# Patient Record
Sex: MALE | Race: WHITE | NOT HISPANIC OR LATINO | Employment: UNEMPLOYED | URBAN - METROPOLITAN AREA
[De-identification: names, ages, dates, MRNs, and addresses within clinical notes are randomized per-mention and may not be internally consistent; named-entity substitution may affect disease eponyms.]

---

## 2018-10-19 ENCOUNTER — HOSPITAL ENCOUNTER (OUTPATIENT)
Dept: CARDIOLOGY | Facility: HOSPITAL | Age: 53
Discharge: HOME | End: 2018-10-19
Attending: FAMILY MEDICINE | Admitting: FAMILY MEDICINE

## 2020-10-01 ENCOUNTER — INPATIENT HOSPITAL (OUTPATIENT)
Dept: URBAN - METROPOLITAN AREA HOSPITAL 76 | Facility: HOSPITAL | Age: 55
End: 2020-10-01

## 2020-10-01 DIAGNOSIS — K85.90 ACUTE PANCREATITIS WITHOUT NECROSIS OR INFECTION, UNSPECIFIE: ICD-10-CM

## 2020-10-01 DIAGNOSIS — R12 HEARTBURN: ICD-10-CM

## 2020-10-01 PROCEDURE — 99253 IP/OBS CNSLTJ NEW/EST LOW 45: CPT | Performed by: NURSE PRACTITIONER

## 2020-10-02 ENCOUNTER — INPATIENT HOSPITAL (OUTPATIENT)
Dept: URBAN - METROPOLITAN AREA HOSPITAL 76 | Facility: HOSPITAL | Age: 55
End: 2020-10-02

## 2020-10-02 DIAGNOSIS — K85.90 ACUTE PANCREATITIS WITHOUT NECROSIS OR INFECTION, UNSPECIFIE: ICD-10-CM

## 2020-10-02 DIAGNOSIS — R12 HEARTBURN: ICD-10-CM

## 2020-10-02 PROCEDURE — 99232 SBSQ HOSP IP/OBS MODERATE 35: CPT | Performed by: NURSE PRACTITIONER

## 2020-10-23 ENCOUNTER — OFFICE (OUTPATIENT)
Dept: URBAN - METROPOLITAN AREA CLINIC 64 | Facility: CLINIC | Age: 55
End: 2020-10-23

## 2020-10-23 VITALS
HEIGHT: 68 IN | HEART RATE: 81 BPM | SYSTOLIC BLOOD PRESSURE: 132 MMHG | DIASTOLIC BLOOD PRESSURE: 80 MMHG | WEIGHT: 241 LBS

## 2020-10-23 DIAGNOSIS — K85.90 ACUTE PANCREATITIS WITHOUT NECROSIS OR INFECTION, UNSPECIFIE: ICD-10-CM

## 2020-10-23 PROCEDURE — 99214 OFFICE O/P EST MOD 30 MIN: CPT | Performed by: INTERNAL MEDICINE

## 2020-11-11 ENCOUNTER — ON CAMPUS - OUTPATIENT (OUTPATIENT)
Dept: URBAN - METROPOLITAN AREA HOSPITAL 77 | Facility: HOSPITAL | Age: 55
End: 2020-11-11

## 2020-11-11 DIAGNOSIS — K85.90 ACUTE PANCREATITIS WITHOUT NECROSIS OR INFECTION, UNSPECIFIE: ICD-10-CM

## 2020-11-11 DIAGNOSIS — D17.5 BENIGN LIPOMATOUS NEOPLASM OF INTRA-ABDOMINAL ORGANS: ICD-10-CM

## 2020-11-11 PROCEDURE — 43259 EGD US EXAM DUODENUM/JEJUNUM: CPT | Performed by: INTERNAL MEDICINE

## 2022-11-15 ENCOUNTER — HOSPITAL ENCOUNTER (OUTPATIENT)
Facility: HOSPITAL | Age: 57
Discharge: HOME | End: 2022-11-15

## 2022-11-15 VITALS
OXYGEN SATURATION: 95 % | RESPIRATION RATE: 18 BRPM | BODY MASS INDEX: 36.98 KG/M2 | TEMPERATURE: 97.1 F | HEART RATE: 93 BPM | SYSTOLIC BLOOD PRESSURE: 140 MMHG | DIASTOLIC BLOOD PRESSURE: 94 MMHG | HEIGHT: 68 IN | WEIGHT: 244 LBS

## 2022-11-15 DIAGNOSIS — J10.1 INFLUENZA A: Primary | ICD-10-CM

## 2022-11-15 LAB
FLUAV SUBTYP SPEC NAA+PROBE: DETECTED
FLUBV RNA ISLT QL NAA+PROBE: NOT DETECTED
SARS-COV-2 RNA RESP QL NAA+PROBE: NOT DETECTED

## 2022-11-15 PROCEDURE — EDLOS: Performed by: PHYSICIAN ASSISTANT

## 2022-11-15 PROCEDURE — 87636 SARSCOV2 & INF A&B AMP PRB: CPT

## 2022-11-15 PROCEDURE — 99203 OFFICE O/P NEW LOW 30 MIN: CPT | Performed by: PHYSICIAN ASSISTANT

## 2022-11-15 PROCEDURE — 87636 SARSCOV2 & INF A&B AMP PRB: CPT | Performed by: PHYSICIAN ASSISTANT

## 2022-11-15 RX ORDER — GUAIFENESIN 600 MG/1
1200 TABLET, EXTENDED RELEASE ORAL 2 TIMES DAILY
Qty: 20 TABLET | Refills: 0 | Status: SHIPPED | OUTPATIENT
Start: 2022-11-15 | End: 2022-11-20

## 2022-11-15 RX ORDER — BENZONATATE 200 MG/1
200 CAPSULE ORAL 3 TIMES DAILY PRN
Qty: 15 CAPSULE | Refills: 0 | Status: SHIPPED | OUTPATIENT
Start: 2022-11-15 | End: 2022-11-22

## 2022-11-18 ENCOUNTER — HOSPITAL ENCOUNTER (EMERGENCY)
Facility: HOSPITAL | Age: 57
Discharge: HOME | End: 2022-11-18
Attending: EMERGENCY MEDICINE | Admitting: EMERGENCY MEDICINE

## 2022-11-18 ENCOUNTER — APPOINTMENT (OUTPATIENT)
Dept: GENERAL RADIOLOGY | Facility: HOSPITAL | Age: 57
End: 2022-11-18

## 2022-11-18 ENCOUNTER — APPOINTMENT (OUTPATIENT)
Dept: CT IMAGING | Facility: HOSPITAL | Age: 57
End: 2022-11-18

## 2022-11-18 VITALS
HEART RATE: 98 BPM | BODY MASS INDEX: 37.42 KG/M2 | WEIGHT: 246.91 LBS | TEMPERATURE: 100.4 F | SYSTOLIC BLOOD PRESSURE: 126 MMHG | HEIGHT: 68 IN | OXYGEN SATURATION: 94 % | RESPIRATION RATE: 16 BRPM | DIASTOLIC BLOOD PRESSURE: 77 MMHG

## 2022-11-18 DIAGNOSIS — J11.1 INFLUENZA: ICD-10-CM

## 2022-11-18 DIAGNOSIS — R50.9 FEVER, UNSPECIFIED FEVER CAUSE: Primary | ICD-10-CM

## 2022-11-18 LAB
ALBUMIN SERPL-MCNC: 3.9 G/DL (ref 3.5–5.2)
ALBUMIN/GLOB SERPL: 0.9 G/DL
ALP SERPL-CCNC: 83 U/L (ref 39–117)
ALT SERPL W P-5'-P-CCNC: 28 U/L (ref 1–41)
ANION GAP SERPL CALCULATED.3IONS-SCNC: 18 MMOL/L (ref 5–15)
AST SERPL-CCNC: 26 U/L (ref 1–40)
BASOPHILS # BLD AUTO: 0.1 10*3/MM3 (ref 0–0.2)
BASOPHILS NFR BLD AUTO: 0.7 % (ref 0–1.5)
BILIRUB SERPL-MCNC: 0.5 MG/DL (ref 0–1.2)
BUN SERPL-MCNC: 17 MG/DL (ref 6–20)
BUN/CREAT SERPL: 12.1 (ref 7–25)
CALCIUM SPEC-SCNC: 8.8 MG/DL (ref 8.6–10.5)
CHLORIDE SERPL-SCNC: 96 MMOL/L (ref 98–107)
CO2 SERPL-SCNC: 20 MMOL/L (ref 22–29)
CREAT SERPL-MCNC: 1.4 MG/DL (ref 0.76–1.27)
D-LACTATE SERPL-SCNC: 0.6 MMOL/L (ref 0.5–2)
DEPRECATED RDW RBC AUTO: 43.3 FL (ref 37–54)
EGFRCR SERPLBLD CKD-EPI 2021: 58.6 ML/MIN/1.73
EOSINOPHIL # BLD AUTO: 0 10*3/MM3 (ref 0–0.4)
EOSINOPHIL NFR BLD AUTO: 0.2 % (ref 0.3–6.2)
ERYTHROCYTE [DISTWIDTH] IN BLOOD BY AUTOMATED COUNT: 13.6 % (ref 12.3–15.4)
GLOBULIN UR ELPH-MCNC: 4.4 GM/DL
GLUCOSE SERPL-MCNC: 155 MG/DL (ref 65–99)
HCT VFR BLD AUTO: 45.4 % (ref 37.5–51)
HGB BLD-MCNC: 15.4 G/DL (ref 13–17.7)
HOLD SPECIMEN: NORMAL
HOLD SPECIMEN: NORMAL
LYMPHOCYTES # BLD AUTO: 1.9 10*3/MM3 (ref 0.7–3.1)
LYMPHOCYTES NFR BLD AUTO: 15.4 % (ref 19.6–45.3)
MCH RBC QN AUTO: 29.1 PG (ref 26.6–33)
MCHC RBC AUTO-ENTMCNC: 33.9 G/DL (ref 31.5–35.7)
MCV RBC AUTO: 86 FL (ref 79–97)
MONOCYTES # BLD AUTO: 1.5 10*3/MM3 (ref 0.1–0.9)
MONOCYTES NFR BLD AUTO: 12.4 % (ref 5–12)
NEUTROPHILS NFR BLD AUTO: 71.3 % (ref 42.7–76)
NEUTROPHILS NFR BLD AUTO: 8.6 10*3/MM3 (ref 1.7–7)
NRBC BLD AUTO-RTO: 0.1 /100 WBC (ref 0–0.2)
PLATELET # BLD AUTO: 241 10*3/MM3 (ref 140–450)
PMV BLD AUTO: 7.1 FL (ref 6–12)
POTASSIUM SERPL-SCNC: 4.1 MMOL/L (ref 3.5–5.2)
PROT SERPL-MCNC: 8.3 G/DL (ref 6–8.5)
RBC # BLD AUTO: 5.28 10*6/MM3 (ref 4.14–5.8)
SODIUM SERPL-SCNC: 134 MMOL/L (ref 136–145)
WBC NRBC COR # BLD: 12.1 10*3/MM3 (ref 3.4–10.8)
WHOLE BLOOD HOLD COAG: NORMAL
WHOLE BLOOD HOLD SPECIMEN: NORMAL

## 2022-11-18 PROCEDURE — 87040 BLOOD CULTURE FOR BACTERIA: CPT

## 2022-11-18 PROCEDURE — 71046 X-RAY EXAM CHEST 2 VIEWS: CPT

## 2022-11-18 PROCEDURE — 99284 EMERGENCY DEPT VISIT MOD MDM: CPT

## 2022-11-18 PROCEDURE — 85025 COMPLETE CBC W/AUTO DIFF WBC: CPT

## 2022-11-18 PROCEDURE — 83605 ASSAY OF LACTIC ACID: CPT

## 2022-11-18 PROCEDURE — 71250 CT THORAX DX C-: CPT

## 2022-11-18 PROCEDURE — 80053 COMPREHEN METABOLIC PANEL: CPT

## 2022-11-18 PROCEDURE — 36415 COLL VENOUS BLD VENIPUNCTURE: CPT

## 2022-11-18 RX ORDER — ACETAMINOPHEN 500 MG
1000 TABLET ORAL ONCE
Status: COMPLETED | OUTPATIENT
Start: 2022-11-18 | End: 2022-11-18

## 2022-11-18 RX ORDER — SODIUM CHLORIDE 0.9 % (FLUSH) 0.9 %
10 SYRINGE (ML) INJECTION AS NEEDED
Status: DISCONTINUED | OUTPATIENT
Start: 2022-11-18 | End: 2022-11-18 | Stop reason: HOSPADM

## 2022-11-18 RX ADMIN — SODIUM CHLORIDE, POTASSIUM CHLORIDE, SODIUM LACTATE AND CALCIUM CHLORIDE 1000 ML: 600; 310; 30; 20 INJECTION, SOLUTION INTRAVENOUS at 08:24

## 2022-11-18 RX ADMIN — ACETAMINOPHEN 1000 MG: 500 TABLET ORAL at 06:33

## 2022-11-18 NOTE — DISCHARGE INSTRUCTIONS
Rest plenty of fluids Tylenol for fever  Mucinex or Claritin but not both one of the other.  Return for increasing shortness of breath vomiting altered mental status cannot hold anything down chest pain no improvement next for 5 days or any other new or worsening problems or concerns.

## 2022-11-18 NOTE — ED PROVIDER NOTES
Subjective   History of Present Illness  Complaint fever cough short of breath weakness    History of present illness 57-year-old male who was diagnosed with influenza this past Tuesday he started getting sick on Tuesday with fever cough congestion has progressively gotten worse throughout the week he is little bit short of breath temperature of 203.  No vomiting or diarrhea.  No chest pain neck arm or jaw pain.  No ill exposure or foreign travels no antibiotic use or recent hospitalization.  Patient states he cannot sleep at night because of the cough and feels short of breath symptoms ongoing all week moderate-severe really nothing makes it better or worse and are continuous he has been home treating symptomatically.  He did receive a flu shot this year.  No tick bites or rashes no urinary complaints.  No rashes.  No other complaints or associated symptoms at this time.  Cough has been somewhat productive.        Review of Systems   Constitutional: Positive for chills and fever.   HENT: Positive for congestion and sinus pressure.    Eyes: Negative for photophobia and visual disturbance.   Respiratory: Positive for cough and shortness of breath. Negative for chest tightness.    Cardiovascular: Negative for chest pain and palpitations.   Gastrointestinal: Negative for abdominal pain and vomiting.   Genitourinary: Negative for difficulty urinating and dysuria.   Musculoskeletal: Positive for arthralgias and myalgias.   Skin: Negative for rash and wound.   Neurological: Positive for weakness and headaches.   Psychiatric/Behavioral: Negative for agitation and confusion.       Past Medical History:   Diagnosis Date   • Diverticulitis    • GERD (gastroesophageal reflux disease)    • Hypertension        No Known Allergies    Past Surgical History:   Procedure Laterality Date   • HERNIA REPAIR         No family history on file.    Social History     Socioeconomic History   • Marital status:    Tobacco Use   •  Smoking status: Never   • Smokeless tobacco: Never   Substance and Sexual Activity   • Alcohol use: Never   • Drug use: Never   • Sexual activity: Defer     Prior to Admission medications    Medication Sig Start Date End Date Taking? Authorizing Provider   ascorbic acid (VITAMIN C) 100 MG tablet VITAMIN C TABLET 11/28/16   Emergency, Nurse Monty, RN   benzonatate (TESSALON) 200 MG capsule Take 1 capsule by mouth 3 (Three) Times a Day As Needed for Cough for up to 7 days. 11/15/22 11/22/22  Kiko Ordaz II, PA-C   guaiFENesin (Mucinex) 600 MG 12 hr tablet Take 2 tablets by mouth 2 (Two) Times a Day for 5 days. 11/15/22 11/20/22  Kiko Ordaz II, PA-C   Krill Oil 1000 MG capsule KRILL OIL CAPS 11/28/16   Emergency, Nurse Monty, RN   lisinopril (PRINIVIL,ZESTRIL) 10 MG tablet LISINOPRIL 10 MG TABS 9/30/11   Emergency, Nurse Monty, RN   Loratadine-Pseudoephedrine (CLARITIN-D 12 HOUR PO) CLARITIN-D 12 HOUR XR12H-TAB 9/30/11   Emergency, Nurse Epic, RN           Objective   Physical Exam  Constitutional this is a 57-year-old male awake alert no distress temperature was 103.1 sats are 95% room air heart rate initially 116 respirations 20 blood pressure 134/82.  HEENT extraocular muscles are intact pupils equal and reactive there is no photophobia.  Mouth was clear.  Neck supple no adenopathy no meningeal signs.  Lungs clear no retractions.  Back no spinal tenderness or CVA tenderness.  Heart regular without murmur.  Abdomen soft nontender good bowel sounds no peritoneal findings or enlargement of liver or spleen.  No other masses.  Extremities pulses equal throughout upper lower extremities no edema no cords no Homans' sign no evidence of a DVT skin is warm and dry without rashes or cellulitic changes.  Neurologic awake alert follows commands motor strength normal without focal deficits.  Procedures           ED Course      Results for orders placed or performed during the hospital encounter of 11/18/22    Comprehensive Metabolic Panel    Specimen: Blood   Result Value Ref Range    Glucose 155 (H) 65 - 99 mg/dL    BUN 17 6 - 20 mg/dL    Creatinine 1.40 (H) 0.76 - 1.27 mg/dL    Sodium 134 (L) 136 - 145 mmol/L    Potassium 4.1 3.5 - 5.2 mmol/L    Chloride 96 (L) 98 - 107 mmol/L    CO2 20.0 (L) 22.0 - 29.0 mmol/L    Calcium 8.8 8.6 - 10.5 mg/dL    Total Protein 8.3 6.0 - 8.5 g/dL    Albumin 3.90 3.50 - 5.20 g/dL    ALT (SGPT) 28 1 - 41 U/L    AST (SGOT) 26 1 - 40 U/L    Alkaline Phosphatase 83 39 - 117 U/L    Total Bilirubin 0.5 0.0 - 1.2 mg/dL    Globulin 4.4 gm/dL    A/G Ratio 0.9 g/dL    BUN/Creatinine Ratio 12.1 7.0 - 25.0    Anion Gap 18.0 (H) 5.0 - 15.0 mmol/L    eGFR 58.6 (L) >60.0 mL/min/1.73   CBC Auto Differential    Specimen: Blood   Result Value Ref Range    WBC 12.10 (H) 3.40 - 10.80 10*3/mm3    RBC 5.28 4.14 - 5.80 10*6/mm3    Hemoglobin 15.4 13.0 - 17.7 g/dL    Hematocrit 45.4 37.5 - 51.0 %    MCV 86.0 79.0 - 97.0 fL    MCH 29.1 26.6 - 33.0 pg    MCHC 33.9 31.5 - 35.7 g/dL    RDW 13.6 12.3 - 15.4 %    RDW-SD 43.3 37.0 - 54.0 fl    MPV 7.1 6.0 - 12.0 fL    Platelets 241 140 - 450 10*3/mm3    Neutrophil % 71.3 42.7 - 76.0 %    Lymphocyte % 15.4 (L) 19.6 - 45.3 %    Monocyte % 12.4 (H) 5.0 - 12.0 %    Eosinophil % 0.2 (L) 0.3 - 6.2 %    Basophil % 0.7 0.0 - 1.5 %    Neutrophils, Absolute 8.60 (H) 1.70 - 7.00 10*3/mm3    Lymphocytes, Absolute 1.90 0.70 - 3.10 10*3/mm3    Monocytes, Absolute 1.50 (H) 0.10 - 0.90 10*3/mm3    Eosinophils, Absolute 0.00 0.00 - 0.40 10*3/mm3    Basophils, Absolute 0.10 0.00 - 0.20 10*3/mm3    nRBC 0.1 0.0 - 0.2 /100 WBC   POC Lactate    Specimen: Blood   Result Value Ref Range    Lactate 0.6 0.5 - 2.0 mmol/L   Green Top (Gel)   Result Value Ref Range    Extra Tube hide    Lavender Top   Result Value Ref Range    Extra Tube hold for add-on    Gold Top - SST   Result Value Ref Range    Extra Tube hide    Light Blue Top   Result Value Ref Range    Extra Tube Hold for add-ons.       XR Chest 2 View    Result Date: 11/18/2022  Hypoinflated lungs with right basilar airspace disease which may relate to atelectasis and/or pneumonia.  Electronically Signed By-Harlan Parsons MD On:11/18/2022 8:55 AM This report was finalized on [id number] by  Harlan Parsons MD.    CT Chest Without Contrast Diagnostic    Result Date: 11/18/2022   1. Mild bilateral lower lobe linear subsegmental atelectasis. No consolidation. 2. Small amount of mucus or secretions dependently within the right mainstem bronchus. 3. Steatotic liver.    Electronically Signed By-Taya Taylor MD On:11/18/2022 9:19 AM This report was finalized on [id number] by  Taya Taylor MD.    Medications   acetaminophen (TYLENOL) tablet 1,000 mg (1,000 mg Oral Given 11/18/22 0633)   lactated ringers bolus 1,000 mL (0 mL Intravenous Stopped 11/18/22 1014)                                            MDM  Number of Diagnoses or Management Options  Fever, unspecified fever cause: new and requires workup  Influenza: new and requires workup  Diagnosis management comments: Medical decision making.  IV established placed on a monitor with sinus rhythm on my review was given liter lactated Ringer's and a gram of Tylenol p.o. given.  Labs obtained by me chemistries glucose 155 sodium 134 CO2 20 creatinine was 1.4.  White count was 12.1 lactate was 0.6.  Chest x-ray obtained reviewed by me as well as radiology hyperinflated lungs right basilar airspace disease may be atelectasis or pneumonia.  The patient had a chest CT without contrast showed mild bilateral lower lobe lingular segmental atelectasis no consolidation small amount of mucus or secretions dependently within the right mainstem bronchus and fatty liver.  Patient repeat exam temperature was down to 100.4 blood pressure 126/77 heart rate 98 respiration 16.  The patient was resting company on repeat examination temperature down and was feeling better.  I will see him as pneumonia.  No evidence  of sepsis on exam no evidence of any skin changes or cellulitic changes no evidence of acute intra-abdominal process.  No evidence of meningitis or encephalitis.  At this time I talked to the patient about admission to the hospital overnight versus outpatient.  And after discussion with the patient he does not want to stay he wants to go home he will continue to treat this symptomatically we talked about the flu and pneumonia and symptoms that could occur worsening symptoms he voices understanding and he will be discharged home for outpatient management follow-up stable unremarkable ER course improved       Amount and/or Complexity of Data Reviewed  Clinical lab tests: reviewed  Tests in the radiology section of CPT®: reviewed    Risk of Complications, Morbidity, and/or Mortality  Presenting problems: high  Diagnostic procedures: high  Management options: high    Patient Progress  Patient progress: stable      Final diagnoses:   Fever, unspecified fever cause   Influenza       ED Disposition  ED Disposition     ED Disposition   Discharge    Condition   Stable    Comment   --             Silver Bobby MD  [address] [facility] [address]  [phone]    In 3 days           Medication List      No changes were made to your prescriptions during this visit.          Moe Scherer MD  11/18/22 7467

## 2022-11-23 LAB
BACTERIA SPEC AEROBE CULT: NORMAL
BACTERIA SPEC AEROBE CULT: NORMAL

## 2024-02-09 ENCOUNTER — OFFICE VISIT (OUTPATIENT)
Dept: FAMILY MEDICINE CLINIC | Facility: CLINIC | Age: 59
End: 2024-02-09
Payer: COMMERCIAL

## 2024-02-09 ENCOUNTER — LAB (OUTPATIENT)
Dept: FAMILY MEDICINE CLINIC | Facility: CLINIC | Age: 59
End: 2024-02-09
Payer: COMMERCIAL

## 2024-02-09 VITALS
OXYGEN SATURATION: 98 % | RESPIRATION RATE: 20 BRPM | SYSTOLIC BLOOD PRESSURE: 130 MMHG | WEIGHT: 242.6 LBS | DIASTOLIC BLOOD PRESSURE: 78 MMHG | BODY MASS INDEX: 36.77 KG/M2 | HEART RATE: 65 BPM | HEIGHT: 68 IN

## 2024-02-09 DIAGNOSIS — I10 PRIMARY HYPERTENSION: ICD-10-CM

## 2024-02-09 DIAGNOSIS — Z76.89 ENCOUNTER TO ESTABLISH CARE: Primary | ICD-10-CM

## 2024-02-09 DIAGNOSIS — E11.9 TYPE 2 DIABETES MELLITUS WITHOUT COMPLICATION, WITHOUT LONG-TERM CURRENT USE OF INSULIN: ICD-10-CM

## 2024-02-09 DIAGNOSIS — B35.6 TINEA CRURIS: ICD-10-CM

## 2024-02-09 DIAGNOSIS — E78.2 MIXED HYPERLIPIDEMIA: ICD-10-CM

## 2024-02-09 PROBLEM — J45.909 ASTHMA DUE TO ENVIRONMENTAL ALLERGIES: Status: ACTIVE | Noted: 2024-02-09

## 2024-02-09 PROBLEM — G47.33 OBSTRUCTIVE SLEEP APNEA SYNDROME: Status: ACTIVE | Noted: 2024-02-09

## 2024-02-09 PROBLEM — K21.9 GASTROESOPHAGEAL REFLUX DISEASE WITHOUT ESOPHAGITIS: Status: ACTIVE | Noted: 2024-02-09

## 2024-02-09 LAB
ALBUMIN SERPL-MCNC: 4.2 G/DL (ref 3.5–5.2)
ALBUMIN UR-MCNC: 2.1 MG/DL
ALBUMIN/GLOB SERPL: 1.2 G/DL
ALP SERPL-CCNC: 111 U/L (ref 39–117)
ALT SERPL W P-5'-P-CCNC: 41 U/L (ref 1–41)
ANION GAP SERPL CALCULATED.3IONS-SCNC: 10.3 MMOL/L (ref 5–15)
AST SERPL-CCNC: 27 U/L (ref 1–40)
BILIRUB SERPL-MCNC: 0.5 MG/DL (ref 0–1.2)
BUN SERPL-MCNC: 13 MG/DL (ref 6–20)
BUN/CREAT SERPL: 12.7 (ref 7–25)
CALCIUM SPEC-SCNC: 9.2 MG/DL (ref 8.6–10.5)
CHLORIDE SERPL-SCNC: 104 MMOL/L (ref 98–107)
CHOLEST SERPL-MCNC: 167 MG/DL (ref 0–200)
CO2 SERPL-SCNC: 25.7 MMOL/L (ref 22–29)
CREAT SERPL-MCNC: 1.02 MG/DL (ref 0.76–1.27)
CREAT UR-MCNC: 139.2 MG/DL
EGFRCR SERPLBLD CKD-EPI 2021: 85.2 ML/MIN/1.73
GLOBULIN UR ELPH-MCNC: 3.5 GM/DL
GLUCOSE SERPL-MCNC: 126 MG/DL (ref 65–99)
HBA1C MFR BLD: 7.9 % (ref 4.8–5.6)
HDLC SERPL-MCNC: 32 MG/DL (ref 40–60)
LDLC SERPL CALC-MCNC: 114 MG/DL (ref 0–100)
LDLC/HDLC SERPL: 3.49 {RATIO}
MICROALBUMIN/CREAT UR: 15.1 MG/G (ref 0–29)
POTASSIUM SERPL-SCNC: 4.5 MMOL/L (ref 3.5–5.2)
PROT SERPL-MCNC: 7.7 G/DL (ref 6–8.5)
SODIUM SERPL-SCNC: 140 MMOL/L (ref 136–145)
TRIGL SERPL-MCNC: 117 MG/DL (ref 0–150)
VLDLC SERPL-MCNC: 21 MG/DL (ref 5–40)

## 2024-02-09 PROCEDURE — 82043 UR ALBUMIN QUANTITATIVE: CPT | Performed by: NURSE PRACTITIONER

## 2024-02-09 PROCEDURE — 80053 COMPREHEN METABOLIC PANEL: CPT | Performed by: NURSE PRACTITIONER

## 2024-02-09 PROCEDURE — 80061 LIPID PANEL: CPT | Performed by: NURSE PRACTITIONER

## 2024-02-09 PROCEDURE — 82570 ASSAY OF URINE CREATININE: CPT | Performed by: NURSE PRACTITIONER

## 2024-02-09 PROCEDURE — 36415 COLL VENOUS BLD VENIPUNCTURE: CPT

## 2024-02-09 PROCEDURE — 83036 HEMOGLOBIN GLYCOSYLATED A1C: CPT | Performed by: NURSE PRACTITIONER

## 2024-02-09 RX ORDER — ROSUVASTATIN CALCIUM 10 MG/1
10 TABLET, COATED ORAL DAILY
COMMUNITY
Start: 2023-12-07 | End: 2024-02-12 | Stop reason: SDUPTHER

## 2024-02-09 RX ORDER — METOPROLOL SUCCINATE 100 MG/1
100 TABLET, EXTENDED RELEASE ORAL DAILY
COMMUNITY
Start: 2023-11-17 | End: 2024-02-09 | Stop reason: SDUPTHER

## 2024-02-09 RX ORDER — FLUCONAZOLE 150 MG/1
150 TABLET ORAL ONCE
Qty: 2 TABLET | Refills: 0 | Status: SHIPPED | OUTPATIENT
Start: 2024-02-09 | End: 2024-02-09

## 2024-02-09 RX ORDER — MECOBALAMIN 5000 MCG
15 TABLET,DISINTEGRATING ORAL DAILY
COMMUNITY

## 2024-02-09 RX ORDER — METOPROLOL SUCCINATE 100 MG/1
100 TABLET, EXTENDED RELEASE ORAL DAILY
Qty: 90 TABLET | Refills: 3 | Status: SHIPPED | OUTPATIENT
Start: 2024-02-09

## 2024-02-09 RX ORDER — PSEUDOEPHEDRINE HCL 120 MG/1
120 TABLET, FILM COATED, EXTENDED RELEASE ORAL EVERY 12 HOURS
COMMUNITY

## 2024-02-09 NOTE — PROGRESS NOTES
"Chief Complaint  Establish Care    Subjective          Ismael Pulido presents to [facility]  History of Present Illness    Is a new patient, here today to establish care    Has h/o allergies, HTN, DM, dyslipidemia, depression, gerd, plantar fasciitis (resolved), sleep apnea, tinea cruris  Medicines are reported to be vitamin c, lansoprazole, claritin-d, metformin 500 mg bid, metoprolol xl 100 mg qd, sufdafed, crestor 10 mg, cpap    Diet is reported to be fairly healthy with room for improvement    Does not exercise regularly, he is active at work since his job change in August    HTN is managed with metoprolol xl 100 mg qd, he tells me that he is taking this regularly as prescribed  He denies any c/o chest pain, soa, edema, ha,dizziness, weakness    DM is managed with metformin 500 mg bid although he tells me that he has not taken it in 2 months  He endorses that he thinks he has \"pre-diabetes\", takes the metformin, but does not think it works  He believes his A1C will be better since his job change  A1C in May was 8.6, was started on jardiance but it was too expensive, changed to metformin    Cholesterol is managed with crestor 10 mg qd    Colon cancer screening, has had multiple colonoscopies, he is not sure of the last one or anticipated follow up    Is fasting for labs    Review of Systems   Constitutional: Negative.  Negative for appetite change, fatigue and fever.   Respiratory: Negative.  Negative for shortness of breath.    Cardiovascular: Negative.  Negative for chest pain and palpitations.   Gastrointestinal:  Negative for abdominal pain, constipation and diarrhea.        Has h/o hemorrhoid, uses preparation H  Uses lansoprazole for gerd symptoms     Genitourinary: Negative.  Negative for dysuria, frequency and urgency.        Persistent jock itch since working at [employer]   Musculoskeletal: Negative.  Negative for arthralgias and myalgias.   Skin:         " "Endorses recurrent jock itch since starting work at [employer]  Has been using an antifungal powder and an anti itch cream  The rash gets better and worse  He endorses that he sweats a lot at work   Allergic/Immunologic: Positive for environmental allergies.   Neurological:  Negative for dizziness, weakness and headaches.        Endorses some headache when his sinuses are giving him trouble   Psychiatric/Behavioral: Negative.  Negative for dysphoric mood and sleep disturbance. The patient is not nervous/anxious.         Endorses that he sleeps well at night with the cpap       Objective   Vital Signs:  /78   Pulse 65   Resp 20   Ht 172.7 cm (68\")   Wt 110 kg (242 lb 9.6 oz)   SpO2 98%   BMI 36.89 kg/m²     BP Readings from Last 3 Encounters:   02/09/24 130/78   11/18/22 126/77   11/15/22 140/94        Wt Readings from Last 3 Encounters:   02/09/24 110 kg (242 lb 9.6 oz)   11/18/22 112 kg (246 lb 14.6 oz)   11/15/22 111 kg (244 lb)      Physical Exam  Vitals reviewed.   Constitutional:       Appearance: Normal appearance. He is obese.   HENT:      Right Ear: Tympanic membrane, ear canal and external ear normal.      Left Ear: Tympanic membrane, ear canal and external ear normal.      Nose: Nose normal.      Mouth/Throat:      Mouth: Mucous membranes are moist.      Pharynx: Oropharynx is clear.   Neck:      Thyroid: No thyromegaly.      Vascular: No carotid bruit.   Cardiovascular:      Rate and Rhythm: Normal rate and regular rhythm.      Pulses: Normal pulses.      Heart sounds: Normal heart sounds.   Pulmonary:      Effort: Pulmonary effort is normal.      Breath sounds: Normal breath sounds.   Abdominal:      General: Bowel sounds are normal.      Palpations: Abdomen is soft.      Tenderness: There is no abdominal tenderness. There is no right CVA tenderness or left CVA tenderness.   Musculoskeletal:      Cervical back: Neck supple. No tenderness.      Right lower leg: No edema.      Left " lower leg: No edema.   Lymphadenopathy:      Cervical: No cervical adenopathy.   Skin:     General: Skin is warm.   Neurological:      Mental Status: He is alert and oriented to person, place, and time.   Psychiatric:         Mood and Affect: Mood normal.         Behavior: Behavior normal.        Result Review :                 Assessment and Plan    Diagnoses and all orders for this visit:    1. Encounter to establish care (Primary)    2. Type 2 diabetes mellitus without complication, without long-term current use of insulin  -     Hemoglobin A1c; Future  -     Microalbumin / Creatinine Urine Ratio - Urine, Clean Catch  -     Comprehensive Metabolic Panel; Future    3. Primary hypertension  -     Comprehensive Metabolic Panel; Future  -     metoprolol succinate XL (TOPROL-XL) 100 MG 24 hr tablet; Take 1 tablet by mouth Daily.  Dispense: 90 tablet; Refill: 3    4. Mixed hyperlipidemia  -     Comprehensive Metabolic Panel; Future  -     Lipid Panel; Future    5. Tinea cruris  -     fluconazole (Diflucan) 150 MG tablet; Take 1 tablet by mouth 1 (One) Time for 1 dose. Repeat in 7 days if symptoms persist  Dispense: 2 tablet; Refill: 0           Follow Up   Return in about 6 months (around 8/9/2024), or as needed, for Recheck, Annual physical.  Patient was given instructions and counseling regarding his condition or for health maintenance advice. Please see specific information pulled into the AVS if appropriate.

## 2024-02-12 RX ORDER — ROSUVASTATIN CALCIUM 10 MG/1
10 TABLET, COATED ORAL DAILY
Qty: 90 TABLET | Refills: 1 | Status: SHIPPED | OUTPATIENT
Start: 2024-02-12

## 2024-03-18 ENCOUNTER — OFFICE VISIT (OUTPATIENT)
Dept: FAMILY MEDICINE CLINIC | Facility: CLINIC | Age: 59
End: 2024-03-18
Payer: COMMERCIAL

## 2024-03-18 ENCOUNTER — LAB (OUTPATIENT)
Dept: LAB | Facility: HOSPITAL | Age: 59
End: 2024-03-18
Payer: COMMERCIAL

## 2024-03-18 VITALS
WEIGHT: 239 LBS | RESPIRATION RATE: 18 BRPM | DIASTOLIC BLOOD PRESSURE: 78 MMHG | HEIGHT: 68 IN | SYSTOLIC BLOOD PRESSURE: 126 MMHG | BODY MASS INDEX: 36.22 KG/M2 | HEART RATE: 70 BPM | OXYGEN SATURATION: 96 %

## 2024-03-18 DIAGNOSIS — E11.9 TYPE 2 DIABETES MELLITUS WITHOUT COMPLICATION, WITHOUT LONG-TERM CURRENT USE OF INSULIN: ICD-10-CM

## 2024-03-18 DIAGNOSIS — R19.7 DIARRHEA, UNSPECIFIED TYPE: ICD-10-CM

## 2024-03-18 DIAGNOSIS — R19.7 DIARRHEA, UNSPECIFIED TYPE: Primary | ICD-10-CM

## 2024-03-18 PROBLEM — K64.8 OTHER HEMORRHOIDS: Status: ACTIVE | Noted: 2024-03-18

## 2024-03-18 PROBLEM — K57.92 DIVERTICULITIS: Status: ACTIVE | Noted: 2024-03-18

## 2024-03-18 LAB
ALBUMIN SERPL-MCNC: 4.1 G/DL (ref 3.5–5.2)
ALBUMIN/GLOB SERPL: 1.1 G/DL
ALP SERPL-CCNC: 112 U/L (ref 39–117)
ALT SERPL W P-5'-P-CCNC: 43 U/L (ref 1–41)
ANION GAP SERPL CALCULATED.3IONS-SCNC: 13.4 MMOL/L (ref 5–15)
AST SERPL-CCNC: 28 U/L (ref 1–40)
BILIRUB SERPL-MCNC: 0.4 MG/DL (ref 0–1.2)
BUN SERPL-MCNC: 11 MG/DL (ref 6–20)
BUN/CREAT SERPL: 12 (ref 7–25)
CALCIUM SPEC-SCNC: 9.3 MG/DL (ref 8.6–10.5)
CHLORIDE SERPL-SCNC: 106 MMOL/L (ref 98–107)
CO2 SERPL-SCNC: 21.6 MMOL/L (ref 22–29)
CREAT SERPL-MCNC: 0.92 MG/DL (ref 0.76–1.27)
DEPRECATED RDW RBC AUTO: 40.5 FL (ref 37–54)
EGFRCR SERPLBLD CKD-EPI 2021: 96.4 ML/MIN/1.73
ERYTHROCYTE [DISTWIDTH] IN BLOOD BY AUTOMATED COUNT: 13 % (ref 12.3–15.4)
GLOBULIN UR ELPH-MCNC: 3.7 GM/DL
GLUCOSE SERPL-MCNC: 105 MG/DL (ref 65–99)
HCT VFR BLD AUTO: 47.6 % (ref 37.5–51)
HGB BLD-MCNC: 16.2 G/DL (ref 13–17.7)
MCH RBC QN AUTO: 29.8 PG (ref 26.6–33)
MCHC RBC AUTO-ENTMCNC: 34 G/DL (ref 31.5–35.7)
MCV RBC AUTO: 87.5 FL (ref 79–97)
PLATELET # BLD AUTO: 224 10*3/MM3 (ref 140–450)
PMV BLD AUTO: 9.7 FL (ref 6–12)
POTASSIUM SERPL-SCNC: 4.4 MMOL/L (ref 3.5–5.2)
PROT SERPL-MCNC: 7.8 G/DL (ref 6–8.5)
RBC # BLD AUTO: 5.44 10*6/MM3 (ref 4.14–5.8)
SODIUM SERPL-SCNC: 141 MMOL/L (ref 136–145)
WBC NRBC COR # BLD AUTO: 8.01 10*3/MM3 (ref 3.4–10.8)

## 2024-03-18 PROCEDURE — 85027 COMPLETE CBC AUTOMATED: CPT

## 2024-03-18 PROCEDURE — 80053 COMPREHEN METABOLIC PANEL: CPT

## 2024-03-18 PROCEDURE — 36415 COLL VENOUS BLD VENIPUNCTURE: CPT

## 2024-03-18 PROCEDURE — 87338 HPYLORI STOOL AG IA: CPT

## 2024-03-18 NOTE — PROGRESS NOTES
"Chief Complaint  Diarrhea    Subjective          Ismael Pulido presents to [facility]  History of Present Illness     h/o allergies, HTN, DM, dyslipidemia, depression, gerd, plantar fasciitis (resolved), sleep apnea, tinea cruris, diverticulitis, hemorrhoid  Medicines are reported to be vitamin c, lansoprazole, claritin-d, metformin 500 mg bid, metoprolol xl 100 mg qd, sufdafed, crestor 10 mg, cpap    Is here today with c/o diarrhea     He endorses that he has had diarrhea every Monday for 3 weeks, has noticed blood in the toilet after th diarrhea has gotten going, tells me that he has lost 5 pounds     He has recently restarted the metformin, had been off of it for several months - he does not recall having this type of reaction to the metformin in the past    He has used jardiance in the past and it was effective for him, however his cost was too high ($500) to be affordable to him  Will try again with this medicine since it is anew plan year, it is listed as preferred with PA needed when prescribed today    Colonoscopy was last done in 2017  Review of Systems   Constitutional: Negative.  Negative for appetite change, fatigue and fever.   Respiratory: Negative.  Negative for shortness of breath.    Cardiovascular: Negative.  Negative for chest pain.   Gastrointestinal:  Positive for blood in stool and diarrhea. Negative for abdominal pain.        Endorses that each week on Sunday night he develops belching and reflux which leads to diarrhea through the night and into Monday  He has seen blood after the diarrhea on the last 2 Monday's  He does endorse that he has a hemorrhoid      He has been using pepto bismol for a day or two and it makes his symptoms resolve   Psychiatric/Behavioral: Negative.  Negative for sleep disturbance.      Objective   Vital Signs:  /78   Pulse 70   Resp 18   Ht 172.7 cm (67.99\")   Wt 108 kg (239 lb)   SpO2 96%   BMI 36.35 kg/m²     BP " Readings from Last 3 Encounters:   03/18/24 126/78   02/09/24 130/78   11/18/22 126/77        Wt Readings from Last 3 Encounters:   03/18/24 108 kg (239 lb)   02/09/24 110 kg (242 lb 9.6 oz)   11/18/22 112 kg (246 lb 14.6 oz)       Physical Exam  Vitals reviewed.   Constitutional:       Appearance: Normal appearance.   Neck:      Vascular: No carotid bruit.   Cardiovascular:      Rate and Rhythm: Normal rate and regular rhythm.      Heart sounds: Normal heart sounds.   Pulmonary:      Effort: Pulmonary effort is normal.      Breath sounds: Normal breath sounds.   Abdominal:      General: Bowel sounds are normal.      Palpations: Abdomen is soft.      Tenderness: There is no abdominal tenderness. There is no right CVA tenderness or left CVA tenderness.   Musculoskeletal:         General: Normal range of motion.      Cervical back: Neck supple.      Right lower leg: No edema.      Left lower leg: No edema.   Skin:     General: Skin is warm.   Neurological:      Mental Status: He is alert and oriented to person, place, and time.   Psychiatric:         Mood and Affect: Mood normal.         Behavior: Behavior normal.        Result Review :     CMP          2/9/2024    11:13   CMP   Glucose 126    BUN 13    Creatinine 1.02    EGFR 85.2    Sodium 140    Potassium 4.5    Chloride 104    Calcium 9.2    Total Protein 7.7    Albumin 4.2    Globulin 3.5    Total Bilirubin 0.5    Alkaline Phosphatase 111    AST (SGOT) 27    ALT (SGPT) 41    Albumin/Globulin Ratio 1.2    BUN/Creatinine Ratio 12.7    Anion Gap 10.3      Most Recent A1C          2/9/2024    11:13   HGBA1C Most Recent   Hemoglobin A1C 7.90      Microalbumin          2/9/2024    11:13   Microalbumin   Microalbumin, Urine 2.1                Assessment and Plan    Diagnoses and all orders for this visit:    1. Diarrhea, unspecified type (Primary)  -     Comprehensive metabolic panel; Future  -     CBC No Differential; Future  -     H. Pylori Antigen, Stool - Stool, Per  Rectum; Future    2. Type 2 diabetes mellitus without complication, without long-term current use of insulin  -     empagliflozin (JARDIANCE) 10 MG tablet tablet; Take 1 tablet by mouth Daily.  Dispense: 30 tablet; Refill: 2    Check labs, d/c metformin,start jardiance  Referral for EGD/Colonoscopy if persists       Follow Up   Return in about 2 weeks (around 4/1/2024) for Recheck diarrhea.  Patient was given instructions and counseling regarding his condition or for health maintenance advice. Please see specific information pulled into the AVS if appropriate.

## 2024-03-18 NOTE — PATIENT INSTRUCTIONS
Dietary modification may be helpful:  For patients with watery diarrhea, encourage consumption of boiled starches (eg, potatoes, noodles, rice, wheat, and oat) with salt; crackers, bananas, soup, and boiled vegetables are also reasonable options.  Avoid foods with high fat content.  Temporary avoidance of many lactose-containing foods may be helpful; dairy products (except yogurt) may be difficult to digest due to secondary lactose malabsorption, which may last for several weeks to months

## 2024-03-19 LAB — H PYLORI AG STL QL IA: NEGATIVE

## 2024-03-20 ENCOUNTER — TELEPHONE (OUTPATIENT)
Dept: FAMILY MEDICINE CLINIC | Facility: CLINIC | Age: 59
End: 2024-03-20
Payer: COMMERCIAL

## 2024-03-20 NOTE — TELEPHONE ENCOUNTER
Please Relay:  Left Ismael Pulido a message to call our office back for these results.     Blood counts are normal, H. pylori stool test is negative, and the metabolic panel is fairly unremarkable with a couple of slightly abnormal values.

## 2024-04-05 ENCOUNTER — OFFICE VISIT (OUTPATIENT)
Dept: FAMILY MEDICINE CLINIC | Facility: CLINIC | Age: 59
End: 2024-04-05
Payer: COMMERCIAL

## 2024-04-05 VITALS
BODY MASS INDEX: 36.68 KG/M2 | OXYGEN SATURATION: 95 % | WEIGHT: 242 LBS | RESPIRATION RATE: 18 BRPM | HEIGHT: 68 IN | DIASTOLIC BLOOD PRESSURE: 82 MMHG | HEART RATE: 88 BPM | SYSTOLIC BLOOD PRESSURE: 134 MMHG

## 2024-04-05 DIAGNOSIS — R19.7 DIARRHEA, UNSPECIFIED TYPE: Primary | ICD-10-CM

## 2024-04-05 DIAGNOSIS — E11.9 TYPE 2 DIABETES MELLITUS WITHOUT COMPLICATION, WITHOUT LONG-TERM CURRENT USE OF INSULIN: ICD-10-CM

## 2024-04-05 DIAGNOSIS — B35.6 TINEA CRURIS: ICD-10-CM

## 2024-04-05 RX ORDER — FLUCONAZOLE 150 MG/1
150 TABLET ORAL WEEKLY
Qty: 4 TABLET | Refills: 0 | Status: SHIPPED | OUTPATIENT
Start: 2024-04-05

## 2024-04-05 NOTE — PROGRESS NOTES
"Chief Complaint  Results (Follow up )    Subjective          Ismael Pulido presents to [facility]  History of Present Illness    Is here today for follow up with results     h/o allergies, HTN, DM, dyslipidemia, depression, gerd, plantar fasciitis (resolved), sleep apnea, tinea cruris, diverticulitis, hemorrhoid    Current medicines are jardiance 10 mg, vitamin c, claritin-d, metoprolol xl 100 mg qd, sufdafed, crestor 10 mg, neuriva, cpap    Am fasting blood sugars have been running 120-160, evenings have been 100-110    Review of Systems   Constitutional: Negative.  Negative for appetite change, fatigue and fever.   Gastrointestinal:  Negative for abdominal pain and diarrhea.        He endorses that the diarrhea has resolved   Genitourinary: Negative.      Objective   Vital Signs:  /82 (BP Location: Left arm, Patient Position: Sitting)   Pulse 88   Resp 18   Ht 172.7 cm (67.99\")   Wt 110 kg (242 lb)   SpO2 95%   BMI 36.80 kg/m²     BP Readings from Last 3 Encounters:   04/05/24 134/82   03/18/24 126/78   02/09/24 130/78        Wt Readings from Last 3 Encounters:   04/05/24 110 kg (242 lb)   03/18/24 108 kg (239 lb)   02/09/24 110 kg (242 lb 9.6 oz)     Physical Exam  Vitals reviewed.   Constitutional:       Appearance: Normal appearance. He is obese.   Neck:      Vascular: No carotid bruit.   Cardiovascular:      Rate and Rhythm: Normal rate and regular rhythm.      Pulses: Normal pulses.      Heart sounds: Normal heart sounds.   Pulmonary:      Effort: Pulmonary effort is normal.      Breath sounds: Normal breath sounds.   Musculoskeletal:      Right lower leg: No edema.      Left lower leg: No edema.   Skin:     General: Skin is warm.   Neurological:      Mental Status: He is alert and oriented to person, place, and time.   Psychiatric:         Mood and Affect: Mood normal.         Behavior: Behavior normal.        Result Review :     CMP          2/9/2024    " 11:13 3/18/2024    13:47   CMP   Glucose 126  105    BUN 13  11    Creatinine 1.02  0.92    EGFR 85.2  96.4    Sodium 140  141    Potassium 4.5  4.4    Chloride 104  106    Calcium 9.2  9.3    Total Protein 7.7  7.8    Albumin 4.2  4.1    Globulin 3.5  3.7    Total Bilirubin 0.5  0.4    Alkaline Phosphatase 111  112    AST (SGOT) 27  28    ALT (SGPT) 41  43    Albumin/Globulin Ratio 1.2  1.1    BUN/Creatinine Ratio 12.7  12.0    Anion Gap 10.3  13.4      CBC          3/18/2024    13:47   CBC   WBC 8.01    RBC 5.44    Hemoglobin 16.2    Hematocrit 47.6    MCV 87.5    MCH 29.8    MCHC 34.0    RDW 13.0    Platelets 224      Lipid Panel          2/9/2024    11:13   Lipid Panel   Total Cholesterol 167    Triglycerides 117    HDL Cholesterol 32    VLDL Cholesterol 21    LDL Cholesterol  114    LDL/HDL Ratio 3.49      A1C Last 3 Results          2/9/2024    11:13   HGBA1C Last 3 Results   Hemoglobin A1C 7.90      Microalbumin          2/9/2024    11:13   Microalbumin   Microalbumin, Urine 2.1                Assessment and Plan    Diagnoses and all orders for this visit:    1. Diarrhea, unspecified type (Primary)    2. Type 2 diabetes mellitus without complication, without long-term current use of insulin    3. Tinea cruris  -     fluconazole (Diflucan) 150 MG tablet; Take 1 tablet by mouth 1 (One) Time Per Week.  Dispense: 4 tablet; Refill: 0    Continue jardiance, limit sweets, sweet drinks       Follow Up   Return in about 2 months (around 6/5/2024) for Recheck DM.  Patient was given instructions and counseling regarding his condition or for health maintenance advice. Please see specific information pulled into the AVS if appropriate.

## 2024-06-08 DIAGNOSIS — E11.9 TYPE 2 DIABETES MELLITUS WITHOUT COMPLICATION, WITHOUT LONG-TERM CURRENT USE OF INSULIN: ICD-10-CM

## 2024-06-10 RX ORDER — EMPAGLIFLOZIN 10 MG/1
10 TABLET, FILM COATED ORAL DAILY
Qty: 30 TABLET | Refills: 2 | Status: SHIPPED | OUTPATIENT
Start: 2024-06-10

## 2024-06-14 ENCOUNTER — OFFICE VISIT (OUTPATIENT)
Dept: FAMILY MEDICINE CLINIC | Facility: CLINIC | Age: 59
End: 2024-06-14
Payer: COMMERCIAL

## 2024-06-14 VITALS
SYSTOLIC BLOOD PRESSURE: 120 MMHG | BODY MASS INDEX: 36.22 KG/M2 | HEART RATE: 68 BPM | WEIGHT: 239 LBS | DIASTOLIC BLOOD PRESSURE: 80 MMHG | RESPIRATION RATE: 16 BRPM | HEIGHT: 68 IN | OXYGEN SATURATION: 95 %

## 2024-06-14 DIAGNOSIS — B35.3 TINEA PEDIS OF BOTH FEET: ICD-10-CM

## 2024-06-14 DIAGNOSIS — L30.9 DERMATITIS: Primary | ICD-10-CM

## 2024-06-14 DIAGNOSIS — B35.6 TINEA CRURIS: ICD-10-CM

## 2024-06-14 PROCEDURE — 99213 OFFICE O/P EST LOW 20 MIN: CPT | Performed by: STUDENT IN AN ORGANIZED HEALTH CARE EDUCATION/TRAINING PROGRAM

## 2024-06-14 RX ORDER — LORATADINE 10 MG/1
10 TABLET ORAL DAILY
COMMUNITY

## 2024-06-14 RX ORDER — CLOTRIMAZOLE 1 %
1 CREAM (GRAM) TOPICAL 2 TIMES DAILY
Qty: 85 G | Refills: 3 | Status: SHIPPED | OUTPATIENT
Start: 2024-06-14

## 2024-06-14 RX ORDER — TRIAMCINOLONE ACETONIDE 1 MG/G
1 OINTMENT TOPICAL 2 TIMES DAILY
Qty: 80 G | Refills: 3 | Status: SHIPPED | OUTPATIENT
Start: 2024-06-14 | End: 2024-06-28

## 2024-06-14 RX ORDER — MECOBALAMIN 5000 MCG
15 TABLET,DISINTEGRATING ORAL DAILY
COMMUNITY

## 2024-06-14 NOTE — PROGRESS NOTES
[facility] [place] [facility]  Established Patient Visit  06/14/2024     Patient Name: Ismael Pulido   YOB: 1965   Medical Record Number: [id number]   Primary Care Physician: Sharon Silva MD     Subjective   Chief Complaint     Chief Complaint   Patient presents with    Establish Care       Ismael Pulido is a 58 y.o. male who presents to clinic to establish care.  He wants to discuss rash on his abdomen and groin as well as his feet.  He was previously a patient of Fide Marroquin and is here to establish with me as his new PCP, states he needs a doctor who works on Fridays as that is when he is off from work and can make his appointments.      History of Present Illness     Rash on abdomen: Works at [employer] and exposed to sawdust which is what he thinks has caused skin reaction on his lower abdomen.  Area is red and itchy and is persistent for the past several weeks.  He tries not to scratch the area and tries to allow area to heal but has continued exposure and states that area never seems to resolve.    Jock itch, athlete's foot: Has rash from fungus in groin and on testicles that keeps coming back and now also has similar itchy/peeling rash on both feet.  States that he does sweat a lot at work but cannot avoid this.  He knows that the moisture in these areas is causing the rash to remain.  He has been treated with Diflucan without significant improvement in the rash.  Denies any signs of secondary infection.        Review of Systems   A medically appropriate and patient-specific review of systems was performed. Pertinent findings are mentioned in the HPI, with no additional significant findings beyond those already noted.    Patient History   The following portions of the patient's history were reviewed and updated as appropriate:   allergies, current medications, problem list, PMHx, PSHx, PFHx, past social history      Objective   Vitals     Vitals:    06/14/24 1110  "  BP: 120/80   Pulse: 68   Resp: 16   SpO2: 95%   Weight: 108 kg (239 lb)   Height: 172.7 cm (68\")        Physical Exam   Constitutional: Alert, well-appearing, no acute distress  HENT: NCAT, mucous membranes moist  Neck: Supple, no thyromegaly  Respiratory: No respiratory distress, good effort and air entry, clear to auscultation bilaterally   Cardiovascular: RRR, no LE edema  Psychiatric: Appropriate mood and affect, cooperative  Skin: Erythematous maculopapular lesions on lower abdomen with slight eczematous changes and excoriations; erythematous and slightly peeling rash noted on bilateral feet, groin/testicle exam deferred today          Assessment & Plan       Diagnoses and all orders for this visit:    Dermatitis  Comments:  Contact dermatitis on abdomen secondary to sawdust and occupational exposure.  Treat with triamcinolone 0.1% ointment  Orders:  -     triamcinolone (KENALOG) 0.1 % ointment; Apply 1 Application topically to the appropriate area as directed 2 (Two) Times a Day for 14 days. To abdomen    Tinea cruris  Comments:  Recommended to also apply clotrimazole 1% cream to groin area as well as this is the same type of fungal infection on his foot  Orders:  -     clotrimazole (LOTRIMIN) 1 % cream; Apply 1 Application topically to the appropriate area as directed 2 (Two) Times a Day.    Tinea pedis of both feet  Comments:  Continue to apply clotrimazole 1% cream as directed.  Supportive care and hygiene measures discussed  Orders:  -     clotrimazole (LOTRIMIN) 1 % cream; Apply 1 Application topically to the appropriate area as directed 2 (Two) Times a Day.        Follow Up   Return if symptoms worsen or fail to improve.  Follow-up in August as scheduled for management of chronic conditions.    Patient was given instructions and counseling regarding his condition or for health maintenance advice. Please see specific information pulled into the AVS if appropriate.     This medical documentation was " produced in part using speech recognition software (Dragon Dictation System) and may contain errors related to that system including errors in grammar, punctuation, and spelling, as well as words and phrases that may be inappropriate and not intentional --- If there are any questions or concerns please feel free to contact me, the dictating provider, for clarification.        Disclaimer For Patients: Per the Federal 21st Century CURES Act and as of April 2021, medical records (including provider notes and laboratory/imaging results) are to be made available to patient’s and/or their designees as soon as the documents are signed/resulted. While the intention is to ensure transparency and to engage patients in their healthcare, this immediate access may create unintended consequences. This document uses language intended for communication between medical experts and diagnostic results are often interpreted with the entirety of the patient’s clinical picture in mind. It is recommended that patients and/or their designees review all available information with their primary or specialist providers for explanation and guidance to avoid misinterpretation based on layperson understanding, non-medical expert opinions, or internet searches.      Sharon Silva MD

## 2024-08-09 ENCOUNTER — LAB (OUTPATIENT)
Dept: FAMILY MEDICINE CLINIC | Facility: CLINIC | Age: 59
End: 2024-08-09
Payer: COMMERCIAL

## 2024-08-09 ENCOUNTER — OFFICE VISIT (OUTPATIENT)
Dept: FAMILY MEDICINE CLINIC | Facility: CLINIC | Age: 59
End: 2024-08-09
Payer: COMMERCIAL

## 2024-08-09 VITALS
SYSTOLIC BLOOD PRESSURE: 120 MMHG | OXYGEN SATURATION: 92 % | HEART RATE: 66 BPM | WEIGHT: 239 LBS | BODY MASS INDEX: 36.22 KG/M2 | HEIGHT: 68 IN | DIASTOLIC BLOOD PRESSURE: 78 MMHG | RESPIRATION RATE: 16 BRPM

## 2024-08-09 DIAGNOSIS — I10 PRIMARY HYPERTENSION: ICD-10-CM

## 2024-08-09 DIAGNOSIS — E78.2 MIXED HYPERLIPIDEMIA: ICD-10-CM

## 2024-08-09 DIAGNOSIS — E11.9 TYPE 2 DIABETES MELLITUS WITHOUT COMPLICATION, WITHOUT LONG-TERM CURRENT USE OF INSULIN: ICD-10-CM

## 2024-08-09 DIAGNOSIS — B35.6 TINEA CRURIS: ICD-10-CM

## 2024-08-09 DIAGNOSIS — E11.9 TYPE 2 DIABETES MELLITUS WITHOUT COMPLICATION, WITHOUT LONG-TERM CURRENT USE OF INSULIN: Primary | ICD-10-CM

## 2024-08-09 PROCEDURE — 99214 OFFICE O/P EST MOD 30 MIN: CPT | Performed by: STUDENT IN AN ORGANIZED HEALTH CARE EDUCATION/TRAINING PROGRAM

## 2024-08-09 PROCEDURE — 83036 HEMOGLOBIN GLYCOSYLATED A1C: CPT | Performed by: STUDENT IN AN ORGANIZED HEALTH CARE EDUCATION/TRAINING PROGRAM

## 2024-08-09 PROCEDURE — 36415 COLL VENOUS BLD VENIPUNCTURE: CPT

## 2024-08-09 NOTE — PROGRESS NOTES
"[facility] [place] [facility] - [facility]  08/09/2024     Patient Name: Ismael Pulido   YOB: 1965   Medical Record Number: [id number]   Primary Care Physician: Sharon Silva MD     Subjective   Chief Complaint     Chief Complaint   Patient presents with    Diabetes    Hypertension         History of Present Illness     Jock Itch: better; recurrent at times bc of moisture environment     T2DM:   - checks fasting BG daily - avg 150  - less nocturia    - trying new online thing because pancreas is \"gummed up\"  - wait until Feb to recheck and then increase Jardiance     R side neck: bump, better but was worried about carotid      Dermatitis  - Previously prescribed clotrimazole cream for dermatitis on nipples, feet, and groin   - Symptoms have significantly improved with cream; only using intermittently now as needed for mild flares (\"the cream, all that stuff went away... the foot stuff went away, and and then the the ball stuff is regan that's the one though that you're saying regan comes\")  - Attributes groin dermatitis to working in a hot, sweaty environment   - Has used 2-3 tubes of cream so far out of 5 prescribed; has not needed any refills yet     Diabetes Mellitus   - Currently taking Jardiance for diabetes management  - Reports Jardiance is working well; frequently urinating which he attributes to the medication clearing out sugars (\"it works because I I go to the bathroom frequently... I think it's it's cleared the sugars out\")  - Blood sugars have been running in the 150s on average, with one reading above 200 after eating a large meal late at night   - Occasionally wakes up at night with charley horses; finds relief with electrolyte solution   - Trying a new natural supplement aimed at clearing insulin resistance; wants to see if it has any effect before considering medication adjustments     Hypertension  - Blood pressure readings have been consistently at goal for the past " "several months per patient's log  - Heart rate in the 60s-70s, which patient attributes to exercise and metoprolol     Transient Neck Pain  - Experienced intermittent pain on right side of neck over the past few days  - Describes it as feeling like a bug bite or bee sting that would come and go; no visible bite or rash seen  - Occurred both inside and outside; considered splinter or sawdust as potential irritant   - Pain has resolved; no palpable lymphadenopathy or carotid bruit on exam      Review of Systems   A medically appropriate and patient-specific review of systems was performed. Pertinent findings are mentioned in the HPI, with no additional significant findings beyond those already noted.    Patient History   The following portions of the patient's history were reviewed and updated as appropriate:   allergies, current medications, problem list, PMHx, PSHx, PFHx, past social history      Objective   Vitals     Vitals:    08/09/24 1102   BP: 120/78   Pulse: 66   Resp: 16   SpO2: 92%   Weight: 108 kg (239 lb)   Height: 172.7 cm (68\")      BMI Readings from Last 3 Encounters:   08/09/24 36.34 kg/m²   06/14/24 36.34 kg/m²   04/05/24 36.80 kg/m²            Physical Exam   Constitutional: Alert, well-appearing, no acute distress  HENT: NCAT, mucous membranes moist  Neck: Supple,  No palpable lymphadenopathy or masses. Carotid pulses 2+ bilaterally without bruits.  Respiratory: No respiratory distress, good effort and air entry, clear to auscultation bilaterally   Cardiovascular: RRR, no LE edema  Psychiatric: Appropriate mood and affect, cooperative  Skin: No apparent rashes or lesions        Assessment & Plan     Diagnoses and all orders for this visit:    Primary hypertension    Mixed hyperlipidemia    Type 2 diabetes mellitus without complication, without long-term current use of insulin  -     Hemoglobin A1c; Future  -     empagliflozin (Jardiance) 25 MG tablet tablet; Take 1 tablet by mouth " Daily.          Diabetes Mellitus Type 2  Suboptimally controlled. Last A1c 7.9. On Jardiance 10 mg daily. Checking sugars at home, averaging 150s with occasional 200s. Trying natural supplement for insulin resistance.  PLAN:  - Check A1c today  - If A1c improved or stable, continue current management and recheck in 6 months   - If A1c worsened, consider increasing Jardiance to 25 mg daily  - Encourage continued blood sugar monitoring and lifestyle modifications   - Okay to trial natural supplement; reassess efficacy at next visit. Discussed that I could not guarantee supplement's safety or efficacy.     Dermatitis, TInea  Well-controlled on clotrimazole cream as needed for flares. No active rash today.  PLAN:  - Continue clotrimazole cream as needed  - Follow up if symptoms worsen or do not respond to treatment    Hypertension  Well-controlled. Blood pressures consistently at goal.    PLAN:  - Continue current management with metoprolol and lifestyle modifications  - Recheck blood pressure at next visit    Neck pain  - Intermittent right-sided neck pain, feels like a bug bite but no visible lesion   - Exam unremarkable, carotids without bruits  - Suspect irritation from clothing or environmental exposure  - Patient to monitor and notify if persists or worsens        Orders Placed This Encounter   Procedures    Hemoglobin A1c          In addition to the above, I also completed the following during today's visit: educated the patient and/or family on the overall impression and recommended plan of care, encouraged the patient/family to voice questions, and addressed all inquiries. I reviewed the appropriate use of any current medications and emphasized important side effects to be aware of, provided education on any new medications that were started, including their indications, proper administration, dosing, and potential side effects as applicable. We reviewed return precautions and reasons to seek urgent/emergent  care, and if indicated, I reiterated the importance of maintaining a healthy diet and regular physical activity on chronic conditions and overall well-being.     Follow Up   Return in about 6 months (around 2/9/2025) for Annual physical.      This medical documentation was produced in part using speech recognition software (Dragon Dictation System) and may contain errors related to that system including errors in grammar, punctuation, and spelling, as well as words and phrases that may be inappropriate and not intentional --- If there are any questions or concerns please feel free to contact me, the dictating provider, for clarification.        Disclaimer For Patients: Per the Federal 21st Century CURES Act and as of April 2021, medical records (including provider notes and laboratory/imaging results) are to be made available to patient’s and/or their designees as soon as the documents are signed/resulted. While the intention is to ensure transparency and to engage patients in their healthcare, this immediate access may create unintended consequences. This document uses language intended for communication between medical experts and diagnostic results are often interpreted with the entirety of the patient’s clinical picture in mind. It is recommended that patients and/or their designees review all available information with their primary or specialist providers for explanation and guidance to avoid misinterpretation based on layperson understanding, non-medical expert opinions, or internet searches.      Sharon Silva MD

## 2024-08-10 LAB — HBA1C MFR BLD: 8.1 % (ref 4.8–5.6)

## 2024-08-12 ENCOUNTER — TELEPHONE (OUTPATIENT)
Dept: FAMILY MEDICINE CLINIC | Facility: CLINIC | Age: 59
End: 2024-08-12
Payer: COMMERCIAL

## 2024-08-12 NOTE — TELEPHONE ENCOUNTER
Tried calling pt again and had to LVM to call the office back. 8/14/24    Tried calling pt again and had to LVM to call the office back. 8/13/24    Tried calling pt regarding orders below and had to LVM to call the office back. 8/12/24      ----- Message from Sharon Silva sent at 8/12/2024  8:04 AM EDT -----  His A1c went up from 6 months ago, so I really would like to go up on his Jardiance dose from 10 mg to 25 mg daily. We talked about this at his appointment, and I told him if it was the same or lower then I would give it time and let him try this supplement he wanted to try, but if it was higher then I would want to increase the medication. If he's okay with this then I will send in the new dose and he can take 2 tablets of the 10 mg of whatever he has left.

## 2024-08-14 NOTE — PROGRESS NOTES
Pt called back and notified him of message and he v/u. He is ok with increasing dose. Please send in new prescription.

## 2024-12-04 ENCOUNTER — TELEPHONE (OUTPATIENT)
Dept: FAMILY MEDICINE CLINIC | Facility: CLINIC | Age: 59
End: 2024-12-04
Payer: COMMERCIAL

## 2024-12-04 NOTE — TELEPHONE ENCOUNTER
Caller: Ismael Pulido    Relationship: Self    Best call back number: [phone]    What is the best time to reach you: ANYTIME     Who are you requesting to speak with (clinical staff, provider,  specific staff member): CLINICAL     What was the call regarding: PATIENT STATES THAT HIS JARDIANCE IS GOING TO BE OVER $500 AND HE WOULD LIKE TO KNOW IF SOMETHING CAN BE DONE TO LOWER THE PRICE, OR IF THERE IS A CHEAPER ALTERNATIVE THAT COULD BE CALLED IN FOR HIM.     PLEASE ADVISE

## 2024-12-09 NOTE — TELEPHONE ENCOUNTER
Called pt and he is not sure why copay is up. Called Pharmacy and they state that it is because insurance is wanting 90 day supply. She was able to change it and will be $74, which is lower than what he was paying. Tried calling pt back and had to LVM for him to call back.

## 2025-02-07 ENCOUNTER — LAB (OUTPATIENT)
Dept: FAMILY MEDICINE CLINIC | Facility: CLINIC | Age: 60
End: 2025-02-07
Payer: COMMERCIAL

## 2025-02-07 ENCOUNTER — OFFICE VISIT (OUTPATIENT)
Dept: FAMILY MEDICINE CLINIC | Facility: CLINIC | Age: 60
End: 2025-02-07
Payer: COMMERCIAL

## 2025-02-07 VITALS
DIASTOLIC BLOOD PRESSURE: 68 MMHG | BODY MASS INDEX: 36.24 KG/M2 | HEART RATE: 73 BPM | RESPIRATION RATE: 18 BRPM | OXYGEN SATURATION: 98 % | WEIGHT: 239.13 LBS | SYSTOLIC BLOOD PRESSURE: 116 MMHG | HEIGHT: 68 IN

## 2025-02-07 DIAGNOSIS — Z00.00 ENCOUNTER FOR ANNUAL PHYSICAL EXAM: ICD-10-CM

## 2025-02-07 DIAGNOSIS — E78.2 MIXED HYPERLIPIDEMIA: ICD-10-CM

## 2025-02-07 DIAGNOSIS — I10 PRIMARY HYPERTENSION: ICD-10-CM

## 2025-02-07 DIAGNOSIS — E11.9 TYPE 2 DIABETES MELLITUS WITHOUT COMPLICATION, WITHOUT LONG-TERM CURRENT USE OF INSULIN: ICD-10-CM

## 2025-02-07 DIAGNOSIS — K21.9 GASTROESOPHAGEAL REFLUX DISEASE WITHOUT ESOPHAGITIS: ICD-10-CM

## 2025-02-07 DIAGNOSIS — R06.02 SHORTNESS OF BREATH: ICD-10-CM

## 2025-02-07 DIAGNOSIS — Z00.00 ENCOUNTER FOR ANNUAL PHYSICAL EXAM: Primary | ICD-10-CM

## 2025-02-07 LAB
ALBUMIN SERPL-MCNC: 4.2 G/DL (ref 3.5–5.2)
ALBUMIN UR-MCNC: <1.2 MG/DL
ALBUMIN/GLOB SERPL: 1.2 G/DL
ALP SERPL-CCNC: 111 U/L (ref 39–117)
ALT SERPL W P-5'-P-CCNC: 60 U/L (ref 1–41)
ANION GAP SERPL CALCULATED.3IONS-SCNC: 10 MMOL/L (ref 5–15)
AST SERPL-CCNC: 38 U/L (ref 1–40)
BASOPHILS # BLD AUTO: 0.08 10*3/MM3 (ref 0–0.2)
BASOPHILS NFR BLD AUTO: 0.9 % (ref 0–1.5)
BILIRUB SERPL-MCNC: 0.4 MG/DL (ref 0–1.2)
BUN SERPL-MCNC: 12 MG/DL (ref 6–20)
BUN/CREAT SERPL: 11.8 (ref 7–25)
CALCIUM SPEC-SCNC: 9.4 MG/DL (ref 8.6–10.5)
CHLORIDE SERPL-SCNC: 103 MMOL/L (ref 98–107)
CHOLEST SERPL-MCNC: 122 MG/DL (ref 0–200)
CO2 SERPL-SCNC: 25 MMOL/L (ref 22–29)
CREAT SERPL-MCNC: 1.02 MG/DL (ref 0.76–1.27)
CREAT UR-MCNC: 29.3 MG/DL
DEPRECATED RDW RBC AUTO: 39 FL (ref 37–54)
EGFRCR SERPLBLD CKD-EPI 2021: 84.7 ML/MIN/1.73
EOSINOPHIL # BLD AUTO: 0.18 10*3/MM3 (ref 0–0.4)
EOSINOPHIL NFR BLD AUTO: 2 % (ref 0.3–6.2)
ERYTHROCYTE [DISTWIDTH] IN BLOOD BY AUTOMATED COUNT: 12.7 % (ref 12.3–15.4)
GLOBULIN UR ELPH-MCNC: 3.5 GM/DL
GLUCOSE SERPL-MCNC: 144 MG/DL (ref 65–99)
HBA1C MFR BLD: 8.4 % (ref 4.8–5.6)
HCT VFR BLD AUTO: 48.7 % (ref 37.5–51)
HDLC SERPL-MCNC: 32 MG/DL (ref 40–60)
HGB BLD-MCNC: 16.7 G/DL (ref 13–17.7)
IMM GRANULOCYTES # BLD AUTO: 0.06 10*3/MM3 (ref 0–0.05)
IMM GRANULOCYTES NFR BLD AUTO: 0.7 % (ref 0–0.5)
LDLC SERPL CALC-MCNC: 57 MG/DL (ref 0–100)
LDLC/HDLC SERPL: 1.58 {RATIO}
LYMPHOCYTES # BLD AUTO: 2.4 10*3/MM3 (ref 0.7–3.1)
LYMPHOCYTES NFR BLD AUTO: 27 % (ref 19.6–45.3)
MCH RBC QN AUTO: 29.7 PG (ref 26.6–33)
MCHC RBC AUTO-ENTMCNC: 34.3 G/DL (ref 31.5–35.7)
MCV RBC AUTO: 86.5 FL (ref 79–97)
MICROALBUMIN/CREAT UR: NORMAL MG/G{CREAT}
MONOCYTES # BLD AUTO: 0.81 10*3/MM3 (ref 0.1–0.9)
MONOCYTES NFR BLD AUTO: 9.1 % (ref 5–12)
NEUTROPHILS NFR BLD AUTO: 5.35 10*3/MM3 (ref 1.7–7)
NEUTROPHILS NFR BLD AUTO: 60.3 % (ref 42.7–76)
NRBC BLD AUTO-RTO: 0 /100 WBC (ref 0–0.2)
PLATELET # BLD AUTO: 234 10*3/MM3 (ref 140–450)
PMV BLD AUTO: 9.4 FL (ref 6–12)
POTASSIUM SERPL-SCNC: 4.7 MMOL/L (ref 3.5–5.2)
PROT SERPL-MCNC: 7.7 G/DL (ref 6–8.5)
RBC # BLD AUTO: 5.63 10*6/MM3 (ref 4.14–5.8)
SODIUM SERPL-SCNC: 138 MMOL/L (ref 136–145)
TRIGL SERPL-MCNC: 198 MG/DL (ref 0–150)
TSH SERPL DL<=0.05 MIU/L-ACNC: 1.27 UIU/ML (ref 0.27–4.2)
VLDLC SERPL-MCNC: 33 MG/DL (ref 5–40)
WBC NRBC COR # BLD AUTO: 8.88 10*3/MM3 (ref 3.4–10.8)

## 2025-02-07 PROCEDURE — 85025 COMPLETE CBC W/AUTO DIFF WBC: CPT | Performed by: STUDENT IN AN ORGANIZED HEALTH CARE EDUCATION/TRAINING PROGRAM

## 2025-02-07 PROCEDURE — 80061 LIPID PANEL: CPT | Performed by: STUDENT IN AN ORGANIZED HEALTH CARE EDUCATION/TRAINING PROGRAM

## 2025-02-07 PROCEDURE — 82570 ASSAY OF URINE CREATININE: CPT | Performed by: STUDENT IN AN ORGANIZED HEALTH CARE EDUCATION/TRAINING PROGRAM

## 2025-02-07 PROCEDURE — 83036 HEMOGLOBIN GLYCOSYLATED A1C: CPT | Performed by: STUDENT IN AN ORGANIZED HEALTH CARE EDUCATION/TRAINING PROGRAM

## 2025-02-07 PROCEDURE — 84443 ASSAY THYROID STIM HORMONE: CPT | Performed by: STUDENT IN AN ORGANIZED HEALTH CARE EDUCATION/TRAINING PROGRAM

## 2025-02-07 PROCEDURE — 82043 UR ALBUMIN QUANTITATIVE: CPT | Performed by: STUDENT IN AN ORGANIZED HEALTH CARE EDUCATION/TRAINING PROGRAM

## 2025-02-07 PROCEDURE — 36415 COLL VENOUS BLD VENIPUNCTURE: CPT

## 2025-02-07 PROCEDURE — 80053 COMPREHEN METABOLIC PANEL: CPT | Performed by: STUDENT IN AN ORGANIZED HEALTH CARE EDUCATION/TRAINING PROGRAM

## 2025-02-07 RX ORDER — LANSOPRAZOLE 30 MG/1
30 CAPSULE, DELAYED RELEASE ORAL DAILY
Qty: 90 CAPSULE | OUTPATIENT
Start: 2025-02-07

## 2025-02-07 RX ORDER — ROSUVASTATIN CALCIUM 10 MG/1
10 TABLET, COATED ORAL DAILY
Qty: 90 TABLET | Refills: 1 | Status: ON HOLD | OUTPATIENT
Start: 2025-02-07

## 2025-02-07 RX ORDER — LANSOPRAZOLE 30 MG/1
30 CAPSULE, DELAYED RELEASE ORAL DAILY
Qty: 30 CAPSULE | Refills: 5 | Status: ON HOLD | OUTPATIENT
Start: 2025-02-07

## 2025-02-07 RX ORDER — METOPROLOL SUCCINATE 100 MG/1
100 TABLET, EXTENDED RELEASE ORAL DAILY
Qty: 90 TABLET | Refills: 3 | Status: SHIPPED | OUTPATIENT
Start: 2025-02-07 | End: 2025-02-18

## 2025-02-07 NOTE — PROGRESS NOTES
[facility] [place] [facility]  Established Patient Visit  02/07/2025     Patient Name: Ismael Pulido   YOB: 1965   Medical Record Number: [id number]   Primary Care Physician: Sharon Silva MD     Subjective   Chief Complaint     Chief Complaint   Patient presents with    Annual Exam     SOAOE       History of Present Illness     Ears ringing    SOB with stairs or bending over: a few months;   - hx of GERD; lansoprazole     Sinuses clear  Congestion still in throat  Took mucinex    Bout of diarrhea last Friday  No nausea    T2DM: tolerating jardiance 25 mg daily  - blood sugar measurements at home   - still going up to 200     Shortness of Breath  - Reports experiencing dyspnea during physical activities such as ascending stairs or bending over to lift objects from the floor  - Accompanied by facial flushing and a recovery period of approximately 2 to 5 minutes  - Symptoms began approximately one week ago  - Describes a sensation of pressure in his lungs when bending over, attributed to his obesity  - Occasionally experiences apnea while climbing stairs, even when consciously attempting to breathe  - Does not experience dyspnea at rest  - Heart rate normalizes upon reaching the top of the stairs  - Reports dyspnea after walking short distances on flat terrain  - No history of similar symptoms  - Has been taking Mucinex for lung congestion, which has been effective  - Does not experience nausea  - Had an episode of diarrhea last Friday, attributed to cinnamon consumption  - Does not typically experience constipation  - Reports daily bowel movements and occasional hemorrhoids  - Has been consuming black licorice for its anti-inflammatory properties  - Has not consulted a pulmonologist  - Planning a [place] cruise in June 2024 and anticipates extensive walking    Diabetes Mellitus  - Reports that Jardiance has not been effective in controlling his blood glucose levels, which  remain in the 200s  - Has not observed any changes since the dosage increase  - Used to monitor his blood glucose levels daily but has discontinued this practice due to a change in his work schedule  - Plans to resume daily monitoring  - Previously on metformin, which was ineffective  - Open to trying new medications once his current supply is depleted  - Prefers oral medications over injectables  - Occasionally experiences fatigue at lunchtime, attributed to hypoglycemia, although he does not monitor his blood glucose levels at this time  - Reports that his blood glucose levels are either normal or elevated upon returning home    GERD  - Has been taking over-the-counter lansoprazole for an extended period  - Occasionally discontinues the medication to assess its necessity, but symptoms recur upon discontinuation  - Last discontinued the medication at the beginning of the previous year  - Has a history of GERD but does not smoke or consume alcohol  - Advised to avoid lying down immediately after eating  - Experienced an episode of nocturnal regurgitation three weeks ago while lying on his stomach, a position he finds beneficial for breathing with his CPAP machine    Supplemental information: He reports tinnitus and mild throat congestion but clear sinuses. He has a history of sleep apnea and uses a CPAP machine. He has an upcoming appointment with Dr. Mauricio on 04/25/2024 at 1330 hours. He has cleaned his ears and reports no issues. He uses over-the-ear headphones to prevent ear infections.    SOCIAL HISTORY  - Does not smoke  - Does not drink alcohol    FAMILY HISTORY  - Father had a hernia and developed cancer in that area    ALLERGIES  Allergies discussed during this visit.  Medication allergies: NKDA.  Food allergies: CINNAMON, causing burning, ringing, and diarrhea.  Environmental allergies:   Other allergies:     MEDICATIONS  - Current: Jardiance  - Current: Lansoprazole (Prevacid)  - Past:  "Metformin          Review of Systems   A medically appropriate and patient-specific review of systems was performed. Pertinent findings are mentioned in the HPI, with no additional significant findings beyond those already noted.    Patient History   The following portions of the patient's history were reviewed and updated as appropriate:   allergies, current medications, problem list, PMHx, PSHx, PFHx, past social history      Objective   Vitals     Vitals:    02/07/25 1047   BP: 116/68   Pulse: 73   Resp: 18   SpO2: 98%   Weight: 108 kg (239 lb 2 oz)   Height: 172.7 cm (68\")      BMI Readings from Last 3 Encounters:   02/07/25 36.36 kg/m²   12/30/24 34.97 kg/m²   08/09/24 36.34 kg/m²          Physical Exam   Constitutional: Alert, well-appearing, no acute distress  HENT: NCAT, mucous membranes moist  Neck: Supple, no thyromegaly  Respiratory: No respiratory distress, good effort and air entry, clear to auscultation bilaterally   Cardiovascular: RRR, no LE edema  Psychiatric: Appropriate mood and affect, cooperative  Skin: No apparent rashes or lesions          Assessment & Plan      Encounter for annual physical exam  Age appropriate cancer and preventative screenings were reviewed and ordered as indicated. Immunizations were reviewed and education/instructions were provided on any recommended vaccines that are currenlty due. Routine labs ordered for monitoring and screening as indicated. Counseled patient on diet, exercise, weight, vaccines, disease/screening prevention, safety, risk reduction, dental/vision care, and mental health. Addressed all patient/family questions.  Orders:    CBC Auto Differential; Future    Comprehensive Metabolic Panel; Future    Mixed hyperlipidemia  Chronic, stable. Continue current regimen.   Orders:    rosuvastatin (CRESTOR) 10 MG tablet; Take 1 tablet by mouth Daily.    Lipid Panel; Future    Primary hypertension  Chronic, stable. Continue current regimen.          Type 2 diabetes " mellitus without complication, without long-term current use of insulin  Chronic, not at goal  His blood glucose levels have not shown significant improvement despite the increased dose of Jardiance.  PLAN:  He is advised to continue his current regimen of Jardiance. He is also advised to monitor his blood glucose levels first thing in the morning while fasting. A prescription for Rybelsus has been provided, and he is instructed to inform us if the cost of the medication is prohibitive. Laboratory tests, including CBC, CMP, A1c, lipid panel, thyroid, and urine, will be conducted today.  Orders:    Hemoglobin A1c; Future    TSH Rfx On Abnormal To Free T4; Future    Microalbumin / Creatinine Urine Ratio - Urine, Clean Catch; Future    Shortness of breath  His dyspnea could potentially be attributed to a small hernia, which may be exerting pressure on his diaphragm and subsequently causing shortness of breath.  PLAN:  A chest x-ray will be ordered to further investigate the cause of his symptoms.  Treatment plan: He is advised to continue his current regimen of lansoprazole 15 mg, but to double the dose to 30 mg. A prescription for the increased dose of lansoprazole has been provided. If there is no significant improvement in his symptoms within a period of 4 to 6 weeks, further diagnostic measures will be considered.  Orders:    XR Chest PA & Lateral; Future    Gastroesophageal reflux disease without esophagitis  Orders:    lansoprazole (PREVACID) 30 MG capsule; Take 1 capsule by mouth Daily.           In addition to the above, I also completed the following during today's visit: educated the patient and/or family on the overall impression and recommended plan of care, encouraged the patient/family to voice questions, and addressed all inquiries. I reviewed the appropriate use of any current medications and emphasized important side effects to be aware of, provided education on any new medications that were started,  including their indications, proper administration, dosing, and potential side effects as applicable. We reviewed return precautions and reasons to seek urgent/emergent care, and if indicated, I reiterated the importance of maintaining a healthy diet and regular physical activity on chronic conditions and overall well-being.     Follow Up   Return in about 3 months (around 5/7/2025) for 4 month f/u, med refills, chronic conditions.      This medical documentation was produced in part using speech recognition software (Dragon Dictation System) and may contain errors related to that system including errors in grammar, punctuation, and spelling, as well as words and phrases that may be inappropriate and not intentional --- If there are any questions or concerns please feel free to contact me, the dictating provider, for clarification.        Patient or patient representative verbalized consent for the use of Ambient Listening during the visit with  Sharon Silva MD for chart documentation.       Disclaimer For Patients: Per the Federal 21st Century CURES Act and as of April 2021, medical records (including provider notes and laboratory/imaging results) are to be made available to patient’s and/or their designees as soon as the documents are signed/resulted. While the intention is to ensure transparency and to engage patients in their healthcare, this immediate access may create unintended consequences. This document uses language intended for communication between medical experts and diagnostic results are often interpreted with the entirety of the patient’s clinical picture in mind. It is recommended that patients and/or their designees review all available information with their primary or specialist providers for explanation and guidance to avoid misinterpretation based on layperson understanding, non-medical expert opinions, or internet searches.      Sharon Silva MD

## 2025-02-10 DIAGNOSIS — R06.02 SHORTNESS OF BREATH: ICD-10-CM

## 2025-02-16 ENCOUNTER — PATIENT MESSAGE (OUTPATIENT)
Dept: FAMILY MEDICINE CLINIC | Facility: CLINIC | Age: 60
End: 2025-02-16
Payer: COMMERCIAL

## 2025-02-18 ENCOUNTER — HOSPITAL ENCOUNTER (INPATIENT)
Facility: HOSPITAL | Age: 60
LOS: 4 days | Discharge: HOME | DRG: 244 | End: 2025-02-22
Attending: EMERGENCY MEDICINE | Admitting: STUDENT IN AN ORGANIZED HEALTH CARE EDUCATION/TRAINING PROGRAM
Payer: COMMERCIAL

## 2025-02-18 ENCOUNTER — OFFICE VISIT (OUTPATIENT)
Dept: FAMILY MEDICINE CLINIC | Facility: CLINIC | Age: 60
End: 2025-02-18
Payer: COMMERCIAL

## 2025-02-18 ENCOUNTER — APPOINTMENT (OUTPATIENT)
Dept: CT IMAGING | Facility: HOSPITAL | Age: 60
DRG: 244 | End: 2025-02-18
Payer: COMMERCIAL

## 2025-02-18 ENCOUNTER — APPOINTMENT (OUTPATIENT)
Dept: GENERAL RADIOLOGY | Facility: HOSPITAL | Age: 60
DRG: 244 | End: 2025-02-18
Payer: COMMERCIAL

## 2025-02-18 VITALS
WEIGHT: 237.38 LBS | BODY MASS INDEX: 35.98 KG/M2 | DIASTOLIC BLOOD PRESSURE: 70 MMHG | SYSTOLIC BLOOD PRESSURE: 120 MMHG | RESPIRATION RATE: 18 BRPM | HEART RATE: 39 BPM | HEIGHT: 68 IN | OXYGEN SATURATION: 96 %

## 2025-02-18 DIAGNOSIS — S00.01XA ABRASION OF SCALP, INITIAL ENCOUNTER: ICD-10-CM

## 2025-02-18 DIAGNOSIS — R55 SYNCOPE, UNSPECIFIED SYNCOPE TYPE: ICD-10-CM

## 2025-02-18 DIAGNOSIS — R00.1 SYMPTOMATIC BRADYCARDIA: ICD-10-CM

## 2025-02-18 DIAGNOSIS — R00.1 SINUS BRADYCARDIA: ICD-10-CM

## 2025-02-18 DIAGNOSIS — E11.9 TYPE 2 DIABETES MELLITUS WITHOUT COMPLICATION, WITHOUT LONG-TERM CURRENT USE OF INSULIN: ICD-10-CM

## 2025-02-18 DIAGNOSIS — I10 PRIMARY HYPERTENSION: ICD-10-CM

## 2025-02-18 DIAGNOSIS — I44.1 MOBITZ TYPE 2 SECOND DEGREE ATRIOVENTRICULAR BLOCK: Primary | ICD-10-CM

## 2025-02-18 DIAGNOSIS — R00.1 BRADYCARDIA: Primary | ICD-10-CM

## 2025-02-18 DIAGNOSIS — R55 SYNCOPE AND COLLAPSE: ICD-10-CM

## 2025-02-18 LAB
ANION GAP SERPL CALCULATED.3IONS-SCNC: 12.6 MMOL/L (ref 5–15)
BASOPHILS # BLD AUTO: 0.07 10*3/MM3 (ref 0–0.2)
BASOPHILS NFR BLD AUTO: 0.7 % (ref 0–1.5)
BUN SERPL-MCNC: 16 MG/DL (ref 6–20)
BUN/CREAT SERPL: 14.5 (ref 7–25)
CALCIUM SPEC-SCNC: 9.2 MG/DL (ref 8.6–10.5)
CHLORIDE SERPL-SCNC: 105 MMOL/L (ref 98–107)
CO2 SERPL-SCNC: 22.4 MMOL/L (ref 22–29)
CREAT SERPL-MCNC: 1.1 MG/DL (ref 0.76–1.27)
DEPRECATED RDW RBC AUTO: 40.6 FL (ref 37–54)
EGFRCR SERPLBLD CKD-EPI 2021: 77.3 ML/MIN/1.73
EOSINOPHIL # BLD AUTO: 0.16 10*3/MM3 (ref 0–0.4)
EOSINOPHIL NFR BLD AUTO: 1.7 % (ref 0.3–6.2)
ERYTHROCYTE [DISTWIDTH] IN BLOOD BY AUTOMATED COUNT: 12.8 % (ref 12.3–15.4)
GEN 5 1HR TROPONIN T REFLEX: 44 NG/L
GLUCOSE SERPL-MCNC: 161 MG/DL (ref 65–99)
HCT VFR BLD AUTO: 49.7 % (ref 37.5–51)
HGB BLD-MCNC: 16.7 G/DL (ref 13–17.7)
HOLD SPECIMEN: NORMAL
IMM GRANULOCYTES # BLD AUTO: 0.03 10*3/MM3 (ref 0–0.05)
IMM GRANULOCYTES NFR BLD AUTO: 0.3 % (ref 0–0.5)
LYMPHOCYTES # BLD AUTO: 2.97 10*3/MM3 (ref 0.7–3.1)
LYMPHOCYTES NFR BLD AUTO: 31.6 % (ref 19.6–45.3)
MCH RBC QN AUTO: 29.2 PG (ref 26.6–33)
MCHC RBC AUTO-ENTMCNC: 33.6 G/DL (ref 31.5–35.7)
MCV RBC AUTO: 87 FL (ref 79–97)
MONOCYTES # BLD AUTO: 0.82 10*3/MM3 (ref 0.1–0.9)
MONOCYTES NFR BLD AUTO: 8.7 % (ref 5–12)
NEUTROPHILS NFR BLD AUTO: 5.35 10*3/MM3 (ref 1.7–7)
NEUTROPHILS NFR BLD AUTO: 57 % (ref 42.7–76)
NRBC BLD AUTO-RTO: 0 /100 WBC (ref 0–0.2)
PLATELET # BLD AUTO: 257 10*3/MM3 (ref 140–450)
PMV BLD AUTO: 9.2 FL (ref 6–12)
POTASSIUM SERPL-SCNC: 4.4 MMOL/L (ref 3.5–5.2)
RBC # BLD AUTO: 5.71 10*6/MM3 (ref 4.14–5.8)
SODIUM SERPL-SCNC: 140 MMOL/L (ref 136–145)
TROPONIN T % DELTA: -4
TROPONIN T NUMERIC DELTA: -2 NG/L
TROPONIN T SERPL HS-MCNC: 46 NG/L
WBC NRBC COR # BLD AUTO: 9.4 10*3/MM3 (ref 3.4–10.8)
WHOLE BLOOD HOLD COAG: NORMAL

## 2025-02-18 PROCEDURE — 93010 ELECTROCARDIOGRAM REPORT: CPT | Performed by: STUDENT IN AN ORGANIZED HEALTH CARE EDUCATION/TRAINING PROGRAM

## 2025-02-18 PROCEDURE — 93005 ELECTROCARDIOGRAM TRACING: CPT | Performed by: EMERGENCY MEDICINE

## 2025-02-18 PROCEDURE — 99285 EMERGENCY DEPT VISIT HI MDM: CPT

## 2025-02-18 PROCEDURE — 93005 ELECTROCARDIOGRAM TRACING: CPT | Performed by: STUDENT IN AN ORGANIZED HEALTH CARE EDUCATION/TRAINING PROGRAM

## 2025-02-18 PROCEDURE — 71045 X-RAY EXAM CHEST 1 VIEW: CPT

## 2025-02-18 PROCEDURE — 80048 BASIC METABOLIC PNL TOTAL CA: CPT | Performed by: EMERGENCY MEDICINE

## 2025-02-18 PROCEDURE — 84484 ASSAY OF TROPONIN QUANT: CPT | Performed by: EMERGENCY MEDICINE

## 2025-02-18 PROCEDURE — 93005 ELECTROCARDIOGRAM TRACING: CPT

## 2025-02-18 PROCEDURE — 70450 CT HEAD/BRAIN W/O DYE: CPT

## 2025-02-18 PROCEDURE — 36415 COLL VENOUS BLD VENIPUNCTURE: CPT

## 2025-02-18 PROCEDURE — 85025 COMPLETE CBC W/AUTO DIFF WBC: CPT | Performed by: EMERGENCY MEDICINE

## 2025-02-18 RX ORDER — POLYETHYLENE GLYCOL 3350 17 G/17G
17 POWDER, FOR SOLUTION ORAL DAILY PRN
Status: DISCONTINUED | OUTPATIENT
Start: 2025-02-18 | End: 2025-02-22 | Stop reason: HOSPADM

## 2025-02-18 RX ORDER — AMOXICILLIN 250 MG
2 CAPSULE ORAL 2 TIMES DAILY PRN
Status: DISCONTINUED | OUTPATIENT
Start: 2025-02-18 | End: 2025-02-22 | Stop reason: HOSPADM

## 2025-02-18 RX ORDER — BISACODYL 10 MG
10 SUPPOSITORY, RECTAL RECTAL DAILY PRN
Status: DISCONTINUED | OUTPATIENT
Start: 2025-02-18 | End: 2025-02-22 | Stop reason: HOSPADM

## 2025-02-18 RX ORDER — ACETAMINOPHEN 650 MG/1
650 SUPPOSITORY RECTAL EVERY 4 HOURS PRN
Status: DISCONTINUED | OUTPATIENT
Start: 2025-02-18 | End: 2025-02-22 | Stop reason: HOSPADM

## 2025-02-18 RX ORDER — SODIUM CHLORIDE 0.9 % (FLUSH) 0.9 %
10 SYRINGE (ML) INJECTION AS NEEDED
Status: DISCONTINUED | OUTPATIENT
Start: 2025-02-18 | End: 2025-02-22 | Stop reason: HOSPADM

## 2025-02-18 RX ORDER — ACETAMINOPHEN 325 MG/1
650 TABLET ORAL EVERY 4 HOURS PRN
Status: DISCONTINUED | OUTPATIENT
Start: 2025-02-18 | End: 2025-02-22 | Stop reason: HOSPADM

## 2025-02-18 RX ORDER — ACETAMINOPHEN 160 MG/5ML
650 SOLUTION ORAL EVERY 4 HOURS PRN
Status: DISCONTINUED | OUTPATIENT
Start: 2025-02-18 | End: 2025-02-22 | Stop reason: HOSPADM

## 2025-02-18 RX ORDER — LOSARTAN POTASSIUM 25 MG/1
25 TABLET ORAL DAILY
Qty: 30 TABLET | Refills: 5 | Status: ON HOLD | OUTPATIENT
Start: 2025-02-18 | End: 2025-02-19

## 2025-02-18 RX ORDER — BISACODYL 5 MG/1
5 TABLET, DELAYED RELEASE ORAL DAILY PRN
Status: DISCONTINUED | OUTPATIENT
Start: 2025-02-18 | End: 2025-02-22 | Stop reason: HOSPADM

## 2025-02-18 RX ORDER — SODIUM CHLORIDE 9 MG/ML
40 INJECTION, SOLUTION INTRAVENOUS AS NEEDED
Status: DISCONTINUED | OUTPATIENT
Start: 2025-02-18 | End: 2025-02-22 | Stop reason: HOSPADM

## 2025-02-18 RX ORDER — SODIUM CHLORIDE 0.9 % (FLUSH) 0.9 %
10 SYRINGE (ML) INJECTION EVERY 12 HOURS SCHEDULED
Status: DISCONTINUED | OUTPATIENT
Start: 2025-02-18 | End: 2025-02-22 | Stop reason: HOSPADM

## 2025-02-18 RX ORDER — HEPARIN SODIUM 5000 [USP'U]/ML
5000 INJECTION, SOLUTION INTRAVENOUS; SUBCUTANEOUS EVERY 8 HOURS SCHEDULED
Status: DISCONTINUED | OUTPATIENT
Start: 2025-02-19 | End: 2025-02-21

## 2025-02-18 NOTE — Clinical Note
Level of Care: Telemetry [5]   Admitting Physician: LLANES ALVAREZ, CARLOS [[id number]]   Attending Physician: LLANES ALVAREZ, CARLOS [[id number]]

## 2025-02-19 ENCOUNTER — APPOINTMENT (OUTPATIENT)
Dept: CARDIOLOGY | Facility: HOSPITAL | Age: 60
DRG: 244 | End: 2025-02-19
Payer: COMMERCIAL

## 2025-02-19 LAB
ALBUMIN SERPL-MCNC: 3.9 G/DL (ref 3.5–5.2)
ALBUMIN/GLOB SERPL: 1.3 G/DL
ALP SERPL-CCNC: 102 U/L (ref 39–117)
ALT SERPL W P-5'-P-CCNC: 42 U/L (ref 1–41)
ANION GAP SERPL CALCULATED.3IONS-SCNC: 10.4 MMOL/L (ref 5–15)
AORTIC DIMENSIONLESS INDEX: 0.63 (DI)
AST SERPL-CCNC: 34 U/L (ref 1–40)
AV MEAN PRESS GRAD SYS DOP V1V2: 3.3 MMHG
AV VMAX SYS DOP: 124.9 CM/SEC
BASOPHILS # BLD AUTO: 0.06 10*3/MM3 (ref 0–0.2)
BASOPHILS NFR BLD AUTO: 0.7 % (ref 0–1.5)
BH CV ECHO LEFT VENTRICLE GLOBAL LONGITUDINAL STRAIN: -15 %
BH CV ECHO MEAS - AO MAX PG: 6.2 MMHG
BH CV ECHO MEAS - AO V2 VTI: 27.4 CM
BH CV ECHO MEAS - AVA(I,D): 2.3 CM2
BH CV ECHO MEAS - EDV(CUBED): 87.6 ML
BH CV ECHO MEAS - ESV(CUBED): 34.9 ML
BH CV ECHO MEAS - FS: 26.4 %
BH CV ECHO MEAS - IVS/LVPW: 1.02 CM
BH CV ECHO MEAS - IVSD: 1.33 CM
BH CV ECHO MEAS - LA DIMENSION: 4.3 CM
BH CV ECHO MEAS - LAT PEAK E' VEL: 10.8 CM/SEC
BH CV ECHO MEAS - LV MASS(C)D: 223.6 GRAMS
BH CV ECHO MEAS - LV MAX PG: 2.5 MMHG
BH CV ECHO MEAS - LV MEAN PG: 1.25 MMHG
BH CV ECHO MEAS - LV V1 MAX: 79.1 CM/SEC
BH CV ECHO MEAS - LV V1 VTI: 16.2 CM
BH CV ECHO MEAS - LVIDD: 4.4 CM
BH CV ECHO MEAS - LVIDS: 3.3 CM
BH CV ECHO MEAS - LVOT AREA: 3.9 CM2
BH CV ECHO MEAS - LVOT DIAM: 2.22 CM
BH CV ECHO MEAS - LVPWD: 1.31 CM
BH CV ECHO MEAS - MED PEAK E' VEL: 7.6 CM/SEC
BH CV ECHO MEAS - MR MAX PG: 87.2 MMHG
BH CV ECHO MEAS - MR MAX VEL: 467 CM/SEC
BH CV ECHO MEAS - MV A DUR: 0.12 SEC
BH CV ECHO MEAS - MV A MAX VEL: 47.2 CM/SEC
BH CV ECHO MEAS - MV DEC SLOPE: 293.6 CM/SEC2
BH CV ECHO MEAS - MV DEC TIME: 0.31 SEC
BH CV ECHO MEAS - MV E MAX VEL: 88.6 CM/SEC
BH CV ECHO MEAS - MV E/A: 1.88
BH CV ECHO MEAS - MV MAX PG: 2.07 MMHG
BH CV ECHO MEAS - MV MEAN PG: 1.07 MMHG
BH CV ECHO MEAS - MV P1/2T: 76.8 MSEC
BH CV ECHO MEAS - MV V2 VTI: 20.9 CM
BH CV ECHO MEAS - MVA(P1/2T): 2.9 CM2
BH CV ECHO MEAS - MVA(VTI): 3 CM2
BH CV ECHO MEAS - PA ACC TIME: 0.11 SEC
BH CV ECHO MEAS - PA V2 MAX: 87.1 CM/SEC
BH CV ECHO MEAS - PULM A REVS DUR: 0.16 SEC
BH CV ECHO MEAS - PULM A REVS VEL: 27.6 CM/SEC
BH CV ECHO MEAS - PULM DIAS VEL: 57.8 CM/SEC
BH CV ECHO MEAS - PULM S/D: 0.83
BH CV ECHO MEAS - PULM SYS VEL: 48 CM/SEC
BH CV ECHO MEAS - RAP SYSTOLE: 15 MMHG
BH CV ECHO MEAS - RV MAX PG: 1.23 MMHG
BH CV ECHO MEAS - RV V1 MAX: 55.5 CM/SEC
BH CV ECHO MEAS - RV V1 VTI: 12.8 CM
BH CV ECHO MEAS - RVSP: 49.7 MMHG
BH CV ECHO MEAS - SV(LVOT): 62.8 ML
BH CV ECHO MEAS - SVI(LVOT): 28.7 ML/M2
BH CV ECHO MEAS - TAPSE (>1.6): 1.41 CM
BH CV ECHO MEAS - TR MAX PG: 34.7 MMHG
BH CV ECHO MEAS - TR MAX VEL: 294.5 CM/SEC
BH CV ECHO MEASUREMENTS AVERAGE E/E' RATIO: 9.63
BH CV XLRA - TDI S': 9.6 CM/SEC
BILIRUB SERPL-MCNC: 0.2 MG/DL (ref 0–1.2)
BUN SERPL-MCNC: 15 MG/DL (ref 6–20)
BUN/CREAT SERPL: 15.2 (ref 7–25)
CALCIUM SPEC-SCNC: 9.2 MG/DL (ref 8.6–10.5)
CHLORIDE SERPL-SCNC: 107 MMOL/L (ref 98–107)
CO2 SERPL-SCNC: 22.6 MMOL/L (ref 22–29)
CREAT SERPL-MCNC: 0.99 MG/DL (ref 0.76–1.27)
DEPRECATED RDW RBC AUTO: 42.4 FL (ref 37–54)
EGFRCR SERPLBLD CKD-EPI 2021: 87.8 ML/MIN/1.73
EOSINOPHIL # BLD AUTO: 0.16 10*3/MM3 (ref 0–0.4)
EOSINOPHIL NFR BLD AUTO: 1.9 % (ref 0.3–6.2)
ERYTHROCYTE [DISTWIDTH] IN BLOOD BY AUTOMATED COUNT: 13 % (ref 12.3–15.4)
FLUAV RNA RESP QL NAA+PROBE: NOT DETECTED
FLUBV RNA RESP QL NAA+PROBE: NOT DETECTED
GLOBULIN UR ELPH-MCNC: 3.1 GM/DL
GLUCOSE BLDC GLUCOMTR-MCNC: 137 MG/DL (ref 70–105)
GLUCOSE BLDC GLUCOMTR-MCNC: 139 MG/DL (ref 70–105)
GLUCOSE BLDC GLUCOMTR-MCNC: 139 MG/DL (ref 70–105)
GLUCOSE BLDC GLUCOMTR-MCNC: 192 MG/DL (ref 70–105)
GLUCOSE SERPL-MCNC: 145 MG/DL (ref 65–99)
HCT VFR BLD AUTO: 48.5 % (ref 37.5–51)
HGB BLD-MCNC: 15.9 G/DL (ref 13–17.7)
IMM GRANULOCYTES # BLD AUTO: 0.03 10*3/MM3 (ref 0–0.05)
IMM GRANULOCYTES NFR BLD AUTO: 0.3 % (ref 0–0.5)
LEFT ATRIUM VOLUME INDEX: 33.2 ML/M2
LYMPHOCYTES # BLD AUTO: 2.93 10*3/MM3 (ref 0.7–3.1)
LYMPHOCYTES NFR BLD AUTO: 34.1 % (ref 19.6–45.3)
MAGNESIUM SERPL-MCNC: 2.4 MG/DL (ref 1.6–2.6)
MCH RBC QN AUTO: 28.9 PG (ref 26.6–33)
MCHC RBC AUTO-ENTMCNC: 32.8 G/DL (ref 31.5–35.7)
MCV RBC AUTO: 88.2 FL (ref 79–97)
MONOCYTES # BLD AUTO: 0.81 10*3/MM3 (ref 0.1–0.9)
MONOCYTES NFR BLD AUTO: 9.4 % (ref 5–12)
NEUTROPHILS NFR BLD AUTO: 4.61 10*3/MM3 (ref 1.7–7)
NEUTROPHILS NFR BLD AUTO: 53.6 % (ref 42.7–76)
NRBC BLD AUTO-RTO: 0 /100 WBC (ref 0–0.2)
PHOSPHATE SERPL-MCNC: 4 MG/DL (ref 2.5–4.5)
PLATELET # BLD AUTO: 218 10*3/MM3 (ref 140–450)
PMV BLD AUTO: 9.2 FL (ref 6–12)
POTASSIUM SERPL-SCNC: 4.3 MMOL/L (ref 3.5–5.2)
PROT SERPL-MCNC: 7 G/DL (ref 6–8.5)
QT INTERVAL: 558 MS
QTC INTERVAL: 454 MS
RBC # BLD AUTO: 5.5 10*6/MM3 (ref 4.14–5.8)
RSV RNA RESP QL NAA+PROBE: NOT DETECTED
SARS-COV-2 RNA RESP QL NAA+PROBE: NOT DETECTED
SINUS: 3.7 CM
SODIUM SERPL-SCNC: 140 MMOL/L (ref 136–145)
STJ: 2.5 CM
WBC NRBC COR # BLD AUTO: 8.6 10*3/MM3 (ref 3.4–10.8)

## 2025-02-19 PROCEDURE — 85025 COMPLETE CBC W/AUTO DIFF WBC: CPT | Performed by: STUDENT IN AN ORGANIZED HEALTH CARE EDUCATION/TRAINING PROGRAM

## 2025-02-19 PROCEDURE — 25010000002 HEPARIN (PORCINE) PER 1000 UNITS: Performed by: STUDENT IN AN ORGANIZED HEALTH CARE EDUCATION/TRAINING PROGRAM

## 2025-02-19 PROCEDURE — 93005 ELECTROCARDIOGRAM TRACING: CPT | Performed by: STUDENT IN AN ORGANIZED HEALTH CARE EDUCATION/TRAINING PROGRAM

## 2025-02-19 PROCEDURE — 83735 ASSAY OF MAGNESIUM: CPT | Performed by: STUDENT IN AN ORGANIZED HEALTH CARE EDUCATION/TRAINING PROGRAM

## 2025-02-19 PROCEDURE — 84100 ASSAY OF PHOSPHORUS: CPT | Performed by: STUDENT IN AN ORGANIZED HEALTH CARE EDUCATION/TRAINING PROGRAM

## 2025-02-19 PROCEDURE — 25010000002 HEPARIN (PORCINE) PER 1000 UNITS: Performed by: INTERNAL MEDICINE

## 2025-02-19 PROCEDURE — 99222 1ST HOSP IP/OBS MODERATE 55: CPT | Performed by: INTERNAL MEDICINE

## 2025-02-19 PROCEDURE — 87637 SARSCOV2&INF A&B&RSV AMP PRB: CPT | Performed by: STUDENT IN AN ORGANIZED HEALTH CARE EDUCATION/TRAINING PROGRAM

## 2025-02-19 PROCEDURE — 93306 TTE W/DOPPLER COMPLETE: CPT | Performed by: INTERNAL MEDICINE

## 2025-02-19 PROCEDURE — 82948 REAGENT STRIP/BLOOD GLUCOSE: CPT

## 2025-02-19 PROCEDURE — 93356 MYOCRD STRAIN IMG SPCKL TRCK: CPT

## 2025-02-19 PROCEDURE — 93356 MYOCRD STRAIN IMG SPCKL TRCK: CPT | Performed by: INTERNAL MEDICINE

## 2025-02-19 PROCEDURE — 82948 REAGENT STRIP/BLOOD GLUCOSE: CPT | Performed by: STUDENT IN AN ORGANIZED HEALTH CARE EDUCATION/TRAINING PROGRAM

## 2025-02-19 PROCEDURE — 93010 ELECTROCARDIOGRAM REPORT: CPT | Performed by: STUDENT IN AN ORGANIZED HEALTH CARE EDUCATION/TRAINING PROGRAM

## 2025-02-19 PROCEDURE — 80053 COMPREHEN METABOLIC PANEL: CPT | Performed by: STUDENT IN AN ORGANIZED HEALTH CARE EDUCATION/TRAINING PROGRAM

## 2025-02-19 PROCEDURE — 93306 TTE W/DOPPLER COMPLETE: CPT

## 2025-02-19 PROCEDURE — 63710000001 INSULIN LISPRO (HUMAN) PER 5 UNITS: Performed by: STUDENT IN AN ORGANIZED HEALTH CARE EDUCATION/TRAINING PROGRAM

## 2025-02-19 RX ORDER — DEXTROSE MONOHYDRATE 25 G/50ML
25 INJECTION, SOLUTION INTRAVENOUS
Status: DISCONTINUED | OUTPATIENT
Start: 2025-02-19 | End: 2025-02-22 | Stop reason: HOSPADM

## 2025-02-19 RX ORDER — NICOTINE POLACRILEX 4 MG
15 LOZENGE BUCCAL
Status: DISCONTINUED | OUTPATIENT
Start: 2025-02-19 | End: 2025-02-22 | Stop reason: HOSPADM

## 2025-02-19 RX ORDER — CETIRIZINE HYDROCHLORIDE 10 MG/1
10 TABLET ORAL DAILY
Status: DISCONTINUED | OUTPATIENT
Start: 2025-02-19 | End: 2025-02-22 | Stop reason: HOSPADM

## 2025-02-19 RX ORDER — IBUPROFEN 600 MG/1
1 TABLET ORAL
Status: DISCONTINUED | OUTPATIENT
Start: 2025-02-19 | End: 2025-02-22 | Stop reason: HOSPADM

## 2025-02-19 RX ORDER — INSULIN LISPRO 100 [IU]/ML
2-7 INJECTION, SOLUTION INTRAVENOUS; SUBCUTANEOUS
Status: DISCONTINUED | OUTPATIENT
Start: 2025-02-19 | End: 2025-02-22 | Stop reason: HOSPADM

## 2025-02-19 RX ORDER — PANTOPRAZOLE SODIUM 40 MG/1
40 TABLET, DELAYED RELEASE ORAL
Status: DISCONTINUED | OUTPATIENT
Start: 2025-02-19 | End: 2025-02-22 | Stop reason: HOSPADM

## 2025-02-19 RX ORDER — ROSUVASTATIN CALCIUM 10 MG/1
10 TABLET, COATED ORAL DAILY
Status: DISCONTINUED | OUTPATIENT
Start: 2025-02-19 | End: 2025-02-22 | Stop reason: HOSPADM

## 2025-02-19 RX ORDER — LOSARTAN POTASSIUM 25 MG/1
25 TABLET ORAL DAILY
Status: DISCONTINUED | OUTPATIENT
Start: 2025-02-19 | End: 2025-02-22 | Stop reason: HOSPADM

## 2025-02-19 RX ORDER — LOSARTAN POTASSIUM 25 MG/1
25 TABLET ORAL DAILY
Qty: 90 TABLET | Refills: 1 | Status: SHIPPED | OUTPATIENT
Start: 2025-02-19

## 2025-02-19 RX ADMIN — HEPARIN SODIUM 5000 UNITS: 5000 INJECTION INTRAVENOUS; SUBCUTANEOUS at 14:33

## 2025-02-19 RX ADMIN — CETIRIZINE HYDROCHLORIDE 10 MG: 10 TABLET, FILM COATED ORAL at 12:43

## 2025-02-19 RX ADMIN — ROSUVASTATIN CALCIUM 10 MG: 10 TABLET, FILM COATED ORAL at 09:03

## 2025-02-19 RX ADMIN — PANTOPRAZOLE SODIUM 40 MG: 40 TABLET, DELAYED RELEASE ORAL at 09:03

## 2025-02-19 RX ADMIN — Medication 10 ML: at 09:05

## 2025-02-19 RX ADMIN — EMPAGLIFLOZIN 25 MG: 25 TABLET, FILM COATED ORAL at 09:03

## 2025-02-19 RX ADMIN — LOSARTAN POTASSIUM 25 MG: 25 TABLET, FILM COATED ORAL at 09:03

## 2025-02-19 RX ADMIN — HEPARIN SODIUM 5000 UNITS: 5000 INJECTION INTRAVENOUS; SUBCUTANEOUS at 21:07

## 2025-02-19 RX ADMIN — HEPARIN SODIUM 5000 UNITS: 5000 INJECTION INTRAVENOUS; SUBCUTANEOUS at 07:13

## 2025-02-19 RX ADMIN — INSULIN LISPRO 2 UNITS: 100 INJECTION, SOLUTION INTRAVENOUS; SUBCUTANEOUS at 12:44

## 2025-02-19 RX ADMIN — Medication 10 ML: at 21:08

## 2025-02-19 NOTE — ED PROVIDER NOTES
Subjective   History of Present Illness  Chief complaint: Slow heart rate    59-year-old male presents with a slow heart rate.  Patient states he had a syncopal episode at work yesterday.  He became lightheaded and rested his head on the table and symptoms resolved.  He stood back up.  A couple minutes later he had another episode of lightheadedness and put his head back down on the table but then ended up falling and having a brief syncopal episode.  This was witnessed by coworkers.  He sustained an abrasion to the back of his head.  He denies any chest pain or shortness of breath.  He went to occupational health today and had x-rays of his skull and C-spine which were unremarkable.  He also followed up with his primary provider.  They noticed that his heart rate was low at that time and told him to stop taking his metoprolol.  He states he did take it this morning.  He has had no further dizzy spells.  He states he was talking with some friends this evening and told them what happened.  They checked his heart rate and it was still around 38.  They told him to come to the emergency room for further evaluation.    History provided by:  Patient      Review of Systems   Constitutional:  Negative for fever.   HENT:  Negative for congestion.    Respiratory:  Negative for cough and shortness of breath.    Cardiovascular:  Negative for chest pain.   Gastrointestinal:  Negative for abdominal pain and vomiting.   Musculoskeletal:  Negative for back pain.   Neurological:  Positive for syncope. Negative for weakness and numbness.   Psychiatric/Behavioral:  Negative for confusion.        Past Medical History:   Diagnosis Date   • Allergic 01/01/1988    5+ molds, pollens, grass   • Diabetes mellitus     Not sure like 2020   • Diverticulitis    • Diverticulosis     Mot sure 1995   • GERD (gastroesophageal reflux disease)    • Hypertension    • Kidney stone     Not dure 1995   • Obesity     Not sure 1999       Allergies  "  Allergen Reactions   • Lisinopril Nausea And Vomiting       Past Surgical History:   Procedure Laterality Date   • HERNIA REPAIR         Family History   Problem Relation Age of Onset   • Anxiety disorder Mother    • Arthritis Mother    • Depression Mother    • Diabetes Mother    • Hyperlipidemia Mother    • Kidney disease Mother         Kidney stones   • Cancer Father          of cancer in 2009   • Diabetes Father        Social History     Socioeconomic History   • Marital status:    Tobacco Use   • Smoking status: Never   • Smokeless tobacco: Never   Vaping Use   • Vaping status: Never Used   Substance and Sexual Activity   • Alcohol use: Never   • Drug use: Never   • Sexual activity: Yes     Partners: Female       /77   Pulse (!) 38   Temp 98.9 °F (37.2 °C) (Oral)   Resp 16   Ht 172.7 cm (68\")   Wt 108 kg (238 lb 15.7 oz)   SpO2 96%   BMI 36.34 kg/m²       Objective   Physical Exam  Vitals and nursing note reviewed.   Constitutional:       Appearance: Normal appearance.   HENT:      Head: Normocephalic.      Comments: Abrasion to posterior scalp     Mouth/Throat:      Mouth: Mucous membranes are moist.   Cardiovascular:      Rate and Rhythm: Regular rhythm. Bradycardia present.      Heart sounds: Normal heart sounds.   Pulmonary:      Effort: Pulmonary effort is normal. No respiratory distress.      Breath sounds: Normal breath sounds.   Abdominal:      Palpations: Abdomen is soft.      Tenderness: There is no abdominal tenderness.   Skin:     General: Skin is warm and dry.   Neurological:      General: No focal deficit present.      Mental Status: He is alert and oriented to person, place, and time.         Procedures           ED Course  ED Course as of 25 Dr. Garcia per hospitalist request.  He reviewed the EKG, EKG consistent with type II heart block.  Since patient is asymptomatic currently he will see patient in the morning.    Discussed " with Dr. Huggins. [LB]      ED Course User Index  [LB] Rima Downing, TAMIKO      My interpretation of EKG shows sinus bradycardia, rate of 40, prolonged MA interval, nonspecific IVCD, I have no EKG for comparison                                     Results for orders placed or performed during the hospital encounter of 02/18/25   ECG 12 Lead Bradycardia    Collection Time: 02/18/25  8:41 PM   Result Value Ref Range    QT Interval 558 ms    QTC Interval 454 ms   Basic Metabolic Panel    Collection Time: 02/18/25  9:08 PM    Specimen: Blood   Result Value Ref Range    Glucose 161 (H) 65 - 99 mg/dL    BUN 16 6 - 20 mg/dL    Creatinine 1.10 0.76 - 1.27 mg/dL    Sodium 140 136 - 145 mmol/L    Potassium 4.4 3.5 - 5.2 mmol/L    Chloride 105 98 - 107 mmol/L    CO2 22.4 22.0 - 29.0 mmol/L    Calcium 9.2 8.6 - 10.5 mg/dL    BUN/Creatinine Ratio 14.5 7.0 - 25.0    Anion Gap 12.6 5.0 - 15.0 mmol/L    eGFR 77.3 >60.0 mL/min/1.73   High Sensitivity Troponin T    Collection Time: 02/18/25  9:08 PM    Specimen: Blood   Result Value Ref Range    HS Troponin T 46 (H) <22 ng/L   CBC Auto Differential    Collection Time: 02/18/25  9:08 PM    Specimen: Blood   Result Value Ref Range    WBC 9.40 3.40 - 10.80 10*3/mm3    RBC 5.71 4.14 - 5.80 10*6/mm3    Hemoglobin 16.7 13.0 - 17.7 g/dL    Hematocrit 49.7 37.5 - 51.0 %    MCV 87.0 79.0 - 97.0 fL    MCH 29.2 26.6 - 33.0 pg    MCHC 33.6 31.5 - 35.7 g/dL    RDW 12.8 12.3 - 15.4 %    RDW-SD 40.6 37.0 - 54.0 fl    MPV 9.2 6.0 - 12.0 fL    Platelets 257 140 - 450 10*3/mm3    Neutrophil % 57.0 42.7 - 76.0 %    Lymphocyte % 31.6 19.6 - 45.3 %    Monocyte % 8.7 5.0 - 12.0 %    Eosinophil % 1.7 0.3 - 6.2 %    Basophil % 0.7 0.0 - 1.5 %    Immature Grans % 0.3 0.0 - 0.5 %    Neutrophils, Absolute 5.35 1.70 - 7.00 10*3/mm3    Lymphocytes, Absolute 2.97 0.70 - 3.10 10*3/mm3    Monocytes, Absolute 0.82 0.10 - 0.90 10*3/mm3    Eosinophils, Absolute 0.16 0.00 - 0.40 10*3/mm3    Basophils,  Absolute 0.07 0.00 - 0.20 10*3/mm3    Immature Grans, Absolute 0.03 0.00 - 0.05 10*3/mm3    nRBC 0.0 0.0 - 0.2 /100 WBC   Gold Top - SST    Collection Time: 02/18/25  9:08 PM   Result Value Ref Range    Extra Tube Hold for add-ons.    Light Blue Top    Collection Time: 02/18/25  9:08 PM   Result Value Ref Range    Extra Tube Hold for add-ons.      CT Head Without Contrast    Result Date: 2/18/2025  Impression: No acute intracranial findings. Electronically Signed: Bao Trujillo  2/18/2025 10:00 PM EST  Workstation ID: [id number]    XR Chest 1 View    Result Date: 2/18/2025  Impression: No radiographic evidence of acute cardiopulmonary abnormality. Electronically Signed: Bao Trujillo  2/18/2025 9:37 PM EST  Workstation ID: [id number]                 Medical Decision Making  Amount and/or Complexity of Data Reviewed  Labs: ordered.  Radiology: ordered.  ECG/medicine tests: ordered.    Risk  Prescription drug management.      Patient had the above evaluation.  Results were discussed with the patient.  My interpretation of chest x-ray shows no infiltrate or effusion.  CT head shows no acute intracranial abnormality.  EKG shows sinus bradycardia with no acute ischemia.  Troponin is mildly elevated at 46.  Patient is having no chest pain or shortness of breath.  White blood cell count is normal.  BMP is unremarkable.  I discussed with the hospitalist and the patient will be admitted for further evaluation and management.      Final diagnoses:   Syncope, unspecified syncope type   Sinus bradycardia   Abrasion of scalp, initial encounter       ED Disposition  ED Disposition       ED Disposition   Decision to Admit    Condition   --    Comment   Level of Care: Telemetry [5]   Admitting Physician: LLANES ALVAREZ, CARLOS [[id number]]   Attending Physician: LLANES ALVAREZ, CARLOS [[id number]]                 No follow-up provider specified.       Medication List      No changes were made to your prescriptions during this visit.             Hussein Bar MD  02/18/25 1502       Hussein Bar MD  02/19/25 7713

## 2025-02-19 NOTE — CONSULTS
CARDIOLOGY CONSULT:    Ismael Pulido  1965  male  [id number]      Referring Provider: Hospitalist  Reason for Consultation: Syncope and bradycardia    Patient Care Team:  Sharon Silva MD as PCP - General (Internal Medicine & Pediatrics)    Chief complaint syncope    Subjective .     History of present illness:  Ismael Pulido is a 59 y.o. male with history of diabetes hypertension hyperlipidemia presented to hospital after syncopal episode at workplace.  Patient said that he had of having some dizziness recently and low heart rates but he had a syncopal episode.  He was on metoprolol at a higher dose for his blood pressure.  No complaint of any chest pain shortness of breath palpitations or swelling of the feet.  Patient initially had sinus bradycardia but also second-degree block intermittently initially.  Patient is off his beta-blockers now.    Review of Systems   Constitutional: Negative for fever and malaise/fatigue.   HENT:  Negative for ear pain and nosebleeds.    Eyes:  Negative for blurred vision and double vision.   Cardiovascular:  Positive for syncope. Negative for chest pain, dyspnea on exertion and palpitations.   Respiratory:  Negative for cough and shortness of breath.    Skin:  Negative for rash.   Musculoskeletal:  Negative for joint pain.   Gastrointestinal:  Negative for abdominal pain, nausea and vomiting.   Neurological:  Negative for focal weakness and headaches.   Psychiatric/Behavioral:  Negative for depression. The patient is not nervous/anxious.    All other systems reviewed and are negative.      History  Past Medical History:   Diagnosis Date    Allergic 01/01/1988    5+ molds, pollens, grass    Diabetes mellitus     Not sure like 2020    Diverticulitis     Diverticulosis     Mot sure 1995    GERD (gastroesophageal reflux disease)     Hypertension     Kidney stone     Not dure 1995    Obesity     Not sure 1999       Past Surgical History:   Procedure Laterality  Date    HERNIA REPAIR         Family History   Problem Relation Age of Onset    Anxiety disorder Mother     Arthritis Mother     Depression Mother     Diabetes Mother     Hyperlipidemia Mother     Kidney disease Mother         Kidney stones    Cancer Father          of cancer in 2009    Diabetes Father        Social History     Tobacco Use    Smoking status: Never    Smokeless tobacco: Never   Vaping Use    Vaping status: Never Used   Substance Use Topics    Alcohol use: Never    Drug use: Never        Medications Prior to Admission   Medication Sig Dispense Refill Last Dose/Taking    ascorbic acid ([employer] Vitamin C) 1000 MG tablet Take 4 tablets by mouth Daily.   2025 at  5:00 AM    clotrimazole (LOTRIMIN) 1 % cream Apply 1 Application topically to the appropriate area as directed 2 (Two) Times a Day. 85 g 3 2025 at  5:00 AM    empagliflozin (Jardiance) 25 MG tablet tablet Take 1 tablet by mouth Daily. 30 tablet 5 2025 at  5:00 AM    lansoprazole (PREVACID) 30 MG capsule Take 1 capsule by mouth Daily. 30 capsule 5 2025 at  5:00 AM    loratadine (Claritin) 10 MG tablet Take 1 tablet by mouth Daily.   2025 at  5:00 AM    Misc Natural Products (NEURIVA PO) Take  by mouth Daily.   2025 at  5:00 AM    pseudoephedrine (SUDAFED) 120 MG 12 hr tablet Take 1 tablet by mouth Every Morning.   2025 at  5:00 AM    rosuvastatin (CRESTOR) 10 MG tablet Take 1 tablet by mouth Daily. 90 tablet 1 2025 at  5:00 AM    Semaglutide 3 MG tablet Take 1 tablet by mouth Daily. 30 tablet 0        Allergies: Lisinopril    Scheduled Meds:cetirizine, 10 mg, Oral, Daily  empagliflozin, 25 mg, Oral, Daily  heparin (porcine), 5,000 Units, Subcutaneous, Q8H  insulin lispro, 2-7 Units, Subcutaneous, 4x Daily AC & at Bedtime  losartan, 25 mg, Oral, Daily  pantoprazole, 40 mg, Oral, Q AM  rosuvastatin, 10 mg, Oral, Daily  sodium chloride, 10 mL, Intravenous, Q12H      Continuous Infusions:   PRN Meds:.   "acetaminophen **OR** acetaminophen **OR** acetaminophen    senna-docusate sodium **AND** polyethylene glycol **AND** bisacodyl **AND** bisacodyl    Calcium Replacement - Follow Nurse / BPA Driven Protocol    dextrose    dextrose    glucagon (human recombinant)    Magnesium Standard Dose Replacement - Follow Nurse / BPA Driven Protocol    Phosphorus Replacement - Follow Nurse / BPA Driven Protocol    Potassium Replacement - Follow Nurse / BPA Driven Protocol    [COMPLETED] Insert Peripheral IV **AND** sodium chloride    sodium chloride    sodium chloride    Objective     VITAL SIGNS  Vitals:    02/19/25 0420 02/19/25 0432 02/19/25 0829 02/19/25 0903   BP:  126/85 112/73    BP Location:  Left arm Left arm    Patient Position:  Lying Lying    Pulse: (!) 36 (!) 40 (!) 43 (!) 37   Resp:  14 10    Temp:  97.4 °F (36.3 °C) 97.8 °F (36.6 °C)    TempSrc:  Oral Oral    SpO2: 99% 94% 98%    Weight:       Height:           Flowsheet Rows      Flowsheet Row First Filed Value   Admission Height 172.7 cm (68\") Documented at 02/18/2025 2036   Admission Weight 108 kg (238 lb 15.7 oz) Documented at 02/18/2025 2036             TELEMETRY: Sinus bradycardia with PVCs    Physical Exam:  Constitutional:       Appearance: Well-developed.   Eyes:      General: No scleral icterus.     Conjunctiva/sclera: Conjunctivae normal.      Pupils: Pupils are equal, round, and reactive to light.   HENT:      Head: Normocephalic and atraumatic.   Neck:      Vascular: No carotid bruit or JVD.   Pulmonary:      Effort: Pulmonary effort is normal.      Breath sounds: Normal breath sounds. No wheezing. No rales.   Cardiovascular:      Normal rate. Regular rhythm.   Pulses:     Intact distal pulses.   Abdominal:      General: Bowel sounds are normal.      Palpations: Abdomen is soft.   Musculoskeletal: Normal range of motion.      Cervical back: Normal range of motion and neck supple. Skin:     General: Skin is warm and dry.      Findings: No rash. "   Neurological:      Mental Status: Alert.      Comments: No focal deficits          Results Review:   I reviewed the patient's new clinical results.  Lab Results (last 24 hours)       Procedure Component Value Units Date/Time    POC Glucose 4x Daily Before Meals & at Bedtime [[id number]]  (Abnormal) Collected: 02/19/25 0804    Specimen: Blood Updated: 02/19/25 0806     Glucose 137 mg/dL      Comment: Serial Number: [id number]:  [id number]       Magnesium [[id number]]  (Normal) Collected: 02/19/25 0326    Specimen: Blood Updated: 02/19/25 0409     Magnesium 2.4 mg/dL     Comprehensive Metabolic Panel [[id number]]  (Abnormal) Collected: 02/19/25 0326    Specimen: Blood Updated: 02/19/25 0409     Glucose 145 mg/dL      BUN 15 mg/dL      Creatinine 0.99 mg/dL      Sodium 140 mmol/L      Potassium 4.3 mmol/L      Comment: Slight hemolysis detected by analyzer. Result may be falsely elevated.        Chloride 107 mmol/L      CO2 22.6 mmol/L      Calcium 9.2 mg/dL      Total Protein 7.0 g/dL      Albumin 3.9 g/dL      ALT (SGPT) 42 U/L      AST (SGOT) 34 U/L      Comment: Slight hemolysis detected by analyzer. Result may be falsely elevated.        Alkaline Phosphatase 102 U/L      Total Bilirubin 0.2 mg/dL      Globulin 3.1 gm/dL      A/G Ratio 1.3 g/dL      BUN/Creatinine Ratio 15.2     Anion Gap 10.4 mmol/L      eGFR 87.8 mL/min/1.73     Narrative:      GFR Categories in Chronic Kidney Disease (CKD)      GFR Category          GFR (mL/min/1.73)    Interpretation  G1                     90 or greater         Normal or high (1)  G2                      60-89                Mild decrease (1)  G3a                   45-59                Mild to moderate decrease  G3b                   30-44                Moderate to severe decrease  G4                    15-29                Severe decrease  G5                    14 or less           Kidney failure          (1)In the absence of evidence of kidney disease, neither GFR  category G1 or G2 fulfill the criteria for CKD.    eGFR calculation 2021 CKD-EPI creatinine equation, which does not include race as a factor    Phosphorus [[id number]]  (Normal) Collected: 02/19/25 0326    Specimen: Blood Updated: 02/19/25 0401     Phosphorus 4.0 mg/dL     CBC Auto Differential [[id number]]  (Normal) Collected: 02/19/25 0326    Specimen: Blood Updated: 02/19/25 0337     WBC 8.60 10*3/mm3      RBC 5.50 10*6/mm3      Hemoglobin 15.9 g/dL      Hematocrit 48.5 %      MCV 88.2 fL      MCH 28.9 pg      MCHC 32.8 g/dL      RDW 13.0 %      RDW-SD 42.4 fl      MPV 9.2 fL      Platelets 218 10*3/mm3      Neutrophil % 53.6 %      Lymphocyte % 34.1 %      Monocyte % 9.4 %      Eosinophil % 1.9 %      Basophil % 0.7 %      Immature Grans % 0.3 %      Neutrophils, Absolute 4.61 10*3/mm3      Lymphocytes, Absolute 2.93 10*3/mm3      Monocytes, Absolute 0.81 10*3/mm3      Eosinophils, Absolute 0.16 10*3/mm3      Basophils, Absolute 0.06 10*3/mm3      Immature Grans, Absolute 0.03 10*3/mm3      nRBC 0.0 /100 WBC     COVID-19, FLU A/B, RSV PCR 1 HR TAT - Swab, Nasopharynx [[id number]]  (Normal) Collected: 02/19/25 0017    Specimen: Swab from Nasopharynx Updated: 02/19/25 0059     COVID19 Not Detected     Influenza A PCR Not Detected     Influenza B PCR Not Detected     RSV, PCR Not Detected    Narrative:      Fact sheet for providers: https://www.fda.gov/media/996063/download    Fact sheet for patients: https://www.fda.gov/media/414941/download    Test performed by PCR.    High Sensitivity Troponin T 1Hr [[id number]]  (Abnormal) Collected: 02/18/25 2208    Specimen: Blood Updated: 02/18/25 2232     HS Troponin T 44 ng/L      Troponin T Numeric Delta -2 ng/L      Troponin T % Delta -4    Narrative:      High Sensitive Troponin T Reference Range:  <14.0 ng/L- Negative Female for AMI  <22.0 ng/L- Negative Male for AMI  >=14 - Abnormal Female indicating possible myocardial injury.  >=22 - Abnormal Male indicating possible  myocardial injury.   Clinicians would have to utilize clinical acumen, EKG, Troponin, and serial changes to determine if it is an Acute Myocardial Infarction or myocardial injury due to an underlying chronic condition.         Basic Metabolic Panel [[id number]]  (Abnormal) Collected: 02/18/25 2108    Specimen: Blood Updated: 02/18/25 2141     Glucose 161 mg/dL      BUN 16 mg/dL      Creatinine 1.10 mg/dL      Sodium 140 mmol/L      Potassium 4.4 mmol/L      Comment: Specimen hemolyzed.  Result may be falsely elevated.        Chloride 105 mmol/L      CO2 22.4 mmol/L      Calcium 9.2 mg/dL      BUN/Creatinine Ratio 14.5     Anion Gap 12.6 mmol/L      eGFR 77.3 mL/min/1.73     Narrative:      GFR Categories in Chronic Kidney Disease (CKD)      GFR Category          GFR (mL/min/1.73)    Interpretation  G1                     90 or greater         Normal or high (1)  G2                      60-89                Mild decrease (1)  G3a                   45-59                Mild to moderate decrease  G3b                   30-44                Moderate to severe decrease  G4                    15-29                Severe decrease  G5                    14 or less           Kidney failure          (1)In the absence of evidence of kidney disease, neither GFR category G1 or G2 fulfill the criteria for CKD.    eGFR calculation 2021 CKD-EPI creatinine equation, which does not include race as a factor    High Sensitivity Troponin T [[id number]]  (Abnormal) Collected: 02/18/25 2108    Specimen: Blood Updated: 02/18/25 2136     HS Troponin T 46 ng/L     Narrative:      High Sensitive Troponin T Reference Range:  <14.0 ng/L- Negative Female for AMI  <22.0 ng/L- Negative Male for AMI  >=14 - Abnormal Female indicating possible myocardial injury.  >=22 - Abnormal Male indicating possible myocardial injury.   Clinicians would have to utilize clinical acumen, EKG, Troponin, and serial changes to determine if it is an Acute Myocardial  Infarction or myocardial injury due to an underlying chronic condition.         Extra Tubes [[id number]] Collected: 02/18/25 2108    Specimen: Blood, Venous Line Updated: 02/18/25 2117    Narrative:      The following orders were created for panel order Extra Tubes.  Procedure                               Abnormality         Status                     ---------                               -----------         ------                     Gold Top - SST[[id number]]                                   Final result               Light Blue Top[[id number]]                                   Final result                 Please view results for these tests on the individual orders.    Light Blue Top [[id number]] Collected: 02/18/25 2108    Specimen: Blood Updated: 02/18/25 2117     Extra Tube Hold for add-ons.     Comment: Auto resulted       Gold Top - SST [[id number]] Collected: 02/18/25 2108    Specimen: Blood Updated: 02/18/25 2117     Extra Tube Hold for add-ons.     Comment: Auto resulted.       CBC & Differential [[id number]]  (Normal) Collected: 02/18/25 2108    Specimen: Blood Updated: 02/18/25 2116    Narrative:      The following orders were created for panel order CBC & Differential.  Procedure                               Abnormality         Status                     ---------                               -----------         ------                     CBC Auto Differential[[id number]]        Normal              Final result                 Please view results for these tests on the individual orders.    CBC Auto Differential [[id number]]  (Normal) Collected: 02/18/25 2108    Specimen: Blood Updated: 02/18/25 2116     WBC 9.40 10*3/mm3      RBC 5.71 10*6/mm3      Hemoglobin 16.7 g/dL      Hematocrit 49.7 %      MCV 87.0 fL      MCH 29.2 pg      MCHC 33.6 g/dL      RDW 12.8 %      RDW-SD 40.6 fl      MPV 9.2 fL      Platelets 257 10*3/mm3      Neutrophil % 57.0 %      Lymphocyte % 31.6 %      Monocyte % 8.7 %       Eosinophil % 1.7 %      Basophil % 0.7 %      Immature Grans % 0.3 %      Neutrophils, Absolute 5.35 10*3/mm3      Lymphocytes, Absolute 2.97 10*3/mm3      Monocytes, Absolute 0.82 10*3/mm3      Eosinophils, Absolute 0.16 10*3/mm3      Basophils, Absolute 0.07 10*3/mm3      Immature Grans, Absolute 0.03 10*3/mm3      nRBC 0.0 /100 WBC             Imaging Results (Last 24 Hours)       Procedure Component Value Units Date/Time    CT Head Without Contrast [[id number]] Collected: 02/18/25 2158     Updated: 02/18/25 2202    Narrative:      CT HEAD WO CONTRAST    Date of Exam: 2/18/2025 9:52 PM EST    Indication: syncope, fall.    Comparison: None available.    Technique: Axial CT images were obtained of the head without contrast administration.  Coronal reconstructions were performed.  Automated exposure control and iterative reconstruction methods were used.      Findings:  No acute intracranial hemorrhage.Intact appearing gray-white differentiation.No extra-axial fluid collection.No significant mass effect.    No hydrocephalus.    Mild generalized parenchymal volume loss.    Scattered areas of periventricular and subcortical white matter hypoattenuation, nonspecific, perhaps from small vessel ischemic/hypertensive changes in a patient of this age.There are intracranial atherosclerotic calcifications.    Mild scattered mucosal thickening in the paranasal sinuses. Layering fluid in the right maxillary sinus which can be correlated for symptoms of recent sinusitis..Mastoid air cells are essentially clear.Included globes and orbits appear unremarkable by   CT.    No acute or aggressive appearing bony or extracranial soft tissue process.      Impression:      Impression:  No acute intracranial findings.      Electronically Signed: Bao Trujillo    2/18/2025 10:00 PM EST    Workstation ID: [id number]    XR Chest 1 View [[id number]] Collected: 02/18/25 2137     Updated: 02/18/25 2139    Narrative:      XR CHEST 1 VW    Date of  Exam: 2/18/2025 9:10 PM EST    Indication: syncope    Comparison: Chest radiograph 2/7/2025    Findings:      Mediastinum: Cardiac silhouette appears unchanged and normal in size    Lungs: The lungs appear clear without focal consolidation appreciated.    Pleura: No pleural effusion or pneumothorax.    Bones and soft tissues: No acute, displaced fracture seen.        Impression:      Impression:  No radiographic evidence of acute cardiopulmonary abnormality.        Electronically Signed: Bao OHARA Ronnie    2/18/2025 9:37 PM EST    Workstation ID: [id number]            EKG      I personally viewed and interpreted the patient's EKG/Telemetry data:    ECHOCARDIOGRAM:  Results for orders placed during the hospital encounter of 02/18/25    Adult Transthoracic Echo Complete w/ Color, Spectral and Contrast if Necessary Per Protocol    Interpretation Summary    Left ventricular ejection fraction appears to be 56 - 60%.    The right ventricular cavity is moderately dilated.    The left atrial cavity is mildly dilated.    Moderate tricuspid valve regurgitation is present.    Estimated right ventricular systolic pressure from tricuspid regurgitation is moderately elevated (45-55 mmHg).         STRESS MYOVIEW:       CARDIAC CATHETERIZATION:    No results found for this or any previous visit.       OTHER:         MDM      Syncope/bradycardia  Patient presented after syncopal episode and had bradycardia with second-degree AV block but now he is in sinus rhythm with bradycardia  Patient had borderline level troponin  Patient's TSH level and electrolytes are normal.  Patient had an echocardiogram which showed normal LV systolic function but moderate right ventricular dilatation  Patient probably has sleep apnea  Patient was on very high dose beta-blockers which have been stopped and will give him another 24 hours to recover otherwise we will discuss about permanent pacemaker placement.  Patient will also have a stress Myoview to rule  out ischemia    Hypertension  Patient will continue on losartan but stop his metoprolol and will add amlodipine if needed    Diabetes  Patient is on sliding scale insulin but is on oral medicines at home.    Hyperlipidemia  Patient on statins and the lipid levels are well within normal meds.    I discussed the patients findings and my recommendations with patient and nurse    Win Reaves MD  02/19/25  11:54 EST

## 2025-02-19 NOTE — PLAN OF CARE
Goal Outcome Evaluation:         No pain or sob reported, no chest pain. Isaias on monitor, nonsymptomatic with no dizziness or syncope. Alert. No overnight events, echo scheduled in am,

## 2025-02-19 NOTE — PAYOR COMM NOTE
"This is inpatient admit submission for Ismael PATRICK Tess.    IP admit 2/18/25    CORRECT PROVIDER IS NOT IN THIS SYSTEM AS AN OPTION.  ATTENDING: CHARLES MCDUFFIE MD. NPI: [id number]           AUTHORIZATION PENDING:   PLEASE FAX OR CALL DETERMINATION TO CONTACT BELOW:   THANK YOU.      Anita Diego, RN, CCM  Utilization Nurse  [facility]   [address] [place] [address]   [phone]  Fx [phone]     TessIsmael (59 y.o. Male)       Date of Birth   1965    Social Security Number       Address   [address] [place] [address]    Home Phone   [phone]    MRN   [id number]       Muslim   Voodoo    Marital Status                               Admission Date   2/18/25    Admission Type   Emergency    Admitting Provider   Llanes Alvarez, Carlos, MD    Attending Provider   Charles Mcduffie DO    Department, Room/Bed   [facility] PROGRESS CARE, 2109/1       Discharge Date       Discharge Disposition       Discharge Destination                                 Attending Provider: Charles Mcduffie DO    Allergies: Lisinopril    Isolation: None   Infection: None   Code Status: CPR    Ht: 172.7 cm (68\")   Wt: 107 kg (235 lb 0.2 oz)    Admission Cmt: None   Principal Problem: Symptomatic bradycardia [R00.1]                   Active Insurance as of 2/18/2025       Primary Coverage       Payor Plan Insurance Group Employer/Plan Group    [employer] COMMERCIAL AETNA [id number]       Payor Plan Address Payor Plan Phone Number Payor Plan Fax Number Effective Dates    [address] [phone]  3/25/2021 - None Entered    [facility] [address]         Subscriber Name Subscriber Birth Date Member ID       ISMAEL LAU 1965 [id number]                     Emergency Contacts        (Rel.) Home Phone Work Phone Mobile Phone    TESSCLAIRE (Spouse) [phone] -- [phone]                 History & Physical        Llanes " "Abram Huggins MD at 25 [phone]              [facility]  History & Physical    Patient Name: Ismael Pulido  : 1965  MRN: [id number]  Primary Care Physician:  Sharon Silva MD  Date of admission: 2025  Date and Time of Service: 2025 at 2300    Subjective      Chief Complaint: \"loss of consciousness\"    History of Present Illness: Ismael Pulido is a 59 y.o. male with a PMH of HTN on metoprolol  mg once daily last dose taken today at around 5:00 am also on losartan, type 2 DM, HLD, who presented to [facility] on 2025 with a slow heart rate and status post syncope episode yesterday at his work place. 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 He was apparently seen at occupational health and had some skull x ray and C spine which were reportedly normal(films not seen). He was then seen in follow up with his PCP today and was found to be bradycardic for which his metoprolol was discontinued, he again rechecked his HR in the evening and was still in the 30s which prompted him to come to ED where he has been persistently bradycardic. EKG showed Mobitz type 2 heart block, HR persistently in mid 30s. No syncopal episodes reported since yesterday. Denies chest pain, fever or chills, similar episodes in the past. He does report ongoing SOB with minimal exertion.  I received signout for admission for symptomatic sinus bradycardia. I requested Cardiology consultation prior to admission.       Review of Systems   Constitutional:  Negative for chills and fatigue.   HENT:  Negative for drooling.    Eyes:  Negative for itching.   Respiratory:  Positive for shortness of breath. Negative for cough, choking, chest tightness and " stridor.    Cardiovascular:  Negative for chest pain, palpitations and leg swelling.   Gastrointestinal:  Negative for abdominal pain and constipation.   Genitourinary:  Negative for dysuria.   Neurological:  Positive for dizziness and syncope. Negative for seizures.       Personal History     Past Medical History:   Diagnosis Date    Allergic 01/01/1988    5+ molds, pollens, grass    Diabetes mellitus     Not sure like 2020    Diverticulitis     Diverticulosis     Mot sure 1995    GERD (gastroesophageal reflux disease)     Hypertension     Kidney stone     Not dure 1995    Obesity     Not sure 1999       Past Surgical History:   Procedure Laterality Date    HERNIA REPAIR         Family History: family history includes Anxiety disorder in his mother; Arthritis in his mother; Cancer in his father; Depression in his mother; Diabetes in his father and mother; Hyperlipidemia in his mother; Kidney disease in his mother. Otherwise pertinent FHx was reviewed and not pertinent to current issue.    Social History:  reports that he has never smoked. He has never used smokeless tobacco. He reports that he does not drink alcohol and does not use drugs.    Home Medications:  Prior to Admission Medications       Prescriptions Last Dose Informant Patient Reported? Taking?    ascorbic acid ([employer] Vitamin C) 1000 MG tablet 2/18/2025  Yes Yes    Take 4 tablets by mouth Daily.    clotrimazole (LOTRIMIN) 1 % cream 2/18/2025  No Yes    Apply 1 Application topically to the appropriate area as directed 2 (Two) Times a Day.    empagliflozin (Jardiance) 25 MG tablet tablet 2/18/2025  No Yes    Take 1 tablet by mouth Daily.    lansoprazole (PREVACID) 30 MG capsule 2/18/2025  No Yes    Take 1 capsule by mouth Daily.    loratadine (Claritin) 10 MG tablet 2/18/2025  Yes Yes    Take 1 tablet by mouth Daily.    losartan (Cozaar) 25 MG tablet   No No    Take 1 tablet by mouth Daily.    Misc Natural Products (NEURIVA PO) 2/18/2025  Yes Yes    Take   by mouth Daily.    pseudoephedrine (SUDAFED) 120 MG 12 hr tablet 2/18/2025  Yes Yes    Take 1 tablet by mouth Every Morning.    rosuvastatin (CRESTOR) 10 MG tablet 2/18/2025  No Yes    Take 1 tablet by mouth Daily.    Semaglutide 3 MG tablet   No No    Take 1 tablet by mouth Daily.              Allergies:  Allergies   Allergen Reactions    Lisinopril Nausea And Vomiting       Objective      Vitals:   Temp:  [98.9 °F (37.2 °C)] 98.9 °F (37.2 °C)  Heart Rate:  [38-39] 38  Resp:  [16-18] 17  BP: (120-156)/(70-99) 133/80  Body mass index is 36.34 kg/m².  Physical Exam  Constitutional:       Appearance: Normal appearance.   HENT:      Head: Normocephalic.      Nose: Nose normal.      Mouth/Throat:      Mouth: Mucous membranes are dry.   Eyes:      Extraocular Movements: Extraocular movements intact.      Pupils: Pupils are equal, round, and reactive to light.   Cardiovascular:      Rate and Rhythm: Bradycardia present. Rhythm irregular.      Pulses: Normal pulses.      Heart sounds: Normal heart sounds.   Pulmonary:      Effort: Pulmonary effort is normal.      Breath sounds: Normal breath sounds.   Abdominal:      General: Abdomen is flat. Bowel sounds are normal. There is no distension.      Palpations: Abdomen is soft.   Musculoskeletal:         General: Normal range of motion.      Cervical back: Neck supple.   Skin:     General: Skin is dry.      Capillary Refill: Capillary refill takes less than 2 seconds.   Neurological:      Mental Status: He is alert and oriented to person, place, and time.   Psychiatric:         Behavior: Behavior normal.         Diagnostic Data:  Lab Results (last 24 hours)       Procedure Component Value Units Date/Time    High Sensitivity Troponin T 1Hr [[id number]]  (Abnormal) Collected: 02/18/25 2208    Specimen: Blood Updated: 02/18/25 2232     HS Troponin T 44 ng/L      Troponin T Numeric Delta -2 ng/L      Troponin T % Delta -4    Narrative:      High Sensitive Troponin T Reference  Range:  <14.0 ng/L- Negative Female for AMI  <22.0 ng/L- Negative Male for AMI  >=14 - Abnormal Female indicating possible myocardial injury.  >=22 - Abnormal Male indicating possible myocardial injury.   Clinicians would have to utilize clinical acumen, EKG, Troponin, and serial changes to determine if it is an Acute Myocardial Infarction or myocardial injury due to an underlying chronic condition.         Basic Metabolic Panel [[id number]]  (Abnormal) Collected: 02/18/25 2108    Specimen: Blood Updated: 02/18/25 2141     Glucose 161 mg/dL      BUN 16 mg/dL      Creatinine 1.10 mg/dL      Sodium 140 mmol/L      Potassium 4.4 mmol/L      Comment: Specimen hemolyzed.  Result may be falsely elevated.        Chloride 105 mmol/L      CO2 22.4 mmol/L      Calcium 9.2 mg/dL      BUN/Creatinine Ratio 14.5     Anion Gap 12.6 mmol/L      eGFR 77.3 mL/min/1.73     Narrative:      GFR Categories in Chronic Kidney Disease (CKD)      GFR Category          GFR (mL/min/1.73)    Interpretation  G1                     90 or greater         Normal or high (1)  G2                      60-89                Mild decrease (1)  G3a                   45-59                Mild to moderate decrease  G3b                   30-44                Moderate to severe decrease  G4                    15-29                Severe decrease  G5                    14 or less           Kidney failure          (1)In the absence of evidence of kidney disease, neither GFR category G1 or G2 fulfill the criteria for CKD.    eGFR calculation 2021 CKD-EPI creatinine equation, which does not include race as a factor    High Sensitivity Troponin T [[id number]]  (Abnormal) Collected: 02/18/25 2108    Specimen: Blood Updated: 02/18/25 2136     HS Troponin T 46 ng/L     Narrative:      High Sensitive Troponin T Reference Range:  <14.0 ng/L- Negative Female for AMI  <22.0 ng/L- Negative Male for AMI  >=14 - Abnormal Female indicating possible myocardial injury.  >=22 -  Abnormal Male indicating possible myocardial injury.   Clinicians would have to utilize clinical acumen, EKG, Troponin, and serial changes to determine if it is an Acute Myocardial Infarction or myocardial injury due to an underlying chronic condition.         Extra Tubes [[id number]] Collected: 02/18/25 2108    Specimen: Blood, Venous Line Updated: 02/18/25 2117    Narrative:      The following orders were created for panel order Extra Tubes.  Procedure                               Abnormality         Status                     ---------                               -----------         ------                     Gold Top - SST[[id number]]                                   Final result               Light Blue Top[[id number]]                                   Final result                 Please view results for these tests on the individual orders.    Light Blue Top [[id number]] Collected: 02/18/25 2108    Specimen: Blood Updated: 02/18/25 2117     Extra Tube Hold for add-ons.     Comment: Auto resulted       Gold Top - SST [[id number]] Collected: 02/18/25 2108    Specimen: Blood Updated: 02/18/25 2117     Extra Tube Hold for add-ons.     Comment: Auto resulted.       CBC & Differential [[id number]]  (Normal) Collected: 02/18/25 2108    Specimen: Blood Updated: 02/18/25 2116    Narrative:      The following orders were created for panel order CBC & Differential.  Procedure                               Abnormality         Status                     ---------                               -----------         ------                     CBC Auto Differential[[id number]]        Normal              Final result                 Please view results for these tests on the individual orders.    CBC Auto Differential [[id number]]  (Normal) Collected: 02/18/25 2108    Specimen: Blood Updated: 02/18/25 2116     WBC 9.40 10*3/mm3      RBC 5.71 10*6/mm3      Hemoglobin 16.7 g/dL      Hematocrit 49.7 %      MCV 87.0 fL      MCH 29.2 pg       MCHC 33.6 g/dL      RDW 12.8 %      RDW-SD 40.6 fl      MPV 9.2 fL      Platelets 257 10*3/mm3      Neutrophil % 57.0 %      Lymphocyte % 31.6 %      Monocyte % 8.7 %      Eosinophil % 1.7 %      Basophil % 0.7 %      Immature Grans % 0.3 %      Neutrophils, Absolute 5.35 10*3/mm3      Lymphocytes, Absolute 2.97 10*3/mm3      Monocytes, Absolute 0.82 10*3/mm3      Eosinophils, Absolute 0.16 10*3/mm3      Basophils, Absolute 0.07 10*3/mm3      Immature Grans, Absolute 0.03 10*3/mm3      nRBC 0.0 /100 WBC              Imaging Results (Last 24 Hours)       Procedure Component Value Units Date/Time    CT Head Without Contrast [[id number]] Collected: 02/18/25 2158     Updated: 02/18/25 2202    Narrative:      CT HEAD WO CONTRAST    Date of Exam: 2/18/2025 9:52 PM EST    Indication: syncope, fall.    Comparison: None available.    Technique: Axial CT images were obtained of the head without contrast administration.  Coronal reconstructions were performed.  Automated exposure control and iterative reconstruction methods were used.      Findings:  No acute intracranial hemorrhage.Intact appearing gray-white differentiation.No extra-axial fluid collection.No significant mass effect.    No hydrocephalus.    Mild generalized parenchymal volume loss.    Scattered areas of periventricular and subcortical white matter hypoattenuation, nonspecific, perhaps from small vessel ischemic/hypertensive changes in a patient of this age.There are intracranial atherosclerotic calcifications.    Mild scattered mucosal thickening in the paranasal sinuses. Layering fluid in the right maxillary sinus which can be correlated for symptoms of recent sinusitis..Mastoid air cells are essentially clear.Included globes and orbits appear unremarkable by   CT.    No acute or aggressive appearing bony or extracranial soft tissue process.      Impression:      Impression:  No acute intracranial findings.      Electronically Signed: Bao Trujillo     2/18/2025 10:00 PM EST    Workstation ID: [id number]    XR Chest 1 View [[id number]] Collected: 02/18/25 2137     Updated: 02/18/25 2139    Narrative:      XR CHEST 1 VW    Date of Exam: 2/18/2025 9:10 PM EST    Indication: syncope    Comparison: Chest radiograph 2/7/2025    Findings:      Mediastinum: Cardiac silhouette appears unchanged and normal in size    Lungs: The lungs appear clear without focal consolidation appreciated.    Pleura: No pleural effusion or pneumothorax.    Bones and soft tissues: No acute, displaced fracture seen.        Impression:      Impression:  No radiographic evidence of acute cardiopulmonary abnormality.        Electronically Signed: Bao Trujillo    2/18/2025 9:37 PM EST    Workstation ID: [id number]              Assessment & Plan        This is a 59 y.o. male with:    Active and Resolved Problems  Active Hospital Problems    Diagnosis  POA    **Symptomatic bradycardia [R00.1]  Yes      Resolved Hospital Problems   No resolved problems to display.       Symptomatic bradycardia, syncope  Second degree AV block, seems Mobitz type 2, rule out alternating complete heart block  Exertional dyspnea  Mild troponin elevation 46-->44 R/O ACS  Hold metoprolol or any other AV eduardo blocker, last dose metoprolol ER was today at 5:00 am  Transcutaneous pacer on standby  Requested Cardiology consultation prior to admission to evaluate for possible pacemaker  Would avoid atropine  Continuous cardiac monitoring  Obtain ECHO      HTN  Continue losartan  Hold metoprolol as above    Type 2 DM  Hold Jardiance and Semaglutide while inpatient  Start insulin SS    HLD  Continue statin      VTE Prophylaxis:  Pharmacologic VTE prophylaxis orders are present.        The patient desires to be as follows:    CODE STATUS:    Code Status (Patient has no pulse and is not breathing): CPR (Attempt to Resuscitate)  Medical Interventions (Patient has pulse or is breathing): Full Support        Palak Hoffman, who can be  contacted at , is the designated person to make medical decisions on the patient's behalf if He is incapable of doing so. This was clarified with patient and/or next of kin on 2/18/2025 during the course of this H&P.    Admission Status:  I believe this patient meets inpatient status.    Expected Length of Stay: 2 days    PDMP and Medication Dispenses via Sidebar reviewed and consistent with patient reported medications.    I discussed the patient's findings and my recommendations with patient, family, and nursing staff.      Signature:     This document has been electronically signed by Carlos Llanes Alvarez, MD on February 18, 2025 22:50 [facility]     Electronically signed by Llanes Alvarez, Carlos, MD at 02/19/25 0055          Emergency Department Notes        Jacquelin Carrasco RN at 02/18/25 2309          Nursing report ED to floor  Ismael Pulido  59 y.o.  male    HPI:   Chief Complaint   Patient presents with    Slow Heart Rate       Admitting doctor:   Carlos Llanes Alvarez, MD    Admitting diagnosis:   The primary encounter diagnosis was Syncope, unspecified syncope type. Diagnoses of Sinus bradycardia and Abrasion of scalp, initial encounter were also pertinent to this visit.    Code status:   Current Code Status       Date Active Code Status Order ID Comments User Context       2/18/2025 2249 CPR (Attempt to Resuscitate) [id number]  Llanes Alvarez, Carlos, MD ED        Question Answer    Code Status (Patient has no pulse and is not breathing) CPR (Attempt to Resuscitate)    Medical Interventions (Patient has pulse or is breathing) Full Support                    Allergies:   Lisinopril    Isolation:  No active isolations     Fall Risk:  Fall Risk Assessment was completed, and patient is at low risk for falls.   Predictive Model Details         4 (Low) Factor Value    Calculated 2/18/2025 23:08 Age 59    Risk of Fall Model Active Peripheral IV Present     Imaging order  "in this encounter Present     Magnesium not on file     Respiratory Rate 17     Phil Scale not on file     Diastolic BP 80     Clinically Relevant Sex Not Female     Cardiac Assessment X     Albumin not on file     Calcium 9.2 mg/dL     Total Bilirubin not on file     Potassium 4.4 mmol/L     Creatinine 1.1 mg/dL     Days after Admission 0.104     Chloride 105 mmol/L     ALT not on file         Weight:       02/18/25 2036   Weight: 108 kg (238 lb 15.7 oz)       Intake and Output  No intake or output data in the 24 hours ending 02/18/25 2310    Diet:   Dietary Orders (From admission, onward)       Start     Ordered    02/18/25 2249  Diet: Cardiac, Diabetic; Healthy Heart (2-3 Na+); Consistent Carbohydrate; Fluid Consistency: Thin (IDDSI 0)  Diet Effective Now        References:    Diet Order Definitions   Question Answer Comment   Diets: Cardiac    Diets: Diabetic    Cardiac Diet: Healthy Heart (2-3 Na+)    Diabetic Diet: Consistent Carbohydrate    Fluid Consistency: Thin (IDDSI 0)        02/18/25 2249                     Most recent vitals:   Vitals:    02/18/25 2036 02/18/25 2129 02/18/25 2243   BP: 156/99 129/77 133/80   BP Location: Left arm     Patient Position: Sitting     Pulse: (!) 39 (!) 38 (!) 38   Resp: 16 16 17   Temp: 98.9 °F (37.2 °C)     TempSrc: Oral     SpO2: 99% 96% 96%   Weight: 108 kg (238 lb 15.7 oz)     Height: 172.7 cm (68\")         Active LDAs/IV Access:   Lines, Drains & Airways       Active LDAs       Name Placement date Placement time Site Days    Peripheral IV 02/18/25 2213 Anterior;Left Forearm 02/18/25 2213  Forearm  less than 1                    Skin Condition:   Skin Assessments (last day)       None             Labs (abnormal labs have a star):   Labs Reviewed   BASIC METABOLIC PANEL - Abnormal; Notable for the following components:       Result Value    Glucose 161 (*)     All other components within normal limits    Narrative:     GFR Categories in Chronic Kidney Disease " (CKD)      GFR Category          GFR (mL/min/1.73)    Interpretation  G1                     90 or greater         Normal or high (1)  G2                      60-89                Mild decrease (1)  G3a                   45-59                Mild to moderate decrease  G3b                   30-44                Moderate to severe decrease  G4                    15-29                Severe decrease  G5                    14 or less           Kidney failure          (1)In the absence of evidence of kidney disease, neither GFR category G1 or G2 fulfill the criteria for CKD.    eGFR calculation 2021 CKD-EPI creatinine equation, which does not include race as a factor   TROPONIN - Abnormal; Notable for the following components:    HS Troponin T 46 (*)     All other components within normal limits    Narrative:     High Sensitive Troponin T Reference Range:  <14.0 ng/L- Negative Female for AMI  <22.0 ng/L- Negative Male for AMI  >=14 - Abnormal Female indicating possible myocardial injury.  >=22 - Abnormal Male indicating possible myocardial injury.   Clinicians would have to utilize clinical acumen, EKG, Troponin, and serial changes to determine if it is an Acute Myocardial Infarction or myocardial injury due to an underlying chronic condition.        HIGH SENSITIVITIY TROPONIN T 1HR - Abnormal; Notable for the following components:    HS Troponin T 44 (*)     All other components within normal limits    Narrative:     High Sensitive Troponin T Reference Range:  <14.0 ng/L- Negative Female for AMI  <22.0 ng/L- Negative Male for AMI  >=14 - Abnormal Female indicating possible myocardial injury.  >=22 - Abnormal Male indicating possible myocardial injury.   Clinicians would have to utilize clinical acumen, EKG, Troponin, and serial changes to determine if it is an Acute Myocardial Infarction or myocardial injury due to an underlying chronic condition.        CBC WITH AUTO DIFFERENTIAL - Normal   COVID-19/FLUA&B/RSV, NP SWAB  IN TRANSPORT MEDIA 1 HR TAT   CBC WITH AUTO DIFFERENTIAL   COMPREHENSIVE METABOLIC PANEL   MAGNESIUM   PHOSPHORUS   CBC AND DIFFERENTIAL    Narrative:     The following orders were created for panel order CBC & Differential.  Procedure                               Abnormality         Status                     ---------                               -----------         ------                     CBC Auto Differential[[id number]]        Normal              Final result                 Please view results for these tests on the individual orders.   EXTRA TUBES    Narrative:     The following orders were created for panel order Extra Tubes.  Procedure                               Abnormality         Status                     ---------                               -----------         ------                     Gold Top - [facility][[id number]]                                   Final result               Light Blue Top[[id number]]                                   Final result                 Please view results for these tests on the individual orders.   [facility]   LIGHT BLUE TOP       LOC: Person, Place, Time, and Situation    Telemetry:  Progressive Care    Cardiac Monitoring Ordered: yes    EKG:   ECG 12 Lead Bradycardia   Preliminary Result   HEART RATE=40  bpm   RR Zdolldaa=1459  ms   NH Dyxuzvkv=452  ms   P Horizontal Axis=  deg   P Front Axis=55  deg   QRSD Gqnlzmps=661  ms   QT Uzoxxskr=847  ms   IDnY=218  ms   QRS Axis=-11  deg   T Wave Axis=51  deg   - ABNORMAL ECG -   Sinus bradycardia   Prolonged NH interval   IVCD, consider LBBB   Date and Time of Study:2025-02-18 20:41:18      ECG 12 Lead Bradycardia    (Results Pending)       Medications Given in the ED:   Medications   sodium chloride 0.9 % flush 10 mL (has no administration in time range)   sodium chloride 0.9 % flush 10 mL (has no administration in time range)   sodium chloride 0.9 % flush 10 mL (has no administration in time range)   sodium chloride  0.9 % infusion 40 mL (has no administration in time range)   heparin (porcine) 5000 UNIT/ML injection 5,000 Units (has no administration in time range)   Potassium Replacement - Follow Nurse / BPA Driven Protocol (has no administration in time range)   Magnesium Standard Dose Replacement - Follow Nurse / BPA Driven Protocol (has no administration in time range)   Phosphorus Replacement - Follow Nurse / BPA Driven Protocol (has no administration in time range)   Calcium Replacement - Follow Nurse / BPA Driven Protocol (has no administration in time range)   acetaminophen (TYLENOL) tablet 650 mg (has no administration in time range)     Or   acetaminophen (TYLENOL) 160 MG/5ML oral solution 650 mg (has no administration in time range)     Or   acetaminophen (TYLENOL) suppository 650 mg (has no administration in time range)   sennosides-docusate (PERICOLACE) 8.6-50 MG per tablet 2 tablet (has no administration in time range)     And   polyethylene glycol (MIRALAX) packet 17 g (has no administration in time range)     And   bisacodyl (DULCOLAX) EC tablet 5 mg (has no administration in time range)     And   bisacodyl (DULCOLAX) suppository 10 mg (has no administration in time range)       Imaging results:  CT Head Without Contrast    Result Date: 2/18/2025  Impression: No acute intracranial findings. Electronically Signed: Bao Trujillo  2/18/2025 10:00 PM EST  Workstation ID: [id number]    XR Chest 1 View    Result Date: 2/18/2025  Impression: No radiographic evidence of acute cardiopulmonary abnormality. Electronically Signed: Bao Trujillo  2/18/2025 9:37 PM EST  Workstation ID: [id number]     Social issues:   Social History     Socioeconomic History    Marital status:    Tobacco Use    Smoking status: Never    Smokeless tobacco: Never   Vaping Use    Vaping status: Never Used   Substance and Sexual Activity    Alcohol use: Never    Drug use: Never    Sexual activity: Yes     Partners: Female       NIH Stroke  Scale:  Interval: (not recorded)  1a. Level of Consciousness: (not recorded)  1b. LOC Questions: (not recorded)  1c. LOC Commands: (not recorded)  2. Best Gaze: (not recorded)  3. Visual: (not recorded)  4. Facial Palsy: (not recorded)  5a. Motor Arm, Left: (not recorded)  5b. Motor Arm, Right: (not recorded)  6a. Motor Leg, Left: (not recorded)  6b. Motor Leg, Right: (not recorded)  7. Limb Ataxia: (not recorded)  8. Sensory: (not recorded)  9. Best Language: (not recorded)  10. Dysarthria: (not recorded)  11. Extinction and Inattention (formerly Neglect): (not recorded)    Total (NIH Stroke Scale): (not recorded)     Additional notable assessment information: Pt HR will occasionally drop to low 30's, high 20's. Pt asymptomatic with this.     Nursing report ED to floor:  [facility]    Jacquelin Carrasco RN   02/18/25 23:10 EST     Electronically signed by Jacquelin Carrasco RN at 02/18/25 2310       Jacquelin Carrasco RN at 02/18/25 2301          Attempted to call report x1, was told primary nurse is KOMAL and to call back in 10 minutes.    Electronically signed by Jacquelin Carrasco RN at 02/18/25 2301       Hussein Bar MD at 02/18/25 2110          Subjective   History of Present Illness  Chief complaint: Slow heart rate    59-year-old male presents with a slow heart rate.  Patient states he had a syncopal episode at work yesterday.  He became lightheaded and rested his head on the table and symptoms resolved.  He stood back up.  A couple minutes later he had another episode of lightheadedness and put his head back down on the table but then ended up falling and having a brief syncopal episode.  This was witnessed by coworkers.  He sustained an abrasion to the back of his head.  He denies any chest pain or shortness of breath.  He went to occupational health today and had x-rays of his skull and C-spine which were unremarkable.  He also followed up with his primary provider.  They noticed that his heart rate was low at  that time and told him to stop taking his metoprolol.  He states he did take it this morning.  He has had no further dizzy spells.  He states he was talking with some friends this evening and told them what happened.  They checked his heart rate and it was still around 38.  They told him to come to the emergency room for further evaluation.    History provided by:  Patient      Review of Systems   Constitutional:  Negative for fever.   HENT:  Negative for congestion.    Respiratory:  Negative for cough and shortness of breath.    Cardiovascular:  Negative for chest pain.   Gastrointestinal:  Negative for abdominal pain and vomiting.   Musculoskeletal:  Negative for back pain.   Neurological:  Positive for syncope. Negative for weakness and numbness.   Psychiatric/Behavioral:  Negative for confusion.        Past Medical History:   Diagnosis Date    Allergic 1988    5+ molds, pollens, grass    Diabetes mellitus     Not sure like     Diverticulitis     Diverticulosis     Mot sure     GERD (gastroesophageal reflux disease)     Hypertension     Kidney stone     Not dure     Obesity     Not sure        Allergies   Allergen Reactions    Lisinopril Nausea And Vomiting       Past Surgical History:   Procedure Laterality Date    HERNIA REPAIR         Family History   Problem Relation Age of Onset    Anxiety disorder Mother     Arthritis Mother     Depression Mother     Diabetes Mother     Hyperlipidemia Mother     Kidney disease Mother         Kidney stones    Cancer Father          of cancer in 2009    Diabetes Father        Social History     Socioeconomic History    Marital status:    Tobacco Use    Smoking status: Never    Smokeless tobacco: Never   Vaping Use    Vaping status: Never Used   Substance and Sexual Activity    Alcohol use: Never    Drug use: Never    Sexual activity: Yes     Partners: Female       /77   Pulse (!) 38   Temp 98.9 °F (37.2 °C) (Oral)   Resp 16   Ht  "172.7 cm (68\")   Wt 108 kg (238 lb 15.7 oz)   SpO2 96%   BMI 36.34 kg/m²       Objective   Physical Exam  Vitals and nursing note reviewed.   Constitutional:       Appearance: Normal appearance.   HENT:      Head: Normocephalic.      Comments: Abrasion to posterior scalp     Mouth/Throat:      Mouth: Mucous membranes are moist.   Cardiovascular:      Rate and Rhythm: Regular rhythm. Bradycardia present.      Heart sounds: Normal heart sounds.   Pulmonary:      Effort: Pulmonary effort is normal. No respiratory distress.      Breath sounds: Normal breath sounds.   Abdominal:      Palpations: Abdomen is soft.      Tenderness: There is no abdominal tenderness.   Skin:     General: Skin is warm and dry.   Neurological:      General: No focal deficit present.      Mental Status: He is alert and oriented to person, place, and time.         Procedures          ED Course      My interpretation of EKG shows sinus bradycardia, rate of 40, prolonged VT interval, nonspecific IVCD, I have no EKG for comparison                                     Results for orders placed or performed during the hospital encounter of 02/18/25   ECG 12 Lead Bradycardia    Collection Time: 02/18/25  8:41 PM   Result Value Ref Range    QT Interval 558 ms    QTC Interval 454 ms   Basic Metabolic Panel    Collection Time: 02/18/25  9:08 PM    Specimen: Blood   Result Value Ref Range    Glucose 161 (H) 65 - 99 mg/dL    BUN 16 6 - 20 mg/dL    Creatinine 1.10 0.76 - 1.27 mg/dL    Sodium 140 136 - 145 mmol/L    Potassium 4.4 3.5 - 5.2 mmol/L    Chloride 105 98 - 107 mmol/L    CO2 22.4 22.0 - 29.0 mmol/L    Calcium 9.2 8.6 - 10.5 mg/dL    BUN/Creatinine Ratio 14.5 7.0 - 25.0    Anion Gap 12.6 5.0 - 15.0 mmol/L    eGFR 77.3 >60.0 mL/min/1.73   High Sensitivity Troponin T    Collection Time: 02/18/25  9:08 PM    Specimen: Blood   Result Value Ref Range    HS Troponin T 46 (H) <22 ng/L   CBC Auto Differential    Collection Time: 02/18/25  9:08 PM    " Specimen: Blood   Result Value Ref Range    WBC 9.40 3.40 - 10.80 10*3/mm3    RBC 5.71 4.14 - 5.80 10*6/mm3    Hemoglobin 16.7 13.0 - 17.7 g/dL    Hematocrit 49.7 37.5 - 51.0 %    MCV 87.0 79.0 - 97.0 fL    MCH 29.2 26.6 - 33.0 pg    MCHC 33.6 31.5 - 35.7 g/dL    RDW 12.8 12.3 - 15.4 %    RDW-SD 40.6 37.0 - 54.0 fl    MPV 9.2 6.0 - 12.0 fL    Platelets 257 140 - 450 10*3/mm3    Neutrophil % 57.0 42.7 - 76.0 %    Lymphocyte % 31.6 19.6 - 45.3 %    Monocyte % 8.7 5.0 - 12.0 %    Eosinophil % 1.7 0.3 - 6.2 %    Basophil % 0.7 0.0 - 1.5 %    Immature Grans % 0.3 0.0 - 0.5 %    Neutrophils, Absolute 5.35 1.70 - 7.00 10*3/mm3    Lymphocytes, Absolute 2.97 0.70 - 3.10 10*3/mm3    Monocytes, Absolute 0.82 0.10 - 0.90 10*3/mm3    Eosinophils, Absolute 0.16 0.00 - 0.40 10*3/mm3    Basophils, Absolute 0.07 0.00 - 0.20 10*3/mm3    Immature Grans, Absolute 0.03 0.00 - 0.05 10*3/mm3    nRBC 0.0 0.0 - 0.2 /100 WBC   Gold Top - SST    Collection Time: 02/18/25  9:08 PM   Result Value Ref Range    Extra Tube Hold for add-ons.    Light Blue Top    Collection Time: 02/18/25  9:08 PM   Result Value Ref Range    Extra Tube Hold for add-ons.      CT Head Without Contrast    Result Date: 2/18/2025  Impression: No acute intracranial findings. Electronically Signed: Bao Trujillo  2/18/2025 10:00 PM EST  Workstation ID: [id number]    XR Chest 1 View    Result Date: 2/18/2025  Impression: No radiographic evidence of acute cardiopulmonary abnormality. Electronically Signed: Bao Trujillo  2/18/2025 9:37 PM EST  Workstation ID: [id number]                 Medical Decision Making  Amount and/or Complexity of Data Reviewed  Labs: ordered.  Radiology: ordered.  ECG/medicine tests: ordered.    Risk  Prescription drug management.      Patient had the above evaluation.  Results were discussed with the patient.  My interpretation of chest x-ray shows no infiltrate or effusion.  CT head shows no acute intracranial abnormality.  EKG shows sinus  bradycardia with no acute ischemia.  Troponin is mildly elevated at 46.  Patient is having no chest pain or shortness of breath.  White blood cell count is normal.  BMP is unremarkable.  I discussed with the hospitalist and the patient will be admitted for further evaluation and management.      Final diagnoses:   Syncope, unspecified syncope type   Sinus bradycardia   Abrasion of scalp, initial encounter       ED Disposition  ED Disposition       ED Disposition   Decision to Admit    Condition   --    Comment   Level of Care: Telemetry [5]   Admitting Physician: LLANES ALVAREZ, CARLOS [[id number]]   Attending Physician: LLANES ALVAREZ, CARLOS [[id number]]                 No follow-up provider specified.       Medication List      No changes were made to your prescriptions during this visit.            Hussein Bar MD  25      Electronically signed by Hussein Bar MD at 25 220       Operative/Procedure Notes (last 48 hours)  Notes from 25 1201 through 25 1201   No notes of this type exist for this encounter.          Physician Progress Notes (last 48 hours)        Amanda Mcduffie DO at 25 1015          Hospitalist Service   Daily Progress Note      Patient Name: Ismael Pulido  : 1965  MRN: [id number]  Primary Care Physician:  Sharon Silva MD  Date of admission: 2025      Subjective      Chief Complaint: LOC    Patient seen and examined this morning.  Doing well, denies any complaints.  Has been able to get up and go to the bathroom without any issues.  Heart rate remains in 30s and 40s, times going into the 50s and 60s.  Denies any palpitations, dizziness, syncope, chest pain or shortness of breath.  Last took his metoprolol Tuesday morning.    Pertinent positives as noted in HPI/subjective.  All other systems were reviewed and are negative.      Objective      Vitals:   Temp:  [97.4 °F (36.3 °C)-98.9 °F (37.2 °C)] 97.8 °F (36.6 °C)  Heart Rate:   [36-43] 37  Resp:  [10-18] 10  BP: (112-156)/(70-99) 112/73    Physical Exam:    General: Awake, alert, NAD  Eyes: PERRL, EOMI, conjunctivae are clear  Cardiovascular:  bradycardia, regular rhythm, no murmurs  Respiratory: Clear to auscultation bilaterally, no wheezing or rales, unlabored breathing  Abdomen: Soft, nontender, positive bowel sounds, no guarding  Neurologic: A&O, CN grossly intact, moves all extremities spontaneously  Musculoskeletal: Normal range of motion, no other gross deformities  Skin: Warm, dry         Result Review    Result Review:  I have personally reviewed the results from the time of this admission to 2/19/2025 10:15 EST and agree with these findings:  [x]  Laboratory  [x]  Microbiology  [x]  Radiology  [x]  EKG/Telemetry   [x]  Cardiology/Vascular   []  Pathology  [x]  Old records  []  Other:          Assessment & Plan      Brief Patient Summary:  Ismael Pulido is a 59 y.o. male with history of hypertension hyperlipidemia, DM2 who presented to the ED with complaint of dizziness and passing out.  Patient has been on metoprolol 100 mg daily for his hypertension for a while but was noted to have decreased heart rate as outpatient.  He was told to stop metoprolol on Tuesday afternoon  However he had taken the morning dose at 5 AM that day.  While at work patient states he felt sudden dizziness so had to rest on the table then soon he stood back up and then had loss of consciousness for few seconds and fell backward on his head.  Imaging reported negative for any fractures or intracranial findings.  Noted to have bradycardia in the 30s with possible Mobitz type II heart block.  Patient admitted for further workup.  Cardiology consulted.      cetirizine, 10 mg, Oral, Daily  empagliflozin, 25 mg, Oral, Daily  heparin (porcine), 5,000 Units, Subcutaneous, Q8H  insulin lispro, 2-7 Units, Subcutaneous, 4x Daily AC & at Bedtime  losartan, 25 mg, Oral, Daily  pantoprazole, 40 mg, Oral, Q  AM  rosuvastatin, 10 mg, Oral, Daily  sodium chloride, 10 mL, Intravenous, Q12H             I have utilized all available, immediate resources to obtain, update, or review the patient's current medications including all prescriptions, over-the-counter products, herbals, cannabis/cannabidiol products, and vitamin.mineral/dietary (nutritional) supplements.    Active Hospital Problems:  Active Hospital Problems    Diagnosis     **Symptomatic bradycardia        Assessment/Plan:     Symptomatic bradycardia  Possible high degree AV block  Cardiogenic syncope  -Possibly induced by metoprolol but patient has been on same dose for a while now and started having bradycardia, may have underlying high degree AV block  -Relatively asymptomatic at this time but presented with syncope, continue to monitor on telemetry, transcutaneous pacing on standby  -Continue to hold metoprolol, last dose was the morning of 2/18  -Echo pending  -Cardiology on board, may need pacemaker    Hypertension  -Continue home losartan  -Holding metoprolol as above  -Avoid AV eduardo blockers  -Monitor    DM2  -Holding home oral meds  -Continue SSI, Accu-Cheks  -Monitor blood glucose, adjust as needed    Hyperlipidemia  -Continue statin    Chronic environmental allergies/congestion  -Patient chronically on Claritin and Sudafed at home   -okay to continue Claritin for now, hold Sudafed due to above    VTE Prophylaxis:  Pharmacologic VTE prophylaxis orders are present.        CODE STATUS:    Code Status (Patient has no pulse and is not breathing): CPR (Attempt to Resuscitate)  Medical Interventions (Patient has pulse or is breathing): Full Support      Disposition Planning:     Barriers to Discharge: Bradycardia, possible pacemaker  Anticipated Date of Discharge: 2/21  Place of Discharge: Home    Electronically signed by Amanda Mcduffie DO, 02/19/25, 10:15 EST.  [facility] Hospitalist Team      Part of this note may be an electronic  transcription/translation of spoken language to printed text using the Dragon Dictation System.      Electronically signed by Amanda Mcduffie DO at 02/19/25 1022          Consult Notes (last 48 hours)        Win Reaves MD at 02/19/25 1154        Consult Orders    1. Cardiology (on-call MD unless specified) [[id number]] ordered by Amanda Mcduffie DO at 02/18/25 2220                 CARDIOLOGY CONSULT:    Ismael Pulido  1965  male  [id number]      Referring Provider: Hospitalist  Reason for Consultation: Syncope and bradycardia    Patient Care Team:  Sharon Silva MD as PCP - General (Internal Medicine & Pediatrics)    Chief complaint syncope    Subjective .     History of present illness:  Ismael Pulido is a 59 y.o. male with history of diabetes hypertension hyperlipidemia presented to hospital after syncopal episode at workplace.  Patient said that he had of having some dizziness recently and low heart rates but he had a syncopal episode.  He was on metoprolol at a higher dose for his blood pressure.  No complaint of any chest pain shortness of breath palpitations or swelling of the feet.  Patient initially had sinus bradycardia but also second-degree block intermittently initially.  Patient is off his beta-blockers now.    Review of Systems   Constitutional: Negative for fever and malaise/fatigue.   HENT:  Negative for ear pain and nosebleeds.    Eyes:  Negative for blurred vision and double vision.   Cardiovascular:  Positive for syncope. Negative for chest pain, dyspnea on exertion and palpitations.   Respiratory:  Negative for cough and shortness of breath.    Skin:  Negative for rash.   Musculoskeletal:  Negative for joint pain.   Gastrointestinal:  Negative for abdominal pain, nausea and vomiting.   Neurological:  Negative for focal weakness and headaches.   Psychiatric/Behavioral:  Negative for depression. The patient is not nervous/anxious.    All other systems reviewed and are  negative.      History  Past Medical History:   Diagnosis Date    Allergic 1988    5+ molds, pollens, grass    Diabetes mellitus     Not sure like     Diverticulitis     Diverticulosis     Mot sure     GERD (gastroesophageal reflux disease)     Hypertension     Kidney stone     Not dure     Obesity     Not sure        Past Surgical History:   Procedure Laterality Date    HERNIA REPAIR         Family History   Problem Relation Age of Onset    Anxiety disorder Mother     Arthritis Mother     Depression Mother     Diabetes Mother     Hyperlipidemia Mother     Kidney disease Mother         Kidney stones    Cancer Father          of cancer in 2009    Diabetes Father        Social History     Tobacco Use    Smoking status: Never    Smokeless tobacco: Never   Vaping Use    Vaping status: Never Used   Substance Use Topics    Alcohol use: Never    Drug use: Never        Medications Prior to Admission   Medication Sig Dispense Refill Last Dose/Taking    ascorbic acid ([employer] Vitamin C) 1000 MG tablet Take 4 tablets by mouth Daily.   2025 at  5:00 AM    clotrimazole (LOTRIMIN) 1 % cream Apply 1 Application topically to the appropriate area as directed 2 (Two) Times a Day. 85 g 3 2025 at  5:00 AM    empagliflozin (Jardiance) 25 MG tablet tablet Take 1 tablet by mouth Daily. 30 tablet 5 2025 at  5:00 AM    lansoprazole (PREVACID) 30 MG capsule Take 1 capsule by mouth Daily. 30 capsule 5 2025 at  5:00 AM    loratadine (Claritin) 10 MG tablet Take 1 tablet by mouth Daily.   2025 at  5:00 AM    Misc Natural Products (NEURIVA PO) Take  by mouth Daily.   2025 at  5:00 AM    pseudoephedrine (SUDAFED) 120 MG 12 hr tablet Take 1 tablet by mouth Every Morning.   2025 at  5:00 AM    rosuvastatin (CRESTOR) 10 MG tablet Take 1 tablet by mouth Daily. 90 tablet 1 2025 at  5:00 AM    Semaglutide 3 MG tablet Take 1 tablet by mouth Daily. 30 tablet 0        Allergies:  "Lisinopril    Scheduled Meds:cetirizine, 10 mg, Oral, Daily  empagliflozin, 25 mg, Oral, Daily  heparin (porcine), 5,000 Units, Subcutaneous, Q8H  insulin lispro, 2-7 Units, Subcutaneous, 4x Daily AC & at Bedtime  losartan, 25 mg, Oral, Daily  pantoprazole, 40 mg, Oral, Q AM  rosuvastatin, 10 mg, Oral, Daily  sodium chloride, 10 mL, Intravenous, Q12H      Continuous Infusions:   PRN Meds:.  acetaminophen **OR** acetaminophen **OR** acetaminophen    senna-docusate sodium **AND** polyethylene glycol **AND** bisacodyl **AND** bisacodyl    Calcium Replacement - Follow Nurse / BPA Driven Protocol    dextrose    dextrose    glucagon (human recombinant)    Magnesium Standard Dose Replacement - Follow Nurse / BPA Driven Protocol    Phosphorus Replacement - Follow Nurse / BPA Driven Protocol    Potassium Replacement - Follow Nurse / BPA Driven Protocol    [COMPLETED] Insert Peripheral IV **AND** sodium chloride    sodium chloride    sodium chloride    Objective     VITAL SIGNS  Vitals:    02/19/25 0420 02/19/25 0432 02/19/25 0829 02/19/25 0903   BP:  126/85 112/73    BP Location:  Left arm Left arm    Patient Position:  Lying Lying    Pulse: (!) 36 (!) 40 (!) 43 (!) 37   Resp:  14 10    Temp:  97.4 °F (36.3 °C) 97.8 °F (36.6 °C)    TempSrc:  Oral Oral    SpO2: 99% 94% 98%    Weight:       Height:           Flowsheet Rows      Flowsheet Row First Filed Value   Admission Height 172.7 cm (68\") Documented at 02/18/2025 2036   Admission Weight 108 kg (238 lb 15.7 oz) Documented at 02/18/2025 2036             TELEMETRY: Sinus bradycardia with PVCs    Physical Exam:  Constitutional:       Appearance: Well-developed.   Eyes:      General: No scleral icterus.     Conjunctiva/sclera: Conjunctivae normal.      Pupils: Pupils are equal, round, and reactive to light.   HENT:      Head: Normocephalic and atraumatic.   Neck:      Vascular: No carotid bruit or JVD.   Pulmonary:      Effort: Pulmonary effort is normal.      Breath sounds: " Normal breath sounds. No wheezing. No rales.   Cardiovascular:      Normal rate. Regular rhythm.   Pulses:     Intact distal pulses.   Abdominal:      General: Bowel sounds are normal.      Palpations: Abdomen is soft.   Musculoskeletal: Normal range of motion.      Cervical back: Normal range of motion and neck supple. Skin:     General: Skin is warm and dry.      Findings: No rash.   Neurological:      Mental Status: Alert.      Comments: No focal deficits          Results Review:   I reviewed the patient's new clinical results.  Lab Results (last 24 hours)       Procedure Component Value Units Date/Time    POC Glucose 4x Daily Before Meals & at Bedtime [[id number]]  (Abnormal) Collected: 02/19/25 0804    Specimen: Blood Updated: 02/19/25 0806     Glucose 137 mg/dL      Comment: Serial Number: [id number]:  [id number]       Magnesium [[id number]]  (Normal) Collected: 02/19/25 0326    Specimen: Blood Updated: 02/19/25 0409     Magnesium 2.4 mg/dL     Comprehensive Metabolic Panel [[id number]]  (Abnormal) Collected: 02/19/25 0326    Specimen: Blood Updated: 02/19/25 0409     Glucose 145 mg/dL      BUN 15 mg/dL      Creatinine 0.99 mg/dL      Sodium 140 mmol/L      Potassium 4.3 mmol/L      Comment: Slight hemolysis detected by analyzer. Result may be falsely elevated.        Chloride 107 mmol/L      CO2 22.6 mmol/L      Calcium 9.2 mg/dL      Total Protein 7.0 g/dL      Albumin 3.9 g/dL      ALT (SGPT) 42 U/L      AST (SGOT) 34 U/L      Comment: Slight hemolysis detected by analyzer. Result may be falsely elevated.        Alkaline Phosphatase 102 U/L      Total Bilirubin 0.2 mg/dL      Globulin 3.1 gm/dL      A/G Ratio 1.3 g/dL      BUN/Creatinine Ratio 15.2     Anion Gap 10.4 mmol/L      eGFR 87.8 mL/min/1.73     Narrative:      GFR Categories in Chronic Kidney Disease (CKD)      GFR Category          GFR (mL/min/1.73)    Interpretation  G1                     90 or greater         Normal or high (1)  G2                       60-89                Mild decrease (1)  G3a                   45-59                Mild to moderate decrease  G3b                   30-44                Moderate to severe decrease  G4                    15-29                Severe decrease  G5                    14 or less           Kidney failure          (1)In the absence of evidence of kidney disease, neither GFR category G1 or G2 fulfill the criteria for CKD.    eGFR calculation 2021 CKD-EPI creatinine equation, which does not include race as a factor    Phosphorus [[id number]]  (Normal) Collected: 02/19/25 0326    Specimen: Blood Updated: 02/19/25 0401     Phosphorus 4.0 mg/dL     CBC Auto Differential [[id number]]  (Normal) Collected: 02/19/25 0326    Specimen: Blood Updated: 02/19/25 0337     WBC 8.60 10*3/mm3      RBC 5.50 10*6/mm3      Hemoglobin 15.9 g/dL      Hematocrit 48.5 %      MCV 88.2 fL      MCH 28.9 pg      MCHC 32.8 g/dL      RDW 13.0 %      RDW-SD 42.4 fl      MPV 9.2 fL      Platelets 218 10*3/mm3      Neutrophil % 53.6 %      Lymphocyte % 34.1 %      Monocyte % 9.4 %      Eosinophil % 1.9 %      Basophil % 0.7 %      Immature Grans % 0.3 %      Neutrophils, Absolute 4.61 10*3/mm3      Lymphocytes, Absolute 2.93 10*3/mm3      Monocytes, Absolute 0.81 10*3/mm3      Eosinophils, Absolute 0.16 10*3/mm3      Basophils, Absolute 0.06 10*3/mm3      Immature Grans, Absolute 0.03 10*3/mm3      nRBC 0.0 /100 WBC     COVID-19, FLU A/B, RSV PCR 1 HR TAT - Swab, Nasopharynx [[id number]]  (Normal) Collected: 02/19/25 0017    Specimen: Swab from Nasopharynx Updated: 02/19/25 0059     COVID19 Not Detected     Influenza A PCR Not Detected     Influenza B PCR Not Detected     RSV, PCR Not Detected    Narrative:      Fact sheet for providers: https://www.fda.gov/media/532978/download    Fact sheet for patients: https://www.fda.gov/media/199428/download    Test performed by PCR.    High Sensitivity Troponin T 1Hr [[id number]]  (Abnormal) Collected:  02/18/25 2208    Specimen: Blood Updated: 02/18/25 2232     HS Troponin T 44 ng/L      Troponin T Numeric Delta -2 ng/L      Troponin T % Delta -4    Narrative:      High Sensitive Troponin T Reference Range:  <14.0 ng/L- Negative Female for AMI  <22.0 ng/L- Negative Male for AMI  >=14 - Abnormal Female indicating possible myocardial injury.  >=22 - Abnormal Male indicating possible myocardial injury.   Clinicians would have to utilize clinical acumen, EKG, Troponin, and serial changes to determine if it is an Acute Myocardial Infarction or myocardial injury due to an underlying chronic condition.         Basic Metabolic Panel [[id number]]  (Abnormal) Collected: 02/18/25 2108    Specimen: Blood Updated: 02/18/25 2141     Glucose 161 mg/dL      BUN 16 mg/dL      Creatinine 1.10 mg/dL      Sodium 140 mmol/L      Potassium 4.4 mmol/L      Comment: Specimen hemolyzed.  Result may be falsely elevated.        Chloride 105 mmol/L      CO2 22.4 mmol/L      Calcium 9.2 mg/dL      BUN/Creatinine Ratio 14.5     Anion Gap 12.6 mmol/L      eGFR 77.3 mL/min/1.73     Narrative:      GFR Categories in Chronic Kidney Disease (CKD)      GFR Category          GFR (mL/min/1.73)    Interpretation  G1                     90 or greater         Normal or high (1)  G2                      60-89                Mild decrease (1)  G3a                   45-59                Mild to moderate decrease  G3b                   30-44                Moderate to severe decrease  G4                    15-29                Severe decrease  G5                    14 or less           Kidney failure          (1)In the absence of evidence of kidney disease, neither GFR category G1 or G2 fulfill the criteria for CKD.    eGFR calculation 2021 CKD-EPI creatinine equation, which does not include race as a factor    High Sensitivity Troponin T [[id number]]  (Abnormal) Collected: 02/18/25 2108    Specimen: Blood Updated: 02/18/25 2136     HS Troponin T 46 ng/L      Narrative:      High Sensitive Troponin T Reference Range:  <14.0 ng/L- Negative Female for AMI  <22.0 ng/L- Negative Male for AMI  >=14 - Abnormal Female indicating possible myocardial injury.  >=22 - Abnormal Male indicating possible myocardial injury.   Clinicians would have to utilize clinical acumen, EKG, Troponin, and serial changes to determine if it is an Acute Myocardial Infarction or myocardial injury due to an underlying chronic condition.         Extra Tubes [[id number]] Collected: 02/18/25 2108    Specimen: Blood, Venous Line Updated: 02/18/25 2117    Narrative:      The following orders were created for panel order Extra Tubes.  Procedure                               Abnormality         Status                     ---------                               -----------         ------                     Gold Top - SST[[id number]]                                   Final result               Light Blue Top[[id number]]                                   Final result                 Please view results for these tests on the individual orders.    Light Blue Top [[id number]] Collected: 02/18/25 2108    Specimen: Blood Updated: 02/18/25 2117     Extra Tube Hold for add-ons.     Comment: Auto resulted       Gold Top - SST [[id number]] Collected: 02/18/25 2108    Specimen: Blood Updated: 02/18/25 2117     Extra Tube Hold for add-ons.     Comment: Auto resulted.       CBC & Differential [[id number]]  (Normal) Collected: 02/18/25 2108    Specimen: Blood Updated: 02/18/25 2116    Narrative:      The following orders were created for panel order CBC & Differential.  Procedure                               Abnormality         Status                     ---------                               -----------         ------                     CBC Auto Differential[[id number]]        Normal              Final result                 Please view results for these tests on the individual orders.    CBC Auto Differential [[id number]]   (Normal) Collected: 02/18/25 2108    Specimen: Blood Updated: 02/18/25 2116     WBC 9.40 10*3/mm3      RBC 5.71 10*6/mm3      Hemoglobin 16.7 g/dL      Hematocrit 49.7 %      MCV 87.0 fL      MCH 29.2 pg      MCHC 33.6 g/dL      RDW 12.8 %      RDW-SD 40.6 fl      MPV 9.2 fL      Platelets 257 10*3/mm3      Neutrophil % 57.0 %      Lymphocyte % 31.6 %      Monocyte % 8.7 %      Eosinophil % 1.7 %      Basophil % 0.7 %      Immature Grans % 0.3 %      Neutrophils, Absolute 5.35 10*3/mm3      Lymphocytes, Absolute 2.97 10*3/mm3      Monocytes, Absolute 0.82 10*3/mm3      Eosinophils, Absolute 0.16 10*3/mm3      Basophils, Absolute 0.07 10*3/mm3      Immature Grans, Absolute 0.03 10*3/mm3      nRBC 0.0 /100 WBC             Imaging Results (Last 24 Hours)       Procedure Component Value Units Date/Time    CT Head Without Contrast [[id number]] Collected: 02/18/25 2158     Updated: 02/18/25 2202    Narrative:      CT HEAD WO CONTRAST    Date of Exam: 2/18/2025 9:52 PM EST    Indication: syncope, fall.    Comparison: None available.    Technique: Axial CT images were obtained of the head without contrast administration.  Coronal reconstructions were performed.  Automated exposure control and iterative reconstruction methods were used.      Findings:  No acute intracranial hemorrhage.Intact appearing gray-white differentiation.No extra-axial fluid collection.No significant mass effect.    No hydrocephalus.    Mild generalized parenchymal volume loss.    Scattered areas of periventricular and subcortical white matter hypoattenuation, nonspecific, perhaps from small vessel ischemic/hypertensive changes in a patient of this age.There are intracranial atherosclerotic calcifications.    Mild scattered mucosal thickening in the paranasal sinuses. Layering fluid in the right maxillary sinus which can be correlated for symptoms of recent sinusitis..Mastoid air cells are essentially clear.Included globes and orbits appear  unremarkable by   CT.    No acute or aggressive appearing bony or extracranial soft tissue process.      Impression:      Impression:  No acute intracranial findings.      Electronically Signed: Bao Trujillo    2/18/2025 10:00 PM EST    Workstation ID: [id number]    XR Chest 1 View [[id number]] Collected: 02/18/25 2137     Updated: 02/18/25 2139    Narrative:      XR CHEST 1 VW    Date of Exam: 2/18/2025 9:10 PM EST    Indication: syncope    Comparison: Chest radiograph 2/7/2025    Findings:      Mediastinum: Cardiac silhouette appears unchanged and normal in size    Lungs: The lungs appear clear without focal consolidation appreciated.    Pleura: No pleural effusion or pneumothorax.    Bones and soft tissues: No acute, displaced fracture seen.        Impression:      Impression:  No radiographic evidence of acute cardiopulmonary abnormality.        Electronically Signed: Bao Trujillo    2/18/2025 9:37 PM EST    Workstation ID: [id number]            EKG      I personally viewed and interpreted the patient's EKG/Telemetry data:    ECHOCARDIOGRAM:  Results for orders placed during the hospital encounter of 02/18/25    Adult Transthoracic Echo Complete w/ Color, Spectral and Contrast if Necessary Per Protocol    Interpretation Summary    Left ventricular ejection fraction appears to be 56 - 60%.    The right ventricular cavity is moderately dilated.    The left atrial cavity is mildly dilated.    Moderate tricuspid valve regurgitation is present.    Estimated right ventricular systolic pressure from tricuspid regurgitation is moderately elevated (45-55 mmHg).         STRESS MYOVIEW:       CARDIAC CATHETERIZATION:    No results found for this or any previous visit.       OTHER:         MDM      Syncope/bradycardia  Patient presented after syncopal episode and had bradycardia with second-degree AV block but now he is in sinus rhythm with bradycardia  Patient had borderline level troponin  Patient's TSH level and  electrolytes are normal.  Patient had an echocardiogram which showed normal LV systolic function but moderate right ventricular dilatation  Patient probably has sleep apnea  Patient was on very high dose beta-blockers which have been stopped and will give him another 24 hours to recover otherwise we will discuss about permanent pacemaker placement.  Patient will also have a stress Myoview to rule out ischemia    Hypertension  Patient will continue on losartan but stop his metoprolol and will add amlodipine if needed    Diabetes  Patient is on sliding scale insulin but is on oral medicines at home.    Hyperlipidemia  Patient on statins and the lipid levels are well within normal meds.    I discussed the patients findings and my recommendations with patient and nurse    Win Reaves MD  02/19/25  11:54 EST              Electronically signed by Win Reaves MD at 02/19/25 6295

## 2025-02-19 NOTE — PROGRESS NOTES
Hospitalist Service   Daily Progress Note      Patient Name: Ismael Pulido  : 1965  MRN: [id number]  Primary Care Physician:  Sharon Silva MD  Date of admission: 2025      Subjective      Chief Complaint: LOC    Patient seen and examined this morning.  Doing well, denies any complaints.  Has been able to get up and go to the bathroom without any issues.  Heart rate remains in 30s and 40s, times going into the 50s and 60s.  Denies any palpitations, dizziness, syncope, chest pain or shortness of breath.  Last took his metoprolol Tuesday morning.    Pertinent positives as noted in HPI/subjective.  All other systems were reviewed and are negative.      Objective      Vitals:   Temp:  [97.4 °F (36.3 °C)-98.9 °F (37.2 °C)] 97.8 °F (36.6 °C)  Heart Rate:  [36-43] 37  Resp:  [10-18] 10  BP: (112-156)/(70-99) 112/73    Physical Exam:    General: Awake, alert, NAD  Eyes: PERRL, EOMI, conjunctivae are clear  Cardiovascular:  bradycardia, regular rhythm, no murmurs  Respiratory: Clear to auscultation bilaterally, no wheezing or rales, unlabored breathing  Abdomen: Soft, nontender, positive bowel sounds, no guarding  Neurologic: A&O, CN grossly intact, moves all extremities spontaneously  Musculoskeletal: Normal range of motion, no other gross deformities  Skin: Warm, dry         Result Review    Result Review:  I have personally reviewed the results from the time of this admission to 2025 10:15 EST and agree with these findings:  [x]  Laboratory  [x]  Microbiology  [x]  Radiology  [x]  EKG/Telemetry   [x]  Cardiology/Vascular   []  Pathology  [x]  Old records  []  Other:          Assessment & Plan      Brief Patient Summary:  Ismael Pulido is a 59 y.o. male with history of hypertension hyperlipidemia, DM2 who presented to the ED with complaint of dizziness and passing out.  Patient has been on metoprolol 100 mg daily for his hypertension for a while but was noted to have decreased heart  rate as outpatient.  He was told to stop metoprolol on Tuesday afternoon  However he had taken the morning dose at 5 AM that day.  While at work patient states he felt sudden dizziness so had to rest on the table then soon he stood back up and then had loss of consciousness for few seconds and fell backward on his head.  Imaging reported negative for any fractures or intracranial findings.  Noted to have bradycardia in the 30s with possible Mobitz type II heart block.  Patient admitted for further workup.  Cardiology consulted.      cetirizine, 10 mg, Oral, Daily  empagliflozin, 25 mg, Oral, Daily  heparin (porcine), 5,000 Units, Subcutaneous, Q8H  insulin lispro, 2-7 Units, Subcutaneous, 4x Daily AC & at Bedtime  losartan, 25 mg, Oral, Daily  pantoprazole, 40 mg, Oral, Q AM  rosuvastatin, 10 mg, Oral, Daily  sodium chloride, 10 mL, Intravenous, Q12H             I have utilized all available, immediate resources to obtain, update, or review the patient's current medications including all prescriptions, over-the-counter products, herbals, cannabis/cannabidiol products, and vitamin.mineral/dietary (nutritional) supplements.    Active Hospital Problems:  Active Hospital Problems    Diagnosis     **Symptomatic bradycardia        Assessment/Plan:     Symptomatic bradycardia  Possible high degree AV block  Cardiogenic syncope  -Possibly induced by metoprolol but patient has been on same dose for a while now and started having bradycardia, may have underlying high degree AV block  -Relatively asymptomatic at this time but presented with syncope, continue to monitor on telemetry, transcutaneous pacing on standby  -Continue to hold metoprolol, last dose was the morning of 2/18  -Echo pending  -Cardiology on board, may need pacemaker    Hypertension  -Continue home losartan  -Holding metoprolol as above  -Avoid AV eduardo blockers  -Monitor    DM2  -Holding home oral meds  -Continue SSI, Accu-Cheks  -Monitor blood glucose,  adjust as needed    Hyperlipidemia  -Continue statin    Chronic environmental allergies/congestion  -Patient chronically on Claritin and Sudafed at home   -okay to continue Claritin for now, hold Sudafed due to above    VTE Prophylaxis:  Pharmacologic VTE prophylaxis orders are present.        CODE STATUS:    Code Status (Patient has no pulse and is not breathing): CPR (Attempt to Resuscitate)  Medical Interventions (Patient has pulse or is breathing): Full Support      Disposition Planning:     Barriers to Discharge: Bradycardia, possible pacemaker  Anticipated Date of Discharge: 2/21  Place of Discharge: Home    Electronically signed by Amanda Mcduffie DO, 02/19/25, 10:15 EST.  [facility]      Part of this note may be an electronic transcription/translation of spoken language to printed text using the Dragon Dictation System.

## 2025-02-19 NOTE — ED NOTES
Nursing report ED to floor  Ismael Puildo  59 y.o.  male    HPI:   Chief Complaint   Patient presents with    Slow Heart Rate       Admitting doctor:   Carlos Llanes Alvarez, MD    Admitting diagnosis:   The primary encounter diagnosis was Syncope, unspecified syncope type. Diagnoses of Sinus bradycardia and Abrasion of scalp, initial encounter were also pertinent to this visit.    Code status:   Current Code Status       Date Active Code Status Order ID Comments User Context       2/18/2025 2249 CPR (Attempt to Resuscitate) [id number]  Llanes Alvarez, Carlos, MD ED        Question Answer    Code Status (Patient has no pulse and is not breathing) CPR (Attempt to Resuscitate)    Medical Interventions (Patient has pulse or is breathing) Full Support                    Allergies:   Lisinopril    Isolation:  No active isolations     Fall Risk:  Fall Risk Assessment was completed, and patient is at low risk for falls.   Predictive Model Details         4 (Low) Factor Value    Calculated 2/18/2025 23:08 Age 59    Risk of Fall Model Active Peripheral IV Present     Imaging order in this encounter Present     Magnesium not on file     Respiratory Rate 17     Phil Scale not on file     Diastolic BP 80     Clinically Relevant Sex Not Female     Cardiac Assessment X     Albumin not on file     Calcium 9.2 mg/dL     Total Bilirubin not on file     Potassium 4.4 mmol/L     Creatinine 1.1 mg/dL     Days after Admission 0.104     Chloride 105 mmol/L     ALT not on file         Weight:       02/18/25 2036   Weight: 108 kg (238 lb 15.7 oz)       Intake and Output  No intake or output data in the 24 hours ending 02/18/25 2310    Diet:   Dietary Orders (From admission, onward)       Start     Ordered    02/18/25 2249  Diet: Cardiac, Diabetic; Healthy Heart (2-3 Na+); Consistent Carbohydrate; Fluid Consistency: Thin (IDDSI 0)  Diet Effective Now        References:    Diet Order Definitions   Question Answer Comment   Diets:  "Cardiac    Diets: Diabetic    Cardiac Diet: Healthy Heart (2-3 Na+)    Diabetic Diet: Consistent Carbohydrate    Fluid Consistency: Thin (IDDSI 0)        02/18/25 2249                     Most recent vitals:   Vitals:    02/18/25 2036 02/18/25 2129 02/18/25 2243   BP: 156/99 129/77 133/80   BP Location: Left arm     Patient Position: Sitting     Pulse: (!) 39 (!) 38 (!) 38   Resp: 16 16 17   Temp: 98.9 °F (37.2 °C)     TempSrc: Oral     SpO2: 99% 96% 96%   Weight: 108 kg (238 lb 15.7 oz)     Height: 172.7 cm (68\")         Active LDAs/IV Access:   Lines, Drains & Airways       Active LDAs       Name Placement date Placement time Site Days    Peripheral IV 02/18/25 2213 Anterior;Left Forearm 02/18/25 2213  Forearm  less than 1                    Skin Condition:   Skin Assessments (last day)       None             Labs (abnormal labs have a star):   Labs Reviewed   BASIC METABOLIC PANEL - Abnormal; Notable for the following components:       Result Value    Glucose 161 (*)     All other components within normal limits    Narrative:     GFR Categories in Chronic Kidney Disease (CKD)      GFR Category          GFR (mL/min/1.73)    Interpretation  G1                     90 or greater         Normal or high (1)  G2                      60-89                Mild decrease (1)  G3a                   45-59                Mild to moderate decrease  G3b                   30-44                Moderate to severe decrease  G4                    15-29                Severe decrease  G5                    14 or less           Kidney failure          (1)In the absence of evidence of kidney disease, neither GFR category G1 or G2 fulfill the criteria for CKD.    eGFR calculation 2021 CKD-EPI creatinine equation, which does not include race as a factor   TROPONIN - Abnormal; Notable for the following components:    HS Troponin T 46 (*)     All other components within normal limits    Narrative:     High Sensitive Troponin T Reference " Range:  <14.0 ng/L- Negative Female for AMI  <22.0 ng/L- Negative Male for AMI  >=14 - Abnormal Female indicating possible myocardial injury.  >=22 - Abnormal Male indicating possible myocardial injury.   Clinicians would have to utilize clinical acumen, EKG, Troponin, and serial changes to determine if it is an Acute Myocardial Infarction or myocardial injury due to an underlying chronic condition.        HIGH SENSITIVITIY TROPONIN T 1HR - Abnormal; Notable for the following components:    HS Troponin T 44 (*)     All other components within normal limits    Narrative:     High Sensitive Troponin T Reference Range:  <14.0 ng/L- Negative Female for AMI  <22.0 ng/L- Negative Male for AMI  >=14 - Abnormal Female indicating possible myocardial injury.  >=22 - Abnormal Male indicating possible myocardial injury.   Clinicians would have to utilize clinical acumen, EKG, Troponin, and serial changes to determine if it is an Acute Myocardial Infarction or myocardial injury due to an underlying chronic condition.        CBC WITH AUTO DIFFERENTIAL - Normal   COVID-19/FLUA&B/RSV, NP SWAB IN TRANSPORT MEDIA 1 HR TAT   CBC WITH AUTO DIFFERENTIAL   COMPREHENSIVE METABOLIC PANEL   MAGNESIUM   PHOSPHORUS   CBC AND DIFFERENTIAL    Narrative:     The following orders were created for panel order CBC & Differential.  Procedure                               Abnormality         Status                     ---------                               -----------         ------                     CBC Auto Differential[[id number]]        Normal              Final result                 Please view results for these tests on the individual orders.   EXTRA TUBES    Narrative:     The following orders were created for panel order Extra Tubes.  Procedure                               Abnormality         Status                     ---------                               -----------         ------                     Gold Top - [facility][[id number]]                                    Final result               Light Blue Top[[id number]]                                   Final result                 Please view results for these tests on the individual orders.   GOLD TOP - [facility]   LIGHT BLUE TOP       LOC: Person, Place, Time, and Situation    Telemetry:  Progressive Care    Cardiac Monitoring Ordered: yes    EKG:   ECG 12 Lead Bradycardia   Preliminary Result   HEART RATE=40  bpm   RR Wymhmagw=7502  ms   IN Paqrjdcq=978  ms   P Horizontal Axis=  deg   P Front Axis=55  deg   QRSD Tgobgkmz=707  ms   QT Pwoqtezf=498  ms   HMhI=997  ms   QRS Axis=-11  deg   T Wave Axis=51  deg   - ABNORMAL ECG -   Sinus bradycardia   Prolonged IN interval   IVCD, consider LBBB   Date and Time of Study:2025-02-18 20:41:18      ECG 12 Lead Bradycardia    (Results Pending)       Medications Given in the ED:   Medications   sodium chloride 0.9 % flush 10 mL (has no administration in time range)   sodium chloride 0.9 % flush 10 mL (has no administration in time range)   sodium chloride 0.9 % flush 10 mL (has no administration in time range)   sodium chloride 0.9 % infusion 40 mL (has no administration in time range)   heparin (porcine) 5000 UNIT/ML injection 5,000 Units (has no administration in time range)   Potassium Replacement - Follow Nurse / BPA Driven Protocol (has no administration in time range)   Magnesium Standard Dose Replacement - Follow Nurse / BPA Driven Protocol (has no administration in time range)   Phosphorus Replacement - Follow Nurse / BPA Driven Protocol (has no administration in time range)   Calcium Replacement - Follow Nurse / BPA Driven Protocol (has no administration in time range)   acetaminophen (TYLENOL) tablet 650 mg (has no administration in time range)     Or   acetaminophen (TYLENOL) 160 MG/5ML oral solution 650 mg (has no administration in time range)     Or   acetaminophen (TYLENOL) suppository 650 mg (has no administration in time range)   sennosides-docusate  (PERICOLACE) 8.6-50 MG per tablet 2 tablet (has no administration in time range)     And   polyethylene glycol (MIRALAX) packet 17 g (has no administration in time range)     And   bisacodyl (DULCOLAX) EC tablet 5 mg (has no administration in time range)     And   bisacodyl (DULCOLAX) suppository 10 mg (has no administration in time range)       Imaging results:  CT Head Without Contrast    Result Date: 2/18/2025  Impression: No acute intracranial findings. Electronically Signed: Bao Trujillo  2/18/2025 10:00 PM EST  Workstation ID: [id number]    XR Chest 1 View    Result Date: 2/18/2025  Impression: No radiographic evidence of acute cardiopulmonary abnormality. Electronically Signed: Bao Trujillo  2/18/2025 9:37 PM EST  Workstation ID: [id number]     Social issues:   Social History     Socioeconomic History    Marital status:    Tobacco Use    Smoking status: Never    Smokeless tobacco: Never   Vaping Use    Vaping status: Never Used   Substance and Sexual Activity    Alcohol use: Never    Drug use: Never    Sexual activity: Yes     Partners: Female       NIH Stroke Scale:  Interval: (not recorded)  1a. Level of Consciousness: (not recorded)  1b. LOC Questions: (not recorded)  1c. LOC Commands: (not recorded)  2. Best Gaze: (not recorded)  3. Visual: (not recorded)  4. Facial Palsy: (not recorded)  5a. Motor Arm, Left: (not recorded)  5b. Motor Arm, Right: (not recorded)  6a. Motor Leg, Left: (not recorded)  6b. Motor Leg, Right: (not recorded)  7. Limb Ataxia: (not recorded)  8. Sensory: (not recorded)  9. Best Language: (not recorded)  10. Dysarthria: (not recorded)  11. Extinction and Inattention (formerly Neglect): (not recorded)    Total (NIH Stroke Scale): (not recorded)     Additional notable assessment information: Pt HR will occasionally drop to low 30's, high 20's. Pt asymptomatic with this.     Nursing report ED to floor:  [facility] 2109    Jacquelin Carrasco RN   02/18/25 23:10 EST

## 2025-02-19 NOTE — H&P
"[facility] Medicine Services  History & Physical    Patient Name: Ismael Pulido  : 1965  MRN: [id number]  Primary Care Physician:  Sharon Silva MD  Date of admission: 2025  Date and Time of Service: 2025 at 2300    Subjective      Chief Complaint: \"loss of consciousness\"    History of Present Illness: Ismael Pulido is a 59 y.o. male with a PMH of HTN on metoprolol  mg once daily last dose taken today at around 5:00 am also on losartan, type 2 DM, HLD, who presented to [facility] on 2025 with a slow heart rate and status post syncope episode yesterday at his work place. 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 He was apparently seen at occupational health and had some skull x ray and C spine which were reportedly normal(films not seen). He was then seen in follow up with his PCP today and was found to be bradycardic for which his metoprolol was discontinued, he again rechecked his HR in the evening and was still in the 30s which prompted him to come to ED where he has been persistently bradycardic. EKG showed Mobitz type 2 heart block, HR persistently in mid 30s. No syncopal episodes reported since yesterday. Denies chest pain, fever or chills, similar episodes in the past. He does report ongoing SOB with minimal exertion.  I received signout for admission for symptomatic sinus bradycardia. I requested Cardiology consultation prior to admission.       Review of Systems   Constitutional:  Negative for chills and fatigue.   HENT:  Negative for drooling.    Eyes:  Negative for itching.   Respiratory:  Positive for shortness of breath. Negative for cough, choking, chest tightness and stridor.    Cardiovascular:  Negative for chest pain, " palpitations and leg swelling.   Gastrointestinal:  Negative for abdominal pain and constipation.   Genitourinary:  Negative for dysuria.   Neurological:  Positive for dizziness and syncope. Negative for seizures.       Personal History     Past Medical History:   Diagnosis Date    Allergic 01/01/1988    5+ molds, pollens, grass    Diabetes mellitus     Not sure like 2020    Diverticulitis     Diverticulosis     Mot sure 1995    GERD (gastroesophageal reflux disease)     Hypertension     Kidney stone     Not dure 1995    Obesity     Not sure 1999       Past Surgical History:   Procedure Laterality Date    HERNIA REPAIR         Family History: family history includes Anxiety disorder in his mother; Arthritis in his mother; Cancer in his father; Depression in his mother; Diabetes in his father and mother; Hyperlipidemia in his mother; Kidney disease in his mother. Otherwise pertinent FHx was reviewed and not pertinent to current issue.    Social History:  reports that he has never smoked. He has never used smokeless tobacco. He reports that he does not drink alcohol and does not use drugs.    Home Medications:  Prior to Admission Medications       Prescriptions Last Dose Informant Patient Reported? Taking?    ascorbic acid ([employer] Vitamin C) 1000 MG tablet 2/18/2025  Yes Yes    Take 4 tablets by mouth Daily.    clotrimazole (LOTRIMIN) 1 % cream 2/18/2025  No Yes    Apply 1 Application topically to the appropriate area as directed 2 (Two) Times a Day.    empagliflozin (Jardiance) 25 MG tablet tablet 2/18/2025  No Yes    Take 1 tablet by mouth Daily.    lansoprazole (PREVACID) 30 MG capsule 2/18/2025  No Yes    Take 1 capsule by mouth Daily.    loratadine (Claritin) 10 MG tablet 2/18/2025  Yes Yes    Take 1 tablet by mouth Daily.    losartan (Cozaar) 25 MG tablet   No No    Take 1 tablet by mouth Daily.    Misc Natural Products (NEURIVA PO) 2/18/2025  Yes Yes    Take  by mouth Daily.    pseudoephedrine (SUDAFED) 120 MG 12  hr tablet 2/18/2025  Yes Yes    Take 1 tablet by mouth Every Morning.    rosuvastatin (CRESTOR) 10 MG tablet 2/18/2025  No Yes    Take 1 tablet by mouth Daily.    Semaglutide 3 MG tablet   No No    Take 1 tablet by mouth Daily.              Allergies:  Allergies   Allergen Reactions    Lisinopril Nausea And Vomiting       Objective      Vitals:   Temp:  [98.9 °F (37.2 °C)] 98.9 °F (37.2 °C)  Heart Rate:  [38-39] 38  Resp:  [16-18] 17  BP: (120-156)/(70-99) 133/80  Body mass index is 36.34 kg/m².  Physical Exam  Constitutional:       Appearance: Normal appearance.   HENT:      Head: Normocephalic.      Nose: Nose normal.      Mouth/Throat:      Mouth: Mucous membranes are dry.   Eyes:      Extraocular Movements: Extraocular movements intact.      Pupils: Pupils are equal, round, and reactive to light.   Cardiovascular:      Rate and Rhythm: Bradycardia present. Rhythm irregular.      Pulses: Normal pulses.      Heart sounds: Normal heart sounds.   Pulmonary:      Effort: Pulmonary effort is normal.      Breath sounds: Normal breath sounds.   Abdominal:      General: Abdomen is flat. Bowel sounds are normal. There is no distension.      Palpations: Abdomen is soft.   Musculoskeletal:         General: Normal range of motion.      Cervical back: Neck supple.   Skin:     General: Skin is dry.      Capillary Refill: Capillary refill takes less than 2 seconds.   Neurological:      Mental Status: He is alert and oriented to person, place, and time.   Psychiatric:         Behavior: Behavior normal.         Diagnostic Data:  Lab Results (last 24 hours)       Procedure Component Value Units Date/Time    High Sensitivity Troponin T 1Hr [[id number]]  (Abnormal) Collected: 02/18/25 2208    Specimen: Blood Updated: 02/18/25 2232     HS Troponin T 44 ng/L      Troponin T Numeric Delta -2 ng/L      Troponin T % Delta -4    Narrative:      High Sensitive Troponin T Reference Range:  <14.0 ng/L- Negative Female for AMI  <22.0 ng/L-  Negative Male for AMI  >=14 - Abnormal Female indicating possible myocardial injury.  >=22 - Abnormal Male indicating possible myocardial injury.   Clinicians would have to utilize clinical acumen, EKG, Troponin, and serial changes to determine if it is an Acute Myocardial Infarction or myocardial injury due to an underlying chronic condition.         Basic Metabolic Panel [[id number]]  (Abnormal) Collected: 02/18/25 2108    Specimen: Blood Updated: 02/18/25 2141     Glucose 161 mg/dL      BUN 16 mg/dL      Creatinine 1.10 mg/dL      Sodium 140 mmol/L      Potassium 4.4 mmol/L      Comment: Specimen hemolyzed.  Result may be falsely elevated.        Chloride 105 mmol/L      CO2 22.4 mmol/L      Calcium 9.2 mg/dL      BUN/Creatinine Ratio 14.5     Anion Gap 12.6 mmol/L      eGFR 77.3 mL/min/1.73     Narrative:      GFR Categories in Chronic Kidney Disease (CKD)      GFR Category          GFR (mL/min/1.73)    Interpretation  G1                     90 or greater         Normal or high (1)  G2                      60-89                Mild decrease (1)  G3a                   45-59                Mild to moderate decrease  G3b                   30-44                Moderate to severe decrease  G4                    15-29                Severe decrease  G5                    14 or less           Kidney failure          (1)In the absence of evidence of kidney disease, neither GFR category G1 or G2 fulfill the criteria for CKD.    eGFR calculation 2021 CKD-EPI creatinine equation, which does not include race as a factor    High Sensitivity Troponin T [[id number]]  (Abnormal) Collected: 02/18/25 2108    Specimen: Blood Updated: 02/18/25 2136     HS Troponin T 46 ng/L     Narrative:      High Sensitive Troponin T Reference Range:  <14.0 ng/L- Negative Female for AMI  <22.0 ng/L- Negative Male for AMI  >=14 - Abnormal Female indicating possible myocardial injury.  >=22 - Abnormal Male indicating possible myocardial injury.    Clinicians would have to utilize clinical acumen, EKG, Troponin, and serial changes to determine if it is an Acute Myocardial Infarction or myocardial injury due to an underlying chronic condition.         Extra Tubes [[id number]] Collected: 02/18/25 2108    Specimen: Blood, Venous Line Updated: 02/18/25 2117    Narrative:      The following orders were created for panel order Extra Tubes.  Procedure                               Abnormality         Status                     ---------                               -----------         ------                     Gold Top - SST[[id number]]                                   Final result               Light Blue Top[[id number]]                                   Final result                 Please view results for these tests on the individual orders.    Light Blue Top [[id number]] Collected: 02/18/25 2108    Specimen: Blood Updated: 02/18/25 2117     Extra Tube Hold for add-ons.     Comment: Auto resulted       Gold Top - SST [[id number]] Collected: 02/18/25 2108    Specimen: Blood Updated: 02/18/25 2117     Extra Tube Hold for add-ons.     Comment: Auto resulted.       CBC & Differential [[id number]]  (Normal) Collected: 02/18/25 2108    Specimen: Blood Updated: 02/18/25 2116    Narrative:      The following orders were created for panel order CBC & Differential.  Procedure                               Abnormality         Status                     ---------                               -----------         ------                     CBC Auto Differential[[id number]]        Normal              Final result                 Please view results for these tests on the individual orders.    CBC Auto Differential [[id number]]  (Normal) Collected: 02/18/25 2108    Specimen: Blood Updated: 02/18/25 2116     WBC 9.40 10*3/mm3      RBC 5.71 10*6/mm3      Hemoglobin 16.7 g/dL      Hematocrit 49.7 %      MCV 87.0 fL      MCH 29.2 pg      MCHC 33.6 g/dL      RDW 12.8 %      RDW-SD 40.6  fl      MPV 9.2 fL      Platelets 257 10*3/mm3      Neutrophil % 57.0 %      Lymphocyte % 31.6 %      Monocyte % 8.7 %      Eosinophil % 1.7 %      Basophil % 0.7 %      Immature Grans % 0.3 %      Neutrophils, Absolute 5.35 10*3/mm3      Lymphocytes, Absolute 2.97 10*3/mm3      Monocytes, Absolute 0.82 10*3/mm3      Eosinophils, Absolute 0.16 10*3/mm3      Basophils, Absolute 0.07 10*3/mm3      Immature Grans, Absolute 0.03 10*3/mm3      nRBC 0.0 /100 WBC              Imaging Results (Last 24 Hours)       Procedure Component Value Units Date/Time    CT Head Without Contrast [[id number]] Collected: 02/18/25 2158     Updated: 02/18/25 2202    Narrative:      CT HEAD WO CONTRAST    Date of Exam: 2/18/2025 9:52 PM EST    Indication: syncope, fall.    Comparison: None available.    Technique: Axial CT images were obtained of the head without contrast administration.  Coronal reconstructions were performed.  Automated exposure control and iterative reconstruction methods were used.      Findings:  No acute intracranial hemorrhage.Intact appearing gray-white differentiation.No extra-axial fluid collection.No significant mass effect.    No hydrocephalus.    Mild generalized parenchymal volume loss.    Scattered areas of periventricular and subcortical white matter hypoattenuation, nonspecific, perhaps from small vessel ischemic/hypertensive changes in a patient of this age.There are intracranial atherosclerotic calcifications.    Mild scattered mucosal thickening in the paranasal sinuses. Layering fluid in the right maxillary sinus which can be correlated for symptoms of recent sinusitis..Mastoid air cells are essentially clear.Included globes and orbits appear unremarkable by   CT.    No acute or aggressive appearing bony or extracranial soft tissue process.      Impression:      Impression:  No acute intracranial findings.      Electronically Signed: Bao Trujillo    2/18/2025 10:00 PM EST    Workstation ID: [id number]     XR Chest 1 View [[id number]] Collected: 02/18/25 2137     Updated: 02/18/25 2139    Narrative:      XR CHEST 1 VW    Date of Exam: 2/18/2025 9:10 PM EST    Indication: syncope    Comparison: Chest radiograph 2/7/2025    Findings:      Mediastinum: Cardiac silhouette appears unchanged and normal in size    Lungs: The lungs appear clear without focal consolidation appreciated.    Pleura: No pleural effusion or pneumothorax.    Bones and soft tissues: No acute, displaced fracture seen.        Impression:      Impression:  No radiographic evidence of acute cardiopulmonary abnormality.        Electronically Signed: Bao Trujillo    2/18/2025 9:37 PM EST    Workstation ID: [id number]              Assessment & Plan        This is a 59 y.o. male with:    Active and Resolved Problems  Active Hospital Problems    Diagnosis  POA    **Symptomatic bradycardia [R00.1]  Yes      Resolved Hospital Problems   No resolved problems to display.       Symptomatic bradycardia, syncope  Second degree AV block, seems Mobitz type 2, rule out alternating complete heart block  Exertional dyspnea  Mild troponin elevation 46-->44 R/O ACS  Hold metoprolol or any other AV eduardo blocker, last dose metoprolol ER was today at 5:00 am  Transcutaneous pacer on standby  Requested Cardiology consultation prior to admission to evaluate for possible pacemaker  Would avoid atropine  Continuous cardiac monitoring  Obtain ECHO      HTN  Continue losartan  Hold metoprolol as above    Type 2 DM  Hold Jardiance and Semaglutide while inpatient  Start insulin SS    HLD  Continue statin      VTE Prophylaxis:  Pharmacologic VTE prophylaxis orders are present.        The patient desires to be as follows:    CODE STATUS:    Code Status (Patient has no pulse and is not breathing): CPR (Attempt to Resuscitate)  Medical Interventions (Patient has pulse or is breathing): Full Support        Palak Hoffman, who can be contacted at , is the designated person to  make medical decisions on the patient's behalf if He is incapable of doing so. This was clarified with patient and/or next of kin on 2/18/2025 during the course of this H&P.    Admission Status:  I believe this patient meets inpatient status.    Expected Length of Stay: 2 days    PDMP and Medication Dispenses via Sidebar reviewed and consistent with patient reported medications.    I discussed the patient's findings and my recommendations with patient, family, and nursing staff.      Signature:     This document has been electronically signed by Carlos Llanes Alvarez, MD on February 18, 2025 22:50 [facility] Hospitalist Team

## 2025-02-19 NOTE — PLAN OF CARE
Goal Outcome Evaluation:         Pt complains of his neck being sore from fall that brought him in. HR has dropped to 30s and back up to 60s throughout the day. Nonsymptomatic. Moderate tricuspid valve regurgitation present on echo. Continuing to monitor pt to see if HR will normalize without invasive measures

## 2025-02-19 NOTE — CASE MANAGEMENT/SOCIAL WORK
Discharge Planning Assessment   [facility]     Patient Name: Ismael Pulido  MRN: [id number]  Today's Date: 2/19/2025    Admit Date: 2/18/2025    Plan: Anticipate routine home with spouse. Current home cpap through [employer].   Discharge Needs Assessment       Row Name 02/19/25 1644       Living Environment    People in Home spouse    Name(s) of People in Home Spouse- [place]    Current Living Arrangements home    Potentially Unsafe Housing Conditions none    In the past 12 months has the electric, gas, oil, or water company threatened to shut off services in your home? No    Primary Care Provided by self    Provides Primary Care For no one    Family Caregiver if Needed spouse    Quality of Family Relationships helpful;involved;supportive    Able to Return to Prior Arrangements yes       Resource/Environmental Concerns    Resource/Environmental Concerns none    Transportation Concerns none       Transportation Needs    In the past 12 months, has lack of transportation kept you from medical appointments or from getting medications? no    In the past 12 months, has lack of transportation kept you from meetings, work, or from getting things needed for daily living? No       Food Insecurity    Within the past 12 months, you worried that your food would run out before you got the money to buy more. Never true    Within the past 12 months, the food you bought just didn't last and you didn't have money to get more. Never true       Transition Planning    Patient/Family Anticipates Transition to home with family    Patient/Family Anticipated Services at Transition none    Transportation Anticipated car, drives self;family or friend will provide       Discharge Needs Assessment    Readmission Within the Last 30 Days no previous admission in last 30 days    Equipment Currently Used at Home cpap    Concerns to be Addressed denies needs/concerns at this time    Anticipated Changes Related to Illness none    Equipment  "Needed After Discharge none    Provided Post Acute Provider List? N/A                   Discharge Plan       Row Name 02/19/25 4004       Plan    Plan Anticipate routine home with spouse. Current home cpap through [employer].    Patient/Family in Agreement with Plan yes    Plan Comments CM met with patient and spouse at bedside to discuss dc planning. PCP and pharmacy confirmed, reported no trouble affording food/medications, and declined needs at this time for any DME/HH/PT services. Patient confirmed he had a cpap at home through [facility] [employer]. Reported that one of his new diabetic medications was going to be around $100 per month for the first month then he was signing up for a 90-day supply through mail order. Reported that he does prefer mail order for medications because it is \"cheaper.\" Confirmed that they do have someone who comes in to clean their house.                        Demographic Summary       Row Name 02/19/25 1647       General Information    Admission Type inpatient    Arrived From emergency department    Referral Source admission list    Reason for Consult discharge planning    Preferred Language English       Contact Information    Permission Granted to Share Info With                 Functional Status       Row Name 02/19/25 6634       Functional Status    Usual Activity Tolerance good    Current Activity Tolerance good       Functional Status, IADL    Medications independent    Meal Preparation independent    Housekeeping assistive person    Laundry assistive person    Shopping assistive person                Ghada Barry RN     Office Phone: [phone]  Office Cell: [phone]    "

## 2025-02-20 ENCOUNTER — APPOINTMENT (OUTPATIENT)
Dept: NUCLEAR MEDICINE | Facility: HOSPITAL | Age: 60
DRG: 244 | End: 2025-02-20
Payer: COMMERCIAL

## 2025-02-20 PROBLEM — R55 SYNCOPE AND COLLAPSE: Status: ACTIVE | Noted: 2025-02-18

## 2025-02-20 PROBLEM — I44.1 MOBITZ TYPE 2 SECOND DEGREE ATRIOVENTRICULAR BLOCK: Status: ACTIVE | Noted: 2025-02-18

## 2025-02-20 LAB
BH CV NUCLEAR PRIOR STUDY: 3
BH CV REST NUCLEAR ISOTOPE DOSE: 11 MCI
BH CV STRESS BP STAGE 1: NORMAL
BH CV STRESS BP STAGE 2: NORMAL
BH CV STRESS BP STAGE 3: NORMAL
BH CV STRESS COMMENTS STAGE 1: NORMAL
BH CV STRESS COMMENTS STAGE 2: NORMAL
BH CV STRESS DOSE REGADENOSON STAGE 1: 0.4
BH CV STRESS DURATION MIN STAGE 1: 0
BH CV STRESS DURATION MIN STAGE 2: 4
BH CV STRESS DURATION SEC STAGE 1: 10
BH CV STRESS DURATION SEC STAGE 2: 0
BH CV STRESS HR STAGE 1: 75
BH CV STRESS HR STAGE 2: 54
BH CV STRESS HR STAGE 3: 52
BH CV STRESS NUCLEAR ISOTOPE DOSE: 31 MCI
BH CV STRESS PROTOCOL 1: NORMAL
BH CV STRESS RECOVERY BP: NORMAL MMHG
BH CV STRESS RECOVERY HR: 75 BPM
BH CV STRESS STAGE 1: 1
BH CV STRESS STAGE 2: 2
BH CV STRESS STAGE 3: 3
GLUCOSE BLDC GLUCOMTR-MCNC: 119 MG/DL (ref 70–105)
GLUCOSE BLDC GLUCOMTR-MCNC: 123 MG/DL (ref 70–105)
GLUCOSE BLDC GLUCOMTR-MCNC: 127 MG/DL (ref 70–105)
GLUCOSE BLDC GLUCOMTR-MCNC: 165 MG/DL (ref 70–105)
MAXIMAL PREDICTED HEART RATE: 161 BPM
PERCENT MAX PREDICTED HR: 46.58 %
QT INTERVAL: 530 MS
QT INTERVAL: 598 MS
QTC INTERVAL: 418 MS
QTC INTERVAL: 517 MS
SPECT HRT GATED+EF W RNC IV: 64 %
STRESS BASELINE BP: NORMAL MMHG
STRESS BASELINE HR: 74 BPM
STRESS PERCENT HR: 55 %
STRESS POST PEAK BP: NORMAL MMHG
STRESS POST PEAK HR: 75 BPM
STRESS TARGET HR: 137 BPM

## 2025-02-20 PROCEDURE — A9502 TC99M TETROFOSMIN: HCPCS | Performed by: INTERNAL MEDICINE

## 2025-02-20 PROCEDURE — 25010000002 REGADENOSON 0.4 MG/5ML SOLUTION: Performed by: INTERNAL MEDICINE

## 2025-02-20 PROCEDURE — 34310000005 TECHNETIUM TETROFOSMIN KIT: Performed by: INTERNAL MEDICINE

## 2025-02-20 PROCEDURE — 93017 CV STRESS TEST TRACING ONLY: CPT

## 2025-02-20 PROCEDURE — 82948 REAGENT STRIP/BLOOD GLUCOSE: CPT | Performed by: STUDENT IN AN ORGANIZED HEALTH CARE EDUCATION/TRAINING PROGRAM

## 2025-02-20 PROCEDURE — 78452 HT MUSCLE IMAGE SPECT MULT: CPT | Performed by: INTERNAL MEDICINE

## 2025-02-20 PROCEDURE — 25010000002 HEPARIN (PORCINE) PER 1000 UNITS: Performed by: INTERNAL MEDICINE

## 2025-02-20 PROCEDURE — 99232 SBSQ HOSP IP/OBS MODERATE 35: CPT | Performed by: INTERNAL MEDICINE

## 2025-02-20 PROCEDURE — 82948 REAGENT STRIP/BLOOD GLUCOSE: CPT

## 2025-02-20 PROCEDURE — 99222 1ST HOSP IP/OBS MODERATE 55: CPT | Performed by: INTERNAL MEDICINE

## 2025-02-20 PROCEDURE — 93018 CV STRESS TEST I&R ONLY: CPT | Performed by: INTERNAL MEDICINE

## 2025-02-20 PROCEDURE — 78452 HT MUSCLE IMAGE SPECT MULT: CPT

## 2025-02-20 RX ORDER — ECHINACEA PURPUREA EXTRACT 125 MG
1 TABLET ORAL AS NEEDED
Status: DISCONTINUED | OUTPATIENT
Start: 2025-02-20 | End: 2025-02-22 | Stop reason: HOSPADM

## 2025-02-20 RX ORDER — REGADENOSON 0.08 MG/ML
0.4 INJECTION, SOLUTION INTRAVENOUS
Status: COMPLETED | OUTPATIENT
Start: 2025-02-20 | End: 2025-02-20

## 2025-02-20 RX ADMIN — REGADENOSON 0.4 MG: 0.08 INJECTION, SOLUTION INTRAVENOUS at 10:48

## 2025-02-20 RX ADMIN — ROSUVASTATIN CALCIUM 10 MG: 10 TABLET, FILM COATED ORAL at 08:17

## 2025-02-20 RX ADMIN — TETROFOSMIN 1 DOSE: 1.38 INJECTION, POWDER, LYOPHILIZED, FOR SOLUTION INTRAVENOUS at 10:48

## 2025-02-20 RX ADMIN — HEPARIN SODIUM 5000 UNITS: 5000 INJECTION INTRAVENOUS; SUBCUTANEOUS at 21:06

## 2025-02-20 RX ADMIN — CETIRIZINE HYDROCHLORIDE 10 MG: 10 TABLET, FILM COATED ORAL at 08:17

## 2025-02-20 RX ADMIN — TETROFOSMIN 1 DOSE: 1.38 INJECTION, POWDER, LYOPHILIZED, FOR SOLUTION INTRAVENOUS at 09:59

## 2025-02-20 RX ADMIN — Medication 10 ML: at 08:18

## 2025-02-20 RX ADMIN — Medication 10 ML: at 21:07

## 2025-02-20 RX ADMIN — EMPAGLIFLOZIN 25 MG: 25 TABLET, FILM COATED ORAL at 08:17

## 2025-02-20 RX ADMIN — LOSARTAN POTASSIUM 25 MG: 25 TABLET, FILM COATED ORAL at 08:17

## 2025-02-20 RX ADMIN — PANTOPRAZOLE SODIUM 40 MG: 40 TABLET, DELAYED RELEASE ORAL at 06:14

## 2025-02-20 RX ADMIN — HEPARIN SODIUM 5000 UNITS: 5000 INJECTION INTRAVENOUS; SUBCUTANEOUS at 14:21

## 2025-02-20 RX ADMIN — HEPARIN SODIUM 5000 UNITS: 5000 INJECTION INTRAVENOUS; SUBCUTANEOUS at 06:14

## 2025-02-20 NOTE — CONSULTS
Cardiology Consult-EP      REQUESTING PROVIDER  Amanda Mcduffie DO    PATIENT IDENTIFICATION  Name: Ismael Pulido  Age: 59 y.o.  Sex: male  :  1965  MRN: [id number]             REASON  FOR  CONSULTATION  59-year-old male with no known history of ischemic heart disease or cardiac arrhythmia.  Past medical history includes diabetes mellitus 2, primary hypertension, dyslipidemia, GERD, diverticulitis.    CC:  Syncope and collapse    HPI:  Patient presented to the emergency department at [facility] 2025 for evaluation after syncopal episode while at work.  He reports recent dizziness and low heart rates.  He was previously on beta-blocker for blood pressure management which was discontinued.  He was seen in consultation by cardiologist initially.  2D echocardiogram performed showed normal LV systolic function with EF 56-60%, moderate right ventricular dilation and moderate tricuspid valve insufficiency.  Pharmacological nuclear stress testing performed today with no evidence of ischemia, no evidence of prior myocardial injury.  Patient had persistently low heart rates in the 30s-40s.  BB has been discontinued for 36 hours and he continues to have rates in the 40s.  Heart rate actually drops when he stands up.  EP was consulted for possible permanent pacemaker implant.    Telemetry reviewed-patient has frequent heart rates in the 30s the last for several seconds and then quickly jumps to 60s with occasional rates in the upper 70s.  The patient tells me that while he was at work he had sudden onset of severe dizziness.  He laid his head down and it improved.  When he stood up the dizziness returned and this occurred several times.  He then had sudden loss of consciousness, passed out and awoke on the floor.  He stated that he did strike his head and apparently suffered a superficial cut that did not require sutures.  Head CT showed no acute findings.  Upon my evaluation, he is  "sitting up in bed with family member at bedside.  He continues to have frequent highs and lows and his heart rate with no symptoms while lying in bed.  He denies any recent lower extremity edema, diaphoresis, nausea or vomiting.      REVIEW OF SYSTEMS:  Pertinent items are noted in HPI, all other systems reviewed and negative    OBJECTIVE   Diagnostics reviewed    ASSESSMENT  Intermittent profound bradycardia/second-degree Mobitz 2 AV block  Dizziness/lightheadedness  Syncope and collapse  Diabetes mellitus 2  Primary hypertension  Dyslipidemia  GERD      RECOMMENDATIONS  Recommendation by EP is for permanent pacemaker implant.  Procedure, risks and benefits were reviewed with the patient.  He wishes to proceed.  Will make n.p.o. after midnight for permanent pacemaker implant tomorrow.          CHF Guideline Directed Medical Therapy  Beta Blocker:   ARNI/ACE/ARB:   SGLT 2 inhibitors:   Diuretics:   Aldosterone Antagonist:   Vasodilators & Nitrates:       Vital Signs  Visit Vitals  /78 (BP Location: Right arm, Patient Position: Lying)   Pulse (!) 44   Temp 98.1 °F (36.7 °C) (Oral)   Resp 16   Ht 172.7 cm (68\")   Wt 105 kg (230 lb 13.2 oz)   SpO2 94%   BMI 35.10 kg/m²     Oxygen Therapy  SpO2: 94 %  Pulse Oximetry Type: Intermittent  Device (Oxygen Therapy): room air  Flowsheet Rows      Flowsheet Row First Filed Value   Admission Height 172.7 cm (68\") Documented at 02/18/2025 2036   Admission Weight 108 kg (238 lb 15.7 oz) Documented at 02/18/2025 2036          Intake & Output (last 3 days)         02/17 0701  02/18 0700 02/18 0701  02/19 0700 02/19 0701  02/20 0700 02/20 0701  02/21 0700    P.O.   840     Total Intake(mL/kg)   840 (8)     Net   +840             Urine Unmeasured Occurrence   2 x           Lines, Drains & Airways       Active LDAs       Name Placement date Placement time Site Days    Peripheral IV 02/18/25 2213 Anterior;Left Forearm 02/18/25 2213  Forearm  1                    MEDICAL HISTORY  " "  Past Medical History:   Diagnosis Date    Allergic 1988    5+ molds, pollens, grass    Diabetes mellitus     Not sure like     Diverticulitis     Diverticulosis     Mot sure     GERD (gastroesophageal reflux disease)     Hypertension     Kidney stone     Not dure     Obesity     Not sure         SURGICAL HISTORY    Past Surgical History:   Procedure Laterality Date    HERNIA REPAIR          FAMILY HISTORY    Family History   Problem Relation Age of Onset    Anxiety disorder Mother     Arthritis Mother     Depression Mother     Diabetes Mother     Hyperlipidemia Mother     Kidney disease Mother         Kidney stones    Cancer Father          of cancer in 2009    Diabetes Father        SOCIAL HISTORY    Social History     Tobacco Use    Smoking status: Never    Smokeless tobacco: Never   Substance Use Topics    Alcohol use: Never        ALLERGIES    Allergies   Allergen Reactions    Lisinopril Nausea And Vomiting              /78 (BP Location: Right arm, Patient Position: Lying)   Pulse (!) 44   Temp 98.1 °F (36.7 °C) (Oral)   Resp 16   Ht 172.7 cm (68\")   Wt 105 kg (230 lb 13.2 oz)   SpO2 94%   BMI 35.10 kg/m²   Intake/Output last 3 shifts:  I/O last 3 completed shifts:  In: 840 [P.O.:840]  Out: -   Intake/Output this shift:  No intake/output data recorded.    PHYSICAL EXAM:    General: Alert, cooperative, no distress, appears stated age  Head:  Normocephalic, atraumatic, mucous membranes moist  Eyes:  Conjunctivae/corneas clear, EOM's intact     Neck:  Supple,  no adenopathy; no JVD or bruit  Lungs: Clear to auscultation bilaterally, no wheezes, rhonchi or rales are noted  Chest wall: No tenderness  Heart::  Irregular rate and rhythm, S1 and S2 normal, no murmur, rub or gallop  Abdomen: Soft, nontender, nondistended, bowel sounds active  Extremities: No cyanosis, clubbing, or edema   Pulses: 2+ and symmetric all extremities  Skin:  No rashes or lesions  Neuro/psych: A&O " x3. CN II through XII are grossly intact with appropriate affect    Scheduled Meds:      cetirizine, 10 mg, Oral, Daily  empagliflozin, 25 mg, Oral, Daily  heparin (porcine), 5,000 Units, Subcutaneous, Q8H  insulin lispro, 2-7 Units, Subcutaneous, 4x Daily AC & at Bedtime  losartan, 25 mg, Oral, Daily  pantoprazole, 40 mg, Oral, Q AM  rosuvastatin, 10 mg, Oral, Daily  sodium chloride, 10 mL, Intravenous, Q12H        Continuous Infusions:         PRN Meds:      acetaminophen **OR** acetaminophen **OR** acetaminophen    senna-docusate sodium **AND** polyethylene glycol **AND** bisacodyl **AND** bisacodyl    Calcium Replacement - Follow Nurse / BPA Driven Protocol    dextrose    dextrose    glucagon (human recombinant)    Magnesium Standard Dose Replacement - Follow Nurse / BPA Driven Protocol    Phosphorus Replacement - Follow Nurse / BPA Driven Protocol    Potassium Replacement - Follow Nurse / BPA Driven Protocol    [COMPLETED] Insert Peripheral IV **AND** sodium chloride    sodium chloride    sodium chloride    sodium chloride     Data Review:     Results Review:     I reviewed the patient's new clinical results.    CBC    Results from last 7 days   Lab Units 02/19/25  0326 02/18/25  2108   WBC 10*3/mm3 8.60 9.40   HEMOGLOBIN g/dL 15.9 16.7   PLATELETS 10*3/mm3 218 257     Cr Clearance Estimated Creatinine Clearance: 94.3 mL/min (by C-G formula based on SCr of 0.99 mg/dL).  Coag     HbA1C   Lab Results   Component Value Date    HGBA1C 8.40 (H) 02/07/2025    HGBA1C 8.10 (H) 08/09/2024    HGBA1C 7.90 (H) 02/09/2024     Blood Glucose   Glucose   Date/Time Value Ref Range Status   02/20/2025 1210 123 (H) 70 - 105 mg/dL Final     Comment:     Serial Number: [id number]:  [id number]   02/20/2025 0746 119 (H) 70 - 105 mg/dL Final     Comment:     Serial Number: [id number]:  [id number]   02/19/2025 2025 139 (H) 70 - 105 mg/dL Final     Comment:     Serial Number: [id number]:  [id number]   02/19/2025 1608  "139 (H) 70 - 105 mg/dL Final     Comment:     Serial Number: [id number]:  [id number]   02/19/2025 1203 192 (H) 70 - 105 mg/dL Final     Comment:     Serial Number: [id number]:  [id number]   02/19/2025 0804 137 (H) 70 - 105 mg/dL Final     Comment:     Serial Number: [id number]:  [id number]     Infection     CMP   Results from last 7 days   Lab Units 02/19/25  0326 02/18/25  2108   SODIUM mmol/L 140 140   POTASSIUM mmol/L 4.3 4.4   CHLORIDE mmol/L 107 105   CO2 mmol/L 22.6 22.4   BUN mg/dL 15 16   CREATININE mg/dL 0.99 1.10   GLUCOSE mg/dL 145* 161*   ALBUMIN g/dL 3.9  --    BILIRUBIN mg/dL 0.2  --    ALK PHOS U/L 102  --    AST (SGOT) U/L 34  --    ALT (SGPT) U/L 42*  --      ABG      UA      KARINA  No results found for: \"POCMETH\", \"POCAMPHET\", \"POCBARBITUR\", \"POCBENZO\", \"POCCOCAINE\", \"POCOPIATES\", \"POCOXYCODO\", \"POCPHENCYC\", \"POCPROPOXY\", \"POCTHC\", \"POCTRICYC\"  Lysis Labs   Results from last 7 days   Lab Units 02/19/25  0326 02/18/25  2108   HEMOGLOBIN g/dL 15.9 16.7   PLATELETS 10*3/mm3 218 257   CREATININE mg/dL 0.99 1.10     Radiology(recent) CT Head Without Contrast    Result Date: 2/18/2025  Impression: No acute intracranial findings. Electronically Signed: Bao Trujillo  2/18/2025 10:00 PM EST  Workstation ID: [id number]    XR Chest 1 View    Result Date: 2/18/2025  Impression: No radiographic evidence of acute cardiopulmonary abnormality. Electronically Signed: Bao Trujillo  2/18/2025 9:37 PM EST  Workstation ID: [id number]       Results from last 7 days   Lab Units 02/18/25  2208   HSTROP T ng/L 44*       X-rays, labs reviewed personally by physician.    ECG/EMG Results (most recent)       Procedure Component Value Units Date/Time    ECG 12 Lead Bradycardia [[id number]] Collected: 02/18/25 2041     Updated: 02/19/25 0609     QT Interval 558 ms      QTC Interval 454 ms     Narrative:      HEART RATE=40  bpm  RR Kkymwzir=3025  ms  AL Xrwahywd=388  ms  P Horizontal Axis=  deg  P Front Axis=55  " deg  QRSD Yxgppqbc=656  ms  QT Sbqycygk=585  ms  UEfM=368  ms  QRS Axis=-11  deg  T Wave Axis=51  deg  - ABNORMAL ECG -  Sinus bradycardia  Prolonged MI interval  IVCD, consider LBBB  No previous ECG available for comparison  Electronically Signed By: Hussein Bar ([place]) 2025-02-19 06:09:27  Date and Time of Study:2025-02-18 20:41:18    Telemetry Scan [[id number]] Resulted: 02/18/25     Updated: 02/19/25 0709    Telemetry Scan [[id number]] Resulted: 02/18/25     Updated: 02/19/25 0848    Telemetry Scan [[id number]] Resulted: 02/18/25     Updated: 02/19/25 0914    Adult Transthoracic Echo Complete w/ Color, Spectral and Contrast if Necessary Per Protocol [[id number]] Resulted: 02/19/25 1021     Updated: 02/19/25 1021     LV GLOBAL STRAIN  -15.0 %      LVIDd 4.4 cm      LVIDs 3.3 cm      IVSd 1.33 cm      LVPWd 1.31 cm      FS 26.4 %      IVS/LVPW 1.02 cm      ESV(cubed) 34.9 ml      EDV(cubed) 87.6 ml      LV mass(C)d 223.6 grams      LVOT area 3.9 cm2      LVOT diam 2.22 cm      SVi (LVOT) 28.7 ml/m2      MV E max manpreet 88.6 cm/sec      MV A max manpreet 47.2 cm/sec      MV dec time 0.31 sec      MV E/A 1.88     Pulm A Revs Dur 0.16 sec      MV A dur 0.12 sec      LA ESV Index (BP) 33.2 ml/m2      Med Peak E' Manpreet 7.6 cm/sec      Lat Peak E' Manpreet 10.8 cm/sec      TR max manpreet 294.5 cm/sec      Avg E/e' ratio 9.63     SV(LVOT) 62.8 ml      TAPSE (>1.6) 1.41 cm      RV S' 9.6 cm/sec      LA dimension (2D)  4.3 cm      Pulm Sys Manpreet 48.0 cm/sec      Pulm Kothari Manpreet 57.8 cm/sec      Pulm S/D 0.83     Pulm A Revs Manpreet 27.6 cm/sec      LV V1 max 79.1 cm/sec      LV V1 max PG 2.5 mmHg      LV V1 mean PG 1.25 mmHg      LV V1 VTI 16.2 cm      Ao pk manpreet 124.9 cm/sec      Ao max PG 6.2 mmHg      Ao mean PG 3.3 mmHg      Ao V2 VTI 27.4 cm      MARCELLUS(I,D) 2.30 cm2      MV max PG 2.07 mmHg      MV mean PG 1.07 mmHg      MV V2 VTI 20.9 cm      MV P1/2t 76.8 msec      MVA(P1/2t) 2.9 cm2      MVA(VTI) 3.0 cm2      MV dec slope 293.6 cm/sec2      MR max  al 467.0 cm/sec      MR max PG 87.2 mmHg      TR max PG 34.7 mmHg      RVSP(TR) 49.7 mmHg      RAP systole 15.0 mmHg      RV V1 max PG 1.23 mmHg      RV V1 max 55.5 cm/sec      RV V1 VTI 12.8 cm      PA V2 max 87.1 cm/sec      PA acc time 0.11 sec      Sinus 3.7 cm      STJ 2.5 cm      Dimensionless Index 0.63 (DI)     Narrative:        Left ventricular ejection fraction appears to be 56 - 60%.    The right ventricular cavity is moderately dilated.    The left atrial cavity is mildly dilated.    Moderate tricuspid valve regurgitation is present.    Estimated right ventricular systolic pressure from tricuspid   regurgitation is moderately elevated (45-55 mmHg).      Telemetry Scan [[id number]] Resulted: 02/18/25     Updated: 02/19/25 1252    Telemetry Scan [[id number]] Resulted: 02/18/25     Updated: 02/20/25 0826    Telemetry Scan [[id number]] Resulted: 02/18/25     Updated: 02/20/25 0841    Telemetry Scan [[id number]] Resulted: 02/18/25     Updated: 02/20/25 0855    Telemetry Scan [[id number]] Resulted: 02/18/25     Updated: 02/20/25 0959    Telemetry Scan [[id number]] Resulted: 02/18/25     Updated: 02/20/25 1003    ECG 12 Lead Bradycardia [[id number]] Collected: 02/19/25 1008     Updated: 02/20/25 1435     QT Interval 598 ms      QTC Interval 517 ms     Narrative:      HEART RATE=45  bpm  RR Fwilmqlp=1703  ms  MT Xbysarjl=852  ms  P Horizontal Axis=-3  deg  P Front Axis=54  deg  QRSD Xztbfnnl=915  ms  QT Eetlbfis=756  ms  ASpJ=774  ms  QRS Axis=-4  deg  T Wave Axis=-34  deg  - ABNORMAL ECG -  Sinus bradycardia  Multiple premature complexes, vent & supraven  Second deg AVB, Mobitz I (Wenckebach)  Left bundle branch block  When compared with ECG of 18-Feb-2025 23:10:07,  No significant change  Electronically Signed By: Augie Elizalde (RALPH) 2025-02-20 14:35:37  Date and Time of Study:2025-02-19 10:08:15    ECG 12 Lead Bradycardia [[id number]] Collected: 02/18/25 2310     Updated: 02/20/25 1436     QT Interval 530 ms      QTC  Interval 418 ms     Narrative:      HEART RATE=37  bpm  RR Rqqyqmmt=2310  ms  NH Bmzcxtnz=810  ms  P Horizontal Axis=16  deg  P Front Axis=54  deg  QRSD Sdtacrbp=003  ms  QT Altyrppo=696  ms  DQlK=648  ms  QRS Axis=-19  deg  T Wave Axis=34  deg  - ABNORMAL ECG -  Sinus bradycardia  Prolonged NH interval  Left bundle branch block  When compared with ECG of 18-Feb-2025 20:41:18,  No significant change  Electronically Signed By: Augie Elizalde ([facility]) 2025-02-20 14:36:35  Date and Time of Study:2025-02-18 23:10:07    Telemetry Scan [[id number]] Resulted: 02/18/25     Updated: 02/20/25 1514    Telemetry Scan [[id number]] Resulted: 02/18/25     Updated: 02/20/25 1519              Medication Review:   I have reviewed the patient's current medication list  Scheduled Meds:cetirizine, 10 mg, Oral, Daily  empagliflozin, 25 mg, Oral, Daily  heparin (porcine), 5,000 Units, Subcutaneous, Q8H  insulin lispro, 2-7 Units, Subcutaneous, 4x Daily AC & at Bedtime  losartan, 25 mg, Oral, Daily  pantoprazole, 40 mg, Oral, Q AM  rosuvastatin, 10 mg, Oral, Daily  sodium chloride, 10 mL, Intravenous, Q12H      Continuous Infusions:   PRN Meds:.  acetaminophen **OR** acetaminophen **OR** acetaminophen    senna-docusate sodium **AND** polyethylene glycol **AND** bisacodyl **AND** bisacodyl    Calcium Replacement - Follow Nurse / BPA Driven Protocol    dextrose    dextrose    glucagon (human recombinant)    Magnesium Standard Dose Replacement - Follow Nurse / BPA Driven Protocol    Phosphorus Replacement - Follow Nurse / BPA Driven Protocol    Potassium Replacement - Follow Nurse / BPA Driven Protocol    [COMPLETED] Insert Peripheral IV **AND** sodium chloride    sodium chloride    sodium chloride    sodium chloride    Imaging:  Imaging Results (Last 72 Hours)       Procedure Component Value Units Date/Time    CT Head Without Contrast [[id number]] Collected: 02/18/25 2158     Updated: 02/18/25 2202    Narrative:      CT HEAD WO CONTRAST    Date of  Exam: 2/18/2025 9:52 PM EST    Indication: syncope, fall.    Comparison: None available.    Technique: Axial CT images were obtained of the head without contrast administration.  Coronal reconstructions were performed.  Automated exposure control and iterative reconstruction methods were used.      Findings:  No acute intracranial hemorrhage.Intact appearing gray-white differentiation.No extra-axial fluid collection.No significant mass effect.    No hydrocephalus.    Mild generalized parenchymal volume loss.    Scattered areas of periventricular and subcortical white matter hypoattenuation, nonspecific, perhaps from small vessel ischemic/hypertensive changes in a patient of this age.There are intracranial atherosclerotic calcifications.    Mild scattered mucosal thickening in the paranasal sinuses. Layering fluid in the right maxillary sinus which can be correlated for symptoms of recent sinusitis..Mastoid air cells are essentially clear.Included globes and orbits appear unremarkable by   CT.    No acute or aggressive appearing bony or extracranial soft tissue process.      Impression:      Impression:  No acute intracranial findings.      Electronically Signed: Bao Trujillo    2/18/2025 10:00 PM EST    Workstation ID: [id number]    XR Chest 1 View [[id number]] Collected: 02/18/25 2137     Updated: 02/18/25 2139    Narrative:      XR CHEST 1 VW    Date of Exam: 2/18/2025 9:10 PM EST    Indication: syncope    Comparison: Chest radiograph 2/7/2025    Findings:      Mediastinum: Cardiac silhouette appears unchanged and normal in size    Lungs: The lungs appear clear without focal consolidation appreciated.    Pleura: No pleural effusion or pneumothorax.    Bones and soft tissues: No acute, displaced fracture seen.        Impression:      Impression:  No radiographic evidence of acute cardiopulmonary abnormality.        Electronically Signed: Bao Trujillo    2/18/2025 9:37 PM EST    Workstation ID: [id number]       "        TAMIKO Leos  02/20/25  15:24 EST      EMR Dragon/Transcription:   \"Dictated utilizing Dragon dictation\".              Electronically signed by TAMIKO Leos, 02/20/25, 2:21 PM EST.  Copied text in this note has been reviewed by me and is accurate as of 02/20/25.    Patient seen and examined.  Labs x-rays EKGs reviewed.  Patient has left bundle branch block with 2-1 block with syncope and bradycardia and highly suspicious for infra His block      Physical Exam    General:      well developed, well nourished, in no acute distress.    Head:      normocephalic and atraumatic.    Eyes:      PERRL/EOM intact, conjunctivae and sclerae clear without nystagmus.    Neck:      no  thyromegaly, trachea central with normal respiratory effort  Lungs:      clear bilaterally to auscultation.    Heart:       regular rate and rhythm, S1, S2 without murmurs, rubs, or gallops  Skin:      intact without lesions or rashes.    Psych:      alert and cooperative; normal mood and affect; normal attention span and concentration.        Patient scheduled for pacemaker implantation with placement of paraseptal lead versus coronary sinus lead to optimize AV pacing and reduce any heart failure symptoms in future.  Risks and benefits outcomes educated and orders placed for pacemaker implantation patient to be started on IV fluids and instruction given to the nurse.        Electronically signed by Dez Loco MD, 02/21/25, 1:35 PM EST.      "

## 2025-02-20 NOTE — PLAN OF CARE
Goal Outcome Evaluation:  Plan of Care Reviewed With: patient        Progress: no change  Outcome Evaluation: HR 50s overnight with no dizziness or syncopal episodes. Continue to hold metoprolol and monitor

## 2025-02-20 NOTE — CASE MANAGEMENT/SOCIAL WORK
Continued Stay Note  [facility]     Patient Name: Ismael Pulido  MRN: [id number]  Today's Date: 2/20/2025    Admit Date: 2/18/2025    Plan: Anticipate routine home with spouse. Current home cpap through [employer].   Discharge Plan       Row Name 02/20/25 1332       Plan    Plan Comments Barrier to D/C: stress test today, PPM pending, cardiology following.                      Expected Discharge Date and Time       Expected Discharge Date Expected Discharge Time    Feb 22, 2025           Phone communication or documentation only - no physical contact with patient or family.     ANJALI LooneyN, RN    [place] [id number]    Office: [phone]  Fax: [phone]

## 2025-02-20 NOTE — PLAN OF CARE
Goal Outcome Evaluation:   Stress test completed this shift. Plan for pacemaker implant tomorrow. Consent signed and in chart. PT able to voice concerns. Call light within reach.

## 2025-02-20 NOTE — PROGRESS NOTES
CARDIOLOGY PROGRESS NOTE:    Ismael Pulido  59 y.o.  male  1965  [id number]      Referring Provider: Hospitalist    Reason for follow-up: Syncope and bradycardia     Patient Care Team:  Sharon Silva MD as PCP - General (Internal Medicine & Pediatrics)    Subjective .  Patient still has significantly lower heart rate    Objective lying in bed because once he gets up his heart rate drops and is getting dizzy     Review of Systems   Constitutional: Negative for malaise/fatigue.   Cardiovascular:  Negative for chest pain, dyspnea on exertion, leg swelling and palpitations.   Respiratory:  Negative for cough and shortness of breath.    Gastrointestinal:  Negative for abdominal pain, nausea and vomiting.   Neurological:  Negative for dizziness, focal weakness, headaches, light-headedness and numbness.   All other systems reviewed and are negative.      Allergies: Lisinopril    Scheduled Meds:cetirizine, 10 mg, Oral, Daily  empagliflozin, 25 mg, Oral, Daily  heparin (porcine), 5,000 Units, Subcutaneous, Q8H  insulin lispro, 2-7 Units, Subcutaneous, 4x Daily AC & at Bedtime  losartan, 25 mg, Oral, Daily  pantoprazole, 40 mg, Oral, Q AM  rosuvastatin, 10 mg, Oral, Daily  sodium chloride, 10 mL, Intravenous, Q12H      Continuous Infusions:   PRN Meds:.  acetaminophen **OR** acetaminophen **OR** acetaminophen    senna-docusate sodium **AND** polyethylene glycol **AND** bisacodyl **AND** bisacodyl    Calcium Replacement - Follow Nurse / BPA Driven Protocol    dextrose    dextrose    glucagon (human recombinant)    Magnesium Standard Dose Replacement - Follow Nurse / BPA Driven Protocol    Phosphorus Replacement - Follow Nurse / BPA Driven Protocol    Potassium Replacement - Follow Nurse / BPA Driven Protocol    [COMPLETED] Insert Peripheral IV **AND** sodium chloride    sodium chloride    sodium chloride    sodium chloride        VITAL SIGNS  Vitals:    02/20/25 0125 02/20/25 0450 02/20/25 0817 02/20/25  "0844   BP: 115/78 128/94 130/95    BP Location: Right arm Right arm Right arm    Patient Position: Lying Lying Lying    Pulse: 63 (!) 42 (!) 43    Resp: 15 15 14    Temp: 97 °F (36.1 °C) 98.4 °F (36.9 °C)  97.5 °F (36.4 °C)   TempSrc: Axillary Axillary  Oral   SpO2: 95% 95% 97%    Weight:  105 kg (230 lb 13.2 oz)     Height:           Flowsheet Rows      Flowsheet Row First Filed Value   Admission Height 172.7 cm (68\") Documented at 02/18/2025 2036   Admission Weight 108 kg (238 lb 15.7 oz) Documented at 02/18/2025 2036             TELEMETRY: Sinus bradycardia with left bundle branch block    Physical Exam:  Constitutional:       Appearance: Well-developed.   Eyes:      General: No scleral icterus.     Conjunctiva/sclera: Conjunctivae normal.   HENT:      Head: Normocephalic and atraumatic.   Neck:      Vascular: No carotid bruit or JVD.   Pulmonary:      Effort: Pulmonary effort is normal.      Breath sounds: Normal breath sounds. No wheezing. No rales.   Cardiovascular:      Normal rate. Regular rhythm.   Pulses:     Intact distal pulses.   Abdominal:      General: Bowel sounds are normal.      Palpations: Abdomen is soft.   Musculoskeletal:      Cervical back: Normal range of motion and neck supple. Skin:     General: Skin is warm and dry.      Findings: No rash.   Neurological:      Mental Status: Alert.          Results Review:   I reviewed the patient's new clinical results.  Lab Results (last 24 hours)       Procedure Component Value Units Date/Time    POC Glucose 4x Daily Before Meals & at Bedtime [[id number]]  (Abnormal) Collected: 02/20/25 1210    Specimen: Blood Updated: 02/20/25 1211     Glucose 123 mg/dL      Comment: Serial Number: [id number]:  [id number]       POC Glucose 4x Daily Before Meals & at Bedtime [[id number]]  (Abnormal) Collected: 02/20/25 0746    Specimen: Blood Updated: 02/20/25 0750     Glucose 119 mg/dL      Comment: Serial Number: [id number]:  [id number]       POC Glucose " Once [[id number]]  (Abnormal) Collected: 02/19/25 2025    Specimen: Blood Updated: 02/19/25 2026     Glucose 139 mg/dL      Comment: Serial Number: [id number]:  [id number]       POC Glucose Once [[id number]]  (Abnormal) Collected: 02/19/25 1608    Specimen: Blood Updated: 02/19/25 1609     Glucose 139 mg/dL      Comment: Serial Number: [id number]:  [id number]               Imaging Results (Last 24 Hours)       ** No results found for the last 24 hours. **            EKG      I personally viewed and interpreted the patient's EKG/Telemetry data:    ECHOCARDIOGRAM:  Results for orders placed during the hospital encounter of 02/18/25    Adult Transthoracic Echo Complete w/ Color, Spectral and Contrast if Necessary Per Protocol    Interpretation Summary    Left ventricular ejection fraction appears to be 56 - 60%.    The right ventricular cavity is moderately dilated.    The left atrial cavity is mildly dilated.    Moderate tricuspid valve regurgitation is present.    Estimated right ventricular systolic pressure from tricuspid regurgitation is moderately elevated (45-55 mmHg).       STRESS MYOVIEW:       CARDIAC CATHETERIZATION:  No results found for this or any previous visit.       OTHER:         Assessment & Plan     Syncope and symptomatic bradycardia  Patient presented after syncopal episode and had significant bradycardia in the 30s and 40s  Patient was on metoprolol which has been stopped and it has been 36 hours but still his heart rates in the 40s and actually drops when he stands up  Patient had an echocardiogram which showed normal LV function but has moderate right ventricular dilatation and moderate tricuspid regurgitation  Patient is having a Lexiscan Myoview study to rule out any ischemia  Patient will need permanent pacemaker placement  Discussed with patient and family.  Electrophysiologist will see patient.    Hypertension  Patient blood pressure currently stable on losartan and will add  amlodipine if needed.    Diabetes  Patient on sliding scale insulin.    Hyperlipidemia  Patient is on statins and the lipid levels are followed by the primary care doctor.  I discussed the patients findings and my recommendations with patient and nurse    Win Reaves MD  02/20/25  12:50 EST

## 2025-02-20 NOTE — PAYOR COMM NOTE
"This is clinical for Ismael Pulido.  Reference/Auth#: [id number]     EXTENDED AUTHORIZATION PENDING:     Please call or fax determination to contact below.   Thank you.    Anita Diego RN, [facility]  Utilization Review Nurse  [place] [phone]   [phone]  Fx [phone]       Ismael Pulido (59 y.o. Male)       Date of Birth   1965    Social Security Number       Address   [address] [place] [address]    Home Phone   [phone]    MRN   [id number]       Orthodoxy   [employer]    Marital Status                               Admission Date   2/18/25    Admission Type   Emergency    Admitting Provider   Llanes Alvarez, Carlos, MD    Attending Provider   Amanda Mcduffie DO    Department, Room/Bed   [facility] PROGRESS CARE, 2109/1       Discharge Date       Discharge Disposition       Discharge Destination                                 Attending Provider: Amanda Mcduffie DO    Allergies: Lisinopril    Isolation: None   Infection: None   Code Status: CPR    Ht: 172.7 cm (68\")   Wt: 105 kg (230 lb 13.2 oz)    Admission Cmt: None   Principal Problem: Symptomatic bradycardia [R00.1]                   Active Insurance as of 2/18/2025       Primary Coverage       Payor Plan Insurance Group Employer/Plan Group    [employer] COMMERCIAL AETNA [id number]       Payor Plan Address Payor Plan Phone Number Payor Plan Fax Number Effective Dates    [address] [phone]  3/25/2021 - None Entered    [facility] [id number]         Subscriber Name Subscriber Birth Date Member ID       ISMAEL PULIDO 1965 [id number]                     Emergency Contacts        (Rel.) Home Phone Work Phone Mobile Phone    CLAIRE PULIDO (Spouse) [phone] -- [phone]              Operative/Procedure Notes (last 24 hours)  Notes from 02/19/25 1217 through 02/20/25 1217   No notes of this type exist for this " [place], [place], [address]        DEPARTMENT OF HOSPITALIST MEDICINE        PROGRESS  NOTE:    NOTE: Portions of this note have been copied forward, however, changed to reflect the most current clinical status of this patient. Patient:  Elizabeth Alvarado  YOB: 1963  Date of Service: 11/25/2020  MRN: [id number]   Acct: [de-identified]   Primary Care Physician: Santosh Clarke MD  Advance Directive: Full Code  Admit Date: 11/22/2020       Hospital Day: 3      CHIEF COMPLAINT:  Chief Complaint   Patient presents with    Shortness of Breath     covid pos, sob increased this AM       SUBJECTIVE:    Patient carried full conversation with me during my morning rounds. His O2 requirements increased in the afternoon. [address]  COURSE:    11/25/2020  Patient is on day 4 of remdesivir. His O2 comments increased throughout the day. He was increased from 6 L nasal cannula to Ventimask hour at 8 L. He also increased him to full anticoagulation, increased albuterol every hour and Symbicort every 6 hours. 11/24/2020  Today's remdesivir day 3 for him. He is feeling better. O2 sats are in 90s on nasal cannula. 11/23/2020  Patient is feeling better today after receiving oxygen, dexamethasone and remdesivir. He is on day 2 of remdesivir. His O2 sats are in 90s.    11/22/2020  HISTORY OF PRESENT ILLNESS:  Elizabeth Alvarado is an 62 y.o. male. He is a very pleasant gentleman who was diagnosed with COVID-19 pneumonia in our ER 4 days ago when he presented with symptoms for about 6 days. He was stable and not requiring any oxygen hence he was sent home with instruction to self quarantine. He came back to the ER for evaluation with complaints of worsening shortness of breath since this morning. He was satting 86% on room air. He was given oxygen via nasal cannula and his O2 sats improved to 90s.   Chest x-ray was done which showed worsening of the pulmonary findings as compared to the encounter.          Physician Progress Notes (last 24 hours)        Amanda Mcduffie DO at 25 1013          Hospitalist Service   Daily Progress Note      Patient Name: Ismael Pulido  : 1965  MRN: [id number]  Primary Care Physician:  Sharon Silva MD  Date of admission: 2025      Subjective      Chief Complaint: LOC    Patient seen and examined this morning.  Doing well this morning, starting to feel congested however.  Heart rate remains low intermittently going down to the 40s.  Discussed with cardiology, plan for pacemaker likely tomorrow.  Stress test planned for today.  No other complaints.    Pertinent positives as noted in HPI/subjective.  All other systems were reviewed and are negative.      Objective      Vitals:   Temp:  [97 °F (36.1 °C)-98.4 °F (36.9 °C)] 97.5 °F (36.4 °C)  Heart Rate:  [37-63] 43  Resp:  [14-17] 14  BP: (113-130)/(67-95) 130/95    Physical Exam:    General: Awake, alert, NAD  Eyes: PERRL, EOMI, conjunctivae are clear  Cardiovascular:  bradycardia, regular rhythm, no murmurs  Respiratory: Clear to auscultation bilaterally, no wheezing or rales, unlabored breathing  Abdomen: Soft, nontender, positive bowel sounds, no guarding  Neurologic: A&O, CN grossly intact, moves all extremities spontaneously  Musculoskeletal: Normal range of motion, no other gross deformities  Skin: Warm, dry         Result Review    Result Review:  I have personally reviewed the results from the time of this admission to 2025 10:13 EST and agree with these findings:  [x]  Laboratory  []  Microbiology  []  Radiology  [x]  EKG/Telemetry   [x]  Cardiology/Vascular   []  Pathology  []  Old records  []  Other:          Assessment & Plan      Brief Patient Summary:  Ismael Pulido is a 59 y.o. male with history of hypertension hyperlipidemia, DM2 who presented to the ED with complaint of dizziness and passing out.  Patient has been on metoprolol 100 mg daily for his hypertension  x-ray 4 days ago. Given his worsening symptoms and advanced pulmonary changes, he is being admitted for medical management, IV steroids, IV remdesivir, adjuvant medications and close management and hydration in the COVID-19 unit. REVIEW  OF  SYSTEMS:    Constitutional:  + low grade fevers, chills, + nausea, no vomiting,    Lungs:   No hemoptysis, pleurisy, + SOB, + cough   Heart:  No chest pressure with exertion, palpitations,    Abdomen:   No new masses, no bright red blood per rectum   Extremities: + difficulty ambulating due to weakness, no new lesions   Neurologic: No new motor or sensory changes       PAST MEDICAL HISTORY:  Past Medical History:   Diagnosis Date    GERD (gastroesophageal reflux disease)     Hyperlipidemia     Hypertension          PAST SURGICAL HISTORY:  Past Surgical History:   Procedure Laterality Date    COLONOSCOPY      HERNIA REPAIR          FAMILY HISTORY:  History reviewed. No pertinent family history. OBJECTIVE:  /83   Pulse 83   Temp 98.4 °F (36.9 °C)   Resp 20   Ht 6' 7\" (2.007 m)   Wt 250 lb 6 oz (113.6 kg)   SpO2 91%   BMI 28.21 kg/m²   I/O this shift:  In: -   Out: 250 [Urine:250]      PHYSICAL  EXAMINATION (done remotely turning in doorstep to avoid COVID-19 exposure and to conserve PPE) . ..  Captured in coordination with the floor nurse:     BHUMI:  Awake, alert, oriented x 3, patient appears tired and fatigued   Head/Eyes:  Normocephalic, atraumatic, EOMI and PERRLA bilaterally   Respiratory:   Bilateral decreased air entry in both lung fields, mild bilateral rhonchi, coarse breath sounds (As per floor nurse)   Cardiovascular:  Regular rate and rhythm, S1+S2+0 (As per floor nurse)   Abdomen:   Non-distended   Extremities: Moves all, decreased range of motion, no edema   Neurologic: Awake, alert, oriented x 3, cranial nerves II-XII intact   Psychiatric: Normal mood, non-suicidal         CURRENT MEDICATIONS:  Scheduled:   budesonide-formoterol  2 puff Inhalation 4x Daily    albuterol sulfate HFA  2 puff Inhalation Q1H While awake    enoxaparin  1 mg/kg Subcutaneous BID    zinc sulfate  50 mg Oral Daily    melatonin  15 mg Oral Nightly    dexamethasone  6 mg Intravenous Q24H    carvedilol  25 mg Oral BID WC    atorvastatin  10 mg Oral Daily    sodium chloride flush  10 mL Intravenous 2 times per day    famotidine  20 mg Oral BID    Vitamin D  6,000 Units Oral Daily    vitamin C  500 mg Oral TID    pantoprazole  40 mg Oral QAM AC    remdesivir IVPB  100 mg Intravenous Q24H        PRN:  benzonatate, 100 mg, TID PRN  HYDROcodone-homatropine, 1 tablet, Q6H PRN  albuterol sulfate HFA, 2 puff, Q4H PRN  sodium chloride flush, 10 mL, PRN  potassium chloride, 40 mEq, PRN    Or  potassium alternative oral replacement, 40 mEq, PRN    Or  potassium chloride, 10 mEq, PRN  magnesium sulfate, 1 g, PRN  acetaminophen, 650 mg, Q6H PRN    Or  acetaminophen, 650 mg, Q6H PRN  polyethylene glycol, 17 g, Daily PRN  promethazine, 12.5 mg, Q6H PRN    Or  ondansetron, 4 mg, Q6H PRN  sodium chloride, 30 mL, PRN        Infusions:   lactated ringers 75 mL/hr at 11/24/20 2305       Laboratory Data:  Recent Labs     11/23/20  0604 11/25/20  0420   WBC 6.8 7.6   HGB 15.6 14.5    153     Recent Labs     11/23/20  0604 11/24/20  0145 11/25/20  0420   * 133* 135*   K 4.5 4.5 4.2   CL 95* 99 101   CO2 25 24 24   BUN 22* 24* 21*   CREATININE 1.0 1.0 0.9   GLUCOSE 146* 165* 162*     Recent Labs     11/24/20  0145 11/25/20  0420   AST 28 27   ALT 33 41   BILITOT 0.3 0.3   ALKPHOS 51 50     Troponin T: No results for input(s): TROPONINI in the last 72 hours. Pro-BNP: No results for input(s): BNP in the last 72 hours. INR: No results for input(s): INR in the last 72 hours.   UA:  Recent Labs     11/22/20  1902   COLORU DARK YELLOW*   PHUR 6.0   WBCUA 3   RBCUA 3   BACTERIA NEGATIVE*   CLARITYU Clear   SPECGRAV 1.031   LEUKOCYTESUR TRACE*   UROBILINOGEN 1.0   BILIRUBINUR for a while but was noted to have decreased heart rate as outpatient.  He was told to stop metoprolol on Tuesday afternoon  However he had taken the morning dose at 5 AM that day.  While at work patient states he felt sudden dizziness so had to rest on the table then soon he stood back up and then had loss of consciousness for few seconds and fell backward on his head.  Imaging reported negative for any fractures or intracranial findings.  Noted to have bradycardia in the 30s with possible Mobitz type II heart block.  Patient admitted for further workup.  Cardiology consulted.      cetirizine, 10 mg, Oral, Daily  empagliflozin, 25 mg, Oral, Daily  heparin (porcine), 5,000 Units, Subcutaneous, Q8H  insulin lispro, 2-7 Units, Subcutaneous, 4x Daily AC & at Bedtime  losartan, 25 mg, Oral, Daily  pantoprazole, 40 mg, Oral, Q AM  rosuvastatin, 10 mg, Oral, Daily  sodium chloride, 10 mL, Intravenous, Q12H             I have utilized all available, immediate resources to obtain, update, or review the patient's current medications including all prescriptions, over-the-counter products, herbals, cannabis/cannabidiol products, and vitamin.mineral/dietary (nutritional) supplements.    Active Hospital Problems:  Active Hospital Problems    Diagnosis     **Symptomatic bradycardia        Assessment/Plan:     Symptomatic bradycardia  Possible high degree AV block  Cardiogenic syncope  -Possibly induced by metoprolol but patient has been on same dose for a while now and started having bradycardia, may have underlying high degree AV block  -Relatively asymptomatic at this time but presented with syncope, continue to monitor on telemetry, transcutaneous pacing on standby  -Continue to hold metoprolol, last dose was the morning of 2/18  -Echo shows preserved EF without any WMA noted  -Cardiology on board, likely plan for pacemaker but stress test planned for today    Hypertension  -Continue home losartan  -Holding metoprolol as  Negative   BLOODU Negative   GLUCOSEU Negative     A1C: No results for input(s): LABA1C in the last 72 hours. ABG:  No results for input(s): PHART, XKK7QMM, PO2ART, DWO7YTM, BEART, HGBAE, B9MKABLA, CARBOXHGBART in the last 72 hours. Imaging:    Xr Chest Portable    Result Date: 11/22/2020  Bilateral pneumonia, suspicious for Covid infection. Signed by Dr Sangeeta Calderón on 11/22/2020 3:13 PM       ASSESSMENT & PLAN:    Principal Problem:    Pneumonia due to COVID-19 virus  Active Problems:    Dyspnea due to COVID-19    Acute respiratory failure with hypoxia (HCC)    Dyslipidemia    GERD (gastroesophageal reflux disease)  Resolved Problems:    * No resolved hospital problems. *        Continue with current medications  Continue with the IV remdesivir day #4 of 5  Continue with IV dexamethasone  Increase Lovenox to full anticoagulation protocol 1 mg/kg twice daily  Albuterol inhaler increased to 2 puffs q. hourly  Symbicort inhaler increased to every 6 hours  Continue with adjuvant medications vitamin C, zinc and melatonin  Vitamin D level discontinued as the vitamin D level is 51  Continue with IV hydration  DC IV fluids  Follow-up with case management for DC planning tomorrow      Repeat labs in a.m. Electrolyte replacement as per protocol. Patient will be monitored very closely on the floor. Further recommendations as per the hospital course. Discharge Plan: DC planning home with home health care after 5 days of IV remdesivir if the patient is clinically and symptomatically improved    Patient  is on DVT prophylaxis  Current medications reviewed  Lab work reviewed  Radiology/Chest x-ray films reviewed  Treatment recommendations from suspecialities reviewed, appreciated and agreed with  Discussed with the nurse and addressed all questions/concerns  Discussed with Patient and/or Family at the bedside in detail . .. they understand and agree with the management plan.       Suleiman Kirk MD  11/25/2020 6:55 above  -Avoid AV eduardo blockers  -Monitor    DM2  -Holding home oral meds  -Continue SSI, Accu-Cheks  -Monitor blood glucose, adjust as needed    Hyperlipidemia  -Continue statin    Chronic environmental allergies/congestion  -Patient chronically on Claritin and Sudafed at home   -okay to continue Claritin for now, hold Sudafed due to above  -As needed saline spray    VTE Prophylaxis:  Pharmacologic VTE prophylaxis orders are present.        CODE STATUS:    Code Status (Patient has no pulse and is not breathing): CPR (Attempt to Resuscitate)  Medical Interventions (Patient has pulse or is breathing): Full Support      Disposition Planning:     Barriers to Discharge: Bradycardia, possible pacemaker  Anticipated Date of Discharge: 2/21  Place of Discharge: Home    Electronically signed by Amanda Mcduffie DO, 02/20/25, 10:13 EST.  [facility] Hospitalist Team      Part of this note may be an electronic transcription/translation of spoken language to printed text using the Dragon Dictation System.      Electronically signed by Amanda Mcduffie DO at 02/20/25 1015       Consult Notes (last 24 hours)  Notes from 02/19/25 1217 through 02/20/25 1217   No notes of this type exist for this encounter.        PM      DISCLAIMER: This note was created with electronic voice recognition which does have occasional errors. If you have any questions regarding the content within the note please do not hesitate to contact me. .. Thanks.

## 2025-02-20 NOTE — PROGRESS NOTES
Hospitalist Service   Daily Progress Note      Patient Name: Ismael Pulido  : 1965  MRN: [id number]  Primary Care Physician:  Sahron Silva MD  Date of admission: 2025      Subjective      Chief Complaint: LOC    Patient seen and examined this morning.  Doing well this morning, starting to feel congested however.  Heart rate remains low intermittently going down to the 40s.  Discussed with cardiology, plan for pacemaker likely tomorrow.  Stress test planned for today.  No other complaints.    Pertinent positives as noted in HPI/subjective.  All other systems were reviewed and are negative.      Objective      Vitals:   Temp:  [97 °F (36.1 °C)-98.4 °F (36.9 °C)] 97.5 °F (36.4 °C)  Heart Rate:  [37-63] 43  Resp:  [14-17] 14  BP: (113-130)/(67-95) 130/95    Physical Exam:    General: Awake, alert, NAD  Eyes: PERRL, EOMI, conjunctivae are clear  Cardiovascular:  bradycardia, regular rhythm, no murmurs  Respiratory: Clear to auscultation bilaterally, no wheezing or rales, unlabored breathing  Abdomen: Soft, nontender, positive bowel sounds, no guarding  Neurologic: A&O, CN grossly intact, moves all extremities spontaneously  Musculoskeletal: Normal range of motion, no other gross deformities  Skin: Warm, dry         Result Review    Result Review:  I have personally reviewed the results from the time of this admission to 2025 10:13 EST and agree with these findings:  [x]  Laboratory  []  Microbiology  []  Radiology  [x]  EKG/Telemetry   [x]  Cardiology/Vascular   []  Pathology  []  Old records  []  Other:          Assessment & Plan      Brief Patient Summary:  Ismael Pulido is a 59 y.o. male with history of hypertension hyperlipidemia, DM2 who presented to the ED with complaint of dizziness and passing out.  Patient has been on metoprolol 100 mg daily for his hypertension for a while but was noted to have decreased heart rate as outpatient.  He was told to stop metoprolol on Tuesday  afternoon  However he had taken the morning dose at 5 AM that day.  While at work patient states he felt sudden dizziness so had to rest on the table then soon he stood back up and then had loss of consciousness for few seconds and fell backward on his head.  Imaging reported negative for any fractures or intracranial findings.  Noted to have bradycardia in the 30s with possible Mobitz type II heart block.  Patient admitted for further workup.  Cardiology consulted.      cetirizine, 10 mg, Oral, Daily  empagliflozin, 25 mg, Oral, Daily  heparin (porcine), 5,000 Units, Subcutaneous, Q8H  insulin lispro, 2-7 Units, Subcutaneous, 4x Daily AC & at Bedtime  losartan, 25 mg, Oral, Daily  pantoprazole, 40 mg, Oral, Q AM  rosuvastatin, 10 mg, Oral, Daily  sodium chloride, 10 mL, Intravenous, Q12H             I have utilized all available, immediate resources to obtain, update, or review the patient's current medications including all prescriptions, over-the-counter products, herbals, cannabis/cannabidiol products, and vitamin.mineral/dietary (nutritional) supplements.    Active Hospital Problems:  Active Hospital Problems    Diagnosis     **Symptomatic bradycardia        Assessment/Plan:     Symptomatic bradycardia  Possible high degree AV block  Cardiogenic syncope  -Possibly induced by metoprolol but patient has been on same dose for a while now and started having bradycardia, may have underlying high degree AV block  -Relatively asymptomatic at this time but presented with syncope, continue to monitor on telemetry, transcutaneous pacing on standby  -Continue to hold metoprolol, last dose was the morning of 2/18  -Echo shows preserved EF without any WMA noted  -Cardiology on board, likely plan for pacemaker but stress test planned for today    Hypertension  -Continue home losartan  -Holding metoprolol as above  -Avoid AV eduardo blockers  -Monitor    DM2  -Holding home oral meds  -Continue SSI, Accu-Cheks  -Monitor blood  glucose, adjust as needed    Hyperlipidemia  -Continue statin    Chronic environmental allergies/congestion  -Patient chronically on Claritin and Sudafed at home   -okay to continue Claritin for now, hold Sudafed due to above  -As needed saline spray    VTE Prophylaxis:  Pharmacologic VTE prophylaxis orders are present.        CODE STATUS:    Code Status (Patient has no pulse and is not breathing): CPR (Attempt to Resuscitate)  Medical Interventions (Patient has pulse or is breathing): Full Support      Disposition Planning:     Barriers to Discharge: Bradycardia, possible pacemaker  Anticipated Date of Discharge: 2/21  Place of Discharge: Home    Electronically signed by Amanda Mcduffie DO, 02/20/25, 10:13 EST.  [facility] Hospitalist Team      Part of this note may be an electronic transcription/translation of spoken language to printed text using the Dragon Dictation System.

## 2025-02-21 ENCOUNTER — APPOINTMENT (OUTPATIENT)
Dept: GENERAL RADIOLOGY | Facility: HOSPITAL | Age: 60
DRG: 244 | End: 2025-02-21
Payer: COMMERCIAL

## 2025-02-21 ENCOUNTER — ANESTHESIA EVENT (OUTPATIENT)
Dept: CARDIOLOGY | Facility: HOSPITAL | Age: 60
End: 2025-02-21
Payer: COMMERCIAL

## 2025-02-21 ENCOUNTER — ANESTHESIA (OUTPATIENT)
Dept: CARDIOLOGY | Facility: HOSPITAL | Age: 60
End: 2025-02-21
Payer: COMMERCIAL

## 2025-02-21 VITALS
OXYGEN SATURATION: 97 % | RESPIRATION RATE: 6 BRPM | DIASTOLIC BLOOD PRESSURE: 78 MMHG | SYSTOLIC BLOOD PRESSURE: 126 MMHG | HEART RATE: 119 BPM

## 2025-02-21 PROBLEM — I44.2 INTERMITTENT COMPLETE HEART BLOCK: Status: ACTIVE | Noted: 2025-02-21

## 2025-02-21 LAB
GLUCOSE BLDC GLUCOMTR-MCNC: 105 MG/DL (ref 70–105)
GLUCOSE BLDC GLUCOMTR-MCNC: 109 MG/DL (ref 70–105)
GLUCOSE BLDC GLUCOMTR-MCNC: 147 MG/DL (ref 70–105)
GLUCOSE BLDC GLUCOMTR-MCNC: 98 MG/DL (ref 70–105)
GLUCOSE BLDC GLUCOMTR-MCNC: 99 MG/DL (ref 70–105)

## 2025-02-21 PROCEDURE — 25810000003 SODIUM CHLORIDE 0.9 % SOLUTION: Performed by: INTERNAL MEDICINE

## 2025-02-21 PROCEDURE — C1894 INTRO/SHEATH, NON-LASER: HCPCS | Performed by: INTERNAL MEDICINE

## 2025-02-21 PROCEDURE — 02H43JZ INSERTION OF PACEMAKER LEAD INTO CORONARY VEIN, PERCUTANEOUS APPROACH: ICD-10-PCS | Performed by: INTERNAL MEDICINE

## 2025-02-21 PROCEDURE — 25810000003 SODIUM CHLORIDE 0.9 % SOLUTION: Performed by: NURSE ANESTHETIST, CERTIFIED REGISTERED

## 2025-02-21 PROCEDURE — C1898 LEAD, PMKR, OTHER THAN TRANS: HCPCS | Performed by: INTERNAL MEDICINE

## 2025-02-21 PROCEDURE — 25010000002 LIDOCAINE PF 2% 2 % SOLUTION: Performed by: NURSE ANESTHETIST, CERTIFIED REGISTERED

## 2025-02-21 PROCEDURE — C1900 LEAD, CORONARY VENOUS: HCPCS | Performed by: INTERNAL MEDICINE

## 2025-02-21 PROCEDURE — 02H63JZ INSERTION OF PACEMAKER LEAD INTO RIGHT ATRIUM, PERCUTANEOUS APPROACH: ICD-10-PCS | Performed by: INTERNAL MEDICINE

## 2025-02-21 PROCEDURE — 02HK3JZ INSERTION OF PACEMAKER LEAD INTO RIGHT VENTRICLE, PERCUTANEOUS APPROACH: ICD-10-PCS | Performed by: INTERNAL MEDICINE

## 2025-02-21 PROCEDURE — 33208 INSRT HEART PM ATRIAL & VENT: CPT | Performed by: INTERNAL MEDICINE

## 2025-02-21 PROCEDURE — 25010000002 PROPOFOL 200 MG/20ML EMULSION: Performed by: NURSE ANESTHETIST, CERTIFIED REGISTERED

## 2025-02-21 PROCEDURE — 25010000002 CEFAZOLIN PER 500 MG: Performed by: NURSE ANESTHETIST, CERTIFIED REGISTERED

## 2025-02-21 PROCEDURE — C1893 INTRO/SHEATH, FIXED,NON-PEEL: HCPCS | Performed by: INTERNAL MEDICINE

## 2025-02-21 PROCEDURE — 25010000002 CEFAZOLIN PER 500 MG: Performed by: INTERNAL MEDICINE

## 2025-02-21 PROCEDURE — 82948 REAGENT STRIP/BLOOD GLUCOSE: CPT

## 2025-02-21 PROCEDURE — 25010000002 PROPOFOL 1000 MG/100ML EMULSION: Performed by: NURSE ANESTHETIST, CERTIFIED REGISTERED

## 2025-02-21 PROCEDURE — 33225 L VENTRIC PACING LEAD ADD-ON: CPT | Performed by: INTERNAL MEDICINE

## 2025-02-21 PROCEDURE — 25010000002 LIDOCAINE-EPINEPHRINE 2 %-1:100000 SOLUTION: Performed by: INTERNAL MEDICINE

## 2025-02-21 PROCEDURE — C2621 PMKR, OTHER THAN SING/DUAL: HCPCS | Performed by: INTERNAL MEDICINE

## 2025-02-21 PROCEDURE — C1769 GUIDE WIRE: HCPCS | Performed by: INTERNAL MEDICINE

## 2025-02-21 PROCEDURE — C1732 CATH, EP, DIAG/ABL, 3D/VECT: HCPCS | Performed by: INTERNAL MEDICINE

## 2025-02-21 PROCEDURE — 0JH607Z INSERTION OF CARDIAC RESYNCHRONIZATION PACEMAKER PULSE GENERATOR INTO CHEST SUBCUTANEOUS TISSUE AND FASCIA, OPEN APPROACH: ICD-10-PCS | Performed by: INTERNAL MEDICINE

## 2025-02-21 PROCEDURE — 82948 REAGENT STRIP/BLOOD GLUCOSE: CPT | Performed by: STUDENT IN AN ORGANIZED HEALTH CARE EDUCATION/TRAINING PROGRAM

## 2025-02-21 PROCEDURE — 71045 X-RAY EXAM CHEST 1 VIEW: CPT

## 2025-02-21 PROCEDURE — 99232 SBSQ HOSP IP/OBS MODERATE 35: CPT | Performed by: INTERNAL MEDICINE

## 2025-02-21 PROCEDURE — C1892 INTRO/SHEATH,FIXED,PEEL-AWAY: HCPCS | Performed by: INTERNAL MEDICINE

## 2025-02-21 PROCEDURE — 25510000001 IOPAMIDOL PER 1 ML: Performed by: INTERNAL MEDICINE

## 2025-02-21 DEVICE — PACE/SENSE LEAD
Type: IMPLANTABLE DEVICE | Site: HEART | Status: FUNCTIONAL
Brand: INGEVITY™+

## 2025-02-21 DEVICE — CARDIAC RESYNCHRONIZATION THERAPY PACEMAKER
Type: IMPLANTABLE DEVICE | Site: CHEST WALL | Status: FUNCTIONAL
Brand: VISIONIST™ X4 CRT-P

## 2025-02-21 DEVICE — PACE/SENSE LEAD
Type: IMPLANTABLE DEVICE | Site: HEART | Status: FUNCTIONAL
Brand: ACUITY™ X4 STRAIGHT

## 2025-02-21 RX ORDER — ACETAMINOPHEN 650 MG/1
650 SUPPOSITORY RECTAL EVERY 8 HOURS
Status: DISCONTINUED | OUTPATIENT
Start: 2025-02-21 | End: 2025-02-22 | Stop reason: HOSPADM

## 2025-02-21 RX ORDER — CEFAZOLIN SODIUM 1 G/3ML
INJECTION, POWDER, FOR SOLUTION INTRAMUSCULAR; INTRAVENOUS AS NEEDED
Status: DISCONTINUED | OUTPATIENT
Start: 2025-02-21 | End: 2025-02-21 | Stop reason: SURG

## 2025-02-21 RX ORDER — FENTANYL CITRATE 50 UG/ML
50 INJECTION, SOLUTION INTRAMUSCULAR; INTRAVENOUS
Status: DISCONTINUED | OUTPATIENT
Start: 2025-02-21 | End: 2025-02-21

## 2025-02-21 RX ORDER — ACETAMINOPHEN 160 MG/5ML
650 SOLUTION ORAL EVERY 8 HOURS
Status: DISCONTINUED | OUTPATIENT
Start: 2025-02-21 | End: 2025-02-22 | Stop reason: HOSPADM

## 2025-02-21 RX ORDER — SODIUM CHLORIDE 9 MG/ML
INJECTION, SOLUTION INTRAVENOUS CONTINUOUS PRN
Status: DISCONTINUED | OUTPATIENT
Start: 2025-02-21 | End: 2025-02-21 | Stop reason: SURG

## 2025-02-21 RX ORDER — HYDROMORPHONE HYDROCHLORIDE 1 MG/ML
0.5 INJECTION, SOLUTION INTRAMUSCULAR; INTRAVENOUS; SUBCUTANEOUS
Status: DISCONTINUED | OUTPATIENT
Start: 2025-02-21 | End: 2025-02-21

## 2025-02-21 RX ORDER — SODIUM CHLORIDE 9 MG/ML
100 INJECTION, SOLUTION INTRAVENOUS CONTINUOUS
Status: DISCONTINUED | OUTPATIENT
Start: 2025-02-21 | End: 2025-02-21

## 2025-02-21 RX ORDER — ONDANSETRON 2 MG/ML
4 INJECTION INTRAMUSCULAR; INTRAVENOUS EVERY 6 HOURS PRN
Status: DISCONTINUED | OUTPATIENT
Start: 2025-02-21 | End: 2025-02-22 | Stop reason: HOSPADM

## 2025-02-21 RX ORDER — HYDROCODONE BITARTRATE AND ACETAMINOPHEN 5; 325 MG/1; MG/1
1 TABLET ORAL EVERY 6 HOURS PRN
Status: DISCONTINUED | OUTPATIENT
Start: 2025-02-21 | End: 2025-02-22 | Stop reason: HOSPADM

## 2025-02-21 RX ORDER — ONDANSETRON 2 MG/ML
4 INJECTION INTRAMUSCULAR; INTRAVENOUS ONCE AS NEEDED
Status: DISCONTINUED | OUTPATIENT
Start: 2025-02-21 | End: 2025-02-21

## 2025-02-21 RX ORDER — LIDOCAINE HYDROCHLORIDE 20 MG/ML
INJECTION, SOLUTION EPIDURAL; INFILTRATION; INTRACAUDAL; PERINEURAL AS NEEDED
Status: DISCONTINUED | OUTPATIENT
Start: 2025-02-21 | End: 2025-02-21 | Stop reason: SURG

## 2025-02-21 RX ORDER — LIDOCAINE HYDROCHLORIDE AND EPINEPHRINE BITARTRATE 20; .01 MG/ML; MG/ML
INJECTION, SOLUTION SUBCUTANEOUS
Status: DISCONTINUED | OUTPATIENT
Start: 2025-02-21 | End: 2025-02-21 | Stop reason: HOSPADM

## 2025-02-21 RX ORDER — HYDRALAZINE HYDROCHLORIDE 20 MG/ML
5 INJECTION INTRAMUSCULAR; INTRAVENOUS
Status: DISCONTINUED | OUTPATIENT
Start: 2025-02-21 | End: 2025-02-21

## 2025-02-21 RX ORDER — NALOXONE HCL 0.4 MG/ML
0.4 VIAL (ML) INJECTION
Status: DISCONTINUED | OUTPATIENT
Start: 2025-02-21 | End: 2025-02-22 | Stop reason: HOSPADM

## 2025-02-21 RX ORDER — IOPAMIDOL 755 MG/ML
INJECTION, SOLUTION INTRAVASCULAR
Status: DISCONTINUED | OUTPATIENT
Start: 2025-02-21 | End: 2025-02-21 | Stop reason: HOSPADM

## 2025-02-21 RX ORDER — PROPOFOL 10 MG/ML
INJECTION, EMULSION INTRAVENOUS CONTINUOUS PRN
Status: DISCONTINUED | OUTPATIENT
Start: 2025-02-21 | End: 2025-02-21 | Stop reason: SURG

## 2025-02-21 RX ORDER — EPHEDRINE SULFATE 5 MG/ML
5 INJECTION INTRAVENOUS ONCE AS NEEDED
Status: DISCONTINUED | OUTPATIENT
Start: 2025-02-21 | End: 2025-02-21

## 2025-02-21 RX ORDER — MORPHINE SULFATE 2 MG/ML
1 INJECTION, SOLUTION INTRAMUSCULAR; INTRAVENOUS EVERY 4 HOURS PRN
Status: DISCONTINUED | OUTPATIENT
Start: 2025-02-21 | End: 2025-02-22 | Stop reason: HOSPADM

## 2025-02-21 RX ORDER — PROPOFOL 10 MG/ML
INJECTION, EMULSION INTRAVENOUS AS NEEDED
Status: DISCONTINUED | OUTPATIENT
Start: 2025-02-21 | End: 2025-02-21 | Stop reason: SURG

## 2025-02-21 RX ORDER — ACETAMINOPHEN 325 MG/1
650 TABLET ORAL EVERY 8 HOURS
Status: DISCONTINUED | OUTPATIENT
Start: 2025-02-21 | End: 2025-02-22 | Stop reason: HOSPADM

## 2025-02-21 RX ADMIN — SODIUM CHLORIDE 100 ML/HR: 9 INJECTION, SOLUTION INTRAVENOUS at 12:47

## 2025-02-21 RX ADMIN — CEFAZOLIN 2 G: 1 INJECTION, POWDER, FOR SOLUTION INTRAMUSCULAR; INTRAVENOUS at 14:45

## 2025-02-21 RX ADMIN — LOSARTAN POTASSIUM 25 MG: 25 TABLET, FILM COATED ORAL at 08:45

## 2025-02-21 RX ADMIN — PROPOFOL 125 MCG/KG/MIN: 10 INJECTION, EMULSION INTRAVENOUS at 14:37

## 2025-02-21 RX ADMIN — LIDOCAINE HYDROCHLORIDE 60 MG: 20 INJECTION, SOLUTION EPIDURAL; INFILTRATION; INTRACAUDAL; PERINEURAL at 14:36

## 2025-02-21 RX ADMIN — Medication 1 SPRAY: at 12:43

## 2025-02-21 RX ADMIN — CETIRIZINE HYDROCHLORIDE 10 MG: 10 TABLET, FILM COATED ORAL at 08:45

## 2025-02-21 RX ADMIN — ROSUVASTATIN CALCIUM 10 MG: 10 TABLET, FILM COATED ORAL at 08:45

## 2025-02-21 RX ADMIN — ACETAMINOPHEN 650 MG: 325 TABLET, FILM COATED ORAL at 19:17

## 2025-02-21 RX ADMIN — CEFAZOLIN 2000 MG: 2 INJECTION, POWDER, FOR SOLUTION INTRAMUSCULAR; INTRAVENOUS at 22:55

## 2025-02-21 RX ADMIN — Medication 10 ML: at 08:46

## 2025-02-21 RX ADMIN — PROPOFOL INJECTABLE EMULSION 50 MG: 10 INJECTION, EMULSION INTRAVENOUS at 14:36

## 2025-02-21 RX ADMIN — SODIUM CHLORIDE: 9 INJECTION, SOLUTION INTRAVENOUS at 14:07

## 2025-02-21 RX ADMIN — EMPAGLIFLOZIN 25 MG: 25 TABLET, FILM COATED ORAL at 08:45

## 2025-02-21 RX ADMIN — Medication 10 ML: at 22:54

## 2025-02-21 RX ADMIN — Medication 1 SPRAY: at 20:37

## 2025-02-21 RX ADMIN — Medication 10 ML: at 20:36

## 2025-02-21 NOTE — PLAN OF CARE
Goal Outcome Evaluation:  Plan of Care Reviewed With: patient, spouse        Progress: no change  Outcome Evaluation: HR 40s overnight. Pacemaker placement scheduled for today. Continue to monitor

## 2025-02-21 NOTE — ANESTHESIA POSTPROCEDURE EVALUATION
Patient: Ismael Pulido    Procedure Summary       Date: 02/21/25 Room / Location: [place] CATH LAB 3 / [facility] CATH INVASIVE LOCATION    Anesthesia Start: 1430 Anesthesia Stop: 1655    Procedure: Pacemaker DC [place] aware Diagnosis:       Mobitz type 2 second degree atrioventricular block      Symptomatic bradycardia      Syncope and collapse    Providers: Dez Loco MD Provider: Fernando Grande MD    Anesthesia Type: MAC, general ASA Status: 3            Anesthesia Type: MAC, general    Vitals  Vitals Value Taken Time   /90 02/21/25 1731   Temp     Pulse 83 02/21/25 1732   Resp 6 02/21/25 1652   SpO2 95 % 02/21/25 1732   Vitals shown include unfiled device data.        Post Anesthesia Care and Evaluation    Patient location during evaluation: PACU  Patient participation: complete - patient participated  Level of consciousness: awake  Pain scale: See nurse's notes for pain score.  Pain management: adequate    Airway patency: patent  Anesthetic complications: No anesthetic complications  PONV Status: none  Cardiovascular status: acceptable  Respiratory status: acceptable and spontaneous ventilation  Hydration status: acceptable    Comments: Patient seen and examined postoperatively; vital signs stable; SpO2 greater than or equal to 90%; cardiopulmonary status stable; nausea/vomiting adequately controlled; pain adequately controlled; no apparent anesthesia complications; patient discharged from anesthesia care when discharge criteria were met

## 2025-02-21 NOTE — PAYOR COMM NOTE
"This is a clinical update for Ismael Pulido   Reference/Auth # [id number]     EXTENDED AUTHORIZATION PENDING:     Please call or fax determination to contact below.   Thank you.    Paradise Ritter, RN, BSN  Utilization Review Nurse  [facility]  Direct & confidential phone # [phone]  Fax # [phone]      Ismael Pulido (59 y.o. Male)       Date of Birth   1965    Social Security Number       Address   [address] [place] [address]    Home Phone   [phone]    MRN   [id number]       Rastafari   Hinduism    Marital Status                               Admission Date   2/18/25    Admission Type   Emergency    Admitting Provider   Llanes Alvarez, Carlos, MD    Attending Provider   Amanda Mcduffie DO    Department, Room/Bed   [facility] CATH LAB, Pool/Cath       Discharge Date       Discharge Disposition       Discharge Destination                                 Attending Provider: Amanda Mcduffie DO    Allergies: Lisinopril    Isolation: None   Infection: None   Code Status: CPR    Ht: 172.7 cm (68\")   Wt: 103 kg (226 lb 3.1 oz)    Admission Cmt: None   Principal Problem: Symptomatic bradycardia [R00.1]                   Active Insurance as of 2/18/2025       Primary Coverage       Payor Plan Insurance Group Employer/Plan Group    [employer] COMMERCIAL AET[employer] [id number]       Payor Plan Address Payor Plan Phone Number Payor Plan Fax Number Effective Dates    [address] [phone]  3/25/2021 - None Entered    [facility] [id number]         Subscriber Name Subscriber Birth Date Member ID       ISMAEL PULIDO 1965 [id number]                     Emergency Contacts        (Rel.) Home Phone Work Phone Mobile Phone    CLAIRE PULIDO (Spouse) [phone] -- [phone]              Vital Signs (last day)       Date/Time Temp Temp src Pulse Resp BP Patient Position SpO2    02/21/25 1112 97.5 (36.4) Oral 66 11 " 125/87 Lying 96    02/21/25 0754 97.7 (36.5) Oral 67 18 137/94 Lying 94    02/21/25 0436 97.2 (36.2) Oral 72 24 136/86 Lying 94    02/21/25 0053 97.5 (36.4) Oral 69 19 127/75 Lying 96    02/20/25 2100 98.7 (37.1) Axillary 41 18 147/86 Lying 94    02/20/25 1640 97.8 (36.6) Oral 49 18 140/84 Lying 97    02/20/25 1305 98.1 (36.7) Oral 44 16 140/78 Lying 94    02/20/25 0844 97.5 (36.4) Oral -- -- -- -- --    02/20/25 0817 -- -- 43 14 130/95 Lying 97    02/20/25 0450 98.4 (36.9) Axillary 42 15 128/94 Lying 95    02/20/25 0125 97 (36.1) Axillary 63 15 115/78 Lying 95          Current Facility-Administered Medications   Medication Dose Route Frequency Provider Last Rate Last Admin    [Transfer Hold] acetaminophen (TYLENOL) tablet 650 mg  650 mg Oral Q4H PRN Froy, Thelmaram, DO        Or    [Transfer Hold] acetaminophen (TYLENOL) 160 MG/5ML oral solution 650 mg  650 mg Oral Q4H PRN Thelma Mcduffieram, DO        Or    [Transfer Hold] acetaminophen (TYLENOL) suppository 650 mg  650 mg Rectal Q4H PRN hTelma Mcduffieram, DO        [Transfer Hold] sennosides-docusate (PERICOLACE) 8.6-50 MG per tablet 2 tablet  2 tablet Oral BID PRN Thelma Mcduffieram, DO        And    [Transfer Hold] polyethylene glycol (MIRALAX) packet 17 g  17 g Oral Daily PRN Thelma Mcduffieram, DO        And    [Transfer Hold] bisacodyl (DULCOLAX) EC tablet 5 mg  5 mg Oral Daily PRN Amanda Mcduffie, DO        And    [Transfer Hold] bisacodyl (DULCOLAX) suppository 10 mg  10 mg Rectal Daily PRN Thelma Mcduffieram, DO        [Transfer Hold] Calcium Replacement - Follow Nurse / BPA Driven Protocol   Not Applicable PRN Froy, Thelmaram, DO        [Transfer Hold] cetirizine (zyrTEC) tablet 10 mg  10 mg Oral Daily Amanda Mcduffie DO   10 mg at 02/21/25 0845    [Transfer Hold] dextrose (D50W) (25 g/50 mL) IV injection 25 g  25 g Intravenous Q15 Min PRN Llanes Alvarez, Carlos, MD        [Transfer Hold] dextrose (GLUTOSE) oral gel 15 g  15 g Oral Q15 Min PRN Llanes Alvarez, Carlos, MD         [Transfer Hold] empagliflozin (JARDIANCE) tablet 25 mg  25 mg Oral Daily Amanda Mcduffie DO   25 mg at 02/21/25 0845    [Transfer Hold] glucagon (GLUCAGEN) injection 1 mg  1 mg Intramuscular Q15 Min PRN Llanes Alvarez, Carlos, MD        [Transfer Hold] heparin (porcine) 5000 UNIT/ML injection 5,000 Units  5,000 Units Subcutaneous Q8H Amanda Mcduffie DO   5,000 Units at 02/20/25 2106    [Transfer Hold] insulin lispro (HUMALOG/ADMELOG) injection 2-7 Units  2-7 Units Subcutaneous 4x Daily AC & at Bedtime Llanes Alvarez, Carlos, MD   2 Units at 02/19/25 1244    losartan (COZAAR) tablet 25 mg  25 mg Oral Daily Amanda Mcduffie DO   25 mg at 02/21/25 0845    [Transfer Hold] Magnesium Standard Dose Replacement - Follow Nurse / BPA Driven Protocol   Not Applicable PRN Amanda Mcduffie DO        [Transfer Hold] pantoprazole (PROTONIX) EC tablet 40 mg  40 mg Oral Q AM Amanda Mcduffie DO   40 mg at 02/20/25 0614    [Transfer Hold] Phosphorus Replacement - Follow Nurse / BPA Driven Protocol   Not Applicable PRN Amanda Mcduffie DO        Potassium Replacement - Follow Nurse / BPA Driven Protocol   Not Applicable PRN Amanda Mcduffie DO        [Transfer Hold] rosuvastatin (CRESTOR) tablet 10 mg  10 mg Oral Daily Amanda Mcduffie DO   10 mg at 02/21/25 0845    [Transfer Hold] sodium chloride 0.9 % flush 10 mL  10 mL Intravenous PRN Amanda Mcduffie DO        [Transfer Hold] sodium chloride 0.9 % flush 10 mL  10 mL Intravenous Q12H Amanda Mcduffie DO   10 mL at 02/21/25 0846    [Transfer Hold] sodium chloride 0.9 % flush 10 mL  10 mL Intravenous PRN Amanda Mcduffie DO        [Transfer Hold] sodium chloride 0.9 % infusion 40 mL  40 mL Intravenous PRN Amanda Mcduffie DO        sodium chloride 0.9 % infusion  100 mL/hr Intravenous Continuous Dez Loco  mL/hr at 02/21/25 1247 100 mL/hr at 02/21/25 1247    [Transfer Hold] sodium chloride nasal spray 1 spray  1 spray Each Nare PRN Amanda Mcduffie,    1 spray at 02/21/25  1243     Facility-Administered Medications Ordered in Other Encounters   Medication Dose Route Frequency Provider Last Rate Last Admin    sodium chloride 0.9 % infusion   Intravenous Continuous PRN Konstantin Herman CRNA   New Bag at 25 1407     Lab Results (last 24 hours)       Procedure Component Value Units Date/Time    POC Glucose 4x Daily Before Meals & at Bedtime [[id number]]  (Normal) Collected: 25 1108    Specimen: Blood Updated: 25 1110     Glucose 105 mg/dL      Comment: Serial Number: [id number]:  [id number]       POC Glucose 4x Daily Before Meals & at Bedtime [[id number]]  (Abnormal) Collected: 25 0753    Specimen: Blood Updated: 25 0754     Glucose 109 mg/dL      Comment: Serial Number: [id number]:  [id number]       POC Glucose Once [[id number]]  (Abnormal) Collected: 25    Specimen: Blood Updated: 25     Glucose 165 mg/dL      Comment: Serial Number: [id number]:  [id number]       POC Glucose Once [[id number]]  (Abnormal) Collected: 25 1541    Specimen: Blood Updated: 25 1543     Glucose 127 mg/dL      Comment: Serial Number: [id number]:  [id number]             Imaging Results (Last 24 Hours)       ** No results found for the last 24 hours. **          Operative/Procedure Notes (last 24 hours)  Notes from 25 1442 through 25 1442   No notes of this type exist for this encounter.          Physician Progress Notes (last 24 hours)        Amanda Mcduffie DO at 25 1006          Hospitalist Service   Daily Progress Note      Patient Name: Ismael Pulido  : 1965  MRN: [id number]  Primary Care Physician:  Sharon Silva MD  Date of admission: 2025      Subjective      Chief Complaint: LOC    Patient seen and examined this morning.  Doing well, denies any new complaints.  Wants to have Sudafed restarted as soon as possible for his nasal congestion.  Going for pacemaker placement later  today.  Discussed with patient and family at bedside.    Pertinent positives as noted in HPI/subjective.  All other systems were reviewed and are negative.      Objective      Vitals:   Temp:  [97.2 °F (36.2 °C)-98.7 °F (37.1 °C)] 97.7 °F (36.5 °C)  Heart Rate:  [41-72] 67  Resp:  [16-24] 18  BP: (127-147)/(75-94) 137/94    Physical Exam:    General: Awake, alert, NAD  Eyes: PERRL, EOMI, conjunctivae are clear  Cardiovascular:  bradycardia, regular rhythm, no murmurs  Respiratory: Clear to auscultation bilaterally, no wheezing or rales, unlabored breathing  Abdomen: Soft, nontender, positive bowel sounds, no guarding  Neurologic: A&O, CN grossly intact, moves all extremities spontaneously  Musculoskeletal: Normal range of motion, no other gross deformities  Skin: Warm, dry         Result Review    Result Review:  I have personally reviewed the results from the time of this admission to 2/21/2025 10:06 EST and agree with these findings:  [x]  Laboratory  []  Microbiology  []  Radiology  [x]  EKG/Telemetry   [x]  Cardiology/Vascular   []  Pathology  []  Old records  []  Other:          Assessment & Plan      Brief Patient Summary:  Ismael Pulido is a 59 y.o. male with history of hypertension hyperlipidemia, DM2 who presented to the ED with complaint of dizziness and passing out.  Patient has been on metoprolol 100 mg daily for his hypertension for a while but was noted to have decreased heart rate as outpatient.  He was told to stop metoprolol on Tuesday afternoon  However he had taken the morning dose at 5 AM that day.  While at work patient states he felt sudden dizziness so had to rest on the table then soon he stood back up and then had loss of consciousness for few seconds and fell backward on his head.  Imaging reported negative for any fractures or intracranial findings.  Noted to have bradycardia in the 30s with possible Mobitz type II heart block.  Patient admitted for further workup.  Cardiology  consulted.      cetirizine, 10 mg, Oral, Daily  empagliflozin, 25 mg, Oral, Daily  heparin (porcine), 5,000 Units, Subcutaneous, Q8H  insulin lispro, 2-7 Units, Subcutaneous, 4x Daily AC & at Bedtime  losartan, 25 mg, Oral, Daily  pantoprazole, 40 mg, Oral, Q AM  rosuvastatin, 10 mg, Oral, Daily  sodium chloride, 10 mL, Intravenous, Q12H             I have utilized all available, immediate resources to obtain, update, or review the patient's current medications including all prescriptions, over-the-counter products, herbals, cannabis/cannabidiol products, and vitamin.mineral/dietary (nutritional) supplements.    Active Hospital Problems:  Active Hospital Problems    Diagnosis     **Symptomatic bradycardia     Mobitz type 2 second degree atrioventricular block     Syncope and collapse        Assessment/Plan:     Symptomatic bradycardia  Possible high degree AV block  Cardiogenic syncope  -Possibly induced by metoprolol but patient has been on same dose for a while now and started having bradycardia, may have underlying high degree AV block  -Relatively asymptomatic at this time but presented with syncope, continue to monitor on telemetry, transcutaneous pacing on standby  -Continue to hold metoprolol, last dose was the morning of 2/18  -Echo shows preserved EF without any WMA noted  -Cardiology on board, stress test negative, plan for pacemaker placement today    Hypertension  -Continue home losartan  -Holding metoprolol as above  -Avoid AV eduardo blockers  -Monitor    DM2  -Holding home oral meds  -Continue SSI, Accu-Cheks  -Monitor blood glucose, adjust as needed    Hyperlipidemia  -Continue statin    Chronic environmental allergies/congestion  -Patient chronically on Claritin and Sudafed at home   -okay to continue Claritin for now, hold Sudafed due to above  -As needed saline spray    VTE Prophylaxis:  Pharmacologic VTE prophylaxis orders are present.        CODE STATUS:    Code Status (Patient has no pulse and is  not breathing): CPR (Attempt to Resuscitate)  Medical Interventions (Patient has pulse or is breathing): Full Support      Disposition Planning:     Barriers to Discharge: Bradycardia, pacemaker  Anticipated Date of Discharge: 2/22  Place of Discharge: Home    Electronically signed by Amanda Mcduffie DO, 02/21/25, 10:06 [facility].  [facility]      Part of this note may be an electronic transcription/translation of spoken language to printed text using the Dragon Dictation System.      Electronically signed by Amanda Mcduffie DO at 02/21/25 1007       Consult Notes (last 24 hours)  Notes from 02/20/25 1443 through 02/21/25 1443   No notes of this type exist for this encounter.

## 2025-02-21 NOTE — PROGRESS NOTES
CARDIOLOGY PROGRESS NOTE:    Ismael Pulido  59 y.o.  male  1965  [id number]      Referring Provider: Hospitalist    Reason for follow-up: Syncope and bradycardia     Patient Care Team:  Sharon Silva MD as PCP - General (Internal Medicine & Pediatrics)    Subjective .  Patient doing well without any symptoms but still his heart rate drops when he stands up    Objective lying in bed comfortably     Review of Systems   Constitutional: Negative for malaise/fatigue.   Cardiovascular:  Negative for chest pain, dyspnea on exertion, leg swelling and palpitations.   Respiratory:  Negative for cough and shortness of breath.    Gastrointestinal:  Negative for abdominal pain, nausea and vomiting.   Neurological:  Negative for dizziness, focal weakness, headaches, light-headedness and numbness.   All other systems reviewed and are negative.      Allergies: Lisinopril    Scheduled Meds:[Transfer Hold] cetirizine, 10 mg, Oral, Daily  [Transfer Hold] empagliflozin, 25 mg, Oral, Daily  [Transfer Hold] heparin (porcine), 5,000 Units, Subcutaneous, Q8H  [Transfer Hold] insulin lispro, 2-7 Units, Subcutaneous, 4x Daily AC & at Bedtime  losartan, 25 mg, Oral, Daily  [Transfer Hold] pantoprazole, 40 mg, Oral, Q AM  [Transfer Hold] rosuvastatin, 10 mg, Oral, Daily  [Transfer Hold] sodium chloride, 10 mL, Intravenous, Q12H      Continuous Infusions:sodium chloride, 100 mL/hr, Last Rate: 100 mL/hr (02/21/25 1247)      PRN Meds:.  [Transfer Hold] acetaminophen **OR** [Transfer Hold] acetaminophen **OR** [Transfer Hold] acetaminophen    [Transfer Hold] senna-docusate sodium **AND** [Transfer Hold] polyethylene glycol **AND** [Transfer Hold] bisacodyl **AND** [Transfer Hold] bisacodyl    [Transfer Hold] Calcium Replacement - Follow Nurse / BPA Driven Protocol    [Transfer Hold] dextrose    [Transfer Hold] dextrose    [Transfer Hold] glucagon (human recombinant)    lidocaine-EPINEPHrine    [Transfer Hold] Magnesium  "Standard Dose Replacement - Follow Nurse / BPA Driven Protocol    [Transfer Hold] Phosphorus Replacement - Follow Nurse / BPA Driven Protocol    Potassium Replacement - Follow Nurse / BPA Driven Protocol    [COMPLETED] Insert Peripheral IV **AND** [Transfer Hold] sodium chloride    [Transfer Hold] sodium chloride    [Transfer Hold] sodium chloride    [Transfer Hold] sodium chloride        VITAL SIGNS  Vitals:    02/21/25 0436 02/21/25 0500 02/21/25 0754 02/21/25 1112   BP: 136/86  137/94 125/87   BP Location: Right arm  Right arm Right arm   Patient Position: Lying  Lying Lying   Pulse: 72  67 66   Resp: 24 18 11   Temp: 97.2 °F (36.2 °C)  97.7 °F (36.5 °C) 97.5 °F (36.4 °C)   TempSrc: Oral  Oral Oral   SpO2: 94%  94% 96%   Weight:  103 kg (226 lb 3.1 oz)     Height:           Flowsheet Rows      Flowsheet Row First Filed Value   Admission Height 172.7 cm (68\") Documented at 02/18/2025 2036   Admission Weight 108 kg (238 lb 15.7 oz) Documented at 02/18/2025 2036             TELEMETRY: Sinus bradycardia    Physical Exam:  Constitutional:       Appearance: Well-developed.   Eyes:      General: No scleral icterus.     Conjunctiva/sclera: Conjunctivae normal.   HENT:      Head: Normocephalic and atraumatic.   Neck:      Vascular: No carotid bruit or JVD.   Pulmonary:      Effort: Pulmonary effort is normal.      Breath sounds: Normal breath sounds. No wheezing. No rales.   Cardiovascular:      Normal rate. Regular rhythm.   Pulses:     Intact distal pulses.   Abdominal:      General: Bowel sounds are normal.      Palpations: Abdomen is soft.   Musculoskeletal:      Cervical back: Normal range of motion and neck supple. Skin:     General: Skin is warm and dry.      Findings: No rash.   Neurological:      Mental Status: Alert.          Results Review:   I reviewed the patient's new clinical results.  Lab Results (last 24 hours)       Procedure Component Value Units Date/Time    POC Glucose 4x Daily Before Meals & at " Bedtime [[id number]]  (Normal) Collected: 02/21/25 1108    Specimen: Blood Updated: 02/21/25 1110     Glucose 105 mg/dL      Comment: Serial Number: [id number]:  [id number]       POC Glucose 4x Daily Before Meals & at Bedtime [[id number]]  (Abnormal) Collected: 02/21/25 0753    Specimen: Blood Updated: 02/21/25 0754     Glucose 109 mg/dL      Comment: Serial Number: [id number]:  [id number]       POC Glucose Once [[id number]]  (Abnormal) Collected: 02/20/25 2023    Specimen: Blood Updated: 02/20/25 2025     Glucose 165 mg/dL      Comment: Serial Number: [id number]:  [id number]       POC Glucose Once [[id number]]  (Abnormal) Collected: 02/20/25 1541    Specimen: Blood Updated: 02/20/25 1543     Glucose 127 mg/dL      Comment: Serial Number: [id number]:  [id number]               Imaging Results (Last 24 Hours)       ** No results found for the last 24 hours. **            EKG      I personally viewed and interpreted the patient's EKG/Telemetry data:    ECHOCARDIOGRAM:  Results for orders placed during the hospital encounter of 02/18/25    Adult Transthoracic Echo Complete w/ Color, Spectral and Contrast if Necessary Per Protocol    Interpretation Summary    Left ventricular ejection fraction appears to be 56 - 60%.    The right ventricular cavity is moderately dilated.    The left atrial cavity is mildly dilated.    Moderate tricuspid valve regurgitation is present.    Estimated right ventricular systolic pressure from tricuspid regurgitation is moderately elevated (45-55 mmHg).       STRESS MYOVIEW:  Results for orders placed during the hospital encounter of 02/18/25    Stress Test With Myocardial Perfusion One Day    Interpretation Summary    Myocardial perfusion imaging indicates a normal myocardial perfusion study with no evidence of ischemia. Impressions are consistent with a low risk study.    Left ventricular ejection fraction is normal (Calculated EF = 64%).       CARDIAC  CATHETERIZATION:  No results found for this or any previous visit.       OTHER:         Assessment & Plan     Syncope and symptomatic bradycardia  Patient presented with syncope and had symptomatic bradycardia with heart rates dropping into the 30s especially when he stands up  Patient also has first-degree block and left bundle branch block  Patient had an echocardiogram which showed normal V function  Patient had a stress Myoview study which did not show any ischemia  Patient is seen by electrophysiologist and is having a permanent pacemaker placement done.  Discussed with patient and family about procedure risks and benefits.    Hypertension  Patient blood pressure currently stable on losartan.    Diabetes  Patient has sliding scale insulin and will be started on oral medicines later.    Hyperlipidemia  Patient on statins and the lipid levels are well within normal limits    I discussed the patients findings and my recommendations with patient and nurse    Win Reaves MD  02/21/25  15:18 EST

## 2025-02-21 NOTE — CASE MANAGEMENT/SOCIAL WORK
Continued Stay Note  [facility]     Patient Name: Ismael Pulido  MRN: [id number]  Today's Date: 2/21/2025    Admit Date: 2/18/2025    Plan: Anticipate routine home with spouse. Current home cpap through [employer].   Discharge Plan       Row Name 02/21/25 1204       Plan    Plan Anticipate routine home with spouse. Current home cpap through [employer].    Plan Comments Barrier to D/C: PPM today, anticipate d/c once cleared by cardiology.                      Expected Discharge Date and Time       Expected Discharge Date Expected Discharge Time    Feb 22, 2025           Phone communication or documentation only - no physical contact with patient or family.     Cristina Joseph, ANJALIN, [place] [address]    Office: [phone]  Fax: [phone]

## 2025-02-21 NOTE — PROGRESS NOTES
Hospitalist Service   Daily Progress Note      Patient Name: Ismael Pulido  : 1965  MRN: [id number]  Primary Care Physician:  Sharon Silva MD  Date of admission: 2025      Subjective      Chief Complaint: LOC    Patient seen and examined this morning.  Doing well, denies any new complaints.  Wants to have Sudafed restarted as soon as possible for his nasal congestion.  Going for pacemaker placement later today.  Discussed with patient and family at bedside.    Pertinent positives as noted in HPI/subjective.  All other systems were reviewed and are negative.      Objective      Vitals:   Temp:  [97.2 °F (36.2 °C)-98.7 °F (37.1 °C)] 97.7 °F (36.5 °C)  Heart Rate:  [41-72] 67  Resp:  [16-24] 18  BP: (127-147)/(75-94) 137/94    Physical Exam:    General: Awake, alert, NAD  Eyes: PERRL, EOMI, conjunctivae are clear  Cardiovascular:  bradycardia, regular rhythm, no murmurs  Respiratory: Clear to auscultation bilaterally, no wheezing or rales, unlabored breathing  Abdomen: Soft, nontender, positive bowel sounds, no guarding  Neurologic: A&O, CN grossly intact, moves all extremities spontaneously  Musculoskeletal: Normal range of motion, no other gross deformities  Skin: Warm, dry         Result Review    Result Review:  I have personally reviewed the results from the time of this admission to 2025 10:06 EST and agree with these findings:  [x]  Laboratory  []  Microbiology  []  Radiology  [x]  EKG/Telemetry   [x]  Cardiology/Vascular   []  Pathology  []  Old records  []  Other:          Assessment & Plan      Brief Patient Summary:  Ismael Pulido is a 59 y.o. male with history of hypertension hyperlipidemia, DM2 who presented to the ED with complaint of dizziness and passing out.  Patient has been on metoprolol 100 mg daily for his hypertension for a while but was noted to have decreased heart rate as outpatient.  He was told to stop metoprolol on   However he had  taken the morning dose at 5 AM that day.  While at work patient states he felt sudden dizziness so had to rest on the table then soon he stood back up and then had loss of consciousness for few seconds and fell backward on his head.  Imaging reported negative for any fractures or intracranial findings.  Noted to have bradycardia in the 30s with possible Mobitz type II heart block.  Patient admitted for further workup.  Cardiology consulted.      cetirizine, 10 mg, Oral, Daily  empagliflozin, 25 mg, Oral, Daily  heparin (porcine), 5,000 Units, Subcutaneous, Q8H  insulin lispro, 2-7 Units, Subcutaneous, 4x Daily AC & at Bedtime  losartan, 25 mg, Oral, Daily  pantoprazole, 40 mg, Oral, Q AM  rosuvastatin, 10 mg, Oral, Daily  sodium chloride, 10 mL, Intravenous, Q12H             I have utilized all available, immediate resources to obtain, update, or review the patient's current medications including all prescriptions, over-the-counter products, herbals, cannabis/cannabidiol products, and vitamin.mineral/dietary (nutritional) supplements.    Active Hospital Problems:  Active Hospital Problems    Diagnosis     **Symptomatic bradycardia     Mobitz type 2 second degree atrioventricular block     Syncope and collapse        Assessment/Plan:     Symptomatic bradycardia  Possible high degree AV block  Cardiogenic syncope  -Possibly induced by metoprolol but patient has been on same dose for a while now and started having bradycardia, may have underlying high degree AV block  -Relatively asymptomatic at this time but presented with syncope, continue to monitor on telemetry, transcutaneous pacing on standby  -Continue to hold metoprolol, last dose was the morning of 2/18  -Echo shows preserved EF without any WMA noted  -Cardiology on board, stress test negative, plan for pacemaker placement today    Hypertension  -Continue home losartan  -Holding metoprolol as above  -Avoid AV eduardo blockers  -Monitor    DM2  -Holding home oral  meds  -Continue SSI, Accu-Cheks  -Monitor blood glucose, adjust as needed    Hyperlipidemia  -Continue statin    Chronic environmental allergies/congestion  -Patient chronically on Claritin and Sudafed at home   -okay to continue Claritin for now, hold Sudafed due to above  -As needed saline spray    VTE Prophylaxis:  Pharmacologic VTE prophylaxis orders are present.        CODE STATUS:    Code Status (Patient has no pulse and is not breathing): CPR (Attempt to Resuscitate)  Medical Interventions (Patient has pulse or is breathing): Full Support      Disposition Planning:     Barriers to Discharge: Bradycardia, pacemaker  Anticipated Date of Discharge: 2/22  Place of Discharge: Home    Electronically signed by Amanda Mcduffie DO, 02/21/25, 10:06 EST.  [facility], [place]      Part of this note may be an electronic transcription/translation of spoken language to printed text using the Dragon Dictation System.

## 2025-02-21 NOTE — ANESTHESIA PREPROCEDURE EVALUATION
Anesthesia Evaluation     Patient summary reviewed and Nursing notes reviewed   NPO Solid Status: > 8 hours  NPO Liquid Status: > 8 hours           Airway   Mallampati: II  Dental      Pulmonary    (+) asthma,sleep apnea  Cardiovascular     ECG reviewed    (+) hypertension, dysrhythmias Bradycardia, hyperlipidemia      Neuro/Psych  (+) syncope  GI/Hepatic/Renal/Endo    (+) obesity, GERD, renal disease-, diabetes mellitus    Musculoskeletal     Abdominal    Substance History      OB/GYN          Other        ROS/Med Hx Other: ·  Left ventricular ejection fraction appears to be 56 - 60%.  ·  The right ventricular cavity is moderately dilated.  ·  The left atrial cavity is mildly dilated.  ·  Moderate tricuspid valve regurgitation is present.  ·  Estimated right ventricular systolic pressure from tricuspid regurgitation is moderately elevated (45-55 mmHg).                      Anesthesia Plan    ASA 3     MAC and general     intravenous induction     Anesthetic plan, risks, benefits, and alternatives have been provided, discussed and informed consent has been obtained with: patient.    Plan discussed with CRNA.    CODE STATUS:    Code Status (Patient has no pulse and is not breathing): CPR (Attempt to Resuscitate)  Medical Interventions (Patient has pulse or is breathing): Full Support

## 2025-02-22 ENCOUNTER — READMISSION MANAGEMENT (OUTPATIENT)
Dept: CALL CENTER | Facility: HOSPITAL | Age: 60
End: 2025-02-22
Payer: COMMERCIAL

## 2025-02-22 VITALS
WEIGHT: 230.38 LBS | SYSTOLIC BLOOD PRESSURE: 137 MMHG | TEMPERATURE: 97.4 F | BODY MASS INDEX: 34.92 KG/M2 | RESPIRATION RATE: 15 BRPM | OXYGEN SATURATION: 95 % | HEIGHT: 68 IN | HEART RATE: 91 BPM | DIASTOLIC BLOOD PRESSURE: 86 MMHG

## 2025-02-22 LAB
ANION GAP SERPL CALCULATED.3IONS-SCNC: 16.2 MMOL/L (ref 5–15)
BUN SERPL-MCNC: 19 MG/DL (ref 6–20)
BUN/CREAT SERPL: 18.4 (ref 7–25)
CALCIUM SPEC-SCNC: 9.3 MG/DL (ref 8.6–10.5)
CHLORIDE SERPL-SCNC: 102 MMOL/L (ref 98–107)
CO2 SERPL-SCNC: 18.8 MMOL/L (ref 22–29)
CREAT SERPL-MCNC: 1.03 MG/DL (ref 0.76–1.27)
DEPRECATED RDW RBC AUTO: 39.3 FL (ref 37–54)
EGFRCR SERPLBLD CKD-EPI 2021: 83.7 ML/MIN/1.73
ERYTHROCYTE [DISTWIDTH] IN BLOOD BY AUTOMATED COUNT: 12.7 % (ref 12.3–15.4)
GLUCOSE BLDC GLUCOMTR-MCNC: 113 MG/DL (ref 70–105)
GLUCOSE SERPL-MCNC: 93 MG/DL (ref 65–99)
HCT VFR BLD AUTO: 51.9 % (ref 37.5–51)
HGB BLD-MCNC: 17.6 G/DL (ref 13–17.7)
MCH RBC QN AUTO: 29.1 PG (ref 26.6–33)
MCHC RBC AUTO-ENTMCNC: 33.9 G/DL (ref 31.5–35.7)
MCV RBC AUTO: 85.8 FL (ref 79–97)
PLATELET # BLD AUTO: 226 10*3/MM3 (ref 140–450)
PMV BLD AUTO: 9 FL (ref 6–12)
POTASSIUM SERPL-SCNC: 4.4 MMOL/L (ref 3.5–5.2)
QT INTERVAL: 435 MS
QTC INTERVAL: 487 MS
RBC # BLD AUTO: 6.05 10*6/MM3 (ref 4.14–5.8)
SODIUM SERPL-SCNC: 137 MMOL/L (ref 136–145)
WBC NRBC COR # BLD AUTO: 10.58 10*3/MM3 (ref 3.4–10.8)

## 2025-02-22 PROCEDURE — 93005 ELECTROCARDIOGRAM TRACING: CPT | Performed by: INTERNAL MEDICINE

## 2025-02-22 PROCEDURE — 99232 SBSQ HOSP IP/OBS MODERATE 35: CPT | Performed by: INTERNAL MEDICINE

## 2025-02-22 PROCEDURE — 93010 ELECTROCARDIOGRAM REPORT: CPT | Performed by: INTERNAL MEDICINE

## 2025-02-22 PROCEDURE — 82948 REAGENT STRIP/BLOOD GLUCOSE: CPT

## 2025-02-22 PROCEDURE — 85027 COMPLETE CBC AUTOMATED: CPT | Performed by: INTERNAL MEDICINE

## 2025-02-22 PROCEDURE — 80048 BASIC METABOLIC PNL TOTAL CA: CPT | Performed by: INTERNAL MEDICINE

## 2025-02-22 PROCEDURE — 25010000002 CEFAZOLIN PER 500 MG: Performed by: INTERNAL MEDICINE

## 2025-02-22 RX ORDER — CEPHALEXIN 500 MG/1
500 CAPSULE ORAL EVERY 8 HOURS SCHEDULED
Qty: 15 CAPSULE | Refills: 0 | Status: ON HOLD | OUTPATIENT
Start: 2025-02-22 | End: 2025-02-27

## 2025-02-22 RX ORDER — CEPHALEXIN 500 MG/1
500 CAPSULE ORAL EVERY 8 HOURS SCHEDULED
Status: DISCONTINUED | OUTPATIENT
Start: 2025-02-22 | End: 2025-02-22 | Stop reason: HOSPADM

## 2025-02-22 RX ADMIN — CETIRIZINE HYDROCHLORIDE 10 MG: 10 TABLET, FILM COATED ORAL at 08:35

## 2025-02-22 RX ADMIN — LOSARTAN POTASSIUM 25 MG: 25 TABLET, FILM COATED ORAL at 08:35

## 2025-02-22 RX ADMIN — PANTOPRAZOLE SODIUM 40 MG: 40 TABLET, DELAYED RELEASE ORAL at 06:13

## 2025-02-22 RX ADMIN — EMPAGLIFLOZIN 25 MG: 25 TABLET, FILM COATED ORAL at 08:35

## 2025-02-22 RX ADMIN — Medication 10 ML: at 08:36

## 2025-02-22 RX ADMIN — ROSUVASTATIN CALCIUM 10 MG: 10 TABLET, FILM COATED ORAL at 08:35

## 2025-02-22 RX ADMIN — CEPHALEXIN 500 MG: 500 CAPSULE ORAL at 11:03

## 2025-02-22 RX ADMIN — CEFAZOLIN 2000 MG: 2 INJECTION, POWDER, FOR SOLUTION INTRAMUSCULAR; INTRAVENOUS at 06:18

## 2025-02-22 RX ADMIN — ACETAMINOPHEN 650 MG: 325 TABLET, FILM COATED ORAL at 02:40

## 2025-02-22 NOTE — OUTREACH NOTE
Prep Survey      Flowsheet Row Responses   [facility] facility patient discharged from? [facility]   Is LACE score < 7 ? No   Eligibility [facility]   Date of Admission 02/18/25   Date of Discharge 02/22/25   Discharge Disposition Home or Self Care   Discharge diagnosis Symptomatic bradycardia-pacemaker this visit   Does the patient have one of the following disease processes/diagnoses(primary or secondary)? Other   Does the patient have Home health ordered? No   Is there a DME ordered? No   Prep survey completed? Yes            BETHANY RODRIGUEZ - Registered Nurse

## 2025-02-22 NOTE — PLAN OF CARE
Goal Outcome Evaluation:      Pacemaker implantation complete in cath lab today. Site dressing clean, dry, intact, no signs of bleeding. Pt aware of bedrest until 0600 2/22. Call light within reach. Pt able to voice concerns, family at bedside.

## 2025-02-22 NOTE — PROGRESS NOTES
CARDIOLOGY PROGRESS NOTE:    Ismael Pulido  59 y.o.  male  1965  [id number]      Referring Provider: Hospitalist    Reason for follow-up: Bradycardia and pacemaker placement     Patient Care Team:  Sharon Silva MD as PCP - General (Internal Medicine & Pediatrics)    Subjective .  Patient doing well without any symptoms    Objective sitting in chair comfortably.  Pacemaker site is good     Review of Systems   Constitutional: Negative for malaise/fatigue.   Cardiovascular:  Negative for chest pain, dyspnea on exertion, leg swelling and palpitations.   Respiratory:  Negative for cough and shortness of breath.    Gastrointestinal:  Negative for abdominal pain, nausea and vomiting.   Neurological:  Negative for dizziness, focal weakness, headaches, light-headedness and numbness.   All other systems reviewed and are negative.      Allergies: Lisinopril    Scheduled Meds:acetaminophen, 650 mg, Oral, Q8H   Or  acetaminophen, 650 mg, Oral, Q8H   Or  acetaminophen, 650 mg, Rectal, Q8H  cephalexin, 500 mg, Oral, Q8H  cetirizine, 10 mg, Oral, Daily  empagliflozin, 25 mg, Oral, Daily  insulin lispro, 2-7 Units, Subcutaneous, 4x Daily AC & at Bedtime  losartan, 25 mg, Oral, Daily  pantoprazole, 40 mg, Oral, Q AM  rosuvastatin, 10 mg, Oral, Daily  sodium chloride, 10 mL, Intravenous, Q12H      Continuous Infusions:   PRN Meds:.  acetaminophen **OR** acetaminophen **OR** acetaminophen    senna-docusate sodium **AND** polyethylene glycol **AND** bisacodyl **AND** bisacodyl    Calcium Replacement - Follow Nurse / BPA Driven Protocol    dextrose    dextrose    glucagon (human recombinant)    HYDROcodone-acetaminophen    Magnesium Standard Dose Replacement - Follow Nurse / BPA Driven Protocol    Morphine **AND** naloxone    ondansetron    Phosphorus Replacement - Follow Nurse / BPA Driven Protocol    Potassium Replacement - Follow Nurse / BPA Driven Protocol    [COMPLETED] Insert Peripheral IV **AND** sodium  "chloride    sodium chloride    sodium chloride    sodium chloride        VITAL SIGNS  Vitals:    02/22/25 0035 02/22/25 0500 02/22/25 0523 02/22/25 0903   BP: 122/86  132/92 137/86   BP Location: Right arm  Right arm Right arm   Patient Position: Lying  Lying Sitting   Pulse: 89  79 91   Resp: 17  18 15   Temp: 98.9 °F (37.2 °C)  97.3 °F (36.3 °C) 97.4 °F (36.3 °C)   TempSrc: Oral  Oral Oral   SpO2: 95%  93% 95%   Weight:  104 kg (230 lb 6.1 oz)     Height:           Flowsheet Rows      Flowsheet Row First Filed Value   Admission Height 172.7 cm (68\") Documented at 02/18/2025 2036   Admission Weight 108 kg (238 lb 15.7 oz) Documented at 02/18/2025 2036             TELEMETRY: Ventricular pacemaker rhythm    Physical Exam:  Constitutional:       Appearance: Well-developed.   Eyes:      General: No scleral icterus.     Conjunctiva/sclera: Conjunctivae normal.   HENT:      Head: Normocephalic and atraumatic.   Neck:      Vascular: No carotid bruit or JVD.   Pulmonary:      Effort: Pulmonary effort is normal.      Breath sounds: Normal breath sounds. No wheezing. No rales.   Cardiovascular:      Normal rate. Regular rhythm.   Pulses:     Intact distal pulses.   Abdominal:      General: Bowel sounds are normal.      Palpations: Abdomen is soft.   Musculoskeletal:      Cervical back: Normal range of motion and neck supple. Skin:     General: Skin is warm and dry.      Findings: No rash.   Neurological:      Mental Status: Alert.          Results Review:   I reviewed the patient's new clinical results.  Lab Results (last 24 hours)       Procedure Component Value Units Date/Time    Basic Metabolic Panel [[id number]]  (Abnormal) Collected: 02/22/25 0252    Specimen: Blood Updated: 02/22/25 0350     Glucose 93 mg/dL      BUN 19 mg/dL      Creatinine 1.03 mg/dL      Sodium 137 mmol/L      Potassium 4.4 mmol/L      Comment: Specimen hemolyzed.  Result may be falsely elevated.        Chloride 102 mmol/L      CO2 18.8 mmol/L      " Calcium 9.3 mg/dL      BUN/Creatinine Ratio 18.4     Anion Gap 16.2 mmol/L      eGFR 83.7 mL/min/1.73     Narrative:      GFR Categories in Chronic Kidney Disease (CKD)      GFR Category          GFR (mL/min/1.73)    Interpretation  G1                     90 or greater         Normal or high (1)  G2                      60-89                Mild decrease (1)  G3a                   45-59                Mild to moderate decrease  G3b                   30-44                Moderate to severe decrease  G4                    15-29                Severe decrease  G5                    14 or less           Kidney failure          (1)In the absence of evidence of kidney disease, neither GFR category G1 or G2 fulfill the criteria for CKD.    eGFR calculation 2021 CKD-EPI creatinine equation, which does not include race as a factor    CBC (No Diff) [[id number]]  (Abnormal) Collected: 02/22/25 0252    Specimen: Blood Updated: 02/22/25 0257     WBC 10.58 10*3/mm3      RBC 6.05 10*6/mm3      Hemoglobin 17.6 g/dL      Hematocrit 51.9 %      MCV 85.8 fL      MCH 29.1 pg      MCHC 33.9 g/dL      RDW 12.7 %      RDW-SD 39.3 fl      MPV 9.0 fL      Platelets 226 10*3/mm3     POC Glucose Once [[id number]]  (Abnormal) Collected: 02/21/25 2034    Specimen: Blood Updated: 02/21/25 2040     Glucose 147 mg/dL      Comment: Serial Number: [id number]:  [id number]       POC Glucose Once [[id number]]  (Normal) Collected: 02/21/25 1812    Specimen: Blood Updated: 02/21/25 1814     Glucose 98 mg/dL      Comment: Serial Number: [id number]:  [id number]       POC Glucose 4x Daily Before Meals & at Bedtime [[id number]]  (Normal) Collected: 02/21/25 1709    Specimen: Blood Updated: 02/21/25 1711     Glucose 99 mg/dL      Comment: Serial Number: [id number]:  [id number]       POC Glucose 4x Daily Before Meals & at Bedtime [[id number]]  (Normal) Collected: 02/21/25 1108    Specimen: Blood Updated: 02/21/25 1110     Glucose 105 mg/dL       Comment: Serial Number: [id number]:  [id number]               Imaging Results (Last 24 Hours)       Procedure Component Value Units Date/Time    XR Chest 1 View [[id number]] Collected: 02/21/25 1748     Updated: 02/21/25 1751    Narrative:      XR CHEST 1 VW    Date of Exam: 2/21/2025 5:45 PM EST    Indication: Post ICD / Pacer Implant    Comparison: Chest radiograph 2/18/2025    Findings:  Multichamber AICD, without apparent radiographic complication. Lung volumes are low. Heart size normal. Negative for lobar consolidation, edema, effusion or pneumothorax. Degenerative related osseous change.      Impression:      Impression:  Radiographically uncomplicated multichamber AICD. No pneumothorax.      Electronically Signed: Nicholas Becerra MD    2/21/2025 5:49 PM EST    Workstation ID: [id number]            EKG      I personally viewed and interpreted the patient's EKG/Telemetry data:    ECHOCARDIOGRAM:  Results for orders placed during the hospital encounter of 02/18/25    Adult Transthoracic Echo Complete w/ Color, Spectral and Contrast if Necessary Per Protocol    Interpretation Summary    Left ventricular ejection fraction appears to be 56 - 60%.    The right ventricular cavity is moderately dilated.    The left atrial cavity is mildly dilated.    Moderate tricuspid valve regurgitation is present.    Estimated right ventricular systolic pressure from tricuspid regurgitation is moderately elevated (45-55 mmHg).       STRESS MYOVIEW:  Results for orders placed during the hospital encounter of 02/18/25    Stress Test With Myocardial Perfusion One Day    Interpretation Summary    Myocardial perfusion imaging indicates a normal myocardial perfusion study with no evidence of ischemia. Impressions are consistent with a low risk study.    Left ventricular ejection fraction is normal (Calculated EF = 64%).       CARDIAC CATHETERIZATION:  No results found for this or any previous visit.       OTHER:         Assessment &  Plan            Syncope and symptomatic bradycardia  Patient presented after syncopal episode and had symptomatic bradycardia  Patient was off beta-blockers for 72 hours but he still had bradycardia with left bundle branch block and fascicular block  Patient underwent permanent pacemaker placement without any complications and is doing very well.    Hypertension  Patient blood pressure currently stable on losartan and will add beta-blockers as needed in future    Diabetes  Patient is on oral medicines.    Hyperlipidemia  Been on statins and the lipid levels are followed by the primary care doctor.    I discussed the patients findings and my recommendations with patient and nurse    Win Reaves MD  02/22/25  10:39 EST

## 2025-02-22 NOTE — PLAN OF CARE
Problem: Adult Inpatient Plan of Care  Goal: Plan of Care Review  Outcome: Progressing  Flowsheets (Taken 2/22/2025 1022)  Outcome Evaluation: Pt to discharge home.  IV removed intact.  verbalized undersanding of discharge instructions.  Transported per family. Pt left arm in sling on discharge   Goal Outcome Evaluation:              Outcome Evaluation: Pt to discharge home.  IV removed intact.  verbalized undersanding of discharge instructions.  Transported per family. Pt left arm in sling on discharge

## 2025-02-22 NOTE — PROGRESS NOTES
Pacemaker site is clean  Post pacemaker placement instructions discussed with the patient  Treat patient with Keflex for 5 days with 500 3 times daily  Follow-up as an outpatient for staple removal  Patient can be discharged home    Electronically signed by Dez Loco MD, 02/22/25, 9:39 AM EST.

## 2025-02-22 NOTE — DISCHARGE SUMMARY
Hospitalist Service   DISCHARGE SUMMARY    Patient Name: Ismael Pulido  : 1965  MRN: [id number]    Date of Admission: 2025  Date of Discharge:  25    Primary Care Physician: Sharon Silva MD      Presenting Problem:   Sinus bradycardia [R00.1]  Symptomatic bradycardia [R00.1]  Abrasion of scalp, initial encounter [S00.01XA]  Syncope, unspecified syncope type [R55]    Active and Resolved Hospital Problems:  Active Hospital Problems    Diagnosis POA    **Symptomatic bradycardia [R00.1] Yes    Intermittent complete heart block [I44.2] Yes    Mobitz type 2 second degree atrioventricular block [I44.1] Yes    Syncope and collapse [R55] Yes      Resolved Hospital Problems   No resolved problems to display.         Hospital Course     Hospital Course:  Ismael Pulido is a 59 y.o. male with history of hypertension hyperlipidemia, DM2 who presented to the ED with complaint of dizziness and passing out. Patient has been on metoprolol 100 mg daily for his hypertension for a while but was noted to have decreased heart rate as outpatient. He was told to stop metoprolol on Tuesday afternoon However he had taken the morning dose at 5 AM that day. While at work patient states he felt sudden dizziness so had to rest on the table then soon he stood back up and then had loss of consciousness for few seconds and fell backward on his head. Imaging reported negative for any fractures or intracranial findings. Noted to have bradycardia in the 30s with possible Mobitz type II heart block. Patient admitted for further workup. Cardiology consulted.  Further details as noted below.  Patient is clinically improved and stable to discharge home with follow-up with PCP and cardiology as an outpatient.    Assessment/Plan:      Symptomatic bradycardia  Mobitz type II AV block and LBBB  Cardiogenic syncope  -Possibly induced by metoprolol but patient has been on same dose for a while now and started having  bradycardia, may have underlying high degree AV block  -Relatively asymptomatic at this time but presented with syncope, continue to monitor on telemetry, transcutaneous pacing on standby  -Continue to hold metoprolol, last dose was the morning of 2/18  -Echo shows preserved EF without any WMA noted  -Cardiology on board, stress test negative, s/p dual-chamber PPM on 2/21  -Doing well postop, okay to discharge per cardiology with outpatient follow-up     Hypertension  -Continue home losartan  -Holding metoprolol as above  -Avoid AV eduardo blockers  -Monitor     DM2  -Resume home meds on discharge     Hyperlipidemia  -Continue statin     Chronic environmental allergies/congestion  -Patient chronically on Claritin and Sudafed at home     DISCHARGE Follow Up Recommendations for labs and diagnostics:   Follow-up with PCP and cardiology in 1 to 2 weeks    Day of Discharge     Vital Signs:  Temp:  [97.3 °F (36.3 °C)-98.9 °F (37.2 °C)] 97.4 °F (36.3 °C)  Heart Rate:  [] 91  Resp:  [2-18] 15  BP: (122-137)/(70-92) 137/86    Physical Exam:  General: Awake, alert, NAD  Eyes: PERRL, EOMI, conjunctivae are clear  Cardiovascular: Regular rate and rhythm, no murmurs  Respiratory: Clear to auscultation bilaterally, no wheezing or rales, unlabored breathing  Abdomen: Soft, nontender, positive bowel sounds, no guarding  Neurologic: A&O, CN grossly intact, moves all extremities spontaneously  Musculoskeletal: Normal range of motion, no other gross deformities  Skin: Warm, dry        Pertinent  and/or Most Recent Results     LAB RESULTS:      Lab 02/22/25 0252 02/19/25 0326 02/18/25  2108   WBC 10.58 8.60 9.40   HEMOGLOBIN 17.6 15.9 16.7   HEMATOCRIT 51.9* 48.5 49.7   PLATELETS 226 218 257   NEUTROS ABS  --  4.61 5.35   IMMATURE GRANS (ABS)  --  0.03 0.03   LYMPHS ABS  --  2.93 2.97   MONOS ABS  --  0.81 0.82   EOS ABS  --  0.16 0.16   MCV 85.8 88.2 87.0         Lab 02/22/25  0252 02/19/25  0326 02/18/25  2108   SODIUM 137 140  140   POTASSIUM 4.4 4.3 4.4   CHLORIDE 102 107 105   CO2 18.8* 22.6 22.4   ANION GAP 16.2* 10.4 12.6   BUN 19 15 16   CREATININE 1.03 0.99 1.10   EGFR 83.7 87.8 77.3   GLUCOSE 93 145* 161*   CALCIUM 9.3 9.2 9.2   MAGNESIUM  --  2.4  --    PHOSPHORUS  --  4.0  --          Lab 02/19/25  0326   TOTAL PROTEIN 7.0   ALBUMIN 3.9   GLOBULIN 3.1   ALT (SGPT) 42*   AST (SGOT) 34   BILIRUBIN 0.2   ALK PHOS 102         Lab 02/18/25  2208 02/18/25  2108   HSTROP T 44* 46*                 Brief Urine Lab Results  (Last result in the past 365 days)        Color   Clarity   Blood   Leuk Est   Nitrite   Protein   CREAT   Urine HCG        02/07/25 1139             29.3               Microbiology Results (last 10 days)       Procedure Component Value - Date/Time    COVID-19, FLU A/B, RSV PCR 1 HR TAT - Swab, Nasopharynx [[id number]]  (Normal) Collected: 02/19/25 0017    Lab Status: Final result Specimen: Swab from Nasopharynx Updated: 02/19/25 0059     COVID19 Not Detected     Influenza A PCR Not Detected     Influenza B PCR Not Detected     RSV, PCR Not Detected    Narrative:      Fact sheet for providers: https://www.fda.gov/media/082267/download    Fact sheet for patients: https://www.fda.gov/media/909485/download    Test performed by PCR.            XR Chest 1 View    Result Date: 2/21/2025  Impression: Impression: Radiographically uncomplicated multichamber AICD. No pneumothorax. Electronically Signed: Nicholas Becerra MD  2/21/2025 5:49 PM EST  Workstation ID: [id number]    CT Head Without Contrast    Result Date: 2/18/2025  Impression: Impression: No acute intracranial findings. Electronically Signed: Bao Trujillo  2/18/2025 10:00 PM EST  Workstation ID: [id number]    XR Chest 1 View    Result Date: 2/18/2025  Impression: Impression: No radiographic evidence of acute cardiopulmonary abnormality. Electronically Signed: Bao Trujillo  2/18/2025 9:37 PM EST  Workstation ID: [id number]             Results for orders placed during the  hospital encounter of 02/18/25    Adult Transthoracic Echo Complete w/ Color, Spectral and Contrast if Necessary Per Protocol    Interpretation Summary    Left ventricular ejection fraction appears to be 56 - 60%.    The right ventricular cavity is moderately dilated.    The left atrial cavity is mildly dilated.    Moderate tricuspid valve regurgitation is present.    Estimated right ventricular systolic pressure from tricuspid regurgitation is moderately elevated (45-55 mmHg).      Labs Pending at Discharge:      Procedures Performed  Procedure(s):  Pacemaker DC new boston [employer]         Consults:   Consults       Date and Time Order Name Status Description    2/18/2025 10:21 PM Cardiology (on-call MD unless specified) Completed     2/18/2025  9:53 PM Hospitalist (on-call MD unless specified)                Discharge Details        Discharge Medications        New Medications        Instructions Start Date   cephalexin 500 MG capsule  Commonly known as: KEFLEX   500 mg, Oral, Every 8 Hours Scheduled             Continue These Medications        Instructions Start Date   Claritin 10 MG tablet  Generic drug: loratadine   10 mg, Daily      clotrimazole 1 % cream  Commonly known as: LOTRIMIN   1 Application, Topical, 2 Times Daily      [employer] Vitamin C 1000 MG tablet  Generic drug: ascorbic acid   4,000 mg, Daily      empagliflozin 25 MG tablet tablet  Commonly known as: Jardiance   25 mg, Oral, Daily      lansoprazole 30 MG capsule  Commonly known as: PREVACID   30 mg, Oral, Daily      losartan 25 MG tablet  Commonly known as: COZAAR   25 mg, Oral, Daily      NEURIVA PO   Daily      pseudoephedrine 120 MG 12 hr tablet  Commonly known as: SUDAFED   120 mg, Every Morning      rosuvastatin 10 MG tablet  Commonly known as: CRESTOR   10 mg, Oral, Daily      Semaglutide 3 MG tablet   3 mg, Oral, Daily               Allergies   Allergen Reactions    Lisinopril Nausea And Vomiting         Discharge Disposition:  Home or Self  Care    Diet:  Hospital:  Diet Order   Procedures    Diet: Cardiac, Diabetic; Healthy Heart (2-3 Na+); Consistent Carbohydrate; Fluid Consistency: Thin (IDDSI 0)         Discharge Activity:         CODE STATUS:  Code Status and Medical Interventions: CPR (Attempt to Resuscitate); Full Support   Ordered at: 02/18/25 2241     Code Status (Patient has no pulse and is not breathing):    CPR (Attempt to Resuscitate)     Medical Interventions (Patient has pulse or is breathing):    Full Support         Future Appointments   Date Time Provider Department Center   5/9/2025 11:00 AM Sharon Silva MD [facility]           Time spent on Discharge including face to face service:  44 minutes    Signature:    Electronically signed by Amanda Mcduffie DO, 02/22/25, 10:25 AM EST.      Part of this note may be an electronic transcription/translation of spoken language to printed text using the Dragon Dictation System.

## 2025-02-22 NOTE — NURSING NOTE
Patient had a pacemaker placed yesterday.   Currently patient is  Vpaced on the monitor.  Sling is on.   On bedrest as of this writing.

## 2025-02-23 NOTE — CASE MANAGEMENT/SOCIAL WORK
Case Management Discharge Note      Final Note: Home with spouse. Current CPAP through [employer].    Transportation Services  Private: Car    Final Discharge Disposition Code: 01 - home or self-care

## 2025-02-24 ENCOUNTER — TELEPHONE (OUTPATIENT)
Dept: CARDIOLOGY | Facility: CLINIC | Age: 60
End: 2025-02-24
Payer: COMMERCIAL

## 2025-02-24 ENCOUNTER — TRANSITIONAL CARE MANAGEMENT TELEPHONE ENCOUNTER (OUTPATIENT)
Dept: CALL CENTER | Facility: HOSPITAL | Age: 60
End: 2025-02-24
Payer: COMMERCIAL

## 2025-02-24 NOTE — TELEPHONE ENCOUNTER
Caller: ARISTEO    Relationship: SELF    Best call back number: [phone]     What is the best time to reach you: ANY      What was the call regarding: 10/14 DAY [place] CHECK AND FOLLOW UP WITH DR. PLUMMER/ESTEVAN      PT WOULD LIKE TO BE SEEN AROUND NOON DUE TO TRANSPORTATION ISSUES

## 2025-02-24 NOTE — PAYOR COMM NOTE
"This is discharge notification for Ismael Pulido.  Dc'd 25 routine home.      AUTHORIZATION : [id number]   THANK YOU.      Anita Diego RN, CCM  Utilization Nurse  [address] [address]   [phone]   [phone]   Ismael Pulido (59 y.o. Male)       Date of Birth   1965    Social Security Number       Address   [address] [place] [address] [place] [address]    Home Phone   [phone]    MRN   [id number]       [employer]    Marital Status                               Admission Date   25    Admission Type   Emergency    Admitting Provider   Llanes Alvarez, Carlos, MD    Attending Provider       Department, Room/Bed   [facility] PROGRESS CARE,        Discharge Date   2025    Discharge Disposition   Home or Self Care    Discharge Destination                                 Attending Provider: (none)   Allergies: Lisinopril    Isolation: None   Infection: None   Code Status: Prior    Ht: 172.7 cm (68\")   Wt: 104 kg (230 lb 6.1 oz)    Admission Cmt: None   Principal Problem: Symptomatic bradycardia [R00.1]                   Active Insurance as of 2025       Primary Coverage       Payor Plan Insurance Group Employer/Plan Group    [employer] COMMERCIAL A[employer]NA [id number]       Payor Plan Address Payor Plan Phone Number Payor Plan Fax Number Effective Dates    [address] [phone]  3/25/2021 - None Entered    [facility] [address]         Subscriber Name Subscriber Birth Date Member ID       ISMAEL PULIDO 1965 [id number]                     Emergency Contacts        (Rel.) Home Phone Work Phone Mobile Phone    SIDCLAIRE (Spouse) [phone] -- [phone]                 Discharge Summary        Amanda Mcduffie DO at 25 1024                Hospitalist Service   DISCHARGE SUMMARY    Patient Name: Ismael Pulido  : 1965  MRN: [id number]    Date " of Admission: 2/18/2025  Date of Discharge:  02/22/25    Primary Care Physician: Sharon Silva MD      Presenting Problem:   Sinus bradycardia [R00.1]  Symptomatic bradycardia [R00.1]  Abrasion of scalp, initial encounter [S00.01XA]  Syncope, unspecified syncope type [R55]    Active and Resolved Hospital Problems:  Active Hospital Problems    Diagnosis POA    **Symptomatic bradycardia [R00.1] Yes    Intermittent complete heart block [I44.2] Yes    Mobitz type 2 second degree atrioventricular block [I44.1] Yes    Syncope and collapse [R55] Yes      Resolved Hospital Problems   No resolved problems to display.         Hospital Course     Hospital Course:  Ismael Pulido is a 59 y.o. male with history of hypertension hyperlipidemia, DM2 who presented to the ED with complaint of dizziness and passing out. Patient has been on metoprolol 100 mg daily for his hypertension for a while but was noted to have decreased heart rate as outpatient. He was told to stop metoprolol on Tuesday afternoon However he had taken the morning dose at 5 AM that day. While at work patient states he felt sudden dizziness so had to rest on the table then soon he stood back up and then had loss of consciousness for few seconds and fell backward on his head. Imaging reported negative for any fractures or intracranial findings. Noted to have bradycardia in the 30s with possible Mobitz type II heart block. Patient admitted for further workup. Cardiology consulted.  Further details as noted below.  Patient is clinically improved and stable to discharge home with follow-up with PCP and cardiology as an outpatient.    Assessment/Plan:      Symptomatic bradycardia  Mobitz type II AV block and LBBB  Cardiogenic syncope  -Possibly induced by metoprolol but patient has been on same dose for a while now and started having bradycardia, may have underlying high degree AV block  -Relatively asymptomatic at this time but presented with syncope,  continue to monitor on telemetry, transcutaneous pacing on standby  -Continue to hold metoprolol, last dose was the morning of 2/18  -Echo shows preserved EF without any WMA noted  -Cardiology on board, stress test negative, s/p dual-chamber PPM on 2/21  -Doing well postop, okay to discharge per cardiology with outpatient follow-up     Hypertension  -Continue home losartan  -Holding metoprolol as above  -Avoid AV eduardo blockers  -Monitor     DM2  -Resume home meds on discharge     Hyperlipidemia  -Continue statin     Chronic environmental allergies/congestion  -Patient chronically on Claritin and Sudafed at home     DISCHARGE Follow Up Recommendations for labs and diagnostics:   Follow-up with PCP and cardiology in 1 to 2 weeks    Day of Discharge     Vital Signs:  Temp:  [97.3 °F (36.3 °C)-98.9 °F (37.2 °C)] 97.4 °F (36.3 °C)  Heart Rate:  [] 91  Resp:  [2-18] 15  BP: (122-137)/(70-92) 137/86    Physical Exam:  General: Awake, alert, NAD  Eyes: PERRL, EOMI, conjunctivae are clear  Cardiovascular: Regular rate and rhythm, no murmurs  Respiratory: Clear to auscultation bilaterally, no wheezing or rales, unlabored breathing  Abdomen: Soft, nontender, positive bowel sounds, no guarding  Neurologic: A&O, CN grossly intact, moves all extremities spontaneously  Musculoskeletal: Normal range of motion, no other gross deformities  Skin: Warm, dry        Pertinent  and/or Most Recent Results     LAB RESULTS:      Lab 02/22/25  0252 02/19/25  0326 02/18/25  2108   WBC 10.58 8.60 9.40   HEMOGLOBIN 17.6 15.9 16.7   HEMATOCRIT 51.9* 48.5 49.7   PLATELETS 226 218 257   NEUTROS ABS  --  4.61 5.35   IMMATURE GRANS (ABS)  --  0.03 0.03   LYMPHS ABS  --  2.93 2.97   MONOS ABS  --  0.81 0.82   EOS ABS  --  0.16 0.16   MCV 85.8 88.2 87.0         Lab 02/22/25  0252 02/19/25  0326 02/18/25  2108   SODIUM 137 140 140   POTASSIUM 4.4 4.3 4.4   CHLORIDE 102 107 105   CO2 18.8* 22.6 22.4   ANION GAP 16.2* 10.4 12.6   BUN 19 15 16    CREATININE 1.03 0.99 1.10   EGFR 83.7 87.8 77.3   GLUCOSE 93 145* 161*   CALCIUM 9.3 9.2 9.2   MAGNESIUM  --  2.4  --    PHOSPHORUS  --  4.0  --          Lab 02/19/25  0326   TOTAL PROTEIN 7.0   ALBUMIN 3.9   GLOBULIN 3.1   ALT (SGPT) 42*   AST (SGOT) 34   BILIRUBIN 0.2   ALK PHOS 102         Lab 02/18/25  2208 02/18/25  2108   HSTROP T 44* 46*                 Brief Urine Lab Results  (Last result in the past 365 days)        Color   Clarity   Blood   Leuk Est   Nitrite   Protein   CREAT   Urine HCG        02/07/25 1139             29.3               Microbiology Results (last 10 days)       Procedure Component Value - Date/Time    COVID-19, FLU A/B, RSV PCR 1 HR TAT - Swab, Nasopharynx [[id number]]  (Normal) Collected: 02/19/25 0017    Lab Status: Final result Specimen: Swab from Nasopharynx Updated: 02/19/25 0059     COVID19 Not Detected     Influenza A PCR Not Detected     Influenza B PCR Not Detected     RSV, PCR Not Detected    Narrative:      Fact sheet for providers: https://www.fda.gov/media/662820/download    Fact sheet for patients: https://www.fda.gov/media/785883/download    Test performed by PCR.            XR Chest 1 View    Result Date: 2/21/2025  Impression: Impression: Radiographically uncomplicated multichamber AICD. No pneumothorax. Electronically Signed: Nicholas Becerra MD  2/21/2025 5:49 PM EST  Workstation ID: [id number]    CT Head Without Contrast    Result Date: 2/18/2025  Impression: Impression: No acute intracranial findings. Electronically Signed: Bao Trujillo  2/18/2025 10:00 PM EST  Workstation ID: [id number]    XR Chest 1 View    Result Date: 2/18/2025  Impression: Impression: No radiographic evidence of acute cardiopulmonary abnormality. Electronically Signed: Bao Trujillo  2/18/2025 9:37 PM EST  Workstation ID: [id number]             Results for orders placed during the hospital encounter of 02/18/25    Adult Transthoracic Echo Complete w/ Color, Spectral and Contrast if Necessary  Per Protocol    Interpretation Summary    Left ventricular ejection fraction appears to be 56 - 60%.    The right ventricular cavity is moderately dilated.    The left atrial cavity is mildly dilated.    Moderate tricuspid valve regurgitation is present.    Estimated right ventricular systolic pressure from tricuspid regurgitation is moderately elevated (45-55 mmHg).      Labs Pending at Discharge:      Procedures Performed  Procedure(s):  Pacemaker DC new boston [employer]         Consults:   Consults       Date and Time Order Name Status Description    2/18/2025 10:21 PM Cardiology (on-call MD unless specified) Completed     2/18/2025  9:53 PM Hospitalist (on-call MD unless specified)                Discharge Details        Discharge Medications        New Medications        Instructions Start Date   cephalexin 500 MG capsule  Commonly known as: KEFLEX   500 mg, Oral, Every 8 Hours Scheduled             Continue These Medications        Instructions Start Date   Claritin 10 MG tablet  Generic drug: loratadine   10 mg, Daily      clotrimazole 1 % cream  Commonly known as: LOTRIMIN   1 Application, Topical, 2 Times Daily      [employer] Vitamin C 1000 MG tablet  Generic drug: ascorbic acid   4,000 mg, Daily      empagliflozin 25 MG tablet tablet  Commonly known as: Jardiance   25 mg, Oral, Daily      lansoprazole 30 MG capsule  Commonly known as: PREVACID   30 mg, Oral, Daily      losartan 25 MG tablet  Commonly known as: COZAAR   25 mg, Oral, Daily      NEURIVA PO   Daily      pseudoephedrine 120 MG 12 hr tablet  Commonly known as: SUDAFED   120 mg, Every Morning      rosuvastatin 10 MG tablet  Commonly known as: CRESTOR   10 mg, Oral, Daily      Semaglutide 3 MG tablet   3 mg, Oral, Daily               Allergies   Allergen Reactions    Lisinopril Nausea And Vomiting         Discharge Disposition:  Home or Self Care    Diet:  Hospital:  Diet Order   Procedures    Diet: Cardiac, Diabetic; Healthy Heart (2-3 Na+); Consistent  Carbohydrate; Fluid Consistency: Thin (IDDSI 0)         Discharge Activity:         CODE STATUS:  Code Status and Medical Interventions: CPR (Attempt to Resuscitate); Full Support   Ordered at: 02/18/25 1080     Code Status (Patient has no pulse and is not breathing):    CPR (Attempt to Resuscitate)     Medical Interventions (Patient has pulse or is breathing):    Full Support         Future Appointments   Date Time Provider Department Center   5/9/2025 11:00 AM Sharon Silva MD [facility]           Time spent on Discharge including face to face service:  44 minutes    Signature:    Electronically signed by Amanda Mcduffie DO, 02/22/25, 10:25 AM EST.      Part of this note may be an electronic transcription/translation of spoken language to printed text using the Dragon Dictation System.      Electronically signed by Amanda Mcduffie DO at 02/22/25 1026

## 2025-02-24 NOTE — OUTREACH NOTE
Call Center TCM Note      Flowsheet Row Responses   [facility] patient discharged from? [facility]   Does the patient have one of the following disease processes/diagnoses(primary or secondary)? Other   TCM attempt successful? Yes   Call start time 1636   Call end time 1637   Discharge diagnosis Symptomatic bradycardia-pacemaker this visit   Person spoke with today (if not patient) and relationship patient   Meds reviewed with patient/caregiver? Yes   Does the patient have all medications ordered at discharge? Yes   Prescription comments No concerns or questions noted.   Is the patient taking all medications as directed (includes completed medication regime)? Yes   Comments 3/6/2025 11:30 AM  HOSPITAL FOLLOW UP 30 min [facility] Sharon Silva MD   Does the patient have an appointment with their PCP within 7-14 days of discharge? Yes   Has home health visited the patient within 72 hours of discharge? N/A   Psychosocial issues? No   Did the patient receive a copy of their discharge instructions? Yes   Nursing interventions Reviewed instructions with patient   What is the patient's perception of their health status since discharge? Improving   Is the patient/caregiver able to teach back signs and symptoms related to disease process for when to call PCP? Yes   Is the patient/caregiver able to teach back signs and symptoms related to disease process for when to call 911? Yes   Is the patient/caregiver able to teach back the hierarchy of who to call/visit for symptoms/problems? PCP, Specialist, Home health nurse, Urgent Care, ED, 911 Yes   TCM call completed? Yes   Wrap up additional comments Patient reports doing well. Still has some mild pain. No s/s of infection noted. No other concerns or questions noted.   Call end time 1637   Would this patient benefit from a Referral to Amb Social Work? No   Is the patient interested in additional calls from an ambulatory ?  No            Cynthia Avery RN    2/24/2025, 16:37 EST

## 2025-02-24 NOTE — OUTREACH NOTE
Call Center TCM Note      Flowsheet Row Responses   [facility] patient discharged from? [facility]   Does the patient have one of the following disease processes/diagnoses(primary or secondary)? Other   TCM attempt successful? No   Unsuccessful attempts Attempt 1            Cynthia Avery RN    2/24/2025, 14:57 EST

## 2025-02-24 NOTE — TELEPHONE ENCOUNTER
Would you be ok with device check 3/6 @ 12?? I could work him in with [facility] same day.    Or I could offer 3/13 @ 1, but that is past the 10-14 days.

## 2025-02-25 ENCOUNTER — READMISSION MANAGEMENT (OUTPATIENT)
Dept: CALL CENTER | Facility: HOSPITAL | Age: 60
End: 2025-02-25
Payer: COMMERCIAL

## 2025-02-25 ENCOUNTER — APPOINTMENT (OUTPATIENT)
Dept: GENERAL RADIOLOGY | Facility: HOSPITAL | Age: 60
End: 2025-02-25
Payer: COMMERCIAL

## 2025-02-25 ENCOUNTER — APPOINTMENT (OUTPATIENT)
Dept: CT IMAGING | Facility: HOSPITAL | Age: 60
End: 2025-02-25
Payer: COMMERCIAL

## 2025-02-25 ENCOUNTER — NURSE TRIAGE (OUTPATIENT)
Dept: CALL CENTER | Facility: HOSPITAL | Age: 60
End: 2025-02-25
Payer: COMMERCIAL

## 2025-02-25 ENCOUNTER — HOSPITAL ENCOUNTER (INPATIENT)
Facility: HOSPITAL | Age: 60
LOS: 7 days | Discharge: HOME | End: 2025-03-04
Attending: EMERGENCY MEDICINE | Admitting: STUDENT IN AN ORGANIZED HEALTH CARE EDUCATION/TRAINING PROGRAM
Payer: COMMERCIAL

## 2025-02-25 DIAGNOSIS — T82.110A PACEMAKER LEAD MALFUNCTION, INITIAL ENCOUNTER: ICD-10-CM

## 2025-02-25 DIAGNOSIS — R00.1 SYMPTOMATIC BRADYCARDIA: ICD-10-CM

## 2025-02-25 DIAGNOSIS — I44.2 INTERMITTENT COMPLETE HEART BLOCK: ICD-10-CM

## 2025-02-25 DIAGNOSIS — R07.9 CHEST PAIN, UNSPECIFIED TYPE: ICD-10-CM

## 2025-02-25 DIAGNOSIS — R10.13 EPIGASTRIC PAIN: Primary | ICD-10-CM

## 2025-02-25 PROBLEM — R10.9 ABDOMINAL PAIN: Status: ACTIVE | Noted: 2025-02-25

## 2025-02-25 LAB
ANION GAP SERPL CALCULATED.3IONS-SCNC: 14 MMOL/L (ref 5–15)
B PARAPERT DNA SPEC QL NAA+PROBE: NOT DETECTED
B PERT DNA SPEC QL NAA+PROBE: NOT DETECTED
BASOPHILS # BLD AUTO: 0.06 10*3/MM3 (ref 0–0.2)
BASOPHILS NFR BLD AUTO: 0.5 % (ref 0–1.5)
BUN SERPL-MCNC: 9 MG/DL (ref 6–20)
BUN/CREAT SERPL: 9.5 (ref 7–25)
C PNEUM DNA NPH QL NAA+NON-PROBE: NOT DETECTED
CALCIUM SPEC-SCNC: 9.7 MG/DL (ref 8.6–10.5)
CHLORIDE SERPL-SCNC: 103 MMOL/L (ref 98–107)
CO2 SERPL-SCNC: 24 MMOL/L (ref 22–29)
CREAT SERPL-MCNC: 0.95 MG/DL (ref 0.76–1.27)
D DIMER PPP FEU-MCNC: 3.54 MCGFEU/ML (ref 0–0.59)
D-LACTATE SERPL-SCNC: 2.3 MMOL/L (ref 0.5–2)
DEPRECATED RDW RBC AUTO: 42.1 FL (ref 37–54)
EGFRCR SERPLBLD CKD-EPI 2021: 92.2 ML/MIN/1.73
EOSINOPHIL # BLD AUTO: 0.31 10*3/MM3 (ref 0–0.4)
EOSINOPHIL NFR BLD AUTO: 2.5 % (ref 0.3–6.2)
ERYTHROCYTE [DISTWIDTH] IN BLOOD BY AUTOMATED COUNT: 12.9 % (ref 12.3–15.4)
FLUAV SUBTYP SPEC NAA+PROBE: NOT DETECTED
FLUBV RNA ISLT QL NAA+PROBE: NOT DETECTED
GEN 5 1HR TROPONIN T REFLEX: 39 NG/L
GLUCOSE SERPL-MCNC: 139 MG/DL (ref 65–99)
HADV DNA SPEC NAA+PROBE: NOT DETECTED
HCOV 229E RNA SPEC QL NAA+PROBE: NOT DETECTED
HCOV HKU1 RNA SPEC QL NAA+PROBE: NOT DETECTED
HCOV NL63 RNA SPEC QL NAA+PROBE: NOT DETECTED
HCOV OC43 RNA SPEC QL NAA+PROBE: NOT DETECTED
HCT VFR BLD AUTO: 51.9 % (ref 37.5–51)
HGB BLD-MCNC: 17.1 G/DL (ref 13–17.7)
HMPV RNA NPH QL NAA+NON-PROBE: NOT DETECTED
HPIV1 RNA ISLT QL NAA+PROBE: NOT DETECTED
HPIV2 RNA SPEC QL NAA+PROBE: NOT DETECTED
HPIV3 RNA NPH QL NAA+PROBE: NOT DETECTED
HPIV4 P GENE NPH QL NAA+PROBE: NOT DETECTED
IMM GRANULOCYTES # BLD AUTO: 0.07 10*3/MM3 (ref 0–0.05)
IMM GRANULOCYTES NFR BLD AUTO: 0.6 % (ref 0–0.5)
LIPASE SERPL-CCNC: 26 U/L (ref 13–60)
LYMPHOCYTES # BLD AUTO: 2.05 10*3/MM3 (ref 0.7–3.1)
LYMPHOCYTES NFR BLD AUTO: 16.5 % (ref 19.6–45.3)
M PNEUMO IGG SER IA-ACNC: NOT DETECTED
MCH RBC QN AUTO: 29.1 PG (ref 26.6–33)
MCHC RBC AUTO-ENTMCNC: 32.9 G/DL (ref 31.5–35.7)
MCV RBC AUTO: 88.3 FL (ref 79–97)
MONOCYTES # BLD AUTO: 1.13 10*3/MM3 (ref 0.1–0.9)
MONOCYTES NFR BLD AUTO: 9.1 % (ref 5–12)
NEUTROPHILS NFR BLD AUTO: 70.8 % (ref 42.7–76)
NEUTROPHILS NFR BLD AUTO: 8.84 10*3/MM3 (ref 1.7–7)
NRBC BLD AUTO-RTO: 0 /100 WBC (ref 0–0.2)
PLATELET # BLD AUTO: 217 10*3/MM3 (ref 140–450)
PMV BLD AUTO: 9.2 FL (ref 6–12)
POTASSIUM SERPL-SCNC: 4 MMOL/L (ref 3.5–5.2)
PROCALCITONIN SERPL-MCNC: 0.08 NG/ML (ref 0–0.25)
RBC # BLD AUTO: 5.88 10*6/MM3 (ref 4.14–5.8)
RHINOVIRUS RNA SPEC NAA+PROBE: NOT DETECTED
RSV RNA NPH QL NAA+NON-PROBE: NOT DETECTED
SARS-COV-2 RNA RESP QL NAA+PROBE: NOT DETECTED
SODIUM SERPL-SCNC: 141 MMOL/L (ref 136–145)
TROPONIN T % DELTA: 3
TROPONIN T NUMERIC DELTA: 1 NG/L
TROPONIN T SERPL HS-MCNC: 38 NG/L
WBC NRBC COR # BLD AUTO: 12.46 10*3/MM3 (ref 3.4–10.8)
WHOLE BLOOD HOLD COAG: NORMAL

## 2025-02-25 PROCEDURE — 87040 BLOOD CULTURE FOR BACTERIA: CPT | Performed by: EMERGENCY MEDICINE

## 2025-02-25 PROCEDURE — G0378 HOSPITAL OBSERVATION PER HR: HCPCS

## 2025-02-25 PROCEDURE — 80048 BASIC METABOLIC PNL TOTAL CA: CPT | Performed by: EMERGENCY MEDICINE

## 2025-02-25 PROCEDURE — 74018 RADEX ABDOMEN 1 VIEW: CPT

## 2025-02-25 PROCEDURE — 25010000002 HYDROMORPHONE 1 MG/ML SOLUTION: Performed by: EMERGENCY MEDICINE

## 2025-02-25 PROCEDURE — 84145 PROCALCITONIN (PCT): CPT | Performed by: NURSE PRACTITIONER

## 2025-02-25 PROCEDURE — 71045 X-RAY EXAM CHEST 1 VIEW: CPT

## 2025-02-25 PROCEDURE — 36415 COLL VENOUS BLD VENIPUNCTURE: CPT

## 2025-02-25 PROCEDURE — 87040 BLOOD CULTURE FOR BACTERIA: CPT | Performed by: NURSE PRACTITIONER

## 2025-02-25 PROCEDURE — 83605 ASSAY OF LACTIC ACID: CPT | Performed by: NURSE PRACTITIONER

## 2025-02-25 PROCEDURE — 25510000001 IOPAMIDOL PER 1 ML: Performed by: EMERGENCY MEDICINE

## 2025-02-25 PROCEDURE — 71275 CT ANGIOGRAPHY CHEST: CPT

## 2025-02-25 PROCEDURE — 93005 ELECTROCARDIOGRAM TRACING: CPT | Performed by: EMERGENCY MEDICINE

## 2025-02-25 PROCEDURE — 25010000002 KETOROLAC TROMETHAMINE PER 15 MG: Performed by: EMERGENCY MEDICINE

## 2025-02-25 PROCEDURE — 83690 ASSAY OF LIPASE: CPT | Performed by: EMERGENCY MEDICINE

## 2025-02-25 PROCEDURE — 99285 EMERGENCY DEPT VISIT HI MDM: CPT

## 2025-02-25 PROCEDURE — 0202U NFCT DS 22 TRGT SARS-COV-2: CPT | Performed by: EMERGENCY MEDICINE

## 2025-02-25 PROCEDURE — 25010000002 HYDRALAZINE PER 20 MG: Performed by: NURSE PRACTITIONER

## 2025-02-25 PROCEDURE — 25010000002 CEFTRIAXONE PER 250 MG: Performed by: EMERGENCY MEDICINE

## 2025-02-25 PROCEDURE — 74177 CT ABD & PELVIS W/CONTRAST: CPT

## 2025-02-25 PROCEDURE — 85379 FIBRIN DEGRADATION QUANT: CPT | Performed by: EMERGENCY MEDICINE

## 2025-02-25 PROCEDURE — 85025 COMPLETE CBC W/AUTO DIFF WBC: CPT | Performed by: EMERGENCY MEDICINE

## 2025-02-25 PROCEDURE — 25010000002 HYDRALAZINE PER 20 MG: Performed by: INTERNAL MEDICINE

## 2025-02-25 PROCEDURE — 84484 ASSAY OF TROPONIN QUANT: CPT | Performed by: EMERGENCY MEDICINE

## 2025-02-25 RX ORDER — BISACODYL 5 MG/1
5 TABLET, DELAYED RELEASE ORAL DAILY PRN
Status: DISCONTINUED | OUTPATIENT
Start: 2025-02-25 | End: 2025-03-04 | Stop reason: HOSPADM

## 2025-02-25 RX ORDER — PANTOPRAZOLE SODIUM 40 MG/1
40 TABLET, DELAYED RELEASE ORAL
Status: DISCONTINUED | OUTPATIENT
Start: 2025-02-26 | End: 2025-02-26

## 2025-02-25 RX ORDER — KETOROLAC TROMETHAMINE 30 MG/ML
15 INJECTION, SOLUTION INTRAMUSCULAR; INTRAVENOUS ONCE
Status: COMPLETED | OUTPATIENT
Start: 2025-02-25 | End: 2025-02-25

## 2025-02-25 RX ORDER — HYDRALAZINE HYDROCHLORIDE 20 MG/ML
10 INJECTION INTRAMUSCULAR; INTRAVENOUS EVERY 6 HOURS PRN
Status: DISCONTINUED | OUTPATIENT
Start: 2025-02-25 | End: 2025-03-04 | Stop reason: HOSPADM

## 2025-02-25 RX ORDER — CEPHALEXIN 500 MG/1
500 CAPSULE ORAL EVERY 8 HOURS SCHEDULED
Status: DISCONTINUED | OUTPATIENT
Start: 2025-02-25 | End: 2025-02-25

## 2025-02-25 RX ORDER — NITROGLYCERIN 0.4 MG/1
0.4 TABLET SUBLINGUAL
Status: DISCONTINUED | OUTPATIENT
Start: 2025-02-25 | End: 2025-03-04 | Stop reason: HOSPADM

## 2025-02-25 RX ORDER — AMOXICILLIN 250 MG
2 CAPSULE ORAL 2 TIMES DAILY PRN
Status: DISCONTINUED | OUTPATIENT
Start: 2025-02-25 | End: 2025-03-04 | Stop reason: HOSPADM

## 2025-02-25 RX ORDER — SODIUM CHLORIDE 0.9 % (FLUSH) 0.9 %
10 SYRINGE (ML) INJECTION AS NEEDED
Status: DISCONTINUED | OUTPATIENT
Start: 2025-02-25 | End: 2025-03-04 | Stop reason: HOSPADM

## 2025-02-25 RX ORDER — ACETAMINOPHEN 325 MG/1
650 TABLET ORAL EVERY 4 HOURS PRN
Status: DISCONTINUED | OUTPATIENT
Start: 2025-02-25 | End: 2025-03-04 | Stop reason: HOSPADM

## 2025-02-25 RX ORDER — ONDANSETRON 4 MG/1
4 TABLET, ORALLY DISINTEGRATING ORAL EVERY 6 HOURS PRN
Status: DISCONTINUED | OUTPATIENT
Start: 2025-02-25 | End: 2025-03-04 | Stop reason: HOSPADM

## 2025-02-25 RX ORDER — LOSARTAN POTASSIUM 25 MG/1
25 TABLET ORAL DAILY
Status: DISCONTINUED | OUTPATIENT
Start: 2025-02-25 | End: 2025-03-04 | Stop reason: HOSPADM

## 2025-02-25 RX ORDER — SODIUM CHLORIDE 9 MG/ML
40 INJECTION, SOLUTION INTRAVENOUS AS NEEDED
Status: DISCONTINUED | OUTPATIENT
Start: 2025-02-25 | End: 2025-03-04 | Stop reason: HOSPADM

## 2025-02-25 RX ORDER — SODIUM CHLORIDE 0.9 % (FLUSH) 0.9 %
10 SYRINGE (ML) INJECTION EVERY 12 HOURS SCHEDULED
Status: DISCONTINUED | OUTPATIENT
Start: 2025-02-25 | End: 2025-03-04 | Stop reason: HOSPADM

## 2025-02-25 RX ORDER — ONDANSETRON 2 MG/ML
4 INJECTION INTRAMUSCULAR; INTRAVENOUS EVERY 6 HOURS PRN
Status: DISCONTINUED | OUTPATIENT
Start: 2025-02-25 | End: 2025-03-04 | Stop reason: HOSPADM

## 2025-02-25 RX ORDER — ACETAMINOPHEN 160 MG/5ML
650 SOLUTION ORAL EVERY 4 HOURS PRN
Status: DISCONTINUED | OUTPATIENT
Start: 2025-02-25 | End: 2025-03-04 | Stop reason: HOSPADM

## 2025-02-25 RX ORDER — ACETAMINOPHEN 650 MG/1
650 SUPPOSITORY RECTAL EVERY 4 HOURS PRN
Status: DISCONTINUED | OUTPATIENT
Start: 2025-02-25 | End: 2025-03-04 | Stop reason: HOSPADM

## 2025-02-25 RX ORDER — POLYETHYLENE GLYCOL 3350 17 G/17G
17 POWDER, FOR SOLUTION ORAL DAILY PRN
Status: DISCONTINUED | OUTPATIENT
Start: 2025-02-25 | End: 2025-03-04 | Stop reason: HOSPADM

## 2025-02-25 RX ORDER — ROSUVASTATIN CALCIUM 10 MG/1
10 TABLET, COATED ORAL DAILY
Status: DISCONTINUED | OUTPATIENT
Start: 2025-02-25 | End: 2025-03-04 | Stop reason: HOSPADM

## 2025-02-25 RX ORDER — ALUMINA, MAGNESIA, AND SIMETHICONE 2400; 2400; 240 MG/30ML; MG/30ML; MG/30ML
15 SUSPENSION ORAL EVERY 6 HOURS PRN
Status: DISCONTINUED | OUTPATIENT
Start: 2025-02-25 | End: 2025-03-04 | Stop reason: HOSPADM

## 2025-02-25 RX ORDER — CETIRIZINE HYDROCHLORIDE 10 MG/1
10 TABLET ORAL DAILY
Status: DISCONTINUED | OUTPATIENT
Start: 2025-02-25 | End: 2025-03-04 | Stop reason: HOSPADM

## 2025-02-25 RX ORDER — BISACODYL 10 MG
10 SUPPOSITORY, RECTAL RECTAL DAILY PRN
Status: DISCONTINUED | OUTPATIENT
Start: 2025-02-25 | End: 2025-03-04 | Stop reason: HOSPADM

## 2025-02-25 RX ORDER — IOPAMIDOL 755 MG/ML
100 INJECTION, SOLUTION INTRAVASCULAR
Status: COMPLETED | OUTPATIENT
Start: 2025-02-25 | End: 2025-02-25

## 2025-02-25 RX ADMIN — KETOROLAC TROMETHAMINE 15 MG: 30 INJECTION, SOLUTION INTRAMUSCULAR at 11:33

## 2025-02-25 RX ADMIN — HYDRALAZINE HYDROCHLORIDE 10 MG: 20 INJECTION INTRAMUSCULAR; INTRAVENOUS at 20:42

## 2025-02-25 RX ADMIN — HYDROMORPHONE HYDROCHLORIDE 0.5 MG: 1 INJECTION, SOLUTION INTRAMUSCULAR; INTRAVENOUS; SUBCUTANEOUS at 13:24

## 2025-02-25 RX ADMIN — CEFTRIAXONE SODIUM 1 G: 1 INJECTION, POWDER, FOR SOLUTION INTRAMUSCULAR; INTRAVENOUS at 23:35

## 2025-02-25 RX ADMIN — IOPAMIDOL 100 ML: 755 INJECTION, SOLUTION INTRAVENOUS at 14:59

## 2025-02-25 RX ADMIN — HYDROMORPHONE HYDROCHLORIDE 0.5 MG: 1 INJECTION, SOLUTION INTRAMUSCULAR; INTRAVENOUS; SUBCUTANEOUS at 11:33

## 2025-02-25 RX ADMIN — ACETAMINOPHEN 650 MG: 650 SUPPOSITORY RECTAL at 20:42

## 2025-02-25 NOTE — TELEPHONE ENCOUNTER
Epigastric pain s/p pacer on Friday. Anterior bilateral shoulder pain. Neck sore. Cannot get deep breath. Ears are ringing. Head throbbing. Chest pain and pressure. Due to multiple factors with no known etiology for certain, advised to be seen in ED asap via immediate ride or he should call an ambulance. Verbalized understanding.     Reason for Disposition   Sounds like a life-threatening emergency to the triager    Additional Information   Negative: CARDIAC ARREST suspected   Negative: Breathing stopped   Negative: Anaphylactic reaction (life-threatening allergic reaction)   Negative: Asthma attack, severe (e.g., struggling for each breath, unable to speak)   Negative: Bleeding (severe) from skin AND can't be stopped   Negative: Bleeding (severe) from nose, mouth, vomiting, anus, vagina AND can't be stopped   Negative: Breathing difficulty, severe (e.g., struggling for each breath, unable to speak)   Negative: Burn, severe (e.g., burn area larger than 20 palms of hand [>10% BSA] with blisters)   Negative: Choking, severe (e.g., unable to breathe, talk)   Negative: Coma (e.g., unconscious, not responding to verbal or painful stimulus)   Negative: Cyanosis (bluish or gray lips or face)   Negative: Dehydration, severe (e.g., cold/pale/clammy skin, too weak to stand)   Negative: Drowning, near   Negative: Electrical shock, severe   Negative: Heart attack suspected   Negative: Homicidal threat   Negative: Hyperthermia (from heat exposure) > 105 F (40.5 C) rectally or > 104 F (40.0 C) orally   Negative: Hypothermia (from cold exposure) < 95 F (35 C) rectally or < 94 F (34.4 C) orally   Negative: Lightning strike injury   Negative: Meningococcal sepsis suspected (purple spots/dots with fever)   Negative: Mental status altered (confusion) now   Negative: Neck trauma, major (e.g., MVA, diving, trampoline, contact sports, fall > 10 feet or 3 meters, hanging)   Negative: Penetrating wound of chest or abdomen   Negative:  "Purpura/petechiae with fever (purple spots/dots with fever)   Negative: Respiratory distress, severe (e.g., struggling for each breath, unable to speak)   Negative: Seizure lasts > 5 minutes or first seizure ever   Negative: Seizure caused by head injury   Negative: Shock suspected (e.g., cold/pale/clammy skin, too weak to stand, low BP, rapid pulse)   Negative: Stroke suspected (e.g., sudden onset of weakness of the face, arm or leg on one side of the body)   Negative: Suicide attempt   Negative: Trauma, severe   Negative: Weakness, severe (i.e., unable to walk or barely able to walk, requires support) AND new-onset or worsening    Answer Assessment - Initial Assessment Questions  1. SYMPTOMS: \"What is the most serious symptom?\"  Epigastric pain s/p pacer on Friday. Anterior bilateral shoulder pain. Neck sore. Cannot get deep breath. Ears are ringing. Head throbbing. Chest pain and pressure.    Protocols used: 911 Symptoms-ADULT-    "

## 2025-02-25 NOTE — ED PROVIDER NOTES
Subjective   History of Present Illness  Patient is a 59-year-old male complaining of chest and epigastric pain that developed throughout the day today.  Nuys cough fever shortness of breath Isra diarrhea dysuria or other associated complaints      Review of Systems    Past Medical History:   Diagnosis Date    Allergic 1988    5+ molds, pollens, grass    Diabetes mellitus     Not sure like     Diverticulitis     Diverticulosis     Mot sure     GERD (gastroesophageal reflux disease)     Hypertension     Kidney stone     Not dure     Obesity     Not sure        Allergies   Allergen Reactions    Lisinopril Nausea And Vomiting       Past Surgical History:   Procedure Laterality Date    CARDIAC ELECTROPHYSIOLOGY PROCEDURE N/A 2025    Procedure: Pacemaker DC new boston aware;  Surgeon: Dez Loco MD;  Location: [facility] INVASIVE LOCATION;  Service: Cardiovascular;  Laterality: N/A;    HERNIA REPAIR         Family History   Problem Relation Age of Onset    Anxiety disorder Mother     Arthritis Mother     Depression Mother     Diabetes Mother     Hyperlipidemia Mother     Kidney disease Mother         Kidney stones    Cancer Father          of cancer in 2009    Diabetes Father        Social History     Socioeconomic History    Marital status:    Tobacco Use    Smoking status: Never    Smokeless tobacco: Never   Vaping Use    Vaping status: Never Used   Substance and Sexual Activity    Alcohol use: Never    Drug use: Never    Sexual activity: Yes     Partners: Female           Objective   Physical Exam  Neck has no adenopathy JVD or bruits.  Lungs clear.  Heart has regular rate rhythm without murmur rub or gallop.  Chest is nontender.  Abdomen soft with mild epigastric tenderness.  Patient has normal bowel sounds without rebound or guard.  Back has no CVA tenderness.  Procedures           ED Course      Results for orders placed or performed during the hospital encounter  of 02/25/25   ECG 12 Lead Chest Pain    Collection Time: 02/25/25 11:24 AM   Result Value Ref Range    QT Interval 368 ms    QTC Interval 455 ms   Respiratory Panel PCR w/COVID-19(SARS-CoV-2) MAC/DEVIN/RALPH/PAD/COR/TANNER In-House, NP Swab in UTM/VTM, 2 HR TAT - Swab, Nasopharynx    Collection Time: 02/25/25 11:31 AM    Specimen: Nasopharynx; Swab   Result Value Ref Range    ADENOVIRUS, PCR Not Detected Not Detected    Coronavirus 229E Not Detected Not Detected    Coronavirus HKU1 Not Detected Not Detected    Coronavirus NL63 Not Detected Not Detected    Coronavirus OC43 Not Detected Not Detected    COVID19 Not Detected Not Detected - Ref. Range    Human Metapneumovirus Not Detected Not Detected    Human Rhinovirus/Enterovirus Not Detected Not Detected    Influenza A PCR Not Detected Not Detected    Influenza B PCR Not Detected Not Detected    Parainfluenza Virus 1 Not Detected Not Detected    Parainfluenza Virus 2 Not Detected Not Detected    Parainfluenza Virus 3 Not Detected Not Detected    Parainfluenza Virus 4 Not Detected Not Detected    RSV, PCR Not Detected Not Detected    Bordetella pertussis pcr Not Detected Not Detected    Bordetella parapertussis PCR Not Detected Not Detected    Chlamydophila pneumoniae PCR Not Detected Not Detected    Mycoplasma pneumo by PCR Not Detected Not Detected   Basic Metabolic Panel    Collection Time: 02/25/25 11:32 AM    Specimen: Blood   Result Value Ref Range    Glucose 139 (H) 65 - 99 mg/dL    BUN 9 6 - 20 mg/dL    Creatinine 0.95 0.76 - 1.27 mg/dL    Sodium 141 136 - 145 mmol/L    Potassium 4.0 3.5 - 5.2 mmol/L    Chloride 103 98 - 107 mmol/L    CO2 24.0 22.0 - 29.0 mmol/L    Calcium 9.7 8.6 - 10.5 mg/dL    BUN/Creatinine Ratio 9.5 7.0 - 25.0    Anion Gap 14.0 5.0 - 15.0 mmol/L    eGFR 92.2 >60.0 mL/min/1.73   High Sensitivity Troponin T    Collection Time: 02/25/25 11:32 AM    Specimen: Blood   Result Value Ref Range    HS Troponin T 38 (H) <22 ng/L   D-dimer, Quantitative     Collection Time: 02/25/25 11:32 AM    Specimen: Blood   Result Value Ref Range    D-Dimer, Quantitative 3.54 (H) 0.00 - 0.59 MCGFEU/mL   CBC Auto Differential    Collection Time: 02/25/25 11:32 AM    Specimen: Blood   Result Value Ref Range    WBC 12.46 (H) 3.40 - 10.80 10*3/mm3    RBC 5.88 (H) 4.14 - 5.80 10*6/mm3    Hemoglobin 17.1 13.0 - 17.7 g/dL    Hematocrit 51.9 (H) 37.5 - 51.0 %    MCV 88.3 79.0 - 97.0 fL    MCH 29.1 26.6 - 33.0 pg    MCHC 32.9 31.5 - 35.7 g/dL    RDW 12.9 12.3 - 15.4 %    RDW-SD 42.1 37.0 - 54.0 fl    MPV 9.2 6.0 - 12.0 fL    Platelets 217 140 - 450 10*3/mm3    Neutrophil % 70.8 42.7 - 76.0 %    Lymphocyte % 16.5 (L) 19.6 - 45.3 %    Monocyte % 9.1 5.0 - 12.0 %    Eosinophil % 2.5 0.3 - 6.2 %    Basophil % 0.5 0.0 - 1.5 %    Immature Grans % 0.6 (H) 0.0 - 0.5 %    Neutrophils, Absolute 8.84 (H) 1.70 - 7.00 10*3/mm3    Lymphocytes, Absolute 2.05 0.70 - 3.10 10*3/mm3    Monocytes, Absolute 1.13 (H) 0.10 - 0.90 10*3/mm3    Eosinophils, Absolute 0.31 0.00 - 0.40 10*3/mm3    Basophils, Absolute 0.06 0.00 - 0.20 10*3/mm3    Immature Grans, Absolute 0.07 (H) 0.00 - 0.05 10*3/mm3    nRBC 0.0 0.0 - 0.2 /100 WBC   Light Blue Top    Collection Time: 02/25/25 11:32 AM   Result Value Ref Range    Extra Tube Hold for add-ons.    High Sensitivity Troponin T 1Hr    Collection Time: 02/25/25 12:45 PM    Specimen: Blood   Result Value Ref Range    HS Troponin T 39 (H) <22 ng/L    Troponin T Numeric Delta 1 ng/L    Troponin T % Delta 3 Abnormal if >/= 20%   Lipase    Collection Time: 02/25/25 12:45 PM    Specimen: Blood   Result Value Ref Range    Lipase 26 13 - 60 U/L       CT Angiogram Chest Pulmonary Embolism    Result Date: 2/25/2025  Impression: No evidence of pulmonary embolism. Small left pleural effusion. Mild bilateral dependent atelectasis. Moderate gastric distention with air and fluid. This may be transient however, gastroparesis cannot be excluded. Correlate with patient symptoms. This can be  further evaluated with nuclear medicine gastric emptying study as clinically warranted. If there  is concern for gastric outlet obstruction, correlate with endoscopy. Moderately distended urinary bladder. Correlate for urinary retention. Chronic findings as above. Electronically Signed: Charles Santo MD  2/25/2025 3:14 PM EST  Workstation ID: [id number]    CT Abdomen Pelvis With Contrast    Result Date: 2/25/2025  Impression: No evidence of pulmonary embolism. Small left pleural effusion. Mild bilateral dependent atelectasis. Moderate gastric distention with air and fluid. This may be transient however, gastroparesis cannot be excluded. Correlate with patient symptoms. This can be further evaluated with nuclear medicine gastric emptying study as clinically warranted. If there  is concern for gastric outlet obstruction, correlate with endoscopy. Moderately distended urinary bladder. Correlate for urinary retention. Chronic findings as above. Electronically Signed: Charles Santo MD  2/25/2025 3:14 PM EST  Workstation ID: [id number]    XR Chest 1 View    Result Date: 2/25/2025  Impression: Low lung volumes with probable mild bibasal atelectasis. Correlate for infection. Electronically Signed: Nicholas Becerra MD  2/25/2025 12:17 PM EST  Workstation ID: [id number]                                                      Medical Decision Making  Tryptase patient CT scan chest PE protocol shows no evidence of pulmonary embolism with no other acute abnormality.  CT scan and pelvis without contrast shows no perforation obstruction with acute abnormality.  Chest x-ray shows no cardiomegaly fusion or infiltrate.  Strep panel is negative.  BMP shows no renal insufficiency or electrolyte abnormality.  Troponins are unchanged and serial enzymes D-dimer is 3.54.  Patient had leukocytosis with no left shift and no anemia.  Patient NG tube placed to low wall suction due to gastric distention with fluid-filled stomach.  This did provide some  relief of discomfort.  Will be placed in ED observation for further evaluation.    Amount and/or Complexity of Data Reviewed  Labs: ordered. Decision-making details documented in ED Course.  Radiology: ordered and independent interpretation performed.  ECG/medicine tests: ordered and independent interpretation performed.    Risk  Prescription drug management.  Decision regarding hospitalization.        Final diagnoses:   Epigastric pain   Chest pain, unspecified type       ED Disposition  ED Disposition       ED Disposition   Intended Admit    Condition   --    Comment   --               No follow-up provider specified.       Medication List      No changes were made to your prescriptions during this visit.            Jeromy Blake MD  02/25/25 [id number]

## 2025-02-26 ENCOUNTER — INPATIENT HOSPITAL (OUTPATIENT)
Dept: URBAN - METROPOLITAN AREA HOSPITAL 84 | Facility: HOSPITAL | Age: 60
End: 2025-02-26
Payer: COMMERCIAL

## 2025-02-26 ENCOUNTER — ANESTHESIA (OUTPATIENT)
Dept: GASTROENTEROLOGY | Facility: HOSPITAL | Age: 60
End: 2025-02-26
Payer: COMMERCIAL

## 2025-02-26 ENCOUNTER — APPOINTMENT (OUTPATIENT)
Dept: CARDIOLOGY | Facility: HOSPITAL | Age: 60
End: 2025-02-26
Payer: COMMERCIAL

## 2025-02-26 ENCOUNTER — ANESTHESIA EVENT (OUTPATIENT)
Dept: GASTROENTEROLOGY | Facility: HOSPITAL | Age: 60
End: 2025-02-26
Payer: COMMERCIAL

## 2025-02-26 DIAGNOSIS — R10.13 EPIGASTRIC PAIN: ICD-10-CM

## 2025-02-26 DIAGNOSIS — K29.50 UNSPECIFIED CHRONIC GASTRITIS WITHOUT BLEEDING: ICD-10-CM

## 2025-02-26 DIAGNOSIS — K20.90 ESOPHAGITIS, UNSPECIFIED WITHOUT BLEEDING: ICD-10-CM

## 2025-02-26 LAB
ANION GAP SERPL CALCULATED.3IONS-SCNC: 13.9 MMOL/L (ref 5–15)
BASOPHILS # BLD AUTO: 0.07 10*3/MM3 (ref 0–0.2)
BASOPHILS NFR BLD AUTO: 0.4 % (ref 0–1.5)
BUN SERPL-MCNC: 14 MG/DL (ref 6–20)
BUN/CREAT SERPL: 15.4 (ref 7–25)
CALCIUM SPEC-SCNC: 9.7 MG/DL (ref 8.6–10.5)
CHLORIDE SERPL-SCNC: 101 MMOL/L (ref 98–107)
CO2 SERPL-SCNC: 22.1 MMOL/L (ref 22–29)
CREAT SERPL-MCNC: 0.91 MG/DL (ref 0.76–1.27)
D-LACTATE SERPL-SCNC: 2 MMOL/L (ref 0.5–2)
DEPRECATED RDW RBC AUTO: 42.5 FL (ref 37–54)
EGFRCR SERPLBLD CKD-EPI 2021: 97.1 ML/MIN/1.73
EOSINOPHIL # BLD AUTO: 0.01 10*3/MM3 (ref 0–0.4)
EOSINOPHIL NFR BLD AUTO: 0.1 % (ref 0.3–6.2)
ERYTHROCYTE [DISTWIDTH] IN BLOOD BY AUTOMATED COUNT: 13.1 % (ref 12.3–15.4)
GLUCOSE SERPL-MCNC: 133 MG/DL (ref 65–99)
HCT VFR BLD AUTO: 50.2 % (ref 37.5–51)
HGB BLD-MCNC: 16.6 G/DL (ref 13–17.7)
IMM GRANULOCYTES # BLD AUTO: 0.1 10*3/MM3 (ref 0–0.05)
IMM GRANULOCYTES NFR BLD AUTO: 0.5 % (ref 0–0.5)
LYMPHOCYTES # BLD AUTO: 1.5 10*3/MM3 (ref 0.7–3.1)
LYMPHOCYTES NFR BLD AUTO: 8.2 % (ref 19.6–45.3)
MCH RBC QN AUTO: 29.1 PG (ref 26.6–33)
MCHC RBC AUTO-ENTMCNC: 33.1 G/DL (ref 31.5–35.7)
MCV RBC AUTO: 88.1 FL (ref 79–97)
MONOCYTES # BLD AUTO: 2.16 10*3/MM3 (ref 0.1–0.9)
MONOCYTES NFR BLD AUTO: 11.8 % (ref 5–12)
NEUTROPHILS NFR BLD AUTO: 14.48 10*3/MM3 (ref 1.7–7)
NEUTROPHILS NFR BLD AUTO: 79 % (ref 42.7–76)
NRBC BLD AUTO-RTO: 0 /100 WBC (ref 0–0.2)
PLATELET # BLD AUTO: 228 10*3/MM3 (ref 140–450)
PMV BLD AUTO: 9.4 FL (ref 6–12)
POTASSIUM SERPL-SCNC: 4.4 MMOL/L (ref 3.5–5.2)
QT INTERVAL: 368 MS
QTC INTERVAL: 455 MS
RBC # BLD AUTO: 5.7 10*6/MM3 (ref 4.14–5.8)
SODIUM SERPL-SCNC: 137 MMOL/L (ref 136–145)
WBC NRBC COR # BLD AUTO: 18.32 10*3/MM3 (ref 3.4–10.8)

## 2025-02-26 PROCEDURE — 93308 TTE F-UP OR LMTD: CPT

## 2025-02-26 PROCEDURE — 85025 COMPLETE CBC W/AUTO DIFF WBC: CPT | Performed by: NURSE PRACTITIONER

## 2025-02-26 PROCEDURE — 43235 EGD DIAGNOSTIC BRUSH WASH: CPT | Performed by: INTERNAL MEDICINE

## 2025-02-26 PROCEDURE — 25010000002 FENTANYL CITRATE (PF) 100 MCG/2ML SOLUTION: Performed by: NURSE ANESTHETIST, CERTIFIED REGISTERED

## 2025-02-26 PROCEDURE — 93308 TTE F-UP OR LMTD: CPT | Performed by: INTERNAL MEDICINE

## 2025-02-26 PROCEDURE — 25810000003 SODIUM CHLORIDE 0.9 % SOLUTION: Performed by: PHYSICIAN ASSISTANT

## 2025-02-26 PROCEDURE — 80048 BASIC METABOLIC PNL TOTAL CA: CPT | Performed by: NURSE PRACTITIONER

## 2025-02-26 PROCEDURE — 25010000002 METOCLOPRAMIDE PER 10 MG: Performed by: INTERNAL MEDICINE

## 2025-02-26 PROCEDURE — 25010000002 PROPOFOL 200 MG/20ML EMULSION: Performed by: NURSE ANESTHETIST, CERTIFIED REGISTERED

## 2025-02-26 PROCEDURE — 99222 1ST HOSP IP/OBS MODERATE 55: CPT | Performed by: INTERNAL MEDICINE

## 2025-02-26 PROCEDURE — 25010000002 PHENYLEPHRINE 10 MG/ML SOLUTION: Performed by: NURSE ANESTHETIST, CERTIFIED REGISTERED

## 2025-02-26 PROCEDURE — 25010000002 CEFTRIAXONE PER 250 MG: Performed by: PHYSICIAN ASSISTANT

## 2025-02-26 PROCEDURE — 25010000002 LIDOCAINE PF 1% 1 % SOLUTION: Performed by: NURSE ANESTHETIST, CERTIFIED REGISTERED

## 2025-02-26 PROCEDURE — 25010000002 SUCCINYLCHOLINE PER 20 MG: Performed by: NURSE ANESTHETIST, CERTIFIED REGISTERED

## 2025-02-26 PROCEDURE — 25010000002 METOCLOPRAMIDE PER 10 MG: Performed by: NURSE PRACTITIONER

## 2025-02-26 PROCEDURE — 25010000002 CEFTRIAXONE PER 250 MG: Performed by: INTERNAL MEDICINE

## 2025-02-26 PROCEDURE — 0DJ08ZZ INSPECTION OF UPPER INTESTINAL TRACT, VIA NATURAL OR ARTIFICIAL OPENING ENDOSCOPIC: ICD-10-PCS | Performed by: INTERNAL MEDICINE

## 2025-02-26 RX ORDER — PANTOPRAZOLE SODIUM 40 MG/1
40 TABLET, DELAYED RELEASE ORAL
Status: DISCONTINUED | OUTPATIENT
Start: 2025-02-26 | End: 2025-03-04 | Stop reason: HOSPADM

## 2025-02-26 RX ORDER — SUCCINYLCHOLINE CHLORIDE 20 MG/ML
INJECTION INTRAMUSCULAR; INTRAVENOUS AS NEEDED
Status: DISCONTINUED | OUTPATIENT
Start: 2025-02-26 | End: 2025-02-26 | Stop reason: SURG

## 2025-02-26 RX ORDER — LIDOCAINE HYDROCHLORIDE 10 MG/ML
INJECTION, SOLUTION EPIDURAL; INFILTRATION; INTRACAUDAL; PERINEURAL AS NEEDED
Status: DISCONTINUED | OUTPATIENT
Start: 2025-02-26 | End: 2025-02-26 | Stop reason: SURG

## 2025-02-26 RX ORDER — METOCLOPRAMIDE HYDROCHLORIDE 5 MG/ML
5 INJECTION INTRAMUSCULAR; INTRAVENOUS EVERY 6 HOURS
Status: DISCONTINUED | OUTPATIENT
Start: 2025-02-26 | End: 2025-02-26

## 2025-02-26 RX ORDER — ONDANSETRON 2 MG/ML
4 INJECTION INTRAMUSCULAR; INTRAVENOUS EVERY 6 HOURS PRN
Status: DISCONTINUED | OUTPATIENT
Start: 2025-02-26 | End: 2025-03-04 | Stop reason: HOSPADM

## 2025-02-26 RX ORDER — PHENYLEPHRINE HYDROCHLORIDE 10 MG/ML
INJECTION INTRAVENOUS AS NEEDED
Status: DISCONTINUED | OUTPATIENT
Start: 2025-02-26 | End: 2025-02-26 | Stop reason: SURG

## 2025-02-26 RX ORDER — PROPOFOL 10 MG/ML
INJECTION, EMULSION INTRAVENOUS AS NEEDED
Status: DISCONTINUED | OUTPATIENT
Start: 2025-02-26 | End: 2025-02-26 | Stop reason: SURG

## 2025-02-26 RX ORDER — FENTANYL CITRATE 50 UG/ML
INJECTION, SOLUTION INTRAMUSCULAR; INTRAVENOUS AS NEEDED
Status: DISCONTINUED | OUTPATIENT
Start: 2025-02-26 | End: 2025-02-26 | Stop reason: SURG

## 2025-02-26 RX ORDER — ONDANSETRON 4 MG/1
4 TABLET, ORALLY DISINTEGRATING ORAL EVERY 6 HOURS PRN
Status: DISCONTINUED | OUTPATIENT
Start: 2025-02-26 | End: 2025-03-04 | Stop reason: HOSPADM

## 2025-02-26 RX ORDER — METOCLOPRAMIDE HYDROCHLORIDE 5 MG/ML
5 INJECTION INTRAMUSCULAR; INTRAVENOUS
Status: DISCONTINUED | OUTPATIENT
Start: 2025-02-26 | End: 2025-03-03

## 2025-02-26 RX ADMIN — CEFTRIAXONE 2 G: 2 INJECTION, POWDER, FOR SOLUTION INTRAMUSCULAR; INTRAVENOUS at 10:40

## 2025-02-26 RX ADMIN — LIDOCAINE HYDROCHLORIDE 40 MG: 10 INJECTION, SOLUTION EPIDURAL; INFILTRATION; INTRACAUDAL; PERINEURAL at 14:42

## 2025-02-26 RX ADMIN — SUCCINYLCHOLINE CHLORIDE 120 MG: 20 INJECTION, SOLUTION INTRAMUSCULAR; INTRAVENOUS at 14:42

## 2025-02-26 RX ADMIN — PROPOFOL 150 MG: 10 INJECTION, EMULSION INTRAVENOUS at 14:42

## 2025-02-26 RX ADMIN — PHENYLEPHRINE HYDROCHLORIDE 100 MCG: 10 INJECTION INTRAVENOUS at 14:59

## 2025-02-26 RX ADMIN — FENTANYL CITRATE 25 MCG: 50 INJECTION, SOLUTION INTRAMUSCULAR; INTRAVENOUS at 14:42

## 2025-02-26 RX ADMIN — PHENYLEPHRINE HYDROCHLORIDE 100 MCG: 10 INJECTION INTRAVENOUS at 15:04

## 2025-02-26 RX ADMIN — Medication 10 ML: at 20:36

## 2025-02-26 RX ADMIN — METOCLOPRAMIDE 5 MG: 5 INJECTION, SOLUTION INTRAMUSCULAR; INTRAVENOUS at 17:04

## 2025-02-26 RX ADMIN — METOCLOPRAMIDE 5 MG: 5 INJECTION, SOLUTION INTRAMUSCULAR; INTRAVENOUS at 10:40

## 2025-02-26 RX ADMIN — PANTOPRAZOLE SODIUM 40 MG: 40 TABLET, DELAYED RELEASE ORAL at 17:04

## 2025-02-26 RX ADMIN — SODIUM CHLORIDE 50 ML/HR: 9 INJECTION, SOLUTION INTRAVENOUS at 14:38

## 2025-02-26 RX ADMIN — Medication 10 ML: at 08:00

## 2025-02-26 RX ADMIN — SODIUM CHLORIDE 500 ML: 9 INJECTION, SOLUTION INTRAVENOUS at 10:40

## 2025-02-26 NOTE — ANESTHESIA PROCEDURE NOTES
Airway  Urgency: elective    Date/Time: 2/26/2025 2:43 PM  Airway not difficult    General Information and Staff    Patient location during procedure: OR    Indications and Patient Condition  Indications for airway management: airway protection    Preoxygenated: yes  MILS maintained throughout  Mask difficulty assessment: 1 - vent by mask    Final Airway Details  Final airway type: endotracheal airway      Successful airway: ETT  Cuffed: yes   Successful intubation technique: RSI and video laryngoscopy  Facilitating devices/methods: intubating stylet  Endotracheal tube insertion site: oral  Blade: Clark  Blade size: 3  ETT size (mm): 7.0  Cormack-Lehane Classification: grade I - full view of glottis  Placement verified by: chest auscultation and capnometry   Cuff volume (mL): 8  Number of attempts at approach: 1  Assessment: lips, teeth, and gum same as pre-op and atraumatic intubation

## 2025-02-26 NOTE — CONSULTS
"[facility] Medicine Services  Consult Note    Patient Name: Ismael Pulido  : 1965  MRN: [id number]  Primary Care Physician:  Sharon Silva MD  Referring Physician: No ref. provider found  Date of admission: 2025  Date and Time of Care: 25 at 1700    Inpatient Hospitalist Consult  Consult performed by: Brittany Patton APRN  Consult ordered by: Irais Pink PA-C            Reason for Consult/ Chief Complaint: Medical management    Consult Requested By: JOSELYN Hay    Subjective:     History of Present Illness:   Per the documentation by JOSELYN Hay, dated 25,  \"59-year-old male complaining of chest and epigastric pain that developed throughout the day today \"    Review of Systems:   Review of Systems    Personal History:     Past Medical History:   Diagnosis Date    Allergic 1988    5+ molds, pollens, grass    Diabetes mellitus     Not sure like 2020    Diverticulitis     Diverticulosis     Mot sure     GERD (gastroesophageal reflux disease)     Hypertension     Kidney stone     Not dure     Obesity     Not sure        Past Surgical History:   Procedure Laterality Date    CARDIAC ELECTROPHYSIOLOGY PROCEDURE N/A 2025    Procedure: Pacemaker DC new [place]oston aware;  Surgeon: Dez Loco MD;  Location:  INVASIVE LOCATION;  Service: Cardiovascular;  Laterality: N/A;    HERNIA REPAIR         Family History: family history includes Anxiety disorder in his mother; Arthritis in his mother; Cancer in his father; Depression in his mother; Diabetes in his father and mother; Hyperlipidemia in his mother; Kidney disease in his mother. Otherwise pertinent FHx was reviewed and not pertinent to current issue.    Social History:  reports that he has never smoked. He has never used smokeless tobacco. He reports that he does not drink alcohol and does not use drugs.    Home Medications:   Misc Natural Products, Semaglutide, " Yes.  That medication works on heart rate and can be taken with lisinopril.    ascorbic acid, cephalexin, clotrimazole, empagliflozin, lansoprazole, loratadine, losartan, pseudoephedrine, and rosuvastatin    Allergies:  Allergies   Allergen Reactions    Lisinopril Nausea And Vomiting         Objective:     Vital Signs  Temp:  [97.6 °F (36.4 °C)-100.2 °F (37.9 °C)] 99.5 °F (37.5 °C)  Heart Rate:  [] 120  Resp:  [16-32] 22  BP: (105-198)/() 127/94  Flow (L/min) (Oxygen Therapy):  [8-10] 8   Body mass index is 34.53 kg/m².    Physical Exam  Physical Exam  Constitutional:       General: He is not in acute distress.     Appearance: Normal appearance. He is obese. He is not ill-appearing.   HENT:      Head: Normocephalic and atraumatic.      Nose: Nose normal.      Mouth/Throat:      Mouth: Mucous membranes are moist.   Eyes:      Extraocular Movements: Extraocular movements intact.      Conjunctiva/sclera: Conjunctivae normal.      Pupils: Pupils are equal, round, and reactive to light.   Cardiovascular:      Rate and Rhythm: Normal rate and regular rhythm.      Pulses: Normal pulses.      Heart sounds: Normal heart sounds.   Pulmonary:      Effort: Pulmonary effort is normal.      Breath sounds: Normal breath sounds.   Abdominal:      General: Abdomen is flat. Bowel sounds are normal.      Palpations: Abdomen is soft.   Musculoskeletal:         General: Normal range of motion.      Cervical back: Normal range of motion.   Skin:     General: Skin is warm and dry.   Neurological:      General: No focal deficit present.      Mental Status: He is alert and oriented to person, place, and time. Mental status is at baseline.   Psychiatric:         Mood and Affect: Mood normal.         Behavior: Behavior normal.         Thought Content: Thought content normal.         Judgment: Judgment normal.         Scheduled Meds   cefTRIAXone, 2 g, Intravenous, Q24H  cetirizine, 10 mg, Oral, Daily  empagliflozin, 25 mg, Oral, Daily  losartan, 25 mg, Oral, Daily  metoclopramide, 5 mg, Intravenous, BID  AC  pantoprazole, 40 mg, Oral, BID AC  rosuvastatin, 10 mg, Oral, Daily  sodium chloride, 10 mL, Intravenous, Q12H       PRN Meds     acetaminophen **OR** acetaminophen **OR** acetaminophen    aluminum-magnesium hydroxide-simethicone    senna-docusate sodium **AND** polyethylene glycol **AND** bisacodyl **AND** bisacodyl    hydrALAZINE    nitroglycerin    ondansetron ODT **OR** ondansetron    [COMPLETED] Insert Peripheral IV **AND** sodium chloride    sodium chloride    sodium chloride   Infusions         Diagnostic Data    Results from last 7 days   Lab Units 02/26/25  0352   WBC 10*3/mm3 18.32*   HEMOGLOBIN g/dL 16.6   HEMATOCRIT % 50.2   PLATELETS 10*3/mm3 228   GLUCOSE mg/dL 133*   CREATININE mg/dL 0.91   BUN mg/dL 14   SODIUM mmol/L 137   POTASSIUM mmol/L 4.4   ANION GAP mmol/L 13.9       XR Abdomen KUB    Result Date: 2/25/2025  Impression: Gastric tube in appropriate position. Electronically Signed: Charles Santo MD  2/25/2025 9:17 PM EST  Workstation ID: [id number]    XR Abdomen KUB    Result Date: 2/25/2025  Impression: Slightly high position of the gastric tube. Recommend advancing by 3 cm. Electronically Signed: Charles Santo MD  2/25/2025 5:44 PM EST  Workstation ID: [id number]    XR Abdomen KUB    Result Date: 2/25/2025  Impression: The tip of the nasogastric tube terminates in the fundus of the stomach. Electronically Signed: Erasmo Bernard MD  2/25/2025 4:00 PM EST  Workstation ID: [id number]    CT Angiogram Chest Pulmonary Embolism    Result Date: 2/25/2025  Impression: No evidence of pulmonary embolism. Small left pleural effusion. Mild bilateral dependent atelectasis. Moderate gastric distention with air and fluid. This may be transient however, gastroparesis cannot be excluded. Correlate with patient symptoms. This can be further evaluated with nuclear medicine gastric emptying study as clinically warranted. If there  is concern for gastric outlet obstruction, correlate with endoscopy. Moderately  distended urinary bladder. Correlate for urinary retention. Chronic findings as above. Electronically Signed: Charles Santo MD  2/25/2025 3:14 PM EST  Workstation ID: [id number]    CT Abdomen Pelvis With Contrast    Result Date: 2/25/2025  Impression: No evidence of pulmonary embolism. Small left pleural effusion. Mild bilateral dependent atelectasis. Moderate gastric distention with air and fluid. This may be transient however, gastroparesis cannot be excluded. Correlate with patient symptoms. This can be further evaluated with nuclear medicine gastric emptying study as clinically warranted. If there  is concern for gastric outlet obstruction, correlate with endoscopy. Moderately distended urinary bladder. Correlate for urinary retention. Chronic findings as above. Electronically Signed: Charles Santo MD  2/25/2025 3:14 PM EST  Workstation ID: [id number]    XR Chest 1 View    Result Date: 2/25/2025  Impression: Low lung volumes with probable mild bibasal atelectasis. Correlate for infection. Electronically Signed: Nicholas Becerra MD  2/25/2025 12:17 PM EST  Workstation ID: [id number]       I reviewed the patient's new clinical results.    Assessment/Plan:     Active and Resolved Problems  Active Hospital Problems    Diagnosis  POA    **Abdominal pain [R10.9]  Yes      Resolved Hospital Problems   No resolved problems to display.       Chest pain  -Troponin, 38, 39  -d. dimer 3.54  -CXR showed low lung volumes with probable mild bibasal atelectasis.   -Cta chest negative for PE. Small left pleural effusion. Mild bilateral dependent atelectasis  -Ekg rate 85, pvcs, pacemaker, prolonged SD  -Cardiology consulted  -Pacemaker interrogated and lead dislodged     Abdominal pain  -Lipase 26  -Ct abd reviewed and showing Moderate gastric distention with air and fluid. This may be transient however, gastroparesis cannot be excluded. Correlate with patient symptoms. This can be further evaluated with nuclear medicine gastric  emptying study as clinically warranted. If there is concern for gastric outlet obstruction, correlate with endoscopy. Moderately distended urinary bladder. Correlate for urinary retention.  -Gastroenterology consulted  -Ng tube in place to low wall suction, removed today   -EGD today showed esophagitis  -Continue PPI      Hypertension  -Stable  -Continue losartan    Type 2 diabetes  -Continue jardiance   -SSI  -Diabetic diet  -Monitor before meals and at bedtime     VTE Prophylaxis:  Mechanical VTE prophylaxis orders are present.         Code status is   Code Status and Medical Interventions: CPR (Attempt to Resuscitate); Full Support   Ordered at: 02/25/25 6787     Level Of Support Discussed With:    Patient     Code Status (Patient has no pulse and is not breathing):    CPR (Attempt to Resuscitate)     Medical Interventions (Patient has pulse or is breathing):    Full Support       Plan for disposition: 1 to 2 days    Time: 30 minutes        Signature: Electronically signed by TAMIKO Bolden, 02/26/25, 15:34 EST.  [facility] Hospitalist Team

## 2025-02-26 NOTE — CONSULTS
"Diabetes Education  Assessment/Teaching    Patient Name:  Ismael Pulido  YOB: 1965  MRN: [id number]  Admit Date:  2/25/2025      Assessment Date:  2/26/2025  Flowsheet Row Most Recent Value   General Information     Referral From: MD order, A1c  [MD consult to teach blood glucose monitoring and on 2/7/2025 A1c was 8.4%.]   Height 172.7 cm (68\")   Height Method Stated   Weight 103 kg (227 lb 1.2 oz)   Weight Method Bed scale   Pregnancy Assessment    Diabetes History    What type of diabetes do you have? Type 2   Length of Diabetes Diagnosis 6 - 10 years  [Patient stated he was diagnosed at least 5 years ago.]   Current DM knowledge fair   Do you test your blood sugar at home? yes   Frequency of checks usually daily but hasn't been checking for the last 2 months.   Meter type [place]   Who performs the test? patient   Education Preferences    What areas of diabetes would you like to learn about? diabetes complications, testing my blood sugar at home   Nutrition Information    Assessment Topics    Reducing Risk - Assessment Needs education   Monitoring - Assessment Needs education   DM Goals    Reducing Risk - Goal Today   Monitoring - Goal Today            Flowsheet Row Most Recent Value   DM Education Needs    Meter Has own   Meter Type Other (comment)  [[place]]   Frequency of Testing Daily  [Discussed with patient about starting back to checking blood sugar daily and varying the times before meals and at bedtime.]   Medication Oral  [At home patient stated that he takes Jardiance 25 mg daily.]   Reducing Risks A1C testing  [On 2/7/2025 A1c was 8.4%.]   Discharge Plan Home   Motivation Moderate   Teaching Method Discussion, Handouts   Patient Response Verbalized understanding              Other Comments:  A1c info sheet given with discussion on A1c target and healthy blood sugar range. Patient stated his [employer] vanita stopped working. Recommended to patient to reboot his phone and to update the " vanita and if that didn't work to call the company to troubleshoot. Patient stated he works at [employer] and gets plenty of exercise at work. Patient has no further questions or concerns related to diabetes at this time.        Electronically signed by:  Claire Krishnamurthy RN  02/26/25 18:32 EST

## 2025-02-26 NOTE — PLAN OF CARE
Goal Outcome Evaluation:    Plan for pacemaker interrogation today. Awaiting GI consult for further recommendations

## 2025-02-26 NOTE — ANESTHESIA PREPROCEDURE EVALUATION
Anesthesia Evaluation     NPO Solid Status: > 8 hours  NPO Liquid Status: > 8 hours           Airway   Mallampati: II  TM distance: >3 FB  Neck ROM: full  No difficulty expected  Dental - normal exam     Pulmonary - normal exam   (+) asthma,sleep apnea on CPAP  Cardiovascular - normal exam    (+) pacemaker pacemaker, hypertension well controlled, dysrhythmias Bradycardia, hyperlipidemia      Neuro/Psych  (+) syncope  GI/Hepatic/Renal/Endo    (+) obesity, GERD well controlled, renal disease-, diabetes mellitus type 2    Musculoskeletal     Abdominal  - normal exam    Bowel sounds: normal.   Substance History      OB/GYN          Other                    Anesthesia Plan    ASA 3     general   Rapid sequence  intravenous induction     Anesthetic plan, risks, benefits, and alternatives have been provided, discussed and informed consent has been obtained with: patient.  Pre-procedure education provided  Plan discussed with CRNA.    CODE STATUS:    Level Of Support Discussed With: Patient  Code Status (Patient has no pulse and is not breathing): CPR (Attempt to Resuscitate)  Medical Interventions (Patient has pulse or is breathing): Full Support

## 2025-02-26 NOTE — CONSULTS
GI CONSULT  NOTE:    Referring Provider:  JOSELYN Hay    Chief complaint: Epigastric pain    Subjective .     History of present illness: Ismael Pulido is a 59 y.o. male with history of diabetes, hypertension, and recent pacemaker placement who presents with complaints of chest pain and epigastric pain.  The patient reports that he began having upper abdominal pain approximately 2 to 3 weeks ago.  States that he saw his primary care provider as he was feeling constipated in the upper abdomen.  Reports that his dose of lansoprazole was increased.  He states that following PCP visit he was at work and had a syncopal episode secondary to bradycardia.  He did undergo pacemaker placement on 2/22.  States that he had been doing well postoperatively until yesterday.  States that yesterday he began having acute upper abdominal pain once again.  States that normally he moves his bowels daily, but is unsure of when his last bowel movement occurred.  CT abd/pelvis WO mission does show moderate gastric distention suggestive of possible gastroparesis versus gastric outlet obstruction.  The patient denies any overt GI bleeding.  NG tube has been placed to low intermittent wall suction.      Endo History:  11/2020 EUS (Dr. Zhu)-1 cm lipoma in the stomach, normal pancreas    Past Medical History:  Past Medical History:   Diagnosis Date    Allergic 01/01/1988    5+ molds, pollens, grass    Diabetes mellitus     Not sure like 2020    Diverticulitis     Diverticulosis     Mot sure 1995    GERD (gastroesophageal reflux disease)     Hypertension     Kidney stone     Not dure 1995    Obesity     Not sure 1999       Past Surgical History:  Past Surgical History:   Procedure Laterality Date    CARDIAC ELECTROPHYSIOLOGY PROCEDURE N/A 2/21/2025    Procedure: Pacemaker DC new [place] aware;  Surgeon: Dez Loco MD;  Location: [facility] INVASIVE LOCATION;  Service: Cardiovascular;  Laterality: N/A;    HERNIA REPAIR          Social History:  Social History     Tobacco Use    Smoking status: Never    Smokeless tobacco: Never   Vaping Use    Vaping status: Never Used   Substance Use Topics    Alcohol use: Never    Drug use: Never       Family History:  Family History   Problem Relation Age of Onset    Anxiety disorder Mother     Arthritis Mother     Depression Mother     Diabetes Mother     Hyperlipidemia Mother     Kidney disease Mother         Kidney stones    Cancer Father          of cancer in 2009    Diabetes Father        Medications:  Medications Prior to Admission   Medication Sig Dispense Refill Last Dose/Taking    ascorbic acid ([employer] Vitamin C) 1000 MG tablet Take 4 tablets by mouth Daily.       cephalexin (KEFLEX) 500 MG capsule Take 1 capsule by mouth Every 8 (Eight) Hours for 15 doses. Indications: Preventative Medication Therapy Used Around Surgery 15 capsule 0     clotrimazole (LOTRIMIN) 1 % cream Apply 1 Application topically to the appropriate area as directed 2 (Two) Times a Day. 85 g 3     empagliflozin (Jardiance) 25 MG tablet tablet Take 1 tablet by mouth Daily. 30 tablet 5     lansoprazole (PREVACID) 30 MG capsule Take 1 capsule by mouth Daily. 30 capsule 5     loratadine (Claritin) 10 MG tablet Take 1 tablet by mouth Daily.       losartan (COZAAR) 25 MG tablet TAKE 1 TABLET BY MOUTH DAILY 90 tablet 1     Misc Natural Products (NEURIVA PO) Take  by mouth Daily.       pseudoephedrine (SUDAFED) 120 MG 12 hr tablet Take 1 tablet by mouth Every Morning.       rosuvastatin (CRESTOR) 10 MG tablet Take 1 tablet by mouth Daily. 90 tablet 1     Semaglutide 3 MG tablet Take 1 tablet by mouth Daily. 30 tablet 0        Scheduled Meds:cetirizine, 10 mg, Oral, Daily  empagliflozin, 25 mg, Oral, Daily  losartan, 25 mg, Oral, Daily  pantoprazole, 40 mg, Oral, Q AM  rosuvastatin, 10 mg, Oral, Daily  sodium chloride, 10 mL, Intravenous, Q12H      Continuous Infusions:   PRN Meds:.  acetaminophen **OR** acetaminophen  **OR** acetaminophen    aluminum-magnesium hydroxide-simethicone    senna-docusate sodium **AND** polyethylene glycol **AND** bisacodyl **AND** bisacodyl    hydrALAZINE    nitroglycerin    ondansetron ODT **OR** ondansetron    [COMPLETED] Insert Peripheral IV **AND** sodium chloride    sodium chloride    sodium chloride    ALLERGIES:  Lisinopril    ROS:  The following systems were reviewed   Constitution:  No fevers, chills, no unintentional weight loss  Skin: no rash, no jaundice  Eyes:  No blurry vision, no eye pain  HENT:  No change in hearing or smell  Resp:  No cough, dyspnea  CV:  No chest pain or palpitations  :  No dysuria, hematuria  Musculoskeletal:  No leg cramps or arthralgias  Neuro:  No tremor, no numbness  Psych:  No depression or confusion    Objective     Vital Signs:   Vitals:    02/26/25 0755 02/26/25 0800 02/26/25 0810 02/26/25 0825   BP: 120/91  127/89 126/96   BP Location:       Patient Position:       Pulse: 70 96 104 99   Resp:       Temp:       TempSrc:       SpO2:       Weight:       Height:           Physical Exam:       General Appearance:    Awake and alert, in no acute distress   Head:    Normocephalic, without obvious abnormality, atraumatic   Throat:   No oral lesions, no thrush, oral mucosa moist   Lungs:     Respirations regular, even and unlabored   Chest Wall:    No abnormalities observed   Abdomen:     Soft, upper abdominal tenderness, no rebound or guarding, non-distended, NG tube to low intermittent wall suction with dark brown output   Rectal:     Deferred   Extremities:   Moves all extremities, no edema, no cyanosis   Pulses:   Pulses palpable and equal bilaterally   Skin:   No rash, no jaundice, normal palpation   Lymph nodes:   No cervical, supraclavicular or submandibular palpable adenopathy   Neurologic:   Cranial nerves 2 - 12 grossly intact, no asterixis       Results Review:   I reviewed the patient's labs and imaging.  Results from last 7 days   Lab Units  02/26/25  0352 02/25/25  1132 02/22/25  0252   WBC 10*3/mm3 18.32* 12.46* 10.58   HEMOGLOBIN g/dL 16.6 17.1 17.6   PLATELETS 10*3/mm3 228 217 226     Results from last 7 days   Lab Units 02/26/25  0352 02/25/25  1245 02/25/25  1132 02/22/25  0252   SODIUM mmol/L 137  --  141 137   POTASSIUM mmol/L 4.4  --  4.0 4.4   CHLORIDE mmol/L 101  --  103 102   CO2 mmol/L 22.1  --  24.0 18.8*   BUN mg/dL 14  --  9 19   CREATININE mg/dL 0.91  --  0.95 1.03   GLUCOSE mg/dL 133*  --  139* 93   LIPASE U/L  --  26  --   --      Estimated Creatinine Clearance: 101.6 mL/min (by C-G formula based on SCr of 0.91 mg/dL).  Lab Results   Component Value Date    HGBA1C 8.40 (H) 02/07/2025    HGBA1C 8.10 (H) 08/09/2024    HGBA1C 7.90 (H) 02/09/2024     Results from last 7 days   Lab Units 02/25/25  1245 02/25/25  1132   HSTROP T ng/L 39* 38*     Infection   Results from last 7 days   Lab Units 02/25/25  1245   PROCALCITONIN ng/mL 0.08     UA      Microbiology Results (last 10 days)       Procedure Component Value - Date/Time    Respiratory Panel PCR w/COVID-19(SARS-CoV-2) [facility]/RALPH/[facility] In-House, NP Swab in [facility], 2 HR TAT - Swab, Nasopharynx [[id number]]  (Normal) Collected: 02/25/25 1131    Lab Status: Final result Specimen: Swab from Nasopharynx Updated: 02/25/25 1231     ADENOVIRUS, PCR Not Detected     Coronavirus 229E Not Detected     Coronavirus HKU1 Not Detected     Coronavirus NL63 Not Detected     Coronavirus OC43 Not Detected     COVID19 Not Detected     Human Metapneumovirus Not Detected     Human Rhinovirus/Enterovirus Not Detected     Influenza A PCR Not Detected     Influenza B PCR Not Detected     Parainfluenza Virus 1 Not Detected     Parainfluenza Virus 2 Not Detected     Parainfluenza Virus 3 Not Detected     Parainfluenza Virus 4 Not Detected     RSV, PCR Not Detected     Bordetella pertussis pcr Not Detected     Bordetella parapertussis PCR Not Detected     Chlamydophila pneumoniae PCR Not Detected      Mycoplasma pneumo by PCR Not Detected    Narrative:      In the setting of a positive respiratory panel with a viral infection PLUS a negative procalcitonin without other underlying concern for bacterial infection, consider observing off antibiotics or discontinuation of antibiotics and continue supportive care. If the respiratory panel is positive for atypical bacterial infection (Bordetella pertussis, Chlamydophila pneumoniae, or Mycoplasma pneumoniae), consider antibiotic de-escalation to target atypical bacterial infection.    COVID-19, FLU A/B, RSV PCR 1 HR TAT - Swab, Nasopharynx [[id number]]  (Normal) Collected: 02/19/25 0017    Lab Status: Final result Specimen: Swab from Nasopharynx Updated: 02/19/25 0059     COVID19 Not Detected     Influenza A PCR Not Detected     Influenza B PCR Not Detected     RSV, PCR Not Detected    Narrative:      Fact sheet for providers: https://www.fda.gov/media/257177/download    Fact sheet for patients: https://www.fda.gov/media/019271/download    Test performed by PCR.          Imaging Results (Last 72 Hours)       Procedure Component Value Units Date/Time    XR Abdomen KUB [[id number]] Collected: 02/25/25 2115     Updated: 02/25/25 2119    Narrative:      XR ABDOMEN KUB    Date of Exam: 2/25/2025 8:35 PM EST    Indication: NG placement    Comparison: Same day abdominal radiograph    Findings:  Only the upper abdomen is imaged. Gastric tube is in appropriate position. Nonobstructive bowel gas pattern is visualized. No free intraperitoneal air. No acute osseous abnormalities. Visualized lung bases are clear.      Impression:      Impression:  Gastric tube in appropriate position.      Electronically Signed: Charles Santo MD    2/25/2025 9:17 PM EST    Workstation ID: [id number]    XR Abdomen KUB [[id number]] Collected: 02/25/25 1742     Updated: 02/25/25 1746    Narrative:      XR ABDOMEN KUB    Date of Exam: 2/25/2025 5:14 PM EST    Indication: ng    Comparison: Abdominal  radiograph performed on the same day    Findings:  Limited study. Only the left upper quadrant was imaged. Left side of the gastric tube terminates at the level of the GE junction. Recommend advancing by 3 cm. Nonobstructive bowel gas pattern is visualized. No free intraperitoneal air. No acute osseous   abnormalities. Visualized lung bases are clear.      Impression:      Impression:  Slightly high position of the gastric tube. Recommend advancing by 3 cm.      Electronically Signed: Charles Santo MD    2/25/2025 5:44 PM EST    Workstation ID: [id number]    XR Abdomen KUB [[id number]] Collected: 02/25/25 1558     Updated: 02/25/25 1602    Narrative:      XR ABDOMEN KUB    Date of Exam: 2/25/2025 3:30 PM EST    Indication: Nasogastric tube insertion.    Comparison: CT of the abdomen/pelvis performed on 2/25/2025.    Findings:  The tip of the nasogastric tube terminates in the fundus of the stomach. The pelvis and lower abdomen are excluded from the field-of-view. The visualized bowel gas pattern appears unremarkable. There is mild subsegmental atelectasis in the lung bases.   There are degenerative changes in the thoracolumbar spine.      Impression:      Impression:  The tip of the nasogastric tube terminates in the fundus of the stomach.      Electronically Signed: Erasmo Bernard MD    2/25/2025 4:00 PM EST    Workstation ID: [id number]    CT Angiogram Chest Pulmonary Embolism [[id number]] Collected: 02/25/25 1502     Updated: 02/25/25 1516    Narrative:      CT ANGIOGRAM CHEST PULMONARY EMBOLISM, CT ABDOMEN PELVIS W CONTRAST    Date of Exam: 2/25/2025 2:57 PM EST    Indication: Dyspnea. Abdominal pain.    Comparison: Noncontrast chest CT performed on November 18, 2022. Noncontrast CT of the abdomen pelvis performed on October 31, 2018    Technique: Axial CT images were obtained of the chest after the uneventful intravenous administration of iodinated contrast utilizing pulmonary embolism protocol. CT of the abdomen  pelvis was performed at the same time. In addition, a 3-D volume rendered   image was created for interpretation.  Sagittal and coronal reconstructions were performed.  Automated exposure control and iterative reconstruction methods were used.      Findings:    CTA CHEST:    Pulmonary arteries: Adequate opacification of the pulmonary arteries. No evidence of acute pulmonary embolism.    Thoracic aorta: The thoracic aorta is suboptimally opacified with contrast. Thoracic aorta is normal in caliber and contour.    Cardiovascular: The heart is normal in size.  No evidence of pericardial effusion. Patient is post left subclavian triple lead pacemaker placement.    Thyroid: The visualized portion of the thyroid is unremarkable.    Lungs and Pleura: Small left pleural effusion is visualized with adjacent compressive atelectasis. Small left pleural effusion is visualized. Mild bilateral dependent delayed atelectasis is visualized, left greater than right. No suspicious pulmonary   nodules. No pneumothorax. Central airways are patent.    Mediastinum/Maria G: No mediastinal or hilar lymphadenopathy.    Lymph nodes: No axillary or supraclavicular lymphadenopathy.    Bones and Soft Tissue:No acute fracture, aggressive osseous lesions, or soft tissue process.      CT abdomen/pelvis:    Peritoneum:  No free intraperitoneal air or fluid.     Abdominal wall:  There is evidence of prior ventral hernia repair. Small fat-containing left inguinal hernia is visualized.    Liver:  Liver is normal in size and contour. No focal lesions.    Biliary/Gallbladder:  The gallbladder is normal without evidence of radiopaque gallstones. The biliary tree is nondilated.    Pancreas:  Pancreas is within normal limits. There is no evidence of pancreatic mass or peripancreatic inflammatory changes.    Spleen:  Spleen is normal in size and contour.    Gastrointestinal/Mesentery:   There is moderate gastric distention with air-fluid. No evidence of bowel  obstruction or gross inflammatory changes. The appendix appears within normal limits. There is pancolonic diverticulosis without evidence of diverticulitis.    Adrenals:  Adrenal glands are unremarkable.    Kidneys:  The kidneys are in anatomic position. No evidence of nephrolithiasis. No evidence of hydronephrosis or significant perinephric fat stranding.    Bladder:   The urinary bladder is moderately distended.    Reproductive organs:    Unremarkable.    Lymph Nodes:  No significant adenopathy is identified.     Vasculature:  Mild aortoiliac atheromatous changes are visualized. The abdominal aorta is normal in caliber.    Osseous Structures:    No acute fracture or aggressive lesions. Mild multilevel degenerative changes are visualized, most prominent at L4-5.          Impression:      Impression:    No evidence of pulmonary embolism.    Small left pleural effusion.    Mild bilateral dependent atelectasis.    Moderate gastric distention with air and fluid. This may be transient however, gastroparesis cannot be excluded. Correlate with patient symptoms. This can be further evaluated with nuclear medicine gastric emptying study as clinically warranted. If there   is concern for gastric outlet obstruction, correlate with endoscopy.    Moderately distended urinary bladder. Correlate for urinary retention.    Chronic findings as above.        Electronically Signed: Charles Santo MD    2/25/2025 3:14 PM EST    Workstation ID: [id number]    CT Abdomen Pelvis With Contrast [[id number]] Collected: 02/25/25 1502     Updated: 02/25/25 1516    Narrative:      CT ANGIOGRAM CHEST PULMONARY EMBOLISM, CT ABDOMEN PELVIS W CONTRAST    Date of Exam: 2/25/2025 2:57 PM EST    Indication: Dyspnea. Abdominal pain.    Comparison: Noncontrast chest CT performed on November 18, 2022. Noncontrast CT of the abdomen pelvis performed on October 31, 2018    Technique: Axial CT images were obtained of the chest after the uneventful intravenous  administration of iodinated contrast utilizing pulmonary embolism protocol. CT of the abdomen pelvis was performed at the same time. In addition, a 3-D volume rendered   image was created for interpretation.  Sagittal and coronal reconstructions were performed.  Automated exposure control and iterative reconstruction methods were used.      Findings:    CTA CHEST:    Pulmonary arteries: Adequate opacification of the pulmonary arteries. No evidence of acute pulmonary embolism.    Thoracic aorta: The thoracic aorta is suboptimally opacified with contrast. Thoracic aorta is normal in caliber and contour.    Cardiovascular: The heart is normal in size.  No evidence of pericardial effusion. Patient is post left subclavian triple lead pacemaker placement.    Thyroid: The visualized portion of the thyroid is unremarkable.    Lungs and Pleura: Small left pleural effusion is visualized with adjacent compressive atelectasis. Small left pleural effusion is visualized. Mild bilateral dependent delayed atelectasis is visualized, left greater than right. No suspicious pulmonary   nodules. No pneumothorax. Central airways are patent.    Mediastinum/Maria G: No mediastinal or hilar lymphadenopathy.    Lymph nodes: No axillary or supraclavicular lymphadenopathy.    Bones and Soft Tissue:No acute fracture, aggressive osseous lesions, or soft tissue process.      CT abdomen/pelvis:    Peritoneum:  No free intraperitoneal air or fluid.     Abdominal wall:  There is evidence of prior ventral hernia repair. Small fat-containing left inguinal hernia is visualized.    Liver:  Liver is normal in size and contour. No focal lesions.    Biliary/Gallbladder:  The gallbladder is normal without evidence of radiopaque gallstones. The biliary tree is nondilated.    Pancreas:  Pancreas is within normal limits. There is no evidence of pancreatic mass or peripancreatic inflammatory changes.    Spleen:  Spleen is normal in size and  contour.    Gastrointestinal/Mesentery:   There is moderate gastric distention with air-fluid. No evidence of bowel obstruction or gross inflammatory changes. The appendix appears within normal limits. There is pancolonic diverticulosis without evidence of diverticulitis.    Adrenals:  Adrenal glands are unremarkable.    Kidneys:  The kidneys are in anatomic position. No evidence of nephrolithiasis. No evidence of hydronephrosis or significant perinephric fat stranding.    Bladder:   The urinary bladder is moderately distended.    Reproductive organs:    Unremarkable.    Lymph Nodes:  No significant adenopathy is identified.     Vasculature:  Mild aortoiliac atheromatous changes are visualized. The abdominal aorta is normal in caliber.    Osseous Structures:    No acute fracture or aggressive lesions. Mild multilevel degenerative changes are visualized, most prominent at L4-5.          Impression:      Impression:    No evidence of pulmonary embolism.    Small left pleural effusion.    Mild bilateral dependent atelectasis.    Moderate gastric distention with air and fluid. This may be transient however, gastroparesis cannot be excluded. Correlate with patient symptoms. This can be further evaluated with nuclear medicine gastric emptying study as clinically warranted. If there   is concern for gastric outlet obstruction, correlate with endoscopy.    Moderately distended urinary bladder. Correlate for urinary retention.    Chronic findings as above.        Electronically Signed: Charles Santo MD    2/25/2025 3:14 PM EST    Workstation ID: [id number]    XR Chest 1 View [[id number]] Collected: 02/25/25 1217     Updated: 02/25/25 1220    Narrative:      XR CHEST 1 VW    Date of Exam: 2/25/2025 12:00 PM EST    Indication: Chest pain    Comparison: Chest radiograph 2/21/2025    Findings:  Stable cardiomediastinal silhouette. Multichamber AICD. Lung volumes are low with mild bibasilar opacities. No overt edema, large effusion  or pneumothorax. The gym related osseous change.      Impression:      Impression:  Low lung volumes with probable mild bibasal atelectasis. Correlate for infection.      Electronically Signed: Nicholas Becerra MD    2/25/2025 12:17 PM EST    Workstation ID: [id number]            ASSESSMENT:  -Upper abdominal pain  -Abnormal CT showing moderate gastric distention  -Leukocytosis  -Displaced pacemaker lead  -S/p recent pacemaker placement on 2/22/2025 for bradycardia  -Diabetes  -Hypertension    PLAN:  Patient is a 59-year-old male with history of recent pacemaker placement, diabetes, and hypertension who presented on 2/25 with complaints of upper abdominal pain.    CT abdomen/pelvis W on admission shows moderate gastric distention suggestive of possible gastroparesis versus gastric outlet obstruction.  NG tube to low intermittent wall suction with dark brown output.  Will continue.  Maintain NPO.  Will plan EGD today pending cardiac clearance as patient does have a displaced pacemaker lead.  Increase PPI to twice daily dosing.  Add Reglan.  Antiemetics/analgesics as needed.  Supportive care.      I discussed the patients findings and my recommendations with the patient.  I will discuss the case with Dr. Talbert and change the plan accordingly.    We appreciate the referral.    Electronically signed by TAMIKO Mckeon, 02/26/25, 9:32 AM EST.

## 2025-02-26 NOTE — CONSULTS
[facility] CARDIOLOGY CONSULT  [facility]        Subjective:     Encounter Date:02/25/2025      Patient ID: Ismael Pulido is a 59 y.o. male.    Chief Complaint: Consult for lead dislodgement      HPI:  Ismael Pulido is a 59 y.o. male known to Dr. Loco and Dr. Reaves with a recent cardiac history of symptomatic bradycardia with Mobitz 2 AV block s/p [employer] PPM 2/21/25 and discharged home.  2D echo 2/2025 showed an EF 56-60% with moderate TR and mid moderate MR.  Nuclear stress test 2/2025 shows no ischemia.  PMH includes HTN, HLD, and DM.  Patient returns with c/o chest pain and abdominal pain and found with lead dislodgement.  CT abdomen/pelvis showed gastric distention and GI is planning an EGD for further eval gastroparesis vs gastric outlet obstruction.      Patient tells me that he has had epigastric pain for weeks which worsened after discharge.  On Sunday he reports sudden onset of left lateral chest pain and a feeling of being shocked after straining in a lazy boy recliner.  He tells me that following device interrogation today, the shocking sensations resolved.  However the left lateral chest pain continues and is worse with inspiration.  He reports improvement of epigastric pain with NG tube decompression.  He also reports feeling hot and feverish.  He denies any further syncope since discharge.      Past Medical History:   Diagnosis Date    Allergic 01/01/1988    5+ molds, pollens, grass    Diabetes mellitus     Not sure like 2020    Diverticulitis     Diverticulosis     Mot sure 1995    GERD (gastroesophageal reflux disease)     Hypertension     Kidney stone     Not dure 1995    Obesity     Not sure 1999         Past Surgical History:   Procedure Laterality Date    CARDIAC ELECTROPHYSIOLOGY PROCEDURE N/A 2/21/2025    Procedure: Pacemaker DC new [place] aware;  Surgeon: Dez Loco MD;  Location: [facility] CATH INVASIVE LOCATION;  Service:  "Cardiovascular;  Laterality: N/A;    HERNIA REPAIR           Social History     Socioeconomic History    Marital status:    Tobacco Use    Smoking status: Never    Smokeless tobacco: Never   Vaping Use    Vaping status: Never Used   Substance and Sexual Activity    Alcohol use: Never    Drug use: Never    Sexual activity: Yes     Partners: Female       Family History   Problem Relation Age of Onset    Anxiety disorder Mother     Arthritis Mother     Depression Mother     Diabetes Mother     Hyperlipidemia Mother     Kidney disease Mother         Kidney stones    Cancer Father          of cancer in 2009    Diabetes Father          Allergies   Allergen Reactions    Lisinopril Nausea And Vomiting       Current Medications:   Scheduled Meds:cefTRIAXone, 2 g, Intravenous, Q24H  cetirizine, 10 mg, Oral, Daily  empagliflozin, 25 mg, Oral, Daily  losartan, 25 mg, Oral, Daily  metoclopramide, 5 mg, Intravenous, Q6H  pantoprazole, 40 mg, Oral, BID AC  rosuvastatin, 10 mg, Oral, Daily  sodium chloride, 500 mL, Intravenous, Once  sodium chloride, 10 mL, Intravenous, Q12H      Continuous Infusions:     ROS  All other systems reviewed and are negative.       Objective:         /96   Pulse 99   Temp 98.8 °F (37.1 °C) (Oral)   Resp 16   Ht 172.7 cm (68\")   Wt 103 kg (227 lb 1.2 oz)   SpO2 99%   BMI 34.53 kg/m²       General: Well-developed in NAD.  Neuro: AAOx3. No gross deficits.  HEENT: Sclerae clear, no xanthelasmas.  CV: fast S1S2, RRR. No murmurs or gallops.  Resp: Breathing is shallow and tachypneic. Anterior lungs CTA throughout.  GI: Abdomen soft, obese.  Ext: Pedal pulses are palpable. Extremities are nonedematous.  MS: moves all extremities, no weakness.  Skin: warm, dry. Left subclavian incision without erythema or drainage.   Psych: calm and cooperative.            Lab Review:     Results from last 7 days   Lab Units 25  0352 25  1132   SODIUM mmol/L 137 141   POTASSIUM mmol/L " "4.4 4.0   CHLORIDE mmol/L 101 103   CO2 mmol/L 22.1 24.0   BUN mg/dL 14 9   CREATININE mg/dL 0.91 0.95   GLUCOSE mg/dL 133* 139*   CALCIUM mg/dL 9.7 9.7     Results from last 7 days   Lab Units 02/25/25  1245 02/25/25  1132   HSTROP T ng/L 39* 38*     Results from last 7 days   Lab Units 02/26/25  0352 02/25/25  1132   WBC 10*3/mm3 18.32* 12.46*   HEMOGLOBIN g/dL 16.6 17.1   HEMATOCRIT % 50.2 51.9*   PLATELETS 10*3/mm3 228 217                   Invalid input(s): \"LDLCALC\"            Recent Radiology:  Imaging Results (Most Recent)       Procedure Component Value Units Date/Time    XR Abdomen KUB [[id number]] Collected: 02/25/25 2115     Updated: 02/25/25 2119    Narrative:      XR ABDOMEN KUB    Date of Exam: 2/25/2025 8:35 PM EST    Indication: NG placement    Comparison: Same day abdominal radiograph    Findings:  Only the upper abdomen is imaged. Gastric tube is in appropriate position. Nonobstructive bowel gas pattern is visualized. No free intraperitoneal air. No acute osseous abnormalities. Visualized lung bases are clear.      Impression:      Impression:  Gastric tube in appropriate position.      Electronically Signed: Charles Santo MD    2/25/2025 9:17 PM EST    Workstation ID: [id number]    XR Abdomen KUB [[id number]] Collected: 02/25/25 1742     Updated: 02/25/25 1746    Narrative:      XR ABDOMEN KUB    Date of Exam: 2/25/2025 5:14 PM EST    Indication: ng    Comparison: Abdominal radiograph performed on the same day    Findings:  Limited study. Only the left upper quadrant was imaged. Left side of the gastric tube terminates at the level of the GE junction. Recommend advancing by 3 cm. Nonobstructive bowel gas pattern is visualized. No free intraperitoneal air. No acute osseous   abnormalities. Visualized lung bases are clear.      Impression:      Impression:  Slightly high position of the gastric tube. Recommend advancing by 3 cm.      Electronically Signed: Charles Santo MD    2/25/2025 5:44 PM EST    " Workstation ID: [id number]    XR Abdomen KUB [[id number]] Collected: 02/25/25 1558     Updated: 02/25/25 1602    Narrative:      XR ABDOMEN KUB    Date of Exam: 2/25/2025 3:30 PM EST    Indication: Nasogastric tube insertion.    Comparison: CT of the abdomen/pelvis performed on 2/25/2025.    Findings:  The tip of the nasogastric tube terminates in the fundus of the stomach. The pelvis and lower abdomen are excluded from the field-of-view. The visualized bowel gas pattern appears unremarkable. There is mild subsegmental atelectasis in the lung bases.   There are degenerative changes in the thoracolumbar spine.      Impression:      Impression:  The tip of the nasogastric tube terminates in the fundus of the stomach.      Electronically Signed: Erasmo Bernard MD    2/25/2025 4:00 PM EST    Workstation ID: [id number]    CT Angiogram Chest Pulmonary Embolism [[id number]] Collected: 02/25/25 1502     Updated: 02/25/25 1516    Narrative:      CT ANGIOGRAM CHEST PULMONARY EMBOLISM, CT ABDOMEN PELVIS W CONTRAST    Date of Exam: 2/25/2025 2:57 PM EST    Indication: Dyspnea. Abdominal pain.    Comparison: Noncontrast chest CT performed on November 18, 2022. Noncontrast CT of the abdomen pelvis performed on October 31, 2018    Technique: Axial CT images were obtained of the chest after the uneventful intravenous administration of iodinated contrast utilizing pulmonary embolism protocol. CT of the abdomen pelvis was performed at the same time. In addition, a 3-D volume rendered   image was created for interpretation.  Sagittal and coronal reconstructions were performed.  Automated exposure control and iterative reconstruction methods were used.      Findings:    CTA CHEST:    Pulmonary arteries: Adequate opacification of the pulmonary arteries. No evidence of acute pulmonary embolism.    Thoracic aorta: The thoracic aorta is suboptimally opacified with contrast. Thoracic aorta is normal in caliber and contour.    Cardiovascular: The  heart is normal in size.  No evidence of pericardial effusion. Patient is post left subclavian triple lead pacemaker placement.    Thyroid: The visualized portion of the thyroid is unremarkable.    Lungs and Pleura: Small left pleural effusion is visualized with adjacent compressive atelectasis. Small left pleural effusion is visualized. Mild bilateral dependent delayed atelectasis is visualized, left greater than right. No suspicious pulmonary   nodules. No pneumothorax. Central airways are patent.    Mediastinum/Maria G: No mediastinal or hilar lymphadenopathy.    Lymph nodes: No axillary or supraclavicular lymphadenopathy.    Bones and Soft Tissue:No acute fracture, aggressive osseous lesions, or soft tissue process.      CT abdomen/pelvis:    Peritoneum:  No free intraperitoneal air or fluid.     Abdominal wall:  There is evidence of prior ventral hernia repair. Small fat-containing left inguinal hernia is visualized.    Liver:  Liver is normal in size and contour. No focal lesions.    Biliary/Gallbladder:  The gallbladder is normal without evidence of radiopaque gallstones. The biliary tree is nondilated.    Pancreas:  Pancreas is within normal limits. There is no evidence of pancreatic mass or peripancreatic inflammatory changes.    Spleen:  Spleen is normal in size and contour.    Gastrointestinal/Mesentery:   There is moderate gastric distention with air-fluid. No evidence of bowel obstruction or gross inflammatory changes. The appendix appears within normal limits. There is pancolonic diverticulosis without evidence of diverticulitis.    Adrenals:  Adrenal glands are unremarkable.    Kidneys:  The kidneys are in anatomic position. No evidence of nephrolithiasis. No evidence of hydronephrosis or significant perinephric fat stranding.    Bladder:   The urinary bladder is moderately distended.    Reproductive organs:    Unremarkable.    Lymph Nodes:  No significant adenopathy is identified.     Vasculature:  Mild  aortoiliac atheromatous changes are visualized. The abdominal aorta is normal in caliber.    Osseous Structures:    No acute fracture or aggressive lesions. Mild multilevel degenerative changes are visualized, most prominent at L4-5.          Impression:      Impression:    No evidence of pulmonary embolism.    Small left pleural effusion.    Mild bilateral dependent atelectasis.    Moderate gastric distention with air and fluid. This may be transient however, gastroparesis cannot be excluded. Correlate with patient symptoms. This can be further evaluated with nuclear medicine gastric emptying study as clinically warranted. If there   is concern for gastric outlet obstruction, correlate with endoscopy.    Moderately distended urinary bladder. Correlate for urinary retention.    Chronic findings as above.        Electronically Signed: Charles Santo MD    2/25/2025 3:14 PM EST    Workstation ID: [id number]    CT Abdomen Pelvis With Contrast [[id number]] Collected: 02/25/25 1502     Updated: 02/25/25 1516    Narrative:      CT ANGIOGRAM CHEST PULMONARY EMBOLISM, CT ABDOMEN PELVIS W CONTRAST    Date of Exam: 2/25/2025 2:57 PM EST    Indication: Dyspnea. Abdominal pain.    Comparison: Noncontrast chest CT performed on November 18, 2022. Noncontrast CT of the abdomen pelvis performed on October 31, 2018    Technique: Axial CT images were obtained of the chest after the uneventful intravenous administration of iodinated contrast utilizing pulmonary embolism protocol. CT of the abdomen pelvis was performed at the same time. In addition, a 3-D volume rendered   image was created for interpretation.  Sagittal and coronal reconstructions were performed.  Automated exposure control and iterative reconstruction methods were used.      Findings:    CTA CHEST:    Pulmonary arteries: Adequate opacification of the pulmonary arteries. No evidence of acute pulmonary embolism.    Thoracic aorta: The thoracic aorta is suboptimally  opacified with contrast. Thoracic aorta is normal in caliber and contour.    Cardiovascular: The heart is normal in size.  No evidence of pericardial effusion. Patient is post left subclavian triple lead pacemaker placement.    Thyroid: The visualized portion of the thyroid is unremarkable.    Lungs and Pleura: Small left pleural effusion is visualized with adjacent compressive atelectasis. Small left pleural effusion is visualized. Mild bilateral dependent delayed atelectasis is visualized, left greater than right. No suspicious pulmonary   nodules. No pneumothorax. Central airways are patent.    Mediastinum/Maria G: No mediastinal or hilar lymphadenopathy.    Lymph nodes: No axillary or supraclavicular lymphadenopathy.    Bones and Soft Tissue:No acute fracture, aggressive osseous lesions, or soft tissue process.      CT abdomen/pelvis:    Peritoneum:  No free intraperitoneal air or fluid.     Abdominal wall:  There is evidence of prior ventral hernia repair. Small fat-containing left inguinal hernia is visualized.    Liver:  Liver is normal in size and contour. No focal lesions.    Biliary/Gallbladder:  The gallbladder is normal without evidence of radiopaque gallstones. The biliary tree is nondilated.    Pancreas:  Pancreas is within normal limits. There is no evidence of pancreatic mass or peripancreatic inflammatory changes.    Spleen:  Spleen is normal in size and contour.    Gastrointestinal/Mesentery:   There is moderate gastric distention with air-fluid. No evidence of bowel obstruction or gross inflammatory changes. The appendix appears within normal limits. There is pancolonic diverticulosis without evidence of diverticulitis.    Adrenals:  Adrenal glands are unremarkable.    Kidneys:  The kidneys are in anatomic position. No evidence of nephrolithiasis. No evidence of hydronephrosis or significant perinephric fat stranding.    Bladder:   The urinary bladder is moderately distended.    Reproductive organs:     Unremarkable.    Lymph Nodes:  No significant adenopathy is identified.     Vasculature:  Mild aortoiliac atheromatous changes are visualized. The abdominal aorta is normal in caliber.    Osseous Structures:    No acute fracture or aggressive lesions. Mild multilevel degenerative changes are visualized, most prominent at L4-5.          Impression:      Impression:    No evidence of pulmonary embolism.    Small left pleural effusion.    Mild bilateral dependent atelectasis.    Moderate gastric distention with air and fluid. This may be transient however, gastroparesis cannot be excluded. Correlate with patient symptoms. This can be further evaluated with nuclear medicine gastric emptying study as clinically warranted. If there   is concern for gastric outlet obstruction, correlate with endoscopy.    Moderately distended urinary bladder. Correlate for urinary retention.    Chronic findings as above.        Electronically Signed: Charles Santo MD    2/25/2025 3:14 PM EST    Workstation ID: [id number]    XR Chest 1 View [[id number]] Collected: 02/25/25 1217     Updated: 02/25/25 1220    Narrative:      XR CHEST 1 VW    Date of Exam: 2/25/2025 12:00 PM EST    Indication: Chest pain    Comparison: Chest radiograph 2/21/2025    Findings:  Stable cardiomediastinal silhouette. Multichamber AICD. Lung volumes are low with mild bibasilar opacities. No overt edema, large effusion or pneumothorax. The gym related osseous change.      Impression:      Impression:  Low lung volumes with probable mild bibasal atelectasis. Correlate for infection.      Electronically Signed: Nicholas Becerra MD    2/25/2025 12:17 PM EST    Workstation ID: [id number]              ECHOCARDIOGRAM:    Results for orders placed during the hospital encounter of 02/18/25    Adult Transthoracic Echo Complete w/ Color, Spectral and Contrast if Necessary Per Protocol    Interpretation Summary    Left ventricular ejection fraction appears to be 56 - 60%.    The  right ventricular cavity is moderately dilated.    The left atrial cavity is mildly dilated.    Moderate tricuspid valve regurgitation is present.    Estimated right ventricular systolic pressure from tricuspid regurgitation is moderately elevated (45-55 mmHg).            Assessment:         Active Hospital Problems    Diagnosis  POA    **Abdominal pain [R10.9]  Yes     1) Sepsis  - febrile, leukocytosis, tachycardia  - blood cultures pending  - lactate 2.3--> 2.0    2) Suspected RV lead dislodgement per device interrogation  - non-capture on initial EKG  - currently A sensed V paced  - CXR does not show acute findings    3) symptomatic bradycardia with Mobitz 2 AV block s/p [employer] PPM 2/21/25 - 2D echo 2/2025 showed an EF 56-60% with moderate TR and mid moderate MR.  - - Nuclear stress test 2/2025 shows no ischemia.      4) Abdominal Pain  - CT abdomen/pelvis shows gastric distention  - GI consulted; eval for gastroparesis vs gastric outlet obstruction  - planning EGD    5) HTN    6) HLD    7) DM           Plan:   Patient with evidence of sepsis as above.  Device site is without erythema or drainage.  Currently AS  with stable BP.  As d/w Dr. Loco defer any cardiac procedure till EGD performed and clinical picture further delineated but keep NPO.            Electronically signed by TAMIKO Godfrey, 02/26/25, 11:31 AM EST.       Patient seen and examined  Patient had pacemaker implantation last week) GI symptoms and was noted to have RV lead malfunction and my consultation requested  Patient was complaining of upper abdominal pain worse on deep inspiration and a nasogastric tube was placed and endoscopy planned today.  CT scan without evidence of any effusion and stat echo done while I was there bedside without any effusion.  Pain was better when he is sitting up and worse when taking deep breath      Physical Exam    General:      well developed, well nourished, in no acute distress.     Head:      normocephalic and atraumatic.    Eyes:      PERRL/EOM intact, conjunctivae and sclerae clear without nystagmus.    Neck:      no  thyromegaly, trachea central with normal respiratory effort  Lungs:      clear bilaterally to auscultation.    Heart:       regular rate and rhythm, S1, S2 without murmurs, rubs, or gallops  Skin:      intact without lesions or rashes.    Psych:      alert and cooperative; normal mood and affect; normal attention span and concentration.      Pacemaker site is clean and there is no clear-cut rub on exam  Hemodynamically patient is stable    Pacemaker interrogation reviewed with the pacemaker generator  Patient to undergo endoscopy  X-rays reviewed showing appropriate location of leads and RV lead malfunction can be from micro dislodgment  After endoscopy we will decide on RV lead revision which was discussed with the patient    Electronically signed by Dez Loco MD, 02/26/25, 2:37 PM EST.

## 2025-02-26 NOTE — CONSULTS
CARDIOLOGY CONSULT:    Ismael Pulido  1965  male  [id number]      Referring Provider: Hospitalist  Reason for Consultation: Pacemaker function    Patient Care Team:  Sharon Silva MD as PCP - General (Internal Medicine & Pediatrics)    Chief complaint abdominal distention and pain    Subjective .     History of present illness:  Ismael Pulido is a 59 y.o. male with history of sick sinus syndrome status post pacemaker placement hypertension diabetes hyperlipidemia recently presented to the hospital with complaints of epigastric discomfort along with abdominal distention and nausea.  No complaints of any chest pains or shortness of breath.  No PND orthopnea.  No palpitations dizziness syncope or patient underwent recent permanent pacemaker placement.  When patient presented to the ER patient had a CT scan of the abdomen pelvis which showed moderate gastric distention suggesting possible gastroparesis versus gastric outlet obstruction and hence a gastroenterology consultation is obtained.  He also was noted to have the RV lead of the pacemaker dislodgment with high thresholds.    Review of Systems   Constitutional: Negative for fever and malaise/fatigue.   HENT:  Negative for ear pain and nosebleeds.    Eyes:  Negative for blurred vision and double vision.   Cardiovascular:  Negative for chest pain, dyspnea on exertion and palpitations.   Respiratory:  Negative for cough and shortness of breath.    Skin:  Negative for rash.   Musculoskeletal:  Negative for joint pain.   Gastrointestinal:  Positive for abdominal pain. Negative for nausea and vomiting.   Neurological:  Negative for focal weakness and headaches.   Psychiatric/Behavioral:  Negative for depression. The patient is not nervous/anxious.    All other systems reviewed and are negative.      History  Past Medical History:   Diagnosis Date    Allergic 01/01/1988    5+ molds, pollens, grass    Diabetes mellitus     Not sure like 2020     Diverticulitis     Diverticulosis     Mot sure     GERD (gastroesophageal reflux disease)     Hypertension     Kidney stone     Not dure     Obesity     Not sure        Past Surgical History:   Procedure Laterality Date    CARDIAC ELECTROPHYSIOLOGY PROCEDURE N/A 2025    Procedure: Pacemaker DC malgorzata brandt;  Surgeon: Dez Loco MD;  Location: [facility] INVASIVE LOC[facility];  Service: Cardiovascular;  Laterality: N/A;    HERNIA REPAIR         Family History   Problem Relation Age of Onset    Anxiety disorder Mother     Arthritis Mother     Depression Mother     Diabetes Mother     Hyperlipidemia Mother     Kidney disease Mother         Kidney stones    Cancer Father          of cancer in 2009    Diabetes Father        Social History     Tobacco Use    Smoking status: Never    Smokeless tobacco: Never   Vaping Use    Vaping status: Never Used   Substance Use Topics    Alcohol use: Never    Drug use: Never        Medications Prior to Admission   Medication Sig Dispense Refill Last Dose/Taking    ascorbic acid ([employer] Vitamin C) 1000 MG tablet Take 4 tablets by mouth Daily.       cephalexin (KEFLEX) 500 MG capsule Take 1 capsule by mouth Every 8 (Eight) Hours for 15 doses. Indications: Preventative Medication Therapy Used Around Surgery 15 capsule 0     clotrimazole (LOTRIMIN) 1 % cream Apply 1 Application topically to the appropriate area as directed 2 (Two) Times a Day. 85 g 3     empagliflozin (Jardiance) 25 MG tablet tablet Take 1 tablet by mouth Daily. 30 tablet 5     lansoprazole (PREVACID) 30 MG capsule Take 1 capsule by mouth Daily. 30 capsule 5     loratadine (Claritin) 10 MG tablet Take 1 tablet by mouth Daily.       losartan (COZAAR) 25 MG tablet TAKE 1 TABLET BY MOUTH DAILY 90 tablet 1     Misc Natural Products (NEURIVA PO) Take  by mouth Daily.       pseudoephedrine (SUDAFED) 120 MG 12 hr tablet Take 1 tablet by mouth Every Morning.       rosuvastatin (CRESTOR) 10 MG  "tablet Take 1 tablet by mouth Daily. 90 tablet 1     Semaglutide 3 MG tablet Take 1 tablet by mouth Daily. 30 tablet 0        Allergies: Lisinopril    Scheduled Meds:cefTRIAXone, 2 g, Intravenous, Q24H  cetirizine, 10 mg, Oral, Daily  empagliflozin, 25 mg, Oral, Daily  losartan, 25 mg, Oral, Daily  metoclopramide, 5 mg, Intravenous, Q6H  pantoprazole, 40 mg, Oral, BID AC  rosuvastatin, 10 mg, Oral, Daily  sodium chloride, 10 mL, Intravenous, Q12H      Continuous Infusions:   PRN Meds:.  acetaminophen **OR** acetaminophen **OR** acetaminophen    aluminum-magnesium hydroxide-simethicone    senna-docusate sodium **AND** polyethylene glycol **AND** bisacodyl **AND** bisacodyl    hydrALAZINE    nitroglycerin    ondansetron ODT **OR** ondansetron    [COMPLETED] Insert Peripheral IV **AND** sodium chloride    sodium chloride    sodium chloride    Objective     VITAL SIGNS  Vitals:    02/26/25 0810 02/26/25 0825 02/26/25 1102 02/26/25 1200   BP: 127/89 126/96 125/91    BP Location:   Right arm    Patient Position:   Lying    Pulse: 104 99 108 107   Resp:   24    Temp:   97.6 °F (36.4 °C)    TempSrc:   Oral    SpO2:       Weight:       Height:           Flowsheet Rows      Flowsheet Row First Filed Value   Admission Height 172.7 cm (68\") Documented at 02/25/2025 1110   Admission Weight 104 kg (229 lb 4.5 oz) Documented at 02/25/2025 1110             TELEMETRY: Ventricular pacemaker rhythm    Physical Exam:  Constitutional:       Appearance: Well-developed.   Eyes:      General: No scleral icterus.     Conjunctiva/sclera: Conjunctivae normal.      Pupils: Pupils are equal, round, and reactive to light.   HENT:      Head: Normocephalic and atraumatic.   Neck:      Vascular: No carotid bruit or JVD.   Pulmonary:      Effort: Pulmonary effort is normal.      Breath sounds: Normal breath sounds. No wheezing. No rales.   Cardiovascular:      Normal rate. Regular rhythm.   Pulses:     Intact distal pulses.   Abdominal:      " General: Bowel sounds are normal.      Palpations: Abdomen is soft.   Musculoskeletal: Normal range of motion.      Cervical back: Normal range of motion and neck supple. Skin:     General: Skin is warm and dry.      Findings: No rash.   Neurological:      Mental Status: Alert.      Comments: No focal deficits          Results Review:   I reviewed the patient's new clinical results.  Lab Results (last 24 hours)       Procedure Component Value Units Date/Time    Basic Metabolic Panel [[id number]]  (Abnormal) Collected: 02/26/25 0352    Specimen: Blood from Arm, Left Updated: 02/26/25 0530     Glucose 133 mg/dL      BUN 14 mg/dL      Creatinine 0.91 mg/dL      Sodium 137 mmol/L      Potassium 4.4 mmol/L      Chloride 101 mmol/L      CO2 22.1 mmol/L      Calcium 9.7 mg/dL      BUN/Creatinine Ratio 15.4     Anion Gap 13.9 mmol/L      eGFR 97.1 mL/min/1.73     Narrative:      GFR Categories in Chronic Kidney Disease (CKD)      GFR Category          GFR (mL/min/1.73)    Interpretation  G1                     90 or greater         Normal or high (1)  G2                      60-89                Mild decrease (1)  G3a                   45-59                Mild to moderate decrease  G3b                   30-44                Moderate to severe decrease  G4                    15-29                Severe decrease  G5                    14 or less           Kidney failure          (1)In the absence of evidence of kidney disease, neither GFR category G1 or G2 fulfill the criteria for CKD.    eGFR calculation 2021 CKD-EPI creatinine equation, which does not include race as a factor    CBC & Differential [[id number]]  (Abnormal) Collected: 02/26/25 0352    Specimen: Blood from Arm, Left Updated: 02/26/25 0517    Narrative:      The following orders were created for panel order CBC & Differential.  Procedure                               Abnormality         Status                     ---------                                -----------         ------                     CBC Auto Differential[[id number]]        Abnormal            Final result                 Please view results for these tests on the individual orders.    CBC Auto Differential [[id number]]  (Abnormal) Collected: 02/26/25 0352    Specimen: Blood from Arm, Left Updated: 02/26/25 0517     WBC 18.32 10*3/mm3      RBC 5.70 10*6/mm3      Hemoglobin 16.6 g/dL      Hematocrit 50.2 %      MCV 88.1 fL      MCH 29.1 pg      MCHC 33.1 g/dL      RDW 13.1 %      RDW-SD 42.5 fl      MPV 9.4 fL      Platelets 228 10*3/mm3      Neutrophil % 79.0 %      Lymphocyte % 8.2 %      Monocyte % 11.8 %      Eosinophil % 0.1 %      Basophil % 0.4 %      Immature Grans % 0.5 %      Neutrophils, Absolute 14.48 10*3/mm3      Lymphocytes, Absolute 1.50 10*3/mm3      Monocytes, Absolute 2.16 10*3/mm3      Eosinophils, Absolute 0.01 10*3/mm3      Basophils, Absolute 0.07 10*3/mm3      Immature Grans, Absolute 0.10 10*3/mm3      nRBC 0.0 /100 WBC     STAT Lactic Acid, Reflex [[id number]]  (Normal) Collected: 02/25/25 2333    Specimen: Blood from Arm, Right Updated: 02/26/25 0013     Lactate 2.0 mmol/L     Lactic Acid, Plasma [[id number]]  (Abnormal) Collected: 02/25/25 2026    Specimen: Blood from Arm, Right Updated: 02/25/25 2144     Lactate 2.3 mmol/L     Blood Culture - Blood, Arm, Left [[id number]] Collected: 02/25/25 2029    Specimen: Blood from Arm, Left Updated: 02/25/25 2114    Blood Culture - Blood, Arm, Right [[id number]] Collected: 02/25/25 2026    Specimen: Blood from Arm, Right Updated: 02/25/25 2114    Procalcitonin [[id number]]  (Normal) Collected: 02/25/25 1245    Specimen: Blood Updated: 02/25/25 1737     Procalcitonin 0.08 ng/mL     Narrative:      As a Marker for Sepsis (Non-Neonates):    1. <0.5 ng/mL represents a low risk of severe sepsis and/or septic shock.  2. >2 ng/mL represents a high risk of severe sepsis and/or septic shock.    As a Marker for Lower Respiratory Tract Infections  "that require antibiotic therapy:    PCT on Admission    Antibiotic Therapy       6-12 Hrs later    >0.5                Strongly Recommended  >0.25 - <0.5        Recommended   0.1 - 0.25          Discouraged              Remeasure/reassess PCT  <0.1                Strongly Discouraged     Remeasure/reassess PCT    As 28 day mortality risk marker: \"Change in Procalcitonin Result\" (>80% or <=80%) if Day 0 (or Day 1) and Day 4 values are available. Refer to http://[email or web address] [email or web address]-pct-[email or web address]    Change in PCT <=80%  A decrease of PCT levels below or equal to 80% defines a positive change in PCT test result representing a higher risk for 28-day all-cause mortality of patients diagnosed with severe sepsis for septic shock.    Change in PCT >80%  A decrease of PCT levels of more than 80% defines a negative change in PCT result representing a lower risk for 28-day all-cause mortality of patients diagnosed with severe sepsis or septic shock.       D-dimer, Quantitative [[id number]]  (Abnormal) Collected: 02/25/25 1132    Specimen: Blood Updated: 02/25/25 1340     D-Dimer, Quantitative 3.54 MCGFEU/mL     Narrative:      According to the assay 's published package insert, a normal (<0.50 MCGFEU/mL) D-dimer result in conjunction with a non-high clinical probability assessment, excludes deep vein thrombosis (DVT) and pulmonary embolism (PE) with high sensitivity.    D-dimer values increase with age and this can make VTE exclusion of an older population difficult. To address this, the [employer], based on best available evidence and recent guidelines, recommends that clinicians use age-adjusted D-dimer thresholds in patients greater than 50 years of age with: a) a low probability of PE who do not meet all Pulmonary Embolism Rule Out Criteria, or b) in those with intermediate probability of PE.   The formula for an age-adjusted D-dimer cut-off is \"age/100\".  For example, a 60 year old patient would " have an age-adjusted cut-off of 0.60 MCGFEU/mL and an 80 year old 0.80 MCGFEU/mL.    Lipase [[id number]]  (Normal) Collected: 02/25/25 1245    Specimen: Blood Updated: 02/25/25 1339     Lipase 26 U/L             Imaging Results (Last 24 Hours)       Procedure Component Value Units Date/Time    XR Abdomen KUB [[id number]] Collected: 02/25/25 2115     Updated: 02/25/25 2119    Narrative:      XR ABDOMEN KUB    Date of Exam: 2/25/2025 8:35 PM EST    Indication: NG placement    Comparison: Same day abdominal radiograph    Findings:  Only the upper abdomen is imaged. Gastric tube is in appropriate position. Nonobstructive bowel gas pattern is visualized. No free intraperitoneal air. No acute osseous abnormalities. Visualized lung bases are clear.      Impression:      Impression:  Gastric tube in appropriate position.      Electronically Signed: Charles Santo MD    2/25/2025 9:17 PM EST    Workstation ID: [id number]    XR Abdomen KUB [[id number]] Collected: 02/25/25 1742     Updated: 02/25/25 1746    Narrative:      XR ABDOMEN KUB    Date of Exam: 2/25/2025 5:14 PM EST    Indication: ng    Comparison: Abdominal radiograph performed on the same day    Findings:  Limited study. Only the left upper quadrant was imaged. Left side of the gastric tube terminates at the level of the GE junction. Recommend advancing by 3 cm. Nonobstructive bowel gas pattern is visualized. No free intraperitoneal air. No acute osseous   abnormalities. Visualized lung bases are clear.      Impression:      Impression:  Slightly high position of the gastric tube. Recommend advancing by 3 cm.      Electronically Signed: Charles Santo MD    2/25/2025 5:44 PM EST    Workstation ID: [id number]    XR Abdomen KUB [[id number]] Collected: 02/25/25 1558     Updated: 02/25/25 1602    Narrative:      XR ABDOMEN KUB    Date of Exam: 2/25/2025 3:30 PM EST    Indication: Nasogastric tube insertion.    Comparison: CT of the abdomen/pelvis performed on  2/25/2025.    Findings:  The tip of the nasogastric tube terminates in the fundus of the stomach. The pelvis and lower abdomen are excluded from the field-of-view. The visualized bowel gas pattern appears unremarkable. There is mild subsegmental atelectasis in the lung bases.   There are degenerative changes in the thoracolumbar spine.      Impression:      Impression:  The tip of the nasogastric tube terminates in the fundus of the stomach.      Electronically Signed: Erasmo Bernard MD    2/25/2025 4:00 PM EST    Workstation ID: [id number]    CT Angiogram Chest Pulmonary Embolism [[id number]] Collected: 02/25/25 1502     Updated: 02/25/25 1516    Narrative:      CT ANGIOGRAM CHEST PULMONARY EMBOLISM, CT ABDOMEN PELVIS W CONTRAST    Date of Exam: 2/25/2025 2:57 PM EST    Indication: Dyspnea. Abdominal pain.    Comparison: Noncontrast chest CT performed on November 18, 2022. Noncontrast CT of the abdomen pelvis performed on October 31, 2018    Technique: Axial CT images were obtained of the chest after the uneventful intravenous administration of iodinated contrast utilizing pulmonary embolism protocol. CT of the abdomen pelvis was performed at the same time. In addition, a 3-D volume rendered   image was created for interpretation.  Sagittal and coronal reconstructions were performed.  Automated exposure control and iterative reconstruction methods were used.      Findings:    CTA CHEST:    Pulmonary arteries: Adequate opacification of the pulmonary arteries. No evidence of acute pulmonary embolism.    Thoracic aorta: The thoracic aorta is suboptimally opacified with contrast. Thoracic aorta is normal in caliber and contour.    Cardiovascular: The heart is normal in size.  No evidence of pericardial effusion. Patient is post left subclavian triple lead pacemaker placement.    Thyroid: The visualized portion of the thyroid is unremarkable.    Lungs and Pleura: Small left pleural effusion is visualized with adjacent  compressive atelectasis. Small left pleural effusion is visualized. Mild bilateral dependent delayed atelectasis is visualized, left greater than right. No suspicious pulmonary   nodules. No pneumothorax. Central airways are patent.    Mediastinum/Maria G: No mediastinal or hilar lymphadenopathy.    Lymph nodes: No axillary or supraclavicular lymphadenopathy.    Bones and Soft Tissue:No acute fracture, aggressive osseous lesions, or soft tissue process.      CT abdomen/pelvis:    Peritoneum:  No free intraperitoneal air or fluid.     Abdominal wall:  There is evidence of prior ventral hernia repair. Small fat-containing left inguinal hernia is visualized.    Liver:  Liver is normal in size and contour. No focal lesions.    Biliary/Gallbladder:  The gallbladder is normal without evidence of radiopaque gallstones. The biliary tree is nondilated.    Pancreas:  Pancreas is within normal limits. There is no evidence of pancreatic mass or peripancreatic inflammatory changes.    Spleen:  Spleen is normal in size and contour.    Gastrointestinal/Mesentery:   There is moderate gastric distention with air-fluid. No evidence of bowel obstruction or gross inflammatory changes. The appendix appears within normal limits. There is pancolonic diverticulosis without evidence of diverticulitis.    Adrenals:  Adrenal glands are unremarkable.    Kidneys:  The kidneys are in anatomic position. No evidence of nephrolithiasis. No evidence of hydronephrosis or significant perinephric fat stranding.    Bladder:   The urinary bladder is moderately distended.    Reproductive organs:    Unremarkable.    Lymph Nodes:  No significant adenopathy is identified.     Vasculature:  Mild aortoiliac atheromatous changes are visualized. The abdominal aorta is normal in caliber.    Osseous Structures:    No acute fracture or aggressive lesions. Mild multilevel degenerative changes are visualized, most prominent at L4-5.          Impression:       Impression:    No evidence of pulmonary embolism.    Small left pleural effusion.    Mild bilateral dependent atelectasis.    Moderate gastric distention with air and fluid. This may be transient however, gastroparesis cannot be excluded. Correlate with patient symptoms. This can be further evaluated with nuclear medicine gastric emptying study as clinically warranted. If there   is concern for gastric outlet obstruction, correlate with endoscopy.    Moderately distended urinary bladder. Correlate for urinary retention.    Chronic findings as above.        Electronically Signed: Charles Santo MD    2/25/2025 3:14 PM EST    Workstation ID: [id number]    CT Abdomen Pelvis With Contrast [[id number]] Collected: 02/25/25 1502     Updated: 02/25/25 1516    Narrative:      CT ANGIOGRAM CHEST PULMONARY EMBOLISM, CT ABDOMEN PELVIS W CONTRAST    Date of Exam: 2/25/2025 2:57 PM EST    Indication: Dyspnea. Abdominal pain.    Comparison: Noncontrast chest CT performed on November 18, 2022. Noncontrast CT of the abdomen pelvis performed on October 31, 2018    Technique: Axial CT images were obtained of the chest after the uneventful intravenous administration of iodinated contrast utilizing pulmonary embolism protocol. CT of the abdomen pelvis was performed at the same time. In addition, a 3-D volume rendered   image was created for interpretation.  Sagittal and coronal reconstructions were performed.  Automated exposure control and iterative reconstruction methods were used.      Findings:    CTA CHEST:    Pulmonary arteries: Adequate opacification of the pulmonary arteries. No evidence of acute pulmonary embolism.    Thoracic aorta: The thoracic aorta is suboptimally opacified with contrast. Thoracic aorta is normal in caliber and contour.    Cardiovascular: The heart is normal in size.  No evidence of pericardial effusion. Patient is post left subclavian triple lead pacemaker placement.    Thyroid: The visualized portion of the  thyroid is unremarkable.    Lungs and Pleura: Small left pleural effusion is visualized with adjacent compressive atelectasis. Small left pleural effusion is visualized. Mild bilateral dependent delayed atelectasis is visualized, left greater than right. No suspicious pulmonary   nodules. No pneumothorax. Central airways are patent.    Mediastinum/Maria G: No mediastinal or hilar lymphadenopathy.    Lymph nodes: No axillary or supraclavicular lymphadenopathy.    Bones and Soft Tissue:No acute fracture, aggressive osseous lesions, or soft tissue process.      CT abdomen/pelvis:    Peritoneum:  No free intraperitoneal air or fluid.     Abdominal wall:  There is evidence of prior ventral hernia repair. Small fat-containing left inguinal hernia is visualized.    Liver:  Liver is normal in size and contour. No focal lesions.    Biliary/Gallbladder:  The gallbladder is normal without evidence of radiopaque gallstones. The biliary tree is nondilated.    Pancreas:  Pancreas is within normal limits. There is no evidence of pancreatic mass or peripancreatic inflammatory changes.    Spleen:  Spleen is normal in size and contour.    Gastrointestinal/Mesentery:   There is moderate gastric distention with air-fluid. No evidence of bowel obstruction or gross inflammatory changes. The appendix appears within normal limits. There is pancolonic diverticulosis without evidence of diverticulitis.    Adrenals:  Adrenal glands are unremarkable.    Kidneys:  The kidneys are in anatomic position. No evidence of nephrolithiasis. No evidence of hydronephrosis or significant perinephric fat stranding.    Bladder:   The urinary bladder is moderately distended.    Reproductive organs:    Unremarkable.    Lymph Nodes:  No significant adenopathy is identified.     Vasculature:  Mild aortoiliac atheromatous changes are visualized. The abdominal aorta is normal in caliber.    Osseous Structures:    No acute fracture or aggressive lesions. Mild  multilevel degenerative changes are visualized, most prominent at L4-5.          Impression:      Impression:    No evidence of pulmonary embolism.    Small left pleural effusion.    Mild bilateral dependent atelectasis.    Moderate gastric distention with air and fluid. This may be transient however, gastroparesis cannot be excluded. Correlate with patient symptoms. This can be further evaluated with nuclear medicine gastric emptying study as clinically warranted. If there   is concern for gastric outlet obstruction, correlate with endoscopy.    Moderately distended urinary bladder. Correlate for urinary retention.    Chronic findings as above.        Electronically Signed: Charles Santo MD    2/25/2025 3:14 PM EST    Workstation ID: [id number]            EKG      I personally viewed and interpreted the patient's EKG/Telemetry data:    ECHOCARDIOGRAM:  Results for orders placed during the hospital encounter of 02/18/25    Adult Transthoracic Echo Complete w/ Color, Spectral and Contrast if Necessary Per Protocol    Interpretation Summary    Left ventricular ejection fraction appears to be 56 - 60%.    The right ventricular cavity is moderately dilated.    The left atrial cavity is mildly dilated.    Moderate tricuspid valve regurgitation is present.    Estimated right ventricular systolic pressure from tricuspid regurgitation is moderately elevated (45-55 mmHg).         STRESS MYOVIEW:  Results for orders placed during the hospital encounter of 02/18/25    Stress Test With Myocardial Perfusion One Day    Interpretation Summary    Myocardial perfusion imaging indicates a normal myocardial perfusion study with no evidence of ischemia. Impressions are consistent with a low risk study.    Left ventricular ejection fraction is normal (Calculated EF = 64%).       CARDIAC CATHETERIZATION:    No results found for this or any previous visit.       OTHER:         MDM      Abdominal discomfort/epigastric discomfort  Patient  presented with abdominal pain and has gastric distention  Patient also has leukocytosis  Patient has an NG tube with intermittent wall suction and is n.p.o.  Patient will have EGD performed.  Patient is on PPIs at this time and Reglan.    Sick sinus syndrome status post pacemaker placement  Patient had symptomatic bradycardia and sick sinus syndrome and underwent a permanent pacemaker placement  Patient's pacemaker showed high thresholds consistent with RV dislodgment and will have electrophysiology see the patient for the same.    Hypertension  Patient blood pressure currently stable on losartan    Hyperlipidemia  Patient on statins and the lipid levels are followed by primary care doctor    Diabetes  He is on oral medicines.    I discussed the patients findings and my recommendations with patient and nurse    Win Reaves MD  02/26/25  13:33 EST

## 2025-02-26 NOTE — OUTREACH NOTE
Medical Week 2 Survey      Flowsheet Row Responses   [facility] facility patient discharged from? [facility]   Does the patient have one of the following disease processes/diagnoses(primary or secondary)? Other   Week 2 attempt successful? No   Unsuccessful attempts Attempt 1   Revoke Readmitted            FATOU MIGUEL - Registered Nurse

## 2025-02-26 NOTE — ANESTHESIA POSTPROCEDURE EVALUATION
Patient: Ismael Pulido    Procedure Summary       Date: 02/26/25 Room / Location: [facility] ENDOSCOPY 1 / [facility] ENDOSCOPY    Anesthesia Start: 1439 Anesthesia Stop: 1516    Procedure: ESOPHAGOGASTRODUODENOSCOPY Diagnosis:       Epigastric pain      (Epigastric pain [R10.13])    Surgeons: Kiko Talbert MD Provider: Fernando Grande MD    Anesthesia Type: general ASA Status: 3            Anesthesia Type: general    Vitals  Vitals Value Taken Time   /76 02/26/25 1532   Temp 99.5 °F (37.5 °C) 02/26/25 1515   Pulse 116 02/26/25 1536   Resp 22 02/26/25 1525   SpO2 87 % 02/26/25 1536   Vitals shown include unfiled device data.        Post Anesthesia Care and Evaluation    Patient location during evaluation: PACU  Patient participation: complete - patient participated  Level of consciousness: awake  Pain scale: See nurse's notes for pain score.  Pain management: adequate    Airway patency: patent  Anesthetic complications: No anesthetic complications  PONV Status: none  Cardiovascular status: acceptable  Respiratory status: acceptable and spontaneous ventilation  Hydration status: acceptable    Comments: Patient seen and examined postoperatively; vital signs stable; SpO2 greater than or equal to 90%; cardiopulmonary status stable; nausea/vomiting adequately controlled; pain adequately controlled; no apparent anesthesia complications; patient discharged from anesthesia care when discharge criteria were met

## 2025-02-26 NOTE — H&P
[facility] H&P    Patient Name: Ismael Pulido  : 1965  MRN: [id number]  Primary Care Physician: Sharon Silva MD  Date of admission: 2025     Patient Care Team:  Sharon Silva MD as PCP - General (Internal Medicine & Pediatrics)          Subjective   History Present Illness     Chief Complaint:   Chief Complaint   Patient presents with    Chest Pain     Right sided chest pain, upper abd and lung,   pain on base of skull and having sinus pressure, cough some green phlem.       Chest and abdominal pain    Chest Pain   Associated symptoms include abdominal pain, a cough, a fever and nausea. Pertinent negatives include no vomiting.       ED  59-year-old male complaining of chest and epigastric pain that developed throughout the day today     Observation 25  Pt concurs with er hpi. Cardiology and gastroenterology consulted    Review of Systems   Constitutional: Positive for fever.   Cardiovascular:  Positive for chest pain.   Respiratory:  Positive for cough.    Gastrointestinal:  Positive for abdominal pain and nausea. Negative for vomiting.             Personal History     Past Medical History:   Past Medical History:   Diagnosis Date    Allergic 1988    5+ molds, pollens, grass    Diabetes mellitus     Not sure like     Diverticulitis     Diverticulosis     Mot sure     GERD (gastroesophageal reflux disease)     Hypertension     Kidney stone     Not dure     Obesity     Not sure        Surgical History:      Past Surgical History:   Procedure Laterality Date    CARDIAC ELECTROPHYSIOLOGY PROCEDURE N/A 2025    Procedure: Pacemaker DC new [place] aware;  Surgeon: Dez Loco MD;  Location: [facility] INVASIVE LOCATION;  Service: Cardiovascular;  Laterality: N/A;    HERNIA REPAIR             Family History: family history includes Anxiety disorder in his mother; Arthritis in his mother; Cancer in his father; Depression in his  mother; Diabetes in his father and mother; Hyperlipidemia in his mother; Kidney disease in his mother. Otherwise pertinent FHx was reviewed and unremarkable.     Social History:  reports that he has never smoked. He has never used smokeless tobacco. He reports that he does not drink alcohol and does not use drugs.      Medications:  Prior to Admission medications    Medication Sig Start Date End Date Taking? Authorizing Provider   ascorbic acid ([employer] Vitamin C) 1000 MG tablet Take 4 tablets by mouth Daily.    ProviderAlbertina MD   cephalexin (KEFLEX) 500 MG capsule Take 1 capsule by mouth Every 8 (Eight) Hours for 15 doses. Indications: Preventative Medication Therapy Used Around Surgery 2/22/25 2/27/25  Amanda Mcduffie,    clotrimazole (LOTRIMIN) 1 % cream Apply 1 Application topically to the appropriate area as directed 2 (Two) Times a Day. 6/14/24   Sharon Silva MD   empagliflozin (Jardiance) 25 MG tablet tablet Take 1 tablet by mouth Daily. 8/15/24   Sharon Silva MD   lansoprazole (PREVACID) 30 MG capsule Take 1 capsule by mouth Daily. 2/7/25   Sharon Silva MD   loratadine (Claritin) 10 MG tablet Take 1 tablet by mouth Daily.    ProviderAlbertina MD   losartan (COZAAR) 25 MG tablet TAKE 1 TABLET BY MOUTH DAILY 2/19/25   Sharon Silva MD   Misc Natural Products (NEURIVA PO) Take  by mouth Daily.    Albertina Goff MD   pseudoephedrine (SUDAFED) 120 MG 12 hr tablet Take 1 tablet by mouth Every Morning.    Albertina Goff MD   rosuvastatin (CRESTOR) 10 MG tablet Take 1 tablet by mouth Daily. 2/7/25   Sharon Silva MD   Semaglutide 3 MG tablet Take 1 tablet by mouth Daily. 2/18/25   Sharon Silva MD       Allergies:    Allergies   Allergen Reactions    Lisinopril Nausea And Vomiting       Objective   Objective     Vital Signs  Temp:  [97.6 °F (36.4 °C)-100.2 °F (37.9 °C)] 97.6 °F (36.4 °C)  Heart Rate:  [] 108  Resp:   [16-32] 24  BP: (105-198)/() 125/91  SpO2:  [88 %-99 %] 99 %  on   ;   Device (Oxygen Therapy): room air  Body mass index is 34.53 kg/m².    Physical Exam  Constitutional:       Appearance: Normal appearance.   Cardiovascular:      Rate and Rhythm: Regular rhythm. Tachycardia present.   Pulmonary:      Effort: Pulmonary effort is normal.      Breath sounds: Normal breath sounds.   Abdominal:      Comments: Ng tube in place with small amt of drk outpt   Neurological:      General: No focal deficit present.      Mental Status: He is alert and oriented to person, place, and time. Mental status is at baseline.   Psychiatric:         Mood and Affect: Mood normal.         Behavior: Behavior normal.         Results Review:  I have personally reviewed most recent cardiac tracings, lab results, microbiology results, and radiology images and interpretations and agree with findings, most notably: cbc, cmp, lactic, procal, lipase, ddimer, troponin, rpp, chest xray, ct chest and abdomen, kub, ekg.    Results from last 7 days   Lab Units 02/26/25  0352   WBC 10*3/mm3 18.32*   HEMOGLOBIN g/dL 16.6   HEMATOCRIT % 50.2   PLATELETS 10*3/mm3 228     Results from last 7 days   Lab Units 02/26/25  0352 02/25/25  2333 02/25/25  2026 02/25/25  1245 02/25/25  1132   SODIUM mmol/L 137  --   --   --  141   POTASSIUM mmol/L 4.4  --   --   --  4.0   CHLORIDE mmol/L 101  --   --   --  103   CO2 mmol/L 22.1  --   --   --  24.0   BUN mg/dL 14  --   --   --  9   CREATININE mg/dL 0.91  --   --   --  0.95   GLUCOSE mg/dL 133*  --   --   --  139*   CALCIUM mg/dL 9.7  --   --   --  9.7   HSTROP T ng/L  --   --   --  39* 38*   LACTATE mmol/L  --  2.0   < >  --   --    PROCALCITONIN ng/mL  --   --   --  0.08  --     < > = values in this interval not displayed.     Estimated Creatinine Clearance: 101.6 mL/min (by C-G formula based on SCr of 0.91 mg/dL).  Brief Urine Lab Results  (Last result in the past 365 days)        Color   Clarity   Blood    Leuk Est   Nitrite   Protein   CREAT   Urine HCG        02/07/25 1139             29.3                 Microbiology Results (last 10 days)       Procedure Component Value - Date/Time    Respiratory Panel PCR w/COVID-19(SARS-CoV-2) [facility] In-House, NP Swab in UTM/VTM, 2 HR TAT - Swab, Nasopharynx [[id number]]  (Normal) Collected: 02/25/25 1131    Lab Status: Final result Specimen: Swab from Nasopharynx Updated: 02/25/25 1231     ADENOVIRUS, PCR Not Detected     Coronavirus 229E Not Detected     Coronavirus HKU1 Not Detected     Coronavirus NL63 Not Detected     Coronavirus OC43 Not Detected     COVID19 Not Detected     Human Metapneumovirus Not Detected     Human Rhinovirus/Enterovirus Not Detected     Influenza A PCR Not Detected     Influenza B PCR Not Detected     Parainfluenza Virus 1 Not Detected     Parainfluenza Virus 2 Not Detected     Parainfluenza Virus 3 Not Detected     Parainfluenza Virus 4 Not Detected     RSV, PCR Not Detected     Bordetella pertussis pcr Not Detected     Bordetella parapertussis PCR Not Detected     Chlamydophila pneumoniae PCR Not Detected     Mycoplasma pneumo by PCR Not Detected    Narrative:      In the setting of a positive respiratory panel with a viral infection PLUS a negative procalcitonin without other underlying concern for bacterial infection, consider observing off antibiotics or discontinuation of antibiotics and continue supportive care. If the respiratory panel is positive for atypical bacterial infection (Bordetella pertussis, Chlamydophila pneumoniae, or Mycoplasma pneumoniae), consider antibiotic de-escalation to target atypical bacterial infection.    COVID-19, FLU A/B, RSV PCR 1 HR TAT - Swab, Nasopharynx [[id number]]  (Normal) Collected: 02/19/25 0017    Lab Status: Final result Specimen: Swab from Nasopharynx Updated: 02/19/25 0059     COVID19 Not Detected     Influenza A PCR Not Detected     Influenza B PCR Not Detected     RSV, PCR Not Detected     Narrative:      Fact sheet for providers: https://www.fda.gov/media/002418/download    Fact sheet for patients: https://www.fda.gov/media/146629/download    Test performed by PCR.            ECG/EMG Results (most recent)       Procedure Component Value Units Date/Time    Telemetry Scan [[id number]] Resulted: 02/25/25     Updated: 02/25/25 1307    Telemetry Scan [[id number]] Resulted: 02/25/25     Updated: 02/25/25 1628    ECG 12 Lead Chest Pain [[id number]] Collected: 02/25/25 1124     Updated: 02/26/25 0640     QT Interval 368 ms      QTC Interval 455 ms     Narrative:      HEART RATE=85  bpm  RR Qfflubas=611  ms  AK Xmexdktd=830  ms  P Horizontal Axis=222  deg  P Front Axis=216  deg  QRSD Bkwzvcgw=785  ms  QT Pwgnkuor=541  ms  KNpL=539  ms  QRS Axis=-17  deg  T Wave Axis=163  deg  - ABNORMAL ECG -  Pacemaker spikes or artifacts  Sinus or ectopic atrial rhythm  Multiform ventricular premature complexes  Prolonged AK interval  IVCD, consider LBBB  ST elevation secondary to IVCD  When compared with ECG of 22-Feb-2025 05:54:57,  New conduction abnormality  Electronically Signed By: Jeromy Blake ([facility]) 2025-02-26 06:39:51  Date and Time of Study:2025-02-25 11:24:07    Telemetry Scan [[id number]] Resulted: 02/25/25     Updated: 02/26/25 0825    Telemetry Scan [[id number]] Resulted: 02/25/25     Updated: 02/26/25 0906    Telemetry Scan [[id number]] Resulted: 02/25/25     Updated: 02/26/25 0931    Telemetry Scan [[id number]] Resulted: 02/25/25     Updated: 02/26/25 1040                Results for orders placed during the hospital encounter of 02/18/25    Adult Transthoracic Echo Complete w/ Color, Spectral and Contrast if Necessary Per Protocol    Interpretation Summary    Left ventricular ejection fraction appears to be 56 - 60%.    The right ventricular cavity is moderately dilated.    The left atrial cavity is mildly dilated.    Moderate tricuspid valve regurgitation is present.    Estimated right ventricular systolic  pressure from tricuspid regurgitation is moderately elevated (45-55 mmHg).      XR Abdomen KUB    Result Date: 2/25/2025  Impression: Gastric tube in appropriate position. Electronically Signed: Charles Santo MD  2/25/2025 9:17 PM EST  Workstation ID: [id number]    XR Abdomen KUB    Result Date: 2/25/2025  Impression: Slightly high position of the gastric tube. Recommend advancing by 3 cm. Electronically Signed: Charles Santo MD  2/25/2025 5:44 PM EST  Workstation ID: [id number]    XR Abdomen KUB    Result Date: 2/25/2025  Impression: The tip of the nasogastric tube terminates in the fundus of the stomach. Electronically Signed: Erasmo Bernard MD  2/25/2025 4:00 PM EST  Workstation ID: [id number]    CT Angiogram Chest Pulmonary Embolism    Result Date: 2/25/2025  Impression: No evidence of pulmonary embolism. Small left pleural effusion. Mild bilateral dependent atelectasis. Moderate gastric distention with air and fluid. This may be transient however, gastroparesis cannot be excluded. Correlate with patient symptoms. This can be further evaluated with nuclear medicine gastric emptying study as clinically warranted. If there  is concern for gastric outlet obstruction, correlate with endoscopy. Moderately distended urinary bladder. Correlate for urinary retention. Chronic findings as above. Electronically Signed: Charles Santo MD  2/25/2025 3:14 PM EST  Workstation ID: [id number]    CT Abdomen Pelvis With Contrast    Result Date: 2/25/2025  Impression: No evidence of pulmonary embolism. Small left pleural effusion. Mild bilateral dependent atelectasis. Moderate gastric distention with air and fluid. This may be transient however, gastroparesis cannot be excluded. Correlate with patient symptoms. This can be further evaluated with nuclear medicine gastric emptying study as clinically warranted. If there  is concern for gastric outlet obstruction, correlate with endoscopy. Moderately distended urinary bladder. Correlate for  urinary retention. Chronic findings as above. Electronically Signed: Charles Santo MD  2/25/2025 3:14 PM EST  Workstation ID: [id number]    XR Chest 1 View    Result Date: 2/25/2025  Impression: Low lung volumes with probable mild bibasal atelectasis. Correlate for infection. Electronically Signed: Nicholas Becerra MD  2/25/2025 12:17 PM EST  Workstation ID: [id number]    XR Chest 1 View    Result Date: 2/21/2025  Impression: Radiographically uncomplicated multichamber AICD. No pneumothorax. Electronically Signed: Nicholas Becerra MD  2/21/2025 5:49 PM EST  Workstation ID: [id number]    CT Head Without Contrast    Result Date: 2/18/2025  Impression: No acute intracranial findings. Electronically Signed: Bao Trujillo  2/18/2025 10:00 PM EST  Workstation ID: [id number]    XR Chest 1 View    Result Date: 2/18/2025  Impression: No radiographic evidence of acute cardiopulmonary abnormality. Electronically Signed: Bao Trujillo  2/18/2025 9:37 PM EST  Workstation ID: [id number]       Estimated Creatinine Clearance: 101.6 mL/min (by C-G formula based on SCr of 0.91 mg/dL).    Assessment & Plan   Assessment/Plan       Active Hospital Problems    Diagnosis  POA    **Abdominal pain [R10.9]  Yes      Resolved Hospital Problems   No resolved problems to display.     Chest pain  - troponin, 38, 39  - ddimer 3.54  - lactic 2.3, 2.0  - procal .08  - rpp negative  - chest xray reviewed and showing low lung volumes with probable mild bibasal atelectasis.   - cta chest showing no pe. Small left pleural effusion. Mild bilateral dependent atelectasis  - ekg rate 85, pvcs, pacemaker, prolonged ND  - cardiology consulted  - pacemaker interrogated and lead dislodged    Abdominal pain  - bmp unremarkable  - lipase 26  - ct abd reviewed and showing Moderate gastric distention with air and fluid. This may be transient however, gastroparesis cannot be excluded. Correlate with patient symptoms. This can be further evaluated with nuclear medicine  gastric emptying study as clinically warranted. If there is concern for gastric outlet obstruction, correlate with endoscopy. Moderately distended urinary bladder. Correlate for urinary retention.  - gastroenterology consulted  - ng tube in place to low wall suction  - egd planned    Hypertension  -well Controlled   BP Readings from Last 1 Encounters:   02/26/25 125/91     - Continue losartan  - Monitor while admitted     Diabetes mellitus  -well controlled   Lab Results   Component Value Date    GLUCOSE 133 (H) 02/26/2025    GLUCOSE 139 (H) 02/25/2025    GLUCOSE 93 02/22/2025    GLUCOSE 145 (H) 02/19/2025     -jardiance, semaglutide  - ssi  -Diabetic diet  -Monitor before meals and at bedtime       VTE Prophylaxis - Active VTE Prophylaxis  Mechanical:        Start        02/25/25 1649  Maintain Sequential Compression Device  Continuous                          Select Pharmacologic VTE Prophylaxis if Desired & Appropriate      CODE STATUS:    Code Status and Medical Interventions: CPR (Attempt to Resuscitate); Full Support   Ordered at: 02/25/25 1627     Level Of Support Discussed With:    Patient     Code Status (Patient has no pulse and is not breathing):    CPR (Attempt to Resuscitate)     Medical Interventions (Patient has pulse or is breathing):    Full Support       This patient has been examined wearing personal protective equipment.     I discussed the patient's findings and my recommendations with patient and nursing staff.      Signature:Electronically signed by Irais Pink PA-C, 02/26/25, 12:26 PM EST.

## 2025-02-26 NOTE — PLAN OF CARE
Problem: Adult Inpatient Plan of Care  Goal: Plan of Care Review  Outcome: Progressing  Goal: Patient-Specific Goal (Individualized)  Outcome: Progressing  Goal: Absence of Hospital-Acquired Illness or Injury  Outcome: Progressing  Intervention: Identify and Manage Fall Risk  Recent Flowsheet Documentation  Taken 2/26/2025 0415 by Lilian Castellanos RN  Safety Promotion/Fall Prevention:   safety round/check completed   room organization consistent   clutter free environment maintained   assistive device/personal items within reach  Taken 2/26/2025 0205 by Lilian Castellanos RN  Safety Promotion/Fall Prevention:   safety round/check completed   room organization consistent   clutter free environment maintained   assistive device/personal items within reach  Taken 2/26/2025 0000 by Lilian Castellanos RN  Safety Promotion/Fall Prevention:   safety round/check completed   room organization consistent   clutter free environment maintained   assistive device/personal items within reach  Taken 2/25/2025 2200 by Lilian Castellanos RN  Safety Promotion/Fall Prevention:   safety round/check completed   room organization consistent   clutter free environment maintained   assistive device/personal items within reach  Taken 2/25/2025 2042 by Lilian Castellanos RN  Safety Promotion/Fall Prevention:   clutter free environment maintained   assistive device/personal items within reach  Intervention: Prevent Skin Injury  Recent Flowsheet Documentation  Taken 2/25/2025 2042 by Lilian Castellanos RN  Body Position: position changed independently  Skin Protection: transparent dressing maintained  Intervention: Prevent and Manage VTE (Venous Thromboembolism) Risk  Recent Flowsheet Documentation  Taken 2/25/2025 2042 by Lilian Castellanos RN  VTE Prevention/Management: SCDs (sequential compression devices) off  Intervention: Prevent Infection  Recent Flowsheet Documentation  Taken 2/26/2025 0415 by Lilian Castellanos RN  Infection Prevention:   single patient room provided    rest/sleep promoted   hand hygiene promoted   environmental surveillance performed  Taken 2/26/2025 0205 by Lilian Castellanos RN  Infection Prevention:   single patient room provided   rest/sleep promoted   hand hygiene promoted   environmental surveillance performed  Taken 2/26/2025 0000 by Lilian Castellanos RN  Infection Prevention:   single patient room provided   rest/sleep promoted   hand hygiene promoted   environmental surveillance performed  Taken 2/25/2025 2200 by Lilian Castellanos RN  Infection Prevention:   single patient room provided   rest/sleep promoted   hand hygiene promoted   environmental surveillance performed  Taken 2/25/2025 2042 by Lilian Castellanos RN  Infection Prevention:   single patient room provided   rest/sleep promoted   hand hygiene promoted   environmental surveillance performed  Goal: Optimal Comfort and Wellbeing  Outcome: Progressing  Intervention: Provide Person-Centered Care  Recent Flowsheet Documentation  Taken 2/25/2025 2042 by Lilian Castellanos RN  Trust Relationship/Rapport:   care explained   choices provided   emotional support provided   questions answered   empathic listening provided   questions encouraged   reassurance provided   thoughts/feelings acknowledged  Goal: Readiness for Transition of Care  Outcome: Progressing  Intervention: Mutually Develop Transition Plan  Recent Flowsheet Documentation  Taken 2/25/2025 1902 by Lilian Castellanos RN  Equipment Currently Used at Home: none  Taken 2/25/2025 1858 by Lilian Castellanos RN  Transportation Anticipated: family or friend will provide  Patient/Family Anticipated Services at Transition: none  Patient/Family Anticipates Transition to: home with family     Problem: Pain Acute  Goal: Optimal Pain Control and Function  Outcome: Progressing  Intervention: Optimize Psychosocial Wellbeing  Recent Flowsheet Documentation  Taken 2/25/2025 2042 by Lilian Castellanos RN  Diversional Activities:   television   smartphone  Intervention: Prevent or Manage  Pain  Recent Flowsheet Documentation  Taken 2/25/2025 2042 by Lilian Castellanos RN  Medication Review/Management: medications reviewed     Problem: Nausea and Vomiting  Goal: Nausea and Vomiting Relief  Outcome: Progressing     Problem: Sepsis/Septic Shock  Goal: Optimal Coping  Outcome: Progressing  Intervention: Support Patient and Family Response  Recent Flowsheet Documentation  Taken 2/25/2025 2042 by Lilian Castellanos RN  Family/Support System Care:   support provided   self-care encouraged  Goal: Absence of Bleeding  Outcome: Progressing  Goal: Blood Glucose Level Within Target Range  Outcome: Progressing  Goal: Absence of Infection Signs and Symptoms  Outcome: Progressing  Intervention: Initiate Sepsis Management  Recent Flowsheet Documentation  Taken 2/26/2025 0415 by Lilian Castellanos RN  Infection Prevention:   single patient room provided   rest/sleep promoted   hand hygiene promoted   environmental surveillance performed  Taken 2/26/2025 0205 by Lilian Castellanos RN  Infection Prevention:   single patient room provided   rest/sleep promoted   hand hygiene promoted   environmental surveillance performed  Taken 2/26/2025 0000 by Lilian Castellanos RN  Infection Prevention:   single patient room provided   rest/sleep promoted   hand hygiene promoted   environmental surveillance performed  Taken 2/25/2025 2200 by Lilian Castellanos RN  Infection Prevention:   single patient room provided   rest/sleep promoted   hand hygiene promoted   environmental surveillance performed  Taken 2/25/2025 2042 by Lilian Castellanos RN  Infection Prevention:   single patient room provided   rest/sleep promoted   hand hygiene promoted   environmental surveillance performed  Intervention: Promote Recovery  Recent Flowsheet Documentation  Taken 2/25/2025 2042 by Lilian Castellanos RN  Activity Management: bedrest  Goal: Optimal Nutrition Delivery  Outcome: Progressing   Goal Outcome Evaluation:

## 2025-02-26 NOTE — CASE MANAGEMENT/SOCIAL WORK
Discharge Planning Assessment   Cedric     Patient Name: Ismael Pulido  MRN: [id number]  Today's Date: 2/26/2025    Admit Date: 2/25/2025    Plan: Return home with spouse   Discharge Needs Assessment       Row Name 02/26/25 1408       Living Environment    People in Home spouse    Name(s) of People in Home Palak    Current Living Arrangements home    Potentially Unsafe Housing Conditions none    In the past 12 months has the electric, gas, oil, or water company threatened to shut off services in your home? No    Primary Care Provided by self    Provides Primary Care For no one    Family Caregiver if Needed spouse    Family Caregiver Names Palak    Quality of Family Relationships helpful;involved;supportive    Able to Return to Prior Arrangements yes       Resource/Environmental Concerns    Resource/Environmental Concerns none    Transportation Concerns none       Transportation Needs    In the past 12 months, has lack of transportation kept you from medical appointments or from getting medications? no    In the past 12 months, has lack of transportation kept you from meetings, work, or from getting things needed for daily living? No       Food Insecurity    Within the past 12 months, you worried that your food would run out before you got the money to buy more. Never true    Within the past 12 months, the food you bought just didn't last and you didn't have money to get more. Never true       Transition Planning    Patient/Family Anticipates Transition to home with family    Patient/Family Anticipated Services at Transition none    Transportation Anticipated car, drives self;family or friend will provide       Discharge Needs Assessment    Readmission Within the Last 30 Days no previous admission in last 30 days    Equipment Currently Used at Home cpap    Concerns to be Addressed no discharge needs identified    Do you want help finding or keeping work or a job? I do not need or want help    Do you want help  with school or training? For example, starting or completing job training or getting a high school diploma, GED or equivalent No    Anticipated Changes Related to Illness none    Equipment Needed After Discharge none                   Discharge Plan       Row Name 25 7532       Plan    Plan Return home with spouse    Patient/Family in Agreement with Plan yes    Plan Comments Barriers: GI and Cardiology consults. EGD and Heart Echo ordered. Mr. Pulido was out of room and CM interviewed spouse, Palak after she confirmed .  They reside together and normally he works , drives and is independent with  only equipment a CPAP.  Since pacemaker placement last week his  wife has been helping him as needed with self care. PCP and Pharmacy confirmed. She denied any financial concerns.                  Demographic Summary       Row Name 25 [id number]       General Information    Admission Type observation    Arrived From emergency department    Referral Source admission list    Reason for Consult discharge planning    Preferred Language English       Contact Information    Permission Granted to Share Info With                    Functional Status       Row Name 25 1400       Functional Status    Usual Activity Tolerance good    Current Activity Tolerance moderate       Functional Status, IADL    Medications independent;assistive person    Meal Preparation assistive person    Housekeeping assistive person    Laundry assistive person    Shopping assistive person    If for any reason you need help with day-to-day activities such as bathing, preparing meals, shopping, managing finances, etc., do you get the help you need? I get all the help I need    IADL Comments wife has been helping since pacemaker placement       Mental Status    General Appearance WDL WDL       Mental Status Summary    Recent Changes in Mental Status/Cognitive Functioning no changes       Employment/    Employment Status  employed full-time    Shift Worked first shift                          Claire ALBAN,RN Case Manager  [facility]  Phone: Desk- [phone] cell- [phone]

## 2025-02-26 NOTE — OP NOTE
ESOPHAGOGASTRODUODENOSCOPY Procedure Report    Patient Name:  Ismael Pulido  YOB: 1965    Date of Surgery:  2/26/2025     Pre-Op Diagnosis:  Epigastric pain [R10.13]    Post-Op Diagnosis:  1.  Grade B erosive esophagitis  2.  Erosive gastritis  3.  No evidence of gastric outlet obstruction       Procedure/CPT® Codes:  [facility] ESOPHAGOGASTRODUODENOSCOPY TRANSORAL DIAGNOSTIC [12192]    Procedure(s):  ESOPHAGOGASTRODUODENOSCOPY    Staff:  Surgeon(s):  Kiko Talbert MD      Anesthesia: Monitored Anesthesia Care    Description of Procedure:  A description of the procedure as well as risks, benefits and alternative methods were explained to the patient who voiced understanding and signed the corresponding consent form. A physical exam was performed and vital signs were monitored throughout the procedure.    After informed consent was obtained, the patient was placed in the left lateral decubitus position and sedated as above.  The Olympus video gastroscope was inserted into the oropharynx and the esophagus was intubated without difficulty.  Examination of the esophagus, stomach, pylorus, duodenal bulb, and second portion of the duodenum was performed without difficulty. The scope was then retroflexed and the fundus was visualized. The procedure was not difficult and there were no immediate complications.  There was no blood loss.    Findings:  Esophagus: Linear erosions in the distal esophagus consistent with grade B erosive esophagitis  Stomach: Erosions in the gastric antrum and body consistent with erosive gastritis.  Some retained fluid noted.  Pylorus was widely patent and there was no evidence of gastric outlet obstruction.  Duodenum: Normal    Impression:  1.  Erosive esophagitis  2.  Erosive gastritis    Recommendations:  1.  Pantoprazole 40 mg IV twice daily  2.  Will begin low-dose metoclopramide 5 mg IV twice daily to help with gastric emptying  3.  Begin clear liquid diet and advance  as tolerated  4.  We will see as needed      Kiko Talbert MD     Date: 2/26/2025    Time: 15:11 EST      .

## 2025-02-27 ENCOUNTER — APPOINTMENT (OUTPATIENT)
Dept: CARDIOLOGY | Facility: HOSPITAL | Age: 60
End: 2025-02-27
Payer: COMMERCIAL

## 2025-02-27 ENCOUNTER — APPOINTMENT (OUTPATIENT)
Dept: CT IMAGING | Facility: HOSPITAL | Age: 60
End: 2025-02-27
Payer: COMMERCIAL

## 2025-02-27 PROBLEM — T82.110A PACEMAKER LEAD MALFUNCTION: Status: ACTIVE | Noted: 2025-02-25

## 2025-02-27 LAB
ALBUMIN SERPL-MCNC: 3.5 G/DL (ref 3.5–5.2)
ALBUMIN SERPL-MCNC: 3.6 G/DL (ref 3.5–5.2)
ALBUMIN/GLOB SERPL: 0.9 G/DL
ALBUMIN/GLOB SERPL: 1 G/DL
ALP SERPL-CCNC: 76 U/L (ref 39–117)
ALP SERPL-CCNC: 92 U/L (ref 39–117)
ALT SERPL W P-5'-P-CCNC: 16 U/L (ref 1–41)
ALT SERPL W P-5'-P-CCNC: 38 U/L (ref 1–41)
ANION GAP SERPL CALCULATED.3IONS-SCNC: 13.5 MMOL/L (ref 5–15)
ANION GAP SERPL CALCULATED.3IONS-SCNC: 17.1 MMOL/L (ref 5–15)
AST SERPL-CCNC: 22 U/L (ref 1–40)
AST SERPL-CCNC: 65 U/L (ref 1–40)
BACTERIA UR QL AUTO: NORMAL /HPF
BASOPHILS # BLD AUTO: 0.05 10*3/MM3 (ref 0–0.2)
BASOPHILS # BLD AUTO: 0.09 10*3/MM3 (ref 0–0.2)
BASOPHILS NFR BLD AUTO: 0.5 % (ref 0–1.5)
BASOPHILS NFR BLD AUTO: 0.6 % (ref 0–1.5)
BH CV LOWER VASCULAR LEFT COMMON FEMORAL AUGMENT: NORMAL
BH CV LOWER VASCULAR LEFT COMMON FEMORAL COMPETENT: NORMAL
BH CV LOWER VASCULAR LEFT COMMON FEMORAL COMPRESS: NORMAL
BH CV LOWER VASCULAR LEFT COMMON FEMORAL PHASIC: NORMAL
BH CV LOWER VASCULAR LEFT COMMON FEMORAL SPONT: NORMAL
BH CV LOWER VASCULAR LEFT DISTAL FEMORAL COMPRESS: NORMAL
BH CV LOWER VASCULAR LEFT GASTRONEMIUS COMPRESS: NORMAL
BH CV LOWER VASCULAR LEFT GREATER SAPH AK COMPRESS: NORMAL
BH CV LOWER VASCULAR LEFT GREATER SAPH BK COMPRESS: NORMAL
BH CV LOWER VASCULAR LEFT LESSER SAPH COMPRESS: NORMAL
BH CV LOWER VASCULAR LEFT MID FEMORAL AUGMENT: NORMAL
BH CV LOWER VASCULAR LEFT MID FEMORAL COMPETENT: NORMAL
BH CV LOWER VASCULAR LEFT MID FEMORAL COMPRESS: NORMAL
BH CV LOWER VASCULAR LEFT MID FEMORAL PHASIC: NORMAL
BH CV LOWER VASCULAR LEFT MID FEMORAL SPONT: NORMAL
BH CV LOWER VASCULAR LEFT PERONEAL COMPRESS: NORMAL
BH CV LOWER VASCULAR LEFT POPLITEAL AUGMENT: NORMAL
BH CV LOWER VASCULAR LEFT POPLITEAL COMPETENT: NORMAL
BH CV LOWER VASCULAR LEFT POPLITEAL COMPRESS: NORMAL
BH CV LOWER VASCULAR LEFT POPLITEAL PHASIC: NORMAL
BH CV LOWER VASCULAR LEFT POPLITEAL SPONT: NORMAL
BH CV LOWER VASCULAR LEFT POSTERIOR TIBIAL COMPRESS: NORMAL
BH CV LOWER VASCULAR LEFT PROXIMAL FEMORAL COMPRESS: NORMAL
BH CV LOWER VASCULAR LEFT SAPHENOFEMORAL JUNCTION COMPRESS: NORMAL
BH CV LOWER VASCULAR RIGHT COMMON FEMORAL AUGMENT: NORMAL
BH CV LOWER VASCULAR RIGHT COMMON FEMORAL COMPETENT: NORMAL
BH CV LOWER VASCULAR RIGHT COMMON FEMORAL COMPRESS: NORMAL
BH CV LOWER VASCULAR RIGHT COMMON FEMORAL PHASIC: NORMAL
BH CV LOWER VASCULAR RIGHT COMMON FEMORAL SPONT: NORMAL
BH CV LOWER VASCULAR RIGHT DISTAL FEMORAL COMPRESS: NORMAL
BH CV LOWER VASCULAR RIGHT GASTRONEMIUS COMPRESS: NORMAL
BH CV LOWER VASCULAR RIGHT GREATER SAPH AK COMPRESS: NORMAL
BH CV LOWER VASCULAR RIGHT GREATER SAPH BK COMPRESS: NORMAL
BH CV LOWER VASCULAR RIGHT LESSER SAPH COMPRESS: NORMAL
BH CV LOWER VASCULAR RIGHT MID FEMORAL AUGMENT: NORMAL
BH CV LOWER VASCULAR RIGHT MID FEMORAL COMPETENT: NORMAL
BH CV LOWER VASCULAR RIGHT MID FEMORAL COMPRESS: NORMAL
BH CV LOWER VASCULAR RIGHT MID FEMORAL PHASIC: NORMAL
BH CV LOWER VASCULAR RIGHT MID FEMORAL SPONT: NORMAL
BH CV LOWER VASCULAR RIGHT PERONEAL COMPRESS: NORMAL
BH CV LOWER VASCULAR RIGHT POPLITEAL AUGMENT: NORMAL
BH CV LOWER VASCULAR RIGHT POPLITEAL COMPETENT: NORMAL
BH CV LOWER VASCULAR RIGHT POPLITEAL COMPRESS: NORMAL
BH CV LOWER VASCULAR RIGHT POPLITEAL PHASIC: NORMAL
BH CV LOWER VASCULAR RIGHT POPLITEAL SPONT: NORMAL
BH CV LOWER VASCULAR RIGHT POSTERIOR TIBIAL COMPRESS: NORMAL
BH CV LOWER VASCULAR RIGHT PROXIMAL FEMORAL COMPRESS: NORMAL
BH CV LOWER VASCULAR RIGHT SAPHENOFEMORAL JUNCTION COMPRESS: NORMAL
BILIRUB SERPL-MCNC: 0.6 MG/DL (ref 0–1.2)
BILIRUB SERPL-MCNC: 0.9 MG/DL (ref 0–1.2)
BILIRUB UR QL STRIP: NEGATIVE
BUN SERPL-MCNC: 21 MG/DL (ref 6–20)
BUN SERPL-MCNC: 22 MG/DL (ref 6–20)
BUN/CREAT SERPL: 17.7 (ref 7–25)
BUN/CREAT SERPL: 20 (ref 7–25)
CALCIUM SPEC-SCNC: 8.7 MG/DL (ref 8.6–10.5)
CALCIUM SPEC-SCNC: 9.2 MG/DL (ref 8.6–10.5)
CHLORIDE SERPL-SCNC: 100 MMOL/L (ref 98–107)
CHLORIDE SERPL-SCNC: 97 MMOL/L (ref 98–107)
CLARITY UR: CLEAR
CO2 SERPL-SCNC: 16.9 MMOL/L (ref 22–29)
CO2 SERPL-SCNC: 20.5 MMOL/L (ref 22–29)
COLOR UR: YELLOW
CREAT SERPL-MCNC: 1.05 MG/DL (ref 0.76–1.27)
CREAT SERPL-MCNC: 1.24 MG/DL (ref 0.76–1.27)
D-LACTATE SERPL-SCNC: 1.6 MMOL/L (ref 0.5–2)
DEPRECATED RDW RBC AUTO: 42.8 FL (ref 37–54)
DEPRECATED RDW RBC AUTO: 43.8 FL (ref 37–54)
EGFRCR SERPLBLD CKD-EPI 2021: 67 ML/MIN/1.73
EGFRCR SERPLBLD CKD-EPI 2021: 81.8 ML/MIN/1.73
EOSINOPHIL # BLD AUTO: 0.01 10*3/MM3 (ref 0–0.4)
EOSINOPHIL # BLD AUTO: 0.02 10*3/MM3 (ref 0–0.4)
EOSINOPHIL NFR BLD AUTO: 0.1 % (ref 0.3–6.2)
EOSINOPHIL NFR BLD AUTO: 0.2 % (ref 0.3–6.2)
ERYTHROCYTE [DISTWIDTH] IN BLOOD BY AUTOMATED COUNT: 13.1 % (ref 12.3–15.4)
ERYTHROCYTE [DISTWIDTH] IN BLOOD BY AUTOMATED COUNT: 13.2 % (ref 12.3–15.4)
GLOBULIN UR ELPH-MCNC: 3.5 GM/DL
GLOBULIN UR ELPH-MCNC: 3.7 GM/DL
GLUCOSE SERPL-MCNC: 126 MG/DL (ref 65–99)
GLUCOSE SERPL-MCNC: 134 MG/DL (ref 65–99)
GLUCOSE UR STRIP-MCNC: ABNORMAL MG/DL
HCT VFR BLD AUTO: 42.8 % (ref 37.5–51)
HCT VFR BLD AUTO: 47 % (ref 37.5–51)
HGB BLD-MCNC: 13.9 G/DL (ref 13–17.7)
HGB BLD-MCNC: 15.3 G/DL (ref 13–17.7)
HGB UR QL STRIP.AUTO: NEGATIVE
HYALINE CASTS UR QL AUTO: NORMAL /LPF
IMM GRANULOCYTES # BLD AUTO: 0.04 10*3/MM3 (ref 0–0.05)
IMM GRANULOCYTES # BLD AUTO: 0.07 10*3/MM3 (ref 0–0.05)
IMM GRANULOCYTES NFR BLD AUTO: 0.4 % (ref 0–0.5)
IMM GRANULOCYTES NFR BLD AUTO: 0.5 % (ref 0–0.5)
KETONES UR QL STRIP: ABNORMAL
LEUKOCYTE ESTERASE UR QL STRIP.AUTO: NEGATIVE
LYMPHOCYTES # BLD AUTO: 0.94 10*3/MM3 (ref 0.7–3.1)
LYMPHOCYTES # BLD AUTO: 1.46 10*3/MM3 (ref 0.7–3.1)
LYMPHOCYTES NFR BLD AUTO: 10.2 % (ref 19.6–45.3)
LYMPHOCYTES NFR BLD AUTO: 9.8 % (ref 19.6–45.3)
MCH RBC QN AUTO: 29 PG (ref 26.6–33)
MCH RBC QN AUTO: 29.2 PG (ref 26.6–33)
MCHC RBC AUTO-ENTMCNC: 32.5 G/DL (ref 31.5–35.7)
MCHC RBC AUTO-ENTMCNC: 32.6 G/DL (ref 31.5–35.7)
MCV RBC AUTO: 89.2 FL (ref 79–97)
MCV RBC AUTO: 89.9 FL (ref 79–97)
MONOCYTES # BLD AUTO: 1.28 10*3/MM3 (ref 0.1–0.9)
MONOCYTES # BLD AUTO: 1.53 10*3/MM3 (ref 0.1–0.9)
MONOCYTES NFR BLD AUTO: 10.3 % (ref 5–12)
MONOCYTES NFR BLD AUTO: 13.9 % (ref 5–12)
MRSA DNA SPEC QL NAA+PROBE: NORMAL
NEUTROPHILS NFR BLD AUTO: 11.72 10*3/MM3 (ref 1.7–7)
NEUTROPHILS NFR BLD AUTO: 6.87 10*3/MM3 (ref 1.7–7)
NEUTROPHILS NFR BLD AUTO: 74.8 % (ref 42.7–76)
NEUTROPHILS NFR BLD AUTO: 78.7 % (ref 42.7–76)
NITRITE UR QL STRIP: NEGATIVE
NRBC BLD AUTO-RTO: 0 /100 WBC (ref 0–0.2)
NRBC BLD AUTO-RTO: 0 /100 WBC (ref 0–0.2)
PH UR STRIP.AUTO: 6.5 [PH] (ref 5–8)
PLATELET # BLD AUTO: 200 10*3/MM3 (ref 140–450)
PLATELET # BLD AUTO: 217 10*3/MM3 (ref 140–450)
PMV BLD AUTO: 8.9 FL (ref 6–12)
PMV BLD AUTO: 9.3 FL (ref 6–12)
POTASSIUM SERPL-SCNC: 4.2 MMOL/L (ref 3.5–5.2)
POTASSIUM SERPL-SCNC: 4.4 MMOL/L (ref 3.5–5.2)
PROCALCITONIN SERPL-MCNC: 2.39 NG/ML (ref 0–0.25)
PROCALCITONIN SERPL-MCNC: 2.52 NG/ML (ref 0–0.25)
PROT SERPL-MCNC: 7.1 G/DL (ref 6–8.5)
PROT SERPL-MCNC: 7.2 G/DL (ref 6–8.5)
PROT UR QL STRIP: ABNORMAL
RBC # BLD AUTO: 4.76 10*6/MM3 (ref 4.14–5.8)
RBC # BLD AUTO: 5.27 10*6/MM3 (ref 4.14–5.8)
RBC # UR STRIP: NORMAL /HPF
REF LAB TEST METHOD: NORMAL
SODIUM SERPL-SCNC: 131 MMOL/L (ref 136–145)
SODIUM SERPL-SCNC: 134 MMOL/L (ref 136–145)
SP GR UR STRIP: 1.04 (ref 1–1.03)
SQUAMOUS #/AREA URNS HPF: NORMAL /HPF
UROBILINOGEN UR QL STRIP: ABNORMAL
WBC # UR STRIP: NORMAL /HPF
WBC NRBC COR # BLD AUTO: 14.88 10*3/MM3 (ref 3.4–10.8)
WBC NRBC COR # BLD AUTO: 9.2 10*3/MM3 (ref 3.4–10.8)

## 2025-02-27 PROCEDURE — 71250 CT THORAX DX C-: CPT

## 2025-02-27 PROCEDURE — 87641 MR-STAPH DNA AMP PROBE: CPT

## 2025-02-27 PROCEDURE — 25010000002 METOCLOPRAMIDE PER 10 MG: Performed by: INTERNAL MEDICINE

## 2025-02-27 PROCEDURE — 84145 PROCALCITONIN (PCT): CPT | Performed by: INTERNAL MEDICINE

## 2025-02-27 PROCEDURE — 85025 COMPLETE CBC W/AUTO DIFF WBC: CPT | Performed by: STUDENT IN AN ORGANIZED HEALTH CARE EDUCATION/TRAINING PROGRAM

## 2025-02-27 PROCEDURE — 25010000002 KETOROLAC TROMETHAMINE PER 15 MG

## 2025-02-27 PROCEDURE — 83605 ASSAY OF LACTIC ACID: CPT | Performed by: STUDENT IN AN ORGANIZED HEALTH CARE EDUCATION/TRAINING PROGRAM

## 2025-02-27 PROCEDURE — 99232 SBSQ HOSP IP/OBS MODERATE 35: CPT | Performed by: INTERNAL MEDICINE

## 2025-02-27 PROCEDURE — 87040 BLOOD CULTURE FOR BACTERIA: CPT

## 2025-02-27 PROCEDURE — C1751 CATH, INF, PER/CENT/MIDLINE: HCPCS

## 2025-02-27 PROCEDURE — 36410 VNPNXR 3YR/> PHY/QHP DX/THER: CPT

## 2025-02-27 PROCEDURE — 25010000002 PIPERACILLIN SOD-TAZOBACTAM PER 1 G

## 2025-02-27 PROCEDURE — 81001 URINALYSIS AUTO W/SCOPE: CPT

## 2025-02-27 PROCEDURE — 84145 PROCALCITONIN (PCT): CPT

## 2025-02-27 PROCEDURE — 93970 EXTREMITY STUDY: CPT | Performed by: SURGERY

## 2025-02-27 PROCEDURE — 93970 EXTREMITY STUDY: CPT

## 2025-02-27 PROCEDURE — 80053 COMPREHEN METABOLIC PANEL: CPT | Performed by: INTERNAL MEDICINE

## 2025-02-27 PROCEDURE — 25810000003 SEPSIS FLUID NS 0.9 % SOLUTION: Performed by: STUDENT IN AN ORGANIZED HEALTH CARE EDUCATION/TRAINING PROGRAM

## 2025-02-27 PROCEDURE — 80053 COMPREHEN METABOLIC PANEL: CPT | Performed by: STUDENT IN AN ORGANIZED HEALTH CARE EDUCATION/TRAINING PROGRAM

## 2025-02-27 PROCEDURE — 25010000002 CEFTRIAXONE PER 250 MG: Performed by: INTERNAL MEDICINE

## 2025-02-27 PROCEDURE — 85025 COMPLETE CBC W/AUTO DIFF WBC: CPT | Performed by: INTERNAL MEDICINE

## 2025-02-27 RX ORDER — KETOROLAC TROMETHAMINE 30 MG/ML
15 INJECTION, SOLUTION INTRAMUSCULAR; INTRAVENOUS ONCE
Status: COMPLETED | OUTPATIENT
Start: 2025-02-27 | End: 2025-02-27

## 2025-02-27 RX ORDER — SODIUM CHLORIDE 0.9 % (FLUSH) 0.9 %
10 SYRINGE (ML) INJECTION EVERY 12 HOURS SCHEDULED
Status: DISCONTINUED | OUTPATIENT
Start: 2025-02-27 | End: 2025-03-04 | Stop reason: HOSPADM

## 2025-02-27 RX ORDER — LIDOCAINE HYDROCHLORIDE 10 MG/ML
10 INJECTION, SOLUTION EPIDURAL; INFILTRATION; INTRACAUDAL; PERINEURAL ONCE
Status: DISCONTINUED | OUTPATIENT
Start: 2025-02-27 | End: 2025-02-27

## 2025-02-27 RX ORDER — SODIUM CHLORIDE 0.9 % (FLUSH) 0.9 %
3 SYRINGE (ML) INJECTION EVERY 12 HOURS SCHEDULED
Status: CANCELLED | OUTPATIENT
Start: 2025-02-27

## 2025-02-27 RX ORDER — LIDOCAINE HYDROCHLORIDE 10 MG/ML
10 INJECTION, SOLUTION EPIDURAL; INFILTRATION; INTRACAUDAL; PERINEURAL ONCE
Status: DISCONTINUED | OUTPATIENT
Start: 2025-02-27 | End: 2025-03-04 | Stop reason: HOSPADM

## 2025-02-27 RX ORDER — SODIUM CHLORIDE 0.9 % (FLUSH) 0.9 %
3-10 SYRINGE (ML) INJECTION AS NEEDED
Status: CANCELLED | OUTPATIENT
Start: 2025-02-27

## 2025-02-27 RX ORDER — SODIUM CHLORIDE 9 MG/ML
40 INJECTION, SOLUTION INTRAVENOUS AS NEEDED
Status: CANCELLED | OUTPATIENT
Start: 2025-02-27

## 2025-02-27 RX ORDER — SODIUM CHLORIDE 0.9 % (FLUSH) 0.9 %
10 SYRINGE (ML) INJECTION AS NEEDED
Status: DISCONTINUED | OUTPATIENT
Start: 2025-02-27 | End: 2025-03-04 | Stop reason: HOSPADM

## 2025-02-27 RX ORDER — SODIUM CHLORIDE 9 MG/ML
40 INJECTION, SOLUTION INTRAVENOUS AS NEEDED
Status: DISCONTINUED | OUTPATIENT
Start: 2025-02-27 | End: 2025-03-04 | Stop reason: HOSPADM

## 2025-02-27 RX ADMIN — EMPAGLIFLOZIN 25 MG: 25 TABLET, FILM COATED ORAL at 09:10

## 2025-02-27 RX ADMIN — PIPERACILLIN AND TAZOBACTAM 3.38 G: 3; .375 INJECTION, POWDER, FOR SOLUTION INTRAVENOUS at 19:17

## 2025-02-27 RX ADMIN — CEFTRIAXONE 2 G: 2 INJECTION, POWDER, FOR SOLUTION INTRAMUSCULAR; INTRAVENOUS at 12:26

## 2025-02-27 RX ADMIN — PANTOPRAZOLE SODIUM 40 MG: 40 TABLET, DELAYED RELEASE ORAL at 18:16

## 2025-02-27 RX ADMIN — METOCLOPRAMIDE 5 MG: 5 INJECTION, SOLUTION INTRAMUSCULAR; INTRAVENOUS at 19:00

## 2025-02-27 RX ADMIN — KETOROLAC TROMETHAMINE 15 MG: 30 INJECTION, SOLUTION INTRAMUSCULAR at 19:00

## 2025-02-27 RX ADMIN — ACETAMINOPHEN 650 MG: 325 TABLET, FILM COATED ORAL at 11:12

## 2025-02-27 RX ADMIN — ROSUVASTATIN CALCIUM 10 MG: 10 TABLET, FILM COATED ORAL at 09:10

## 2025-02-27 RX ADMIN — LOSARTAN POTASSIUM 25 MG: 25 TABLET, FILM COATED ORAL at 09:10

## 2025-02-27 RX ADMIN — ACETAMINOPHEN 650 MG: 325 TABLET, FILM COATED ORAL at 16:16

## 2025-02-27 RX ADMIN — Medication 10 ML: at 09:11

## 2025-02-27 RX ADMIN — PANTOPRAZOLE SODIUM 40 MG: 40 TABLET, DELAYED RELEASE ORAL at 09:10

## 2025-02-27 RX ADMIN — METOCLOPRAMIDE 5 MG: 5 INJECTION, SOLUTION INTRAMUSCULAR; INTRAVENOUS at 09:10

## 2025-02-27 RX ADMIN — PIPERACILLIN AND TAZOBACTAM 3.38 G: 3; .375 INJECTION, POWDER, FOR SOLUTION INTRAVENOUS at 16:00

## 2025-02-27 RX ADMIN — CETIRIZINE HYDROCHLORIDE 10 MG: 10 TABLET, FILM COATED ORAL at 09:10

## 2025-02-27 RX ADMIN — SODIUM CHLORIDE 2000 ML: 9 INJECTION, SOLUTION INTRAVENOUS at 21:22

## 2025-02-27 NOTE — PLAN OF CARE
Goal Outcome Evaluation:         VSS on room air- 2L applied while sleeping ( wears cpap @ home), answers orientation ?'s correctly but seems to be forgetful at times, tolerating clear liquid diet

## 2025-02-27 NOTE — PAYOR COMM NOTE
"    Clinical Documentation for Inpatient admission ref #[id number]    THANK YOU,  Ivy Young RN, CCM  Utilization Review  [facility]    Phone: [phone]  Fax: [phone]  --------------------------------  NPI: [id number]  Tax ID: [id number]      Ismael Pulido (59 y.o. Male)       Date of Birth   1965    Social Security Number       Address   [address] [place] [address]    Home Phone   [phone]    MRN   [id number]       Samaritan   Buddhist    Marital Status                               Admission Date   25    Admission Type   Emergency    Admitting Provider   Teofilo Belcher MD    Attending Provider   Ron Eduardo MD    Department, Room/Bed   [facility] OBSERVATION,        Discharge Date       Discharge Disposition       Discharge Destination                                 Attending Provider: Ron Eduardo MD    Allergies: Lisinopril    Isolation: None   Infection: None   Code Status: CPR    Ht: 172.7 cm (68\")   Wt: 103 kg (227 lb 1.2 oz)    Admission Cmt: None   Principal Problem: Abdominal pain [R10.9]                   Active Insurance as of 2025       Primary Coverage       Payor Plan Insurance Group Employer/Plan Group    AETNA COMMERCIAL AETNA [id number]       Payor Plan Address Payor Plan Phone Number Payor Plan Fax Number Effective Dates    [address] [phone]  3/25/2021 - None Entered    [facility] [id number]         Subscriber Name Subscriber Birth Date Member ID       ISMAEL PULIDO 1965 [id number]                     Emergency Contacts        (Rel.) Home Phone Work Phone Mobile Phone    SIDCLAIRE BOWEN (Spouse) [phone] -- [phone]                 History & Physical        Irais Pink PA-C at 25 1225          [facility]    Patient Name: Ismael PATRICK Pulido  : 1965  MRN: [id number]  Primary Care Physician: Shira" Sharon Tamayo MD  Date of admission: 2/25/2025     Patient Care Team:  Sharon Silva MD as PCP - General (Internal Medicine & Pediatrics)          Subjective  History Present Illness     Chief Complaint:   Chief Complaint   Patient presents with    Chest Pain     Right sided chest pain, upper abd and lung,   pain on base of skull and having sinus pressure, cough some green phlem.       Chest and abdominal pain    Chest Pain   Associated symptoms include abdominal pain, a cough, a fever and nausea. Pertinent negatives include no vomiting.       ED  59-year-old male complaining of chest and epigastric pain that developed throughout the day today     Observation 2/26/25  Pt concurs with er hpi. Cardiology and gastroenterology consulted    Review of Systems   Constitutional: Positive for fever.   Cardiovascular:  Positive for chest pain.   Respiratory:  Positive for cough.    Gastrointestinal:  Positive for abdominal pain and nausea. Negative for vomiting.             Personal History     Past Medical History:   Past Medical History:   Diagnosis Date    Allergic 01/01/1988    5+ molds, pollens, grass    Diabetes mellitus     Not sure like 2020    Diverticulitis     Diverticulosis     Mot sure 1995    GERD (gastroesophageal reflux disease)     Hypertension     Kidney stone     Not dure 1995    Obesity     Not sure 1999       Surgical History:      Past Surgical History:   Procedure Laterality Date    CARDIAC ELECTROPHYSIOLOGY PROCEDURE N/A 2/21/2025    Procedure: Pacemaker DC new boston aware;  Surgeon: Dez Loco MD;  Location: [facility] INVASIVE LOCATION;  Service: Cardiovascular;  Laterality: N/A;    HERNIA REPAIR             Family History: family history includes Anxiety disorder in his mother; Arthritis in his mother; Cancer in his father; Depression in his mother; Diabetes in his father and mother; Hyperlipidemia in his mother; Kidney disease in his mother. Otherwise pertinent FHx was  reviewed and unremarkable.     Social History:  reports that he has never smoked. He has never used smokeless tobacco. He reports that he does not drink alcohol and does not use drugs.      Medications:  Prior to Admission medications    Medication Sig Start Date End Date Taking? Authorizing Provider   ascorbic acid ([employer] Vitamin C) 1000 MG tablet Take 4 tablets by mouth Daily.    ProviderAlbertina MD   cephalexin (KEFLEX) 500 MG capsule Take 1 capsule by mouth Every 8 (Eight) Hours for 15 doses. Indications: Preventative Medication Therapy Used Around Surgery 2/22/25 2/27/25  Amanda Mcduffie,    clotrimazole (LOTRIMIN) 1 % cream Apply 1 Application topically to the appropriate area as directed 2 (Two) Times a Day. 6/14/24   Sharon Silva MD   empagliflozin (Jardiance) 25 MG tablet tablet Take 1 tablet by mouth Daily. 8/15/24   Sharon Silva MD   lansoprazole (PREVACID) 30 MG capsule Take 1 capsule by mouth Daily. 2/7/25   Sharon Silva MD   loratadine (Claritin) 10 MG tablet Take 1 tablet by mouth Daily.    ProviderAlbertina MD   losartan (COZAAR) 25 MG tablet TAKE 1 TABLET BY MOUTH DAILY 2/19/25   Sharon Silva MD   Misc Natural Products (NEURIVA PO) Take  by mouth Daily.    ProviderAlbertina MD   pseudoephedrine (SUDAFED) 120 MG 12 hr tablet Take 1 tablet by mouth Every Morning.    Albertina Goff MD   rosuvastatin (CRESTOR) 10 MG tablet Take 1 tablet by mouth Daily. 2/7/25   Sharon Silva MD   Semaglutide 3 MG tablet Take 1 tablet by mouth Daily. 2/18/25   Sharon Silva MD       Allergies:    Allergies   Allergen Reactions    Lisinopril Nausea And Vomiting       Objective  Objective     Vital Signs  Temp:  [97.6 °F (36.4 °C)-100.2 °F (37.9 °C)] 97.6 °F (36.4 °C)  Heart Rate:  [] 108  Resp:  [16-32] 24  BP: (105-198)/() 125/91  SpO2:  [88 %-99 %] 99 %  on   ;   Device (Oxygen Therapy): room air  Body mass index is  34.53 kg/m².    Physical Exam  Constitutional:       Appearance: Normal appearance.   Cardiovascular:      Rate and Rhythm: Regular rhythm. Tachycardia present.   Pulmonary:      Effort: Pulmonary effort is normal.      Breath sounds: Normal breath sounds.   Abdominal:      Comments: Ng tube in place with small amt of drk outpt   Neurological:      General: No focal deficit present.      Mental Status: He is alert and oriented to person, place, and time. Mental status is at baseline.   Psychiatric:         Mood and Affect: Mood normal.         Behavior: Behavior normal.         Results Review:  I have personally reviewed most recent cardiac tracings, lab results, microbiology results, and radiology images and interpretations and agree with findings, most notably: cbc, cmp, lactic, procal, lipase, ddimer, troponin, rpp, chest xray, ct chest and abdomen, kub, ekg.    Results from last 7 days   Lab Units 02/26/25  0352   WBC 10*3/mm3 18.32*   HEMOGLOBIN g/dL 16.6   HEMATOCRIT % 50.2   PLATELETS 10*3/mm3 228     Results from last 7 days   Lab Units 02/26/25  0352 02/25/25  2333 02/25/25  2026 02/25/25  1245 02/25/25  1132   SODIUM mmol/L 137  --   --   --  141   POTASSIUM mmol/L 4.4  --   --   --  4.0   CHLORIDE mmol/L 101  --   --   --  103   CO2 mmol/L 22.1  --   --   --  24.0   BUN mg/dL 14  --   --   --  9   CREATININE mg/dL 0.91  --   --   --  0.95   GLUCOSE mg/dL 133*  --   --   --  139*   CALCIUM mg/dL 9.7  --   --   --  9.7   HSTROP T ng/L  --   --   --  39* 38*   LACTATE mmol/L  --  2.0   < >  --   --    PROCALCITONIN ng/mL  --   --   --  0.08  --     < > = values in this interval not displayed.     Estimated Creatinine Clearance: 101.6 mL/min (by C-G formula based on SCr of 0.91 mg/dL).  Brief Urine Lab Results  (Last result in the past 365 days)        Color   Clarity   Blood   Leuk Est   Nitrite   Protein   CREAT   Urine HCG        02/07/25 1139             29.3                 Microbiology Results (last 10  days)       Procedure Component Value - Date/Time    Respiratory Panel PCR w/COVID-19(SARS-CoV-2) MAC/DEVIN/RALPH/PAD/COR/TANNER In-House, NP Swab in UTM/VTM, 2 HR TAT - Swab, Nasopharynx [[id number]]  (Normal) Collected: 02/25/25 1131    Lab Status: Final result Specimen: Swab from Nasopharynx Updated: 02/25/25 1231     ADENOVIRUS, PCR Not Detected     Coronavirus 229E Not Detected     Coronavirus HKU1 Not Detected     Coronavirus NL63 Not Detected     Coronavirus OC43 Not Detected     COVID19 Not Detected     Human Metapneumovirus Not Detected     Human Rhinovirus/Enterovirus Not Detected     Influenza A PCR Not Detected     Influenza B PCR Not Detected     Parainfluenza Virus 1 Not Detected     Parainfluenza Virus 2 Not Detected     Parainfluenza Virus 3 Not Detected     Parainfluenza Virus 4 Not Detected     RSV, PCR Not Detected     Bordetella pertussis pcr Not Detected     Bordetella parapertussis PCR Not Detected     Chlamydophila pneumoniae PCR Not Detected     Mycoplasma pneumo by PCR Not Detected    Narrative:      In the setting of a positive respiratory panel with a viral infection PLUS a negative procalcitonin without other underlying concern for bacterial infection, consider observing off antibiotics or discontinuation of antibiotics and continue supportive care. If the respiratory panel is positive for atypical bacterial infection (Bordetella pertussis, Chlamydophila pneumoniae, or Mycoplasma pneumoniae), consider antibiotic de-escalation to target atypical bacterial infection.    COVID-19, FLU A/B, RSV PCR 1 HR TAT - Swab, Nasopharynx [[id number]]  (Normal) Collected: 02/19/25 0017    Lab Status: Final result Specimen: Swab from Nasopharynx Updated: 02/19/25 0059     COVID19 Not Detected     Influenza A PCR Not Detected     Influenza B PCR Not Detected     RSV, PCR Not Detected    Narrative:      Fact sheet for providers: https://www.fda.gov/media/995386/download    Fact sheet for patients:  https://www.fda.gov/media/640635/download    Test performed by PCR.            ECG/EMG Results (most recent)       Procedure Component Value Units Date/Time    Telemetry Scan [[id number]] Resulted: 02/25/25     Updated: 02/25/25 1307    Telemetry Scan [[id number]] Resulted: 02/25/25     Updated: 02/25/25 1628    ECG 12 Lead Chest Pain [[id number]] Collected: 02/25/25 1124     Updated: 02/26/25 0640     QT Interval 368 ms      QTC Interval 455 ms     Narrative:      HEART RATE=85  bpm  RR Vtrflwva=934  ms  VA Nkkyztpv=684  ms  P Horizontal Axis=222  deg  P Front Axis=216  deg  QRSD Opoofean=204  ms  QT Degaeyby=370  ms  VWtM=592  ms  QRS Axis=-17  deg  T Wave Axis=163  deg  - ABNORMAL ECG -  Pacemaker spikes or artifacts  Sinus or ectopic atrial rhythm  Multiform ventricular premature complexes  Prolonged VA interval  IVCD, consider LBBB  ST elevation secondary to IVCD  When compared with ECG of 22-Feb-2025 05:54:57,  New conduction abnormality  Electronically Signed By: Jeromy Blake ([facility]) 2025-02-26 06:39:51  Date and Time of Study:2025-02-25 11:24:07    Telemetry Scan [[id number]] Resulted: 02/25/25     Updated: 02/26/25 0825    Telemetry Scan [[id number]] Resulted: 02/25/25     Updated: 02/26/25 0906    Telemetry Scan [[id number]] Resulted: 02/25/25     Updated: 02/26/25 0931    Telemetry Scan [[id number]] Resulted: 02/25/25     Updated: 02/26/25 1040                Results for orders placed during the hospital encounter of 02/18/25    Adult Transthoracic Echo Complete w/ Color, Spectral and Contrast if Necessary Per Protocol    Interpretation Summary    Left ventricular ejection fraction appears to be 56 - 60%.    The right ventricular cavity is moderately dilated.    The left atrial cavity is mildly dilated.    Moderate tricuspid valve regurgitation is present.    Estimated right ventricular systolic pressure from tricuspid regurgitation is moderately elevated (45-55 mmHg).      XR Abdomen KUB    Result Date:  2/25/2025  Impression: Gastric tube in appropriate position. Electronically Signed: Charles Santo MD  2/25/2025 9:17 PM EST  Workstation ID: [id number]    XR Abdomen KUB    Result Date: 2/25/2025  Impression: Slightly high position of the gastric tube. Recommend advancing by 3 cm. Electronically Signed: Charles Santo MD  2/25/2025 5:44 PM EST  Workstation ID: [id number]    XR Abdomen KUB    Result Date: 2/25/2025  Impression: The tip of the nasogastric tube terminates in the fundus of the stomach. Electronically Signed: Erasmo Bernard MD  2/25/2025 4:00 PM EST  Workstation ID: [id number]    CT Angiogram Chest Pulmonary Embolism    Result Date: 2/25/2025  Impression: No evidence of pulmonary embolism. Small left pleural effusion. Mild bilateral dependent atelectasis. Moderate gastric distention with air and fluid. This may be transient however, gastroparesis cannot be excluded. Correlate with patient symptoms. This can be further evaluated with nuclear medicine gastric emptying study as clinically warranted. If there  is concern for gastric outlet obstruction, correlate with endoscopy. Moderately distended urinary bladder. Correlate for urinary retention. Chronic findings as above. Electronically Signed: Charles Santo MD  2/25/2025 3:14 PM EST  Workstation ID: [id number]    CT Abdomen Pelvis With Contrast    Result Date: 2/25/2025  Impression: No evidence of pulmonary embolism. Small left pleural effusion. Mild bilateral dependent atelectasis. Moderate gastric distention with air and fluid. This may be transient however, gastroparesis cannot be excluded. Correlate with patient symptoms. This can be further evaluated with nuclear medicine gastric emptying study as clinically warranted. If there  is concern for gastric outlet obstruction, correlate with endoscopy. Moderately distended urinary bladder. Correlate for urinary retention. Chronic findings as above. Electronically Signed: Charles Santo MD  2/25/2025 3:14 PM EST   Workstation ID: [id number]    XR Chest 1 View    Result Date: 2/25/2025  Impression: Low lung volumes with probable mild bibasal atelectasis. Correlate for infection. Electronically Signed: Nicholas eBcerra MD  2/25/2025 12:17 PM EST  Workstation ID: [id number]    XR Chest 1 View    Result Date: 2/21/2025  Impression: Radiographically uncomplicated multichamber AICD. No pneumothorax. Electronically Signed: Nicholas Becerra MD  2/21/2025 5:49 PM EST  Workstation ID: [id number]    CT Head Without Contrast    Result Date: 2/18/2025  Impression: No acute intracranial findings. Electronically Signed: Bao MAYDA Ronnie  2/18/2025 10:00 PM EST  Workstation ID: [id number]    XR Chest 1 View    Result Date: 2/18/2025  Impression: No radiographic evidence of acute cardiopulmonary abnormality. Electronically Signed: Boa MAYDA Ronnie  2/18/2025 9:37 PM EST  Workstation ID: [id number]       Estimated Creatinine Clearance: 101.6 mL/min (by C-G formula based on SCr of 0.91 mg/dL).    Assessment & Plan  Assessment/Plan       Active Hospital Problems    Diagnosis  POA    **Abdominal pain [R10.9]  Yes      Resolved Hospital Problems   No resolved problems to display.     Chest pain  - troponin, 38, 39  - ddimer 3.54  - lactic 2.3, 2.0  - procal .08  - rpp negative  - chest xray reviewed and showing low lung volumes with probable mild bibasal atelectasis.   - cta chest showing no pe. Small left pleural effusion. Mild bilateral dependent atelectasis  - ekg rate 85, pvcs, pacemaker, prolonged MT  - cardiology consulted  - pacemaker interrogated and lead dislodged    Abdominal pain  - bmp unremarkable  - lipase 26  - ct abd reviewed and showing Moderate gastric distention with air and fluid. This may be transient however, gastroparesis cannot be excluded. Correlate with patient symptoms. This can be further evaluated with nuclear medicine gastric emptying study as clinically warranted. If there is concern for gastric outlet obstruction, correlate with  endoscopy. Moderately distended urinary bladder. Correlate for urinary retention.  - gastroenterology consulted  - ng tube in place to low wall suction  - egd planned    Hypertension  -well Controlled   BP Readings from Last 1 Encounters:   02/26/25 125/91     - Continue losartan  - Monitor while admitted     Diabetes mellitus  -well controlled   Lab Results   Component Value Date    GLUCOSE 133 (H) 02/26/2025    GLUCOSE 139 (H) 02/25/2025    GLUCOSE 93 02/22/2025    GLUCOSE 145 (H) 02/19/2025     -jardiance, semaglutide  - ssi  -Diabetic diet  -Monitor before meals and at bedtime       VTE Prophylaxis - Active VTE Prophylaxis  Mechanical:        Start        02/25/25 1649  Maintain Sequential Compression Device  Continuous                          Select Pharmacologic VTE Prophylaxis if Desired & Appropriate      CODE STATUS:    Code Status and Medical Interventions: CPR (Attempt to Resuscitate); Full Support   Ordered at: 02/25/25 1627     Level Of Support Discussed With:    Patient     Code Status (Patient has no pulse and is not breathing):    CPR (Attempt to Resuscitate)     Medical Interventions (Patient has pulse or is breathing):    Full Support       This patient has been examined wearing personal protective equipment.     I discussed the patient's findings and my recommendations with patient and nursing staff.      Signature:Electronically signed by Irais Pink PA-C, 02/26/25, 12:26 PM EST.             Electronically signed by Irais Pink PA-C at 02/26/25 1254          Emergency Department Notes        Apple Shepard RN at 02/25/25 1708          Pt ng came out. Replacing NG     Electronically signed by Apple Shepard RN at 02/25/25 1708       Jeromy Blake MD at 02/25/25 1544          Subjective   History of Present Illness  Patient is a 59-year-old male complaining of chest and epigastric pain that developed throughout the day today.  Nuys cough fever shortness of breath Isra diarrhea  dysuria or other associated complaints      Review of Systems    Past Medical History:   Diagnosis Date    Allergic 1988    5+ molds, pollens, grass    Diabetes mellitus     Not sure like 2020    Diverticulitis     Diverticulosis     Mot sure     GERD (gastroesophageal reflux disease)     Hypertension     Kidney stone     Not dure     Obesity     Not sure        Allergies   Allergen Reactions    Lisinopril Nausea And Vomiting       Past Surgical History:   Procedure Laterality Date    CARDIAC ELECTROPHYSIOLOGY PROCEDURE N/A 2025    Procedure: Pacemaker DC new boston aware;  Surgeon: Dez Loco MD;  Location: [facility] CATH INVASIVE LOCATION;  Service: Cardiovascular;  Laterality: N/A;    HERNIA REPAIR         Family History   Problem Relation Age of Onset    Anxiety disorder Mother     Arthritis Mother     Depression Mother     Diabetes Mother     Hyperlipidemia Mother     Kidney disease Mother         Kidney stones    Cancer Father          of cancer in 2009    Diabetes Father        Social History     Socioeconomic History    Marital status:    Tobacco Use    Smoking status: Never    Smokeless tobacco: Never   Vaping Use    Vaping status: Never Used   Substance and Sexual Activity    Alcohol use: Never    Drug use: Never    Sexual activity: Yes     Partners: Female           Objective   Physical Exam  Neck has no adenopathy JVD or bruits.  Lungs clear.  Heart has regular rate rhythm without murmur rub or gallop.  Chest is nontender.  Abdomen soft with mild epigastric tenderness.  Patient has normal bowel sounds without rebound or guard.  Back has no CVA tenderness.  Procedures          ED Course      Results for orders placed or performed during the hospital encounter of 25   ECG 12 Lead Chest Pain    Collection Time: 25 11:24 AM   Result Value Ref Range    QT Interval 368 ms    QTC Interval 455 ms   Respiratory Panel PCR w/COVID-19(SARS-CoV-2)  MAC/DEVIN/RALPH/PAD/COR/TANNER In-House, NP Swab in UTM/VTM, 2 HR TAT - Swab, Nasopharynx    Collection Time: 02/25/25 11:31 AM    Specimen: Nasopharynx; Swab   Result Value Ref Range    ADENOVIRUS, PCR Not Detected Not Detected    Coronavirus 229E Not Detected Not Detected    Coronavirus HKU1 Not Detected Not Detected    Coronavirus NL63 Not Detected Not Detected    Coronavirus OC43 Not Detected Not Detected    COVID19 Not Detected Not Detected - Ref. Range    Human Metapneumovirus Not Detected Not Detected    Human Rhinovirus/Enterovirus Not Detected Not Detected    Influenza A PCR Not Detected Not Detected    Influenza B PCR Not Detected Not Detected    Parainfluenza Virus 1 Not Detected Not Detected    Parainfluenza Virus 2 Not Detected Not Detected    Parainfluenza Virus 3 Not Detected Not Detected    Parainfluenza Virus 4 Not Detected Not Detected    RSV, PCR Not Detected Not Detected    Bordetella pertussis pcr Not Detected Not Detected    Bordetella parapertussis PCR Not Detected Not Detected    Chlamydophila pneumoniae PCR Not Detected Not Detected    Mycoplasma pneumo by PCR Not Detected Not Detected   Basic Metabolic Panel    Collection Time: 02/25/25 11:32 AM    Specimen: Blood   Result Value Ref Range    Glucose 139 (H) 65 - 99 mg/dL    BUN 9 6 - 20 mg/dL    Creatinine 0.95 0.76 - 1.27 mg/dL    Sodium 141 136 - 145 mmol/L    Potassium 4.0 3.5 - 5.2 mmol/L    Chloride 103 98 - 107 mmol/L    CO2 24.0 22.0 - 29.0 mmol/L    Calcium 9.7 8.6 - 10.5 mg/dL    BUN/Creatinine Ratio 9.5 7.0 - 25.0    Anion Gap 14.0 5.0 - 15.0 mmol/L    eGFR 92.2 >60.0 mL/min/1.73   High Sensitivity Troponin T    Collection Time: 02/25/25 11:32 AM    Specimen: Blood   Result Value Ref Range    HS Troponin T 38 (H) <22 ng/L   D-dimer, Quantitative    Collection Time: 02/25/25 11:32 AM    Specimen: Blood   Result Value Ref Range    D-Dimer, Quantitative 3.54 (H) 0.00 - 0.59 MCGFEU/mL   CBC Auto Differential    Collection Time: 02/25/25  11:32 AM    Specimen: Blood   Result Value Ref Range    WBC 12.46 (H) 3.40 - 10.80 10*3/mm3    RBC 5.88 (H) 4.14 - 5.80 10*6/mm3    Hemoglobin 17.1 13.0 - 17.7 g/dL    Hematocrit 51.9 (H) 37.5 - 51.0 %    MCV 88.3 79.0 - 97.0 fL    MCH 29.1 26.6 - 33.0 pg    MCHC 32.9 31.5 - 35.7 g/dL    RDW 12.9 12.3 - 15.4 %    RDW-SD 42.1 37.0 - 54.0 fl    MPV 9.2 6.0 - 12.0 fL    Platelets 217 140 - 450 10*3/mm3    Neutrophil % 70.8 42.7 - 76.0 %    Lymphocyte % 16.5 (L) 19.6 - 45.3 %    Monocyte % 9.1 5.0 - 12.0 %    Eosinophil % 2.5 0.3 - 6.2 %    Basophil % 0.5 0.0 - 1.5 %    Immature Grans % 0.6 (H) 0.0 - 0.5 %    Neutrophils, Absolute 8.84 (H) 1.70 - 7.00 10*3/mm3    Lymphocytes, Absolute 2.05 0.70 - 3.10 10*3/mm3    Monocytes, Absolute 1.13 (H) 0.10 - 0.90 10*3/mm3    Eosinophils, Absolute 0.31 0.00 - 0.40 10*3/mm3    Basophils, Absolute 0.06 0.00 - 0.20 10*3/mm3    Immature Grans, Absolute 0.07 (H) 0.00 - 0.05 10*3/mm3    nRBC 0.0 0.0 - 0.2 /100 WBC   Light Blue Top    Collection Time: 02/25/25 11:32 AM   Result Value Ref Range    Extra Tube Hold for add-ons.    High Sensitivity Troponin T 1Hr    Collection Time: 02/25/25 12:45 PM    Specimen: Blood   Result Value Ref Range    HS Troponin T 39 (H) <22 ng/L    Troponin T Numeric Delta 1 ng/L    Troponin T % Delta 3 Abnormal if >/= 20%   Lipase    Collection Time: 02/25/25 12:45 PM    Specimen: Blood   Result Value Ref Range    Lipase 26 13 - 60 U/L       CT Angiogram Chest Pulmonary Embolism    Result Date: 2/25/2025  Impression: No evidence of pulmonary embolism. Small left pleural effusion. Mild bilateral dependent atelectasis. Moderate gastric distention with air and fluid. This may be transient however, gastroparesis cannot be excluded. Correlate with patient symptoms. This can be further evaluated with nuclear medicine gastric emptying study as clinically warranted. If there  is concern for gastric outlet obstruction, correlate with endoscopy. Moderately distended  urinary bladder. Correlate for urinary retention. Chronic findings as above. Electronically Signed: Charles Santo MD  2/25/2025 3:14 PM EST  Workstation ID: [id number]    CT Abdomen Pelvis With Contrast    Result Date: 2/25/2025  Impression: No evidence of pulmonary embolism. Small left pleural effusion. Mild bilateral dependent atelectasis. Moderate gastric distention with air and fluid. This may be transient however, gastroparesis cannot be excluded. Correlate with patient symptoms. This can be further evaluated with nuclear medicine gastric emptying study as clinically warranted. If there  is concern for gastric outlet obstruction, correlate with endoscopy. Moderately distended urinary bladder. Correlate for urinary retention. Chronic findings as above. Electronically Signed: Charles Santo MD  2/25/2025 3:14 PM EST  Workstation ID: [id number]    XR Chest 1 View    Result Date: 2/25/2025  Impression: Low lung volumes with probable mild bibasal atelectasis. Correlate for infection. Electronically Signed: Nicholas Becerra MD  2/25/2025 12:17 PM EST  Workstation ID: [id number]                                                      Medical Decision Making  Tryptase patient CT scan chest PE protocol shows no evidence of pulmonary embolism with no other acute abnormality.  CT scan and pelvis without contrast shows no perforation obstruction with acute abnormality.  Chest x-ray shows no cardiomegaly fusion or infiltrate.  Strep panel is negative.  BMP shows no renal insufficiency or electrolyte abnormality.  Troponins are unchanged and serial enzymes D-dimer is 3.54.  Patient had leukocytosis with no left shift and no anemia.  Patient NG tube placed to low wall suction due to gastric distention with fluid-filled stomach.  This did provide some relief of discomfort.  Will be placed in ED observation for further evaluation.    Amount and/or Complexity of Data Reviewed  Labs: ordered. Decision-making details documented in ED  Course.  Radiology: ordered and independent interpretation performed.  ECG/medicine tests: ordered and independent interpretation performed.    Risk  Prescription drug management.  Decision regarding hospitalization.        Final diagnoses:   Epigastric pain   Chest pain, unspecified type       ED Disposition  ED Disposition       ED Disposition   Intended Admit    Condition   --    Comment   --               No follow-up provider specified.       Medication List      No changes were made to your prescriptions during this visit.            Jeromy Blake MD  02/25/25 1548      Electronically signed by Jeromy Blake MD at 02/25/25 1548       Facility-Administered Medications as of 2/27/2025   Medication Dose Route Frequency Provider Last Rate Last Admin    acetaminophen (TYLENOL) tablet 650 mg  650 mg Oral Q4H PRN Kiko Talbert MD   650 mg at 02/27/25 1112    Or    acetaminophen (TYLENOL) 160 MG/5ML oral solution 650 mg  650 mg Oral Q4H PRN Kiko Talbert MD        Or    acetaminophen (TYLENOL) suppository 650 mg  650 mg Rectal Q4H PRN Kiko Talbert MD   650 mg at 02/25/25 2042    aluminum-magnesium hydroxide-simethicone (MAALOX MAX) 400-400-40 MG/5ML suspension 15 mL  15 mL Oral Q6H PRN Kiko Talbert MD        sennosides-docusate (PERICOLACE) 8.6-50 MG per tablet 2 tablet  2 tablet Oral BID PRN Kiko Talbert MD        And    polyethylene glycol (MIRALAX) packet 17 g  17 g Oral Daily PRN Kiko Talbert MD        And    bisacodyl (DULCOLAX) EC tablet 5 mg  5 mg Oral Daily PRN Kiko Talbert MD        And    bisacodyl (DULCOLAX) suppository 10 mg  10 mg Rectal Daily PRN Kiko Talbert MD        [COMPLETED] cefTRIAXone (ROCEPHIN) 1 g in sodium chloride 0.9 % 100 mL MBP  1 g Intravenous Once Constantino Ragland MD   Stopped at 02/26/25 0715    cetirizine (zyrTEC) tablet 10 mg  10 mg Oral Daily Kiko Talbert MD   10 mg at 02/27/25 0910     empagliflozin (JARDIANCE) tablet 25 mg  25 mg Oral Daily Kiko Talbert MD   25 mg at 02/27/25 0910    hydrALAZINE (APRESOLINE) injection 10 mg  10 mg Intravenous Q6H PRN Kiko Talbert MD   10 mg at 02/25/25 2042    [COMPLETED] HYDROmorphone (DILAUDID) injection 0.5 mg  0.5 mg Intravenous Once Jeromy Blake MD   0.5 mg at 02/25/25 1133    [COMPLETED] HYDROmorphone (DILAUDID) injection 0.5 mg  0.5 mg Intravenous Once Jeromy Blaek MD   0.5 mg at 02/25/25 1324    [COMPLETED] iopamidol (ISOVUE-370) 76 % injection 100 mL  100 mL Intravenous Once in imaging Jeromy Blake MD   100 mL at 02/25/25 1459    [COMPLETED] ketorolac (TORADOL) injection 15 mg  15 mg Intravenous Once Jeromy Blake MD   15 mg at 02/25/25 1133    losartan (COZAAR) tablet 25 mg  25 mg Oral Daily Kiko Talbert MD   25 mg at 02/27/25 0910    metoclopramide (REGLAN) injection 5 mg  5 mg Intravenous BID AC Kiko Talbert MD   5 mg at 02/27/25 0910    nitroglycerin (NITROSTAT) SL tablet 0.4 mg  0.4 mg Sublingual Q5 Min PRN Kiko Talbert MD        ondansetron ODT (ZOFRAN-ODT) disintegrating tablet 4 mg  4 mg Oral Q6H PRN Kiko Talbert MD        Or    ondansetron (ZOFRAN) injection 4 mg  4 mg Intravenous Q6H PRN Kiko Talbert MD        ondansetron ODT (ZOFRAN-ODT) disintegrating tablet 4 mg  4 mg Oral Q6H PRN Kiko Talbert MD        Or    ondansetron (ZOFRAN) injection 4 mg  4 mg Intravenous Q6H PRN Kiko Talbert MD        pantoprazole (PROTONIX) EC tablet 40 mg  40 mg Oral BID AC Kiko Talbert MD   40 mg at 02/27/25 0910    piperacillin-tazobactam (ZOSYN) 3.375 g IVPB in 100 mL NS MBP (CD)  3.375 g Intravenous Once Ron Eduardo MD        piperacillin-tazobactam (ZOSYN) 3.375 g IVPB in 100 mL NS MBP (CD)  3.375 g Intravenous Q8H Ron Eduardo MD        rosuvastatin (CRESTOR) tablet 10 mg  10 mg Oral Daily Kiko Talbert MD   10 mg at  25 0910    [COMPLETED] sodium chloride 0.9 % bolus 500 mL  500 mL Intravenous Once Irais Pink PA-C   Stopped at 25 1235    sodium chloride 0.9 % flush 10 mL  10 mL Intravenous PRN Kiko Talbert MD        sodium chloride 0.9 % flush 10 mL  10 mL Intravenous Q12H Kiko Talbert MD   10 mL at 25 0911    sodium chloride 0.9 % flush 10 mL  10 mL Intravenous PRN Kiko Talbert MD        sodium chloride 0.9 % infusion 40 mL  40 mL Intravenous PRN Kiko Talbert MD   50 mL/hr at 25 1438        Physician Progress Notes (last 72 hours)        Ron Eduardo MD at 25 1237              [facility] MEDICINE SERVICE  DAILY PROGRESS NOTE    NAME: Ismael Pulido  : 1965  MRN: [id number]      LOS: 1 day     PROVIDER OF SERVICE: Ron Eduardo MD    Chief Complaint: Abdominal pain    Subjective:     Interval History:  History taken from: patient    Patient seen and examined at bedside this morning.  No acute complaints overnight.  States started having abdominal pain secondary, having clear liquids last night tolerating liquid diet so far        Review of Systems: Negative except described above  Review of Systems    Objective:     Vital Signs  Temp:  [97.8 °F (36.6 °C)-103 °F (39.4 °C)] 103 °F (39.4 °C)  Heart Rate:  [] 64  Resp:  [18-36] 18  BP: (127-148)/(83-98) 148/91  Flow (L/min) (Oxygen Therapy):  [2-10] 2   Body mass index is 34.53 kg/m².    Physical Exam  Physical Exam  Constitutional:       Comments: NAD    Cardiovascular:      Comments:  RRR, S1 & S2   Pulmonary:      Comments:  Lungs CTA   Abdominal:      Comments:  ABD soft, NT            Diagnostic Data    Results from last 7 days   Lab Units 25  0444   WBC 10*3/mm3 14.88*   HEMOGLOBIN g/dL 15.3   HEMATOCRIT % 47.0   PLATELETS 10*3/mm3 217   GLUCOSE mg/dL 126*   CREATININE mg/dL 1.05   BUN mg/dL 21*   SODIUM mmol/L 131*   POTASSIUM mmol/L 4.2   AST (SGOT)  U/L 22   ALT (SGPT) U/L 16   ALK PHOS U/L 92   BILIRUBIN mg/dL 0.9   ANION GAP mmol/L 17.1*       XR Abdomen KUB    Result Date: 2/25/2025  Impression: Gastric tube in appropriate position. Electronically Signed: Charles Santo MD  2/25/2025 9:17 PM EST  Workstation ID: [id number]    XR Abdomen KUB    Result Date: 2/25/2025  Impression: Slightly high position of the gastric tube. Recommend advancing by 3 cm. Electronically Signed: Charles Santo MD  2/25/2025 5:44 PM EST  Workstation ID: [id number]    XR Abdomen KUB    Result Date: 2/25/2025  Impression: The tip of the nasogastric tube terminates in the fundus of the stomach. Electronically Signed: Erasmo Bernard MD  2/25/2025 4:00 PM EST  Workstation ID: [id number]    CT Angiogram Chest Pulmonary Embolism    Result Date: 2/25/2025  Impression: No evidence of pulmonary embolism. Small left pleural effusion. Mild bilateral dependent atelectasis. Moderate gastric distention with air and fluid. This may be transient however, gastroparesis cannot be excluded. Correlate with patient symptoms. This can be further evaluated with nuclear medicine gastric emptying study as clinically warranted. If there  is concern for gastric outlet obstruction, correlate with endoscopy. Moderately distended urinary bladder. Correlate for urinary retention. Chronic findings as above. Electronically Signed: Charles Santo MD  2/25/2025 3:14 PM EST  Workstation ID: [id number]    CT Abdomen Pelvis With Contrast    Result Date: 2/25/2025  Impression: No evidence of pulmonary embolism. Small left pleural effusion. Mild bilateral dependent atelectasis. Moderate gastric distention with air and fluid. This may be transient however, gastroparesis cannot be excluded. Correlate with patient symptoms. This can be further evaluated with nuclear medicine gastric emptying study as clinically warranted. If there  is concern for gastric outlet obstruction, correlate with endoscopy. Moderately distended urinary  bladder. Correlate for urinary retention. Chronic findings as above. Electronically Signed: Charles Santo MD  2/25/2025 3:14 PM EST  Workstation ID: [id number]       I reviewed the patient's new clinical results.    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       Chest pain  -Troponin, 38, 39  -d. dimer 3.54  -CXR showed low lung volumes with probable mild bibasal atelectasis.   -Cta chest negative for PE. Small left pleural effusion. Mild bilateral dependent atelectasis  -Ekg rate 85, pvcs, pacemaker, prolonged AR  -Cardiology consulted  -Pacemaker interrogated and lead dislodged     Abdominal pain  -Lipase 26  -Ct abd reviewed and showing Moderate gastric distention with air and fluid. This may be transient however, gastroparesis cannot be excluded. Correlate with patient symptoms. This can be further evaluated with nuclear medicine gastric emptying study as clinically warranted. If there is concern for gastric outlet obstruction, correlate with endoscopy. Moderately distended urinary bladder. Correlate for urinary retention.  -Gastroenterology consulted  -Ng tube in place to low wall suction, removed today   -EGD on 2/26 showed esophagitis  -Continue PPI     -Fever  Chest x-ray reviewed, will order CT chest  Check UA  Blood cultures ordered on 2/25 showed no growth to date, will repeat another blood culture  RVP negative  Patient already on Rocephin, will escalate to Zosyn  Check bilateral lower extremity venous duplex for any DVT       Hypertension  -Stable  -Continue losartan     Type 2 diabetes  -Continue jardiance   -SSI  -Diabetic diet  -Monitor before meals and at bedtime        VTE Prophylaxis:  Mechanical VTE  prophylaxis orders are present.     Clinical documentation for Inpt admission Ref#[id number]        THANK YOU,  Ivy Young RN, CCM  Utilization Review  [facility]    Phone: [phone]  Fax: [phone]  --------------------------------  NPI: [id number]  Tax ID: [id number]      Disposition Planning:        Anticipated Date of Discharge: 3/1/2025         Time: 35 minutes     Code Status and Medical Interventions: CPR (Attempt to Resuscitate); Full Support   Ordered at: 25 1627     Level Of Support Discussed With:    Patient     Code Status (Patient has no pulse and is not breathing):    CPR (Attempt to Resuscitate)     Medical Interventions (Patient has pulse or is breathing):    Full Support       Signature: Electronically signed by Ron Eduardo MD, 25, 12:37 EST.  [facility] Hospitalist Team      Electronically signed by Ron Eduardo MD at 25 1244       Dez Loco MD at 25 1109          Pacemaker site is clean  Low-grade temperature noted  Check procalcitonin level  Schedule patient for RV pacing lead if no significant febrile illness for next 24 hours  Orders placed and discussed with patient and family      Electronically signed by Dez BURCH. MD Ber[place], 25, 11:09 AM EST.      Electronically signed by Dez Loco MD at 25 1110          Consult Notes (last 72 hours)        Brittany Patton, APRN at 25 1534        Consult Orders    1. Inpatient Hospitalist Consult [[id number]] ordered by Kiko Talbert MD              Attestation signed by Teofilo Belcher MD at 25 1840    I have reviewed this documentation and agree.                      [facility] Medicine Services  Consult Note    Patient Name: Ismael Pulido  : 1965  MRN: [id number]  Primary Care Physician:  Sharon Silva MD  Referring Physician: No ref. provider found  Date of admission: 2025  Date and Time of  "Care: 2/26/25 at 1700    Inpatient Hospitalist Consult  Consult performed by: Brittany Patton APRN  Consult ordered by: Irais Pink PA-C            Reason for Consult/ Chief Complaint: Medical management    Consult Requested By: JOSELYN Hay    Subjective:     History of Present Illness:   Per the documentation by JOSELYN Hay, dated 2/26/25,  \"59-year-old male complaining of chest and epigastric pain that developed throughout the day today \"    Review of Systems:   Review of Systems    Personal History:     Past Medical History:   Diagnosis Date    Allergic 01/01/1988    5+ molds, pollens, grass    Diabetes mellitus     Not sure like 2020    Diverticulitis     Diverticulosis     Mot sure 1995    GERD (gastroesophageal reflux disease)     Hypertension     Kidney stone     Not dure 1995    Obesity     Not sure 1999       Past Surgical History:   Procedure Laterality Date    CARDIAC ELECTROPHYSIOLOGY PROCEDURE N/A 2/21/2025    Procedure: Pacemaker DC new [place] aware;  Surgeon: Dez Loco MD;  Location: [facility] INVASIVE LOCATION;  Service: Cardiovascular;  Laterality: N/A;    HERNIA REPAIR         Family History: family history includes Anxiety disorder in his mother; Arthritis in his mother; Cancer in his father; Depression in his mother; Diabetes in his father and mother; Hyperlipidemia in his mother; Kidney disease in his mother. Otherwise pertinent FHx was reviewed and not pertinent to current issue.    Social History:  reports that he has never smoked. He has never used smokeless tobacco. He reports that he does not drink alcohol and does not use drugs.    Home Medications:   Misc Natural Products, Semaglutide, ascorbic acid, cephalexin, clotrimazole, empagliflozin, lansoprazole, loratadine, losartan, pseudoephedrine, and rosuvastatin    Allergies:  Allergies   Allergen Reactions    Lisinopril Nausea And Vomiting         Objective:     Vital Signs  Temp:  [97.6 °F (36.4 °C)-100.2 " °F (37.9 °C)] 99.5 °F (37.5 °C)  Heart Rate:  [] 120  Resp:  [16-32] 22  BP: (105-198)/() 127/94  Flow (L/min) (Oxygen Therapy):  [8-10] 8   Body mass index is 34.53 kg/m².    Physical Exam  Physical Exam  Constitutional:       General: He is not in acute distress.     Appearance: Normal appearance. He is obese. He is not ill-appearing.   HENT:      Head: Normocephalic and atraumatic.      Nose: Nose normal.      Mouth/Throat:      Mouth: Mucous membranes are moist.   Eyes:      Extraocular Movements: Extraocular movements intact.      Conjunctiva/sclera: Conjunctivae normal.      Pupils: Pupils are equal, round, and reactive to light.   Cardiovascular:      Rate and Rhythm: Normal rate and regular rhythm.      Pulses: Normal pulses.      Heart sounds: Normal heart sounds.   Pulmonary:      Effort: Pulmonary effort is normal.      Breath sounds: Normal breath sounds.   Abdominal:      General: Abdomen is flat. Bowel sounds are normal.      Palpations: Abdomen is soft.   Musculoskeletal:         General: Normal range of motion.      Cervical back: Normal range of motion.   Skin:     General: Skin is warm and dry.   Neurological:      General: No focal deficit present.      Mental Status: He is alert and oriented to person, place, and time. Mental status is at baseline.   Psychiatric:         Mood and Affect: Mood normal.         Behavior: Behavior normal.         Thought Content: Thought content normal.         Judgment: Judgment normal.         Scheduled Meds   cefTRIAXone, 2 g, Intravenous, Q24H  cetirizine, 10 mg, Oral, Daily  empagliflozin, 25 mg, Oral, Daily  losartan, 25 mg, Oral, Daily  metoclopramide, 5 mg, Intravenous, BID AC  pantoprazole, 40 mg, Oral, BID AC  rosuvastatin, 10 mg, Oral, Daily  sodium chloride, 10 mL, Intravenous, Q12H       PRN Meds     acetaminophen **OR** acetaminophen **OR** acetaminophen    aluminum-magnesium hydroxide-simethicone    senna-docusate sodium **AND**  polyethylene glycol **AND** bisacodyl **AND** bisacodyl    hydrALAZINE    nitroglycerin    ondansetron ODT **OR** ondansetron    [COMPLETED] Insert Peripheral IV **AND** sodium chloride    sodium chloride    sodium chloride   Infusions         Diagnostic Data    Results from last 7 days   Lab Units 02/26/25  0352   WBC 10*3/mm3 18.32*   HEMOGLOBIN g/dL 16.6   HEMATOCRIT % 50.2   PLATELETS 10*3/mm3 228   GLUCOSE mg/dL 133*   CREATININE mg/dL 0.91   BUN mg/dL 14   SODIUM mmol/L 137   POTASSIUM mmol/L 4.4   ANION GAP mmol/L 13.9       XR Abdomen KUB    Result Date: 2/25/2025  Impression: Gastric tube in appropriate position. Electronically Signed: Charles Santo MD  2/25/2025 9:17 PM EST  Workstation ID: [id number]    XR Abdomen KUB    Result Date: 2/25/2025  Impression: Slightly high position of the gastric tube. Recommend advancing by 3 cm. Electronically Signed: Charles Santo MD  2/25/2025 5:44 PM EST  Workstation ID: [id number]    XR Abdomen KUB    Result Date: 2/25/2025  Impression: The tip of the nasogastric tube terminates in the fundus of the stomach. Electronically Signed: Erasmo Bernard MD  2/25/2025 4:00 PM EST  Workstation ID: [id number]    CT Angiogram Chest Pulmonary Embolism    Result Date: 2/25/2025  Impression: No evidence of pulmonary embolism. Small left pleural effusion. Mild bilateral dependent atelectasis. Moderate gastric distention with air and fluid. This may be transient however, gastroparesis cannot be excluded. Correlate with patient symptoms. This can be further evaluated with nuclear medicine gastric emptying study as clinically warranted. If there  is concern for gastric outlet obstruction, correlate with endoscopy. Moderately distended urinary bladder. Correlate for urinary retention. Chronic findings as above. Electronically Signed: Charles Santo MD  2/25/2025 3:14 PM EST  Workstation ID: [id number]    CT Abdomen Pelvis With Contrast    Result Date: 2/25/2025  Impression: No evidence of  pulmonary embolism. Small left pleural effusion. Mild bilateral dependent atelectasis. Moderate gastric distention with air and fluid. This may be transient however, gastroparesis cannot be excluded. Correlate with patient symptoms. This can be further evaluated with nuclear medicine gastric emptying study as clinically warranted. If there  is concern for gastric outlet obstruction, correlate with endoscopy. Moderately distended urinary bladder. Correlate for urinary retention. Chronic findings as above. Electronically Signed: Charles Santo MD  2/25/2025 3:14 PM EST  Workstation ID: [id number]    XR Chest 1 View    Result Date: 2/25/2025  Impression: Low lung volumes with probable mild bibasal atelectasis. Correlate for infection. Electronically Signed: Nicholas Becerra MD  2/25/2025 12:17 PM EST  Workstation ID: [id number]       I reviewed the patient's new clinical results.    Assessment/Plan:     Active and Resolved Problems  Active Hospital Problems    Diagnosis  POA    **Abdominal pain [R10.9]  Yes      Resolved Hospital Problems   No resolved problems to display.       Chest pain  -Troponin, 38, 39  -d. dimer 3.54  -CXR showed low lung volumes with probable mild bibasal atelectasis.   -Cta chest negative for PE. Small left pleural effusion. Mild bilateral dependent atelectasis  -Ekg rate 85, pvcs, pacemaker, prolonged CT  -Cardiology consulted  -Pacemaker interrogated and lead dislodged     Abdominal pain  -Lipase 26  -Ct abd reviewed and showing Moderate gastric distention with air and fluid. This may be transient however, gastroparesis cannot be excluded. Correlate with patient symptoms. This can be further evaluated with nuclear medicine gastric emptying study as clinically warranted. If there is concern for gastric outlet obstruction, correlate with endoscopy. Moderately distended urinary bladder. Correlate for urinary retention.  -Gastroenterology consulted  -Ng tube in place to low wall suction, removed  today   -EGD today showed esophagitis  -Continue PPI      Hypertension  -Stable  -Continue losartan    Type 2 diabetes  -Continue jardiance   -SSI  -Diabetic diet  -Monitor before meals and at bedtime     VTE Prophylaxis:  Mechanical VTE prophylaxis orders are present.         Code status is   Code Status and Medical Interventions: CPR (Attempt to Resuscitate); Full Support   Ordered at: 02/25/25 1627     Level Of Support Discussed With:    Patient     Code Status (Patient has no pulse and is not breathing):    CPR (Attempt to Resuscitate)     Medical Interventions (Patient has pulse or is breathing):    Full Support       Plan for disposition: 1 to 2 days    Time: 30 minutes        Signature: Electronically signed by TAMIKO Bolden, 02/26/25, 15:34 EST.  [facility] Hospitalist Team     Electronically signed by Teofilo Belcher MD at 02/26/25 1840       Win Reaves MD at 02/26/25 1333        Consult Orders    1. Inpatient Cardiology Consult [[id number]] ordered by Dez Loco MD at 02/26/25 0939                 CARDIOLOGY CONSULT:    Ismael Pulido  1965  male  [id number]      Referring Provider: Hospitalist  Reason for Consultation: Pacemaker function    Patient Care Team:  Sharon Silva MD as PCP - General (Internal Medicine & Pediatrics)    Chief complaint abdominal distention and pain    Subjective .     History of present illness:  Ismael Pulido is a 59 y.o. male with history of sick sinus syndrome status post pacemaker placement hypertension diabetes hyperlipidemia recently presented to the hospital with complaints of epigastric discomfort along with abdominal distention and nausea.  No complaints of any chest pains or shortness of breath.  No PND orthopnea.  No palpitations dizziness syncope or patient underwent recent permanent pacemaker placement.  When patient presented to the ER patient had a CT scan of the abdomen pelvis which showed moderate gastric  distention suggesting possible gastroparesis versus gastric outlet obstruction and hence a gastroenterology consultation is obtained.  He also was noted to have the RV lead of the pacemaker dislodgment with high thresholds.    Review of Systems   Constitutional: Negative for fever and malaise/fatigue.   HENT:  Negative for ear pain and nosebleeds.    Eyes:  Negative for blurred vision and double vision.   Cardiovascular:  Negative for chest pain, dyspnea on exertion and palpitations.   Respiratory:  Negative for cough and shortness of breath.    Skin:  Negative for rash.   Musculoskeletal:  Negative for joint pain.   Gastrointestinal:  Positive for abdominal pain. Negative for nausea and vomiting.   Neurological:  Negative for focal weakness and headaches.   Psychiatric/Behavioral:  Negative for depression. The patient is not nervous/anxious.    All other systems reviewed and are negative.      History  Past Medical History:   Diagnosis Date    Allergic 1988    5+ molds, pollens, grass    Diabetes mellitus     Not sure like 2020    Diverticulitis     Diverticulosis     Mot sure     GERD (gastroesophageal reflux disease)     Hypertension     Kidney stone     Not dure     Obesity     Not sure        Past Surgical History:   Procedure Laterality Date    CARDIAC ELECTROPHYSIOLOGY PROCEDURE N/A 2025    Procedure: Pacemaker DC new [place] aware;  Surgeon: Dez Loco MD;  Location: [facility] INVASIVE LOCATION;  Service: Cardiovascular;  Laterality: N/A;    HERNIA REPAIR         Family History   Problem Relation Age of Onset    Anxiety disorder Mother     Arthritis Mother     Depression Mother     Diabetes Mother     Hyperlipidemia Mother     Kidney disease Mother         Kidney stones    Cancer Father          of cancer in 2009    Diabetes Father        Social History     Tobacco Use    Smoking status: Never    Smokeless tobacco: Never   Vaping Use    Vaping status: Never Used    Substance Use Topics    Alcohol use: Never    Drug use: Never        Medications Prior to Admission   Medication Sig Dispense Refill Last Dose/Taking    ascorbic acid ([employer] Vitamin C) 1000 MG tablet Take 4 tablets by mouth Daily.       cephalexin (KEFLEX) 500 MG capsule Take 1 capsule by mouth Every 8 (Eight) Hours for 15 doses. Indications: Preventative Medication Therapy Used Around Surgery 15 capsule 0     clotrimazole (LOTRIMIN) 1 % cream Apply 1 Application topically to the appropriate area as directed 2 (Two) Times a Day. 85 g 3     empagliflozin (Jardiance) 25 MG tablet tablet Take 1 tablet by mouth Daily. 30 tablet 5     lansoprazole (PREVACID) 30 MG capsule Take 1 capsule by mouth Daily. 30 capsule 5     loratadine (Claritin) 10 MG tablet Take 1 tablet by mouth Daily.       losartan (COZAAR) 25 MG tablet TAKE 1 TABLET BY MOUTH DAILY 90 tablet 1     Misc Natural Products (NEURIVA PO) Take  by mouth Daily.       pseudoephedrine (SUDAFED) 120 MG 12 hr tablet Take 1 tablet by mouth Every Morning.       rosuvastatin (CRESTOR) 10 MG tablet Take 1 tablet by mouth Daily. 90 tablet 1     Semaglutide 3 MG tablet Take 1 tablet by mouth Daily. 30 tablet 0        Allergies: Lisinopril    Scheduled Meds:cefTRIAXone, 2 g, Intravenous, Q24H  cetirizine, 10 mg, Oral, Daily  empagliflozin, 25 mg, Oral, Daily  losartan, 25 mg, Oral, Daily  metoclopramide, 5 mg, Intravenous, Q6H  pantoprazole, 40 mg, Oral, BID AC  rosuvastatin, 10 mg, Oral, Daily  sodium chloride, 10 mL, Intravenous, Q12H      Continuous Infusions:   PRN Meds:.  acetaminophen **OR** acetaminophen **OR** acetaminophen    aluminum-magnesium hydroxide-simethicone    senna-docusate sodium **AND** polyethylene glycol **AND** bisacodyl **AND** bisacodyl    hydrALAZINE    nitroglycerin    ondansetron ODT **OR** ondansetron    [COMPLETED] Insert Peripheral IV **AND** sodium chloride    sodium chloride    sodium chloride    Objective     VITAL SIGNS  Vitals:     "02/26/25 0810 02/26/25 0825 02/26/25 1102 02/26/25 1200   BP: 127/89 126/96 125/91    BP Location:   Right arm    Patient Position:   Lying    Pulse: 104 99 108 107   Resp:   24    Temp:   97.6 °F (36.4 °C)    TempSrc:   Oral    SpO2:       Weight:       Height:           Flowsheet Rows      Flowsheet Row First Filed Value   Admission Height 172.7 cm (68\") Documented at 02/25/2025 1110   Admission Weight 104 kg (229 lb 4.5 oz) Documented at 02/25/2025 1110             TELEMETRY: Ventricular pacemaker rhythm    Physical Exam:  Constitutional:       Appearance: Well-developed.   Eyes:      General: No scleral icterus.     Conjunctiva/sclera: Conjunctivae normal.      Pupils: Pupils are equal, round, and reactive to light.   HENT:      Head: Normocephalic and atraumatic.   Neck:      Vascular: No carotid bruit or JVD.   Pulmonary:      Effort: Pulmonary effort is normal.      Breath sounds: Normal breath sounds. No wheezing. No rales.   Cardiovascular:      Normal rate. Regular rhythm.   Pulses:     Intact distal pulses.   Abdominal:      General: Bowel sounds are normal.      Palpations: Abdomen is soft.   Musculoskeletal: Normal range of motion.      Cervical back: Normal range of motion and neck supple. Skin:     General: Skin is warm and dry.      Findings: No rash.   Neurological:      Mental Status: Alert.      Comments: No focal deficits          Results Review:   I reviewed the patient's new clinical results.  Lab Results (last 24 hours)       Procedure Component Value Units Date/Time    Basic Metabolic Panel [[id number]]  (Abnormal) Collected: 02/26/25 0352    Specimen: Blood from Arm, Left Updated: 02/26/25 0530     Glucose 133 mg/dL      BUN 14 mg/dL      Creatinine 0.91 mg/dL      Sodium 137 mmol/L      Potassium 4.4 mmol/L      Chloride 101 mmol/L      CO2 22.1 mmol/L      Calcium 9.7 mg/dL      BUN/Creatinine Ratio 15.4     Anion Gap 13.9 mmol/L      eGFR 97.1 mL/min/1.73     Narrative:      GFR Categories " in Chronic Kidney Disease (CKD)      GFR Category          GFR (mL/min/1.73)    Interpretation  G1                     90 or greater         Normal or high (1)  G2                      60-89                Mild decrease (1)  G3a                   45-59                Mild to moderate decrease  G3b                   30-44                Moderate to severe decrease  G4                    15-29                Severe decrease  G5                    14 or less           Kidney failure          (1)In the absence of evidence of kidney disease, neither GFR category G1 or G2 fulfill the criteria for CKD.    eGFR calculation 2021 CKD-EPI creatinine equation, which does not include race as a factor    CBC & Differential [[id number]]  (Abnormal) Collected: 02/26/25 0352    Specimen: Blood from Arm, Left Updated: 02/26/25 0517    Narrative:      The following orders were created for panel order CBC & Differential.  Procedure                               Abnormality         Status                     ---------                               -----------         ------                     CBC Auto Differential[[id number]]        Abnormal            Final result                 Please view results for these tests on the individual orders.    CBC Auto Differential [[id number]]  (Abnormal) Collected: 02/26/25 0352    Specimen: Blood from Arm, Left Updated: 02/26/25 0517     WBC 18.32 10*3/mm3      RBC 5.70 10*6/mm3      Hemoglobin 16.6 g/dL      Hematocrit 50.2 %      MCV 88.1 fL      MCH 29.1 pg      MCHC 33.1 g/dL      RDW 13.1 %      RDW-SD 42.5 fl      MPV 9.4 fL      Platelets 228 10*3/mm3      Neutrophil % 79.0 %      Lymphocyte % 8.2 %      Monocyte % 11.8 %      Eosinophil % 0.1 %      Basophil % 0.4 %      Immature Grans % 0.5 %      Neutrophils, Absolute 14.48 10*3/mm3      Lymphocytes, Absolute 1.50 10*3/mm3      Monocytes, Absolute 2.16 10*3/mm3      Eosinophils, Absolute 0.01 10*3/mm3      Basophils, Absolute 0.07 10*3/mm3   "    Immature Grans, Absolute 0.10 10*3/mm3      nRBC 0.0 /100 WBC     STAT Lactic Acid, Reflex [[id number]]  (Normal) Collected: 02/25/25 2333    Specimen: Blood from Arm, Right Updated: 02/26/25 0013     Lactate 2.0 mmol/L     Lactic Acid, Plasma [[id number]]  (Abnormal) Collected: 02/25/25 2026    Specimen: Blood from Arm, Right Updated: 02/25/25 2144     Lactate 2.3 mmol/L     Blood Culture - Blood, Arm, Left [[id number]] Collected: 02/25/25 2029    Specimen: Blood from Arm, Left Updated: 02/25/25 2114    Blood Culture - Blood, Arm, Right [[id number]] Collected: 02/25/25 2026    Specimen: Blood from Arm, Right Updated: 02/25/25 2114    Procalcitonin [[id number]]  (Normal) Collected: 02/25/25 1245    Specimen: Blood Updated: 02/25/25 1737     Procalcitonin 0.08 ng/mL     Narrative:      As a Marker for Sepsis (Non-Neonates):    1. <0.5 ng/mL represents a low risk of severe sepsis and/or septic shock.  2. >2 ng/mL represents a high risk of severe sepsis and/or septic shock.    As a Marker for Lower Respiratory Tract Infections that require antibiotic therapy:    PCT on Admission    Antibiotic Therapy       6-12 Hrs later    >0.5                Strongly Recommended  >0.25 - <0.5        Recommended   0.1 - 0.25          Discouraged              Remeasure/reassess PCT  <0.1                Strongly Discouraged     Remeasure/reassess PCT    As 28 day mortality risk marker: \"Change in Procalcitonin Result\" (>80% or <=80%) if Day 0 (or Day 1) and Day 4 values are available. Refer to http://[email or web address]-pct-calculator.[email or web address]    Change in PCT <=80%  A decrease of PCT levels below or equal to 80% defines a positive change in PCT test result representing a higher risk for 28-day all-cause mortality of patients diagnosed with severe sepsis for septic shock.    Change in PCT >80%  A decrease of PCT levels of more than 80% defines a negative change in PCT result representing a lower risk for 28-day all-cause mortality of patients " "diagnosed with severe sepsis or septic shock.       D-dimer, Quantitative [[id number]]  (Abnormal) Collected: 02/25/25 1132    Specimen: Blood Updated: 02/25/25 1340     D-Dimer, Quantitative 3.54 MCGFEU/mL     Narrative:      According to the assay 's published package insert, a normal (<0.50 MCGFEU/mL) D-dimer result in conjunction with a non-high clinical probability assessment, excludes deep vein thrombosis (DVT) and pulmonary embolism (PE) with high sensitivity.    D-dimer values increase with age and this can make VTE exclusion of an older population difficult. To address this, the [employer], based on best available evidence and recent guidelines, recommends that clinicians use age-adjusted D-dimer thresholds in patients greater than 50 years of age with: a) a low probability of PE who do not meet all Pulmonary Embolism Rule Out Criteria, or b) in those with intermediate probability of PE.   The formula for an age-adjusted D-dimer cut-off is \"age/100\".  For example, a 60 year old patient would have an age-adjusted cut-off of 0.60 MCGFEU/mL and an 80 year old 0.80 MCGFEU/mL.    Lipase [[id number]]  (Normal) Collected: 02/25/25 1245    Specimen: Blood Updated: 02/25/25 1339     Lipase 26 U/L             Imaging Results (Last 24 Hours)       Procedure Component Value Units Date/Time    XR Abdomen KUB [[id number]] Collected: 02/25/25 2115     Updated: 02/25/25 2119    Narrative:      XR ABDOMEN KUB    Date of Exam: 2/25/2025 8:35 PM EST    Indication: NG placement    Comparison: Same day abdominal radiograph    Findings:  Only the upper abdomen is imaged. Gastric tube is in appropriate position. Nonobstructive bowel gas pattern is visualized. No free intraperitoneal air. No acute osseous abnormalities. Visualized lung bases are clear.      Impression:      Impression:  Gastric tube in appropriate position.      Electronically Signed: Charles Santo MD    2/25/2025 9:17 PM EST    " Workstation ID: [id number]    XR Abdomen KUB [[id number]] Collected: 02/25/25 1742     Updated: 02/25/25 1746    Narrative:      XR ABDOMEN KUB    Date of Exam: 2/25/2025 5:14 PM EST    Indication: ng    Comparison: Abdominal radiograph performed on the same day    Findings:  Limited study. Only the left upper quadrant was imaged. Left side of the gastric tube terminates at the level of the GE junction. Recommend advancing by 3 cm. Nonobstructive bowel gas pattern is visualized. No free intraperitoneal air. No acute osseous   abnormalities. Visualized lung bases are clear.      Impression:      Impression:  Slightly high position of the gastric tube. Recommend advancing by 3 cm.      Electronically Signed: Charles Santo MD    2/25/2025 5:44 PM EST    Workstation ID: [id number]    XR Abdomen KUB [[id number]] Collected: 02/25/25 1558     Updated: 02/25/25 1602    Narrative:      XR ABDOMEN KUB    Date of Exam: 2/25/2025 3:30 PM EST    Indication: Nasogastric tube insertion.    Comparison: CT of the abdomen/pelvis performed on 2/25/2025.    Findings:  The tip of the nasogastric tube terminates in the fundus of the stomach. The pelvis and lower abdomen are excluded from the field-of-view. The visualized bowel gas pattern appears unremarkable. There is mild subsegmental atelectasis in the lung bases.   There are degenerative changes in the thoracolumbar spine.      Impression:      Impression:  The tip of the nasogastric tube terminates in the fundus of the stomach.      Electronically Signed: Erasmo Bernard MD    2/25/2025 4:00 PM EST    Workstation ID: [id number]    CT Angiogram Chest Pulmonary Embolism [[id number]] Collected: 02/25/25 1502     Updated: 02/25/25 1516    Narrative:      CT ANGIOGRAM CHEST PULMONARY EMBOLISM, CT ABDOMEN PELVIS W CONTRAST    Date of Exam: 2/25/2025 2:57 PM EST    Indication: Dyspnea. Abdominal pain.    Comparison: Noncontrast chest CT performed on November 18, 2022. Noncontrast CT of the abdomen  pelvis performed on October 31, 2018    Technique: Axial CT images were obtained of the chest after the uneventful intravenous administration of iodinated contrast utilizing pulmonary embolism protocol. CT of the abdomen pelvis was performed at the same time. In addition, a 3-D volume rendered   image was created for interpretation.  Sagittal and coronal reconstructions were performed.  Automated exposure control and iterative reconstruction methods were used.      Findings:    CTA CHEST:    Pulmonary arteries: Adequate opacification of the pulmonary arteries. No evidence of acute pulmonary embolism.    Thoracic aorta: The thoracic aorta is suboptimally opacified with contrast. Thoracic aorta is normal in caliber and contour.    Cardiovascular: The heart is normal in size.  No evidence of pericardial effusion. Patient is post left subclavian triple lead pacemaker placement.    Thyroid: The visualized portion of the thyroid is unremarkable.    Lungs and Pleura: Small left pleural effusion is visualized with adjacent compressive atelectasis. Small left pleural effusion is visualized. Mild bilateral dependent delayed atelectasis is visualized, left greater than right. No suspicious pulmonary   nodules. No pneumothorax. Central airways are patent.    Mediastinum/Maria G: No mediastinal or hilar lymphadenopathy.    Lymph nodes: No axillary or supraclavicular lymphadenopathy.    Bones and Soft Tissue:No acute fracture, aggressive osseous lesions, or soft tissue process.      CT abdomen/pelvis:    Peritoneum:  No free intraperitoneal air or fluid.     Abdominal wall:  There is evidence of prior ventral hernia repair. Small fat-containing left inguinal hernia is visualized.    Liver:  Liver is normal in size and contour. No focal lesions.    Biliary/Gallbladder:  The gallbladder is normal without evidence of radiopaque gallstones. The biliary tree is nondilated.    Pancreas:  Pancreas is within normal limits. There is no  evidence of pancreatic mass or peripancreatic inflammatory changes.    Spleen:  Spleen is normal in size and contour.    Gastrointestinal/Mesentery:   There is moderate gastric distention with air-fluid. No evidence of bowel obstruction or gross inflammatory changes. The appendix appears within normal limits. There is pancolonic diverticulosis without evidence of diverticulitis.    Adrenals:  Adrenal glands are unremarkable.    Kidneys:  The kidneys are in anatomic position. No evidence of nephrolithiasis. No evidence of hydronephrosis or significant perinephric fat stranding.    Bladder:   The urinary bladder is moderately distended.    Reproductive organs:    Unremarkable.    Lymph Nodes:  No significant adenopathy is identified.     Vasculature:  Mild aortoiliac atheromatous changes are visualized. The abdominal aorta is normal in caliber.    Osseous Structures:    No acute fracture or aggressive lesions. Mild multilevel degenerative changes are visualized, most prominent at L4-5.          Impression:      Impression:    No evidence of pulmonary embolism.    Small left pleural effusion.    Mild bilateral dependent atelectasis.    Moderate gastric distention with air and fluid. This may be transient however, gastroparesis cannot be excluded. Correlate with patient symptoms. This can be further evaluated with nuclear medicine gastric emptying study as clinically warranted. If there   is concern for gastric outlet obstruction, correlate with endoscopy.    Moderately distended urinary bladder. Correlate for urinary retention.    Chronic findings as above.        Electronically Signed: Charles Santo MD    2/25/2025 3:14 PM EST    Workstation ID: [id number]    CT Abdomen Pelvis With Contrast [[id number]] Collected: 02/25/25 1502     Updated: 02/25/25 1516    Narrative:      CT ANGIOGRAM CHEST PULMONARY EMBOLISM, CT ABDOMEN PELVIS W CONTRAST    Date of Exam: 2/25/2025 2:57 PM EST    Indication: Dyspnea. Abdominal  pain.    Comparison: Noncontrast chest CT performed on November 18, 2022. Noncontrast CT of the abdomen pelvis performed on October 31, 2018    Technique: Axial CT images were obtained of the chest after the uneventful intravenous administration of iodinated contrast utilizing pulmonary embolism protocol. CT of the abdomen pelvis was performed at the same time. In addition, a 3-D volume rendered   image was created for interpretation.  Sagittal and coronal reconstructions were performed.  Automated exposure control and iterative reconstruction methods were used.      Findings:    CTA CHEST:    Pulmonary arteries: Adequate opacification of the pulmonary arteries. No evidence of acute pulmonary embolism.    Thoracic aorta: The thoracic aorta is suboptimally opacified with contrast. Thoracic aorta is normal in caliber and contour.    Cardiovascular: The heart is normal in size.  No evidence of pericardial effusion. Patient is post left subclavian triple lead pacemaker placement.    Thyroid: The visualized portion of the thyroid is unremarkable.    Lungs and Pleura: Small left pleural effusion is visualized with adjacent compressive atelectasis. Small left pleural effusion is visualized. Mild bilateral dependent delayed atelectasis is visualized, left greater than right. No suspicious pulmonary   nodules. No pneumothorax. Central airways are patent.    Mediastinum/Maria G: No mediastinal or hilar lymphadenopathy.    Lymph nodes: No axillary or supraclavicular lymphadenopathy.    Bones and Soft Tissue:No acute fracture, aggressive osseous lesions, or soft tissue process.      CT abdomen/pelvis:    Peritoneum:  No free intraperitoneal air or fluid.     Abdominal wall:  There is evidence of prior ventral hernia repair. Small fat-containing left inguinal hernia is visualized.    Liver:  Liver is normal in size and contour. No focal lesions.    Biliary/Gallbladder:  The gallbladder is normal without evidence of radiopaque  gallstones. The biliary tree is nondilated.    Pancreas:  Pancreas is within normal limits. There is no evidence of pancreatic mass or peripancreatic inflammatory changes.    Spleen:  Spleen is normal in size and contour.    Gastrointestinal/Mesentery:   There is moderate gastric distention with air-fluid. No evidence of bowel obstruction or gross inflammatory changes. The appendix appears within normal limits. There is pancolonic diverticulosis without evidence of diverticulitis.    Adrenals:  Adrenal glands are unremarkable.    Kidneys:  The kidneys are in anatomic position. No evidence of nephrolithiasis. No evidence of hydronephrosis or significant perinephric fat stranding.    Bladder:   The urinary bladder is moderately distended.    Reproductive organs:    Unremarkable.    Lymph Nodes:  No significant adenopathy is identified.     Vasculature:  Mild aortoiliac atheromatous changes are visualized. The abdominal aorta is normal in caliber.    Osseous Structures:    No acute fracture or aggressive lesions. Mild multilevel degenerative changes are visualized, most prominent at L4-5.          Impression:      Impression:    No evidence of pulmonary embolism.    Small left pleural effusion.    Mild bilateral dependent atelectasis.    Moderate gastric distention with air and fluid. This may be transient however, gastroparesis cannot be excluded. Correlate with patient symptoms. This can be further evaluated with nuclear medicine gastric emptying study as clinically warranted. If there   is concern for gastric outlet obstruction, correlate with endoscopy.    Moderately distended urinary bladder. Correlate for urinary retention.    Chronic findings as above.        Electronically Signed: Charles Santo MD    2/25/2025 3:14 PM EST    Workstation ID: [id number]            EKG      I personally viewed and interpreted the patient's EKG/Telemetry data:    ECHOCARDIOGRAM:  Results for orders placed during the hospital  encounter of 02/18/25    Adult Transthoracic Echo Complete w/ Color, Spectral and Contrast if Necessary Per Protocol    Interpretation Summary    Left ventricular ejection fraction appears to be 56 - 60%.    The right ventricular cavity is moderately dilated.    The left atrial cavity is mildly dilated.    Moderate tricuspid valve regurgitation is present.    Estimated right ventricular systolic pressure from tricuspid regurgitation is moderately elevated (45-55 mmHg).         STRESS MYOVIEW:  Results for orders placed during the hospital encounter of 02/18/25    Stress Test With Myocardial Perfusion One Day    Interpretation Summary    Myocardial perfusion imaging indicates a normal myocardial perfusion study with no evidence of ischemia. Impressions are consistent with a low risk study.    Left ventricular ejection fraction is normal (Calculated EF = 64%).       CARDIAC CATHETERIZATION:    No results found for this or any previous visit.       OTHER:         MDM      Abdominal discomfort/epigastric discomfort  Patient presented with abdominal pain and has gastric distention  Patient also has leukocytosis  Patient has an NG tube with intermittent wall suction and is n.p.o.  Patient will have EGD performed.  Patient is on PPIs at this time and Reglan.    Sick sinus syndrome status post pacemaker placement  Patient had symptomatic bradycardia and sick sinus syndrome and underwent a permanent pacemaker placement  Patient's pacemaker showed high thresholds consistent with RV dislodgment and will have electrophysiology see the patient for the same.    Hypertension  Patient blood pressure currently stable on losartan    Hyperlipidemia  Patient on statins and the lipid levels are followed by primary care doctor    Diabetes  He is on oral medicines.    I discussed the patients findings and my recommendations with patient and nurse    Win Reaves MD  02/26/25  13:33 EST              Electronically signed by Win Reaves,  MD at 02/26/25 1336       Robbie Li at 02/26/25 1102        Consult Orders    1. Inpatient Spiritual Care Consult [[id number]] ordered by Jeromy Blake MD at 02/25/25 4283              Summary:Pt & family support                 [facility].    Electronically signed by Robbie Li at 02/26/25 1103       Dez Loco MD at 02/26/25 1047          [facility] CARDIOLOGY CONSULT  [facility]        Subjective:     Encounter Date:02/25/2025    Subjective  Patient ID: Ismael Pulido is a 59 y.o. male.    Chief Complaint: Consult for lead dislodgement      HPI:  Ismael Pulido is a 59 y.o. male known to Dr. Loco and Dr. Reaves with a recent cardiac history of symptomatic bradycardia with Mobitz 2 AV block s/p [employer] PPM 2/21/25 and discharged home.  2D echo 2/2025 showed an EF 56-60% with moderate TR and mid moderate MR.  Nuclear stress test 2/2025 shows no ischemia.  PMH includes HTN, HLD, and DM.  Patient returns with c/o chest pain and abdominal pain and found with lead dislodgement.  CT abdomen/pelvis showed gastric distention and GI is planning an EGD for further eval gastroparesis vs gastric outlet obstruction.      Patient tells me that he has had epigastric pain for weeks which worsened after discharge.  On Sunday he reports sudden onset of left lateral chest pain and a feeling of being shocked after straining in a lazy boy recliner.  He tells me that following device interrogation today, the shocking sensations resolved.  However the left lateral chest pain continues and is worse with inspiration.  He reports improvement of epigastric pain with NG tube decompression.  He also reports feeling hot and feverish.  He denies any further syncope since discharge.      Past Medical History:   Diagnosis Date    Allergic 01/01/1988    5+ molds, pollens, grass    Diabetes mellitus     Not sure  "like     Diverticulitis     Diverticulosis     Mot sure     GERD (gastroesophageal reflux disease)     Hypertension     Kidney stone     Not dure     Obesity     Not sure          Past Surgical History:   Procedure Laterality Date    CARDIAC ELECTROPHYSIOLOGY PROCEDURE N/A 2025    Procedure: Pacemaker DC malgorzata brandt;  Surgeon: Dez Loco MD;  Location: [facility] INVASIVE LOCATION;  Service: Cardiovascular;  Laterality: N/A;    HERNIA REPAIR           Social History     Socioeconomic History    Marital status:    Tobacco Use    Smoking status: Never    Smokeless tobacco: Never   Vaping Use    Vaping status: Never Used   Substance and Sexual Activity    Alcohol use: Never    Drug use: Never    Sexual activity: Yes     Partners: Female       Family History   Problem Relation Age of Onset    Anxiety disorder Mother     Arthritis Mother     Depression Mother     Diabetes Mother     Hyperlipidemia Mother     Kidney disease Mother         Kidney stones    Cancer Father          of cancer in 2009    Diabetes Father          Allergies   Allergen Reactions    Lisinopril Nausea And Vomiting       Current Medications:   Scheduled Meds:cefTRIAXone, 2 g, Intravenous, Q24H  cetirizine, 10 mg, Oral, Daily  empagliflozin, 25 mg, Oral, Daily  losartan, 25 mg, Oral, Daily  metoclopramide, 5 mg, Intravenous, Q6H  pantoprazole, 40 mg, Oral, BID AC  rosuvastatin, 10 mg, Oral, Daily  sodium chloride, 500 mL, Intravenous, Once  sodium chloride, 10 mL, Intravenous, Q12H      Continuous Infusions:     ROS  All other systems reviewed and are negative.      Objective:   Objective      /96   Pulse 99   Temp 98.8 °F (37.1 °C) (Oral)   Resp 16   Ht 172.7 cm (68\")   Wt 103 kg (227 lb 1.2 oz)   SpO2 99%   BMI 34.53 kg/m²       General: Well-developed in NAD.  Neuro: AAOx3. No gross deficits.  HEENT: Sclerae clear, no xanthelasmas.  CV: fast S1S2, RRR. No murmurs or gallops.  Resp: " "Breathing is shallow and tachypneic. Anterior lungs CTA throughout.  GI: Abdomen soft, obese.  Ext: Pedal pulses are palpable. Extremities are nonedematous.  MS: moves all extremities, no weakness.  Skin: warm, dry. Left subclavian incision without erythema or drainage.   Psych: calm and cooperative.            Lab Review:     Results from last 7 days   Lab Units 02/26/25  0352 02/25/25  1132   SODIUM mmol/L 137 141   POTASSIUM mmol/L 4.4 4.0   CHLORIDE mmol/L 101 103   CO2 mmol/L 22.1 24.0   BUN mg/dL 14 9   CREATININE mg/dL 0.91 0.95   GLUCOSE mg/dL 133* 139*   CALCIUM mg/dL 9.7 9.7     Results from last 7 days   Lab Units 02/25/25  1245 02/25/25  1132   HSTROP T ng/L 39* 38*     Results from last 7 days   Lab Units 02/26/25  0352 02/25/25  1132   WBC 10*3/mm3 18.32* 12.46*   HEMOGLOBIN g/dL 16.6 17.1   HEMATOCRIT % 50.2 51.9*   PLATELETS 10*3/mm3 228 217                   Invalid input(s): \"LDLCALC\"            Recent Radiology:  Imaging Results (Most Recent)       Procedure Component Value Units Date/Time    XR Abdomen KUB [[id number]] Collected: 02/25/25 2115     Updated: 02/25/25 2119    Narrative:      XR ABDOMEN KUB    Date of Exam: 2/25/2025 8:35 PM EST    Indication: NG placement    Comparison: Same day abdominal radiograph    Findings:  Only the upper abdomen is imaged. Gastric tube is in appropriate position. Nonobstructive bowel gas pattern is visualized. No free intraperitoneal air. No acute osseous abnormalities. Visualized lung bases are clear.      Impression:      Impression:  Gastric tube in appropriate position.      Electronically Signed: Charles Santo MD    2/25/2025 9:17 PM EST    Workstation ID: [id number]    XR Abdomen KUB [[id number]] Collected: 02/25/25 1742     Updated: 02/25/25 1746    Narrative:      XR ABDOMEN KUB    Date of Exam: 2/25/2025 5:14 PM EST    Indication: ng    Comparison: Abdominal radiograph performed on the same day    Findings:  Limited study. Only the left upper quadrant " was imaged. Left side of the gastric tube terminates at the level of the GE junction. Recommend advancing by 3 cm. Nonobstructive bowel gas pattern is visualized. No free intraperitoneal air. No acute osseous   abnormalities. Visualized lung bases are clear.      Impression:      Impression:  Slightly high position of the gastric tube. Recommend advancing by 3 cm.      Electronically Signed: Charles Santo MD    2/25/2025 5:44 PM EST    Workstation ID: [id number]    XR Abdomen KUB [[id number]] Collected: 02/25/25 1558     Updated: 02/25/25 1602    Narrative:      XR ABDOMEN KUB    Date of Exam: 2/25/2025 3:30 PM EST    Indication: Nasogastric tube insertion.    Comparison: CT of the abdomen/pelvis performed on 2/25/2025.    Findings:  The tip of the nasogastric tube terminates in the fundus of the stomach. The pelvis and lower abdomen are excluded from the field-of-view. The visualized bowel gas pattern appears unremarkable. There is mild subsegmental atelectasis in the lung bases.   There are degenerative changes in the thoracolumbar spine.      Impression:      Impression:  The tip of the nasogastric tube terminates in the fundus of the stomach.      Electronically Signed: Erasmo Bernard MD    2/25/2025 4:00 PM EST    Workstation ID: [id number]    CT Angiogram Chest Pulmonary Embolism [[id number]] Collected: 02/25/25 1502     Updated: 02/25/25 1516    Narrative:      CT ANGIOGRAM CHEST PULMONARY EMBOLISM, CT ABDOMEN PELVIS W CONTRAST    Date of Exam: 2/25/2025 2:57 PM EST    Indication: Dyspnea. Abdominal pain.    Comparison: Noncontrast chest CT performed on November 18, 2022. Noncontrast CT of the abdomen pelvis performed on October 31, 2018    Technique: Axial CT images were obtained of the chest after the uneventful intravenous administration of iodinated contrast utilizing pulmonary embolism protocol. CT of the abdomen pelvis was performed at the same time. In addition, a 3-D volume rendered   image was created for  interpretation.  Sagittal and coronal reconstructions were performed.  Automated exposure control and iterative reconstruction methods were used.      Findings:    CTA CHEST:    Pulmonary arteries: Adequate opacification of the pulmonary arteries. No evidence of acute pulmonary embolism.    Thoracic aorta: The thoracic aorta is suboptimally opacified with contrast. Thoracic aorta is normal in caliber and contour.    Cardiovascular: The heart is normal in size.  No evidence of pericardial effusion. Patient is post left subclavian triple lead pacemaker placement.    Thyroid: The visualized portion of the thyroid is unremarkable.    Lungs and Pleura: Small left pleural effusion is visualized with adjacent compressive atelectasis. Small left pleural effusion is visualized. Mild bilateral dependent delayed atelectasis is visualized, left greater than right. No suspicious pulmonary   nodules. No pneumothorax. Central airways are patent.    Mediastinum/Maria G: No mediastinal or hilar lymphadenopathy.    Lymph nodes: No axillary or supraclavicular lymphadenopathy.    Bones and Soft Tissue:No acute fracture, aggressive osseous lesions, or soft tissue process.      CT abdomen/pelvis:    Peritoneum:  No free intraperitoneal air or fluid.     Abdominal wall:  There is evidence of prior ventral hernia repair. Small fat-containing left inguinal hernia is visualized.    Liver:  Liver is normal in size and contour. No focal lesions.    Biliary/Gallbladder:  The gallbladder is normal without evidence of radiopaque gallstones. The biliary tree is nondilated.    Pancreas:  Pancreas is within normal limits. There is no evidence of pancreatic mass or peripancreatic inflammatory changes.    Spleen:  Spleen is normal in size and contour.    Gastrointestinal/Mesentery:   There is moderate gastric distention with air-fluid. No evidence of bowel obstruction or gross inflammatory changes. The appendix appears within normal limits. There is  pancolonic diverticulosis without evidence of diverticulitis.    Adrenals:  Adrenal glands are unremarkable.    Kidneys:  The kidneys are in anatomic position. No evidence of nephrolithiasis. No evidence of hydronephrosis or significant perinephric fat stranding.    Bladder:   The urinary bladder is moderately distended.    Reproductive organs:    Unremarkable.    Lymph Nodes:  No significant adenopathy is identified.     Vasculature:  Mild aortoiliac atheromatous changes are visualized. The abdominal aorta is normal in caliber.    Osseous Structures:    No acute fracture or aggressive lesions. Mild multilevel degenerative changes are visualized, most prominent at L4-5.          Impression:      Impression:    No evidence of pulmonary embolism.    Small left pleural effusion.    Mild bilateral dependent atelectasis.    Moderate gastric distention with air and fluid. This may be transient however, gastroparesis cannot be excluded. Correlate with patient symptoms. This can be further evaluated with nuclear medicine gastric emptying study as clinically warranted. If there   is concern for gastric outlet obstruction, correlate with endoscopy.    Moderately distended urinary bladder. Correlate for urinary retention.    Chronic findings as above.        Electronically Signed: Charles Santo MD    2/25/2025 3:14 PM EST    Workstation ID: [id number]    CT Abdomen Pelvis With Contrast [[id number]] Collected: 02/25/25 1502     Updated: 02/25/25 1516    Narrative:      CT ANGIOGRAM CHEST PULMONARY EMBOLISM, CT ABDOMEN PELVIS W CONTRAST    Date of Exam: 2/25/2025 2:57 PM EST    Indication: Dyspnea. Abdominal pain.    Comparison: Noncontrast chest CT performed on November 18, 2022. Noncontrast CT of the abdomen pelvis performed on October 31, 2018    Technique: Axial CT images were obtained of the chest after the uneventful intravenous administration of iodinated contrast utilizing pulmonary embolism protocol. CT of the abdomen  pelvis was performed at the same time. In addition, a 3-D volume rendered   image was created for interpretation.  Sagittal and coronal reconstructions were performed.  Automated exposure control and iterative reconstruction methods were used.      Findings:    CTA CHEST:    Pulmonary arteries: Adequate opacification of the pulmonary arteries. No evidence of acute pulmonary embolism.    Thoracic aorta: The thoracic aorta is suboptimally opacified with contrast. Thoracic aorta is normal in caliber and contour.    Cardiovascular: The heart is normal in size.  No evidence of pericardial effusion. Patient is post left subclavian triple lead pacemaker placement.    Thyroid: The visualized portion of the thyroid is unremarkable.    Lungs and Pleura: Small left pleural effusion is visualized with adjacent compressive atelectasis. Small left pleural effusion is visualized. Mild bilateral dependent delayed atelectasis is visualized, left greater than right. No suspicious pulmonary   nodules. No pneumothorax. Central airways are patent.    Mediastinum/Maria G: No mediastinal or hilar lymphadenopathy.    Lymph nodes: No axillary or supraclavicular lymphadenopathy.    Bones and Soft Tissue:No acute fracture, aggressive osseous lesions, or soft tissue process.      CT abdomen/pelvis:    Peritoneum:  No free intraperitoneal air or fluid.     Abdominal wall:  There is evidence of prior ventral hernia repair. Small fat-containing left inguinal hernia is visualized.    Liver:  Liver is normal in size and contour. No focal lesions.    Biliary/Gallbladder:  The gallbladder is normal without evidence of radiopaque gallstones. The biliary tree is nondilated.    Pancreas:  Pancreas is within normal limits. There is no evidence of pancreatic mass or peripancreatic inflammatory changes.    Spleen:  Spleen is normal in size and contour.    Gastrointestinal/Mesentery:   There is moderate gastric distention with air-fluid. No evidence of bowel  obstruction or gross inflammatory changes. The appendix appears within normal limits. There is pancolonic diverticulosis without evidence of diverticulitis.    Adrenals:  Adrenal glands are unremarkable.    Kidneys:  The kidneys are in anatomic position. No evidence of nephrolithiasis. No evidence of hydronephrosis or significant perinephric fat stranding.    Bladder:   The urinary bladder is moderately distended.    Reproductive organs:    Unremarkable.    Lymph Nodes:  No significant adenopathy is identified.     Vasculature:  Mild aortoiliac atheromatous changes are visualized. The abdominal aorta is normal in caliber.    Osseous Structures:    No acute fracture or aggressive lesions. Mild multilevel degenerative changes are visualized, most prominent at L4-5.          Impression:      Impression:    No evidence of pulmonary embolism.    Small left pleural effusion.    Mild bilateral dependent atelectasis.    Moderate gastric distention with air and fluid. This may be transient however, gastroparesis cannot be excluded. Correlate with patient symptoms. This can be further evaluated with nuclear medicine gastric emptying study as clinically warranted. If there   is concern for gastric outlet obstruction, correlate with endoscopy.    Moderately distended urinary bladder. Correlate for urinary retention.    Chronic findings as above.        Electronically Signed: Charles Santo MD    2/25/2025 3:14 PM EST    Workstation ID: [id number]    XR Chest 1 View [[id number]] Collected: 02/25/25 1217     Updated: 02/25/25 1220    Narrative:      XR CHEST 1 VW    Date of Exam: 2/25/2025 12:00 PM EST    Indication: Chest pain    Comparison: Chest radiograph 2/21/2025    Findings:  Stable cardiomediastinal silhouette. Multichamber AICD. Lung volumes are low with mild bibasilar opacities. No overt edema, large effusion or pneumothorax. The gym related osseous change.      Impression:      Impression:  Low lung volumes with probable  mild bibasal atelectasis. Correlate for infection.      Electronically Signed: Nicholas Becerra MD    2/25/2025 12:17 PM EST    Workstation ID: [id number]              ECHOCARDIOGRAM:    Results for orders placed during the hospital encounter of 02/18/25    Adult Transthoracic Echo Complete w/ Color, Spectral and Contrast if Necessary Per Protocol    Interpretation Summary    Left ventricular ejection fraction appears to be 56 - 60%.    The right ventricular cavity is moderately dilated.    The left atrial cavity is mildly dilated.    Moderate tricuspid valve regurgitation is present.    Estimated right ventricular systolic pressure from tricuspid regurgitation is moderately elevated (45-55 mmHg).           Assessment:   Assessment      Active Hospital Problems    Diagnosis  POA    **Abdominal pain [R10.9]  Yes     1) Sepsis  - febrile, leukocytosis, tachycardia  - blood cultures pending  - lactate 2.3--> 2.0    2) Suspected RV lead dislodgement per device interrogation  - non-capture on initial EKG  - currently A sensed V paced  - CXR does not show acute findings    3) symptomatic bradycardia with Mobitz 2 AV block s/p [employer] PPM 2/21/25 - 2D echo 2/2025 showed an EF 56-60% with moderate TR and mid moderate MR.  - - Nuclear stress test 2/2025 shows no ischemia.      4) Abdominal Pain  - CT abdomen/pelvis shows gastric distention  - GI consulted; eval for gastroparesis vs gastric outlet obstruction  - planning EGD    5) HTN    6) HLD    7) DM          Plan:   Patient with evidence of sepsis as above.  Device site is without erythema or drainage.  Currently AS  with stable BP.  As d/w Dr. Loco defer any cardiac procedure till EGD performed and clinical picture further delineated but keep NPO.            Electronically signed by TAMIKO Godfrey, 02/26/25, 11:31 AM EST.       Patient seen and examined  Patient had pacemaker implantation last week) GI symptoms and was noted to have RV lead  malfunction and my consultation requested  Patient was complaining of upper abdominal pain worse on deep inspiration and a nasogastric tube was placed and endoscopy planned today.  CT scan without evidence of any effusion and stat echo done while I was there bedside without any effusion.  Pain was better when he is sitting up and worse when taking deep breath      Physical Exam    General:      well developed, well nourished, in no acute distress.    Head:      normocephalic and atraumatic.    Eyes:      PERRL/EOM intact, conjunctivae and sclerae clear without nystagmus.    Neck:      no  thyromegaly, trachea central with normal respiratory effort  Lungs:      clear bilaterally to auscultation.    Heart:       regular rate and rhythm, S1, S2 without murmurs, rubs, or gallops  Skin:      intact without lesions or rashes.    Psych:      alert and cooperative; normal mood and affect; normal attention span and concentration.      Pacemaker site is clean and there is no clear-cut rub on exam  Hemodynamically patient is stable    Pacemaker interrogation reviewed with the pacemaker generator  Patient to undergo endoscopy  X-rays reviewed showing appropriate location of leads and RV lead malfunction can be from micro dislodgment  After endoscopy we will decide on RV lead revision which was discussed with the patient    Electronically signed by Dez Loco MD, 02/26/25, 2:37 PM EST.      Electronically signed by Dez Loco MD at 02/26/25 1437       Grace Mary APRN at 02/26/25 0932        Consult Orders    1. Inpatient Gastroenterology Consult [[id number]] ordered by Irais Pink PA-C at 02/26/25 0805              Attestation signed by Kiko Talbert MD at 02/26/25 0259    I have reviewed this documentation and agree. Plan EGD today.  Kiko Talbert MD                      GI CONSULT  NOTE:    Referring Provider:  JOSELYN Hay    Chief complaint: Epigastric pain    Subjective .      History of present illness: Ismael Pulido is a 59 y.o. male with history of diabetes, hypertension, and recent pacemaker placement who presents with complaints of chest pain and epigastric pain.  The patient reports that he began having upper abdominal pain approximately 2 to 3 weeks ago.  States that he saw his primary care provider as he was feeling constipated in the upper abdomen.  Reports that his dose of lansoprazole was increased.  He states that following PCP visit he was at work and had a syncopal episode secondary to bradycardia.  He did undergo pacemaker placement on 2/22.  States that he had been doing well postoperatively until yesterday.  States that yesterday he began having acute upper abdominal pain once again.  States that normally he moves his bowels daily, but is unsure of when his last bowel movement occurred.  CT abd/pelvis WO mission does show moderate gastric distention suggestive of possible gastroparesis versus gastric outlet obstruction.  The patient denies any overt GI bleeding.  NG tube has been placed to low intermittent wall suction.      Endo History:  11/2020 EUS (Dr. Zhu)-1 cm lipoma in the stomach, normal pancreas    Past Medical History:  Past Medical History:   Diagnosis Date    Allergic 01/01/1988    5+ molds, pollens, grass    Diabetes mellitus     Not sure like 2020    Diverticulitis     Diverticulosis     Mot sure 1995    GERD (gastroesophageal reflux disease)     Hypertension     Kidney stone     Not dure 1995    Obesity     Not sure 1999       Past Surgical History:  Past Surgical History:   Procedure Laterality Date    CARDIAC ELECTROPHYSIOLOGY PROCEDURE N/A 2/21/2025    Procedure: Pacemaker DC new kortney aware;  Surgeon: Dez Loco MD;  Location: [facility] INVASIVE LOCATION;  Service: Cardiovascular;  Laterality: N/A;    HERNIA REPAIR         Social History:  Social History     Tobacco Use    Smoking status: Never    Smokeless tobacco: Never   Vaping  Use    Vaping status: Never Used   Substance Use Topics    Alcohol use: Never    Drug use: Never       Family History:  Family History   Problem Relation Age of Onset    Anxiety disorder Mother     Arthritis Mother     Depression Mother     Diabetes Mother     Hyperlipidemia Mother     Kidney disease Mother         Kidney stones    Cancer Father          of cancer in 2009    Diabetes Father        Medications:  Medications Prior to Admission   Medication Sig Dispense Refill Last Dose/Taking    ascorbic acid ([employer] Vitamin C) 1000 MG tablet Take 4 tablets by mouth Daily.       cephalexin (KEFLEX) 500 MG capsule Take 1 capsule by mouth Every 8 (Eight) Hours for 15 doses. Indications: Preventative Medication Therapy Used Around Surgery 15 capsule 0     clotrimazole (LOTRIMIN) 1 % cream Apply 1 Application topically to the appropriate area as directed 2 (Two) Times a Day. 85 g 3     empagliflozin (Jardiance) 25 MG tablet tablet Take 1 tablet by mouth Daily. 30 tablet 5     lansoprazole (PREVACID) 30 MG capsule Take 1 capsule by mouth Daily. 30 capsule 5     loratadine (Claritin) 10 MG tablet Take 1 tablet by mouth Daily.       losartan (COZAAR) 25 MG tablet TAKE 1 TABLET BY MOUTH DAILY 90 tablet 1     Misc Natural Products (NEURIVA PO) Take  by mouth Daily.       pseudoephedrine (SUDAFED) 120 MG 12 hr tablet Take 1 tablet by mouth Every Morning.       rosuvastatin (CRESTOR) 10 MG tablet Take 1 tablet by mouth Daily. 90 tablet 1     Semaglutide 3 MG tablet Take 1 tablet by mouth Daily. 30 tablet 0        Scheduled Meds:cetirizine, 10 mg, Oral, Daily  empagliflozin, 25 mg, Oral, Daily  losartan, 25 mg, Oral, Daily  pantoprazole, 40 mg, Oral, Q AM  rosuvastatin, 10 mg, Oral, Daily  sodium chloride, 10 mL, Intravenous, Q12H      Continuous Infusions:   PRN Meds:.  acetaminophen **OR** acetaminophen **OR** acetaminophen    aluminum-magnesium hydroxide-simethicone    senna-docusate sodium **AND** polyethylene glycol  **AND** bisacodyl **AND** bisacodyl    hydrALAZINE    nitroglycerin    ondansetron ODT **OR** ondansetron    [COMPLETED] Insert Peripheral IV **AND** sodium chloride    sodium chloride    sodium chloride    ALLERGIES:  Lisinopril    ROS:  The following systems were reviewed   Constitution:  No fevers, chills, no unintentional weight loss  Skin: no rash, no jaundice  Eyes:  No blurry vision, no eye pain  HENT:  No change in hearing or smell  Resp:  No cough, dyspnea  CV:  No chest pain or palpitations  :  No dysuria, hematuria  Musculoskeletal:  No leg cramps or arthralgias  Neuro:  No tremor, no numbness  Psych:  No depression or confusion    Objective     Vital Signs:   Vitals:    02/26/25 0755 02/26/25 0800 02/26/25 0810 02/26/25 0825   BP: 120/91  127/89 126/96   BP Location:       Patient Position:       Pulse: 70 96 104 99   Resp:       Temp:       TempSrc:       SpO2:       Weight:       Height:           Physical Exam:       General Appearance:    Awake and alert, in no acute distress   Head:    Normocephalic, without obvious abnormality, atraumatic   Throat:   No oral lesions, no thrush, oral mucosa moist   Lungs:     Respirations regular, even and unlabored   Chest Wall:    No abnormalities observed   Abdomen:     Soft, upper abdominal tenderness, no rebound or guarding, non-distended, NG tube to low intermittent wall suction with dark brown output   Rectal:     Deferred   Extremities:   Moves all extremities, no edema, no cyanosis   Pulses:   Pulses palpable and equal bilaterally   Skin:   No rash, no jaundice, normal palpation   Lymph nodes:   No cervical, supraclavicular or submandibular palpable adenopathy   Neurologic:   Cranial nerves 2 - 12 grossly intact, no asterixis       Results Review:   I reviewed the patient's labs and imaging.  Results from last 7 days   Lab Units 02/26/25  0352 02/25/25  1132 02/22/25  0252   WBC 10*3/mm3 18.32* 12.46* 10.58   HEMOGLOBIN g/dL 16.6 17.1 17.6   PLATELETS  10*3/mm3 228 217 226     Results from last 7 days   Lab Units 02/26/25  0352 02/25/25  1245 02/25/25  1132 02/22/25  0252   SODIUM mmol/L 137  --  141 137   POTASSIUM mmol/L 4.4  --  4.0 4.4   CHLORIDE mmol/L 101  --  103 102   CO2 mmol/L 22.1  --  24.0 18.8*   BUN mg/dL 14  --  9 19   CREATININE mg/dL 0.91  --  0.95 1.03   GLUCOSE mg/dL 133*  --  139* 93   LIPASE U/L  --  26  --   --      Estimated Creatinine Clearance: 101.6 mL/min (by C-G formula based on SCr of 0.91 mg/dL).  Lab Results   Component Value Date    HGBA1C 8.40 (H) 02/07/2025    HGBA1C 8.10 (H) 08/09/2024    HGBA1C 7.90 (H) 02/09/2024     Results from last 7 days   Lab Units 02/25/25  1245 02/25/25  1132   HSTROP T ng/L 39* 38*     Infection   Results from last 7 days   Lab Units 02/25/25  1245   PROCALCITONIN ng/mL 0.08     UA      Microbiology Results (last 10 days)       Procedure Component Value - Date/Time    Respiratory Panel PCR w/COVID-19(SARS-CoV-2) [facility] In-House, NP Swab in UTM/VTM, 2 HR TAT - Swab, Nasopharynx [[id number]]  (Normal) Collected: 02/25/25 1131    Lab Status: Final result Specimen: Swab from Nasopharynx Updated: 02/25/25 1231     ADENOVIRUS, PCR Not Detected     Coronavirus 229E Not Detected     Coronavirus HKU1 Not Detected     Coronavirus NL63 Not Detected     Coronavirus OC43 Not Detected     COVID19 Not Detected     Human Metapneumovirus Not Detected     Human Rhinovirus/Enterovirus Not Detected     Influenza A PCR Not Detected     Influenza B PCR Not Detected     Parainfluenza Virus 1 Not Detected     Parainfluenza Virus 2 Not Detected     Parainfluenza Virus 3 Not Detected     Parainfluenza Virus 4 Not Detected     RSV, PCR Not Detected     Bordetella pertussis pcr Not Detected     Bordetella parapertussis PCR Not Detected     Chlamydophila pneumoniae PCR Not Detected     Mycoplasma pneumo by PCR Not Detected    Narrative:      In the setting of a positive respiratory panel with a viral infection PLUS  a negative procalcitonin without other underlying concern for bacterial infection, consider observing off antibiotics or discontinuation of antibiotics and continue supportive care. If the respiratory panel is positive for atypical bacterial infection (Bordetella pertussis, Chlamydophila pneumoniae, or Mycoplasma pneumoniae), consider antibiotic de-escalation to target atypical bacterial infection.    COVID-19, FLU A/B, RSV PCR 1 HR TAT - Swab, Nasopharynx [[id number]]  (Normal) Collected: 02/19/25 0017    Lab Status: Final result Specimen: Swab from Nasopharynx Updated: 02/19/25 0059     COVID19 Not Detected     Influenza A PCR Not Detected     Influenza B PCR Not Detected     RSV, PCR Not Detected    Narrative:      Fact sheet for providers: https://www.fda.gov/media/930781/download    Fact sheet for patients: https://www.fda.gov/media/251017/download    Test performed by PCR.          Imaging Results (Last 72 Hours)       Procedure Component Value Units Date/Time    XR Abdomen KUB [[id number]] Collected: 02/25/25 2115     Updated: 02/25/25 2119    Narrative:      XR ABDOMEN KUB    Date of Exam: 2/25/2025 8:35 PM EST    Indication: NG placement    Comparison: Same day abdominal radiograph    Findings:  Only the upper abdomen is imaged. Gastric tube is in appropriate position. Nonobstructive bowel gas pattern is visualized. No free intraperitoneal air. No acute osseous abnormalities. Visualized lung bases are clear.      Impression:      Impression:  Gastric tube in appropriate position.      Electronically Signed: Charles Santo MD    2/25/2025 9:17 PM EST    Workstation ID: [id number]    XR Abdomen KUB [[id number]] Collected: 02/25/25 1742     Updated: 02/25/25 1746    Narrative:      XR ABDOMEN KUB    Date of Exam: 2/25/2025 5:14 PM EST    Indication: ng    Comparison: Abdominal radiograph performed on the same day    Findings:  Limited study. Only the left upper quadrant was imaged. Left side of the gastric tube  terminates at the level of the GE junction. Recommend advancing by 3 cm. Nonobstructive bowel gas pattern is visualized. No free intraperitoneal air. No acute osseous   abnormalities. Visualized lung bases are clear.      Impression:      Impression:  Slightly high position of the gastric tube. Recommend advancing by 3 cm.      Electronically Signed: Charles Santo MD    2/25/2025 5:44 PM EST    Workstation ID: [id number]    XR Abdomen KUB [[id number]] Collected: 02/25/25 1558     Updated: 02/25/25 1602    Narrative:      XR ABDOMEN KUB    Date of Exam: 2/25/2025 3:30 PM EST    Indication: Nasogastric tube insertion.    Comparison: CT of the abdomen/pelvis performed on 2/25/2025.    Findings:  The tip of the nasogastric tube terminates in the fundus of the stomach. The pelvis and lower abdomen are excluded from the field-of-view. The visualized bowel gas pattern appears unremarkable. There is mild subsegmental atelectasis in the lung bases.   There are degenerative changes in the thoracolumbar spine.      Impression:      Impression:  The tip of the nasogastric tube terminates in the fundus of the stomach.      Electronically Signed: Erasmo Bernard MD    2/25/2025 4:00 PM EST    Workstation ID: [id number]    CT Angiogram Chest Pulmonary Embolism [[id number]] Collected: 02/25/25 1502     Updated: 02/25/25 1516    Narrative:      CT ANGIOGRAM CHEST PULMONARY EMBOLISM, CT ABDOMEN PELVIS W CONTRAST    Date of Exam: 2/25/2025 2:57 PM EST    Indication: Dyspnea. Abdominal pain.    Comparison: Noncontrast chest CT performed on November 18, 2022. Noncontrast CT of the abdomen pelvis performed on October 31, 2018    Technique: Axial CT images were obtained of the chest after the uneventful intravenous administration of iodinated contrast utilizing pulmonary embolism protocol. CT of the abdomen pelvis was performed at the same time. In addition, a 3-D volume rendered   image was created for interpretation.  Sagittal and coronal  reconstructions were performed.  Automated exposure control and iterative reconstruction methods were used.      Findings:    CTA CHEST:    Pulmonary arteries: Adequate opacification of the pulmonary arteries. No evidence of acute pulmonary embolism.    Thoracic aorta: The thoracic aorta is suboptimally opacified with contrast. Thoracic aorta is normal in caliber and contour.    Cardiovascular: The heart is normal in size.  No evidence of pericardial effusion. Patient is post left subclavian triple lead pacemaker placement.    Thyroid: The visualized portion of the thyroid is unremarkable.    Lungs and Pleura: Small left pleural effusion is visualized with adjacent compressive atelectasis. Small left pleural effusion is visualized. Mild bilateral dependent delayed atelectasis is visualized, left greater than right. No suspicious pulmonary   nodules. No pneumothorax. Central airways are patent.    Mediastinum/Maria G: No mediastinal or hilar lymphadenopathy.    Lymph nodes: No axillary or supraclavicular lymphadenopathy.    Bones and Soft Tissue:No acute fracture, aggressive osseous lesions, or soft tissue process.      CT abdomen/pelvis:    Peritoneum:  No free intraperitoneal air or fluid.     Abdominal wall:  There is evidence of prior ventral hernia repair. Small fat-containing left inguinal hernia is visualized.    Liver:  Liver is normal in size and contour. No focal lesions.    Biliary/Gallbladder:  The gallbladder is normal without evidence of radiopaque gallstones. The biliary tree is nondilated.    Pancreas:  Pancreas is within normal limits. There is no evidence of pancreatic mass or peripancreatic inflammatory changes.    Spleen:  Spleen is normal in size and contour.    Gastrointestinal/Mesentery:   There is moderate gastric distention with air-fluid. No evidence of bowel obstruction or gross inflammatory changes. The appendix appears within normal limits. There is pancolonic diverticulosis without evidence  of diverticulitis.    Adrenals:  Adrenal glands are unremarkable.    Kidneys:  The kidneys are in anatomic position. No evidence of nephrolithiasis. No evidence of hydronephrosis or significant perinephric fat stranding.    Bladder:   The urinary bladder is moderately distended.    Reproductive organs:    Unremarkable.    Lymph Nodes:  No significant adenopathy is identified.     Vasculature:  Mild aortoiliac atheromatous changes are visualized. The abdominal aorta is normal in caliber.    Osseous Structures:    No acute fracture or aggressive lesions. Mild multilevel degenerative changes are visualized, most prominent at L4-5.          Impression:      Impression:    No evidence of pulmonary embolism.    Small left pleural effusion.    Mild bilateral dependent atelectasis.    Moderate gastric distention with air and fluid. This may be transient however, gastroparesis cannot be excluded. Correlate with patient symptoms. This can be further evaluated with nuclear medicine gastric emptying study as clinically warranted. If there   is concern for gastric outlet obstruction, correlate with endoscopy.    Moderately distended urinary bladder. Correlate for urinary retention.    Chronic findings as above.        Electronically Signed: Charles Santo MD    2/25/2025 3:14 PM EST    Workstation ID: [id number]    CT Abdomen Pelvis With Contrast [[id number]] Collected: 02/25/25 1502     Updated: 02/25/25 1516    Narrative:      CT ANGIOGRAM CHEST PULMONARY EMBOLISM, CT ABDOMEN PELVIS W CONTRAST    Date of Exam: 2/25/2025 2:57 PM EST    Indication: Dyspnea. Abdominal pain.    Comparison: Noncontrast chest CT performed on November 18, 2022. Noncontrast CT of the abdomen pelvis performed on October 31, 2018    Technique: Axial CT images were obtained of the chest after the uneventful intravenous administration of iodinated contrast utilizing pulmonary embolism protocol. CT of the abdomen pelvis was performed at the same time. In  addition, a 3-D volume rendered   image was created for interpretation.  Sagittal and coronal reconstructions were performed.  Automated exposure control and iterative reconstruction methods were used.      Findings:    CTA CHEST:    Pulmonary arteries: Adequate opacification of the pulmonary arteries. No evidence of acute pulmonary embolism.    Thoracic aorta: The thoracic aorta is suboptimally opacified with contrast. Thoracic aorta is normal in caliber and contour.    Cardiovascular: The heart is normal in size.  No evidence of pericardial effusion. Patient is post left subclavian triple lead pacemaker placement.    Thyroid: The visualized portion of the thyroid is unremarkable.    Lungs and Pleura: Small left pleural effusion is visualized with adjacent compressive atelectasis. Small left pleural effusion is visualized. Mild bilateral dependent delayed atelectasis is visualized, left greater than right. No suspicious pulmonary   nodules. No pneumothorax. Central airways are patent.    Mediastinum/Maria G: No mediastinal or hilar lymphadenopathy.    Lymph nodes: No axillary or supraclavicular lymphadenopathy.    Bones and Soft Tissue:No acute fracture, aggressive osseous lesions, or soft tissue process.      CT abdomen/pelvis:    Peritoneum:  No free intraperitoneal air or fluid.     Abdominal wall:  There is evidence of prior ventral hernia repair. Small fat-containing left inguinal hernia is visualized.    Liver:  Liver is normal in size and contour. No focal lesions.    Biliary/Gallbladder:  The gallbladder is normal without evidence of radiopaque gallstones. The biliary tree is nondilated.    Pancreas:  Pancreas is within normal limits. There is no evidence of pancreatic mass or peripancreatic inflammatory changes.    Spleen:  Spleen is normal in size and contour.    Gastrointestinal/Mesentery:   There is moderate gastric distention with air-fluid. No evidence of bowel obstruction or gross inflammatory changes.  The appendix appears within normal limits. There is pancolonic diverticulosis without evidence of diverticulitis.    Adrenals:  Adrenal glands are unremarkable.    Kidneys:  The kidneys are in anatomic position. No evidence of nephrolithiasis. No evidence of hydronephrosis or significant perinephric fat stranding.    Bladder:   The urinary bladder is moderately distended.    Reproductive organs:    Unremarkable.    Lymph Nodes:  No significant adenopathy is identified.     Vasculature:  Mild aortoiliac atheromatous changes are visualized. The abdominal aorta is normal in caliber.    Osseous Structures:    No acute fracture or aggressive lesions. Mild multilevel degenerative changes are visualized, most prominent at L4-5.          Impression:      Impression:    No evidence of pulmonary embolism.    Small left pleural effusion.    Mild bilateral dependent atelectasis.    Moderate gastric distention with air and fluid. This may be transient however, gastroparesis cannot be excluded. Correlate with patient symptoms. This can be further evaluated with nuclear medicine gastric emptying study as clinically warranted. If there   is concern for gastric outlet obstruction, correlate with endoscopy.    Moderately distended urinary bladder. Correlate for urinary retention.    Chronic findings as above.        Electronically Signed: Charles Santo MD    2/25/2025 3:14 PM EST    Workstation ID: [id number]    XR Chest 1 View [[id number]] Collected: 02/25/25 1217     Updated: 02/25/25 1220    Narrative:      XR CHEST 1 VW    Date of Exam: 2/25/2025 12:00 PM EST    Indication: Chest pain    Comparison: Chest radiograph 2/21/2025    Findings:  Stable cardiomediastinal silhouette. Multichamber AICD. Lung volumes are low with mild bibasilar opacities. No overt edema, large effusion or pneumothorax. The gym related osseous change.      Impression:      Impression:  Low lung volumes with probable mild bibasal atelectasis. Correlate for  infection.      Electronically Signed: Nicholas Becerra MD    2/25/2025 12:17 PM EST    Workstation ID: [id number]            ASSESSMENT:  -Upper abdominal pain  -Abnormal CT showing moderate gastric distention  -Leukocytosis  -Displaced pacemaker lead  -S/p recent pacemaker placement on 2/22/2025 for bradycardia  -Diabetes  -Hypertension    PLAN:  Patient is a 59-year-old male with history of recent pacemaker placement, diabetes, and hypertension who presented on 2/25 with complaints of upper abdominal pain.    CT abdomen/pelvis W on admission shows moderate gastric distention suggestive of possible gastroparesis versus gastric outlet obstruction.  NG tube to low intermittent wall suction with dark brown output.  Will continue.  Maintain NPO.  Will plan EGD today pending cardiac clearance as patient does have a displaced pacemaker lead.  Increase PPI to twice daily dosing.  Add Reglan.  Antiemetics/analgesics as needed.  Supportive care.      I discussed the patients findings and my recommendations with the patient.  I will discuss the case with Dr. Talbert and change the plan accordingly.    We appreciate the referral.    Electronically signed by TAMIKO Mckeon, 02/26/25, 9:32 AM EST.                Electronically signed by Kiko Talbert MD at 02/26/25 2886

## 2025-02-27 NOTE — CASE MANAGEMENT/SOCIAL WORK
Case Management Readmission Assessment Note    Case Management Readmission Assessment (all recorded)       Readmission Interview       [facility] Name 02/27/25 1528             Readmission Indications    Is the patient and/or family able to complete the readmission assessment questions? Yes      Is this hospitalization related to the prior hospital diagnosis? No        [facility] Name 02/27/25 1528             Recommendation for rehospitalization    Did you speak with your physician prior to coming to the hospital No        [facility] Name 02/27/25 1528             Follow-up Appointments    Do you have a PCP? Yes      Did you have an appointment with PCP after your hospitalization? No  3/06 Dr. Silva      Did you have an appointment with a Specialist? No  Cardiology 3/06      Are you current with the Pulmonary Clinic? No      Are you current with the CHF Clinic? No        [facility] Name 02/27/25 1528             Medications    Did you have newly prescribed medications at discharge? Yes  Keflex      Did you understand the reasons for your medications at discharge and how to take them? Yes      Did you understand the side effects of your medications? Yes      Are you taking all of you prescribed medications? Yes      What pharmacy was used to fill prescription(s)? [facility]      Were medications picked up? Yes        [facility] Name 02/27/25 1528             Discharge Instructions    Did you understand your discharge instructions? Yes      Did your family/caregiver hear your instructions? Yes      Were you told to eat a special diet? No      Did you adhere to the diet? No      Were you given a number of someone to call if you had questions or concerns? Yes        [facility] Name 02/27/25 1528             Index discharge location/services    Where did you go upon discharge? Home      Do you have supportive family or friends in the home? Yes        [facility] Name 02/27/25 1528             Discharge Readiness    On a scale of 1-5 (5 being well prepared), how ready  were you for discharge 4      Recommendation based on interview Education on diagnosis/self management        [facility] Name 02/27/25 1528             Palliative Care/Hospice    Are you current with Palliative Care? No      Are you current with Hospice Care? No        [facility] Name 02/27/25 1528 02/25/25 1858          Advance Directives (For Healthcare)    Pre-existing AND/MOST/POLST Order No No     Advance Directive Status Patient does not have advance directive Patient does not have advance directive     Have you reviewed your Advance Directive and is it valid for this stay? No No     Literature Provided on Advance Directives No No     Patient Requests Assistance on Advance Directives Patient Declined Patient Declined       [facility] Name 02/27/25 1528             Readmission Assessment Final Comments    Final Comments 2/18-2/22 Bradycardia and pacemaker placement. 2/25 to ER for abdominal/ epigastric pain.Cta chest negative for PE. Small left pleural effusion. Mild bilateral dependent atelectasis. IV antibiotics. GI and Cardiology following. EGD 2/26 withgrade B erosive esophagitis; Protonix started

## 2025-02-27 NOTE — PROGRESS NOTES
CARDIOLOGY PROGRESS NOTE:    Ismael Pulido  59 y.o.  male  1965  [id number]      Referring Provider: Hospitalist    Reason for follow-up: Abdominal distention and nausea     Patient Care Team:  Sharon Silva MD as PCP - General (Internal Medicine & Pediatrics)    Subjective .  Patient feeling much better without any symptoms today    Objective lying in bed comfortably     Review of Systems   Constitutional: Negative for malaise/fatigue.   Cardiovascular:  Negative for chest pain, dyspnea on exertion, leg swelling and palpitations.   Respiratory:  Negative for cough and shortness of breath.    Gastrointestinal:  Negative for abdominal pain, nausea and vomiting.   Neurological:  Negative for dizziness, focal weakness, headaches, light-headedness and numbness.   All other systems reviewed and are negative.      Allergies: Lisinopril    Scheduled Meds:cetirizine, 10 mg, Oral, Daily  empagliflozin, 25 mg, Oral, Daily  losartan, 25 mg, Oral, Daily  metoclopramide, 5 mg, Intravenous, BID AC  pantoprazole, 40 mg, Oral, BID AC  piperacillin-tazobactam, 3.375 g, Intravenous, Once  piperacillin-tazobactam, 3.375 g, Intravenous, Q8H  rosuvastatin, 10 mg, Oral, Daily  sodium chloride, 10 mL, Intravenous, Q12H      Continuous Infusions:   PRN Meds:.  acetaminophen **OR** acetaminophen **OR** acetaminophen    aluminum-magnesium hydroxide-simethicone    senna-docusate sodium **AND** polyethylene glycol **AND** bisacodyl **AND** bisacodyl    hydrALAZINE    nitroglycerin    ondansetron ODT **OR** ondansetron    ondansetron ODT **OR** ondansetron    [COMPLETED] Insert Peripheral IV **AND** sodium chloride    sodium chloride    sodium chloride        VITAL SIGNS  Vitals:    02/27/25 0345 02/27/25 0838 02/27/25 1108 02/27/25 1115   BP: 128/92 131/83 148/91    BP Location: Right arm Left arm Right arm    Patient Position: Lying Lying Sitting    Pulse: 70 70 (!) 122 64   Resp: 22 25 18    Temp: 98.1 °F (36.7 °C) 100.5  "°F (38.1 °C) (!) 103 °F (39.4 °C) (!) 103 °F (39.4 °C)   TempSrc: Oral Oral Oral Oral   SpO2: 91% 90% 92%    Weight: 103 kg (227 lb 1.2 oz)      Height:           Flowsheet Rows      Flowsheet Row First Filed Value   Admission Height 172.7 cm (68\") Documented at 02/25/2025 1110   Admission Weight 104 kg (229 lb 4.5 oz) Documented at 02/25/2025 1110             TELEMETRY: Ventricular pacemaker rhythm    Physical Exam:  Constitutional:       Appearance: Well-developed.   Eyes:      General: No scleral icterus.     Conjunctiva/sclera: Conjunctivae normal.   HENT:      Head: Normocephalic and atraumatic.   Neck:      Vascular: No carotid bruit or JVD.   Pulmonary:      Effort: Pulmonary effort is normal.      Breath sounds: Normal breath sounds. No wheezing. No rales.   Cardiovascular:      Normal rate. Regular rhythm.   Pulses:     Intact distal pulses.   Abdominal:      General: Bowel sounds are normal.      Palpations: Abdomen is soft.   Musculoskeletal:      Cervical back: Normal range of motion and neck supple. Skin:     General: Skin is warm and dry.      Findings: No rash.   Neurological:      Mental Status: Alert.          Results Review:   I reviewed the patient's new clinical results.  Lab Results (last 24 hours)       Procedure Component Value Units Date/Time    Procalcitonin [[id number]] Collected: 02/27/25 1236    Specimen: Blood Updated: 02/27/25 1315    Comprehensive Metabolic Panel [[id number]]  (Abnormal) Collected: 02/27/25 0444    Specimen: Blood Updated: 02/27/25 0556     Glucose 126 mg/dL      BUN 21 mg/dL      Creatinine 1.05 mg/dL      Sodium 131 mmol/L      Potassium 4.2 mmol/L      Chloride 97 mmol/L      CO2 16.9 mmol/L      Calcium 9.2 mg/dL      Total Protein 7.1 g/dL      Albumin 3.6 g/dL      ALT (SGPT) 16 U/L      AST (SGOT) 22 U/L      Alkaline Phosphatase 92 U/L      Total Bilirubin 0.9 mg/dL      Globulin 3.5 gm/dL      A/G Ratio 1.0 g/dL      BUN/Creatinine Ratio 20.0     Anion Gap 17.1 " mmol/L      eGFR 81.8 mL/min/1.73     Narrative:      GFR Categories in Chronic Kidney Disease (CKD)      GFR Category          GFR (mL/min/1.73)    Interpretation  G1                     90 or greater         Normal or high (1)  G2                      60-89                Mild decrease (1)  G3a                   45-59                Mild to moderate decrease  G3b                   30-44                Moderate to severe decrease  G4                    15-29                Severe decrease  G5                    14 or less           Kidney failure          (1)In the absence of evidence of kidney disease, neither GFR category G1 or G2 fulfill the criteria for CKD.    eGFR calculation 2021 CKD-EPI creatinine equation, which does not include race as a factor    CBC & Differential [[id number]]  (Abnormal) Collected: 02/27/25 0444    Specimen: Blood Updated: 02/27/25 0527    Narrative:      The following orders were created for panel order CBC & Differential.  Procedure                               Abnormality         Status                     ---------                               -----------         ------                     CBC Auto Differential[[id number]]        Abnormal            Final result                 Please view results for these tests on the individual orders.    CBC Auto Differential [[id number]]  (Abnormal) Collected: 02/27/25 0444    Specimen: Blood Updated: 02/27/25 0527     WBC 14.88 10*3/mm3      RBC 5.27 10*6/mm3      Hemoglobin 15.3 g/dL      Hematocrit 47.0 %      MCV 89.2 fL      MCH 29.0 pg      MCHC 32.6 g/dL      RDW 13.1 %      RDW-SD 42.8 fl      MPV 9.3 fL      Platelets 217 10*3/mm3      Neutrophil % 78.7 %      Lymphocyte % 9.8 %      Monocyte % 10.3 %      Eosinophil % 0.1 %      Basophil % 0.6 %      Immature Grans % 0.5 %      Neutrophils, Absolute 11.72 10*3/mm3      Lymphocytes, Absolute 1.46 10*3/mm3      Monocytes, Absolute 1.53 10*3/mm3      Eosinophils, Absolute 0.01 10*3/mm3       Basophils, Absolute 0.09 10*3/mm3      Immature Grans, Absolute 0.07 10*3/mm3      nRBC 0.0 /100 WBC     Blood Culture - Blood, Arm, Right [[id number]]  (Normal) Collected: 02/25/25 2026    Specimen: Blood from Arm, Right Updated: 02/26/25 2116     Blood Culture No growth at 24 hours    Blood Culture - Blood, Arm, Left [[id number]]  (Normal) Collected: 02/25/25 2029    Specimen: Blood from Arm, Left Updated: 02/26/25 2116     Blood Culture No growth at 24 hours            Imaging Results (Last 24 Hours)       ** No results found for the last 24 hours. **            EKG      I personally viewed and interpreted the patient's EKG/Telemetry data:    ECHOCARDIOGRAM:  Results for orders placed during the hospital encounter of 02/25/25    Adult Transthoracic Echo Limited W/ Cont if Necessary Per Protocol    Interpretation Summary    Left ventricular ejection fraction appears to be 51 - 55%.    The right ventricular cavity is moderately dilated.    Technically very limited study.  Valvular structure and function was not evaluated.       STRESS MYOVIEW:  Results for orders placed during the hospital encounter of 02/18/25    Stress Test With Myocardial Perfusion One Day    Interpretation Summary    Myocardial perfusion imaging indicates a normal myocardial perfusion study with no evidence of ischemia. Impressions are consistent with a low risk study.    Left ventricular ejection fraction is normal (Calculated EF = 64%).       CARDIAC CATHETERIZATION:  No results found for this or any previous visit.       OTHER:         Assessment & Plan     Abdominal discomfort/epigastric discomfort  Patient presented with abdominal epigastric discomfort has gastric distention along with leukocytosis  Patient had NG tube and an EGD was performed which showed only erosive esophagitis and was placed on PPIs and is already feeling better and is NG tube has been removed and is on oral diet now    Sick sinus syndrome  Bradycardia status post  pacemaker placement but because of his abdominal discomfort and abnormal movements of his thoracoabdominal area his right ventricular lead is probably dislodged with high thresholds and hence it will be repositioned tomorrow by electrophysiologist.    Hypertension  Patient blood pressure currently stable on losartan and will be started on beta-blockers as needed    Hyperlipidemia  Patient is on statins and the lipid levels are followed by the primary care doctor    Diabetes  Patient is on oral medicines and followed by the primary care doctor.  I discussed the patients findings and my recommendations with patient and nurse    Win Reaves MD  02/27/25  13:54 EST

## 2025-02-27 NOTE — PROGRESS NOTES
Pacemaker site is clean  Low-grade temperature noted  Check procalcitonin level  Schedule patient for RV pacing lead if no significant febrile illness for next 24 hours  Orders placed and discussed with patient and family      Electronically signed by Dez Loco MD, 02/27/25, 11:09 AM EST.

## 2025-02-27 NOTE — PROGRESS NOTES
[facility] MEDICINE SERVICE  DAILY PROGRESS NOTE    NAME: Ismael Pulido  : 1965  MRN: [id number]      LOS: 1 day     PROVIDER OF SERVICE: Ron Eduardo MD    Chief Complaint: Abdominal pain    Subjective:     Interval History:  History taken from: patient    Patient seen and examined at bedside this morning.  No acute complaints overnight.  States started having abdominal pain secondary, having clear liquids last night tolerating liquid diet so far        Review of Systems: Negative except described above  Review of Systems    Objective:     Vital Signs  Temp:  [97.8 °F (36.6 °C)-103 °F (39.4 °C)] 103 °F (39.4 °C)  Heart Rate:  [] 64  Resp:  [18-36] 18  BP: (127-148)/(83-98) 148/91  Flow (L/min) (Oxygen Therapy):  [2-10] 2   Body mass index is 34.53 kg/m².    Physical Exam  Physical Exam  Constitutional:       Comments: NAD    Cardiovascular:      Comments:  RRR, S1 & S2   Pulmonary:      Comments:  Lungs CTA   Abdominal:      Comments:  ABD soft, NT            Diagnostic Data    Results from last 7 days   Lab Units 25  0444   WBC 10*3/mm3 14.88*   HEMOGLOBIN g/dL 15.3   HEMATOCRIT % 47.0   PLATELETS 10*3/mm3 217   GLUCOSE mg/dL 126*   CREATININE mg/dL 1.05   BUN mg/dL 21*   SODIUM mmol/L 131*   POTASSIUM mmol/L 4.2   AST (SGOT) U/L 22   ALT (SGPT) U/L 16   ALK PHOS U/L 92   BILIRUBIN mg/dL 0.9   ANION GAP mmol/L 17.1*       XR Abdomen KUB    Result Date: 2025  Impression: Gastric tube in appropriate position. Electronically Signed: Charles Santo MD  2025 9:17 PM EST  Workstation ID: [id number]    XR Abdomen KUB    Result Date: 2025  Impression: Slightly high position of the gastric tube. Recommend advancing by 3 cm. Electronically Signed: Charles Santo MD  2025 5:44 PM EST  Workstation ID: [id number]    XR Abdomen KUB    Result Date: 2025  Impression: The tip of the nasogastric tube terminates in the fundus of the stomach. Electronically Signed:  Erasmo Bernard MD  2/25/2025 4:00 PM EST  Workstation ID: [id number]    CT Angiogram Chest Pulmonary Embolism    Result Date: 2/25/2025  Impression: No evidence of pulmonary embolism. Small left pleural effusion. Mild bilateral dependent atelectasis. Moderate gastric distention with air and fluid. This may be transient however, gastroparesis cannot be excluded. Correlate with patient symptoms. This can be further evaluated with nuclear medicine gastric emptying study as clinically warranted. If there  is concern for gastric outlet obstruction, correlate with endoscopy. Moderately distended urinary bladder. Correlate for urinary retention. Chronic findings as above. Electronically Signed: Charles Santo MD  2/25/2025 3:14 PM EST  Workstation ID: [id number]    CT Abdomen Pelvis With Contrast    Result Date: 2/25/2025  Impression: No evidence of pulmonary embolism. Small left pleural effusion. Mild bilateral dependent atelectasis. Moderate gastric distention with air and fluid. This may be transient however, gastroparesis cannot be excluded. Correlate with patient symptoms. This can be further evaluated with nuclear medicine gastric emptying study as clinically warranted. If there  is concern for gastric outlet obstruction, correlate with endoscopy. Moderately distended urinary bladder. Correlate for urinary retention. Chronic findings as above. Electronically Signed: Charles Santo MD  2/25/2025 3:14 PM EST  Workstation ID: [id number]       I reviewed the patient's new clinical results.    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       Chest pain  -Troponin, 38, 39  -d. dimer 3.54  -CXR showed low lung volumes with probable mild  bibasal atelectasis.   -Cta chest negative for PE. Small left pleural effusion. Mild bilateral dependent atelectasis  -Ekg rate 85, pvcs, pacemaker, prolonged MT  -Cardiology consulted  -Pacemaker interrogated and lead dislodged     Abdominal pain  -Lipase 26  -Ct abd reviewed and showing Moderate gastric distention with air and fluid. This may be transient however, gastroparesis cannot be excluded. Correlate with patient symptoms. This can be further evaluated with nuclear medicine gastric emptying study as clinically warranted. If there is concern for gastric outlet obstruction, correlate with endoscopy. Moderately distended urinary bladder. Correlate for urinary retention.  -Gastroenterology consulted  -Ng tube in place to low wall suction, removed today   -EGD on 2/26 showed esophagitis  -Continue PPI     -Fever  Chest x-ray reviewed, will order CT chest  Check UA  Blood cultures ordered on 2/25 showed no growth to date, will repeat another blood culture  RVP negative  Patient already on Rocephin, will escalate to Zosyn  Check bilateral lower extremity venous duplex for any DVT       Hypertension  -Stable  -Continue losartan     Type 2 diabetes  -Continue jardiance   -SSI  -Diabetic diet  -Monitor before meals and at bedtime        VTE Prophylaxis:  Mechanical VTE prophylaxis orders are present.             Disposition Planning:        Anticipated Date of Discharge: 3/1/2025         Time: 35 minutes     Code Status and Medical Interventions: CPR (Attempt to Resuscitate); Full Support   Ordered at: 02/25/25 2787     Level Of Support Discussed With:    Patient     Code Status (Patient has no pulse and is not breathing):    CPR (Attempt to Resuscitate)     Medical Interventions (Patient has pulse or is breathing):    Full Support       Signature: Electronically signed by Ron Eduardo MD, 02/27/25, 12:37 EST.  [facility] Hospitalist Team

## 2025-02-28 LAB
ANION GAP SERPL CALCULATED.3IONS-SCNC: 12.2 MMOL/L (ref 5–15)
B PARAPERT DNA SPEC QL NAA+PROBE: NOT DETECTED
B PERT DNA SPEC QL NAA+PROBE: NOT DETECTED
BASOPHILS # BLD AUTO: 0.04 10*3/MM3 (ref 0–0.2)
BASOPHILS NFR BLD AUTO: 0.5 % (ref 0–1.5)
BUN SERPL-MCNC: 26 MG/DL (ref 6–20)
BUN/CREAT SERPL: 20.6 (ref 7–25)
C PNEUM DNA NPH QL NAA+NON-PROBE: NOT DETECTED
CALCIUM SPEC-SCNC: 8.2 MG/DL (ref 8.6–10.5)
CHLORIDE SERPL-SCNC: 103 MMOL/L (ref 98–107)
CO2 SERPL-SCNC: 20.8 MMOL/L (ref 22–29)
CREAT SERPL-MCNC: 1.26 MG/DL (ref 0.76–1.27)
DEPRECATED RDW RBC AUTO: 44.3 FL (ref 37–54)
EGFRCR SERPLBLD CKD-EPI 2021: 65.7 ML/MIN/1.73
EOSINOPHIL # BLD AUTO: 0.04 10*3/MM3 (ref 0–0.4)
EOSINOPHIL NFR BLD AUTO: 0.5 % (ref 0.3–6.2)
ERYTHROCYTE [DISTWIDTH] IN BLOOD BY AUTOMATED COUNT: 13.2 % (ref 12.3–15.4)
FLUAV H3 RNA NPH QL NAA+PROBE: DETECTED
FLUBV RNA ISLT QL NAA+PROBE: NOT DETECTED
GLUCOSE SERPL-MCNC: 97 MG/DL (ref 65–99)
HADV DNA SPEC NAA+PROBE: NOT DETECTED
HCOV 229E RNA SPEC QL NAA+PROBE: NOT DETECTED
HCOV HKU1 RNA SPEC QL NAA+PROBE: NOT DETECTED
HCOV NL63 RNA SPEC QL NAA+PROBE: NOT DETECTED
HCOV OC43 RNA SPEC QL NAA+PROBE: NOT DETECTED
HCT VFR BLD AUTO: 40.8 % (ref 37.5–51)
HGB BLD-MCNC: 12.9 G/DL (ref 13–17.7)
HMPV RNA NPH QL NAA+NON-PROBE: NOT DETECTED
HPIV1 RNA ISLT QL NAA+PROBE: NOT DETECTED
HPIV2 RNA SPEC QL NAA+PROBE: NOT DETECTED
HPIV3 RNA NPH QL NAA+PROBE: NOT DETECTED
HPIV4 P GENE NPH QL NAA+PROBE: NOT DETECTED
IMM GRANULOCYTES # BLD AUTO: 0.05 10*3/MM3 (ref 0–0.05)
IMM GRANULOCYTES NFR BLD AUTO: 0.7 % (ref 0–0.5)
LYMPHOCYTES # BLD AUTO: 0.92 10*3/MM3 (ref 0.7–3.1)
LYMPHOCYTES NFR BLD AUTO: 12.2 % (ref 19.6–45.3)
M PNEUMO IGG SER IA-ACNC: NOT DETECTED
MCH RBC QN AUTO: 28.9 PG (ref 26.6–33)
MCHC RBC AUTO-ENTMCNC: 31.6 G/DL (ref 31.5–35.7)
MCV RBC AUTO: 91.3 FL (ref 79–97)
MONOCYTES # BLD AUTO: 1.16 10*3/MM3 (ref 0.1–0.9)
MONOCYTES NFR BLD AUTO: 15.4 % (ref 5–12)
NEUTROPHILS NFR BLD AUTO: 5.33 10*3/MM3 (ref 1.7–7)
NEUTROPHILS NFR BLD AUTO: 70.7 % (ref 42.7–76)
NRBC BLD AUTO-RTO: 0 /100 WBC (ref 0–0.2)
PLATELET # BLD AUTO: 183 10*3/MM3 (ref 140–450)
PMV BLD AUTO: 9.3 FL (ref 6–12)
POTASSIUM SERPL-SCNC: 4.3 MMOL/L (ref 3.5–5.2)
RBC # BLD AUTO: 4.47 10*6/MM3 (ref 4.14–5.8)
RHINOVIRUS RNA SPEC NAA+PROBE: NOT DETECTED
RSV RNA NPH QL NAA+NON-PROBE: NOT DETECTED
SARS-COV-2 RNA RESP QL NAA+PROBE: NOT DETECTED
SODIUM SERPL-SCNC: 136 MMOL/L (ref 136–145)
WBC NRBC COR # BLD AUTO: 7.54 10*3/MM3 (ref 3.4–10.8)

## 2025-02-28 PROCEDURE — 85025 COMPLETE CBC W/AUTO DIFF WBC: CPT | Performed by: INTERNAL MEDICINE

## 2025-02-28 PROCEDURE — 25010000002 METOCLOPRAMIDE PER 10 MG: Performed by: INTERNAL MEDICINE

## 2025-02-28 PROCEDURE — 80048 BASIC METABOLIC PNL TOTAL CA: CPT | Performed by: INTERNAL MEDICINE

## 2025-02-28 PROCEDURE — 25010000002 PIPERACILLIN SOD-TAZOBACTAM PER 1 G

## 2025-02-28 PROCEDURE — 0202U NFCT DS 22 TRGT SARS-COV-2: CPT

## 2025-02-28 PROCEDURE — 99233 SBSQ HOSP IP/OBS HIGH 50: CPT | Performed by: INTERNAL MEDICINE

## 2025-02-28 RX ADMIN — METOCLOPRAMIDE 5 MG: 5 INJECTION, SOLUTION INTRAMUSCULAR; INTRAVENOUS at 16:49

## 2025-02-28 RX ADMIN — ACETAMINOPHEN 650 MG: 650 SOLUTION ORAL at 19:31

## 2025-02-28 RX ADMIN — CETIRIZINE HYDROCHLORIDE 10 MG: 10 TABLET, FILM COATED ORAL at 09:49

## 2025-02-28 RX ADMIN — METOCLOPRAMIDE 5 MG: 5 INJECTION, SOLUTION INTRAMUSCULAR; INTRAVENOUS at 09:48

## 2025-02-28 RX ADMIN — PIPERACILLIN AND TAZOBACTAM 3.38 G: 3; .375 INJECTION, POWDER, FOR SOLUTION INTRAVENOUS at 02:48

## 2025-02-28 RX ADMIN — LOSARTAN POTASSIUM 25 MG: 25 TABLET, FILM COATED ORAL at 09:49

## 2025-02-28 RX ADMIN — Medication 10 ML: at 09:58

## 2025-02-28 RX ADMIN — PIPERACILLIN AND TAZOBACTAM 3.38 G: 3; .375 INJECTION, POWDER, FOR SOLUTION INTRAVENOUS at 11:59

## 2025-02-28 RX ADMIN — PANTOPRAZOLE SODIUM 40 MG: 40 TABLET, DELAYED RELEASE ORAL at 16:49

## 2025-02-28 RX ADMIN — EMPAGLIFLOZIN 25 MG: 25 TABLET, FILM COATED ORAL at 09:49

## 2025-02-28 RX ADMIN — PANTOPRAZOLE SODIUM 40 MG: 40 TABLET, DELAYED RELEASE ORAL at 09:49

## 2025-02-28 RX ADMIN — ACETAMINOPHEN 650 MG: 325 TABLET, FILM COATED ORAL at 10:03

## 2025-02-28 RX ADMIN — PIPERACILLIN AND TAZOBACTAM 3.38 G: 3; .375 INJECTION, POWDER, FOR SOLUTION INTRAVENOUS at 19:23

## 2025-02-28 RX ADMIN — ROSUVASTATIN CALCIUM 10 MG: 10 TABLET, FILM COATED ORAL at 09:49

## 2025-02-28 NOTE — CONSULTS
Midline Line Insertion Procedure Note    Procedure: Insertion of #18G/10CM PowerMidline    Indications:  Poor Access, Pt./Dr. Preference    Procedure Details:   Sterile technique was used including antiseptics, cap, gloves, gown, hand hygiene, mask, and sheet.    #18G/10CM PowerMidline inserted to the R Cephalic vein per hospital protocol by Tomas Fatima RN.     Non-pulsatile blood return: yes    Ultrasound Guidance: Yes    Lot #: [id number]  Expiration date: 2025-12-31    Complications:  none    Findings:  Catheter inserted to 10 cm, with 0 cm exposed.   Mid upper arm circumference is 37 cm.  Catheter was flushed with 10 cc NS and sterile dressing applied.   Patient tolerated procedure well.    Recommendations:  Verbal and/or written Care/Maintenance instructions provided to patient.   Primary nurse notified that midline is okay to use at this time.     Mateo Fatima RN  02/27/25  19:28 EST

## 2025-02-28 NOTE — PROGRESS NOTES
CARDIOLOGY PROGRESS NOTE:    Ismael Pulido  59 y.o.  male  1965  [id number]      Referring Provider: Hospitalist    Reason for follow-up: Abdominal distention and nausea.  Pacemaker malfunction     Patient Care Team:  Sharon Silva MD as PCP - General (Internal Medicine & Pediatrics)    Subjective .  Patient had temperature last night and also has some shortness of breath.  Objective lying in bed comfortably     Review of Systems   Constitutional: Negative for malaise/fatigue.   Cardiovascular:  Negative for chest pain, dyspnea on exertion, leg swelling and palpitations.   Respiratory:  Positive for shortness of breath. Negative for cough.    Gastrointestinal:  Negative for abdominal pain, nausea and vomiting.   Neurological:  Negative for dizziness, focal weakness, headaches, light-headedness and numbness.   All other systems reviewed and are negative.      Allergies: Lisinopril    Scheduled Meds:cetirizine, 10 mg, Oral, Daily  empagliflozin, 25 mg, Oral, Daily  lidocaine PF 1%, 10 mL, Infiltration, Once  losartan, 25 mg, Oral, Daily  metoclopramide, 5 mg, Intravenous, BID AC  pantoprazole, 40 mg, Oral, BID AC  piperacillin-tazobactam, 3.375 g, Intravenous, Q8H  rosuvastatin, 10 mg, Oral, Daily  sodium chloride, 2,000 mL, Intravenous, Once  sodium chloride, 10 mL, Intravenous, Q12H  sodium chloride, 10 mL, Intravenous, Q12H      Continuous Infusions:   PRN Meds:.  acetaminophen **OR** acetaminophen **OR** acetaminophen    aluminum-magnesium hydroxide-simethicone    senna-docusate sodium **AND** polyethylene glycol **AND** bisacodyl **AND** bisacodyl    hydrALAZINE    nitroglycerin    ondansetron ODT **OR** ondansetron    ondansetron ODT **OR** ondansetron    [COMPLETED] Insert Peripheral IV **AND** sodium chloride    sodium chloride    sodium chloride    sodium chloride    sodium chloride        VITAL SIGNS  Vitals:    02/28/25 0415 02/28/25 0746 02/28/25 1004 02/28/25 1110   BP: 128/77 126/80   "131/87   BP Location: Right arm Left arm  Left arm   Patient Position: Lying Lying  Lying   Pulse: 99 96  107   Resp: 19 24  18   Temp: 98.2 °F (36.8 °C) 98 °F (36.7 °C) (!) 100.7 °F (38.2 °C) 99 °F (37.2 °C)   TempSrc: Axillary Oral Oral Oral   SpO2: 90% 96%     Weight: 105 kg (230 lb 9.6 oz)      Height:           Flowsheet Rows      Flowsheet Row First Filed Value   Admission Height 172.7 cm (68\") Documented at 02/25/2025 1110   Admission Weight 104 kg (229 lb 4.5 oz) Documented at 02/25/2025 1110             TELEMETRY: Ventricular pacemaker rhythm    Physical Exam:  Constitutional:       Appearance: Well-developed.   Eyes:      General: No scleral icterus.     Conjunctiva/sclera: Conjunctivae normal.      Pupils: Pupils are equal, round, and reactive to light.   HENT:      Head: Normocephalic and atraumatic.   Neck:      Vascular: No carotid bruit or JVD.   Pulmonary:      Effort: Pulmonary effort is normal.      Breath sounds: Normal breath sounds. No wheezing. No rales.   Cardiovascular:      Normal rate. Regular rhythm.   Pulses:     Intact distal pulses.   Abdominal:      General: Bowel sounds are normal.      Palpations: Abdomen is soft.   Musculoskeletal: Normal range of motion.      Cervical back: Normal range of motion and neck supple. Skin:     General: Skin is warm and dry.      Findings: No rash.   Neurological:      Mental Status: Alert.      Comments: No focal deficits          Results Review:   I reviewed the patient's new clinical results.  Lab Results (last 24 hours)       Procedure Component Value Units Date/Time    Respiratory Panel PCR w/COVID-19(SARS-CoV-2) [facility] In-House, NP Swab in UTM/VTM, 2 HR TAT - Swab, Nasopharynx [[id number]]  (Abnormal) Collected: 02/28/25 1055    Specimen: Swab from Nasopharynx Updated: 02/28/25 1159     ADENOVIRUS, PCR Not Detected     Coronavirus 229E Not Detected     Coronavirus HKU1 Not Detected     Coronavirus NL63 Not Detected     Coronavirus " OC43 Not Detected     COVID19 Not Detected     Human Metapneumovirus Not Detected     Human Rhinovirus/Enterovirus Not Detected     Influenza A H3 Detected     Influenza B PCR Not Detected     Parainfluenza Virus 1 Not Detected     Parainfluenza Virus 2 Not Detected     Parainfluenza Virus 3 Not Detected     Parainfluenza Virus 4 Not Detected     RSV, PCR Not Detected     Bordetella pertussis pcr Not Detected     Bordetella parapertussis PCR Not Detected     Chlamydophila pneumoniae PCR Not Detected     Mycoplasma pneumo by PCR Not Detected    Narrative:      In the setting of a positive respiratory panel with a viral infection PLUS a negative procalcitonin without other underlying concern for bacterial infection, consider observing off antibiotics or discontinuation of antibiotics and continue supportive care. If the respiratory panel is positive for atypical bacterial infection (Bordetella pertussis, Chlamydophila pneumoniae, or Mycoplasma pneumoniae), consider antibiotic de-escalation to target atypical bacterial infection.    Basic Metabolic Panel [[id number]]  (Abnormal) Collected: 02/28/25 0254    Specimen: Blood Updated: 02/28/25 0339     Glucose 97 mg/dL      BUN 26 mg/dL      Creatinine 1.26 mg/dL      Sodium 136 mmol/L      Potassium 4.3 mmol/L      Chloride 103 mmol/L      CO2 20.8 mmol/L      Calcium 8.2 mg/dL      BUN/Creatinine Ratio 20.6     Anion Gap 12.2 mmol/L      eGFR 65.7 mL/min/1.73     Narrative:      GFR Categories in Chronic Kidney Disease (CKD)      GFR Category          GFR (mL/min/1.73)    Interpretation  G1                     90 or greater         Normal or high (1)  G2                      60-89                Mild decrease (1)  G3a                   45-59                Mild to moderate decrease  G3b                   30-44                Moderate to severe decrease  G4                    15-29                Severe decrease  G5                    14 or less           Kidney  failure          (1)In the absence of evidence of kidney disease, neither GFR category G1 or G2 fulfill the criteria for CKD.    eGFR calculation 2021 CKD-EPI creatinine equation, which does not include race as a factor    CBC & Differential [[id number]]  (Abnormal) Collected: 02/28/25 0254    Specimen: Blood Updated: 02/28/25 0325    Narrative:      The following orders were created for panel order CBC & Differential.  Procedure                               Abnormality         Status                     ---------                               -----------         ------                     CBC Auto Differential[[id number]]        Abnormal            Final result                 Please view results for these tests on the individual orders.    CBC Auto Differential [[id number]]  (Abnormal) Collected: 02/28/25 0254    Specimen: Blood Updated: 02/28/25 0325     WBC 7.54 10*3/mm3      RBC 4.47 10*6/mm3      Hemoglobin 12.9 g/dL      Hematocrit 40.8 %      MCV 91.3 fL      MCH 28.9 pg      MCHC 31.6 g/dL      RDW 13.2 %      RDW-SD 44.3 fl      MPV 9.3 fL      Platelets 183 10*3/mm3      Neutrophil % 70.7 %      Lymphocyte % 12.2 %      Monocyte % 15.4 %      Eosinophil % 0.5 %      Basophil % 0.5 %      Immature Grans % 0.7 %      Neutrophils, Absolute 5.33 10*3/mm3      Lymphocytes, Absolute 0.92 10*3/mm3      Monocytes, Absolute 1.16 10*3/mm3      Eosinophils, Absolute 0.04 10*3/mm3      Basophils, Absolute 0.04 10*3/mm3      Immature Grans, Absolute 0.05 10*3/mm3      nRBC 0.0 /100 WBC     Lactic Acid, Plasma [[id number]]  (Normal) Collected: 02/27/25 2123    Specimen: Blood Updated: 02/27/25 2151     Lactate 1.6 mmol/L     Comprehensive Metabolic Panel [[id number]]  (Abnormal) Collected: 02/27/25 1857    Specimen: Blood Updated: 02/27/25 2136     Glucose 134 mg/dL      BUN 22 mg/dL      Creatinine 1.24 mg/dL      Sodium 134 mmol/L      Potassium 4.4 mmol/L      Chloride 100 mmol/L      CO2 20.5 mmol/L      Calcium 8.7  mg/dL      Total Protein 7.2 g/dL      Albumin 3.5 g/dL      ALT (SGPT) 38 U/L      AST (SGOT) 65 U/L      Alkaline Phosphatase 76 U/L      Total Bilirubin 0.6 mg/dL      Globulin 3.7 gm/dL      A/G Ratio 0.9 g/dL      BUN/Creatinine Ratio 17.7     Anion Gap 13.5 mmol/L      eGFR 67.0 mL/min/1.73     Narrative:      GFR Categories in Chronic Kidney Disease (CKD)      GFR Category          GFR (mL/min/1.73)    Interpretation  G1                     90 or greater         Normal or high (1)  G2                      60-89                Mild decrease (1)  G3a                   45-59                Mild to moderate decrease  G3b                   30-44                Moderate to severe decrease  G4                    15-29                Severe decrease  G5                    14 or less           Kidney failure          (1)In the absence of evidence of kidney disease, neither GFR category G1 or G2 fulfill the criteria for CKD.    eGFR calculation 2021 CKD-EPI creatinine equation, which does not include race as a factor    CBC & Differential [[id number]]  (Abnormal) Collected: 02/27/25 2123    Specimen: Blood Updated: 02/27/25 2133    Narrative:      The following orders were created for panel order CBC & Differential.  Procedure                               Abnormality         Status                     ---------                               -----------         ------                     CBC Auto Differential[[id number]]        Abnormal            Final result                 Please view results for these tests on the individual orders.    CBC Auto Differential [[id number]]  (Abnormal) Collected: 02/27/25 2123    Specimen: Blood Updated: 02/27/25 2133     WBC 9.20 10*3/mm3      RBC 4.76 10*6/mm3      Hemoglobin 13.9 g/dL      Hematocrit 42.8 %      MCV 89.9 fL      MCH 29.2 pg      MCHC 32.5 g/dL      RDW 13.2 %      RDW-SD 43.8 fl      MPV 8.9 fL      Platelets 200 10*3/mm3      Neutrophil % 74.8 %      Lymphocyte % 10.2  "%      Monocyte % 13.9 %      Eosinophil % 0.2 %      Basophil % 0.5 %      Immature Grans % 0.4 %      Neutrophils, Absolute 6.87 10*3/mm3      Lymphocytes, Absolute 0.94 10*3/mm3      Monocytes, Absolute 1.28 10*3/mm3      Eosinophils, Absolute 0.02 10*3/mm3      Basophils, Absolute 0.05 10*3/mm3      Immature Grans, Absolute 0.04 10*3/mm3      nRBC 0.0 /100 WBC     Blood Culture - Blood, Arm, Right [[id number]]  (Normal) Collected: 02/25/25 2026    Specimen: Blood from Arm, Right Updated: 02/27/25 2115     Blood Culture No growth at 2 days    Blood Culture - Blood, Arm, Left [[id number]]  (Normal) Collected: 02/25/25 2029    Specimen: Blood from Arm, Left Updated: 02/27/25 2115     Blood Culture No growth at 2 days    MRSA Screen, PCR (Inpatient) - Swab, Nares [[id number]]  (Normal) Collected: 02/27/25 1935    Specimen: Swab from Nares Updated: 02/27/25 2114     MRSA PCR No MRSA Detected    Narrative:      The negative predictive value of this diagnostic test is high and should only be used to consider de-escalating anti-MRSA therapy. A positive result may indicate colonization with MRSA and must be correlated clinically.    Procalcitonin [[id number]]  (Abnormal) Collected: 02/27/25 1857    Specimen: Blood Updated: 02/27/25 1957     Procalcitonin 2.39 ng/mL     Narrative:      As a Marker for Sepsis (Non-Neonates):    1. <0.5 ng/mL represents a low risk of severe sepsis and/or septic shock.  2. >2 ng/mL represents a high risk of severe sepsis and/or septic shock.    As a Marker for Lower Respiratory Tract Infections that require antibiotic therapy:    PCT on Admission    Antibiotic Therapy       6-12 Hrs later    >0.5                Strongly Recommended  >0.25 - <0.5        Recommended   0.1 - 0.25          Discouraged              Remeasure/reassess PCT  <0.1                Strongly Discouraged     Remeasure/reassess PCT    As 28 day mortality risk marker: \"Change in Procalcitonin Result\" (>80% or <=80%) if Day 0 " (or Day 1) and Day 4 values are available. Refer to http://[email or web address]-pct-calculator.com    Change in PCT <=80%  A decrease of PCT levels below or equal to 80% defines a positive change in PCT test result representing a higher risk for 28-day all-cause mortality of patients diagnosed with severe sepsis for septic shock.    Change in PCT >80%  A decrease of PCT levels of more than 80% defines a negative change in PCT result representing a lower risk for 28-day all-cause mortality of patients diagnosed with severe sepsis or septic shock.       Urinalysis, Microscopic Only - Urine, Clean Catch [[id number]] Collected: 02/27/25 1721    Specimen: Urine, Clean Catch Updated: 02/27/25 1735     RBC, UA 0-2 /HPF      WBC, UA 0-2 /HPF      Comment: Urine culture not indicated.        Bacteria, UA None Seen /HPF      Squamous Epithelial Cells, UA 0-2 /HPF      Hyaline Casts, UA None Seen /LPF      Methodology Automated Microscopy    Urinalysis With Culture If Indicated - Urine, Clean Catch [[id number]]  (Abnormal) Collected: 02/27/25 1721    Specimen: Urine, Clean Catch Updated: 02/27/25 1735     Color, UA Yellow     Appearance, UA Clear     pH, UA 6.5     Specific Gravity, UA 1.038     Glucose, UA >=1000 mg/dL (3+)     Ketones, UA 15 mg/dL (1+)     Bilirubin, UA Negative     Blood, UA Negative     Protein, UA 30 mg/dL (1+)     Leuk Esterase, UA Negative     Nitrite, UA Negative     Urobilinogen, UA 1.0 E.U./dL    Narrative:      In absence of clinical symptoms, the presence of pyuria, bacteria, and/or nitrites on the urinalysis result does not correlate with infection.    Blood Culture - Blood, Hand, Right [[id number]] Collected: 02/27/25 1707    Specimen: Blood from Hand, Right Updated: 02/27/25 1714    Procalcitonin [[id number]]  (Abnormal) Collected: 02/27/25 1236    Specimen: Blood Updated: 02/27/25 1405     Procalcitonin 2.52 ng/mL     Narrative:      As a Marker for Sepsis (Non-Neonates):    1. <0.5 ng/mL represents a low  "risk of severe sepsis and/or septic shock.  2. >2 ng/mL represents a high risk of severe sepsis and/or septic shock.    As a Marker for Lower Respiratory Tract Infections that require antibiotic therapy:    PCT on Admission    Antibiotic Therapy       6-12 Hrs later    >0.5                Strongly Recommended  >0.25 - <0.5        Recommended   0.1 - 0.25          Discouraged              Remeasure/reassess PCT  <0.1                Strongly Discouraged     Remeasure/reassess PCT    As 28 day mortality risk marker: \"Change in Procalcitonin Result\" (>80% or <=80%) if Day 0 (or Day 1) and Day 4 values are available. Refer to http://[email or web address]-pct-calculator.com    Change in PCT <=80%  A decrease of PCT levels below or equal to 80% defines a positive change in PCT test result representing a higher risk for 28-day all-cause mortality of patients diagnosed with severe sepsis for septic shock.    Change in PCT >80%  A decrease of PCT levels of more than 80% defines a negative change in PCT result representing a lower risk for 28-day all-cause mortality of patients diagnosed with severe sepsis or septic shock.               Imaging Results (Last 24 Hours)       Procedure Component Value Units Date/Time    CT Chest Without Contrast Diagnostic [[id number]] Collected: 02/27/25 1525     Updated: 02/27/25 1531    Narrative:      CT CHEST WO CONTRAST DIAGNOSTIC    Date of Exam: 2/27/2025 2:10 PM EST    Indication: Chest pain, rule out pneumonia.    Comparison: Chest CT 2/25/2025    Technique: Axial CT images were obtained of the chest without contrast administration.  Sagittal and coronal reconstructions were performed.  Automated exposure control and iterative reconstruction methods were used.      Findings:  Thyroid unremarkable. Aortic atherosclerotic disease without aneurysm. Multichamber AICD. Coronary atherosclerotic disease. Mild cardiomegaly. Increasing small low-density pericardial effusion. Esophagus unremarkable. Mild " mediastinal adenopathy   including some increasing nodes from prior exam, example 1.3 cm short axis right paratracheal lymph node previously measured 7 mm short axis.    Trachea patent. Increasing now small to moderate low-density left effusion and new trace right pleural effusion. Increasing adjacent consolidation in the left lower lobe. Linear bandlike areas of atelectasis versus scar. No edema or pneumothorax. No   suspicious nodule.    Lipomatous infiltration and atrophy of the pancreas. Vicarious excretion of contrast in the gallbladder. Colonic diverticulosis. Minimal symmetric gynecomastia. Minimal reticular subcutaneous edema similar to prior. Bone island in the left glenoid. No   acute displaced fracture or aggressive lesion. Degenerative changes in the thoracic spine.      Impression:      Impression:  1. Findings suggesting volume overload/third spacing including increasing small to moderate left effusion, new trace right pleural effusion and new small pericardial effusion.  2. Increasing adjacent left lower lobe consolidation which could reflect atelectasis or pneumonia in the right clinical setting.  3. Mild likely reactive mediastinal adenopathy, slightly increasing from prior.  4. Aortic and coronary atherosclerotic disease.  5. Stable mild cardiomegaly.      Electronically Signed: Nicholas Becerra MD    2/27/2025 3:29 PM EST    Workstation ID: [id number]            EKG      I personally viewed and interpreted the patient's EKG/Telemetry data:    ECHOCARDIOGRAM:  Results for orders placed during the hospital encounter of 02/25/25    Adult Transthoracic Echo Limited W/ Cont if Necessary Per Protocol    Interpretation Summary    Left ventricular ejection fraction appears to be 51 - 55%.    The right ventricular cavity is moderately dilated.    Technically very limited study.  Valvular structure and function was not evaluated.       STRESS MYOVIEW:  Results for orders placed during the hospital encounter of  02/18/25    Stress Test With Myocardial Perfusion One Day    Interpretation Summary    Myocardial perfusion imaging indicates a normal myocardial perfusion study with no evidence of ischemia. Impressions are consistent with a low risk study.    Left ventricular ejection fraction is normal (Calculated EF = 64%).       CARDIAC CATHETERIZATION:  No results found for this or any previous visit.       OTHER:         Assessment & Plan     Fever/shortness of breath  Patient developed fever last night and had some shortness of breath and hence he had respiratory panel done which showed influenza A and he will be on Tamiflu now.  Patient also has high white count.    Sick sinus syndrome/RV lead dislodgment  Patient had sick sinus syndrome and status post pacemaker placement which was a biventricular pacemaker.  Patient's right ventricular lead is showing high thresholds and hence will be repositioned but because of the fluid will hold off for a few days.    Abdominal pain and distention  Patient initially presented with abdominal discomfort and at high white count but now after the EGD is feeling better    History of hypertension  Patient is currently on beta-blockers.    Hyperlipidemia  Patrick on statins and the lipid levels will be followed with primary care doctor    Diabetes  Patient on oral medicines and his sugar levels are followed by the primary care doctor.  I discussed the patients findings and my recommendations with patient and nurse    Win Reaves MD  02/28/25  12:25 EST

## 2025-02-28 NOTE — CASE MANAGEMENT/SOCIAL WORK
Continued Stay Note  [facility]     Patient Name: Ismael Pulido  MRN: [id number]  Today's Date: 2/28/2025    Admit Date: 2/25/2025    Plan: Return home with spouse. No home oxygen;  on 2 liters   Discharge Plan       Row Name 02/28/25 1436       Plan    Plan Return home with spouse. No home oxygen;  on 2 liters    Plan Comments Barriers: GI and Cardiology following. Repeat BC pending. CT chest ordered which showed increasing left lower lobe consolidation. IV antibiotics, IV Reglan.. Oxygen 2 liters which he does not have at home. At discharge Mr. Pulido plans to return home with spouse                          Claire LINDQUIST,RN Case Manager  [facility]  Phone: Desk- [phone] cell- [phone]

## 2025-02-28 NOTE — PROGRESS NOTES
Patient looks significantly better  No further tachypnea fever  Positive for flu  Defer RV lead revision for now  Scheduled RV lead revision may be Monday evening or early next week discussed with patient and family      Electronically signed by Dez Loco MD, 02/28/25, 5:11 PM EST.

## 2025-02-28 NOTE — SIGNIFICANT NOTE
"Per RN, pt has low grade fever and plans pacemaker lead insertion later this date, up tp pt whether he wants to work with PT this date.  Pt declined PT eval secondary to not feeling well.  \"Please try back tomorrow\"    "

## 2025-02-28 NOTE — PLAN OF CARE
Problem: Adult Inpatient Plan of Care  Goal: Plan of Care Review  Outcome: Progressing  Flowsheets (Taken 2/28/2025 0420)  Progress: no change  Outcome Evaluation: Patient is alert and oriented, patient can use call light to make needs known, patients wife is at bedside, patient given fluid bolus for simple sepsis signs, patient tolerated well.  Plan of Care Reviewed With:   patient   spouse  Goal: Patient-Specific Goal (Individualized)  Outcome: Progressing  Goal: Absence of Hospital-Acquired Illness or Injury  Outcome: Progressing  Intervention: Identify and Manage Fall Risk  Recent Flowsheet Documentation  Taken 2/28/2025 0400 by Faviola Daniels RN  Safety Promotion/Fall Prevention: safety round/check completed  Taken 2/28/2025 0200 by Faviola Daniels RN  Safety Promotion/Fall Prevention: safety round/check completed  Taken 2/28/2025 0000 by Faviola Daniels RN  Safety Promotion/Fall Prevention: safety round/check completed  Taken 2/27/2025 2200 by Faviola Daniels RN  Safety Promotion/Fall Prevention: safety round/check completed  Intervention: Prevent Skin Injury  Recent Flowsheet Documentation  Taken 2/28/2025 0400 by Faviola Daniels RN  Body Position: position changed independently  Skin Protection: transparent dressing maintained  Taken 2/28/2025 0200 by Faviola Daniels RN  Body Position: position changed independently  Taken 2/28/2025 0000 by Faviola Daniels RN  Body Position: position changed independently  Skin Protection: transparent dressing maintained  Intervention: Prevent and Manage VTE (Venous Thromboembolism) Risk  Recent Flowsheet Documentation  Taken 2/28/2025 0400 by Faviola Daniels RN  VTE Prevention/Management:   SCDs (sequential compression devices) off   patient refused intervention  Taken 2/28/2025 0000 by Faviola Daniels RN  VTE Prevention/Management:   SCDs (sequential compression devices) off   patient refused intervention  Intervention: Prevent Infection  Recent Flowsheet  Documentation  Taken 2/28/2025 0400 by Faviola Daniels RN  Infection Prevention: hand hygiene promoted  Taken 2/28/2025 0200 by Faviola Daniels RN  Infection Prevention: hand hygiene promoted  Taken 2/28/2025 0000 by Faviola Daniels RN  Infection Prevention: hand hygiene promoted  Goal: Optimal Comfort and Wellbeing  Outcome: Progressing  Intervention: Monitor Pain and Promote Comfort  Recent Flowsheet Documentation  Taken 2/28/2025 0400 by Faviola Daniels RN  Pain Management Interventions: pain management plan reviewed with patient/caregiver  Taken 2/28/2025 0000 by Faviola Daniels RN  Pain Management Interventions: pain management plan reviewed with patient/caregiver  Intervention: Provide Person-Centered Care  Recent Flowsheet Documentation  Taken 2/28/2025 0400 by Faviola Daniels RN  Trust Relationship/Rapport:   care explained   choices provided   emotional support provided   empathic listening provided   questions answered   questions encouraged   reassurance provided   thoughts/feelings acknowledged  Taken 2/28/2025 0000 by Faviola Daniels RN  Trust Relationship/Rapport:   care explained   choices provided   empathic listening provided   emotional support provided   questions answered   questions encouraged   reassurance provided   thoughts/feelings acknowledged  Goal: Readiness for Transition of Care  Outcome: Progressing     Problem: Pain Acute  Goal: Optimal Pain Control and Function  Outcome: Progressing  Intervention: Optimize Psychosocial Wellbeing  Recent Flowsheet Documentation  Taken 2/28/2025 0400 by Faviola Daniels RN  Supportive Measures: active listening utilized  Diversional Activities: smartphone  Taken 2/28/2025 0000 by Faviola Daniels RN  Supportive Measures: active listening utilized  Diversional Activities: smartphone  Intervention: Develop Pain Management Plan  Recent Flowsheet Documentation  Taken 2/28/2025 0400 by Faviola Daniels RN  Pain Management Interventions: pain management plan  reviewed with patient/caregiver  Taken 2/28/2025 0000 by Faviola Daniels RN  Pain Management Interventions: pain management plan reviewed with patient/caregiver  Intervention: Prevent or Manage Pain  Recent Flowsheet Documentation  Taken 2/28/2025 0400 by Faviola Daniels RN  Sensory Stimulation Regulation: care clustered  Sleep/Rest Enhancement: awakenings minimized  Medication Review/Management: medications reviewed  Taken 2/28/2025 0200 by Faviola Daniels RN  Medication Review/Management: medications reviewed  Taken 2/28/2025 0000 by Faviola Daniels RN  Sensory Stimulation Regulation: care clustered  Sleep/Rest Enhancement: awakenings minimized  Medication Review/Management: medications reviewed     Problem: Nausea and Vomiting  Goal: Nausea and Vomiting Relief  Outcome: Progressing  Intervention: Prevent and Manage Nausea and Vomiting  Recent Flowsheet Documentation  Taken 2/28/2025 0400 by Faviola Daniels RN  Environmental Support: calm environment promoted  Taken 2/28/2025 0000 by Faviola Daniels RN  Environmental Support: calm environment promoted     Problem: Sepsis/Septic Shock  Goal: Optimal Coping  Outcome: Progressing  Intervention: Support Patient and Family Response  Recent Flowsheet Documentation  Taken 2/28/2025 0400 by Faviola Daniels RN  Supportive Measures: active listening utilized  Family/Support System Care: support provided  Taken 2/28/2025 0000 by Faviola Daniels RN  Supportive Measures: active listening utilized  Family/Support System Care: support provided  Goal: Absence of Bleeding  Outcome: Progressing  Goal: Blood Glucose Level Within Target Range  Outcome: Progressing  Goal: Absence of Infection Signs and Symptoms  Outcome: Progressing  Intervention: Initiate Sepsis Management  Recent Flowsheet Documentation  Taken 2/28/2025 0400 by Faviola Daniels RN  Infection Prevention: hand hygiene promoted  Taken 2/28/2025 0200 by Faviola Daniels RN  Infection Prevention: hand hygiene  promoted  Taken 2/28/2025 0000 by Faviola Daniels RN  Infection Prevention: hand hygiene promoted  Intervention: Promote Recovery  Recent Flowsheet Documentation  Taken 2/28/2025 0400 by Faviola Daniels RN  Activity Management: activity encouraged  Sleep/Rest Enhancement: awakenings minimized  Taken 2/28/2025 0200 by Faviola Daniels RN  Activity Management: activity encouraged  Taken 2/28/2025 0000 by Faviola Daniels RN  Activity Management: activity encouraged  Sleep/Rest Enhancement: awakenings minimized  Goal: Optimal Nutrition Delivery  Outcome: Progressing     Problem: Fall Injury Risk  Goal: Absence of Fall and Fall-Related Injury  Outcome: Progressing  Intervention: Identify and Manage Contributors  Recent Flowsheet Documentation  Taken 2/28/2025 0400 by Faviola Daniels RN  Medication Review/Management: medications reviewed  Self-Care Promotion: independence encouraged  Taken 2/28/2025 0200 by Faviola Daniels RN  Medication Review/Management: medications reviewed  Self-Care Promotion: independence encouraged  Taken 2/28/2025 0000 by Faviola Daniels RN  Medication Review/Management: medications reviewed  Self-Care Promotion: independence encouraged  Intervention: Promote Injury-Free Environment  Recent Flowsheet Documentation  Taken 2/28/2025 0400 by Faviola Daniels RN  Safety Promotion/Fall Prevention: safety round/check completed  Taken 2/28/2025 0200 by Faviola Daniels RN  Safety Promotion/Fall Prevention: safety round/check completed  Taken 2/28/2025 0000 by Faviola Daniels RN  Safety Promotion/Fall Prevention: safety round/check completed  Taken 2/27/2025 2200 by Faviola Daniels RN  Safety Promotion/Fall Prevention: safety round/check completed   Goal Outcome Evaluation:  Plan of Care Reviewed With: patient, spouse        Progress: no change  Outcome Evaluation: Patient is alert and oriented, patient can use call light to make needs known, patients wife is at bedside, patient given fluid bolus for simple  sepsis signs, patient tolerated well.

## 2025-02-28 NOTE — PROGRESS NOTES
[facility] MEDICINE SERVICE  DAILY PROGRESS NOTE    NAME: Ismael Pulido  : 1965  MRN: [id number]      LOS: 2 days     PROVIDER OF SERVICE: Ron Eduardo MD    Chief Complaint: Abdominal pain    Subjective:     Interval History:  History taken from: patient    Patient seen and examined at bedside this morning.  No acute complaints overnight.  Spiked a fever again this morning        Review of Systems: Negative except described above  Review of Systems    Objective:     Vital Signs  Temp:  [98 °F (36.7 °C)-103 °F (39.4 °C)] 98 °F (36.7 °C)  Heart Rate:  [] 96  Resp:  [18-24] 24  BP: ()/(61-91) 126/80   Body mass index is 35.06 kg/m².    Physical Exam  Physical Exam  Constitutional:       Comments: NAD    Cardiovascular:      Comments:  RRR, S1 & S2   Pulmonary:      Comments:  Lungs CTA   Abdominal:      Comments:  ABD soft, NT            Diagnostic Data    Results from last 7 days   Lab Units 25  0254 25  2123 25  1857   WBC 10*3/mm3 7.54   < >  --    HEMOGLOBIN g/dL 12.9*   < >  --    HEMATOCRIT % 40.8   < >  --    PLATELETS 10*3/mm3 183   < >  --    GLUCOSE mg/dL 97  --  134*   CREATININE mg/dL 1.26  --  1.24   BUN mg/dL 26*  --  22*   SODIUM mmol/L 136  --  134*   POTASSIUM mmol/L 4.3  --  4.4   AST (SGOT) U/L  --   --  65*   ALT (SGPT) U/L  --   --  38   ALK PHOS U/L  --   --  76   BILIRUBIN mg/dL  --   --  0.6   ANION GAP mmol/L 12.2  --  13.5    < > = values in this interval not displayed.       CT Chest Without Contrast Diagnostic    Result Date: 2025  Impression: 1. Findings suggesting volume overload/third spacing including increasing small to moderate left effusion, new trace right pleural effusion and new small pericardial effusion. 2. Increasing adjacent left lower lobe consolidation which could reflect atelectasis or pneumonia in the right clinical setting. 3. Mild likely reactive mediastinal adenopathy, slightly increasing from prior. 4.  Aortic and coronary atherosclerotic disease. 5. Stable mild cardiomegaly. Electronically Signed: Nicholas Becerra MD  2/27/2025 3:29 PM EST  Workstation ID: [id number]       I reviewed the patient's new clinical results.    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       Chest pain  -Troponin, 38, 39  -d. dimer 3.54  -CXR showed low lung volumes with probable mild bibasal atelectasis.   -Cta chest negative for PE. Small left pleural effusion. Mild bilateral dependent atelectasis  -Ekg rate 85, pvcs, pacemaker, prolonged SD  -Cardiology consulted  -Pacemaker interrogated and lead dislodged.  Patient scheduled to go for lead replacement as per cardiology     Abdominal pain  -Lipase 26  -Ct abd reviewed and showing Moderate gastric distention with air and fluid. This may be transient however, gastroparesis cannot be excluded. Correlate with patient symptoms. This can be further evaluated with nuclear medicine gastric emptying study as clinically warranted. If there is concern for gastric outlet obstruction, correlate with endoscopy. Moderately distended urinary bladder. Correlate for urinary retention.  -Will do bladder scan every 6 hours and monitor for retention  -Gastroenterology consulted  -Ng tube in place to low wall suction, removed today   -EGD on 2/26 showed esophagitis  -Continue PPI      -Fever likely in the setting of developing pneumonia  Chest x-ray reviewed, CT chest ordered which showed increasing left lower lobe consolidation  UA unremarkable  Blood cultures ordered on 2/25 showed no growth to date, will repeat another blood culture  RVP negative, will repeat  MRSA PCR negative  Pro-Fidel increased from 0.08 on  2/25-2.39 on 2/27  Continue on Zosyn for now  Check bilateral lower extremity venous duplex for any DVT, negative        Hypertension  -Stable  -Continue losartan     Type 2 diabetes  -Continue jardiance   -SSI  -Diabetic diet  -Monitor before meals and at bedtime     VTE Prophylaxis:  Mechanical VTE prophylaxis orders are present.             Disposition Planning:        Anticipated Date of Discharge: tbd         Time: 35 minutes     Code Status and Medical Interventions: CPR (Attempt to Resuscitate); Full Support   Ordered at: 02/25/25 8451     Level Of Support Discussed With:    Patient     Code Status (Patient has no pulse and is not breathing):    CPR (Attempt to Resuscitate)     Medical Interventions (Patient has pulse or is breathing):    Full Support       Signature: Electronically signed by Ron Eduardo MD, 02/28/25, 09:11 EST.  [facility] Hospitalist Team

## 2025-02-28 NOTE — PAYOR COMM NOTE
"  INPATIENT AUTH  EXTENSION REQUEST / CLINICAL SUPPORT -  [id number]       Requesting auth extension for Inpatient Hospital auth [id number].  Clinical support follows for above referenced request.  Please advise if additional information is required to process this authorization request.    Thank you,      Caro Hoover  Utilization Review Coordinator  [facility]  [address] [place]  [address]  Ph: [phone]  Fx: [phone]      Ismael Pulido (59 y.o. Male)       Date of Birth   1965    Social Security Number       Address   [address] [place] [address]    Home Phone   [phone]    MRN   [id number]       Episcopal   Gnosticism    Marital Status                               Admission Date   2/25/25    Admission Type   Emergency    Admitting Provider   Teofilo Belcher MD    Attending Provider   Ron Eduardo MD    Department, Room/Bed   [facility] OBSERVATION, 228/1       Discharge Date       Discharge Disposition       Discharge Destination                                 Attending Provider: Ron Eduardo MD    Allergies: Lisinopril    Isolation: None   Infection: Influenza (02/28/25)   Code Status: CPR    Ht: 172.7 cm (68\")   Wt: 105 kg (230 lb 9.6 oz)    Admission Cmt: None   Principal Problem: Abdominal pain [R10.9]                   Active Insurance as of 2/25/2025       Primary Coverage       Payor Plan Insurance Group Employer/Plan Group    [employer]ERCIAL AETNA [id number]       Payor Plan Address Payor Plan Phone Number Payor Plan Fax Number Effective Dates    [address] [phone]  3/25/2021 - None Entered    [facility] [id number]         Subscriber Name Subscriber Birth Date Member ID       ISMAEL PULIDO PATRICK 1965 [id number]                     Emergency Contacts        (Rel.) Home Phone Work Phone Mobile Phone    SIDCLAIRE (Spouse) [phone] -- [phone]      "         Operative/Procedure Notes (last 24 hours)  Notes from 02/27/25 1424 through 02/28/25 1424   No notes of this type exist for this encounter.          Physician Progress Notes (last 24 hours)        Win Reaves MD at 02/28/25 1225          CARDIOLOGY PROGRESS NOTE:    Ismael Pulido  59 y.o.  male  1965  [id number]      Referring Provider: Hospitalist    Reason for follow-up: Abdominal distention and nausea.  Pacemaker malfunction     Patient Care Team:  Sharon Silva MD as PCP - General (Internal Medicine & Pediatrics)    Subjective .  Patient had temperature last night and also has some shortness of breath.  Objective lying in bed comfortably     Review of Systems   Constitutional: Negative for malaise/fatigue.   Cardiovascular:  Negative for chest pain, dyspnea on exertion, leg swelling and palpitations.   Respiratory:  Positive for shortness of breath. Negative for cough.    Gastrointestinal:  Negative for abdominal pain, nausea and vomiting.   Neurological:  Negative for dizziness, focal weakness, headaches, light-headedness and numbness.   All other systems reviewed and are negative.      Allergies: Lisinopril    Scheduled Meds:cetirizine, 10 mg, Oral, Daily  empagliflozin, 25 mg, Oral, Daily  lidocaine PF 1%, 10 mL, Infiltration, Once  losartan, 25 mg, Oral, Daily  metoclopramide, 5 mg, Intravenous, BID AC  pantoprazole, 40 mg, Oral, BID AC  piperacillin-tazobactam, 3.375 g, Intravenous, Q8H  rosuvastatin, 10 mg, Oral, Daily  sodium chloride, 2,000 mL, Intravenous, Once  sodium chloride, 10 mL, Intravenous, Q12H  sodium chloride, 10 mL, Intravenous, Q12H      Continuous Infusions:   PRN Meds:.  acetaminophen **OR** acetaminophen **OR** acetaminophen    aluminum-magnesium hydroxide-simethicone    senna-docusate sodium **AND** polyethylene glycol **AND** bisacodyl **AND** bisacodyl    hydrALAZINE    nitroglycerin    ondansetron ODT **OR** ondansetron    ondansetron ODT **OR**  "ondansetron    [COMPLETED] Insert Peripheral IV **AND** sodium chloride    sodium chloride    sodium chloride    sodium chloride    sodium chloride        VITAL SIGNS  Vitals:    02/28/25 0415 02/28/25 0746 02/28/25 1004 02/28/25 1110   BP: 128/77 126/80  131/87   BP Location: Right arm Left arm  Left arm   Patient Position: Lying Lying  Lying   Pulse: 99 96  107   Resp: 19 24  18   Temp: 98.2 °F (36.8 °C) 98 °F (36.7 °C) (!) 100.7 °F (38.2 °C) 99 °F (37.2 °C)   TempSrc: Axillary Oral Oral Oral   SpO2: 90% 96%     Weight: 105 kg (230 lb 9.6 oz)      Height:           Flowsheet Rows      Flowsheet Row First Filed Value   Admission Height 172.7 cm (68\") Documented at 02/25/2025 1110   Admission Weight 104 kg (229 lb 4.5 oz) Documented at 02/25/2025 1110             TELEMETRY: Ventricular pacemaker rhythm    Physical Exam:  Constitutional:       Appearance: Well-developed.   Eyes:      General: No scleral icterus.     Conjunctiva/sclera: Conjunctivae normal.      Pupils: Pupils are equal, round, and reactive to light.   HENT:      Head: Normocephalic and atraumatic.   Neck:      Vascular: No carotid bruit or JVD.   Pulmonary:      Effort: Pulmonary effort is normal.      Breath sounds: Normal breath sounds. No wheezing. No rales.   Cardiovascular:      Normal rate. Regular rhythm.   Pulses:     Intact distal pulses.   Abdominal:      General: Bowel sounds are normal.      Palpations: Abdomen is soft.   Musculoskeletal: Normal range of motion.      Cervical back: Normal range of motion and neck supple. Skin:     General: Skin is warm and dry.      Findings: No rash.   Neurological:      Mental Status: Alert.      Comments: No focal deficits          Results Review:   I reviewed the patient's new clinical results.  Lab Results (last 24 hours)       Procedure Component Value Units Date/Time    Respiratory Panel PCR w/COVID-19(SARS-CoV-2) MAC/DEVIN/RALPH/PAD/COR/TANNER In-House, NP Swab in UTM/VTM, 2 HR TAT - Swab, Nasopharynx " [[id number]]  (Abnormal) Collected: 02/28/25 1055    Specimen: Swab from Nasopharynx Updated: 02/28/25 1159     ADENOVIRUS, PCR Not Detected     Coronavirus 229E Not Detected     Coronavirus HKU1 Not Detected     Coronavirus NL63 Not Detected     Coronavirus OC43 Not Detected     COVID19 Not Detected     Human Metapneumovirus Not Detected     Human Rhinovirus/Enterovirus Not Detected     Influenza A H3 Detected     Influenza B PCR Not Detected     Parainfluenza Virus 1 Not Detected     Parainfluenza Virus 2 Not Detected     Parainfluenza Virus 3 Not Detected     Parainfluenza Virus 4 Not Detected     RSV, PCR Not Detected     Bordetella pertussis pcr Not Detected     Bordetella parapertussis PCR Not Detected     Chlamydophila pneumoniae PCR Not Detected     Mycoplasma pneumo by PCR Not Detected    Narrative:      In the setting of a positive respiratory panel with a viral infection PLUS a negative procalcitonin without other underlying concern for bacterial infection, consider observing off antibiotics or discontinuation of antibiotics and continue supportive care. If the respiratory panel is positive for atypical bacterial infection (Bordetella pertussis, Chlamydophila pneumoniae, or Mycoplasma pneumoniae), consider antibiotic de-escalation to target atypical bacterial infection.    Basic Metabolic Panel [[id number]]  (Abnormal) Collected: 02/28/25 0254    Specimen: Blood Updated: 02/28/25 0339     Glucose 97 mg/dL      BUN 26 mg/dL      Creatinine 1.26 mg/dL      Sodium 136 mmol/L      Potassium 4.3 mmol/L      Chloride 103 mmol/L      CO2 20.8 mmol/L      Calcium 8.2 mg/dL      BUN/Creatinine Ratio 20.6     Anion Gap 12.2 mmol/L      eGFR 65.7 mL/min/1.73     Narrative:      GFR Categories in Chronic Kidney Disease (CKD)      GFR Category          GFR (mL/min/1.73)    Interpretation  G1                     90 or greater         Normal or high (1)  G2                      60-89                Mild decrease (1)  G3a                    45-59                Mild to moderate decrease  G3b                   30-44                Moderate to severe decrease  G4                    15-29                Severe decrease  G5                    14 or less           Kidney failure          (1)In the absence of evidence of kidney disease, neither GFR category G1 or G2 fulfill the criteria for CKD.    eGFR calculation 2021 CKD-EPI creatinine equation, which does not include race as a factor    CBC & Differential [[id number]]  (Abnormal) Collected: 02/28/25 0254    Specimen: Blood Updated: 02/28/25 0325    Narrative:      The following orders were created for panel order CBC & Differential.  Procedure                               Abnormality         Status                     ---------                               -----------         ------                     CBC Auto Differential[[id number]]        Abnormal            Final result                 Please view results for these tests on the individual orders.    CBC Auto Differential [[id number]]  (Abnormal) Collected: 02/28/25 0254    Specimen: Blood Updated: 02/28/25 0325     WBC 7.54 10*3/mm3      RBC 4.47 10*6/mm3      Hemoglobin 12.9 g/dL      Hematocrit 40.8 %      MCV 91.3 fL      MCH 28.9 pg      MCHC 31.6 g/dL      RDW 13.2 %      RDW-SD 44.3 fl      MPV 9.3 fL      Platelets 183 10*3/mm3      Neutrophil % 70.7 %      Lymphocyte % 12.2 %      Monocyte % 15.4 %      Eosinophil % 0.5 %      Basophil % 0.5 %      Immature Grans % 0.7 %      Neutrophils, Absolute 5.33 10*3/mm3      Lymphocytes, Absolute 0.92 10*3/mm3      Monocytes, Absolute 1.16 10*3/mm3      Eosinophils, Absolute 0.04 10*3/mm3      Basophils, Absolute 0.04 10*3/mm3      Immature Grans, Absolute 0.05 10*3/mm3      nRBC 0.0 /100 WBC     Lactic Acid, Plasma [[id number]]  (Normal) Collected: 02/27/25 2123    Specimen: Blood Updated: 02/27/25 2151     Lactate 1.6 mmol/L     Comprehensive Metabolic Panel [[id number]]  (Abnormal)  Collected: 02/27/25 1857    Specimen: Blood Updated: 02/27/25 2136     Glucose 134 mg/dL      BUN 22 mg/dL      Creatinine 1.24 mg/dL      Sodium 134 mmol/L      Potassium 4.4 mmol/L      Chloride 100 mmol/L      CO2 20.5 mmol/L      Calcium 8.7 mg/dL      Total Protein 7.2 g/dL      Albumin 3.5 g/dL      ALT (SGPT) 38 U/L      AST (SGOT) 65 U/L      Alkaline Phosphatase 76 U/L      Total Bilirubin 0.6 mg/dL      Globulin 3.7 gm/dL      A/G Ratio 0.9 g/dL      BUN/Creatinine Ratio 17.7     Anion Gap 13.5 mmol/L      eGFR 67.0 mL/min/1.73     Narrative:      GFR Categories in Chronic Kidney Disease (CKD)      GFR Category          GFR (mL/min/1.73)    Interpretation  G1                     90 or greater         Normal or high (1)  G2                      60-89                Mild decrease (1)  G3a                   45-59                Mild to moderate decrease  G3b                   30-44                Moderate to severe decrease  G4                    15-29                Severe decrease  G5                    14 or less           Kidney failure          (1)In the absence of evidence of kidney disease, neither GFR category G1 or G2 fulfill the criteria for CKD.    eGFR calculation 2021 CKD-EPI creatinine equation, which does not include race as a factor    CBC & Differential [[id number]]  (Abnormal) Collected: 02/27/25 2123    Specimen: Blood Updated: 02/27/25 2133    Narrative:      The following orders were created for panel order CBC & Differential.  Procedure                               Abnormality         Status                     ---------                               -----------         ------                     CBC Auto Differential[[id number]]        Abnormal            Final result                 Please view results for these tests on the individual orders.    CBC Auto Differential [[id number]]  (Abnormal) Collected: 02/27/25 2123    Specimen: Blood Updated: 02/27/25 2133     WBC 9.20 10*3/mm3       RBC 4.76 10*6/mm3      Hemoglobin 13.9 g/dL      Hematocrit 42.8 %      MCV 89.9 fL      MCH 29.2 pg      MCHC 32.5 g/dL      RDW 13.2 %      RDW-SD 43.8 fl      MPV 8.9 fL      Platelets 200 10*3/mm3      Neutrophil % 74.8 %      Lymphocyte % 10.2 %      Monocyte % 13.9 %      Eosinophil % 0.2 %      Basophil % 0.5 %      Immature Grans % 0.4 %      Neutrophils, Absolute 6.87 10*3/mm3      Lymphocytes, Absolute 0.94 10*3/mm3      Monocytes, Absolute 1.28 10*3/mm3      Eosinophils, Absolute 0.02 10*3/mm3      Basophils, Absolute 0.05 10*3/mm3      Immature Grans, Absolute 0.04 10*3/mm3      nRBC 0.0 /100 WBC     Blood Culture - Blood, Arm, Right [[id number]]  (Normal) Collected: 02/25/25 2026    Specimen: Blood from Arm, Right Updated: 02/27/25 2115     Blood Culture No growth at 2 days    Blood Culture - Blood, Arm, Left [[id number]]  (Normal) Collected: 02/25/25 2029    Specimen: Blood from Arm, Left Updated: 02/27/25 2115     Blood Culture No growth at 2 days    MRSA Screen, PCR (Inpatient) - Swab, Nares [[id number]]  (Normal) Collected: 02/27/25 1935    Specimen: Swab from Nares Updated: 02/27/25 2114     MRSA PCR No MRSA Detected    Narrative:      The negative predictive value of this diagnostic test is high and should only be used to consider de-escalating anti-MRSA therapy. A positive result may indicate colonization with MRSA and must be correlated clinically.    Procalcitonin [[id number]]  (Abnormal) Collected: 02/27/25 1857    Specimen: Blood Updated: 02/27/25 1957     Procalcitonin 2.39 ng/mL     Narrative:      As a Marker for Sepsis (Non-Neonates):    1. <0.5 ng/mL represents a low risk of severe sepsis and/or septic shock.  2. >2 ng/mL represents a high risk of severe sepsis and/or septic shock.    As a Marker for Lower Respiratory Tract Infections that require antibiotic therapy:    PCT on Admission    Antibiotic Therapy       6-12 Hrs later    >0.5                Strongly Recommended  >0.25 - <0.5        " Recommended   0.1 - 0.25          Discouraged              Remeasure/reassess PCT  <0.1                Strongly Discouraged     Remeasure/reassess PCT    As 28 day mortality risk marker: \"Change in Procalcitonin Result\" (>80% or <=80%) if Day 0 (or Day 1) and Day 4 values are available. Refer to http://www.[facility]-pct-calculator.com    Change in PCT <=80%  A decrease of PCT levels below or equal to 80% defines a positive change in PCT test result representing a higher risk for 28-day all-cause mortality of patients diagnosed with severe sepsis for septic shock.    Change in PCT >80%  A decrease of PCT levels of more than 80% defines a negative change in PCT result representing a lower risk for 28-day all-cause mortality of patients diagnosed with severe sepsis or septic shock.       Urinalysis, Microscopic Only - Urine, Clean Catch [[id number]] Collected: 02/27/25 1721    Specimen: Urine, Clean Catch Updated: 02/27/25 1735     RBC, UA 0-2 /HPF      WBC, UA 0-2 /HPF      Comment: Urine culture not indicated.        Bacteria, UA None Seen /HPF      Squamous Epithelial Cells, UA 0-2 /HPF      Hyaline Casts, UA None Seen /LPF      Methodology Automated Microscopy    Urinalysis With Culture If Indicated - Urine, Clean Catch [[id number]]  (Abnormal) Collected: 02/27/25 1721    Specimen: Urine, Clean Catch Updated: 02/27/25 1735     Color, UA Yellow     Appearance, UA Clear     pH, UA 6.5     Specific Gravity, UA 1.038     Glucose, UA >=1000 mg/dL (3+)     Ketones, UA 15 mg/dL (1+)     Bilirubin, UA Negative     Blood, UA Negative     Protein, UA 30 mg/dL (1+)     Leuk Esterase, UA Negative     Nitrite, UA Negative     Urobilinogen, UA 1.0 E.U./dL    Narrative:      In absence of clinical symptoms, the presence of pyuria, bacteria, and/or nitrites on the urinalysis result does not correlate with infection.    Blood Culture - Blood, Hand, Right [[id number]] Collected: 02/27/25 1707    Specimen: Blood from Hand, Right Updated: " "02/27/25 1714    Procalcitonin [[id number]]  (Abnormal) Collected: 02/27/25 1236    Specimen: Blood Updated: 02/27/25 1405     Procalcitonin 2.52 ng/mL     Narrative:      As a Marker for Sepsis (Non-Neonates):    1. <0.5 ng/mL represents a low risk of severe sepsis and/or septic shock.  2. >2 ng/mL represents a high risk of severe sepsis and/or septic shock.    As a Marker for Lower Respiratory Tract Infections that require antibiotic therapy:    PCT on Admission    Antibiotic Therapy       6-12 Hrs later    >0.5                Strongly Recommended  >0.25 - <0.5        Recommended   0.1 - 0.25          Discouraged              Remeasure/reassess PCT  <0.1                Strongly Discouraged     Remeasure/reassess PCT    As 28 day mortality risk marker: \"Change in Procalcitonin Result\" (>80% or <=80%) if Day 0 (or Day 1) and Day 4 values are available. Refer to http://www.[employer] [facility]-pct-calculator.com    Change in PCT <=80%  A decrease of PCT levels below or equal to 80% defines a positive change in PCT test result representing a higher risk for 28-day all-cause mortality of patients diagnosed with severe sepsis for septic shock.    Change in PCT >80%  A decrease of PCT levels of more than 80% defines a negative change in PCT result representing a lower risk for 28-day all-cause mortality of patients diagnosed with severe sepsis or septic shock.               Imaging Results (Last 24 Hours)       Procedure Component Value Units Date/Time    CT Chest Without Contrast Diagnostic [[id number]] Collected: 02/27/25 1525     Updated: 02/27/25 1531    Narrative:      CT CHEST WO CONTRAST DIAGNOSTIC    Date of Exam: 2/27/2025 2:10 PM EST    Indication: Chest pain, rule out pneumonia.    Comparison: Chest CT 2/25/2025    Technique: Axial CT images were obtained of the chest without contrast administration.  Sagittal and coronal reconstructions were performed.  Automated exposure control and iterative reconstruction methods were " used.      Findings:  Thyroid unremarkable. Aortic atherosclerotic disease without aneurysm. Multichamber AICD. Coronary atherosclerotic disease. Mild cardiomegaly. Increasing small low-density pericardial effusion. Esophagus unremarkable. Mild mediastinal adenopathy   including some increasing nodes from prior exam, example 1.3 cm short axis right paratracheal lymph node previously measured 7 mm short axis.    Trachea patent. Increasing now small to moderate low-density left effusion and new trace right pleural effusion. Increasing adjacent consolidation in the left lower lobe. Linear bandlike areas of atelectasis versus scar. No edema or pneumothorax. No   suspicious nodule.    Lipomatous infiltration and atrophy of the pancreas. Vicarious excretion of contrast in the gallbladder. Colonic diverticulosis. Minimal symmetric gynecomastia. Minimal reticular subcutaneous edema similar to prior. Bone island in the left glenoid. No   acute displaced fracture or aggressive lesion. Degenerative changes in the thoracic spine.      Impression:      Impression:  1. Findings suggesting volume overload/third spacing including increasing small to moderate left effusion, new trace right pleural effusion and new small pericardial effusion.  2. Increasing adjacent left lower lobe consolidation which could reflect atelectasis or pneumonia in the right clinical setting.  3. Mild likely reactive mediastinal adenopathy, slightly increasing from prior.  4. Aortic and coronary atherosclerotic disease.  5. Stable mild cardiomegaly.      Electronically Signed: Nicholas Becerra MD    2/27/2025 3:29 PM EST    Workstation ID: [id number]            EKG      I personally viewed and interpreted the patient's EKG/Telemetry data:    ECHOCARDIOGRAM:  Results for orders placed during the hospital encounter of 02/25/25    Adult Transthoracic Echo Limited W/ Cont if Necessary Per Protocol    Interpretation Summary    Left ventricular ejection fraction  appears to be 51 - 55%.    The right ventricular cavity is moderately dilated.    Technically very limited study.  Valvular structure and function was not evaluated.       STRESS MYOVIEW:  Results for orders placed during the hospital encounter of 02/18/25    Stress Test With Myocardial Perfusion One Day    Interpretation Summary    Myocardial perfusion imaging indicates a normal myocardial perfusion study with no evidence of ischemia. Impressions are consistent with a low risk study.    Left ventricular ejection fraction is normal (Calculated EF = 64%).       CARDIAC CATHETERIZATION:  No results found for this or any previous visit.       OTHER:         Assessment & Plan     Fever/shortness of breath  Patient developed fever last night and had some shortness of breath and hence he had respiratory panel done which showed influenza A and he will be on Tamiflu now.  Patient also has high white count.    Sick sinus syndrome/RV lead dislodgment  Patient had sick sinus syndrome and status post pacemaker placement which was a biventricular pacemaker.  Patient's right ventricular lead is showing high thresholds and hence will be repositioned but because of the fluid will hold off for a few days.    Abdominal pain and distention  Patient initially presented with abdominal discomfort and at high white count but now after the EGD is feeling better    History of hypertension  Patient is currently on beta-blockers.    Hyperlipidemia  Patrick on statins and the lipid levels will be followed with primary care doctor    Diabetes  Patient on oral medicines and his sugar levels are followed by the primary care doctor.  I discussed the patients findings and my recommendations with patient and nurse    Win Reaves MD  02/28/25  12:25 EST                Electronically signed by Win Reaves MD at 02/28/25 9899       Ron Eduardo MD at 02/28/25 0956              [facility] MEDICINE SERVICE  DAILY PROGRESS  NOTE    NAME: Ismael Pulido  : 1965  MRN: [id number]      LOS: 2 days     PROVIDER OF SERVICE: Ron Eduardo MD    Chief Complaint: Abdominal pain    Subjective:     Interval History:  History taken from: patient    Patient seen and examined at bedside this morning.  No acute complaints overnight.  Spiked a fever again this morning        Review of Systems: Negative except described above  Review of Systems    Objective:     Vital Signs  Temp:  [98 °F (36.7 °C)-103 °F (39.4 °C)] 98 °F (36.7 °C)  Heart Rate:  [] 96  Resp:  [18-24] 24  BP: ()/(61-91) 126/80   Body mass index is 35.06 kg/m².    Physical Exam  Physical Exam  Constitutional:       Comments: NAD    Cardiovascular:      Comments:  RRR, S1 & S2   Pulmonary:      Comments:  Lungs CTA   Abdominal:      Comments:  ABD soft, NT            Diagnostic Data    Results from last 7 days   Lab Units 25  0254 25  2123 25  1857   WBC 10*3/mm3 7.54   < >  --    HEMOGLOBIN g/dL 12.9*   < >  --    HEMATOCRIT % 40.8   < >  --    PLATELETS 10*3/mm3 183   < >  --    GLUCOSE mg/dL 97  --  134*   CREATININE mg/dL 1.26  --  1.24   BUN mg/dL 26*  --  22*   SODIUM mmol/L 136  --  134*   POTASSIUM mmol/L 4.3  --  4.4   AST (SGOT) U/L  --   --  65*   ALT (SGPT) U/L  --   --  38   ALK PHOS U/L  --   --  76   BILIRUBIN mg/dL  --   --  0.6   ANION GAP mmol/L 12.2  --  13.5    < > = values in this interval not displayed.       CT Chest Without Contrast Diagnostic    Result Date: 2025  Impression: 1. Findings suggesting volume overload/third spacing including increasing small to moderate left effusion, new trace right pleural effusion and new small pericardial effusion. 2. Increasing adjacent left lower lobe consolidation which could reflect atelectasis or pneumonia in the right clinical setting. 3. Mild likely reactive mediastinal adenopathy, slightly increasing from prior. 4. Aortic and coronary atherosclerotic disease. 5.  Stable mild cardiomegaly. Electronically Signed: Nicholas Becerra MD  2/27/2025 3:29 PM EST  Workstation ID: [id number]       I reviewed the patient's new clinical results.    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       Chest pain  -Troponin, 38, 39  -d. dimer 3.54  -CXR showed low lung volumes with probable mild bibasal atelectasis.   -Cta chest negative for PE. Small left pleural effusion. Mild bilateral dependent atelectasis  -Ekg rate 85, pvcs, pacemaker, prolonged NV  -Cardiology consulted  -Pacemaker interrogated and lead dislodged.  Patient scheduled to go for lead replacement as per cardiology     Abdominal pain  -Lipase 26  -Ct abd reviewed and showing Moderate gastric distention with air and fluid. This may be transient however, gastroparesis cannot be excluded. Correlate with patient symptoms. This can be further evaluated with nuclear medicine gastric emptying study as clinically warranted. If there is concern for gastric outlet obstruction, correlate with endoscopy. Moderately distended urinary bladder. Correlate for urinary retention.  -Will do bladder scan every 6 hours and monitor for retention  -Gastroenterology consulted  -Ng tube in place to low wall suction, removed today   -EGD on 2/26 showed esophagitis  -Continue PPI      -Fever likely in the setting of developing pneumonia  Chest x-ray reviewed, CT chest ordered which showed increasing left lower lobe consolidation  UA unremarkable  Blood cultures ordered on 2/25 showed no growth to date, will repeat another blood culture  RVP negative, will repeat  MRSA PCR negative  Pro-Fidel increased from 0.08 on 2/25-2.39 on 2/27  Continue on Zosyn for  now  Check bilateral lower extremity venous duplex for any DVT, negative        Hypertension  -Stable  -Continue losartan     Type 2 diabetes  -Continue jardiance   -SSI  -Diabetic diet  -Monitor before meals and at bedtime     VTE Prophylaxis:  Mechanical VTE prophylaxis orders are present.             Disposition Planning:        Anticipated Date of Discharge: tbd         Time: 35 minutes     Code Status and Medical Interventions: CPR (Attempt to Resuscitate); Full Support   Ordered at: 02/25/25 1627     Level Of Support Discussed With:    Patient     Code Status (Patient has no pulse and is not breathing):    CPR (Attempt to Resuscitate)     Medical Interventions (Patient has pulse or is breathing):    Full Support       Signature: Electronically signed by Ron Eduardo MD, 02/28/25, 09:11 EST.  [facility] Hospitalist Team      Electronically signed by Ron Eduardo MD at 02/28/25 1130       Consult Notes (last 24 hours)  Notes from 02/27/25 1425 through 02/28/25 1425   No notes of this type exist for this encounter.

## 2025-03-01 PROCEDURE — 25010000002 PIPERACILLIN SOD-TAZOBACTAM PER 1 G

## 2025-03-01 PROCEDURE — 25010000002 METOCLOPRAMIDE PER 10 MG: Performed by: INTERNAL MEDICINE

## 2025-03-01 PROCEDURE — 99232 SBSQ HOSP IP/OBS MODERATE 35: CPT | Performed by: INTERNAL MEDICINE

## 2025-03-01 RX ORDER — OSELTAMIVIR PHOSPHATE 75 MG/1
75 CAPSULE ORAL EVERY 12 HOURS SCHEDULED
Status: DISCONTINUED | OUTPATIENT
Start: 2025-03-01 | End: 2025-03-04 | Stop reason: HOSPADM

## 2025-03-01 RX ADMIN — CETIRIZINE HYDROCHLORIDE 10 MG: 10 TABLET, FILM COATED ORAL at 10:07

## 2025-03-01 RX ADMIN — PANTOPRAZOLE SODIUM 40 MG: 40 TABLET, DELAYED RELEASE ORAL at 17:30

## 2025-03-01 RX ADMIN — PIPERACILLIN AND TAZOBACTAM 3.38 G: 3; .375 INJECTION, POWDER, FOR SOLUTION INTRAVENOUS at 03:17

## 2025-03-01 RX ADMIN — LOSARTAN POTASSIUM 25 MG: 25 TABLET, FILM COATED ORAL at 10:07

## 2025-03-01 RX ADMIN — OSELTAMIVIR PHOSPHATE 75 MG: 75 CAPSULE ORAL at 19:57

## 2025-03-01 RX ADMIN — Medication 10 ML: at 10:08

## 2025-03-01 RX ADMIN — ROSUVASTATIN CALCIUM 10 MG: 10 TABLET, FILM COATED ORAL at 10:07

## 2025-03-01 RX ADMIN — Medication 10 ML: at 10:07

## 2025-03-01 RX ADMIN — OSELTAMIVIR PHOSPHATE 75 MG: 75 CAPSULE ORAL at 10:07

## 2025-03-01 RX ADMIN — PIPERACILLIN AND TAZOBACTAM 3.38 G: 3; .375 INJECTION, POWDER, FOR SOLUTION INTRAVENOUS at 19:57

## 2025-03-01 RX ADMIN — METOCLOPRAMIDE 5 MG: 5 INJECTION, SOLUTION INTRAMUSCULAR; INTRAVENOUS at 17:30

## 2025-03-01 RX ADMIN — ACETAMINOPHEN 650 MG: 325 TABLET, FILM COATED ORAL at 17:30

## 2025-03-01 RX ADMIN — PIPERACILLIN AND TAZOBACTAM 3.38 G: 3; .375 INJECTION, POWDER, FOR SOLUTION INTRAVENOUS at 11:59

## 2025-03-01 RX ADMIN — METOCLOPRAMIDE 5 MG: 5 INJECTION, SOLUTION INTRAMUSCULAR; INTRAVENOUS at 10:07

## 2025-03-01 RX ADMIN — EMPAGLIFLOZIN 25 MG: 25 TABLET, FILM COATED ORAL at 10:06

## 2025-03-01 RX ADMIN — PANTOPRAZOLE SODIUM 40 MG: 40 TABLET, DELAYED RELEASE ORAL at 10:07

## 2025-03-01 NOTE — PLAN OF CARE
Problem: Adult Inpatient Plan of Care  Goal: Plan of Care Review  Outcome: Progressing  Flowsheets (Taken 2/28/2025 4354)  Progress: no change  Outcome Evaluation: Patient having low grade fever this shift. Tylenol is effective. Continues IV abt. Call light is in reach and fall precautions are in place.  Plan of Care Reviewed With: patient  Goal: Patient-Specific Goal (Individualized)  Outcome: Progressing  Goal: Absence of Hospital-Acquired Illness or Injury  Outcome: Progressing  Intervention: Identify and Manage Fall Risk  Recent Flowsheet Documentation  Taken 2/28/2025 2200 by Cristina Marie RN  Safety Promotion/Fall Prevention: safety round/check completed  Taken 2/28/2025 2100 by Cristina Marie RN  Safety Promotion/Fall Prevention: safety round/check completed  Taken 2/28/2025 2001 by Cristina Marie RN  Safety Promotion/Fall Prevention: safety round/check completed  Taken 2/28/2025 1929 by Cristina Marie RN  Safety Promotion/Fall Prevention: safety round/check completed  Intervention: Prevent Skin Injury  Recent Flowsheet Documentation  Taken 2/28/2025 2001 by Cristina Marie RN  Body Position: position changed independently  Skin Protection: transparent dressing maintained  Intervention: Prevent and Manage VTE (Venous Thromboembolism) Risk  Recent Flowsheet Documentation  Taken 2/28/2025 2001 by Cristina Marie RN  VTE Prevention/Management: patient refused intervention  Intervention: Prevent Infection  Recent Flowsheet Documentation  Taken 2/28/2025 2200 by Cristina Marie RN  Infection Prevention: rest/sleep promoted  Taken 2/28/2025 2100 by Cristina Marie RN  Infection Prevention: rest/sleep promoted  Taken 2/28/2025 2001 by Cristina Marie RN  Infection Prevention: environmental surveillance performed  Taken 2/28/2025 1929 by Cristina Marie RN  Infection Prevention: rest/sleep promoted  Goal: Optimal Comfort and Wellbeing  Outcome: Progressing  Intervention: Provide Person-Centered Care  Recent  Flowsheet Documentation  Taken 2/28/2025 2001 by Cristina Marie, RN  Trust Relationship/Rapport: care explained  Goal: Readiness for Transition of Care  Outcome: Progressing   Goal Outcome Evaluation:  Plan of Care Reviewed With: patient        Progress: no change  Outcome Evaluation: Patient having low grade fever this shift. Tylenol is effective. Continues IV abt. Call light is in reach and fall precautions are in place.

## 2025-03-01 NOTE — PROGRESS NOTES
[facility] MEDICINE SERVICE  DAILY PROGRESS NOTE    NAME: Ismael Pulido  : 1965  MRN: [id number]      LOS: 3 days     PROVIDER OF SERVICE: Ron Eduardo MD    Chief Complaint: Abdominal pain    Subjective:     Interval History:  History taken from: patient    Patient seen and examined at bedside this morning.  No acute complaints overnight.  He is feeling much better today        Review of Systems: Negative except as described above      Review of Systems    Objective:     Vital Signs  Temp:  [97.8 °F (36.6 °C)-101.3 °F (38.5 °C)] 97.8 °F (36.6 °C)  Heart Rate:  [] 87  Resp:  [16-19] 18  BP: (118-131)/(76-90) 118/83   Body mass index is 35.06 kg/m².    Physical Exam  Physical Exam  Constitutional:       Comments: NAD    Cardiovascular:      Comments:  RRR, S1 & S2   Pulmonary:      Comments:  Lungs CTA   Abdominal:      Comments:  ABD soft, NT            Diagnostic Data    Results from last 7 days   Lab Units 25  0254 253 25  1857   WBC 10*3/mm3 7.54   < >  --    HEMOGLOBIN g/dL 12.9*   < >  --    HEMATOCRIT % 40.8   < >  --    PLATELETS 10*3/mm3 183   < >  --    GLUCOSE mg/dL 97  --  134*   CREATININE mg/dL 1.26  --  1.24   BUN mg/dL 26*  --  22*   SODIUM mmol/L 136  --  134*   POTASSIUM mmol/L 4.3  --  4.4   AST (SGOT) U/L  --   --  65*   ALT (SGPT) U/L  --   --  38   ALK PHOS U/L  --   --  76   BILIRUBIN mg/dL  --   --  0.6   ANION GAP mmol/L 12.2  --  13.5    < > = values in this interval not displayed.       CT Chest Without Contrast Diagnostic    Result Date: 2025  Impression: 1. Findings suggesting volume overload/third spacing including increasing small to moderate left effusion, new trace right pleural effusion and new small pericardial effusion. 2. Increasing adjacent left lower lobe consolidation which could reflect atelectasis or pneumonia in the right clinical setting. 3. Mild likely reactive mediastinal adenopathy, slightly increasing  from prior. 4. Aortic and coronary atherosclerotic disease. 5. Stable mild cardiomegaly. Electronically Signed: Nicholas Becerra MD  2/27/2025 3:29 PM EST  Workstation ID: [id number]       I reviewed the patient's new clinical results.    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       Chest pain  -Troponin, 38, 39  -d. dimer 3.54  -CXR showed low lung volumes with probable mild bibasal atelectasis.   -Cta chest negative for PE. Small left pleural effusion. Mild bilateral dependent atelectasis  -Ekg rate 85, pvcs, pacemaker, prolonged ND  -Cardiology consulted  -Pacemaker interrogated and lead dislodged.  Patient was initially scheduled to go for lead replacement with EP however because he tested positive for flu and was continuously spiking fevers procedure rescheduled to early next week     Abdominal pain  -Lipase 26  -Ct abd reviewed and showing Moderate gastric distention with air and fluid. This may be transient however, gastroparesis cannot be excluded. Correlate with patient symptoms. This can be further evaluated with nuclear medicine gastric emptying study as clinically warranted. If there is concern for gastric outlet obstruction, correlate with endoscopy. Moderately distended urinary bladder. Correlate for urinary retention.  -Will do bladder scan every 6 hours and monitor for retention  -Gastroenterology consulted  -EGD on 2/26 showed esophagitis  -Continue PPI      -Fever likely in the setting of developing pneumonia  Chest x-ray reviewed, CT chest ordered which showed increasing left lower lobe consolidation  UA unremarkable  Blood cultures ordered on 2/25 showed no growth to date, will repeat another blood  culture on 12/27 also showed no growth to date  RVP negative, will repeat  MRSA PCR negative  Pro-Fidel increased from 0.08 on 2/25-2.39 on 2/27  Tested positive for flu, will start on Tamiflu  Continue on Zosyn for now  Check bilateral lower extremity venous duplex for any DVT, negative        Hypertension  -Stable  -Continue losartan     Type 2 diabetes  -Continue jardiance   -SSI  -Diabetic diet  -Monitor before meals and at bedtime        VTE Prophylaxis:  Mechanical VTE prophylaxis orders are present.             Disposition Planning:        Anticipated Date of Discharge: 3/5/2025         Time: 35 minutes     Code Status and Medical Interventions: CPR (Attempt to Resuscitate); Full Support   Ordered at: 02/25/25 1627     Level Of Support Discussed With:    Patient     Code Status (Patient has no pulse and is not breathing):    CPR (Attempt to Resuscitate)     Medical Interventions (Patient has pulse or is breathing):    Full Support       Signature: Electronically signed by Ron Eduardo MD, 03/01/25, 09:00 EST.  [facility] Hospitalist Team

## 2025-03-01 NOTE — PROGRESS NOTES
CARDIOLOGY PROGRESS NOTE:    Ismael Pulido  59 y.o.  male  1965  [id number]      Referring Provider: Hospitalist    Reason for follow-up: Pacemaker malfunction influenza A     Patient Care Team:  Sharon Silva MD as PCP - General (Internal Medicine & Pediatrics)    Subjective .  Patient is doing well without any chest pain or shortness of breath or cough    Objective lying in bed comfortably sitting in chair     Review of Systems   Constitutional: Negative for malaise/fatigue.   Cardiovascular:  Negative for chest pain, dyspnea on exertion, leg swelling and palpitations.   Respiratory:  Negative for cough and shortness of breath.    Gastrointestinal:  Negative for abdominal pain, nausea and vomiting.   Neurological:  Negative for dizziness, focal weakness, headaches, light-headedness and numbness.   All other systems reviewed and are negative.      Allergies: Lisinopril    Scheduled Meds:cetirizine, 10 mg, Oral, Daily  empagliflozin, 25 mg, Oral, Daily  lidocaine PF 1%, 10 mL, Infiltration, Once  losartan, 25 mg, Oral, Daily  metoclopramide, 5 mg, Intravenous, BID AC  oseltamivir, 75 mg, Oral, Q12H  pantoprazole, 40 mg, Oral, BID AC  piperacillin-tazobactam, 3.375 g, Intravenous, Q8H  rosuvastatin, 10 mg, Oral, Daily  sodium chloride, 2,000 mL, Intravenous, Once  sodium chloride, 10 mL, Intravenous, Q12H  sodium chloride, 10 mL, Intravenous, Q12H      Continuous Infusions:Pharmacy to Dose Oseltamivir (TAMIFLU),       PRN Meds:.  acetaminophen **OR** acetaminophen **OR** acetaminophen    aluminum-magnesium hydroxide-simethicone    senna-docusate sodium **AND** polyethylene glycol **AND** bisacodyl **AND** bisacodyl    hydrALAZINE    nitroglycerin    ondansetron ODT **OR** ondansetron    ondansetron ODT **OR** ondansetron    Pharmacy to Dose Oseltamivir (TAMIFLU)    [COMPLETED] Insert Peripheral IV **AND** sodium chloride    sodium chloride    sodium chloride    sodium chloride    sodium  "chloride        VITAL SIGNS  Vitals:    02/28/25 2350 03/01/25 0330 03/01/25 0757 03/01/25 1110   BP: 125/90 120/88 118/83 132/84   BP Location: Left arm Right arm Right arm Right arm   Patient Position: Lying Lying Lying Sitting   Pulse: 70 80 87 89   Resp: 18 16 18 15   Temp: 99.3 °F (37.4 °C) 98.8 °F (37.1 °C) 97.8 °F (36.6 °C) 97.7 °F (36.5 °C)   TempSrc: Oral Oral Axillary Oral   SpO2: 95% 98% 99% 98%   Weight:       Height:           Flowsheet Rows      Flowsheet Row First Filed Value   Admission Height 172.7 cm (68\") Documented at 02/25/2025 1110   Admission Weight 104 kg (229 lb 4.5 oz) Documented at 02/25/2025 1110             TELEMETRY: Pacemaker rhythm    Physical Exam:  Constitutional:       Appearance: Well-developed.   Eyes:      General: No scleral icterus.     Conjunctiva/sclera: Conjunctivae normal.   HENT:      Head: Normocephalic and atraumatic.   Neck:      Vascular: No carotid bruit or JVD.   Pulmonary:      Effort: Pulmonary effort is normal.      Breath sounds: Normal breath sounds. No wheezing. No rales.   Cardiovascular:      Normal rate. Regular rhythm.   Pulses:     Intact distal pulses.   Abdominal:      General: Bowel sounds are normal.      Palpations: Abdomen is soft.   Musculoskeletal:      Cervical back: Normal range of motion and neck supple. Skin:     General: Skin is warm and dry.      Findings: No rash.   Neurological:      Mental Status: Alert.          Results Review:   I reviewed the patient's new clinical results.  Lab Results (last 24 hours)       Procedure Component Value Units Date/Time    Blood Culture - Blood, Arm, Right [[id number]]  (Normal) Collected: 02/25/25 2026    Specimen: Blood from Arm, Right Updated: 02/28/25 2115     Blood Culture No growth at 3 days    Blood Culture - Blood, Arm, Left [[id number]]  (Normal) Collected: 02/25/25 2029    Specimen: Blood from Arm, Left Updated: 02/28/25 2115     Blood Culture No growth at 3 days    Blood Culture - Blood, Hand, " Right [[id number]]  (Normal) Collected: 02/27/25 1707    Specimen: Blood from Hand, Right Updated: 02/28/25 1715     Blood Culture No growth at 24 hours            Imaging Results (Last 24 Hours)       ** No results found for the last 24 hours. **            EKG      I personally viewed and interpreted the patient's EKG/Telemetry data:    ECHOCARDIOGRAM:  Results for orders placed during the hospital encounter of 02/25/25    Adult Transthoracic Echo Limited W/ Cont if Necessary Per Protocol    Interpretation Summary    Left ventricular ejection fraction appears to be 51 - 55%.    The right ventricular cavity is moderately dilated.    Technically very limited study.  Valvular structure and function was not evaluated.       STRESS MYOVIEW:  Results for orders placed during the hospital encounter of 02/18/25    Stress Test With Myocardial Perfusion One Day    Interpretation Summary    Myocardial perfusion imaging indicates a normal myocardial perfusion study with no evidence of ischemia. Impressions are consistent with a low risk study.    Left ventricular ejection fraction is normal (Calculated EF = 64%).       CARDIAC CATHETERIZATION:  No results found for this or any previous visit.       OTHER:         Assessment & Plan     Sick sinus syndrome status post pacemaker placement  Patient had permanent pacemaker placement secondary to sick sinus syndrome but when he came with abdominal symptoms his right ventricular lead had high thresholds and will need repositioning but his LV lead is working very well  Patient is seen bilateral physiologist.    Influenza A  Patient has fever with shortness of breath and is tested positive for influenza A and is on Tamiflu.    Hypertension  Patient blood pressure currently stable on beta-blockers    Hyperlipidemia  Patient is on statins and the lipid levels are well within normal limits    Diabetes  Patient is on oral medicines and followed by the primary care doctor.      I discussed  the patients findings and my recommendations with patient and nurse      Win Reaves MD  03/01/25  15:00 EST

## 2025-03-02 ENCOUNTER — APPOINTMENT (OUTPATIENT)
Dept: CARDIOLOGY | Facility: HOSPITAL | Age: 60
End: 2025-03-02
Payer: COMMERCIAL

## 2025-03-02 LAB
BACTERIA SPEC AEROBE CULT: NORMAL
BACTERIA SPEC AEROBE CULT: NORMAL
BH CV UPPER VENOUS LEFT INTERNAL JUGULAR AUGMENT: NORMAL
BH CV UPPER VENOUS LEFT INTERNAL JUGULAR COMPRESS: NORMAL
BH CV UPPER VENOUS LEFT INTERNAL JUGULAR PHASIC: NORMAL
BH CV UPPER VENOUS LEFT INTERNAL JUGULAR SPONT: NORMAL
BH CV UPPER VENOUS RIGHT AXILLARY AUGMENT: NORMAL
BH CV UPPER VENOUS RIGHT AXILLARY COMPRESS: NORMAL
BH CV UPPER VENOUS RIGHT AXILLARY PHASIC: NORMAL
BH CV UPPER VENOUS RIGHT AXILLARY SPONT: NORMAL
BH CV UPPER VENOUS RIGHT BASILIC FOREARM COLOR: 1
BH CV UPPER VENOUS RIGHT BASILIC FOREARM COMPRESS: NORMAL
BH CV UPPER VENOUS RIGHT BASILIC FOREARM THROMBUS: NORMAL
BH CV UPPER VENOUS RIGHT BASILIC UPPER COLOR: 1
BH CV UPPER VENOUS RIGHT BASILIC UPPER COMPRESS: NORMAL
BH CV UPPER VENOUS RIGHT BASILIC UPPER THROMBUS: NORMAL
BH CV UPPER VENOUS RIGHT BRACHIAL COMPRESS: NORMAL
BH CV UPPER VENOUS RIGHT CEPHALIC FOREARM COLOR: 1
BH CV UPPER VENOUS RIGHT CEPHALIC FOREARM COMPRESS: NORMAL
BH CV UPPER VENOUS RIGHT CEPHALIC FOREARM THROMBUS: NORMAL
BH CV UPPER VENOUS RIGHT CEPHALIC UPPER COLOR: 1
BH CV UPPER VENOUS RIGHT CEPHALIC UPPER COMPRESS: NORMAL
BH CV UPPER VENOUS RIGHT CEPHALIC UPPER THROMBUS: NORMAL
BH CV UPPER VENOUS RIGHT INTERNAL JUGULAR AUGMENT: NORMAL
BH CV UPPER VENOUS RIGHT INTERNAL JUGULAR COMPRESS: NORMAL
BH CV UPPER VENOUS RIGHT INTERNAL JUGULAR PHASIC: NORMAL
BH CV UPPER VENOUS RIGHT INTERNAL JUGULAR SPONT: NORMAL
BH CV UPPER VENOUS RIGHT RADIAL COMPRESS: NORMAL
BH CV UPPER VENOUS RIGHT SUBCLAVIAN AUGMENT: NORMAL
BH CV UPPER VENOUS RIGHT SUBCLAVIAN COMPRESS: NORMAL
BH CV UPPER VENOUS RIGHT SUBCLAVIAN PHASIC: NORMAL
BH CV UPPER VENOUS RIGHT SUBCLAVIAN SPONT: NORMAL
BH CV UPPER VENOUS RIGHT ULNAR COMPRESS: NORMAL

## 2025-03-02 PROCEDURE — 99232 SBSQ HOSP IP/OBS MODERATE 35: CPT | Performed by: INTERNAL MEDICINE

## 2025-03-02 PROCEDURE — 93971 EXTREMITY STUDY: CPT

## 2025-03-02 PROCEDURE — 25010000002 METOCLOPRAMIDE PER 10 MG: Performed by: INTERNAL MEDICINE

## 2025-03-02 PROCEDURE — 97162 PT EVAL MOD COMPLEX 30 MIN: CPT

## 2025-03-02 PROCEDURE — 25010000002 PIPERACILLIN SOD-TAZOBACTAM PER 1 G

## 2025-03-02 PROCEDURE — 93971 EXTREMITY STUDY: CPT | Performed by: SURGERY

## 2025-03-02 RX ADMIN — PIPERACILLIN AND TAZOBACTAM 3.38 G: 3; .375 INJECTION, POWDER, FOR SOLUTION INTRAVENOUS at 03:56

## 2025-03-02 RX ADMIN — CETIRIZINE HYDROCHLORIDE 10 MG: 10 TABLET, FILM COATED ORAL at 08:24

## 2025-03-02 RX ADMIN — PIPERACILLIN AND TAZOBACTAM 3.38 G: 3; .375 INJECTION, POWDER, FOR SOLUTION INTRAVENOUS at 12:59

## 2025-03-02 RX ADMIN — OSELTAMIVIR PHOSPHATE 75 MG: 75 CAPSULE ORAL at 08:23

## 2025-03-02 RX ADMIN — ROSUVASTATIN CALCIUM 10 MG: 10 TABLET, FILM COATED ORAL at 08:24

## 2025-03-02 RX ADMIN — OSELTAMIVIR PHOSPHATE 75 MG: 75 CAPSULE ORAL at 19:54

## 2025-03-02 RX ADMIN — Medication 10 ML: at 20:09

## 2025-03-02 RX ADMIN — METOCLOPRAMIDE 5 MG: 5 INJECTION, SOLUTION INTRAMUSCULAR; INTRAVENOUS at 17:41

## 2025-03-02 RX ADMIN — PANTOPRAZOLE SODIUM 40 MG: 40 TABLET, DELAYED RELEASE ORAL at 17:41

## 2025-03-02 RX ADMIN — METOCLOPRAMIDE 5 MG: 5 INJECTION, SOLUTION INTRAMUSCULAR; INTRAVENOUS at 08:24

## 2025-03-02 RX ADMIN — LOSARTAN POTASSIUM 25 MG: 25 TABLET, FILM COATED ORAL at 08:23

## 2025-03-02 RX ADMIN — Medication 10 ML: at 08:24

## 2025-03-02 RX ADMIN — PANTOPRAZOLE SODIUM 40 MG: 40 TABLET, DELAYED RELEASE ORAL at 08:24

## 2025-03-02 RX ADMIN — EMPAGLIFLOZIN 25 MG: 25 TABLET, FILM COATED ORAL at 08:24

## 2025-03-02 RX ADMIN — PIPERACILLIN AND TAZOBACTAM 3.38 G: 3; .375 INJECTION, POWDER, FOR SOLUTION INTRAVENOUS at 19:54

## 2025-03-02 NOTE — PLAN OF CARE
Goal Outcome Evaluation:  Plan of Care Reviewed With: patient, spouse           Outcome Evaluation: Pt is a 58 YO F admitted with abdominal pain, Flu (+), recent pacemaker placement after syncope and collapse. Awaiting a lead replacement, planned to do outpaitent. Pt reports living with spouse, where he is independent with all ADLs, ambulation without AD, drives and works. Pt states he has had no falls other than the syncopal episode prior to pacemaker placement. Pt this date demonstrates fair mobitliy, ambulating in room with CGA without significant balance deficit. Pt appears safe to return home with family assist, and PT will follow 3x/week while admitted.    Anticipated Discharge Disposition (PT): home with assist

## 2025-03-02 NOTE — THERAPY EVALUATION
Patient Name: Ismael Pulido  : 1965    MRN: [id number]                              Today's Date: 3/2/2025       Admit Date: 2025    Visit Dx:     ICD-10-CM ICD-9-CM   1. Epigastric pain  R10.13 789.06   2. Chest pain, unspecified type  R07.9 786.50   3. Intermittent complete heart block  I44.2 426.0   4. Symptomatic bradycardia  R00.1 427.89   5. Pacemaker lead malfunction, initial encounter  T82.110A 996.01     Patient Active Problem List   Diagnosis    Primary hypertension    Asthma due to environmental allergies    Gastroesophageal reflux disease without esophagitis    Obstructive sleep apnea syndrome    Type 2 diabetes mellitus without complication, without long-term current use of insulin    Mixed hyperlipidemia    Diverticulitis    Other hemorrhoids    Symptomatic bradycardia    Mobitz type 2 second degree atrioventricular block    Syncope and collapse    Intermittent complete heart block    Abdominal pain    Pacemaker lead malfunction     Past Medical History:   Diagnosis Date    Allergic 1988    5+ molds, pollens, grass    Diabetes mellitus     Not sure like 2020    Diverticulitis     Diverticulosis     Mot sure     GERD (gastroesophageal reflux disease)     Hypertension     Kidney stone     Not dure     Obesity     Not sure      Past Surgical History:   Procedure Laterality Date    CARDIAC ELECTROPHYSIOLOGY PROCEDURE N/A 2025    Procedure: Pacemaker DC new [place] aware;  Surgeon: Dez Loco MD;  Location: [facility] CATH INVASIVE LOCATION;  Service: Cardiovascular;  Laterality: N/A;    ENDOSCOPY N/A 2025    Procedure: ESOPHAGOGASTRODUODENOSCOPY;  Surgeon: Kiko Talbert MD;  Location: [facility] ENDOSCOPY;  Service: Gastroenterology;  Laterality: N/A;  POST-ESOPHAGITIS, GASTRITIS    HERNIA REPAIR        General Information       Row Name 25 [id number]          Physical Therapy Time and Intention    Document Type evaluation  -EL     Mode of Treatment  individual therapy;physical therapy  -EL       Row Name 03/02/25 1742          General Information    Prior Level of Function independent:;all household mobility;ADL's;driving;work  -EL       Row Name 03/02/25 1742          Living Environment    People in Home spouse  -EL       Row Name 03/02/25 1742          Cognition    Orientation Status (Cognition) oriented x 4  -EL       Row Name 03/02/25 1742          Safety Issues/Impairments Affecting Functional Mobility    Impairments Affecting Function (Mobility) balance;cognition;endurance/activity tolerance;strength  -EL               User Key  (r) = Recorded By, (t) = Taken By, (c) = Cosigned By      Initials Name Provider Type    Clark Plasencia, PT Physical Therapist                   Mobility       Row Name 03/02/25 1748          Bed Mobility    Bed Mobility bed mobility (all) activities  -EL     All Activities, [place] (Bed Mobility) modified independence  -EL     Assistive Device (Bed Mobility) bed rails  -EL       Row Name 03/02/25 1748          Bed-Chair Transfer    Bed-Chair [place] (Transfers) contact guard  -EL       Row Name 03/02/25 1748          Sit-Stand Transfer    Sit-Stand [place] (Transfers) contact guard  -EL       Row Name 03/02/25 1748          Gait/Stairs (Locomotion)    [place] Level (Gait) contact guard  -EL     Distance in Feet (Gait) 35  -EL     Deviations/Abnormal Patterns (Gait) gait speed decreased  -EL     Comment, (Gait/Stairs) Mildly unstable, but no significant LOB.  -EL               User Key  (r) = Recorded By, (t) = Taken By, (c) = Cosigned By      Initials Name Provider Type    Clark Plasencia, LEE Physical Therapist                   Obj/Interventions       Row Name 03/02/25 1801          Range of Motion Comprehensive    General Range of Motion bilateral lower extremity ROM WFL  -EL       Row Name 03/02/25 1801          Strength Comprehensive (MMT)    General Manual Muscle Testing (MMT) Assessment lower extremity  strength deficits identified  -EL     Comment, General Manual Muscle Testing (MMT) Assessment BLE 4-/5 gross  -EL       Row Name 03/02/25 1801          Sensory Assessment (Somatosensory)    Sensory Assessment (Somatosensory) sensation intact  -EL               User Key  (r) = Recorded By, (t) = Taken By, (c) = Cosigned By      Initials Name Provider Type    Clark Plasencia, PT Physical Therapist                   Goals/Plan       Row Name 03/02/25 1806          Bed Mobility Goal 1 (PT)    Activity/Assistive Device (Bed Mobility Goal 1, PT) bed mobility activities, all  -EL     [place] Level/Cues Needed (Bed Mobility Goal 1, PT) independent  -EL     Time Frame (Bed Mobility Goal 1, PT) long term goal (LTG);2 weeks  -       Row Name 03/02/25 1806          Transfer Goal 1 (PT)    Activity/Assistive Device (Transfer Goal 1, PT) transfers, all  -EL     [place] Level/Cues Needed (Transfer Goal 1, PT) independent  -EL     Time Frame (Transfer Goal 1, PT) long term goal (LTG);2 weeks  -[place] [place] Name 03/02/25 1806          Gait Training Goal 1 (PT)    Activity/Assistive Device (Gait Training Goal 1, PT) gait (walking locomotion)  -EL     [place] Level (Gait Training Goal 1, PT) independent  -EL     Distance (Gait Training Goal 1, PT) 200  -EL     Time Frame (Gait Training Goal 1, PT) long term goal (LTG);2 weeks  -       Row Name 03/02/25 1806          Therapy Assessment/Plan (PT)    Planned Therapy Interventions (PT) neuromuscular re-education;balance training;bed mobility training;transfer training;gait training;patient/family education;strengthening  -EL               User Key  (r) = Recorded By, (t) = Taken By, (c) = Cosigned By      Initials Name Provider Type    Clark Plasencia, PT Physical Therapist                   Clinical Impression       Row Name 03/02/25 1801          Pain    Additional Documentation Pain Scale: FACES Pre/Post-Treatment (Group)  -       Row Name 03/02/25 1801          Pain  Scale: FACES Pre/Post-Treatment    Pain: FACES Scale, Pretreatment 2-->hurts little bit  -EL     Posttreatment Pain Rating 2-->hurts little bit  -EL       Row Name 03/02/25 1801          Plan of Care Review    Plan of Care Reviewed With patient;spouse  -EL     Outcome Evaluation Pt is a 60 YO F admitted with abdominal pain, Flu (+), recent pacemaker placement after syncope and collapse. Awaiting a lead replacement, planned to do outpaitent. Pt reports living with spouse, where he is independent with all ADLs, ambulation without AD, drives and works. Pt states he has had no falls other than the syncopal episode prior to pacemaker placement. Pt this date demonstrates fair mobitliy, ambulating in room with CGA without significant balance deficit. Pt appears safe to return home with family assist, and PT will follow 3x/week while admitted.  -       Row Name 03/02/25 1801          Therapy Assessment/Plan (PT)    Rehab Potential (PT) good  -EL     Criteria for Skilled Interventions Met (PT) yes  -EL     Therapy Frequency (PT) 3 times/wk  -       Row Name 03/02/25 1801          Vital Signs    O2 Delivery Pre Treatment room air  -EL     O2 Delivery Intra Treatment room air  -EL     O2 Delivery Post Treatment room air  -EL     Pre Patient Position Supine  -EL     Intra Patient Position Standing  -EL     Post Patient Position Supine  -EL       Row Name 03/02/25 1801          Positioning and Restraints    Pre-Treatment Position in bed  -EL     Post Treatment Position bed  -EL     In Bed notified nsg;supine;call light within reach;encouraged to call for assist  -EL               User Key  (r) = Recorded By, (t) = Taken By, (c) = Cosigned By      Initials Name Provider Type    Clark Plasencia, PT Physical Therapist                   Outcome Measures       Row Name 03/02/25 1806 03/02/25 0800       How much help from another person do you currently need...    Turning from your back to your side while in flat bed without using  bedrails? 4  -EL 4  -JH    Moving from lying on back to sitting on the side of a flat bed without bedrails? 4  -EL 4  -JH    Moving to and from a bed to a chair (including a wheelchair)? 4  -EL 4  -JH    Standing up from a chair using your arms (e.g., wheelchair, bedside chair)? 3  -EL 4  -JH    Climbing 3-5 steps with a railing? 3  -EL 4  -JH    To walk in hospital room? 3  -EL 4  -JH    AM-PAC 6 Clicks Score (PT) 21  -EL 24  -JH    Highest Level of Mobility Goal 6 --> Walk 10 steps or more  -EL 8 --> Walked 250 feet or more  -      Row Name 03/02/25 1806          Functional Assessment    Outcome Measure Options AM-PAC 6 Clicks Basic Mobility (PT)  -               User Key  (r) = Recorded By, (t) = Taken By, (c) = Cosigned By      Initials Name Provider Type    EL Clark Aparicio, PT Physical Therapist    Anay Conley, RN Registered Nurse                                 Physical Therapy Education       Title: PT OT SLP Therapies (Done)       Topic: Physical Therapy (Done)       Point: Mobility training (Done)       Learning Progress Summary            Patient Acceptance, E,TB, VU by  at 3/2/2025 1807                      Point: Precautions (Done)       Learning Progress Summary            Patient Acceptance, E,TB, VU by  at 3/2/2025 1807                                      User Key       Initials Effective Dates Name Provider Type [facility] 06/23/20 -  Clark Aparicio, PT Physical Therapist PT                  PT Recommendation and Plan  Planned Therapy Interventions (PT): neuromuscular re-education, balance training, bed mobility training, transfer training, gait training, patient/family education, strengthening  Outcome Evaluation: Pt is a 58 YO F admitted with abdominal pain, Flu (+), recent pacemaker placement after syncope and collapse. Awaiting a lead replacement, planned to do outpaitent. Pt reports living with spouse, where he is independent with all ADLs, ambulation without AD, drives and works.  Pt states he has had no falls other than the syncopal episode prior to pacemaker placement. Pt this date demonstrates fair mobitliy, ambulating in room with CGA without significant balance deficit. Pt appears safe to return home with family assist, and PT will follow 3x/week while admitted.     Time Calculation:   PT Evaluation Complexity  History, PT Evaluation Complexity: 1-2 personal factors and/or comorbidities  Examination of Body Systems (PT Eval Complexity): total of 3 or more elements  Clinical Presentation (PT Evaluation Complexity): evolving  Clinical Decision Making (PT Evaluation Complexity): moderate complexity  Overall Complexity (PT Evaluation Complexity): moderate complexity     PT Charges       Row Name 03/02/25 1808             Time Calculation    Start Time 1056  -EL      Stop Time 1119  -EL      Time Calculation (min) 23 min  -EL      PT Received On 03/02/25  -EL      PT - Next Appointment 03/04/25  -EL      PT Goal Re-Cert Due Date 03/16/25  -EL                User Key  (r) = Recorded By, (t) = Taken By, (c) = Cosigned By      Initials Name Provider Type    Clark Plasencia, PT Physical Therapist                  Therapy Charges for Today       Code Description Service Date Service Provider Modifiers Qty    [id number]  PT EVAL MOD COMPLEXITY 3 3/2/2025 Clark Aparicio, PT GP 1            PT G-Codes  Outcome Measure Options: AM-PAC 6 Clicks Basic Mobility (PT)  AM-PAC 6 Clicks Score (PT): 21  PT Discharge Summary  Anticipated Discharge Disposition (PT): home with assist    Clark Aparicio PT  3/2/2025

## 2025-03-02 NOTE — PLAN OF CARE
Problem: Adult Inpatient Plan of Care  Goal: Plan of Care Review  Outcome: Progressing  Flowsheets (Taken 3/2/2025 0128)  Progress: no change  Outcome Evaluation: Continues IV ABT. Call light is in reach and fall precautions are in place.  Plan of Care Reviewed With: patient  Goal: Patient-Specific Goal (Individualized)  Outcome: Progressing  Goal: Absence of Hospital-Acquired Illness or Injury  Outcome: Progressing  Intervention: Identify and Manage Fall Risk  Recent Flowsheet Documentation  Taken 3/2/2025 0000 by Cristina Marie RN  Safety Promotion/Fall Prevention: safety round/check completed  Taken 3/1/2025 2200 by Cristina Marie RN  Safety Promotion/Fall Prevention: safety round/check completed  Taken 3/1/2025 2100 by Cristina Marie RN  Safety Promotion/Fall Prevention: safety round/check completed  Taken 3/1/2025 2001 by Cristina Marie RN  Safety Promotion/Fall Prevention: safety round/check completed  Taken 3/1/2025 1900 by Cristina Marie RN  Safety Promotion/Fall Prevention: safety round/check completed  Intervention: Prevent Skin Injury  Recent Flowsheet Documentation  Taken 3/2/2025 0000 by Cristina Marie RN  Skin Protection: transparent dressing maintained  Taken 3/1/2025 2001 by Cristina Marie RN  Body Position: position changed independently  Skin Protection: transparent dressing maintained  Intervention: Prevent and Manage VTE (Venous Thromboembolism) Risk  Recent Flowsheet Documentation  Taken 3/1/2025 2001 by Cristina Marie RN  VTE Prevention/Management: patient refused intervention  Intervention: Prevent Infection  Recent Flowsheet Documentation  Taken 3/2/2025 0000 by Cristina Marie RN  Infection Prevention: rest/sleep promoted  Taken 3/1/2025 2200 by Cristina Marie RN  Infection Prevention: rest/sleep promoted  Taken 3/1/2025 2100 by Cristina Marie RN  Infection Prevention: rest/sleep promoted  Taken 3/1/2025 2001 by Cristina Marie RN  Infection Prevention: rest/sleep promoted  Taken  3/1/2025 1900 by Cristina Marie, RN  Infection Prevention: rest/sleep promoted  Goal: Optimal Comfort and Wellbeing  Outcome: Progressing  Intervention: Provide Person-Centered Care  Recent Flowsheet Documentation  Taken 3/1/2025 2001 by Cristina Marie, RN  Trust Relationship/Rapport:   care explained   questions answered   questions encouraged  Goal: Readiness for Transition of Care  Outcome: Progressing   Goal Outcome Evaluation:  Plan of Care Reviewed With: patient        Progress: no change  Outcome Evaluation: Continues IV ABT. Call light is in reach and fall precautions are in place.

## 2025-03-02 NOTE — PLAN OF CARE
Problem: Adult Inpatient Plan of Care  Goal: Plan of Care Review  Outcome: Progressing  Flowsheets  Taken 3/2/2025 1624 by Anay Varghese RN  Progress: improving  Taken 3/2/2025 0128 by Cristina Marie RN  Outcome Evaluation: Continues IV ABT. Call light is in reach and fall precautions are in place.  Goal: Patient-Specific Goal (Individualized)  Outcome: Progressing  Goal: Absence of Hospital-Acquired Illness or Injury  Outcome: Progressing  Intervention: Identify and Manage Fall Risk  Recent Flowsheet Documentation  Taken 3/2/2025 1615 by Anay Varghese RN  Safety Promotion/Fall Prevention:   safety round/check completed   room organization consistent  Taken 3/2/2025 1200 by Anay Varghese RN  Safety Promotion/Fall Prevention:   safety round/check completed   room organization consistent  Taken 3/2/2025 0800 by Anay Varghese RN  Safety Promotion/Fall Prevention:   safety round/check completed   room organization consistent  Intervention: Prevent Skin Injury  Recent Flowsheet Documentation  Taken 3/2/2025 1615 by Anay Varghese RN  Body Position: position changed independently  Taken 3/2/2025 1200 by Anay Varghese RN  Body Position: position changed independently  Taken 3/2/2025 0800 by Anay Varghese RN  Body Position: position changed independently  Intervention: Prevent Infection  Recent Flowsheet Documentation  Taken 3/2/2025 0800 by Anay Varghese RN  Infection Prevention:   rest/sleep promoted   hand hygiene promoted  Goal: Optimal Comfort and Wellbeing  Outcome: Progressing  Goal: Readiness for Transition of Care  Outcome: Progressing     Problem: Pain Acute  Goal: Optimal Pain Control and Function  Outcome: Progressing     Problem: Nausea and Vomiting  Goal: Nausea and Vomiting Relief  Outcome: Progressing  Intervention: Prevent and Manage Nausea and Vomiting  Recent Flowsheet Documentation  Taken 3/2/2025 1615 by Anay Varghese RN  Oral Care: mouth wash rinse  Taken 3/2/2025 0800 by Halima  Anay, RN  Oral Care: oral rinse provided     Problem: Sepsis/Septic Shock  Goal: Optimal Coping  Outcome: Progressing  Goal: Absence of Bleeding  Outcome: Progressing  Goal: Blood Glucose Level Within Target Range  Outcome: Progressing  Goal: Absence of Infection Signs and Symptoms  Outcome: Progressing  Intervention: Initiate Sepsis Management  Recent Flowsheet Documentation  Taken 3/2/2025 0800 by Anay Varghese, RN  Infection Prevention:   rest/sleep promoted   hand hygiene promoted  Isolation Precautions:   precautions maintained   droplet  Intervention: Promote Recovery  Recent Flowsheet Documentation  Taken 3/2/2025 1200 by Anay Varghese, RN  Activity Management: up ad kalee  Taken 3/2/2025 0800 by Anay Varghese, RN  Activity Management:   up ad kalee   up in chair  Goal: Optimal Nutrition Delivery  Outcome: Progressing     Problem: Fall Injury Risk  Goal: Absence of Fall and Fall-Related Injury  Outcome: Progressing  Intervention: Promote Injury-Free Environment  Recent Flowsheet Documentation  Taken 3/2/2025 1615 by Anay Varghese, RN  Safety Promotion/Fall Prevention:   safety round/check completed   room organization consistent  Taken 3/2/2025 1200 by Anay Varghese RN  Safety Promotion/Fall Prevention:   safety round/check completed   room organization consistent  Taken 3/2/2025 0800 by Anay Varghese, RN  Safety Promotion/Fall Prevention:   safety round/check completed   room organization consistent   Goal Outcome Evaluation:           Progress: improving

## 2025-03-02 NOTE — PROGRESS NOTES
[facility] MEDICINE SERVICE  DAILY PROGRESS NOTE    NAME: Ismael Pulido  : 1965  MRN: [id number]      LOS: 4 days     PROVIDER OF SERVICE: Ron Eduardo MD    Chief Complaint: Abdominal pain    Subjective:     Interval History:  History taken from: patient    Patient seen and examined at bedside this morning.  No acute complaints overnight        Review of Systems: Negative except described above  Review of Systems    Objective:     Vital Signs  Temp:  [97.5 °F (36.4 °C)-98.6 °F (37 °C)] 97.5 °F (36.4 °C)  Heart Rate:  [77-95] 77  Resp:  [14-15] 14  BP: (120-132)/(74-95) 120/84   Body mass index is 35.06 kg/m².    Physical Exam  Physical Exam  Constitutional:       Comments: NAD    Cardiovascular:      Comments:  RRR, S1 & S2   Pulmonary:      Comments:  Lungs CTA   Abdominal:      Comments:  ABD soft, NT            Diagnostic Data    Results from last 7 days   Lab Units 25  0254 253 25  1857   WBC 10*3/mm3 7.54   < >  --    HEMOGLOBIN g/dL 12.9*   < >  --    HEMATOCRIT % 40.8   < >  --    PLATELETS 10*3/mm3 183   < >  --    GLUCOSE mg/dL 97  --  134*   CREATININE mg/dL 1.26  --  1.24   BUN mg/dL 26*  --  22*   SODIUM mmol/L 136  --  134*   POTASSIUM mmol/L 4.3  --  4.4   AST (SGOT) U/L  --   --  65*   ALT (SGPT) U/L  --   --  38   ALK PHOS U/L  --   --  76   BILIRUBIN mg/dL  --   --  0.6   ANION GAP mmol/L 12.2  --  13.5    < > = values in this interval not displayed.       No radiology results for the last day      I reviewed the patient's new clinical results.    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       Chest pain  -Troponin, 38, 39  -d. dimer 3.54  -CXR  showed low lung volumes with probable mild bibasal atelectasis.   -Cta chest negative for PE. Small left pleural effusion. Mild bilateral dependent atelectasis  -Ekg rate 85, pvcs, pacemaker, prolonged CT  -Cardiology consulted  -Pacemaker interrogated and lead dislodged.  Patient was initially scheduled to go for lead replacement with EP however because he tested positive for flu and was continuously spiking fevers procedure rescheduled to early next week.  Will discuss with cardiology to see if it can be done in the next day or 2     Abdominal pain  -Lipase 26  -Ct abd reviewed and showing Moderate gastric distention with air and fluid. This may be transient however, gastroparesis cannot be excluded. Correlate with patient symptoms. This can be further evaluated with nuclear medicine gastric emptying study as clinically warranted. If there is concern for gastric outlet obstruction, correlate with endoscopy. Moderately distended urinary bladder. Correlate for urinary retention.  -Will do bladder scan every 6 hours and monitor for retention  -Gastroenterology consulted  -EGD on 2/26 showed esophagitis  -Continue PPI      -Fever likely in the setting of developing pneumonia  Chest x-ray reviewed, CT chest ordered which showed increasing left lower lobe consolidation  UA unremarkable  Blood cultures ordered on 2/25 showed no growth to date, will repeat another blood culture on 12/27 also showed no growth to date  RVP negative, will repeat  MRSA PCR negative  Pro-Fidel increased from 0.08 on 2/25-2.39 on 2/27  Tested positive for flu, will start on Tamiflu  Continue on Zosyn for now  Check bilateral lower extremity venous duplex for any DVT, negative        Hypertension  -Stable  -Continue losartan     Type 2 diabetes  -Continue jardiance   -SSI  -Diabetic diet  -Monitor before meals and at bedtime        VTE Prophylaxis:  Mechanical VTE prophylaxis orders are present.             Disposition Planning:        Anticipated  Date of Discharge: 3/4/2025         Time: 35 minutes     Code Status and Medical Interventions: CPR (Attempt to Resuscitate); Full Support   Ordered at: 02/25/25 1627     Level Of Support Discussed With:    Patient     Code Status (Patient has no pulse and is not breathing):    CPR (Attempt to Resuscitate)     Medical Interventions (Patient has pulse or is breathing):    Full Support       Signature: Electronically signed by Ron Eduardo MD, 03/02/25, 10:41 EST.  [facility]

## 2025-03-03 ENCOUNTER — ANESTHESIA (OUTPATIENT)
Dept: CARDIOLOGY | Facility: HOSPITAL | Age: 60
End: 2025-03-03
Payer: COMMERCIAL

## 2025-03-03 ENCOUNTER — ANESTHESIA EVENT (OUTPATIENT)
Dept: CARDIOLOGY | Facility: HOSPITAL | Age: 60
End: 2025-03-03
Payer: COMMERCIAL

## 2025-03-03 ENCOUNTER — TELEPHONE (OUTPATIENT)
Dept: FAMILY MEDICINE CLINIC | Facility: CLINIC | Age: 60
End: 2025-03-03

## 2025-03-03 PROBLEM — T82.110A PACEMAKER LEAD MALFUNCTION: Status: RESOLVED | Noted: 2025-02-25 | Resolved: 2025-03-03

## 2025-03-03 PROCEDURE — 25010000002 PIPERACILLIN SOD-TAZOBACTAM PER 1 G

## 2025-03-03 PROCEDURE — 99232 SBSQ HOSP IP/OBS MODERATE 35: CPT | Performed by: INTERNAL MEDICINE

## 2025-03-03 RX ORDER — SODIUM CHLORIDE 9 MG/ML
9 INJECTION, SOLUTION INTRAVENOUS CONTINUOUS PRN
Status: CANCELLED | OUTPATIENT
Start: 2025-03-03 | End: 2025-03-04

## 2025-03-03 RX ORDER — SODIUM CHLORIDE 0.9 % (FLUSH) 0.9 %
10 SYRINGE (ML) INJECTION AS NEEDED
Status: CANCELLED | OUTPATIENT
Start: 2025-03-03

## 2025-03-03 RX ORDER — SODIUM CHLORIDE 0.9 % (FLUSH) 0.9 %
10 SYRINGE (ML) INJECTION EVERY 12 HOURS SCHEDULED
Status: CANCELLED | OUTPATIENT
Start: 2025-03-03

## 2025-03-03 RX ADMIN — ROSUVASTATIN CALCIUM 10 MG: 10 TABLET, FILM COATED ORAL at 09:01

## 2025-03-03 RX ADMIN — PANTOPRAZOLE SODIUM 40 MG: 40 TABLET, DELAYED RELEASE ORAL at 09:01

## 2025-03-03 RX ADMIN — PIPERACILLIN AND TAZOBACTAM 3.38 G: 3; .375 INJECTION, POWDER, FOR SOLUTION INTRAVENOUS at 20:39

## 2025-03-03 RX ADMIN — OSELTAMIVIR PHOSPHATE 75 MG: 75 CAPSULE ORAL at 20:39

## 2025-03-03 RX ADMIN — EMPAGLIFLOZIN 25 MG: 25 TABLET, FILM COATED ORAL at 09:01

## 2025-03-03 RX ADMIN — OSELTAMIVIR PHOSPHATE 75 MG: 75 CAPSULE ORAL at 09:01

## 2025-03-03 RX ADMIN — PIPERACILLIN AND TAZOBACTAM 3.38 G: 3; .375 INJECTION, POWDER, FOR SOLUTION INTRAVENOUS at 03:24

## 2025-03-03 RX ADMIN — PANTOPRAZOLE SODIUM 40 MG: 40 TABLET, DELAYED RELEASE ORAL at 18:05

## 2025-03-03 RX ADMIN — Medication 10 ML: at 09:02

## 2025-03-03 RX ADMIN — CETIRIZINE HYDROCHLORIDE 10 MG: 10 TABLET, FILM COATED ORAL at 09:01

## 2025-03-03 RX ADMIN — LOSARTAN POTASSIUM 25 MG: 25 TABLET, FILM COATED ORAL at 09:01

## 2025-03-03 RX ADMIN — PIPERACILLIN AND TAZOBACTAM 3.38 G: 3; .375 INJECTION, POWDER, FOR SOLUTION INTRAVENOUS at 11:37

## 2025-03-03 NOTE — ANESTHESIA PREPROCEDURE EVALUATION
Anesthesia Evaluation     Patient summary reviewed and Nursing notes reviewed   no history of anesthetic complications:   NPO Solid Status: > 8 hours  NPO Liquid Status: > 2 hours           Airway   Dental      Pulmonary    (+) asthma,sleep apnea  Cardiovascular     ECG reviewed    (+) pacemaker pacemaker, hypertension, valvular problems/murmurs MR and TI, dysrhythmias PVC, Bradycardia, hyperlipidemia      Neuro/Psych  (+) syncope  GI/Hepatic/Renal/Endo    (+) obesity, GERD, renal disease- stones, diabetes mellitus    Musculoskeletal     Abdominal    Substance History      OB/GYN          Other        ROS/Med Hx Other: Additional History:  Mobitz type 2 AVB, complete heart block, allergies, abd pain    Semaglutide use    Echo:  Echocardiogram Findings    Left Ventricle Left ventricular ejection fraction appears to be 51 - 55%.     Normal left ventricular cavity size and wall thickness noted.  Right Ventricle Normal right ventricular wall thickness and systolic function noted. The right ventricular cavity is moderately dilated.  Pericardium There is no evidence of pericardial effusion. .      Echocardiogram Findings    Left Ventricle Left ventricular ejection fraction appears to be 56 - 60%.   Global longitudinal LV strain (GLS) = -15.0%. Normal left ventricular cavity size and wall thickness noted.  Right Ventricle Normal right ventricular wall thickness and systolic function noted. The right ventricular cavity is moderately dilated.  Left Atrium The left atrial cavity is mildly dilated.  Right Atrium Normal right atrial cavity size noted.  Aortic Valve The aortic valve is grossly normal in structure.  Mitral Valve Mild to moderate mitral valve regurgitation is present.  Tricuspid Valve The tricuspid valve is grossly normal in structure. Moderate tricuspid valve regurgitation is present. Estimated right ventricular systolic pressure from tricuspid regurgitation is moderately elevated (45-55 mmHg).  Pulmonic  Valve The pulmonic valve is not well visualized.  Greater Vessels No dilation of the aortic root is present.  Pericardium There is no evidence of pericardial effusion. .        Stress Test:  ·  Myocardial perfusion imaging indicates a normal myocardial perfusion study with no evidence of ischemia. Impressions are consistent with a low risk study.  ·  Left ventricular ejection fraction is normal (Calculated EF = 64%).        PSH:  HERNIA REPAIR CARDIAC ELECTROPHYSIOLOGY PROCEDURE  ENDOSCOPY                 Anesthesia Plan    ASA 3     general   total IV anesthesia  (Patient identified; pre-operative vital signs, all relevant labs/studies, complete medical/surgical/anesthetic history, full medication list, full allergy list, and NPO status obtained/reviewed; physical assessment performed; anesthetic options, side effects, potential complications, risks, and benefits discussed; questions answered; written anesthesia consent obtained; patient cleared for procedure; anesthesia machine and equipment checked and functioning)  intravenous induction     Anesthetic plan, risks, benefits, and alternatives have been provided, discussed and informed consent has been obtained with: patient.    Plan discussed with CRNA and CAA.    CODE STATUS:    Level Of Support Discussed With: Patient  Code Status (Patient has no pulse and is not breathing): CPR (Attempt to Resuscitate)  Medical Interventions (Patient has pulse or is breathing): Full Support

## 2025-03-03 NOTE — CASE MANAGEMENT/SOCIAL WORK
Social Work Assessment  [facility]     Patient Name: Ismael Pulido  MRN: [id number]  Today's Date: 3/3/2025    Admit Date: 2/25/2025     Demographic Summary       Row Name 03/03/25 1010       General Information    Reason for Consult health care directive;other (see comments)    General Information Comments LSW consulted re: ACP. LSW met with Pt at bedside, Pt has FMLA paperwork at bedside. Pt is seeing his PCP on Thursday and will have her complete it. Pt reported that he has a will and living will and does not have a need for ACP during this hospital stay. Pt reported that he is  and has three daughters. No further needs identified.      JOANNA Vargas, MSW    Phone: [phone]  Fax: [phone]  Email: [email or web address]

## 2025-03-03 NOTE — CASE MANAGEMENT/SOCIAL WORK
Continued Stay Note  [facility]     Patient Name: Ismael Pulido  MRN: [id number]  Today's Date: 3/3/2025    Admit Date: 2/25/2025    Plan: Return home with spouse. No home oxygen;  on 2 liters   Discharge Plan       Row Name 03/03/25 1403       Plan    Plan Comments Barriers: IV antibiotics. Followed by GI and Cardiology. Oxygen 2 liters with no home oxygen.  AT discharge Mr. Pulido plans to return home with his wife                             Claire LINDQUIST,RN Case Manager  [facility]  Phone: Desk- [phone] cell- [phone]

## 2025-03-03 NOTE — TELEPHONE ENCOUNTER
Caller: Aristeo Pulido    Relationship: Self    Best call back number: [phone]     What is the best time to reach you: ANY     Who are you requesting to speak with (clinical staff, provider,  specific staff member): PROVIDER     Do you know the name of the person who called: ARISTEO     What was the call regarding: PATIENT IS REQUESTING A CALL REGARDING HIS APPOINTMENT SCHEDULE ON THURSDAY. PATIENT STATED  THAT HE HAS THE FLU AND HAS BEEN ON TAMIFLU FOR 4 DAYS . PATIENT WOULD LIKE TO KNOW IF HE SHOULD COME IN. PATIENT ALSO STATED THAT'S HE HAS FMLA PAPERS THAT NEED TO BE FILLED OUT AT THIS APPOINTMENT.     Is it okay if the provider responds through [facility]: YES

## 2025-03-03 NOTE — PROGRESS NOTES
[facility] MEDICINE SERVICE  DAILY PROGRESS NOTE    NAME: Ismael Pulido  : 1965  MRN: [id number]      LOS: 5 days     PROVIDER OF SERVICE: Ron Eduardo MD    Chief Complaint: Abdominal pain    Subjective:     Interval History:  History taken from: patient    Patient seen and examined at bedside this morning.  No acute complaints overnight.  Scheduled to go for pacemaker lead placement today        Review of Systems: Negative except described above  Review of Systems    Objective:     Vital Signs  Temp:  [97.6 °F (36.4 °C)-98.6 °F (37 °C)] 97.8 °F (36.6 °C)  Heart Rate:  [73-94] 73  Resp:  [14-16] 16  BP: (100-124)/(74-87) 124/87   Body mass index is 35.06 kg/m².    Physical Exam  Physical Exam  Constitutional:       Comments: NAD    Cardiovascular:      Comments:  RRR, S1 & S2   Pulmonary:      Comments:  Lungs CTA   Abdominal:      Comments:  ABD soft, NT            Diagnostic Data    Results from last 7 days   Lab Units 25  0254 253 25  1857   WBC 10*3/mm3 7.54   < >  --    HEMOGLOBIN g/dL 12.9*   < >  --    HEMATOCRIT % 40.8   < >  --    PLATELETS 10*3/mm3 183   < >  --    GLUCOSE mg/dL 97  --  134*   CREATININE mg/dL 1.26  --  1.24   BUN mg/dL 26*  --  22*   SODIUM mmol/L 136  --  134*   POTASSIUM mmol/L 4.3  --  4.4   AST (SGOT) U/L  --   --  65*   ALT (SGPT) U/L  --   --  38   ALK PHOS U/L  --   --  76   BILIRUBIN mg/dL  --   --  0.6   ANION GAP mmol/L 12.2  --  13.5    < > = values in this interval not displayed.       No radiology results for the last day      I reviewed the patient's new clinical results.    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        Chest pain  -Troponin, 38, 39  -d. dimer 3.54  -CXR showed low lung volumes with probable mild bibasal atelectasis.   -Cta chest negative for PE. Small left pleural effusion. Mild bilateral dependent atelectasis  -Ekg rate 85, pvcs, pacemaker, prolonged ND  -Cardiology consulted  -Pacemaker interrogated and lead dislodged.  Patient was initially scheduled to go for lead replacement with EP however because he tested positive for flu and was continuously spiking fevers procedure rescheduled to early next week.  Scheduled to go for lead replacement today     Abdominal pain  -Lipase 26  -Ct abd reviewed and showing Moderate gastric distention with air and fluid. This may be transient however, gastroparesis cannot be excluded. Correlate with patient symptoms. This can be further evaluated with nuclear medicine gastric emptying study as clinically warranted. If there is concern for gastric outlet obstruction, correlate with endoscopy. Moderately distended urinary bladder. Correlate for urinary retention.  -Will do bladder scan every 6 hours and monitor for retention  -Gastroenterology consulted  -EGD on 2/26 showed esophagitis  -Continue PPI      -Fever likely in the setting of developing pneumonia  Chest x-ray reviewed, CT chest ordered which showed increasing left lower lobe consolidation  UA unremarkable  Blood cultures ordered on 2/25 showed no growth to date, will repeat another blood culture on 12/27 also showed no growth to date  RVP negative, will repeat  MRSA PCR negative  Pro-Fidel increased from 0.08 on 2/25-2.39 on 2/27  Tested positive for flu, will continue on Tamiflu  Continue on Zosyn for now, received 5 days so far  Check bilateral lower extremity venous duplex for any DVT, negative     Right upper extremity swelling, significantly improved  Acute superficial thrombophlebitis seen in cephalic and basilic veins  Arm elevation and cold compression, supportive care     Hypertension  -Stable  -Continue  losartan     Type 2 diabetes  -Continue jardiance   -SSI  -Diabetic diet  -Monitor before meals and at bedtime     VTE Prophylaxis:  Mechanical VTE prophylaxis orders are present.             Disposition Planning:        Anticipated Date of Discharge: 3/4/25         Time: 35 minutes     Code Status and Medical Interventions: CPR (Attempt to Resuscitate); Full Support   Ordered at: 02/25/25 8197     Level Of Support Discussed With:    Patient     Code Status (Patient has no pulse and is not breathing):    CPR (Attempt to Resuscitate)     Medical Interventions (Patient has pulse or is breathing):    Full Support       Signature: Electronically signed by Ron Eduardo MD, 03/03/25, 10:11 EST.  [facility]ist [facility]

## 2025-03-03 NOTE — PLAN OF CARE
Problem: Adult Inpatient Plan of Care  Goal: Plan of Care Review  Outcome: Progressing  Flowsheets (Taken 3/3/2025 0251)  Progress: improving  Plan of Care Reviewed With: patient  Goal: Patient-Specific Goal (Individualized)  Outcome: Progressing  Goal: Absence of Hospital-Acquired Illness or Injury  Outcome: Progressing  Intervention: Identify and Manage Fall Risk  Recent Flowsheet Documentation  Taken 3/3/2025 0200 by Cristina Marie RN  Safety Promotion/Fall Prevention: safety round/check completed  Taken 3/3/2025 0000 by Cristina Marie RN  Safety Promotion/Fall Prevention: safety round/check completed  Taken 3/2/2025 2200 by Cristina Marie RN  Safety Promotion/Fall Prevention: safety round/check completed  Taken 3/2/2025 2008 by Cristina Marie RN  Safety Promotion/Fall Prevention: safety round/check completed  Intervention: Prevent Skin Injury  Recent Flowsheet Documentation  Taken 3/2/2025 2008 by Cristina Marie RN  Body Position: position changed independently  Skin Protection: transparent dressing maintained  Intervention: Prevent and Manage VTE (Venous Thromboembolism) Risk  Recent Flowsheet Documentation  Taken 3/2/2025 2008 by Cristina Marie RN  VTE Prevention/Management: patient refused intervention  Intervention: Prevent Infection  Recent Flowsheet Documentation  Taken 3/3/2025 0200 by Cristina Marie RN  Infection Prevention: rest/sleep promoted  Taken 3/3/2025 0000 by Cristina Marie RN  Infection Prevention: rest/sleep promoted  Taken 3/2/2025 2200 by Cristina Marie RN  Infection Prevention: rest/sleep promoted  Taken 3/2/2025 2008 by Cristina Marie RN  Infection Prevention: rest/sleep promoted  Goal: Optimal Comfort and Wellbeing  Outcome: Progressing  Intervention: Provide Person-Centered Care  Recent Flowsheet Documentation  Taken 3/2/2025 2008 by Cristina Marie RN  Trust Relationship/Rapport:   care explained   questions encouraged   questions answered  Goal: Readiness for Transition of  Care  Outcome: Progressing   Goal Outcome Evaluation:  Plan of Care Reviewed With: patient        Progress: improving  Outcome Evaluation: Continues IV ABT. Call light is in reach and fall precautions are in place.

## 2025-03-03 NOTE — PAYOR COMM NOTE
"    Updated clinicals for continue inpt stay    THANK YOU,  Ivy Young RN, CCM  Utilization Review  [facility]    Phone: [phone]  Fax: [phone]  --------------------------------  NPI: [id number]  Tax ID: [id number]          Ismael Pulido (59 y.o. Male)       Date of Birth   1965    Social Security Number       Address   [address] [place] [address]    Home Phone   [phone]    MRN   [id number]       Orthodoxy   Protestant    Marital Status                               Admission Date   2/25/25    Admission Type   Emergency    Admitting Provider   Teofilo Belcher MD    Attending Provider   Ron Eduardo MD    Department, Room/Bed   [facility] OBSERVATION, 228/1       Discharge Date       Discharge Disposition       Discharge Destination                                 Attending Provider: Ron Eduardo MD    Allergies: Lisinopril    Isolation: Droplet   Infection: Influenza (02/28/25)   Code Status: CPR    Ht: 172.7 cm (68\")   Wt: 105 kg (230 lb 9.6 oz)    Admission Cmt: None   Principal Problem: Abdominal pain [R10.9]                   Active Insurance as of 2/25/2025       Primary Coverage       Payor Plan Insurance Group Employer/Plan Group    AET[employer] COMMERCIAL AETNA [id number]       Payor Plan Address Payor Plan Phone Number Payor Plan Fax Number Effective Dates    [address] [phone]  3/25/2021 - None Entered    [facility] [address]         Subscriber Name Subscriber Birth Date Member ID       ISMAEL PULIDO 1965 [id number]                     Emergency Contacts        (Rel.) Home Phone Work Phone Mobile Phone    SIDCLAIRE (Spouse) [phone] -- [phone]              Facility-Administered Medications as of 3/3/2025   Medication Dose Route Frequency Provider Last Rate Last Admin    acetaminophen (TYLENOL) tablet 650 mg  650 mg Oral Q4H PRN Kiko Talbert MD   650 " mg at 03/01/25 1730    Or    acetaminophen (TYLENOL) 160 MG/5ML oral solution 650 mg  650 mg Oral Q4H PRN Kiko Talbert MD   650 mg at 02/28/25 1931    Or    acetaminophen (TYLENOL) suppository 650 mg  650 mg Rectal Q4H PRN Kiko Talbert MD   650 mg at 02/25/25 2042    aluminum-magnesium hydroxide-simethicone (MAALOX MAX) 400-400-40 MG/5ML suspension 15 mL  15 mL Oral Q6H PRN Kiko Talbert MD        sennosides-docusate (PERICOLACE) 8.6-50 MG per tablet 2 tablet  2 tablet Oral BID PRN Kiko Talbert MD        And    polyethylene glycol (MIRALAX) packet 17 g  17 g Oral Daily PRN Kiko Talbert MD        And    bisacodyl (DULCOLAX) EC tablet 5 mg  5 mg Oral Daily PRN Kiko Talbert MD        And    bisacodyl (DULCOLAX) suppository 10 mg  10 mg Rectal Daily PRN Kiko Talbert MD        [COMPLETED] cefTRIAXone (ROCEPHIN) 1 g in sodium chloride 0.9 % 100 mL MBP  1 g Intravenous Once Constantino Ragland MD   Stopped at 02/26/25 0715    cetirizine (zyrTEC) tablet 10 mg  10 mg Oral Daily Kiko Talbert MD   10 mg at 03/03/25 0901    empagliflozin (JARDIANCE) tablet 25 mg  25 mg Oral Daily Kiko Talbert MD   25 mg at 03/03/25 0901    hydrALAZINE (APRESOLINE) injection 10 mg  10 mg Intravenous Q6H PRN Kiko Talbert MD   10 mg at 02/25/25 2042    [COMPLETED] HYDROmorphone (DILAUDID) injection 0.5 mg  0.5 mg Intravenous Once Jeromy Blake MD   0.5 mg at 02/25/25 1133    [COMPLETED] HYDROmorphone (DILAUDID) injection 0.5 mg  0.5 mg Intravenous Once Jeromy Blake MD   0.5 mg at 02/25/25 1324    [COMPLETED] iopamidol (ISOVUE-370) 76 % injection 100 mL  100 mL Intravenous Once in imaging Jeromy Blake MD   100 mL at 02/25/25 1459    [COMPLETED] ketorolac (TORADOL) injection 15 mg  15 mg Intravenous Once Jeromy Blake MD   15 mg at 02/25/25 1133    [COMPLETED] ketorolac (TORADOL) injection 15 mg  15 mg Intravenous Once Ron Eduardo MD   15  mg at 02/27/25 1900    lidocaine PF 1% (XYLOCAINE) injection 10 mL  10 mL Infiltration Once Ron Eduardo MD        losartan (COZAAR) tablet 25 mg  25 mg Oral Daily Kiko Talbert MD   25 mg at 03/03/25 0901    nitroglycerin (NITROSTAT) SL tablet 0.4 mg  0.4 mg Sublingual Q5 Min PRN Kiko Talbert MD        ondansetron ODT (ZOFRAN-ODT) disintegrating tablet 4 mg  4 mg Oral Q6H PRN Kiko Talbert MD        Or    ondansetron (ZOFRAN) injection 4 mg  4 mg Intravenous Q6H PRN Kiko Talbert MD        ondansetron ODT (ZOFRAN-ODT) disintegrating tablet 4 mg  4 mg Oral Q6H PRN Kiko Talbert MD        Or    ondansetron (ZOFRAN) injection 4 mg  4 mg Intravenous Q6H PRN Kiko Talbert MD        oseltamivir (TAMIFLU) capsule 75 mg  75 mg Oral Q12H Ron Eduardo MD   75 mg at 03/03/25 0901    pantoprazole (PROTONIX) EC tablet 40 mg  40 mg Oral BID AC Kiko Talbert MD   40 mg at 03/03/25 0901    Pharmacy to Dose Oseltamivir (TAMIFLU)   Not Applicable Continuous PRN Ron Eduardo MD        [COMPLETED] piperacillin-tazobactam (ZOSYN) 3.375 g IVPB in 100 mL NS MBP (CD)  3.375 g Intravenous Once Ron Eduardo MD   3.375 g at 02/27/25 1600    piperacillin-tazobactam (ZOSYN) 3.375 g IVPB in 100 mL NS MBP (CD)  3.375 g Intravenous Q8H Ron Eduardo MD 0 mL/hr at 02/27/25 2318 3.375 g at 03/03/25 1137    rosuvastatin (CRESTOR) tablet 10 mg  10 mg Oral Daily Kiko Talbert MD   10 mg at 03/03/25 0901    [COMPLETED] sepsis fluid NS 0.9 % bolus 2,000 mL  2,000 mL Intravenous Once Llanes Alvarez, Carlos, MD   2,000 mL at 02/27/25 2122    sodium chloride 0.9 % bolus 2,000 mL  2,000 mL Intravenous Once Llanes Alvarez, Carlos, MD        [COMPLETED] sodium chloride 0.9 % bolus 500 mL  500 mL Intravenous Once Irais Pink PA-C   Stopped at 02/26/25 1235    sodium chloride 0.9 % flush 10 mL  10 mL Intravenous PRN Gali,  Kiko Castillo MD        sodium chloride 0.9 % flush 10 mL  10 mL Intravenous Q12H Kiko Talbert MD   10 mL at 03/02/25 2009    sodium chloride 0.9 % flush 10 mL  10 mL Intravenous PRN Kiko Talbert MD        sodium chloride 0.9 % flush 10 mL  10 mL Intravenous Q12H Ron Eduardo MD   10 mL at 03/03/25 0902    sodium chloride 0.9 % flush 10 mL  10 mL Intravenous PRN Ron Eduardo MD        sodium chloride 0.9 % infusion 40 mL  40 mL Intravenous PRN Kiko Talbert MD   50 mL/hr at 02/26/25 1438    sodium chloride 0.9 % infusion 40 mL  40 mL Intravenous PRN Ron Eduardo MD        vancomycin (VANCOCIN) 1,000 mg in sodium chloride 0.9 % 250 mL IVPB-VTB  1,000 mg Intravenous Once Dez Loco MD            Physician Progress Notes (last 4 days)        Win Reaves MD at 03/03/25 1206          CARDIOLOGY PROGRESS NOTE:    Ismael Pulido  59 y.o.  male  1965  [id number]      Referring Provider: Hospitalist    Reason for follow-up: Pacemaker malfunction     Patient Care Team:  Sharon Silva MD as PCP - General (Internal Medicine & Pediatrics)    Subjective .  Patient doing well without any symptoms    Objective lying in bed comfortably     Review of Systems   Constitutional: Negative for malaise/fatigue.   Cardiovascular:  Negative for chest pain, dyspnea on exertion, leg swelling and palpitations.   Respiratory:  Negative for cough and shortness of breath.    Gastrointestinal:  Negative for abdominal pain, nausea and vomiting.   Neurological:  Negative for dizziness, focal weakness, headaches, light-headedness and numbness.   All other systems reviewed and are negative.      Allergies: Lisinopril    Scheduled Meds:cetirizine, 10 mg, Oral, Daily  empagliflozin, 25 mg, Oral, Daily  lidocaine PF 1%, 10 mL, Infiltration, Once  losartan, 25 mg, Oral, Daily  oseltamivir, 75 mg, Oral, Q12H  pantoprazole, 40 mg, Oral, BID  "AC  piperacillin-tazobactam, 3.375 g, Intravenous, Q8H  rosuvastatin, 10 mg, Oral, Daily  sodium chloride, 2,000 mL, Intravenous, Once  sodium chloride, 10 mL, Intravenous, Q12H  sodium chloride, 10 mL, Intravenous, Q12H  vancomycin (VANCOCIN) 1,000 mg in sodium chloride 0.9 % 250 mL IVPB-VTB, 1,000 mg, Intravenous, Once      Continuous Infusions:Pharmacy to Dose Oseltamivir (TAMIFLU),       PRN Meds:.  acetaminophen **OR** acetaminophen **OR** acetaminophen    aluminum-magnesium hydroxide-simethicone    senna-docusate sodium **AND** polyethylene glycol **AND** bisacodyl **AND** bisacodyl    hydrALAZINE    nitroglycerin    ondansetron ODT **OR** ondansetron    ondansetron ODT **OR** ondansetron    Pharmacy to Dose Oseltamivir (TAMIFLU)    [COMPLETED] Insert Peripheral IV **AND** sodium chloride    sodium chloride    sodium chloride    sodium chloride    sodium chloride        VITAL SIGNS  Vitals:    03/02/25 2345 03/03/25 0308 03/03/25 0737 03/03/25 1143   BP: 111/74 109/80 124/87 129/85   BP Location: Left arm Left arm Left arm Left arm   Patient Position: Lying Lying Lying Lying   Pulse: 84 78 73 83   Resp: 14 14 16 16   Temp: 98.1 °F (36.7 °C) 97.6 °F (36.4 °C) 97.8 °F (36.6 °C) 98.3 °F (36.8 °C)   TempSrc: Axillary Axillary Axillary Oral   SpO2: 95% 94% 95% 94%   Weight:       Height:           Flowsheet Rows      Flowsheet Row First Filed Value   Admission Height 172.7 cm (68\") Documented at 02/25/2025 1110   Admission Weight 104 kg (229 lb 4.5 oz) Documented at 02/25/2025 1110             TELEMETRY: Ventricular pacemaker rhythm    Physical Exam:  Constitutional:       Appearance: Well-developed.   Eyes:      General: No scleral icterus.     Conjunctiva/sclera: Conjunctivae normal.   HENT:      Head: Normocephalic and atraumatic.   Neck:      Vascular: No carotid bruit or JVD.   Pulmonary:      Effort: Pulmonary effort is normal.      Breath sounds: Normal breath sounds. No wheezing. No rales.   Cardiovascular: "      Normal rate. Regular rhythm.   Pulses:     Intact distal pulses.   Abdominal:      General: Bowel sounds are normal.      Palpations: Abdomen is soft.   Musculoskeletal:      Cervical back: Normal range of motion and neck supple. Skin:     General: Skin is warm and dry.      Findings: No rash.   Neurological:      Mental Status: Alert.          Results Review:   I reviewed the patient's new clinical results.  Lab Results (last 24 hours)       Procedure Component Value Units Date/Time    Blood Culture - Blood, Arm, Left [[id number]]  (Normal) Collected: 02/25/25 2029    Specimen: Blood from Arm, Left Updated: 03/02/25 2117     Blood Culture No growth at 5 days    Blood Culture - Blood, Arm, Right [[id number]]  (Normal) Collected: 02/25/25 2026    Specimen: Blood from Arm, Right Updated: 03/02/25 2117     Blood Culture No growth at 5 days    Blood Culture - Blood, Hand, Right [[id number]]  (Normal) Collected: 02/27/25 1707    Specimen: Blood from Hand, Right Updated: 03/02/25 1715     Blood Culture No growth at 3 days            Imaging Results (Last 24 Hours)       ** No results found for the last 24 hours. **            EKG      I personally viewed and interpreted the patient's EKG/Telemetry data:    ECHOCARDIOGRAM:  Results for orders placed during the hospital encounter of 02/25/25    Adult Transthoracic Echo Limited W/ Cont if Necessary Per Protocol    Interpretation Summary    Left ventricular ejection fraction appears to be 51 - 55%.    The right ventricular cavity is moderately dilated.    Technically very limited study.  Valvular structure and function was not evaluated.       STRESS MYOVIEW:  Results for orders placed during the hospital encounter of 02/18/25    Stress Test With Myocardial Perfusion One Day    Interpretation Summary    Myocardial perfusion imaging indicates a normal myocardial perfusion study with no evidence of ischemia. Impressions are consistent with a low risk study.    Left  ventricular ejection fraction is normal (Calculated EF = 64%).       CARDIAC CATHETERIZATION:  No results found for this or any previous visit.       OTHER:         Assessment & Plan     Shortness of breath/abdominal pain  Patient presented with significant abdominal pain nausea and had an NG tube initially but later removed and had workup done which did not show any small bowel obstruction  Patient had influenza A with fever and is being treated for the same.    Pacemaker malfunction  Patient had a permanent pacemaker placement recently but his RV lead has high thresholds and hence he can be readjusted.    Sick sinus syndrome  Patient had a permanent pacemaker placement because of sick sinus syndrome and his pacemaker will be adjusted today.    Hypertension  Patient blood pressure currently stable on beta-blockers next    Hyperlipidemia  Patient is on statins and the lipid levels are well within normal limits    Diabetes  Patient is on oral medicines.    I discussed the patients findings and my recommendations with patient and nurse    Win Reaves MD  25  12:06 EST                Electronically signed by Win Reaves MD at 25 1208       Ron Eduardo MD at 25 1011              [facility] MEDICINE SERVICE  DAILY PROGRESS NOTE    NAME: Ismael Pulido  : 1965  MRN: [id number]      LOS: 5 days     PROVIDER OF SERVICE: Ron Eduardo MD    Chief Complaint: Abdominal pain    Subjective:     Interval History:  History taken from: patient    Patient seen and examined at bedside this morning.  No acute complaints overnight.  Scheduled to go for pacemaker lead placement today        Review of Systems: Negative except described above  Review of Systems    Objective:     Vital Signs  Temp:  [97.6 °F (36.4 °C)-98.6 °F (37 °C)] 97.8 °F (36.6 °C)  Heart Rate:  [73-94] 73  Resp:  [14-16] 16  BP: (100-124)/(74-87) 124/87   Body mass index is 35.06 kg/m².    Physical  Exam  Physical Exam  Constitutional:       Comments: NAD    Cardiovascular:      Comments:  RRR, S1 & S2   Pulmonary:      Comments:  Lungs CTA   Abdominal:      Comments:  ABD soft, NT            Diagnostic Data    Results from last 7 days   Lab Units 02/28/25  0254 02/27/25 2123 02/27/25  1857   WBC 10*3/mm3 7.54   < >  --    HEMOGLOBIN g/dL 12.9*   < >  --    HEMATOCRIT % 40.8   < >  --    PLATELETS 10*3/mm3 183   < >  --    GLUCOSE mg/dL 97  --  134*   CREATININE mg/dL 1.26  --  1.24   BUN mg/dL 26*  --  22*   SODIUM mmol/L 136  --  134*   POTASSIUM mmol/L 4.3  --  4.4   AST (SGOT) U/L  --   --  65*   ALT (SGPT) U/L  --   --  38   ALK PHOS U/L  --   --  76   BILIRUBIN mg/dL  --   --  0.6   ANION GAP mmol/L 12.2  --  13.5    < > = values in this interval not displayed.       No radiology results for the last day      I reviewed the patient's new clinical results.    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       Chest pain  -Troponin, 38, 39  -d. dimer 3.54  -CXR showed low lung volumes with probable mild bibasal atelectasis.   -Cta chest negative for PE. Small left pleural effusion. Mild bilateral dependent atelectasis  -Ekg rate 85, pvcs, pacemaker, prolonged SD  -Cardiology consulted  -Pacemaker interrogated and lead dislodged.  Patient was initially scheduled to go for lead replacement with EP however because he tested positive for flu and was continuously spiking fevers procedure rescheduled to early next week.  Scheduled to go for lead replacement today     Abdominal pain  -Lipase 26  -Ct abd reviewed and showing Moderate gastric distention with air and fluid. This may be transient however, gastroparesis cannot  be excluded. Correlate with patient symptoms. This can be further evaluated with nuclear medicine gastric emptying study as clinically warranted. If there is concern for gastric outlet obstruction, correlate with endoscopy. Moderately distended urinary bladder. Correlate for urinary retention.  -Will do bladder scan every 6 hours and monitor for retention  -Gastroenterology consulted  -EGD on 2/26 showed esophagitis  -Continue PPI      -Fever likely in the setting of developing pneumonia  Chest x-ray reviewed, CT chest ordered which showed increasing left lower lobe consolidation  UA unremarkable  Blood cultures ordered on 2/25 showed no growth to date, will repeat another blood culture on 12/27 also showed no growth to date  RVP negative, will repeat  MRSA PCR negative  Pro-Fidel increased from 0.08 on 2/25-2.39 on 2/27  Tested positive for flu, will continue on Tamiflu  Continue on Zosyn for now, received 5 days so far  Check bilateral lower extremity venous duplex for any DVT, negative     Right upper extremity swelling, significantly improved  Acute superficial thrombophlebitis seen in cephalic and basilic veins  Arm elevation and cold compression, supportive care     Hypertension  -Stable  -Continue losartan     Type 2 diabetes  -Continue jardiance   -SSI  -Diabetic diet  -Monitor before meals and at bedtime     VTE Prophylaxis:  Mechanical VTE prophylaxis orders are present.             Disposition Planning:        Anticipated Date of Discharge: 3/4/25         Time: 35 minutes     Code Status and Medical Interventions: CPR (Attempt to Resuscitate); Full Support   Ordered at: 02/25/25 2497     Level Of Support Discussed With:    Patient     Code Status (Patient has no pulse and is not breathing):    CPR (Attempt to Resuscitate)     Medical Interventions (Patient has pulse or is breathing):    Full Support       Signature: Electronically signed by Ron Eduardo MD, 03/03/25, 10:11 EST.  [facility]  Hospitalist Team      Electronically signed by Ron Eduardo MD at 03/03/25 1013       Win Reaves MD at 03/02/25 1926          CARDIOLOGY PROGRESS NOTE:    Ismael Pulido  59 y.o.  male  1965  [id number]      Referring Provider: Hospitalist    Reason for follow-up: Pacemaker malfunction      Patient Care Team:  Sharon Silva MD as PCP - General (Internal Medicine & Pediatrics)    Subjective .  No cp or soa while needed    Objective  sitting in chair comfortably      Review of Systems   Constitutional: Negative for malaise/fatigue.   Cardiovascular:  Negative for chest pain, dyspnea on exertion, leg swelling and palpitations.   Respiratory:  Negative for cough and shortness of breath.    Gastrointestinal:  Negative for abdominal pain, nausea and vomiting.   Neurological:  Negative for dizziness, focal weakness, headaches, light-headedness and numbness.   All other systems reviewed and are negative.      Allergies: Lisinopril    Scheduled Meds:cetirizine, 10 mg, Oral, Daily  empagliflozin, 25 mg, Oral, Daily  lidocaine PF 1%, 10 mL, Infiltration, Once  losartan, 25 mg, Oral, Daily  metoclopramide, 5 mg, Intravenous, BID AC  oseltamivir, 75 mg, Oral, Q12H  pantoprazole, 40 mg, Oral, BID AC  piperacillin-tazobactam, 3.375 g, Intravenous, Q8H  rosuvastatin, 10 mg, Oral, Daily  sodium chloride, 2,000 mL, Intravenous, Once  sodium chloride, 10 mL, Intravenous, Q12H  sodium chloride, 10 mL, Intravenous, Q12H      Continuous Infusions:Pharmacy to Dose Oseltamivir (TAMIFLU),       PRN Meds:.  acetaminophen **OR** acetaminophen **OR** acetaminophen    aluminum-magnesium hydroxide-simethicone    senna-docusate sodium **AND** polyethylene glycol **AND** bisacodyl **AND** bisacodyl    hydrALAZINE    nitroglycerin    ondansetron ODT **OR** ondansetron    ondansetron ODT **OR** ondansetron    Pharmacy to Dose Oseltamivir (TAMIFLU)    [COMPLETED] Insert Peripheral IV **AND** sodium chloride     "sodium chloride    sodium chloride    sodium chloride    sodium chloride        VITAL SIGNS  Vitals:    03/02/25 0730 03/02/25 1130 03/02/25 1308 03/02/25 1610   BP: 120/84 121/86 100/76 117/76   BP Location: Left arm Left arm  Left arm   Patient Position: Lying Sitting  Sitting   Pulse: 77 86 94 90   Resp: 14 15  15   Temp: 97.5 °F (36.4 °C) 97.6 °F (36.4 °C)  98.2 °F (36.8 °C)   TempSrc: Oral Oral  Oral   SpO2: 95% 94% 93% 95%   Weight:       Height:           Flowsheet Rows      Flowsheet Row First Filed Value   Admission Height 172.7 cm (68\") Documented at 02/25/2025 1110   Admission Weight 104 kg (229 lb 4.5 oz) Documented at 02/25/2025 1110             TELEMETRY: Pacemaker rhythm    Physical Exam:  Constitutional:       Appearance: Well-developed.   Eyes:      General: No scleral icterus.     Conjunctiva/sclera: Conjunctivae normal.   HENT:      Head: Normocephalic and atraumatic.   Neck:      Vascular: No carotid bruit or JVD.   Pulmonary:      Effort: Pulmonary effort is normal.      Breath sounds: Normal breath sounds. No wheezing. No rales.   Cardiovascular:      Normal rate. Regular rhythm.   Pulses:     Intact distal pulses.   Abdominal:      General: Bowel sounds are normal.      Palpations: Abdomen is soft.   Musculoskeletal:      Cervical back: Normal range of motion and neck supple. Skin:     General: Skin is warm and dry.      Findings: No rash.   Neurological:      Mental Status: Alert.          Results Review:   I reviewed the patient's new clinical results.  Lab Results (last 24 hours)       Procedure Component Value Units Date/Time    Blood Culture - Blood, Hand, Right [[id number]]  (Normal) Collected: 02/27/25 1707    Specimen: Blood from Hand, Right Updated: 03/02/25 1715     Blood Culture No growth at 3 days    Blood Culture - Blood, Arm, Right [[id number]]  (Normal) Collected: 02/25/25 2026    Specimen: Blood from Arm, Right Updated: 03/01/25 2115     Blood Culture No growth at 4 days    Blood " Culture - Blood, Arm, Left [[id number]]  (Normal) Collected: 02/25/25 2029    Specimen: Blood from Arm, Left Updated: 03/01/25 2115     Blood Culture No growth at 4 days            Imaging Results (Last 24 Hours)       ** No results found for the last 24 hours. **            EKG      I personally viewed and interpreted the patient's EKG/Telemetry data:    ECHOCARDIOGRAM:  Results for orders placed during the hospital encounter of 02/25/25    Adult Transthoracic Echo Limited W/ Cont if Necessary Per Protocol    Interpretation Summary    Left ventricular ejection fraction appears to be 51 - 55%.    The right ventricular cavity is moderately dilated.    Technically very limited study.  Valvular structure and function was not evaluated.       STRESS MYOVIEW:  Results for orders placed during the hospital encounter of 02/18/25    Stress Test With Myocardial Perfusion One Day    Interpretation Summary    Myocardial perfusion imaging indicates a normal myocardial perfusion study with no evidence of ischemia. Impressions are consistent with a low risk study.    Left ventricular ejection fraction is normal (Calculated EF = 64%).       CARDIAC CATHETERIZATION:  No results found for this or any previous visit.       OTHER:         Assessment & Plan     Syncope/sick sinus syndrome  Patient had complete heart block and status post permanent pacemaker placement  Patient went home after the pacemaker without any complications but when he came back with respiratory and abdominal symptoms he had the RV lead threshold increased and hence he will have the RV lead repositioned when he is stable.    Influenza A  Patient had fever with shortness of breath after he was admitted and is tested positive for influenza A and is on Tamiflu and will be going home soon.    Hypertension  Patient blood pressure currently stable on beta-blockers    Hyperlipidemia  Patient is on statins and lipid levels are followed by the primary care  doctor    Diabetes  Present oral medicines.    I discussed the patients findings and my recommendations with patient and nurse      Win Reaves MD  25  19:27 EST                Electronically signed by Win Reaves MD at 25 1929       Ron Eduardo MD at 25 1041              [facility] MEDICINE SERVICE  DAILY PROGRESS NOTE    NAME: Ismael Pulido  : 1965  MRN: [id number]      LOS: 4 days     PROVIDER OF SERVICE: Ron Eduardo MD    Chief Complaint: Abdominal pain    Subjective:     Interval History:  History taken from: patient    Patient seen and examined at bedside this morning.  No acute complaints overnight        Review of Systems: Negative except described above  Review of Systems    Objective:     Vital Signs  Temp:  [97.5 °F (36.4 °C)-98.6 °F (37 °C)] 97.5 °F (36.4 °C)  Heart Rate:  [77-95] 77  Resp:  [14-15] 14  BP: (120-132)/(74-95) 120/84   Body mass index is 35.06 kg/m².    Physical Exam  Physical Exam  Constitutional:       Comments: NAD    Cardiovascular:      Comments:  RRR, S1 & S2   Pulmonary:      Comments:  Lungs CTA   Abdominal:      Comments:  ABD soft, NT            Diagnostic Data    Results from last 7 days   Lab Units 25  0254 253 25  1857   WBC 10*3/mm3 7.54   < >  --    HEMOGLOBIN g/dL 12.9*   < >  --    HEMATOCRIT % 40.8   < >  --    PLATELETS 10*3/mm3 183   < >  --    GLUCOSE mg/dL 97  --  134*   CREATININE mg/dL 1.26  --  1.24   BUN mg/dL 26*  --  22*   SODIUM mmol/L 136  --  134*   POTASSIUM mmol/L 4.3  --  4.4   AST (SGOT) U/L  --   --  65*   ALT (SGPT) U/L  --   --  38   ALK PHOS U/L  --   --  76   BILIRUBIN mg/dL  --   --  0.6   ANION GAP mmol/L 12.2  --  13.5    < > = values in this interval not displayed.       No radiology results for the last day      I reviewed the patient's new clinical results.    Assessment/Plan:     Active and Resolved Problems  Active Hospital Problems    Diagnosis  POA     **Abdominal pain [R10.9]  Yes    Pacemaker lead malfunction [T82.110A]  Unknown    Intermittent complete heart block [I44.2]  Unknown    Symptomatic bradycardia [R00.1]  Unknown      Resolved Hospital Problems   No resolved problems to display.       Chest pain  -Troponin, 38, 39  -d. dimer 3.54  -CXR showed low lung volumes with probable mild bibasal atelectasis.   -Cta chest negative for PE. Small left pleural effusion. Mild bilateral dependent atelectasis  -Ekg rate 85, pvcs, pacemaker, prolonged HI  -Cardiology consulted  -Pacemaker interrogated and lead dislodged.  Patient was initially scheduled to go for lead replacement with EP however because he tested positive for flu and was continuously spiking fevers procedure rescheduled to early next week.  Will discuss with cardiology to see if it can be done in the next day or 2     Abdominal pain  -Lipase 26  -Ct abd reviewed and showing Moderate gastric distention with air and fluid. This may be transient however, gastroparesis cannot be excluded. Correlate with patient symptoms. This can be further evaluated with nuclear medicine gastric emptying study as clinically warranted. If there is concern for gastric outlet obstruction, correlate with endoscopy. Moderately distended urinary bladder. Correlate for urinary retention.  -Will do bladder scan every 6 hours and monitor for retention  -Gastroenterology consulted  -EGD on 2/26 showed esophagitis  -Continue PPI      -Fever likely in the setting of developing pneumonia  Chest x-ray reviewed, CT chest ordered which showed increasing left lower lobe consolidation  UA unremarkable  Blood cultures ordered on 2/25 showed no growth to date, will repeat another blood culture on 12/27 also showed no growth to date  RVP negative, will repeat  MRSA PCR negative  Pro-Fidel increased from 0.08 on 2/25-2.39 on 2/27  Tested positive for flu, will start on Tamiflu  Continue on Zosyn for now  Check bilateral lower extremity venous  duplex for any DVT, negative        Hypertension  -Stable  -Continue losartan     Type 2 diabetes  -Continue jardiance   -SSI  -Diabetic diet  -Monitor before meals and at bedtime        VTE Prophylaxis:  Mechanical VTE prophylaxis orders are present.             Disposition Planning:        Anticipated Date of Discharge: 3/4/2025         Time: 35 minutes     Code Status and Medical Interventions: CPR (Attempt to Resuscitate); Full Support   Ordered at: 02/25/25 1627     Level Of Support Discussed With:    Patient     Code Status (Patient has no pulse and is not breathing):    CPR (Attempt to Resuscitate)     Medical Interventions (Patient has pulse or is breathing):    Full Support       Signature: Electronically signed by Ron Eduardo MD, 03/02/25, 10:41 EST.  [facility] Hospitalist Team     Electronically signed by Ron Eduardo MD at 03/02/25 1043       Win Reaves MD at 03/01/25 1500          CARDIOLOGY PROGRESS NOTE:    Ismael Pulido  59 y.o.  male  1965  [id number]      Referring Provider: Hospitalist    Reason for follow-up: Pacemaker malfunction influenza A     Patient Care Team:  Sharon Silva MD as PCP - General (Internal Medicine & Pediatrics)    Subjective .  Patient is doing well without any chest pain or shortness of breath or cough    Objective lying in bed comfortably sitting in chair     Review of Systems   Constitutional: Negative for malaise/fatigue.   Cardiovascular:  Negative for chest pain, dyspnea on exertion, leg swelling and palpitations.   Respiratory:  Negative for cough and shortness of breath.    Gastrointestinal:  Negative for abdominal pain, nausea and vomiting.   Neurological:  Negative for dizziness, focal weakness, headaches, light-headedness and numbness.   All other systems reviewed and are negative.      Allergies: Lisinopril    Scheduled Meds:cetirizine, 10 mg, Oral, Daily  empagliflozin, 25 mg, Oral, Daily  lidocaine PF 1%, 10  "mL, Infiltration, Once  losartan, 25 mg, Oral, Daily  metoclopramide, 5 mg, Intravenous, BID AC  oseltamivir, 75 mg, Oral, Q12H  pantoprazole, 40 mg, Oral, BID AC  piperacillin-tazobactam, 3.375 g, Intravenous, Q8H  rosuvastatin, 10 mg, Oral, Daily  sodium chloride, 2,000 mL, Intravenous, Once  sodium chloride, 10 mL, Intravenous, Q12H  sodium chloride, 10 mL, Intravenous, Q12H      Continuous Infusions:Pharmacy to Dose Oseltamivir (TAMIFLU),       PRN Meds:.  acetaminophen **OR** acetaminophen **OR** acetaminophen    aluminum-magnesium hydroxide-simethicone    senna-docusate sodium **AND** polyethylene glycol **AND** bisacodyl **AND** bisacodyl    hydrALAZINE    nitroglycerin    ondansetron ODT **OR** ondansetron    ondansetron ODT **OR** ondansetron    Pharmacy to Dose Oseltamivir (TAMIFLU)    [COMPLETED] Insert Peripheral IV **AND** sodium chloride    sodium chloride    sodium chloride    sodium chloride    sodium chloride        VITAL SIGNS  Vitals:    02/28/25 2350 03/01/25 0330 03/01/25 0757 03/01/25 1110   BP: 125/90 120/88 118/83 132/84   BP Location: Left arm Right arm Right arm Right arm   Patient Position: Lying Lying Lying Sitting   Pulse: 70 80 87 89   Resp: 18 16 18 15   Temp: 99.3 °F (37.4 °C) 98.8 °F (37.1 °C) 97.8 °F (36.6 °C) 97.7 °F (36.5 °C)   TempSrc: Oral Oral Axillary Oral   SpO2: 95% 98% 99% 98%   Weight:       Height:           Flowsheet Rows      Flowsheet Row First Filed Value   Admission Height 172.7 cm (68\") Documented at 02/25/2025 1110   Admission Weight 104 kg (229 lb 4.5 oz) Documented at 02/25/2025 1110             TELEMETRY: Pacemaker rhythm    Physical Exam:  Constitutional:       Appearance: Well-developed.   Eyes:      General: No scleral icterus.     Conjunctiva/sclera: Conjunctivae normal.   HENT:      Head: Normocephalic and atraumatic.   Neck:      Vascular: No carotid bruit or JVD.   Pulmonary:      Effort: Pulmonary effort is normal.      Breath sounds: Normal breath " sounds. No wheezing. No rales.   Cardiovascular:      Normal rate. Regular rhythm.   Pulses:     Intact distal pulses.   Abdominal:      General: Bowel sounds are normal.      Palpations: Abdomen is soft.   Musculoskeletal:      Cervical back: Normal range of motion and neck supple. Skin:     General: Skin is warm and dry.      Findings: No rash.   Neurological:      Mental Status: Alert.          Results Review:   I reviewed the patient's new clinical results.  Lab Results (last 24 hours)       Procedure Component Value Units Date/Time    Blood Culture - Blood, Arm, Right [[id number]]  (Normal) Collected: 02/25/25 2026    Specimen: Blood from Arm, Right Updated: 02/28/25 2115     Blood Culture No growth at 3 days    Blood Culture - Blood, Arm, Left [[id number]]  (Normal) Collected: 02/25/25 2029    Specimen: Blood from Arm, Left Updated: 02/28/25 2115     Blood Culture No growth at 3 days    Blood Culture - Blood, Hand, Right [[id number]]  (Normal) Collected: 02/27/25 1707    Specimen: Blood from Hand, Right Updated: 02/28/25 1715     Blood Culture No growth at 24 hours            Imaging Results (Last 24 Hours)       ** No results found for the last 24 hours. **            EKG      I personally viewed and interpreted the patient's EKG/Telemetry data:    ECHOCARDIOGRAM:  Results for orders placed during the hospital encounter of 02/25/25    Adult Transthoracic Echo Limited W/ Cont if Necessary Per Protocol    Interpretation Summary    Left ventricular ejection fraction appears to be 51 - 55%.    The right ventricular cavity is moderately dilated.    Technically very limited study.  Valvular structure and function was not evaluated.       STRESS MYOVIEW:  Results for orders placed during the hospital encounter of 02/18/25    Stress Test With Myocardial Perfusion One Day    Interpretation Summary    Myocardial perfusion imaging indicates a normal myocardial perfusion study with no evidence of ischemia. Impressions are  consistent with a low risk study.    Left ventricular ejection fraction is normal (Calculated EF = 64%).       CARDIAC CATHETERIZATION:  No results found for this or any previous visit.       OTHER:         Assessment & Plan     Sick sinus syndrome status post pacemaker placement  Patient had permanent pacemaker placement secondary to sick sinus syndrome but when he came with abdominal symptoms his right ventricular lead had high thresholds and will need repositioning but his LV lead is working very well  Patient is seen bilateral physiologist.    Influenza A  Patient has fever with shortness of breath and is tested positive for influenza A and is on Tamiflu.    Hypertension  Patient blood pressure currently stable on beta-blockers    Hyperlipidemia  Patient is on statins and the lipid levels are well within normal limits    Diabetes  Patient is on oral medicines and followed by the primary care doctor.      I discussed the patients findings and my recommendations with patient and nurse      Win Reaves MD  25  15:00 EST                Electronically signed by Win Reaves MD at 25 1502       Ron Eduardo MD at 25 0900              [facility] MEDICINE SERVICE  DAILY PROGRESS NOTE    NAME: Ismael Pulido  : 1965  MRN: [id number]      LOS: 3 days     PROVIDER OF SERVICE: Ron Eduardo MD    Chief Complaint: Abdominal pain    Subjective:     Interval History:  History taken from: patient    Patient seen and examined at bedside this morning.  No acute complaints overnight.  He is feeling much better today        Review of Systems: Negative except as described above      Review of Systems    Objective:     Vital Signs  Temp:  [97.8 °F (36.6 °C)-101.3 °F (38.5 °C)] 97.8 °F (36.6 °C)  Heart Rate:  [] 87  Resp:  [16-19] 18  BP: (118-131)/(76-90) 118/83   Body mass index is 35.06 kg/m².    Physical Exam  Physical Exam  Constitutional:       Comments: NAD     Cardiovascular:      Comments:  RRR, S1 & S2   Pulmonary:      Comments:  Lungs CTA   Abdominal:      Comments:  ABD soft, NT            Diagnostic Data    Results from last 7 days   Lab Units 02/28/25  0254 02/27/25  2123 02/27/25  1857   WBC 10*3/mm3 7.54   < >  --    HEMOGLOBIN g/dL 12.9*   < >  --    HEMATOCRIT % 40.8   < >  --    PLATELETS 10*3/mm3 183   < >  --    GLUCOSE mg/dL 97  --  134*   CREATININE mg/dL 1.26  --  1.24   BUN mg/dL 26*  --  22*   SODIUM mmol/L 136  --  134*   POTASSIUM mmol/L 4.3  --  4.4   AST (SGOT) U/L  --   --  65*   ALT (SGPT) U/L  --   --  38   ALK PHOS U/L  --   --  76   BILIRUBIN mg/dL  --   --  0.6   ANION GAP mmol/L 12.2  --  13.5    < > = values in this interval not displayed.       CT Chest Without Contrast Diagnostic    Result Date: 2/27/2025  Impression: 1. Findings suggesting volume overload/third spacing including increasing small to moderate left effusion, new trace right pleural effusion and new small pericardial effusion. 2. Increasing adjacent left lower lobe consolidation which could reflect atelectasis or pneumonia in the right clinical setting. 3. Mild likely reactive mediastinal adenopathy, slightly increasing from prior. 4. Aortic and coronary atherosclerotic disease. 5. Stable mild cardiomegaly. Electronically Signed: Nicholas Becerra MD  2/27/2025 3:29 PM EST  Workstation ID: [id number]       I reviewed the patient's new clinical results.    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       Chest pain  -Troponin, 38, 39  -d. dimer 3.54  -CXR showed low lung volumes with probable mild bibasal atelectasis.   -Cta chest negative for PE. Small left pleural effusion. Mild  bilateral dependent atelectasis  -Ekg rate 85, pvcs, pacemaker, prolonged MI  -Cardiology consulted  -Pacemaker interrogated and lead dislodged.  Patient was initially scheduled to go for lead replacement with EP however because he tested positive for flu and was continuously spiking fevers procedure rescheduled to early next week     Abdominal pain  -Lipase 26  -Ct abd reviewed and showing Moderate gastric distention with air and fluid. This may be transient however, gastroparesis cannot be excluded. Correlate with patient symptoms. This can be further evaluated with nuclear medicine gastric emptying study as clinically warranted. If there is concern for gastric outlet obstruction, correlate with endoscopy. Moderately distended urinary bladder. Correlate for urinary retention.  -Will do bladder scan every 6 hours and monitor for retention  -Gastroenterology consulted  -EGD on 2/26 showed esophagitis  -Continue PPI      -Fever likely in the setting of developing pneumonia  Chest x-ray reviewed, CT chest ordered which showed increasing left lower lobe consolidation  UA unremarkable  Blood cultures ordered on 2/25 showed no growth to date, will repeat another blood culture on 12/27 also showed no growth to date  RVP negative, will repeat  MRSA PCR negative  Pro-Fidel increased from 0.08 on 2/25-2.39 on 2/27  Tested positive for flu, will start on Tamiflu  Continue on Zosyn for now  Check bilateral lower extremity venous duplex for any DVT, negative        Hypertension  -Stable  -Continue losartan     Type 2 diabetes  -Continue jardiance   -SSI  -Diabetic diet  -Monitor before meals and at bedtime        VTE Prophylaxis:  Mechanical VTE prophylaxis orders are present.             Disposition Planning:        Anticipated Date of Discharge: 3/5/2025         Time: 35 minutes     Code Status and Medical Interventions: CPR (Attempt to Resuscitate); Full Support   Ordered at: 02/25/25 1179     Level Of Support Discussed With:     Patient     Code Status (Patient has no pulse and is not breathing):    CPR (Attempt to Resuscitate)     Medical Interventions (Patient has pulse or is breathing):    Full Support       Signature: Electronically signed by Ron Eduardo MD, 03/01/25, 09:00 EST.  [facility]      Electronically signed by Ron Eduardo MD at 03/01/25 1113       Dez Loco MD at 02/28/25 1710          Patient looks significantly better  No further tachypnea fever  Positive for flu  Defer RV lead revision for now  Scheduled RV lead revision may be Monday evening or early next week discussed with patient and family      Electronically signed by Dez BURCH. MD Ber[place], 02/28/25, 5:11 PM EST.      Electronically signed by Dez Loco MD at 02/28/25 1711       Win Reaves MD at 02/28/25 1225          CARDIOLOGY PROGRESS NOTE:    Ismael Pulido  59 y.o.  male  1965  [id number]      Referring Provider: Hospitalist    Reason for follow-up: Abdominal distention and nausea.  Pacemaker malfunction     Patient Care Team:  Sharon Silva MD as PCP - General (Internal Medicine & Pediatrics)    Subjective .  Patient had temperature last night and also has some shortness of breath.  Objective lying in bed comfortably     Review of Systems   Constitutional: Negative for malaise/fatigue.   Cardiovascular:  Negative for chest pain, dyspnea on exertion, leg swelling and palpitations.   Respiratory:  Positive for shortness of breath. Negative for cough.    Gastrointestinal:  Negative for abdominal pain, nausea and vomiting.   Neurological:  Negative for dizziness, focal weakness, headaches, light-headedness and numbness.   All other systems reviewed and are negative.      Allergies: Lisinopril    Scheduled Meds:cetirizine, 10 mg, Oral, Daily  empagliflozin, 25 mg, Oral, Daily  lidocaine PF 1%, 10 mL, Infiltration, Once  losartan, 25 mg, Oral, Daily  metoclopramide, 5 mg,  "Intravenous, BID AC  pantoprazole, 40 mg, Oral, BID AC  piperacillin-tazobactam, 3.375 g, Intravenous, Q8H  rosuvastatin, 10 mg, Oral, Daily  sodium chloride, 2,000 mL, Intravenous, Once  sodium chloride, 10 mL, Intravenous, Q12H  sodium chloride, 10 mL, Intravenous, Q12H      Continuous Infusions:   PRN Meds:.  acetaminophen **OR** acetaminophen **OR** acetaminophen    aluminum-magnesium hydroxide-simethicone    senna-docusate sodium **AND** polyethylene glycol **AND** bisacodyl **AND** bisacodyl    hydrALAZINE    nitroglycerin    ondansetron ODT **OR** ondansetron    ondansetron ODT **OR** ondansetron    [COMPLETED] Insert Peripheral IV **AND** sodium chloride    sodium chloride    sodium chloride    sodium chloride    sodium chloride        VITAL SIGNS  Vitals:    02/28/25 0415 02/28/25 0746 02/28/25 1004 02/28/25 1110   BP: 128/77 126/80  131/87   BP Location: Right arm Left arm  Left arm   Patient Position: Lying Lying  Lying   Pulse: 99 96  107   Resp: 19 24  18   Temp: 98.2 °F (36.8 °C) 98 °F (36.7 °C) (!) 100.7 °F (38.2 °C) 99 °F (37.2 °C)   TempSrc: Axillary Oral Oral Oral   SpO2: 90% 96%     Weight: 105 kg (230 lb 9.6 oz)      Height:           Flowsheet Rows      Flowsheet Row First Filed Value   Admission Height 172.7 cm (68\") Documented at 02/25/2025 1110   Admission Weight 104 kg (229 lb 4.5 oz) Documented at 02/25/2025 1110             TELEMETRY: Ventricular pacemaker rhythm    Physical Exam:  Constitutional:       Appearance: Well-developed.   Eyes:      General: No scleral icterus.     Conjunctiva/sclera: Conjunctivae normal.      Pupils: Pupils are equal, round, and reactive to light.   HENT:      Head: Normocephalic and atraumatic.   Neck:      Vascular: No carotid bruit or JVD.   Pulmonary:      Effort: Pulmonary effort is normal.      Breath sounds: Normal breath sounds. No wheezing. No rales.   Cardiovascular:      Normal rate. Regular rhythm.   Pulses:     Intact distal pulses.   Abdominal:    "   General: Bowel sounds are normal.      Palpations: Abdomen is soft.   Musculoskeletal: Normal range of motion.      Cervical back: Normal range of motion and neck supple. Skin:     General: Skin is warm and dry.      Findings: No rash.   Neurological:      Mental Status: Alert.      Comments: No focal deficits          Results Review:   I reviewed the patient's new clinical results.  Lab Results (last 24 hours)       Procedure Component Value Units Date/Time    Respiratory Panel PCR w/COVID-19(SARS-CoV-2) [facility] In-House, NP Swab in UTM/VTM, 2 HR TAT - Swab, Nasopharynx [[id number]]  (Abnormal) Collected: 02/28/25 1055    Specimen: Swab from Nasopharynx Updated: 02/28/25 1159     ADENOVIRUS, PCR Not Detected     Coronavirus 229E Not Detected     Coronavirus HKU1 Not Detected     Coronavirus NL63 Not Detected     Coronavirus OC43 Not Detected     COVID19 Not Detected     Human Metapneumovirus Not Detected     Human Rhinovirus/Enterovirus Not Detected     Influenza A H3 Detected     Influenza B PCR Not Detected     Parainfluenza Virus 1 Not Detected     Parainfluenza Virus 2 Not Detected     Parainfluenza Virus 3 Not Detected     Parainfluenza Virus 4 Not Detected     RSV, PCR Not Detected     Bordetella pertussis pcr Not Detected     Bordetella parapertussis PCR Not Detected     Chlamydophila pneumoniae PCR Not Detected     Mycoplasma pneumo by PCR Not Detected    Narrative:      In the setting of a positive respiratory panel with a viral infection PLUS a negative procalcitonin without other underlying concern for bacterial infection, consider observing off antibiotics or discontinuation of antibiotics and continue supportive care. If the respiratory panel is positive for atypical bacterial infection (Bordetella pertussis, Chlamydophila pneumoniae, or Mycoplasma pneumoniae), consider antibiotic de-escalation to target atypical bacterial infection.    Basic Metabolic Panel [[id number]]  (Abnormal)  Collected: 02/28/25 0254    Specimen: Blood Updated: 02/28/25 0339     Glucose 97 mg/dL      BUN 26 mg/dL      Creatinine 1.26 mg/dL      Sodium 136 mmol/L      Potassium 4.3 mmol/L      Chloride 103 mmol/L      CO2 20.8 mmol/L      Calcium 8.2 mg/dL      BUN/Creatinine Ratio 20.6     Anion Gap 12.2 mmol/L      eGFR 65.7 mL/min/1.73     Narrative:      GFR Categories in Chronic Kidney Disease (CKD)      GFR Category          GFR (mL/min/1.73)    Interpretation  G1                     90 or greater         Normal or high (1)  G2                      60-89                Mild decrease (1)  G3a                   45-59                Mild to moderate decrease  G3b                   30-44                Moderate to severe decrease  G4                    15-29                Severe decrease  G5                    14 or less           Kidney failure          (1)In the absence of evidence of kidney disease, neither GFR category G1 or G2 fulfill the criteria for CKD.    eGFR calculation 2021 CKD-EPI creatinine equation, which does not include race as a factor    CBC & Differential [[id number]]  (Abnormal) Collected: 02/28/25 0254    Specimen: Blood Updated: 02/28/25 0325    Narrative:      The following orders were created for panel order CBC & Differential.  Procedure                               Abnormality         Status                     ---------                               -----------         ------                     CBC Auto Differential[[id number]]        Abnormal            Final result                 Please view results for these tests on the individual orders.    CBC Auto Differential [[id number]]  (Abnormal) Collected: 02/28/25 0254    Specimen: Blood Updated: 02/28/25 0325     WBC 7.54 10*3/mm3      RBC 4.47 10*6/mm3      Hemoglobin 12.9 g/dL      Hematocrit 40.8 %      MCV 91.3 fL      MCH 28.9 pg      MCHC 31.6 g/dL      RDW 13.2 %      RDW-SD 44.3 fl      MPV 9.3 fL      Platelets 183 10*3/mm3       Neutrophil % 70.7 %      Lymphocyte % 12.2 %      Monocyte % 15.4 %      Eosinophil % 0.5 %      Basophil % 0.5 %      Immature Grans % 0.7 %      Neutrophils, Absolute 5.33 10*3/mm3      Lymphocytes, Absolute 0.92 10*3/mm3      Monocytes, Absolute 1.16 10*3/mm3      Eosinophils, Absolute 0.04 10*3/mm3      Basophils, Absolute 0.04 10*3/mm3      Immature Grans, Absolute 0.05 10*3/mm3      nRBC 0.0 /100 WBC     Lactic Acid, Plasma [[id number]]  (Normal) Collected: 02/27/25 2123    Specimen: Blood Updated: 02/27/25 2151     Lactate 1.6 mmol/L     Comprehensive Metabolic Panel [[id number]]  (Abnormal) Collected: 02/27/25 1857    Specimen: Blood Updated: 02/27/25 2136     Glucose 134 mg/dL      BUN 22 mg/dL      Creatinine 1.24 mg/dL      Sodium 134 mmol/L      Potassium 4.4 mmol/L      Chloride 100 mmol/L      CO2 20.5 mmol/L      Calcium 8.7 mg/dL      Total Protein 7.2 g/dL      Albumin 3.5 g/dL      ALT (SGPT) 38 U/L      AST (SGOT) 65 U/L      Alkaline Phosphatase 76 U/L      Total Bilirubin 0.6 mg/dL      Globulin 3.7 gm/dL      A/G Ratio 0.9 g/dL      BUN/Creatinine Ratio 17.7     Anion Gap 13.5 mmol/L      eGFR 67.0 mL/min/1.73     Narrative:      GFR Categories in Chronic Kidney Disease (CKD)      GFR Category          GFR (mL/min/1.73)    Interpretation  G1                     90 or greater         Normal or high (1)  G2                      60-89                Mild decrease (1)  G3a                   45-59                Mild to moderate decrease  G3b                   30-44                Moderate to severe decrease  G4                    15-29                Severe decrease  G5                    14 or less           Kidney failure          (1)In the absence of evidence of kidney disease, neither GFR category G1 or G2 fulfill the criteria for CKD.    eGFR calculation 2021 CKD-EPI creatinine equation, which does not include race as a factor    CBC & Differential [[id number]]  (Abnormal) Collected: 02/27/25  2123    Specimen: Blood Updated: 02/27/25 2133    Narrative:      The following orders were created for panel order CBC & Differential.  Procedure                               Abnormality         Status                     ---------                               -----------         ------                     CBC Auto Differential[[id number]]        Abnormal            Final result                 Please view results for these tests on the individual orders.    CBC Auto Differential [[id number]]  (Abnormal) Collected: 02/27/25 2123    Specimen: Blood Updated: 02/27/25 2133     WBC 9.20 10*3/mm3      RBC 4.76 10*6/mm3      Hemoglobin 13.9 g/dL      Hematocrit 42.8 %      MCV 89.9 fL      MCH 29.2 pg      MCHC 32.5 g/dL      RDW 13.2 %      RDW-SD 43.8 fl      MPV 8.9 fL      Platelets 200 10*3/mm3      Neutrophil % 74.8 %      Lymphocyte % 10.2 %      Monocyte % 13.9 %      Eosinophil % 0.2 %      Basophil % 0.5 %      Immature Grans % 0.4 %      Neutrophils, Absolute 6.87 10*3/mm3      Lymphocytes, Absolute 0.94 10*3/mm3      Monocytes, Absolute 1.28 10*3/mm3      Eosinophils, Absolute 0.02 10*3/mm3      Basophils, Absolute 0.05 10*3/mm3      Immature Grans, Absolute 0.04 10*3/mm3      nRBC 0.0 /100 WBC     Blood Culture - Blood, Arm, Right [[id number]]  (Normal) Collected: 02/25/25 2026    Specimen: Blood from Arm, Right Updated: 02/27/25 2115     Blood Culture No growth at 2 days    Blood Culture - Blood, Arm, Left [[id number]]  (Normal) Collected: 02/25/25 2029    Specimen: Blood from Arm, Left Updated: 02/27/25 2115     Blood Culture No growth at 2 days    MRSA Screen, PCR (Inpatient) - Swab, Nares [[id number]]  (Normal) Collected: 02/27/25 1935    Specimen: Swab from Nares Updated: 02/27/25 2114     MRSA PCR No MRSA Detected    Narrative:      The negative predictive value of this diagnostic test is high and should only be used to consider de-escalating anti-MRSA therapy. A positive result may indicate colonization  "with MRSA and must be correlated clinically.    Procalcitonin [[id number]]  (Abnormal) Collected: 02/27/25 1857    Specimen: Blood Updated: 02/27/25 1957     Procalcitonin 2.39 ng/mL     Narrative:      As a Marker for Sepsis (Non-Neonates):    1. <0.5 ng/mL represents a low risk of severe sepsis and/or septic shock.  2. >2 ng/mL represents a high risk of severe sepsis and/or septic shock.    As a Marker for Lower Respiratory Tract Infections that require antibiotic therapy:    PCT on Admission    Antibiotic Therapy       6-12 Hrs later    >0.5                Strongly Recommended  >0.25 - <0.5        Recommended   0.1 - 0.25          Discouraged              Remeasure/reassess PCT  <0.1                Strongly Discouraged     Remeasure/reassess PCT    As 28 day mortality risk marker: \"Change in Procalcitonin Result\" (>80% or <=80%) if Day 0 (or Day 1) and Day 4 values are available. Refer to http://www.[facility]pct[facility]cal[facility].com    Change in PCT <=80%  A decrease of PCT levels below or equal to 80% defines a positive change in PCT test result representing a higher risk for 28-day all-cause mortality of patients diagnosed with severe sepsis for septic shock.    Change in PCT >80%  A decrease of PCT levels of more than 80% defines a negative change in PCT result representing a lower risk for 28-day all-cause mortality of patients diagnosed with severe sepsis or septic shock.       Urinalysis, Microscopic Only - Urine, Clean Catch [[id number]] Collected: 02/27/25 1721    Specimen: Urine, Clean Catch Updated: 02/27/25 1735     RBC, UA 0-2 /HPF      WBC, UA 0-2 /HPF      Comment: Urine culture not indicated.        Bacteria, UA None Seen /HPF      Squamous Epithelial Cells, UA 0-2 /HPF      Hyaline Casts, UA None Seen /LPF      Methodology Automated Microscopy    Urinalysis With Culture If Indicated - Urine, Clean Catch [[id number]]  (Abnormal) Collected: 02/27/25 1721    Specimen: Urine, Clean Catch Updated: 02/27/25 1735 " "    Color, UA Yellow     Appearance, UA Clear     pH, UA 6.5     Specific Gravity, UA 1.038     Glucose, UA >=1000 mg/dL (3+)     Ketones, UA 15 mg/dL (1+)     Bilirubin, UA Negative     Blood, UA Negative     Protein, UA 30 mg/dL (1+)     Leuk Esterase, UA Negative     Nitrite, UA Negative     Urobilinogen, UA 1.0 E.U./dL    Narrative:      In absence of clinical symptoms, the presence of pyuria, bacteria, and/or nitrites on the urinalysis result does not correlate with infection.    Blood Culture - Blood, Hand, Right [[id number]] Collected: 02/27/25 1707    Specimen: Blood from Hand, Right Updated: 02/27/25 1714    Procalcitonin [[id number]]  (Abnormal) Collected: 02/27/25 1236    Specimen: Blood Updated: 02/27/25 1405     Procalcitonin 2.52 ng/mL     Narrative:      As a Marker for Sepsis (Non-Neonates):    1. <0.5 ng/mL represents a low risk of severe sepsis and/or septic shock.  2. >2 ng/mL represents a high risk of severe sepsis and/or septic shock.    As a Marker for Lower Respiratory Tract Infections that require antibiotic therapy:    PCT on Admission    Antibiotic Therapy       6-12 Hrs later    >0.5                Strongly Recommended  >0.25 - <0.5        Recommended   0.1 - 0.25          Discouraged              Remeasure/reassess PCT  <0.1                Strongly Discouraged     Remeasure/reassess PCT    As 28 day mortality risk marker: \"Change in Procalcitonin Result\" (>80% or <=80%) if Day 0 (or Day 1) and Day 4 values are available. Refer to http://www.[facility]-pct-calculator.com    Change in PCT <=80%  A decrease of PCT levels below or equal to 80% defines a positive change in PCT test result representing a higher risk for 28-day all-cause mortality of patients diagnosed with severe sepsis for septic shock.    Change in PCT >80%  A decrease of PCT levels of more than 80% defines a negative change in PCT result representing a lower risk for 28-day all-cause mortality of patients diagnosed with severe " sepsis or septic shock.               Imaging Results (Last 24 Hours)       Procedure Component Value Units Date/Time    CT Chest Without Contrast Diagnostic [[id number]] Collected: 02/27/25 1525     Updated: 02/27/25 1531    Narrative:      CT CHEST WO CONTRAST DIAGNOSTIC    Date of Exam: 2/27/2025 2:10 PM EST    Indication: Chest pain, rule out pneumonia.    Comparison: Chest CT 2/25/2025    Technique: Axial CT images were obtained of the chest without contrast administration.  Sagittal and coronal reconstructions were performed.  Automated exposure control and iterative reconstruction methods were used.      Findings:  Thyroid unremarkable. Aortic atherosclerotic disease without aneurysm. Multichamber AICD. Coronary atherosclerotic disease. Mild cardiomegaly. Increasing small low-density pericardial effusion. Esophagus unremarkable. Mild mediastinal adenopathy   including some increasing nodes from prior exam, example 1.3 cm short axis right paratracheal lymph node previously measured 7 mm short axis.    Trachea patent. Increasing now small to moderate low-density left effusion and new trace right pleural effusion. Increasing adjacent consolidation in the left lower lobe. Linear bandlike areas of atelectasis versus scar. No edema or pneumothorax. No   suspicious nodule.    Lipomatous infiltration and atrophy of the pancreas. Vicarious excretion of contrast in the gallbladder. Colonic diverticulosis. Minimal symmetric gynecomastia. Minimal reticular subcutaneous edema similar to prior. Bone island in the left glenoid. No   acute displaced fracture or aggressive lesion. Degenerative changes in the thoracic spine.      Impression:      Impression:  1. Findings suggesting volume overload/third spacing including increasing small to moderate left effusion, new trace right pleural effusion and new small pericardial effusion.  2. Increasing adjacent left lower lobe consolidation which could reflect atelectasis or  pneumonia in the right clinical setting.  3. Mild likely reactive mediastinal adenopathy, slightly increasing from prior.  4. Aortic and coronary atherosclerotic disease.  5. Stable mild cardiomegaly.      Electronically Signed: Nicholas Becerra MD    2/27/2025 3:29 PM EST    Workstation ID: [id number]            EKG      I personally viewed and interpreted the patient's EKG/Telemetry data:    ECHOCARDIOGRAM:  Results for orders placed during the hospital encounter of 02/25/25    Adult Transthoracic Echo Limited W/ Cont if Necessary Per Protocol    Interpretation Summary    Left ventricular ejection fraction appears to be 51 - 55%.    The right ventricular cavity is moderately dilated.    Technically very limited study.  Valvular structure and function was not evaluated.       STRESS MYOVIEW:  Results for orders placed during the hospital encounter of 02/18/25    Stress Test With Myocardial Perfusion One Day    Interpretation Summary    Myocardial perfusion imaging indicates a normal myocardial perfusion study with no evidence of ischemia. Impressions are consistent with a low risk study.    Left ventricular ejection fraction is normal (Calculated EF = 64%).       CARDIAC CATHETERIZATION:  No results found for this or any previous visit.       OTHER:         Assessment & Plan     Fever/shortness of breath  Patient developed fever last night and had some shortness of breath and hence he had respiratory panel done which showed influenza A and he will be on Tamiflu now.  Patient also has high white count.    Sick sinus syndrome/RV lead dislodgment  Patient had sick sinus syndrome and status post pacemaker placement which was a biventricular pacemaker.  Patient's right ventricular lead is showing high thresholds and hence will be repositioned but because of the fluid will hold off for a few days.    Abdominal pain and distention  Patient initially presented with abdominal discomfort and at high white count but now after  the EGD is feeling better    History of hypertension  Patient is currently on beta-blockers.    Hyperlipidemia  Patrick on statins and the lipid levels will be followed with primary care doctor    Diabetes  Patient on oral medicines and his sugar levels are followed by the primary care doctor.  I discussed the patients findings and my recommendations with patient and nurse    Win Reaves MD  25  12:25 EST                Electronically signed by Win Reaves MD at 25 1227       Ron Eduardo MD at 25 0911              [facility] MEDICINE SERVICE  DAILY PROGRESS NOTE    NAME: Ismael Pulido  : 1965  MRN: [id number]      LOS: 2 days     PROVIDER OF SERVICE: Ron Eduardo MD    Chief Complaint: Abdominal pain    Subjective:     Interval History:  History taken from: patient    Patient seen and examined at bedside this morning.  No acute complaints overnight.  Spiked a fever again this morning        Review of Systems: Negative except described above  Review of Systems    Objective:     Vital Signs  Temp:  [98 °F (36.7 °C)-103 °F (39.4 °C)] 98 °F (36.7 °C)  Heart Rate:  [] 96  Resp:  [18-24] 24  BP: ()/(61-91) 126/80   Body mass index is 35.06 kg/m².    Physical Exam  Physical Exam  Constitutional:       Comments: NAD    Cardiovascular:      Comments:  RRR, S1 & S2   Pulmonary:      Comments:  Lungs CTA   Abdominal:      Comments:  ABD soft, NT            Diagnostic Data    Results from last 7 days   Lab Units 25  0254 25  2123 25  1857   WBC 10*3/mm3 7.54   < >  --    HEMOGLOBIN g/dL 12.9*   < >  --    HEMATOCRIT % 40.8   < >  --    PLATELETS 10*3/mm3 183   < >  --    GLUCOSE mg/dL 97  --  134*   CREATININE mg/dL 1.26  --  1.24   BUN mg/dL 26*  --  22*   SODIUM mmol/L 136  --  134*   POTASSIUM mmol/L 4.3  --  4.4   AST (SGOT) U/L  --   --  65*   ALT (SGPT) U/L  --   --  38   ALK PHOS U/L  --   --  76   BILIRUBIN mg/dL  --   --  0.6    ANION GAP mmol/L 12.2  --  13.5    < > = values in this interval not displayed.       CT Chest Without Contrast Diagnostic    Result Date: 2/27/2025  Impression: 1. Findings suggesting volume overload/third spacing including increasing small to moderate left effusion, new trace right pleural effusion and new small pericardial effusion. 2. Increasing adjacent left lower lobe consolidation which could reflect atelectasis or pneumonia in the right clinical setting. 3. Mild likely reactive mediastinal adenopathy, slightly increasing from prior. 4. Aortic and coronary atherosclerotic disease. 5. Stable mild cardiomegaly. Electronically Signed: Nicholas Becrera MD  2/27/2025 3:29 PM EST  Workstation ID: [id number]       I reviewed the patient's new clinical results.    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       Chest pain  -Troponin, 38, 39  -d. dimer 3.54  -CXR showed low lung volumes with probable mild bibasal atelectasis.   -Cta chest negative for PE. Small left pleural effusion. Mild bilateral dependent atelectasis  -Ekg rate 85, pvcs, pacemaker, prolonged KS  -Cardiology consulted  -Pacemaker interrogated and lead dislodged.  Patient scheduled to go for lead replacement as per cardiology     Abdominal pain  -Lipase 26  -Ct abd reviewed and showing Moderate gastric distention with air and fluid. This may be transient however, gastroparesis cannot be excluded. Correlate with patient symptoms. This can be further evaluated with nuclear medicine gastric emptying study as clinically warranted. If there is concern for gastric outlet obstruction, correlate with endoscopy. Moderately distended urinary bladder. Correlate for  urinary retention.  -Will do bladder scan every 6 hours and monitor for retention  -Gastroenterology consulted  -Ng tube in place to low wall suction, removed today   -EGD on 2/26 showed esophagitis  -Continue PPI      -Fever likely in the setting of developing pneumonia  Chest x-ray reviewed, CT chest ordered which showed increasing left lower lobe consolidation  UA unremarkable  Blood cultures ordered on 2/25 showed no growth to date, will repeat another blood culture  RVP negative, will repeat  MRSA PCR negative  Pro-Fidel increased from 0.08 on 2/25-2.39 on 2/27  Continue on Zosyn for now  Check bilateral lower extremity venous duplex for any DVT, negative        Hypertension  -Stable  -Continue losartan     Type 2 diabetes  -Continue jardiance   -SSI  -Diabetic diet  -Monitor before meals and at bedtime     VTE Prophylaxis:  Mechanical VTE prophylaxis orders are present.             Disposition Planning:        Anticipated Date of Discharge: tbd         Time: 35 minutes     Code Status and Medical Interventions: CPR (Attempt to Resuscitate); Full Support   Ordered at: 02/25/25 1627     Level Of Support Discussed With:    Patient     Code Status (Patient has no pulse and is not breathing):    CPR (Attempt to Resuscitate)     Medical Interventions (Patient has pulse or is breathing):    Full Support       Signature: Electronically signed by Ron Eduardo MD, 02/28/25, 09:11 EST.  [facility] Hospitalist Team      Electronically signed by Ron Eduardo MD at 02/28/25 1130       Consult Notes (last 4 days)  Notes from 02/27/25 1357 through 03/03/25 1357   No notes of this type exist for this encounter.

## 2025-03-03 NOTE — TELEPHONE ENCOUNTER
Called pt back and instructed him that as long as he felt ok to come in, it was fine to keep appt and can bring FMLA forms with him and we will fill them out. Pt v/u.

## 2025-03-03 NOTE — PROGRESS NOTES
CARDIOLOGY PROGRESS NOTE:    Ismael Pulido  59 y.o.  male  1965  [id number]      Referring Provider: Hospitalist    Reason for follow-up: Pacemaker malfunction     Patient Care Team:  Sharon Silva MD as PCP - General (Internal Medicine & Pediatrics)    Subjective .  Patient doing well without any symptoms    Objective lying in bed comfortably     Review of Systems   Constitutional: Negative for malaise/fatigue.   Cardiovascular:  Negative for chest pain, dyspnea on exertion, leg swelling and palpitations.   Respiratory:  Negative for cough and shortness of breath.    Gastrointestinal:  Negative for abdominal pain, nausea and vomiting.   Neurological:  Negative for dizziness, focal weakness, headaches, light-headedness and numbness.   All other systems reviewed and are negative.      Allergies: Lisinopril    Scheduled Meds:cetirizine, 10 mg, Oral, Daily  empagliflozin, 25 mg, Oral, Daily  lidocaine PF 1%, 10 mL, Infiltration, Once  losartan, 25 mg, Oral, Daily  oseltamivir, 75 mg, Oral, Q12H  pantoprazole, 40 mg, Oral, BID AC  piperacillin-tazobactam, 3.375 g, Intravenous, Q8H  rosuvastatin, 10 mg, Oral, Daily  sodium chloride, 2,000 mL, Intravenous, Once  sodium chloride, 10 mL, Intravenous, Q12H  sodium chloride, 10 mL, Intravenous, Q12H  vancomycin (VANCOCIN) 1,000 mg in sodium chloride 0.9 % 250 mL IVPB-VTB, 1,000 mg, Intravenous, Once      Continuous Infusions:Pharmacy to Dose Oseltamivir (TAMIFLU),       PRN Meds:.  acetaminophen **OR** acetaminophen **OR** acetaminophen    aluminum-magnesium hydroxide-simethicone    senna-docusate sodium **AND** polyethylene glycol **AND** bisacodyl **AND** bisacodyl    hydrALAZINE    nitroglycerin    ondansetron ODT **OR** ondansetron    ondansetron ODT **OR** ondansetron    Pharmacy to Dose Oseltamivir (TAMIFLU)    [COMPLETED] Insert Peripheral IV **AND** sodium chloride    sodium chloride    sodium chloride    sodium chloride    sodium  "chloride        VITAL SIGNS  Vitals:    03/02/25 2345 03/03/25 0308 03/03/25 0737 03/03/25 1143   BP: 111/74 109/80 124/87 129/85   BP Location: Left arm Left arm Left arm Left arm   Patient Position: Lying Lying Lying Lying   Pulse: 84 78 73 83   Resp: 14 14 16 16   Temp: 98.1 °F (36.7 °C) 97.6 °F (36.4 °C) 97.8 °F (36.6 °C) 98.3 °F (36.8 °C)   TempSrc: Axillary Axillary Axillary Oral   SpO2: 95% 94% 95% 94%   Weight:       Height:           Flowsheet Rows      Flowsheet Row First Filed Value   Admission Height 172.7 cm (68\") Documented at 02/25/2025 1110   Admission Weight 104 kg (229 lb 4.5 oz) Documented at 02/25/2025 1110             TELEMETRY: Ventricular pacemaker rhythm    Physical Exam:  Constitutional:       Appearance: Well-developed.   Eyes:      General: No scleral icterus.     Conjunctiva/sclera: Conjunctivae normal.   HENT:      Head: Normocephalic and atraumatic.   Neck:      Vascular: No carotid bruit or JVD.   Pulmonary:      Effort: Pulmonary effort is normal.      Breath sounds: Normal breath sounds. No wheezing. No rales.   Cardiovascular:      Normal rate. Regular rhythm.   Pulses:     Intact distal pulses.   Abdominal:      General: Bowel sounds are normal.      Palpations: Abdomen is soft.   Musculoskeletal:      Cervical back: Normal range of motion and neck supple. Skin:     General: Skin is warm and dry.      Findings: No rash.   Neurological:      Mental Status: Alert.          Results Review:   I reviewed the patient's new clinical results.  Lab Results (last 24 hours)       Procedure Component Value Units Date/Time    Blood Culture - Blood, Arm, Left [[id number]]  (Normal) Collected: 02/25/25 2029    Specimen: Blood from Arm, Left Updated: 03/02/25 2117     Blood Culture No growth at 5 days    Blood Culture - Blood, Arm, Right [[id number]]  (Normal) Collected: 02/25/25 2026    Specimen: Blood from Arm, Right Updated: 03/02/25 2117     Blood Culture No growth at 5 days    Blood Culture - " Blood, Hand, Right [[id number]]  (Normal) Collected: 02/27/25 1700    Specimen: Blood from Hand, Right Updated: 03/02/25 1713     Blood Culture No growth at 3 days            Imaging Results (Last 24 Hours)       ** No results found for the last 24 hours. **            EKG      I personally viewed and interpreted the patient's EKG/Telemetry data:    ECHOCARDIOGRAM:  Results for orders placed during the hospital encounter of 02/25/25    Adult Transthoracic Echo Limited W/ Cont if Necessary Per Protocol    Interpretation Summary    Left ventricular ejection fraction appears to be 51 - 55%.    The right ventricular cavity is moderately dilated.    Technically very limited study.  Valvular structure and function was not evaluated.       STRESS MYOVIEW:  Results for orders placed during the hospital encounter of 02/18/25    Stress Test With Myocardial Perfusion One Day    Interpretation Summary    Myocardial perfusion imaging indicates a normal myocardial perfusion study with no evidence of ischemia. Impressions are consistent with a low risk study.    Left ventricular ejection fraction is normal (Calculated EF = 64%).       CARDIAC CATHETERIZATION:  No results found for this or any previous visit.       OTHER:         Assessment & Plan     Shortness of breath/abdominal pain  Patient presented with significant abdominal pain nausea and had an NG tube initially but later removed and had workup done which did not show any small bowel obstruction  Patient had influenza A with fever and is being treated for the same.    Pacemaker malfunction  Patient had a permanent pacemaker placement recently but his RV lead has high thresholds and hence he can be readjusted.    Sick sinus syndrome  Patient had a permanent pacemaker placement because of sick sinus syndrome and his pacemaker will be adjusted today.    Hypertension  Patient blood pressure currently stable on beta-blockers next    Hyperlipidemia  Patient is on statins and the  lipid levels are well within normal limits    Diabetes  Patient is on oral medicines.    I discussed the patients findings and my recommendations with patient and nurse    Win Reaves MD  03/03/25  12:06 EST

## 2025-03-03 NOTE — PROGRESS NOTES
CARDIOLOGY PROGRESS NOTE:    Imsael Pulido  59 y.o.  male  1965  [id number]      Referring Provider: Hospitalist    Reason for follow-up: Pacemaker malfunction      Patient Care Team:  Sharon Silva MD as PCP - General (Internal Medicine & Pediatrics)    Subjective .  No cp or soa while needed    Objective  sitting in chair comfortably      Review of Systems   Constitutional: Negative for malaise/fatigue.   Cardiovascular:  Negative for chest pain, dyspnea on exertion, leg swelling and palpitations.   Respiratory:  Negative for cough and shortness of breath.    Gastrointestinal:  Negative for abdominal pain, nausea and vomiting.   Neurological:  Negative for dizziness, focal weakness, headaches, light-headedness and numbness.   All other systems reviewed and are negative.      Allergies: Lisinopril    Scheduled Meds:cetirizine, 10 mg, Oral, Daily  empagliflozin, 25 mg, Oral, Daily  lidocaine PF 1%, 10 mL, Infiltration, Once  losartan, 25 mg, Oral, Daily  metoclopramide, 5 mg, Intravenous, BID AC  oseltamivir, 75 mg, Oral, Q12H  pantoprazole, 40 mg, Oral, BID AC  piperacillin-tazobactam, 3.375 g, Intravenous, Q8H  rosuvastatin, 10 mg, Oral, Daily  sodium chloride, 2,000 mL, Intravenous, Once  sodium chloride, 10 mL, Intravenous, Q12H  sodium chloride, 10 mL, Intravenous, Q12H      Continuous Infusions:Pharmacy to Dose Oseltamivir (TAMIFLU),       PRN Meds:.  acetaminophen **OR** acetaminophen **OR** acetaminophen    aluminum-magnesium hydroxide-simethicone    senna-docusate sodium **AND** polyethylene glycol **AND** bisacodyl **AND** bisacodyl    hydrALAZINE    nitroglycerin    ondansetron ODT **OR** ondansetron    ondansetron ODT **OR** ondansetron    Pharmacy to Dose Oseltamivir (TAMIFLU)    [COMPLETED] Insert Peripheral IV **AND** sodium chloride    sodium chloride    sodium chloride    sodium chloride    sodium chloride        VITAL SIGNS  Vitals:    03/02/25 0730 03/02/25 1130 03/02/25 1308  "03/02/25 1610   BP: 120/84 121/86 100/76 117/76   BP Location: Left arm Left arm  Left arm   Patient Position: Lying Sitting  Sitting   Pulse: 77 86 94 90   Resp: 14 15  15   Temp: 97.5 °F (36.4 °C) 97.6 °F (36.4 °C)  98.2 °F (36.8 °C)   TempSrc: Oral Oral  Oral   SpO2: 95% 94% 93% 95%   Weight:       Height:           Flowsheet Rows      Flowsheet Row First Filed Value   Admission Height 172.7 cm (68\") Documented at 02/25/2025 1110   Admission Weight 104 kg (229 lb 4.5 oz) Documented at 02/25/2025 1110             TELEMETRY: Pacemaker rhythm    Physical Exam:  Constitutional:       Appearance: Well-developed.   Eyes:      General: No scleral icterus.     Conjunctiva/sclera: Conjunctivae normal.   HENT:      Head: Normocephalic and atraumatic.   Neck:      Vascular: No carotid bruit or JVD.   Pulmonary:      Effort: Pulmonary effort is normal.      Breath sounds: Normal breath sounds. No wheezing. No rales.   Cardiovascular:      Normal rate. Regular rhythm.   Pulses:     Intact distal pulses.   Abdominal:      General: Bowel sounds are normal.      Palpations: Abdomen is soft.   Musculoskeletal:      Cervical back: Normal range of motion and neck supple. Skin:     General: Skin is warm and dry.      Findings: No rash.   Neurological:      Mental Status: Alert.          Results Review:   I reviewed the patient's new clinical results.  Lab Results (last 24 hours)       Procedure Component Value Units Date/Time    Blood Culture - Blood, Hand, Right [[id number]]  (Normal) Collected: 02/27/25 1707    Specimen: Blood from Hand, Right Updated: 03/02/25 1715     Blood Culture No growth at 3 days    Blood Culture - Blood, Arm, Right [[id number]]  (Normal) Collected: 02/25/25 2026    Specimen: Blood from Arm, Right Updated: 03/01/25 2115     Blood Culture No growth at 4 days    Blood Culture - Blood, Arm, Left [[id number]]  (Normal) Collected: 02/25/25 2029    Specimen: Blood from Arm, Left Updated: 03/01/25 2115     Blood " Culture No growth at 4 days            Imaging Results (Last 24 Hours)       ** No results found for the last 24 hours. **            EKG      I personally viewed and interpreted the patient's EKG/Telemetry data:    ECHOCARDIOGRAM:  Results for orders placed during the hospital encounter of 02/25/25    Adult Transthoracic Echo Limited W/ Cont if Necessary Per Protocol    Interpretation Summary    Left ventricular ejection fraction appears to be 51 - 55%.    The right ventricular cavity is moderately dilated.    Technically very limited study.  Valvular structure and function was not evaluated.       STRESS MYOVIEW:  Results for orders placed during the hospital encounter of 02/18/25    Stress Test With Myocardial Perfusion One Day    Interpretation Summary    Myocardial perfusion imaging indicates a normal myocardial perfusion study with no evidence of ischemia. Impressions are consistent with a low risk study.    Left ventricular ejection fraction is normal (Calculated EF = 64%).       CARDIAC CATHETERIZATION:  No results found for this or any previous visit.       OTHER:         Assessment & Plan     Syncope/sick sinus syndrome  Patient had complete heart block and status post permanent pacemaker placement  Patient went home after the pacemaker without any complications but when he came back with respiratory and abdominal symptoms he had the RV lead threshold increased and hence he will have the RV lead repositioned when he is stable.    Influenza A  Patient had fever with shortness of breath after he was admitted and is tested positive for influenza A and is on Tamiflu and will be going home soon.    Hypertension  Patient blood pressure currently stable on beta-blockers    Hyperlipidemia  Patient is on statins and lipid levels are followed by the primary care doctor    Diabetes  Present oral medicines.    I discussed the patients findings and my recommendations with patient and nurse      Win Reaves  MD  03/02/25  19:27 EST

## 2025-03-04 ENCOUNTER — READMISSION MANAGEMENT (OUTPATIENT)
Dept: CALL CENTER | Facility: HOSPITAL | Age: 60
End: 2025-03-04
Payer: COMMERCIAL

## 2025-03-04 VITALS
DIASTOLIC BLOOD PRESSURE: 75 MMHG | RESPIRATION RATE: 18 BRPM | BODY MASS INDEX: 34.95 KG/M2 | TEMPERATURE: 97.8 F | SYSTOLIC BLOOD PRESSURE: 108 MMHG | WEIGHT: 230.6 LBS | HEIGHT: 68 IN | OXYGEN SATURATION: 96 % | HEART RATE: 70 BPM

## 2025-03-04 LAB
ANION GAP SERPL CALCULATED.3IONS-SCNC: 14.5 MMOL/L (ref 5–15)
BACTERIA SPEC AEROBE CULT: NORMAL
BUN SERPL-MCNC: 15 MG/DL (ref 6–20)
BUN/CREAT SERPL: 15 (ref 7–25)
CALCIUM SPEC-SCNC: 9.2 MG/DL (ref 8.6–10.5)
CHLORIDE SERPL-SCNC: 100 MMOL/L (ref 98–107)
CO2 SERPL-SCNC: 21.5 MMOL/L (ref 22–29)
CREAT SERPL-MCNC: 1 MG/DL (ref 0.76–1.27)
DEPRECATED RDW RBC AUTO: 41.3 FL (ref 37–54)
EGFRCR SERPLBLD CKD-EPI 2021: 86.7 ML/MIN/1.73
ERYTHROCYTE [DISTWIDTH] IN BLOOD BY AUTOMATED COUNT: 12.5 % (ref 12.3–15.4)
GLUCOSE SERPL-MCNC: 107 MG/DL (ref 65–99)
HCT VFR BLD AUTO: 47.4 % (ref 37.5–51)
HGB BLD-MCNC: 15.1 G/DL (ref 13–17.7)
MCH RBC QN AUTO: 28.9 PG (ref 26.6–33)
MCHC RBC AUTO-ENTMCNC: 31.9 G/DL (ref 31.5–35.7)
MCV RBC AUTO: 90.6 FL (ref 79–97)
PLATELET # BLD AUTO: 288 10*3/MM3 (ref 140–450)
PMV BLD AUTO: 9 FL (ref 6–12)
POTASSIUM SERPL-SCNC: 3.9 MMOL/L (ref 3.5–5.2)
RBC # BLD AUTO: 5.23 10*6/MM3 (ref 4.14–5.8)
SODIUM SERPL-SCNC: 136 MMOL/L (ref 136–145)
WBC NRBC COR # BLD AUTO: 10.15 10*3/MM3 (ref 3.4–10.8)

## 2025-03-04 PROCEDURE — 25010000002 PIPERACILLIN SOD-TAZOBACTAM PER 1 G

## 2025-03-04 PROCEDURE — 80048 BASIC METABOLIC PNL TOTAL CA: CPT

## 2025-03-04 PROCEDURE — 97116 GAIT TRAINING THERAPY: CPT

## 2025-03-04 PROCEDURE — 99232 SBSQ HOSP IP/OBS MODERATE 35: CPT

## 2025-03-04 PROCEDURE — 85027 COMPLETE CBC AUTOMATED: CPT

## 2025-03-04 PROCEDURE — 97112 NEUROMUSCULAR REEDUCATION: CPT

## 2025-03-04 RX ORDER — OSELTAMIVIR PHOSPHATE 75 MG/1
75 CAPSULE ORAL EVERY 12 HOURS SCHEDULED
Qty: 3 CAPSULE | Refills: 0 | OUTPATIENT
Start: 2025-03-04 | End: 2025-03-06

## 2025-03-04 RX ORDER — OSELTAMIVIR PHOSPHATE 75 MG/1
75 CAPSULE ORAL EVERY 12 HOURS SCHEDULED
Qty: 3 CAPSULE | Refills: 0 | Status: SHIPPED | OUTPATIENT
Start: 2025-03-04 | End: 2025-03-06

## 2025-03-04 RX ADMIN — EMPAGLIFLOZIN 25 MG: 25 TABLET, FILM COATED ORAL at 09:30

## 2025-03-04 RX ADMIN — ROSUVASTATIN CALCIUM 10 MG: 10 TABLET, FILM COATED ORAL at 09:31

## 2025-03-04 RX ADMIN — LOSARTAN POTASSIUM 25 MG: 25 TABLET, FILM COATED ORAL at 09:31

## 2025-03-04 RX ADMIN — PANTOPRAZOLE SODIUM 40 MG: 40 TABLET, DELAYED RELEASE ORAL at 09:31

## 2025-03-04 RX ADMIN — OSELTAMIVIR PHOSPHATE 75 MG: 75 CAPSULE ORAL at 09:30

## 2025-03-04 RX ADMIN — PIPERACILLIN AND TAZOBACTAM 3.38 G: 3; .375 INJECTION, POWDER, FOR SOLUTION INTRAVENOUS at 03:42

## 2025-03-04 RX ADMIN — Medication 10 ML: at 09:40

## 2025-03-04 RX ADMIN — CETIRIZINE HYDROCHLORIDE 10 MG: 10 TABLET, FILM COATED ORAL at 09:31

## 2025-03-04 RX ADMIN — PIPERACILLIN AND TAZOBACTAM 3.38 G: 3; .375 INJECTION, POWDER, FOR SOLUTION INTRAVENOUS at 12:33

## 2025-03-04 NOTE — DISCHARGE SUMMARY
"             [facility]  Discharge Summary    Date of Service: 3/4/2025  Patient Name: Ismael Pulido  : 1965  MRN: [id number]    Date of Admission: 2025  Discharge Diagnosis: Abdominal pain  Date of Discharge: 3/4/2025  Primary Care Physician: Sharon Silva MD      Presenting Problem:   Epigastric pain [R10.13]  Abdominal pain [R10.9]  Chest pain, unspecified type [R07.9]    Active and Resolved Hospital Problems:  Active Hospital Problems    Diagnosis POA    **Abdominal pain [R10.9] Yes    Intermittent complete heart block [I44.2] Yes    Symptomatic bradycardia [R00.1] Yes      Resolved Hospital Problems    Diagnosis POA    Pacemaker lead malfunction [T82.110A] Unknown         Hospital Course     HPI:    \"Per the documentation by JOSELYN Hay, dated 25,  \"59-year-old male complaining of chest and epigastric pain that developed throughout the day today \"\"    Hospital Course:  Chest pain  -Troponin, 38, 39  -d. dimer 3.54  -CXR showed low lung volumes with probable mild bibasal atelectasis.   -Cta chest negative for PE. Small left pleural effusion. Mild bilateral dependent atelectasis  -Ekg rate 85, pvcs, pacemaker, prolonged [place]  -Cardiology consulted, cleared for discharge  -Pacemaker interrogated and lead dislodged.  Patient underwent lead repositioning on 3/3 where he was found to have normal position, device interrogation completed revealing normal function     Abdominal pain  -Lipase 26  -Ct abd reviewed and showing Moderate gastric distention with air and fluid. This may be transient however, gastroparesis cannot be excluded. Correlate with patient symptoms. This can be further evaluated with nuclear medicine gastric emptying study as clinically warranted. If there is concern for gastric outlet obstruction, correlate with endoscopy. Moderately distended urinary bladder. Correlate for urinary retention.  -Passed voiding trial  -Gastroenterology " consulted  -EGD on 2/26 showed esophagitis  -Continue PPI      -Fever likely in the setting of developing pneumonia  Chest x-ray reviewed, CT chest ordered which showed increasing left lower lobe consolidation  UA unremarkable  Blood cultures ordered on 2/25 showed no growth to date, will repeat another blood culture on 12/27 also showed no growth to date  RVP negative, will repeat  MRSA PCR negative  Pro-Fidel increased from 0.08 on 2/25-2.39 on 2/27  Tested positive for flu, will continue on Tamiflu to complete course  Received Zosyn while he was in the hospital  Check bilateral lower extremity venous duplex for any DVT, negative     Right upper extremity swelling, significantly improved  Acute superficial thrombophlebitis seen in cephalic and basilic veins  Arm elevation and cold compression, supportive care     Hypertension  -Stable  -Continue losartan           DISCHARGE Follow Up Recommendations for labs and diagnostics: Follow-up with PCP in 1 week and cardiology        Day of Discharge     Vital Signs:  Temp:  [97.8 °F (36.6 °C)-98.6 °F (37 °C)] 97.8 °F (36.6 °C)  Heart Rate:  [60-95] 70  Resp:  [16-20] 18  BP: (107-138)/(66-86) 108/75    Physical Exam:  Physical Exam  Constitutional:       Comments: NAD    Cardiovascular:      Comments:  RRR, S1 & S2   Pulmonary:      Comments:  Lungs CTA   Abdominal:      Comments:  ABD soft, NT             Pertinent  and/or Most Recent Results     LAB RESULTS:      Lab 02/28/25  0254 02/27/25  2123 02/27/25  1857 02/27/25  1236 02/27/25  0444 02/26/25  0352 02/25/25  2333 02/25/25  2026 02/25/25  1245   WBC 7.54 9.20  --   --  14.88* 18.32*  --   --   --    HEMOGLOBIN 12.9* 13.9  --   --  15.3 16.6  --   --   --    HEMATOCRIT 40.8 42.8  --   --  47.0 50.2  --   --   --    PLATELETS 183 200  --   --  217 228  --   --   --    NEUTROS ABS 5.33 6.87  --   --  11.72* 14.48*  --   --   --    IMMATURE GRANS (ABS) 0.05 0.04  --   --  0.07* 0.10*  --   --   --    LYMPHS ABS 0.92  0.94  --   --  1.46 1.50  --   --   --    MONOS ABS 1.16* 1.28*  --   --  1.53* 2.16*  --   --   --    EOS ABS 0.04 0.02  --   --  0.01 0.01  --   --   --    MCV 91.3 89.9  --   --  89.2 88.1  --   --   --    PROCALCITONIN  --   --  2.39* 2.52*  --   --   --   --  0.08   LACTATE  --  1.6  --   --   --   --  2.0 2.3*  --          Lab 02/28/25  0254 02/27/25 1857 02/27/25 0444 02/26/25  0352   SODIUM 136 134* 131* 137   POTASSIUM 4.3 4.4 4.2 4.4   CHLORIDE 103 100 97* 101   CO2 20.8* 20.5* 16.9* 22.1   ANION GAP 12.2 13.5 17.1* 13.9   BUN 26* 22* 21* 14   CREATININE 1.26 1.24 1.05 0.91   EGFR 65.7 67.0 81.8 97.1   GLUCOSE 97 134* 126* 133*   CALCIUM 8.2* 8.7 9.2 9.7         Lab 02/27/25 1857 02/27/25 0444 02/25/25  1245   TOTAL PROTEIN 7.2 7.1  --    ALBUMIN 3.5 3.6  --    GLOBULIN 3.7 3.5  --    ALT (SGPT) 38 16  --    AST (SGOT) 65* 22  --    BILIRUBIN 0.6 0.9  --    ALK PHOS 76 92  --    LIPASE  --   --  26         Lab 02/25/25  1245   HSTROP T 39*                 Brief Urine Lab Results  (Last result in the past 365 days)        Color   Clarity   Blood   Leuk Est   Nitrite   Protein   CREAT   Urine HCG        02/27/25 1721 Yellow   Clear   Negative   Negative   Negative   30 mg/dL (1+)                 Microbiology Results (last 10 days)       Procedure Component Value - Date/Time    Respiratory Panel PCR w/COVID-19(SARS-CoV-2) [facility] In-House, NP Swab in UTM/VTM, 2 HR TAT - Swab, Nasopharynx [[id number]]  (Abnormal) Collected: 02/28/25 1055    Lab Status: Final result Specimen: Swab from Nasopharynx Updated: 02/28/25 1159     ADENOVIRUS, PCR Not Detected     Coronavirus 229E Not Detected     Coronavirus HKU1 Not Detected     Coronavirus NL63 Not Detected     Coronavirus OC43 Not Detected     COVID19 Not Detected     Human Metapneumovirus Not Detected     Human Rhinovirus/Enterovirus Not Detected     Influenza A H3 Detected     Influenza B PCR Not Detected     Parainfluenza Virus 1 Not Detected      Parainfluenza Virus 2 Not Detected     Parainfluenza Virus 3 Not Detected     Parainfluenza Virus 4 Not Detected     RSV, PCR Not Detected     Bordetella pertussis pcr Not Detected     Bordetella parapertussis PCR Not Detected     Chlamydophila pneumoniae PCR Not Detected     Mycoplasma pneumo by PCR Not Detected    Narrative:      In the setting of a positive respiratory panel with a viral infection PLUS a negative procalcitonin without other underlying concern for bacterial infection, consider observing off antibiotics or discontinuation of antibiotics and continue supportive care. If the respiratory panel is positive for atypical bacterial infection (Bordetella pertussis, Chlamydophila pneumoniae, or Mycoplasma pneumoniae), consider antibiotic de-escalation to target atypical bacterial infection.    MRSA Screen, PCR (Inpatient) - Swab, Nares [[id number]]  (Normal) Collected: 02/27/25 1935    Lab Status: Final result Specimen: Swab from Nares Updated: 02/27/25 2114     MRSA PCR No MRSA Detected    Narrative:      The negative predictive value of this diagnostic test is high and should only be used to consider de-escalating anti-MRSA therapy. A positive result may indicate colonization with MRSA and must be correlated clinically.    Blood Culture - Blood, Hand, Right [[id number]]  (Normal) Collected: 02/27/25 1707    Lab Status: Preliminary result Specimen: Blood from Hand, Right Updated: 03/03/25 1715     Blood Culture No growth at 4 days    Blood Culture - Blood, Arm, Left [[id number]]  (Normal) Collected: 02/25/25 2029    Lab Status: Final result Specimen: Blood from Arm, Left Updated: 03/02/25 2117     Blood Culture No growth at 5 days    Blood Culture - Blood, Arm, Right [[id number]]  (Normal) Collected: 02/25/25 2026    Lab Status: Final result Specimen: Blood from Arm, Right Updated: 03/02/25 2117     Blood Culture No growth at 5 days    Respiratory Panel PCR w/COVID-19(SARS-CoV-2) [facility]  In-House, NP Swab in [facility], 2 HR TAT - Swab, Nasopharynx [[id number]]  (Normal) Collected: 02/25/25 1131    Lab Status: Final result Specimen: Swab from Nasopharynx Updated: 02/25/25 1231     ADENOVIRUS, PCR Not Detected     Coronavirus 229E Not Detected     Coronavirus HKU1 Not Detected     Coronavirus NL63 Not Detected     Coronavirus OC43 Not Detected     COVID19 Not Detected     Human Metapneumovirus Not Detected     Human Rhinovirus/Enterovirus Not Detected     Influenza A PCR Not Detected     Influenza B PCR Not Detected     Parainfluenza Virus 1 Not Detected     Parainfluenza Virus 2 Not Detected     Parainfluenza Virus 3 Not Detected     Parainfluenza Virus 4 Not Detected     RSV, PCR Not Detected     Bordetella pertussis pcr Not Detected     Bordetella parapertussis PCR Not Detected     Chlamydophila pneumoniae PCR Not Detected     Mycoplasma pneumo by PCR Not Detected    Narrative:      In the setting of a positive respiratory panel with a viral infection PLUS a negative procalcitonin without other underlying concern for bacterial infection, consider observing off antibiotics or discontinuation of antibiotics and continue supportive care. If the respiratory panel is positive for atypical bacterial infection (Bordetella pertussis, Chlamydophila pneumoniae, or Mycoplasma pneumoniae), consider antibiotic de-escalation to target atypical bacterial infection.            CT Chest Without Contrast Diagnostic    Result Date: 2/27/2025  Impression: Impression: 1. Findings suggesting volume overload/third spacing including increasing small to moderate left effusion, new trace right pleural effusion and new small pericardial effusion. 2. Increasing adjacent left lower lobe consolidation which could reflect atelectasis or pneumonia in the right clinical setting. 3. Mild likely reactive mediastinal adenopathy, slightly increasing from prior. 4. Aortic and coronary atherosclerotic disease. 5. Stable mild cardiomegaly.  Electronically Signed: Nicholas Becerra MD  2/27/2025 3:29 PM EST  Workstation ID: [id number]    XR Abdomen KUB    Result Date: 2/25/2025  Impression: Impression: Gastric tube in appropriate position. Electronically Signed: Charles Santo MD  2/25/2025 9:17 PM EST  Workstation ID: [id number]    XR Abdomen KUB    Result Date: 2/25/2025  Impression: Impression: Slightly high position of the gastric tube. Recommend advancing by 3 cm. Electronically Signed: Charles Santo MD  2/25/2025 5:44 PM EST  Workstation ID: [id number]    XR Abdomen KUB    Result Date: 2/25/2025  Impression: Impression: The tip of the nasogastric tube terminates in the fundus of the stomach. Electronically Signed: Erasmo Bernard MD  2/25/2025 4:00 PM EST  Workstation ID: [id number]    CT Angiogram Chest Pulmonary Embolism    Result Date: 2/25/2025  Impression: Impression: No evidence of pulmonary embolism. Small left pleural effusion. Mild bilateral dependent atelectasis. Moderate gastric distention with air and fluid. This may be transient however, gastroparesis cannot be excluded. Correlate with patient symptoms. This can be further evaluated with nuclear medicine gastric emptying study as clinically warranted. If there  is concern for gastric outlet obstruction, correlate with endoscopy. Moderately distended urinary bladder. Correlate for urinary retention. Chronic findings as above. Electronically Signed: Charles Santo MD  2/25/2025 3:14 PM EST  Workstation ID: [id number]    CT Abdomen Pelvis With Contrast    Result Date: 2/25/2025  Impression: Impression: No evidence of pulmonary embolism. Small left pleural effusion. Mild bilateral dependent atelectasis. Moderate gastric distention with air and fluid. This may be transient however, gastroparesis cannot be excluded. Correlate with patient symptoms. This can be further evaluated with nuclear medicine gastric emptying study as clinically warranted. If there  is concern for gastric outlet obstruction,  correlate with endoscopy. Moderately distended urinary bladder. Correlate for urinary retention. Chronic findings as above. Electronically Signed: Charles Santo MD  2/25/2025 3:14 PM EST  Workstation ID: [id number]    XR Chest 1 View    Result Date: 2/25/2025  Impression: Impression: Low lung volumes with probable mild bibasal atelectasis. Correlate for infection. Electronically Signed: Nicholas Becerra MD  2/25/2025 12:17 PM EST  Workstation ID: [id number]    XR Chest 1 View    Result Date: 2/21/2025  Impression: Impression: Radiographically uncomplicated multichamber AICD. No pneumothorax. Electronically Signed: Nicholas Becerra MD  2/21/2025 5:49 PM EST  Workstation ID: [id number]    CT Head Without Contrast    Result Date: 2/18/2025  Impression: Impression: No acute intracranial findings. Electronically Signed: Bao Trujillo  2/18/2025 10:00 PM EST  Workstation ID: [id number]    XR Chest 1 View    Result Date: 2/18/2025  Impression: Impression: No radiographic evidence of acute cardiopulmonary abnormality. Electronically Signed: Bao Trujillo  2/18/2025 9:37 PM EST  Workstation ID: [id number]     Results for orders placed during the hospital encounter of 02/25/25    Duplex Venous Upper Extremity - Right CAR    Interpretation Summary    There is no acute deep vein thrombosis in the right upper extremity.    Acute superficial vein thrombosis involving the right cephalic and basilic veins, associated with PICC line.    All other right sided vessels appear normal.      Results for orders placed during the hospital encounter of 02/25/25    Duplex Venous Upper Extremity - Right CAR    Interpretation Summary    There is no acute deep vein thrombosis in the right upper extremity.    Acute superficial vein thrombosis involving the right cephalic and basilic veins, associated with PICC line.    All other right sided vessels appear normal.      Results for orders placed during the hospital encounter of 02/25/25    Adult  Transthoracic Echo Limited W/ Cont if Necessary Per Protocol    Interpretation Summary    Left ventricular ejection fraction appears to be 51 - 55%.    The right ventricular cavity is moderately dilated.    Technically very limited study.  Valvular structure and function was not evaluated.      Labs Pending at Discharge:  Pending Results       Procedure [Order ID] Specimen - Date/Time    Basic Metabolic Panel [[id number]] Collected: 03/04/25 1112    Specimen: Blood from Arm, Right     CBC (No Diff) [[id number]] Collected: 03/04/25 1112    Specimen: Blood from Arm, Right     EP/CRM Study [[id number]]             Procedures Performed  Procedure(s):  Pacemaker DC new         Consults:   Consults       Date and Time Order Name Status Description    2/26/2025  1:56 PM Inpatient Hospitalist Consult Completed     2/26/2025  9:39 AM Inpatient Cardiology Consult Completed     2/26/2025  8:05 AM Inpatient Gastroenterology Consult Completed     2/18/2025 10:21 PM Cardiology (on-call MD unless specified) Completed               Discharge Details        Discharge Medications        Continue These Medications        Instructions Start Date   Claritin 10 MG tablet  Generic drug: loratadine   10 mg, Daily      clotrimazole 1 % cream  Commonly known as: LOTRIMIN   1 Application, Topical, 2 Times Daily      [facility] Vitamin C 1000 MG tablet  Generic drug: ascorbic acid   4,000 mg, Daily      empagliflozin 25 MG tablet tablet  Commonly known as: Jardiance   25 mg, Oral, Daily      lansoprazole 30 MG capsule  Commonly known as: PREVACID   30 mg, Oral, Daily      losartan 25 MG tablet  Commonly known as: COZAAR   25 mg, Oral, Daily      NEURIVA PO   Daily      pseudoephedrine 120 MG 12 hr tablet  Commonly known as: SUDAFED   120 mg, Every Morning      rosuvastatin 10 MG tablet  Commonly known as: CRESTOR   10 mg, Oral, Daily      Semaglutide 3 MG tablet   3 mg, Oral, Daily             ASK your doctor about these medications        Instructions  Start Date   cephalexin 500 MG capsule  Commonly known as: KEFLEX  Ask about: Should I take this medication?   500 mg, Oral, Every 8 Hours Scheduled               Allergies   Allergen Reactions    Lisinopril Nausea And Vomiting         Discharge Disposition: home      Diet:  Hospital:  Diet Order   Procedures    Diet: Cardiac; Healthy Heart (2-3 Na+); Fluid Consistency: Thin (IDDSI 0)         Discharge Activity:         CODE STATUS:  Code Status and Medical Interventions: CPR (Attempt to Resuscitate); Full Support   Ordered at: 02/25/25 1627     Level Of Support Discussed With:    Patient     Code Status (Patient has no pulse and is not breathing):    CPR (Attempt to Resuscitate)     Medical Interventions (Patient has pulse or is breathing):    Full Support         Future Appointments   Date Time Provider Department Center   3/6/2025 11:30 AM Sharon Silva MD [facility]   3/7/2025  9:30 AM Anitha Herrera APRN [facility]   3/13/2025  1:00 PM [facility] [place] DEVICE CHECK [facility]   3/13/2025  1:45 PM Rima Salazar APRN [facility]   5/9/2025 11:00 AM Sharon Silva MD [facility]           Time spent on Discharge including face to face service:  35 minutes    Signature: Electronically signed by Ron Eduardo MD, 03/04/25, 11:34 EST.  [facility] Hospitalist Team

## 2025-03-04 NOTE — PROGRESS NOTES
CARDIOLOGY PROGRESS NOTE:    Ismael Pulido  59 y.o.  male  1965  [id number]      Referring Provider: Hospitalist    Reason for follow-up: Pacemaker malfunction     Patient Care Team:  Sharon Silva MD as PCP - General (Internal Medicine & Pediatrics)    Subjective  Patient seen and examined.  Labs and chart reviewed.  Patient is doing well from cardiology standpoint as he denies chest pain, shortness of breath, edema, palpitations.    Objective  Patient out of bed to chair resting comfortably on room air     Review of Systems   Constitutional: Negative for malaise/fatigue.   Cardiovascular:  Negative for chest pain, dyspnea on exertion, leg swelling and palpitations.   Respiratory:  Negative for cough and shortness of breath.    Gastrointestinal:  Negative for abdominal pain, nausea and vomiting.   Neurological:  Negative for dizziness, focal weakness, headaches, light-headedness and numbness.   All other systems reviewed and are negative.      Allergies: Lisinopril    Scheduled Meds:cetirizine, 10 mg, Oral, Daily  empagliflozin, 25 mg, Oral, Daily  lidocaine PF 1%, 10 mL, Infiltration, Once  losartan, 25 mg, Oral, Daily  oseltamivir, 75 mg, Oral, Q12H  pantoprazole, 40 mg, Oral, BID AC  piperacillin-tazobactam, 3.375 g, Intravenous, Q8H  rosuvastatin, 10 mg, Oral, Daily  sodium chloride, 2,000 mL, Intravenous, Once  sodium chloride, 10 mL, Intravenous, Q12H  sodium chloride, 10 mL, Intravenous, Q12H      Continuous Infusions:Pharmacy to Dose Oseltamivir (TAMIFLU),       PRN Meds:.  acetaminophen **OR** acetaminophen **OR** acetaminophen    aluminum-magnesium hydroxide-simethicone    senna-docusate sodium **AND** polyethylene glycol **AND** bisacodyl **AND** bisacodyl    hydrALAZINE    nitroglycerin    ondansetron ODT **OR** ondansetron    ondansetron ODT **OR** ondansetron    Pharmacy to Dose Oseltamivir (TAMIFLU)    [COMPLETED] Insert Peripheral IV **AND** sodium chloride    sodium chloride     "sodium chloride    sodium chloride    sodium chloride        VITAL SIGNS  Vitals:    03/04/25 0743 03/04/25 0744 03/04/25 0745 03/04/25 0746   BP:    108/75   BP Location:    Left arm   Patient Position:    Sitting   Pulse: 81 60 73 70   Resp:    18   Temp:    97.8 °F (36.6 °C)   TempSrc:    Oral   SpO2: 94% 97% 95% 96%   Weight:       Height:           Flowsheet Rows      Flowsheet Row First Filed Value   Admission Height 172.7 cm (68\") Documented at 02/25/2025 1110   Admission Weight 104 kg (229 lb 4.5 oz) Documented at 02/25/2025 1110             TELEMETRY: paced rhythm     Physical Exam:  Constitutional:       Appearance: Well-developed.   Eyes:      General: No scleral icterus.     Conjunctiva/sclera: Conjunctivae normal.   HENT:      Head: Normocephalic and atraumatic.   Neck:      Vascular: No carotid bruit or JVD.   Pulmonary:      Effort: Pulmonary effort is normal.      Breath sounds: Normal breath sounds. No wheezing. No rales.   Cardiovascular:      Normal rate. Regular rhythm.   Pulses:     Intact distal pulses.   Abdominal:      General: Bowel sounds are normal.      Palpations: Abdomen is soft.   Musculoskeletal:      Cervical back: Normal range of motion and neck supple. Skin:     General: Skin is warm and dry.      Findings: No rash.   Neurological:      Mental Status: Alert.          Results Review:   I reviewed the patient's new clinical results.  Lab Results (last 24 hours)       Procedure Component Value Units Date/Time    Blood Culture - Blood, Hand, Right [[id number]]  (Normal) Collected: 02/27/25 1707    Specimen: Blood from Hand, Right Updated: 03/03/25 1715     Blood Culture No growth at 4 days            Imaging Results (Last 24 Hours)       ** No results found for the last 24 hours. **            EKG      I personally viewed and interpreted the patient's EKG/Telemetry data:    ECHOCARDIOGRAM:  Results for orders placed during the hospital encounter of 02/25/25    Adult Transthoracic Echo " Limited W/ Cont if Necessary Per Protocol    Interpretation Summary    Left ventricular ejection fraction appears to be 51 - 55%.    The right ventricular cavity is moderately dilated.    Technically very limited study.  Valvular structure and function was not evaluated.       STRESS MYOVIEW:  Results for orders placed during the hospital encounter of 02/18/25    Stress Test With Myocardial Perfusion One Day    Interpretation Summary    Myocardial perfusion imaging indicates a normal myocardial perfusion study with no evidence of ischemia. Impressions are consistent with a low risk study.    Left ventricular ejection fraction is normal (Calculated EF = 64%).       CARDIAC CATHETERIZATION:  No results found for this or any previous visit.       OTHER:         Assessment & Plan     Shortness of breath  + Influenza A/pneumonia  Dimer elevated  CTA negative for PE  Blood cultures negative  Tamiflu  Status post IV antibiotics    Sick sinus syndrome  Pacemaker in situ  Pacemaker malfunction  Initial device interrogation showing RV lead dislodgment  EP consulted  EP study yesterday revealing normal lead position  Device interrogation revealed normal function  Staple removal scheduled for Friday 3/7    Abdominal pain  GI consulted  EGD revealed esophagitis (2/26)   PPI    Hypertension  Blood pressure well-controlled  Losartan 25 mg    Hyperlipidemia  Statin therapy  LDL 57    Diabetes mellitus  A1c 8.4%  Jardiance    I discussed the patients findings and my recommendations with patient and nurse    Rima Salazar, TAMIKO  03/04/25  10:23 EST

## 2025-03-04 NOTE — PLAN OF CARE
Assessment: Ismael Pulido presents with functional mobility impairments which indicate the need for skilled intervention. Tolerating session today without incident. Pt able to amb without any LOB. Should do well at home with assist.Will continue to follow and progress as tolerated.

## 2025-03-04 NOTE — PROGRESS NOTES
Patient seen today for wound check s/p aborted RV lead revision.  Left subclavian incision is well approximated with staples and without erythema or drainage.  Staples will be discontinued today.  Device interrogation will be performed.        RTC in one month.

## 2025-03-04 NOTE — OUTREACH NOTE
Prep Survey      Flowsheet Row Responses   [facility] patient discharged from? [place]   Is LACE score < 7 ? No   Eligibility [facility]   Date of Admission 02/25/25   Date of Discharge 03/04/25   Discharge diagnosis Pacemaker DC new   Does the patient have one of the following disease processes/diagnoses(primary or secondary)? General Surgery   Prep survey completed? Yes            Cynthia OHARA - Registered Nurse

## 2025-03-04 NOTE — ANESTHESIA POSTPROCEDURE EVALUATION
Patient: Ismael Pulido    Procedure Summary       Date: 03/03/25 Room / Location: [place] CATH LAB 3 / [facility] CATH INVASIVE LOCATION    Anesthesia Start: 1636 Anesthesia Stop: 1657    Procedure: Pacemaker DC new Diagnosis:     Providers: Dez Loco MD Provider: Joesph Okeefe MD    Anesthesia Type: general ASA Status: 3            Anesthesia Type: general    Vitals  Vitals Value Taken Time   /86 03/03/25 1942   Temp 97.9 °F (36.6 °C) 03/03/25 1942   Pulse 83 03/03/25 2153   Resp 16 03/03/25 1942   SpO2 95 % 03/03/25 2153   Vitals shown include unfiled device data.        Post Anesthesia Care and Evaluation    Patient location during evaluation: PACU  Patient participation: complete - patient participated  Level of consciousness: awake  Pain scale: See nurse's notes for pain score.  Pain management: adequate    Airway patency: patent  Anesthetic complications: No anesthetic complications  PONV Status: none  Cardiovascular status: acceptable  Respiratory status: acceptable and spontaneous ventilation  Hydration status: acceptable    Comments: Patient seen and examined postoperatively; vital signs stable; SpO2 greater than or equal to 90%; cardiopulmonary status stable; nausea/vomiting adequately controlled; pain adequately controlled; no apparent anesthesia complications; patient discharged from anesthesia care when discharge criteria were met

## 2025-03-04 NOTE — PLAN OF CARE
Problem: Adult Inpatient Plan of Care  Goal: Plan of Care Review  Outcome: Progressing  Goal: Patient-Specific Goal (Individualized)  Outcome: Progressing  Goal: Absence of Hospital-Acquired Illness or Injury  Outcome: Progressing  Intervention: Identify and Manage Fall Risk  Recent Flowsheet Documentation  Taken 3/4/2025 0010 by Lilian Castellanos RN  Safety Promotion/Fall Prevention:   safety round/check completed   room organization consistent   clutter free environment maintained   assistive device/personal items within reach  Taken 3/3/2025 2210 by Lilian Castellanos RN  Safety Promotion/Fall Prevention:   safety round/check completed   room organization consistent   clutter free environment maintained   assistive device/personal items within reach  Taken 3/3/2025 2015 by Lilian Castellanos RN  Safety Promotion/Fall Prevention:   safety round/check completed   room organization consistent   clutter free environment maintained   assistive device/personal items within reach  Intervention: Prevent Skin Injury  Recent Flowsheet Documentation  Taken 3/3/2025 2015 by Lilian Castellanos RN  Body Position: position changed independently  Skin Protection: transparent dressing maintained  Intervention: Prevent and Manage VTE (Venous Thromboembolism) Risk  Recent Flowsheet Documentation  Taken 3/3/2025 2015 by Lilian Castellanos RN  VTE Prevention/Management: patient refused intervention  Intervention: Prevent Infection  Recent Flowsheet Documentation  Taken 3/4/2025 0010 by Lilian Castellanos RN  Infection Prevention:   single patient room provided   rest/sleep promoted   hand hygiene promoted   environmental surveillance performed  Taken 3/3/2025 2210 by Lilian Castellanos RN  Infection Prevention:   single patient room provided   rest/sleep promoted   hand hygiene promoted   environmental surveillance performed  Taken 3/3/2025 2015 by Lilian Castellanos RN  Infection Prevention:   single patient room provided   rest/sleep promoted   hand hygiene promoted    environmental surveillance performed  Goal: Optimal Comfort and Wellbeing  Outcome: Progressing  Intervention: Provide Person-Centered Care  Recent Flowsheet Documentation  Taken 3/3/2025 2015 by Lilian Castellanos RN  Trust Relationship/Rapport:   care explained   choices provided   emotional support provided   empathic listening provided   questions answered   questions encouraged   reassurance provided   thoughts/feelings acknowledged  Goal: Readiness for Transition of Care  Outcome: Progressing     Problem: Pain Acute  Goal: Optimal Pain Control and Function  Outcome: Progressing  Intervention: Optimize Psychosocial Wellbeing  Recent Flowsheet Documentation  Taken 3/3/2025 2015 by Lilian Castellanos RN  Diversional Activities:   smartphone   television  Intervention: Prevent or Manage Pain  Recent Flowsheet Documentation  Taken 3/3/2025 2015 by Lilian Castellanos RN  Medication Review/Management: medications reviewed     Problem: Nausea and Vomiting  Goal: Nausea and Vomiting Relief  Outcome: Progressing     Problem: Sepsis/Septic Shock  Goal: Optimal Coping  Outcome: Progressing  Intervention: Support Patient and Family Response  Recent Flowsheet Documentation  Taken 3/3/2025 2015 by Lilian Castellanos RN  Family/Support System Care:   self-care encouraged   support provided  Goal: Absence of Bleeding  Outcome: Progressing  Goal: Blood Glucose Level Within Target Range  Outcome: Progressing  Goal: Absence of Infection Signs and Symptoms  Outcome: Progressing  Intervention: Initiate Sepsis Management  Recent Flowsheet Documentation  Taken 3/4/2025 0010 by Lilian Castellanos RN  Infection Prevention:   single patient room provided   rest/sleep promoted   hand hygiene promoted   environmental surveillance performed  Isolation Precautions:   precautions maintained   droplet  Taken 3/3/2025 2210 by Lilian Castellanos RN  Infection Prevention:   single patient room provided   rest/sleep promoted   hand hygiene promoted   environmental  surveillance performed  Isolation Precautions:   precautions maintained   droplet  Taken 3/3/2025 2015 by Lilian Castellanos RN  Infection Prevention:   single patient room provided   rest/sleep promoted   hand hygiene promoted   environmental surveillance performed  Isolation Precautions:   droplet   precautions maintained  Intervention: Promote Recovery  Recent Flowsheet Documentation  Taken 3/3/2025 2015 by Lilian Castellanos RN  Activity Management: activity encouraged  Goal: Optimal Nutrition Delivery  Outcome: Progressing     Problem: Fall Injury Risk  Goal: Absence of Fall and Fall-Related Injury  Outcome: Progressing  Intervention: Identify and Manage Contributors  Recent Flowsheet Documentation  Taken 3/3/2025 2015 by Lilian Castellanos RN  Medication Review/Management: medications reviewed  Self-Care Promotion: independence encouraged  Intervention: Promote Injury-Free Environment  Recent Flowsheet Documentation  Taken 3/4/2025 0010 by Lilian Castellanos RN  Safety Promotion/Fall Prevention:   safety round/check completed   room organization consistent   clutter free environment maintained   assistive device/personal items within reach  Taken 3/3/2025 2210 by Lilian Castellanos, RN  Safety Promotion/Fall Prevention:   safety round/check completed   room organization consistent   clutter free environment maintained   assistive device/personal items within reach  Taken 3/3/2025 2015 by Lilian Castellanos, RN  Safety Promotion/Fall Prevention:   safety round/check completed   room organization consistent   clutter free environment maintained   assistive device/personal items within reach   Goal Outcome Evaluation:

## 2025-03-04 NOTE — THERAPY TREATMENT NOTE
Subjective: Pt agreeable to therapeutic plan of care.     Objective:     Precautions - falls, droplet isol, PPM leads replaced    Bed mobility - Modified-Independent to R  Transfers - CGA  Ambulation - 80 feet CGA    Vitals: WNL    Pain: 0 VAS   Education: Provided education on the importance of mobility in the acute care setting, Verbal/Tactile Cues, Transfer Training, Gait Training, and Post-Op Precautions, occ vc's to not push or pull with LUE and not reach over head    Assessment: Ismael Pulido presents with functional mobility impairments which indicate the need for skilled intervention. Tolerating session today without incident. Pt able to amb without any LOB. Should do well at home with assist.Will continue to follow and progress as tolerated.     Plan/Recommendations:   If medically appropriate, No ongoing therapy recommended post-acute care. No therapy needs. Pt requires no DME at discharge.     Pt desires Home with family assist at discharge. Pt cooperative; agreeable to therapeutic recommendations and plan of care.         Basic Mobility 6-click:  Rollin = Total, A lot = 2, A little = 3; 4 = None  Supine>Sit:   1 = Total, A lot = 2, A little = 3; 4 = None   Sit>Stand with arms:  1 = Total, A lot = 2, A little = 3; 4 = None  Bed>Chair:   1 = Total, A lot = 2, A little = 3; 4 = None  Ambulate in room:  1 = Total, A lot = 2, A little = 3; 4 = None  3-5 Steps with railin = Total, A lot = 2, A little = 3; 4 = None  Score: 21    Post-Tx Position: Supine with HOB Elevated, Alarms activated, and Call light and personal items within reach  PPE: gloves and surgical mask    Therapy Charges for Today       Code Description Service Date Service Provider Modifiers Qty    82598637051 HC GAIT TRAINING EA 15 MIN 3/4/2025 Veronica Lay, PTA GP 1    80677905252 HC PT NEUROMUSC RE EDUCATION EA 15 MIN 3/4/2025 Veronica Lay, PTA GP 1           PT Charges       Row Name 25 1149              Time Calculation    Start Time 0724  -      Stop Time 0741  -      Time Calculation (min) 17 min  -      PT Received On 03/04/25  -      PT - Next Appointment 03/06/25  -         Time Calculation- PT    Total Timed Code Minutes- PT 17 minute(s)  -                User Key  (r) = Recorded By, (t) = Taken By, (c) = Cosigned By      Initials Name Provider Type    Veronica Powers, PTA Physical Therapist Assistant

## 2025-03-05 ENCOUNTER — TRANSITIONAL CARE MANAGEMENT TELEPHONE ENCOUNTER (OUTPATIENT)
Dept: CALL CENTER | Facility: HOSPITAL | Age: 60
End: 2025-03-05
Payer: COMMERCIAL

## 2025-03-05 ENCOUNTER — TELEPHONE (OUTPATIENT)
Dept: CARDIOLOGY | Facility: CLINIC | Age: 60
End: 2025-03-05
Payer: COMMERCIAL

## 2025-03-05 NOTE — PAYOR COMM NOTE
"This is discharge notification for Ismael Pulido  Reference/Auth # [id number]   Pt discharged on 3/4/25    Pending additional days approval    Paradise Ritter, RN, BSN  Utilization Review Nurse  [facility]  Direct & confidential phone # [phone]  Fax # [phone]      Tess Ismael PATRICK (59 y.o. Male)       Date of Birth   1965    Social Security Number       [address] [place] [address]    Home Phone   [phone]    MRN   [id number]       Bahai   Samaritan    Marital Status                               Admission Date   2/25/25    Admission Type   Emergency    Admitting Provider   Teofilo Belcher MD    Attending Provider       Department, Room/Bed   [facility] OBSERVATION, 228/1       Discharge Date   3/4/2025    Discharge Disposition   Home or Self Care    Discharge Destination                                 Attending Provider: (none)   Allergies: Lisinopril    Isolation: None   Infection: Influenza (02/28/25)   Code Status: Prior    Ht: 172.7 cm (68\")   Wt: 105 kg (230 lb 9.6 oz)    Admission Cmt: None   Principal Problem: Abdominal pain [R10.9]                   Active Insurance as of 2/25/2025       Primary Coverage       Payor Plan Insurance Group Employer/Plan Group    [employer] COMMERCIAL AET[employer] [id number]       Payor Plan Address Payor Plan Phone Number Payor Plan Fax Number Effective Dates    [address] [phone]  3/25/2021 - None Entered    [facility] [id number]         Subscriber Name Subscriber Birth Date Member ID       ISMAEL PULIDO PATRICK 1965 [id number]                     Emergency Contacts        (Rel.) Home Phone Work Phone Mobile Phone    CLAIRE PULIDO (Spouse) [phone] -- [phone]              Vital Signs (last day) before discharge       Date/Time Temp Temp src Pulse Resp BP Patient Position SpO2    03/04/25 0746 97.8 (36.6) Oral 70 18 108/75 Sitting 96    03/04/25 0745 -- " -- 73 -- -- -- 95 03/04/25 0744 -- -- 60 -- -- -- 97 03/04/25 0743 -- -- 81 -- -- -- 94 03/04/25 0742 -- -- 63 -- -- -- 95 03/04/25 0741 -- -- 62 -- -- -- 97 03/04/25 0740 -- -- 62 -- -- -- 97 03/04/25 0739 -- -- 82 -- -- -- 0    03/04/25 0732 -- -- 79 -- -- -- 95 03/04/25 0731 -- -- 77 -- -- -- 96 03/04/25 0730 -- -- 78 -- -- -- 95 03/04/25 0729 -- -- 66 -- -- -- 94 03/04/25 0728 -- -- 67 -- -- -- 95 03/04/25 0727 -- -- 66 -- -- -- 95 03/04/25 0726 -- -- 66 -- -- -- 96 03/04/25 0725 -- -- 82 -- -- -- 95 03/04/25 0724 -- -- 76 -- -- -- 94 03/04/25 0723 -- -- 77 -- -- -- 94 03/04/25 0722 -- -- 62 -- -- -- 95 03/04/25 0721 -- -- 65 -- -- -- 94 03/04/25 0720 -- -- 63 -- -- -- 95 03/04/25 0719 -- -- 62 -- -- -- 95 03/04/25 0718 -- -- 60 -- -- -- 95 03/04/25 0717 -- -- 70 -- -- -- 95 03/04/25 0716 -- -- 64 -- -- -- 94 03/04/25 0715 -- -- 61 -- -- -- 96    03/04/25 0714 -- -- 75 -- -- -- 95 03/04/25 0713 -- -- 65 -- -- -- 95 03/04/25 0712 -- -- 66 -- -- -- 95    03/04/25 0711 -- -- 63 -- -- -- 96    03/04/25 0710 -- -- 82 -- -- -- 95    03/04/25 0709 -- -- 88 -- -- -- 97    03/04/25 0708 -- -- 90 -- -- -- 97    03/04/25 0707 -- -- 95 -- -- -- 93    03/04/25 0706 -- -- 92 -- -- -- 97    03/04/25 0705 -- -- 89 -- -- -- 94    03/04/25 0704 -- -- 71 -- -- -- 95    03/04/25 0703 -- -- 77 -- -- -- 96    03/04/25 0702 -- -- 79 -- -- -- 96    03/04/25 0701 -- -- 61 -- -- -- 95    03/04/25 0700 -- -- 73 -- -- -- 94    03/04/25 0659 -- -- 65 -- -- -- 95    03/04/25 0658 -- -- 60 -- -- -- 96    03/04/25 0657 -- -- 66 -- -- -- 93    03/04/25 0656 -- -- 68 -- -- -- 95    03/04/25 0655 -- -- 65 -- -- -- 94    03/04/25 0654 -- -- 67 -- -- -- 94    03/04/25 0653 -- -- 64 -- -- -- 94    03/04/25 0652 -- -- 67 -- -- -- 94    03/04/25 0651 -- -- 62 -- -- -- 95    03/04/25 0650 -- -- 63 -- -- -- 96    03/04/25 0649 -- -- 62 -- -- -- 94    03/04/25 0648 -- -- 76 -- --  -- 95 03/04/25 0647 -- -- 60 -- -- -- 94 03/04/25 0646 -- -- 66 -- -- -- 95 03/04/25 0645 -- -- 63 -- -- -- 94 03/04/25 0644 -- -- 65 -- -- -- 94 03/04/25 0643 -- -- 70 -- -- -- 94 03/04/25 0642 -- -- 65 -- -- -- 95 03/04/25 0641 -- -- 65 -- -- -- 95 03/04/25 0640 -- -- 66 -- -- -- 95 03/04/25 0639 -- -- 63 -- -- -- 94 03/04/25 0638 -- -- 60 -- -- -- 95 03/04/25 0637 -- -- 67 -- -- -- 93 03/04/25 0636 -- -- 64 -- -- -- 94 03/04/25 0635 -- -- 63 -- -- -- 93 03/04/25 0634 -- -- 71 -- -- -- 94 03/04/25 0633 -- -- 62 -- -- -- 95 03/04/25 0632 -- -- 60 -- -- -- 96 03/04/25 0631 -- -- 60 -- -- -- 96 03/04/25 0630 -- -- 72 -- -- -- 96 03/04/25 0629 -- -- 60 -- -- -- 97 03/04/25 0628 -- -- 83 -- -- -- 92    03/04/25 0627 -- -- 60 -- -- -- 97    03/04/25 0626 -- -- 61 -- -- -- 95 03/04/25 0625 -- -- 66 -- -- -- 95 03/04/25 0624 -- -- 68 -- -- -- 95 03/04/25 0623 -- -- 63 -- -- -- 96 03/04/25 0622 -- -- 62 -- -- -- 95 03/04/25 0621 -- -- 65 -- -- -- 95    03/04/25 0620 -- -- 60 -- -- -- 95    03/04/25 0619 -- -- 65 -- -- -- 94    03/04/25 0618 -- -- 62 -- -- -- 95    03/04/25 0617 -- -- 60 -- -- -- 95    03/04/25 0616 -- -- 64 -- -- -- 95    03/04/25 0615 -- -- 66 -- -- -- 96    03/04/25 0614 -- -- 60 -- -- -- 95    03/04/25 0613 -- -- 68 -- -- -- 94    03/04/25 0612 -- -- 63 -- -- -- 95    03/04/25 0611 -- -- 62 -- -- -- 96    03/04/25 0610 -- -- 67 -- -- -- 95    03/04/25 0609 -- -- 60 -- -- -- 94    03/04/25 0608 -- -- 74 -- -- -- 93    03/04/25 0607 -- -- 78 -- -- -- 93    03/04/25 0606 -- -- 61 -- -- -- 93    03/04/25 0605 -- -- 61 -- -- -- 94    03/04/25 0604 -- -- 65 -- -- -- 93    03/04/25 0603 -- -- 60 -- -- -- 97    03/04/25 0602 -- -- 69 -- -- -- 93    03/04/25 0601 -- -- 74 -- -- -- 94    03/04/25 0600 -- -- 67 -- -- -- 93    03/04/25 0559 -- -- 73 -- -- -- 95    03/04/25 0558 -- -- 66 -- -- -- 94    03/04/25 0557 -- -- 63 -- -- -- 94     03/04/25 0556 -- -- 68 -- -- -- 94 03/04/25 0555 -- -- 62 -- -- -- 95 03/04/25 0554 -- -- 78 -- -- -- 94 03/04/25 0553 -- -- 61 -- -- -- 96    03/04/25 0552 -- -- 67 -- -- -- 95 03/04/25 0551 -- -- 64 -- -- -- 95 03/04/25 0550 -- -- 67 -- -- -- 95 03/04/25 0549 -- -- 73 -- -- -- 94 03/04/25 0548 -- -- 71 -- -- -- 95 03/04/25 0547 -- -- 66 -- -- -- 93 03/04/25 0546 -- -- 65 -- -- -- 95 03/04/25 0545 -- -- 77 -- -- -- 94 03/04/25 0544 -- -- 74 -- -- -- 94 03/04/25 0543 -- -- 67 -- -- -- 95 03/04/25 0542 -- -- 75 -- -- -- 95 03/04/25 0541 -- -- 74 -- -- -- 95 03/04/25 0540 -- -- 63 -- -- -- 96 03/04/25 0539 -- -- 78 -- -- -- 95 03/04/25 0538 -- -- 73 -- -- -- 95 03/04/25 0537 -- -- 66 -- -- -- 94 03/04/25 0536 -- -- 68 -- -- -- 94    03/04/25 0535 -- -- 70 -- -- -- 95 03/04/25 0534 -- -- 66 -- -- -- 95 03/04/25 0533 -- -- 64 -- -- -- 95 03/04/25 0532 -- -- 64 -- -- -- 95 03/04/25 0531 -- -- 77 -- -- -- 95 03/04/25 0530 -- -- 71 -- -- -- 95    03/04/25 0529 -- -- 69 -- -- -- 94    03/04/25 0528 -- -- 72 -- -- -- 95    03/04/25 0527 -- -- 64 -- -- -- 95    03/04/25 0526 -- -- 60 -- -- -- 95    03/04/25 0525 -- -- 67 -- -- -- 96    03/04/25 0524 -- -- 77 -- -- -- 96    03/04/25 0523 -- -- 78 -- -- -- 94    03/04/25 0522 -- -- 66 -- -- -- 96    03/04/25 0521 -- -- 69 -- -- -- 96    03/04/25 0520 -- -- 67 -- -- -- 97    03/04/25 0519 -- -- 77 -- -- -- 95    03/04/25 0518 -- -- 62 -- -- -- 96    03/04/25 0517 -- -- 74 -- -- -- 95    03/04/25 0516 -- -- 68 -- -- -- 94    03/04/25 0515 -- -- 69 -- -- -- 95    03/04/25 0514 -- -- 77 -- -- -- 95    03/04/25 0513 -- -- 67 -- -- -- 95    03/04/25 0512 -- -- 73 -- -- -- 93    03/04/25 0511 -- -- 72 -- -- -- 96    03/04/25 0510 -- -- 60 -- -- -- 94    03/04/25 0509 -- -- 71 -- -- -- 94    03/04/25 0508 -- -- 66 -- -- -- 95    03/04/25 0507 -- -- 76 -- -- -- 95    03/04/25 0506 -- -- 77 -- -- -- 94    03/04/25  0505 -- -- 68 -- -- -- 95    03/04/25 0504 -- -- 71 -- -- -- 95    03/04/25 0503 -- -- 67 -- -- -- 95    03/04/25 0502 -- -- 71 -- -- -- 95    03/04/25 0501 -- -- 74 -- -- -- 95    03/04/25 0500 -- -- 68 -- -- -- 97    03/04/25 0459 -- -- 72 -- -- -- 93    03/04/25 0458 -- -- 65 -- -- -- 95    03/04/25 0457 -- -- 62 -- -- -- 94    03/04/25 0456 -- -- 67 -- -- -- 94    03/04/25 0455 -- -- 63 -- -- -- 94    03/04/25 0454 -- -- 69 -- -- -- 93    03/04/25 0453 -- -- 67 -- -- -- 94    03/04/25 0452 -- -- 67 -- -- -- 96    03/04/25 0451 -- -- 67 -- -- -- 94    03/04/25 0450 -- -- 66 -- -- -- 93    03/04/25 0449 -- -- 71 -- -- -- 94    03/04/25 0448 -- -- 69 -- -- -- 96    03/04/25 0447 -- -- 73 -- -- -- 93    03/04/25 0446 -- -- 77 -- -- -- 94    03/04/25 0445 -- -- 65 -- -- -- 94    03/04/25 0444 -- -- 72 -- -- -- 93    03/04/25 0443 -- -- 74 -- -- -- 94    03/04/25 0442 -- -- 70 -- -- -- 95    03/04/25 0441 -- -- 90 -- -- -- 94    03/04/25 0440 -- -- 72 -- -- -- 96    03/04/25 0439 -- -- 65 -- -- -- 94    03/04/25 0438 -- -- 69 -- -- -- 93    03/04/25 0437 -- -- 73 -- -- -- 93    03/04/25 0436 -- -- 75 -- -- -- 95    03/04/25 0435 -- -- 62 -- -- -- 94    03/04/25 0434 -- -- 71 -- -- -- 94    03/04/25 0433 -- -- 75 -- -- -- 93    03/04/25 0432 -- -- 75 -- -- -- 94    03/04/25 0431 -- -- 74 -- -- -- 93    03/04/25 0430 -- -- 77 -- -- -- 96    03/04/25 0429 -- -- 73 -- -- -- 93    03/04/25 0428 -- -- 74 -- -- -- 94    03/04/25 0427 -- -- 75 -- -- -- 94    03/04/25 0426 -- -- 76 -- -- -- 94    03/04/25 0425 -- -- 75 -- -- -- 93    03/04/25 0424 -- -- 72 -- -- -- 94    03/04/25 0423 -- -- 75 -- -- -- 94    03/04/25 0422 -- -- 72 -- -- -- 93    03/04/25 0421 -- -- 77 -- -- -- 95    03/04/25 0420 -- -- 70 -- -- -- 94    03/04/25 0419 -- -- 79 -- -- -- 94    03/04/25 0418 -- -- 73 -- -- -- 94    03/04/25 0417 -- -- 71 -- -- -- 93    03/04/25 0416 -- -- 75 -- -- -- 94    03/04/25 0415 -- -- 69 -- -- -- 94    03/04/25 0414 -- --  68 -- -- -- 94    03/04/25 0413 -- -- 73 -- -- -- 93    03/04/25 0412 -- -- 71 -- -- -- 94    03/04/25 0411 -- -- 66 -- -- -- 95    03/04/25 0410 -- -- 65 -- -- -- 93    03/04/25 0409 -- -- 65 -- -- -- 94    03/04/25 0408 -- -- 69 -- -- -- 94    03/04/25 0407 -- -- 77 -- -- -- 94    03/04/25 0406 -- -- 74 -- -- -- 94    03/04/25 0405 -- -- 78 -- -- -- 93    03/04/25 0404 -- -- 72 -- -- -- 94    03/04/25 0403 -- -- 71 -- -- -- 94    03/04/25 0402 -- -- 74 -- -- -- 93    03/04/25 0401 -- -- 64 -- -- -- 94    03/04/25 0400 -- -- 68 -- -- -- 95    03/04/25 0350 98.6 (37) Oral -- 18 109/70 Lying --    03/03/25 2314 98.2 (36.8) Oral -- 20 107/66 Lying --    03/03/25 1942 97.9 (36.6) Oral 87 16 138/86 Lying 95    03/03/25 1535 98.2 (36.8) Oral 81 16 115/86 Lying 96    03/03/25 1143 98.3 (36.8) Oral 83 16 129/85 Lying 94    03/03/25 0737 97.8 (36.6) Axillary 73 16 124/87 Lying 95    03/03/25 0308 97.6 (36.4) Axillary 78 14 109/80 Lying 94          Physician Progress Notes (last 24 hours)  Notes from 03/04/25 1427 through 03/05/25 1427   No notes of this type exist for this encounter.       Consult Notes (last 24 hours)  Notes from 03/04/25 1427 through 03/05/25 1427   No notes of this type exist for this encounter.           Rima Salazar APRN   Nurse Practitioner  Cardiology     Progress Notes     Signed     Date of Service: 03/04/25 0902  Creation Time: 03/04/25 0902     Signed       Expand All Collapse All    CARDIOLOGY PROGRESS NOTE:     Ismael Pulido  59 y.o.  male  1965  [id number]        Referring Provider: Hospitalist     Reason for follow-up: Pacemaker malfunction     Patient Care Team:  Sharon Silva MD as PCP - General (Internal Medicine & Pediatrics)        Subjective  Patient seen and examined.  Labs and chart reviewed.  Patient is doing well from cardiology standpoint as he denies chest pain, shortness of breath, edema, palpitations.        Objective  Patient out of bed to chair resting  comfortably on room air     Review of Systems   Constitutional: Negative for malaise/fatigue.   Cardiovascular:  Negative for chest pain, dyspnea on exertion, leg swelling and palpitations.   Respiratory:  Negative for cough and shortness of breath.    Gastrointestinal:  Negative for abdominal pain, nausea and vomiting.   Neurological:  Negative for dizziness, focal weakness, headaches, light-headedness and numbness.   All other systems reviewed and are negative.        Allergies: Lisinopril     Scheduled Meds:  Scheduled Medication   cetirizine, 10 mg, Oral, Daily  empagliflozin, 25 mg, Oral, Daily  lidocaine PF 1%, 10 mL, Infiltration, Once  losartan, 25 mg, Oral, Daily  oseltamivir, 75 mg, Oral, Q12H  pantoprazole, 40 mg, Oral, BID AC  piperacillin-tazobactam, 3.375 g, Intravenous, Q8H  rosuvastatin, 10 mg, Oral, Daily  sodium chloride, 2,000 mL, Intravenous, Once  sodium chloride, 10 mL, Intravenous, Q12H  sodium chloride, 10 mL, Intravenous, Q12H         Continuous Infusions:  Infusion Medications   Pharmacy to Dose Oseltamivir (TAMIFLU),          PRN Meds:.  PRN Medication     acetaminophen **OR** acetaminophen **OR** acetaminophen    aluminum-magnesium hydroxide-simethicone    senna-docusate sodium **AND** polyethylene glycol **AND** bisacodyl **AND** bisacodyl    hydrALAZINE    nitroglycerin    ondansetron ODT **OR** ondansetron    ondansetron ODT **OR** ondansetron    Pharmacy to Dose Oseltamivir (TAMIFLU)    [COMPLETED] Insert Peripheral IV **AND** sodium chloride    sodium chloride    sodium chloride    sodium chloride    sodium chloride              VITAL SIGNS  Vitals          Vitals:     03/04/25 0743 03/04/25 0744 03/04/25 0745 03/04/25 0746   BP:       108/75   BP Location:       Left arm   Patient Position:       Sitting   Pulse: 81 60 73 70   Resp:       18   Temp:       97.8 °F (36.6 °C)   TempSrc:       Oral   SpO2: 94% 97% 95% 96%   Weight:           Height:                    Flowsheet Rows      "  Flowsheet Row First Filed Value   Admission Height 172.7 cm (68\") Documented at 02/25/2025 1110   Admission Weight 104 kg (229 lb 4.5 oz) Documented at 02/25/2025 1110                 TELEMETRY: paced rhythm      Physical Exam:  Constitutional:       Appearance: Well-developed.   Eyes:      General: No scleral icterus.     Conjunctiva/sclera: Conjunctivae normal.   HENT:      Head: Normocephalic and atraumatic.   Neck:      Vascular: No carotid bruit or JVD.   Pulmonary:      Effort: Pulmonary effort is normal.      Breath sounds: Normal breath sounds. No wheezing. No rales.   Cardiovascular:      Normal rate. Regular rhythm.   Pulses:     Intact distal pulses.   Abdominal:      General: Bowel sounds are normal.      Palpations: Abdomen is soft.   Musculoskeletal:      Cervical back: Normal range of motion and neck supple. Skin:     General: Skin is warm and dry.      Findings: No rash.   Neurological:      Mental Status: Alert.            Results Review:              I reviewed the patient's new clinical results.  Lab Results (last 24 hours)         Procedure Component Value Units Date/Time     Blood Culture - Blood, Hand, Right [[id number]]  (Normal) Collected: 02/27/25 1707     Specimen: Blood from Hand, Right Updated: 03/03/25 1715       Blood Culture No growth at 4 days                Imaging Results (Last 24 Hours)         ** No results found for the last 24 hours. **                EKG       I personally viewed and interpreted the patient's EKG/Telemetry data:     ECHOCARDIOGRAM:  Results for orders placed during the hospital encounter of 02/25/25     Adult Transthoracic Echo Limited W/ Cont if Necessary Per Protocol     Interpretation Summary    Left ventricular ejection fraction appears to be 51 - 55%.    The right ventricular cavity is moderately dilated.    Technically very limited study.  Valvular structure and function was not evaluated.        STRESS MYOVIEW:  Results for orders placed during the " hospital encounter of 25     Stress Test With Myocardial Perfusion One Day     Interpretation Summary    Myocardial perfusion imaging indicates a normal myocardial perfusion study with no evidence of ischemia. Impressions are consistent with a low risk study.    Left ventricular ejection fraction is normal (Calculated EF = 64%).        CARDIAC CATHETERIZATION:  No results found for this or any previous visit.        OTHER:               Assessment & Plan  Shortness of breath  + Influenza A/pneumonia  Dimer elevated  CTA negative for PE  Blood cultures negative  Tamiflu  Status post IV antibiotics     Sick sinus syndrome  Pacemaker in situ  Pacemaker malfunction  Initial device interrogation showing RV lead dislodgment  EP consulted  EP study yesterday revealing normal lead position  Device interrogation revealed normal function  Staple removal scheduled for Friday 3/7     Abdominal pain  GI consulted  EGD revealed esophagitis ()   PPI     Hypertension  Blood pressure well-controlled  Losartan 25 mg     Hyperlipidemia  Statin therapy  LDL 57     Diabetes mellitus  A1c 8.4%  Jardiance     I discussed the patients findings and my recommendations with patient and nurse     Rima Salazar, TAMIKO  25  10:23 EST                                         Discharge Summary        Ron Eduardo MD at [address] [place] [address] Medicine Services  Discharge Summary    Date of Service: 3/4/2025  Patient Name: Ismael Pulido  : 1965  MRN: [id number]    Date of Admission: 2025  Discharge Diagnosis: Abdominal pain  Date of Discharge: 3/4/2025  Primary Care Physician: Sharon Silva MD      Presenting Problem:   Epigastric pain [R10.13]  Abdominal pain [R10.9]  Chest pain, unspecified type [R07.9]    Active and Resolved Hospital Problems:  Active Hospital Problems    Diagnosis POA    **Abdominal pain [R10.9] Yes    Intermittent complete heart block [I44.2] Yes  "   Symptomatic bradycardia [R00.1] Yes      Resolved Hospital Problems    Diagnosis POA    Pacemaker lead malfunction [T82.110A] Unknown         Hospital Course     HPI:    \"Per the documentation by JOSELYN Hay, dated 2/26/25,  \"59-year-old male complaining of chest and epigastric pain that developed throughout the day today \"\"    Hospital Course:  Chest pain  -Troponin, 38, 39  -d. dimer 3.54  -CXR showed low lung volumes with probable mild bibasal atelectasis.   -Cta chest negative for PE. Small left pleural effusion. Mild bilateral dependent atelectasis  -Ekg rate 85, pvcs, pacemaker, prolonged NV  -Cardiology consulted, cleared for discharge  -Pacemaker interrogated and lead dislodged.  Patient underwent lead repositioning on 3/3 where he was found to have normal position, device interrogation completed revealing normal function     Abdominal pain  -Lipase 26  -Ct abd reviewed and showing Moderate gastric distention with air and fluid. This may be transient however, gastroparesis cannot be excluded. Correlate with patient symptoms. This can be further evaluated with nuclear medicine gastric emptying study as clinically warranted. If there is concern for gastric outlet obstruction, correlate with endoscopy. Moderately distended urinary bladder. Correlate for urinary retention.  -Passed voiding trial  -Gastroenterology consulted  -EGD on 2/26 showed esophagitis  -Continue PPI      -Fever likely in the setting of developing pneumonia  Chest x-ray reviewed, CT chest ordered which showed increasing left lower lobe consolidation  UA unremarkable  Blood cultures ordered on 2/25 showed no growth to date, will repeat another blood culture on 12/27 also showed no growth to date  RVP negative, will repeat  MRSA PCR negative  Pro-Fidel increased from 0.08 on 2/25-2.39 on 2/27  Tested positive for flu, will continue on Tamiflu to complete course  Received Zosyn while he was in the hospital  Check bilateral lower " extremity venous duplex for any DVT, negative     Right upper extremity swelling, significantly improved  Acute superficial thrombophlebitis seen in cephalic and basilic veins  Arm elevation and cold compression, supportive care     Hypertension  -Stable  -Continue losartan           DISCHARGE Follow Up Recommendations for labs and diagnostics: Follow-up with PCP in 1 week and cardiology        Day of Discharge     Vital Signs:  Temp:  [97.8 °F (36.6 °C)-98.6 °F (37 °C)] 97.8 °F (36.6 °C)  Heart Rate:  [60-95] 70  Resp:  [16-20] 18  BP: (107-138)/(66-86) 108/75    Physical Exam:  Physical Exam  Constitutional:       Comments: NAD    Cardiovascular:      Comments:  RRR, S1 & S2   Pulmonary:      Comments:  Lungs CTA   Abdominal:      Comments:  ABD soft, NT             Pertinent  and/or Most Recent Results     LAB RESULTS:      Lab 02/28/25  0254 02/27/25  2123 02/27/25  1857 02/27/25  1236 02/27/25  0444 02/26/25  0352 02/25/25  2333 02/25/25 2026 02/25/25  1245   WBC 7.54 9.20  --   --  14.88* 18.32*  --   --   --    HEMOGLOBIN 12.9* 13.9  --   --  15.3 16.6  --   --   --    HEMATOCRIT 40.8 42.8  --   --  47.0 50.2  --   --   --    PLATELETS 183 200  --   --  217 228  --   --   --    NEUTROS ABS 5.33 6.87  --   --  11.72* 14.48*  --   --   --    IMMATURE GRANS (ABS) 0.05 0.04  --   --  0.07* 0.10*  --   --   --    LYMPHS ABS 0.92 0.94  --   --  1.46 1.50  --   --   --    MONOS ABS 1.16* 1.28*  --   --  1.53* 2.16*  --   --   --    EOS ABS 0.04 0.02  --   --  0.01 0.01  --   --   --    MCV 91.3 89.9  --   --  89.2 88.1  --   --   --    PROCALCITONIN  --   --  2.39* 2.52*  --   --   --   --  0.08   LACTATE  --  1.6  --   --   --   --  2.0 2.3*  --          Lab 02/28/25  0254 02/27/25  1857 02/27/25  0444 02/26/25  0352   SODIUM 136 134* 131* 137   POTASSIUM 4.3 4.4 4.2 4.4   CHLORIDE 103 100 97* 101   CO2 20.8* 20.5* 16.9* 22.1   ANION GAP 12.2 13.5 17.1* 13.9   BUN 26* 22* 21* 14   CREATININE 1.26 1.24 1.05 0.91    EGFR 65.7 67.0 81.8 97.1   GLUCOSE 97 134* 126* 133*   CALCIUM 8.2* 8.7 9.2 9.7         Lab 02/27/25  1857 02/27/25  0444 02/25/25  1245   TOTAL PROTEIN 7.2 7.1  --    ALBUMIN 3.5 3.6  --    GLOBULIN 3.7 3.5  --    ALT (SGPT) 38 16  --    AST (SGOT) 65* 22  --    BILIRUBIN 0.6 0.9  --    ALK PHOS 76 92  --    LIPASE  --   --  26         Lab 02/25/25  1245   HSTROP T 39*                 Brief Urine Lab Results  (Last result in the past 365 days)        Color   Clarity   Blood   Leuk Est   Nitrite   Protein   CREAT   Urine HCG        02/27/25 1721 Yellow   Clear   Negative   Negative   Negative   30 mg/dL (1+)                 Microbiology Results (last 10 days)       Procedure Component Value - Date/Time    Respiratory Panel PCR w/COVID-19(SARS-CoV-2) [facility] In-House, NP Swab in UTM/VTM, 2 HR TAT - Swab, Nasopharynx [[id number]]  (Abnormal) Collected: 02/28/25 1055    Lab Status: Final result Specimen: Swab from Nasopharynx Updated: 02/28/25 1159     ADENOVIRUS, PCR Not Detected     Coronavirus 229E Not Detected     Coronavirus HKU1 Not Detected     Coronavirus NL63 Not Detected     Coronavirus OC43 Not Detected     COVID19 Not Detected     Human Metapneumovirus Not Detected     Human Rhinovirus/Enterovirus Not Detected     Influenza A H3 Detected     Influenza B PCR Not Detected     Parainfluenza Virus 1 Not Detected     Parainfluenza Virus 2 Not Detected     Parainfluenza Virus 3 Not Detected     Parainfluenza Virus 4 Not Detected     RSV, PCR Not Detected     Bordetella pertussis pcr Not Detected     Bordetella parapertussis PCR Not Detected     Chlamydophila pneumoniae PCR Not Detected     Mycoplasma pneumo by PCR Not Detected    Narrative:      In the setting of a positive respiratory panel with a viral infection PLUS a negative procalcitonin without other underlying concern for bacterial infection, consider observing off antibiotics or discontinuation of antibiotics and continue supportive  care. If the respiratory panel is positive for atypical bacterial infection (Bordetella pertussis, Chlamydophila pneumoniae, or Mycoplasma pneumoniae), consider antibiotic de-escalation to target atypical bacterial infection.    MRSA Screen, PCR (Inpatient) - Swab, Nares [[id number]]  (Normal) Collected: 02/27/25 1935    Lab Status: Final result Specimen: Swab from Nares Updated: 02/27/25 2114     MRSA PCR No MRSA Detected    Narrative:      The negative predictive value of this diagnostic test is high and should only be used to consider de-escalating anti-MRSA therapy. A positive result may indicate colonization with MRSA and must be correlated clinically.    Blood Culture - Blood, Hand, Right [[id number]]  (Normal) Collected: 02/27/25 1707    Lab Status: Preliminary result Specimen: Blood from Hand, Right Updated: 03/03/25 1715     Blood Culture No growth at 4 days    Blood Culture - Blood, Arm, Left [[id number]]  (Normal) Collected: 02/25/25 2029    Lab Status: Final result Specimen: Blood from Arm, Left Updated: 03/02/25 2117     Blood Culture No growth at 5 days    Blood Culture - Blood, Arm, Right [[id number]]  (Normal) Collected: 02/25/25 2026    Lab Status: Final result Specimen: Blood from Arm, Right Updated: 03/02/25 2117     Blood Culture No growth at 5 days    Respiratory Panel PCR w/COVID-19(SARS-CoV-2) [facility] In-House, NP Swab in UTM/VTM, 2 HR TAT - Swab, Nasopharynx [[id number]]  (Normal) Collected: 02/25/25 1131    Lab Status: Final result Specimen: Swab from Nasopharynx Updated: 02/25/25 1231     ADENOVIRUS, PCR Not Detected     Coronavirus 229E Not Detected     Coronavirus HKU1 Not Detected     Coronavirus NL63 Not Detected     Coronavirus OC43 Not Detected     COVID19 Not Detected     Human Metapneumovirus Not Detected     Human Rhinovirus/Enterovirus Not Detected     Influenza A PCR Not Detected     Influenza B PCR Not Detected     Parainfluenza Virus 1 Not Detected     Parainfluenza  Virus 2 Not Detected     Parainfluenza Virus 3 Not Detected     Parainfluenza Virus 4 Not Detected     RSV, PCR Not Detected     Bordetella pertussis pcr Not Detected     Bordetella parapertussis PCR Not Detected     Chlamydophila pneumoniae PCR Not Detected     Mycoplasma pneumo by PCR Not Detected    Narrative:      In the setting of a positive respiratory panel with a viral infection PLUS a negative procalcitonin without other underlying concern for bacterial infection, consider observing off antibiotics or discontinuation of antibiotics and continue supportive care. If the respiratory panel is positive for atypical bacterial infection (Bordetella pertussis, Chlamydophila pneumoniae, or Mycoplasma pneumoniae), consider antibiotic de-escalation to target atypical bacterial infection.            CT Chest Without Contrast Diagnostic    Result Date: 2/27/2025  Impression: Impression: 1. Findings suggesting volume overload/third spacing including increasing small to moderate left effusion, new trace right pleural effusion and new small pericardial effusion. 2. Increasing adjacent left lower lobe consolidation which could reflect atelectasis or pneumonia in the right clinical setting. 3. Mild likely reactive mediastinal adenopathy, slightly increasing from prior. 4. Aortic and coronary atherosclerotic disease. 5. Stable mild cardiomegaly. Electronically Signed: Nicholas Becerra MD  2/27/2025 3:29 PM EST  Workstation ID: [id number]    XR Abdomen KUB    Result Date: 2/25/2025  Impression: Impression: Gastric tube in appropriate position. Electronically Signed: Charles Santo MD  2/25/2025 9:17 PM EST  Workstation ID: [id number]    XR Abdomen KUB    Result Date: 2/25/2025  Impression: Impression: Slightly high position of the gastric tube. Recommend advancing by 3 cm. Electronically Signed: Charles Santo MD  2/25/2025 5:44 PM EST  Workstation ID: [id number]    XR Abdomen KUB    Result Date: 2/25/2025  Impression: Impression:  The tip of the nasogastric tube terminates in the fundus of the stomach. Electronically Signed: Erasmo Beranrd MD  2/25/2025 4:00 PM EST  Workstation ID: [id number]    CT Angiogram Chest Pulmonary Embolism    Result Date: 2/25/2025  Impression: Impression: No evidence of pulmonary embolism. Small left pleural effusion. Mild bilateral dependent atelectasis. Moderate gastric distention with air and fluid. This may be transient however, gastroparesis cannot be excluded. Correlate with patient symptoms. This can be further evaluated with nuclear medicine gastric emptying study as clinically warranted. If there  is concern for gastric outlet obstruction, correlate with endoscopy. Moderately distended urinary bladder. Correlate for urinary retention. Chronic findings as above. Electronically Signed: Charles Santo MD  2/25/2025 3:14 PM EST  Workstation ID: [id number]    CT Abdomen Pelvis With Contrast    Result Date: 2/25/2025  Impression: Impression: No evidence of pulmonary embolism. Small left pleural effusion. Mild bilateral dependent atelectasis. Moderate gastric distention with air and fluid. This may be transient however, gastroparesis cannot be excluded. Correlate with patient symptoms. This can be further evaluated with nuclear medicine gastric emptying study as clinically warranted. If there  is concern for gastric outlet obstruction, correlate with endoscopy. Moderately distended urinary bladder. Correlate for urinary retention. Chronic findings as above. Electronically Signed: Charles Santo MD  2/25/2025 3:14 PM EST  Workstation ID: [id number]    XR Chest 1 View    Result Date: 2/25/2025  Impression: Impression: Low lung volumes with probable mild bibasal atelectasis. Correlate for infection. Electronically Signed: Nicholas Becerra MD  2/25/2025 12:17 PM EST  Workstation ID: [id number]    XR Chest 1 View    Result Date: 2/21/2025  Impression: Impression: Radiographically uncomplicated multichamber AICD. No pneumothorax.  Electronically Signed: Nicholas Becerra MD  2/21/2025 5:49 PM EST  Workstation ID: [id number]    CT Head Without Contrast    Result Date: 2/18/2025  Impression: Impression: No acute intracranial findings. Electronically Signed: Bao Trujillo  2/18/2025 10:00 PM EST  Workstation ID: [id number]    XR Chest 1 View    Result Date: 2/18/2025  Impression: Impression: No radiographic evidence of acute cardiopulmonary abnormality. Electronically Signed: Bao Trujillo  2/18/2025 9:37 PM EST  Workstation ID: [id number]     Results for orders placed during the hospital encounter of 02/25/25    Duplex Venous Upper Extremity - Right CAR    Interpretation Summary    There is no acute deep vein thrombosis in the right upper extremity.    Acute superficial vein thrombosis involving the right cephalic and basilic veins, associated with PICC line.    All other right sided vessels appear normal.      Results for orders placed during the hospital encounter of 02/25/25    Duplex Venous Upper Extremity - Right CAR    Interpretation Summary    There is no acute deep vein thrombosis in the right upper extremity.    Acute superficial vein thrombosis involving the right cephalic and basilic veins, associated with PICC line.    All other right sided vessels appear normal.      Results for orders placed during the hospital encounter of 02/25/25    Adult Transthoracic Echo Limited W/ Cont if Necessary Per Protocol    Interpretation Summary    Left ventricular ejection fraction appears to be 51 - 55%.    The right ventricular cavity is moderately dilated.    Technically very limited study.  Valvular structure and function was not evaluated.      Labs Pending at Discharge:  Pending Results       Procedure [Order ID] Specimen - Date/Time    Basic Metabolic Panel [[id number]] Collected: 03/04/25 1112    Specimen: Blood from Arm, Right     CBC (No Diff) [[id number]] Collected: 03/04/25 1112    Specimen: Blood from Arm, Right     EP/CRM Study [[id number]]              Procedures Performed  Procedure(s):  Pacemaker DC new         Consults:   Consults       Date and Time Order Name Status Description    2/26/2025  1:56 PM Inpatient Hospitalist Consult Completed     2/26/2025  9:39 AM Inpatient Cardiology Consult Completed     2/26/2025  8:05 AM Inpatient Gastroenterology Consult Completed     2/18/2025 10:21 PM Cardiology (on-call MD unless specified) Completed               Discharge Details        Discharge Medications        Continue These Medications        Instructions Start Date   Claritin 10 MG tablet  Generic drug: loratadine   10 mg, Daily      clotrimazole 1 % cream  Commonly known as: LOTRIMIN   1 Application, Topical, 2 Times Daily      [facility] Vitamin C 1000 MG tablet  Generic drug: ascorbic acid   4,000 mg, Daily      empagliflozin 25 MG tablet tablet  Commonly known as: Jardiance   25 mg, Oral, Daily      lansoprazole 30 MG capsule  Commonly known as: PREVACID   30 mg, Oral, Daily      losartan 25 MG tablet  Commonly known as: COZAAR   25 mg, Oral, Daily      NEURIVA PO   Daily      pseudoephedrine 120 MG 12 hr tablet  Commonly known as: SUDAFED   120 mg, Every Morning      rosuvastatin 10 MG tablet  Commonly known as: CRESTOR   10 mg, Oral, Daily      Semaglutide 3 MG tablet   3 mg, Oral, Daily             ASK your doctor about these medications        Instructions Start Date   cephalexin 500 MG capsule  Commonly known as: KEFLEX  Ask about: Should I take this medication?   500 mg, Oral, Every 8 Hours Scheduled               Allergies   Allergen Reactions    Lisinopril Nausea And Vomiting         Discharge Disposition: home      Diet:  Hospital:  Diet Order   Procedures    Diet: Cardiac; Healthy Heart (2-3 Na+); Fluid Consistency: Thin (IDDSI 0)         Discharge Activity:         CODE STATUS:  Code Status and Medical Interventions: CPR (Attempt to Resuscitate); Full Support   Ordered at: 02/25/25 1627     Level Of Support Discussed With:    Patient     Code  Status (Patient has no pulse and is not breathing):    CPR (Attempt to Resuscitate)     Medical Interventions (Patient has pulse or is breathing):    Full Support         Future Appointments   Date Time Provider Department Center   3/6/2025 11:30 AM Sharon Silva MD [facility]   3/7/2025  9:30 AM Anitha Herrera APRN [facility]   3/13/2025  1:00 PM [facility] [place] DEVICE CHECK [facility]   3/13/2025  1:45 PM Rima Salazar APRN [facility]   5/9/2025 11:00 AM Sharon Silva MD [facility]           Time spent on Discharge including face to face service:  35 minutes    Signature: Electronically signed by Ron Eduardo MD, 03/04/25, 11:34 EST.  [facility] Hospitalist Team      Electronically signed by Ron Eduarod MD at 03/04/25 5108

## 2025-03-05 NOTE — OUTREACH NOTE
Call Center TCM Note      Flowsheet Row Responses   [facility] facility patient discharged from? [facility]   Does the patient have one of the following disease processes/diagnoses(primary or secondary)? General Surgery   TCM attempt successful? No   Unsuccessful attempts Attempt 1            Claire Wright MA    3/5/2025, 12:45 EST

## 2025-03-05 NOTE — TELEPHONE ENCOUNTER
Hub staff attempted to follow warm transfer process and was unsuccessful     Caller: Ismael Pulido    Relationship to patient: Self    Best call back number: [phone]    Patient is needin MONTH F/U [facility]    HE HAS APPT WITH [facility] 2025

## 2025-03-05 NOTE — OUTREACH NOTE
Call Center TCM Note      Flowsheet Row Responses   [facility] patient discharged from? [facility]   Does the patient have one of the following disease processes/diagnoses(primary or secondary)? General Surgery   TCM attempt successful? Yes   Call start time 1537   Discharge diagnosis Pacemaker DC new Procedure aborted   Meds reviewed with patient/caregiver? Yes   Is the patient having any side effects they believe may be caused by any medication additions or changes? No   Does the patient have all medications related to this admission filled (includes all antibiotics, pain medications, etc.) Yes   Prescription comments New rx Tamiflu in place   Is the patient taking all medications as directed (includes completed medication regime)? Yes   Does the patient have an appointment with their PCP within 7-14 days of discharge? Yes   Has home health visited the patient within 72 hours of discharge? N/A   Psychosocial issues? No   TCM call completed? Yes   Wrap up additional comments D/C DX: chest/epigastric pain,  EGD,  Flu A H3 - Pt feeling much better, no questions. TCM APPT with PCP Dr Silva is tomorrow 03/06/2025.            Claire Wright MA    3/5/2025, 15:43 EST

## 2025-03-06 ENCOUNTER — OFFICE VISIT (OUTPATIENT)
Dept: FAMILY MEDICINE CLINIC | Facility: CLINIC | Age: 60
End: 2025-03-06
Payer: COMMERCIAL

## 2025-03-06 VITALS
WEIGHT: 230 LBS | SYSTOLIC BLOOD PRESSURE: 110 MMHG | RESPIRATION RATE: 18 BRPM | DIASTOLIC BLOOD PRESSURE: 68 MMHG | HEIGHT: 68 IN | HEART RATE: 79 BPM | BODY MASS INDEX: 34.86 KG/M2 | OXYGEN SATURATION: 96 %

## 2025-03-06 DIAGNOSIS — I80.8 SUPERFICIAL THROMBOPHLEBITIS OF RIGHT UPPER EXTREMITY: ICD-10-CM

## 2025-03-06 DIAGNOSIS — R10.84 GENERALIZED ABDOMINAL PAIN: ICD-10-CM

## 2025-03-06 DIAGNOSIS — Z09 HOSPITAL DISCHARGE FOLLOW-UP: Primary | ICD-10-CM

## 2025-03-06 DIAGNOSIS — R00.1 BRADYCARDIA: ICD-10-CM

## 2025-03-06 PROBLEM — Z95.0 STATUS POST PLACEMENT OF CARDIAC PACEMAKER: Status: ACTIVE | Noted: 2025-03-06

## 2025-03-07 ENCOUNTER — OFFICE VISIT (OUTPATIENT)
Dept: CARDIOLOGY | Facility: CLINIC | Age: 60
End: 2025-03-07
Payer: COMMERCIAL

## 2025-03-07 DIAGNOSIS — Z95.0 STATUS POST PLACEMENT OF CARDIAC PACEMAKER: Primary | ICD-10-CM

## 2025-03-09 ENCOUNTER — HOSPITAL ENCOUNTER (INPATIENT)
Facility: HOSPITAL | Age: 60
LOS: 3 days | Discharge: HOME | DRG: 815 | End: 2025-03-14
Attending: EMERGENCY MEDICINE | Admitting: STUDENT IN AN ORGANIZED HEALTH CARE EDUCATION/TRAINING PROGRAM
Payer: COMMERCIAL

## 2025-03-09 ENCOUNTER — HOSPITAL ENCOUNTER (EMERGENCY)
Facility: HOSPITAL | Age: 60
Discharge: LEFT BEFORE DISCHARGE | End: 2025-03-09
Attending: EMERGENCY MEDICINE
Payer: COMMERCIAL

## 2025-03-09 ENCOUNTER — APPOINTMENT (OUTPATIENT)
Dept: CT IMAGING | Facility: HOSPITAL | Age: 60
DRG: 815 | End: 2025-03-09
Payer: COMMERCIAL

## 2025-03-09 VITALS
DIASTOLIC BLOOD PRESSURE: 85 MMHG | HEART RATE: 65 BPM | BODY MASS INDEX: 34.85 KG/M2 | SYSTOLIC BLOOD PRESSURE: 129 MMHG | WEIGHT: 229.94 LBS | RESPIRATION RATE: 18 BRPM | TEMPERATURE: 98.5 F | HEIGHT: 68 IN | OXYGEN SATURATION: 97 %

## 2025-03-09 DIAGNOSIS — R10.10 ACUTE UPPER ABDOMINAL PAIN: ICD-10-CM

## 2025-03-09 DIAGNOSIS — D73.5 SPLENIC INFARCT: Primary | ICD-10-CM

## 2025-03-09 LAB
ALBUMIN SERPL-MCNC: 4.1 G/DL (ref 3.5–5.2)
ALBUMIN/GLOB SERPL: 1 G/DL
ALP SERPL-CCNC: 129 U/L (ref 39–117)
ALT SERPL W P-5'-P-CCNC: 33 U/L (ref 1–41)
ANION GAP SERPL CALCULATED.3IONS-SCNC: 11.8 MMOL/L (ref 5–15)
AST SERPL-CCNC: 27 U/L (ref 1–40)
BASOPHILS # BLD AUTO: 0.07 10*3/MM3 (ref 0–0.2)
BASOPHILS NFR BLD AUTO: 0.5 % (ref 0–1.5)
BILIRUB SERPL-MCNC: 0.4 MG/DL (ref 0–1.2)
BILIRUB UR QL STRIP: NEGATIVE
BUN SERPL-MCNC: 9 MG/DL (ref 6–20)
BUN/CREAT SERPL: 10.1 (ref 7–25)
CALCIUM SPEC-SCNC: 9.9 MG/DL (ref 8.6–10.5)
CHLORIDE SERPL-SCNC: 103 MMOL/L (ref 98–107)
CLARITY UR: CLEAR
CO2 SERPL-SCNC: 24.2 MMOL/L (ref 22–29)
COLOR UR: YELLOW
CREAT SERPL-MCNC: 0.89 MG/DL (ref 0.76–1.27)
D-LACTATE SERPL-SCNC: 2 MMOL/L (ref 0.5–2)
DEPRECATED RDW RBC AUTO: 42.2 FL (ref 37–54)
EGFRCR SERPLBLD CKD-EPI 2021: 98.7 ML/MIN/1.73
EOSINOPHIL # BLD AUTO: 0.28 10*3/MM3 (ref 0–0.4)
EOSINOPHIL NFR BLD AUTO: 2.1 % (ref 0.3–6.2)
ERYTHROCYTE [DISTWIDTH] IN BLOOD BY AUTOMATED COUNT: 12.9 % (ref 12.3–15.4)
GLOBULIN UR ELPH-MCNC: 4.3 GM/DL
GLUCOSE SERPL-MCNC: 109 MG/DL (ref 65–99)
GLUCOSE UR STRIP-MCNC: ABNORMAL MG/DL
HCT VFR BLD AUTO: 46.4 % (ref 37.5–51)
HGB BLD-MCNC: 15.4 G/DL (ref 13–17.7)
HGB UR QL STRIP.AUTO: NEGATIVE
HOLD SPECIMEN: NORMAL
HOLD SPECIMEN: NORMAL
IMM GRANULOCYTES # BLD AUTO: 0.08 10*3/MM3 (ref 0–0.05)
IMM GRANULOCYTES NFR BLD AUTO: 0.6 % (ref 0–0.5)
KETONES UR QL STRIP: ABNORMAL
LEUKOCYTE ESTERASE UR QL STRIP.AUTO: NEGATIVE
LIPASE SERPL-CCNC: 35 U/L (ref 13–60)
LYMPHOCYTES # BLD AUTO: 1.47 10*3/MM3 (ref 0.7–3.1)
LYMPHOCYTES NFR BLD AUTO: 11.1 % (ref 19.6–45.3)
MCH RBC QN AUTO: 29.6 PG (ref 26.6–33)
MCHC RBC AUTO-ENTMCNC: 33.2 G/DL (ref 31.5–35.7)
MCV RBC AUTO: 89.1 FL (ref 79–97)
MONOCYTES # BLD AUTO: 1.02 10*3/MM3 (ref 0.1–0.9)
MONOCYTES NFR BLD AUTO: 7.7 % (ref 5–12)
NEUTROPHILS NFR BLD AUTO: 10.35 10*3/MM3 (ref 1.7–7)
NEUTROPHILS NFR BLD AUTO: 78 % (ref 42.7–76)
NITRITE UR QL STRIP: NEGATIVE
NRBC BLD AUTO-RTO: 0 /100 WBC (ref 0–0.2)
PH UR STRIP.AUTO: <=5 [PH] (ref 5–8)
PLATELET # BLD AUTO: 483 10*3/MM3 (ref 140–450)
PMV BLD AUTO: 8 FL (ref 6–12)
POTASSIUM SERPL-SCNC: 4.1 MMOL/L (ref 3.5–5.2)
PROT SERPL-MCNC: 8.4 G/DL (ref 6–8.5)
PROT UR QL STRIP: ABNORMAL
RBC # BLD AUTO: 5.21 10*6/MM3 (ref 4.14–5.8)
SODIUM SERPL-SCNC: 139 MMOL/L (ref 136–145)
SP GR UR STRIP: >1.03 (ref 1–1.03)
UROBILINOGEN UR QL STRIP: ABNORMAL
WBC NRBC COR # BLD AUTO: 13.27 10*3/MM3 (ref 3.4–10.8)
WHOLE BLOOD HOLD COAG: NORMAL
WHOLE BLOOD HOLD SPECIMEN: NORMAL

## 2025-03-09 PROCEDURE — 25810000003 LACTATED RINGERS SOLUTION: Performed by: EMERGENCY MEDICINE

## 2025-03-09 PROCEDURE — 99211 OFF/OP EST MAY X REQ PHY/QHP: CPT | Performed by: EMERGENCY MEDICINE

## 2025-03-09 PROCEDURE — 74177 CT ABD & PELVIS W/CONTRAST: CPT

## 2025-03-09 PROCEDURE — 25010000002 HYDROMORPHONE PER 4 MG: Performed by: EMERGENCY MEDICINE

## 2025-03-09 PROCEDURE — 36415 COLL VENOUS BLD VENIPUNCTURE: CPT

## 2025-03-09 PROCEDURE — 83605 ASSAY OF LACTIC ACID: CPT | Performed by: EMERGENCY MEDICINE

## 2025-03-09 PROCEDURE — 85025 COMPLETE CBC W/AUTO DIFF WBC: CPT | Performed by: EMERGENCY MEDICINE

## 2025-03-09 PROCEDURE — 80053 COMPREHEN METABOLIC PANEL: CPT | Performed by: EMERGENCY MEDICINE

## 2025-03-09 PROCEDURE — 99285 EMERGENCY DEPT VISIT HI MDM: CPT

## 2025-03-09 PROCEDURE — 25010000002 ONDANSETRON PER 1 MG: Performed by: EMERGENCY MEDICINE

## 2025-03-09 PROCEDURE — 81003 URINALYSIS AUTO W/O SCOPE: CPT | Performed by: EMERGENCY MEDICINE

## 2025-03-09 PROCEDURE — 25510000001 IOPAMIDOL 61 % SOLUTION: Performed by: EMERGENCY MEDICINE

## 2025-03-09 PROCEDURE — 83690 ASSAY OF LIPASE: CPT | Performed by: EMERGENCY MEDICINE

## 2025-03-09 RX ORDER — FAMOTIDINE 10 MG/ML
20 INJECTION, SOLUTION INTRAVENOUS ONCE
Status: COMPLETED | OUTPATIENT
Start: 2025-03-09 | End: 2025-03-09

## 2025-03-09 RX ORDER — IOPAMIDOL 612 MG/ML
100 INJECTION, SOLUTION INTRAVASCULAR
Status: COMPLETED | OUTPATIENT
Start: 2025-03-09 | End: 2025-03-09

## 2025-03-09 RX ORDER — ONDANSETRON 2 MG/ML
4 INJECTION INTRAMUSCULAR; INTRAVENOUS ONCE
Status: COMPLETED | OUTPATIENT
Start: 2025-03-09 | End: 2025-03-09

## 2025-03-09 RX ORDER — HYDROMORPHONE HYDROCHLORIDE 1 MG/ML
0.5 INJECTION, SOLUTION INTRAMUSCULAR; INTRAVENOUS; SUBCUTANEOUS ONCE
Refills: 0 | Status: COMPLETED | OUTPATIENT
Start: 2025-03-09 | End: 2025-03-09

## 2025-03-09 RX ORDER — SODIUM CHLORIDE 0.9 % (FLUSH) 0.9 %
10 SYRINGE (ML) INJECTION AS NEEDED
Status: DISCONTINUED | OUTPATIENT
Start: 2025-03-09 | End: 2025-03-14 | Stop reason: HOSPADM

## 2025-03-09 RX ADMIN — HYDROMORPHONE HYDROCHLORIDE 0.5 MG: 1 INJECTION, SOLUTION INTRAMUSCULAR; INTRAVENOUS; SUBCUTANEOUS at 21:05

## 2025-03-09 RX ADMIN — SODIUM CHLORIDE, SODIUM LACTATE, POTASSIUM CHLORIDE, AND CALCIUM CHLORIDE 1000 ML: .6; .31; .03; .02 INJECTION, SOLUTION INTRAVENOUS at 21:05

## 2025-03-09 RX ADMIN — IOPAMIDOL 85 ML: 612 INJECTION, SOLUTION INTRAVENOUS at 22:16

## 2025-03-09 RX ADMIN — ONDANSETRON 4 MG: 2 INJECTION, SOLUTION INTRAMUSCULAR; INTRAVENOUS at 21:05

## 2025-03-09 RX ADMIN — FAMOTIDINE 20 MG: 10 INJECTION INTRAVENOUS at 21:05

## 2025-03-10 ENCOUNTER — READMISSION MANAGEMENT (OUTPATIENT)
Dept: CALL CENTER | Facility: HOSPITAL | Age: 60
End: 2025-03-10
Payer: COMMERCIAL

## 2025-03-10 PROBLEM — K21.00 GERD WITH ESOPHAGITIS: Chronic | Status: ACTIVE | Noted: 2025-03-10

## 2025-03-10 LAB
ADV 40+41 DNA STL QL NAA+NON-PROBE: NOT DETECTED
ALBUMIN SERPL-MCNC: 3.4 G/DL (ref 3.5–5.2)
ALBUMIN/GLOB SERPL: 0.9 G/DL
ALP SERPL-CCNC: 105 U/L (ref 39–117)
ALT SERPL W P-5'-P-CCNC: 30 U/L (ref 1–41)
ANION GAP SERPL CALCULATED.3IONS-SCNC: 15 MMOL/L (ref 5–15)
APTT PPP: 28.1 SECONDS (ref 22.7–35.4)
APTT PPP: 32.3 SECONDS (ref 22.7–35.4)
AST SERPL-CCNC: 28 U/L (ref 1–40)
ASTRO TYP 1-8 RNA STL QL NAA+NON-PROBE: NOT DETECTED
BILIRUB SERPL-MCNC: 0.4 MG/DL (ref 0–1.2)
BUN SERPL-MCNC: 9 MG/DL (ref 6–20)
BUN/CREAT SERPL: 13.2 (ref 7–25)
C CAYETANENSIS DNA STL QL NAA+NON-PROBE: NOT DETECTED
C COLI+JEJ+UPSA DNA STL QL NAA+NON-PROBE: NOT DETECTED
C DIFF TOX GENS STL QL NAA+PROBE: NEGATIVE
CALCIUM SPEC-SCNC: 9.4 MG/DL (ref 8.6–10.5)
CHLORIDE SERPL-SCNC: 104 MMOL/L (ref 98–107)
CO2 SERPL-SCNC: 22 MMOL/L (ref 22–29)
CREAT SERPL-MCNC: 0.68 MG/DL (ref 0.76–1.27)
CRYPTOSP DNA STL QL NAA+NON-PROBE: NOT DETECTED
DEPRECATED RDW RBC AUTO: 42.1 FL (ref 37–54)
E HISTOLYT DNA STL QL NAA+NON-PROBE: NOT DETECTED
EAEC PAA PLAS AGGR+AATA ST NAA+NON-PRB: NOT DETECTED
EC STX1+STX2 GENES STL QL NAA+NON-PROBE: NOT DETECTED
EGFRCR SERPLBLD CKD-EPI 2021: 107.1 ML/MIN/1.73
EPEC EAE GENE STL QL NAA+NON-PROBE: NOT DETECTED
ERYTHROCYTE [DISTWIDTH] IN BLOOD BY AUTOMATED COUNT: 12.8 % (ref 12.3–15.4)
ETEC LTA+ST1A+ST1B TOX ST NAA+NON-PROBE: NOT DETECTED
G LAMBLIA DNA STL QL NAA+NON-PROBE: NOT DETECTED
GLOBULIN UR ELPH-MCNC: 3.7 GM/DL
GLUCOSE BLDC GLUCOMTR-MCNC: 85 MG/DL (ref 70–130)
GLUCOSE BLDC GLUCOMTR-MCNC: 92 MG/DL (ref 70–130)
GLUCOSE BLDC GLUCOMTR-MCNC: 97 MG/DL (ref 70–130)
GLUCOSE BLDC GLUCOMTR-MCNC: 99 MG/DL (ref 70–130)
GLUCOSE SERPL-MCNC: 89 MG/DL (ref 65–99)
HCT VFR BLD AUTO: 42 % (ref 37.5–51)
HGB BLD-MCNC: 13.8 G/DL (ref 13–17.7)
INR PPP: 1.21 (ref 0.9–1.1)
MAGNESIUM SERPL-MCNC: 2 MG/DL (ref 1.6–2.6)
MCH RBC QN AUTO: 29.4 PG (ref 26.6–33)
MCHC RBC AUTO-ENTMCNC: 32.9 G/DL (ref 31.5–35.7)
MCV RBC AUTO: 89.6 FL (ref 79–97)
NOROVIRUS GI+II RNA STL QL NAA+NON-PROBE: NOT DETECTED
P SHIGELLOIDES DNA STL QL NAA+NON-PROBE: NOT DETECTED
PHOSPHATE SERPL-MCNC: 3.9 MG/DL (ref 2.5–4.5)
PLATELET # BLD AUTO: 434 10*3/MM3 (ref 140–450)
PMV BLD AUTO: 8.7 FL (ref 6–12)
POTASSIUM SERPL-SCNC: 3.8 MMOL/L (ref 3.5–5.2)
PROCALCITONIN SERPL-MCNC: 0.04 NG/ML (ref 0–0.25)
PROT SERPL-MCNC: 7.1 G/DL (ref 6–8.5)
PROTHROMBIN TIME: 15.3 SECONDS (ref 11.7–14.2)
RBC # BLD AUTO: 4.69 10*6/MM3 (ref 4.14–5.8)
RVA RNA STL QL NAA+NON-PROBE: NOT DETECTED
S ENT+BONG DNA STL QL NAA+NON-PROBE: NOT DETECTED
SAPO I+II+IV+V RNA STL QL NAA+NON-PROBE: NOT DETECTED
SHIGELLA SP+EIEC IPAH ST NAA+NON-PROBE: NOT DETECTED
SODIUM SERPL-SCNC: 141 MMOL/L (ref 136–145)
V CHOL+PARA+VUL DNA STL QL NAA+NON-PROBE: NOT DETECTED
V CHOLERAE DNA STL QL NAA+NON-PROBE: NOT DETECTED
WBC NRBC COR # BLD AUTO: 13.74 10*3/MM3 (ref 3.4–10.8)
Y ENTEROCOL DNA STL QL NAA+NON-PROBE: NOT DETECTED

## 2025-03-10 PROCEDURE — 83735 ASSAY OF MAGNESIUM: CPT | Performed by: STUDENT IN AN ORGANIZED HEALTH CARE EDUCATION/TRAINING PROGRAM

## 2025-03-10 PROCEDURE — G0378 HOSPITAL OBSERVATION PER HR: HCPCS

## 2025-03-10 PROCEDURE — 85610 PROTHROMBIN TIME: CPT | Performed by: SURGERY

## 2025-03-10 PROCEDURE — 84100 ASSAY OF PHOSPHORUS: CPT | Performed by: STUDENT IN AN ORGANIZED HEALTH CARE EDUCATION/TRAINING PROGRAM

## 2025-03-10 PROCEDURE — 25010000002 HEPARIN (PORCINE) PER 1000 UNITS: Performed by: SURGERY

## 2025-03-10 PROCEDURE — 25810000003 SODIUM CHLORIDE 0.9 % SOLUTION: Performed by: NURSE PRACTITIONER

## 2025-03-10 PROCEDURE — 85730 THROMBOPLASTIN TIME PARTIAL: CPT | Performed by: STUDENT IN AN ORGANIZED HEALTH CARE EDUCATION/TRAINING PROGRAM

## 2025-03-10 PROCEDURE — 25010000002 HEPARIN (PORCINE) 25000-0.45 UT/250ML-% SOLUTION: Performed by: SURGERY

## 2025-03-10 PROCEDURE — 84145 PROCALCITONIN (PCT): CPT | Performed by: STUDENT IN AN ORGANIZED HEALTH CARE EDUCATION/TRAINING PROGRAM

## 2025-03-10 PROCEDURE — 25810000003 SODIUM CHLORIDE 0.9 % SOLUTION: Performed by: STUDENT IN AN ORGANIZED HEALTH CARE EDUCATION/TRAINING PROGRAM

## 2025-03-10 PROCEDURE — 85027 COMPLETE CBC AUTOMATED: CPT | Performed by: NURSE PRACTITIONER

## 2025-03-10 PROCEDURE — 87507 IADNA-DNA/RNA PROBE TQ 12-25: CPT | Performed by: STUDENT IN AN ORGANIZED HEALTH CARE EDUCATION/TRAINING PROGRAM

## 2025-03-10 PROCEDURE — 87493 C DIFF AMPLIFIED PROBE: CPT | Performed by: STUDENT IN AN ORGANIZED HEALTH CARE EDUCATION/TRAINING PROGRAM

## 2025-03-10 PROCEDURE — 85730 THROMBOPLASTIN TIME PARTIAL: CPT | Performed by: SURGERY

## 2025-03-10 PROCEDURE — 82948 REAGENT STRIP/BLOOD GLUCOSE: CPT

## 2025-03-10 PROCEDURE — 99222 1ST HOSP IP/OBS MODERATE 55: CPT | Performed by: SURGERY

## 2025-03-10 PROCEDURE — 99204 OFFICE O/P NEW MOD 45 MIN: CPT | Performed by: INTERNAL MEDICINE

## 2025-03-10 PROCEDURE — 25010000002 ONDANSETRON PER 1 MG: Performed by: NURSE PRACTITIONER

## 2025-03-10 PROCEDURE — 80053 COMPREHEN METABOLIC PANEL: CPT | Performed by: NURSE PRACTITIONER

## 2025-03-10 RX ORDER — ROSUVASTATIN CALCIUM 10 MG/1
10 TABLET, COATED ORAL DAILY
Status: DISCONTINUED | OUTPATIENT
Start: 2025-03-10 | End: 2025-03-14 | Stop reason: HOSPADM

## 2025-03-10 RX ORDER — NITROGLYCERIN 0.4 MG/1
0.4 TABLET SUBLINGUAL
Status: DISCONTINUED | OUTPATIENT
Start: 2025-03-10 | End: 2025-03-14 | Stop reason: HOSPADM

## 2025-03-10 RX ORDER — SODIUM CHLORIDE 9 MG/ML
100 INJECTION, SOLUTION INTRAVENOUS CONTINUOUS
Status: DISCONTINUED | OUTPATIENT
Start: 2025-03-10 | End: 2025-03-10

## 2025-03-10 RX ORDER — SODIUM CHLORIDE 9 MG/ML
75 INJECTION, SOLUTION INTRAVENOUS CONTINUOUS
Status: ACTIVE | OUTPATIENT
Start: 2025-03-10 | End: 2025-03-11

## 2025-03-10 RX ORDER — ONDANSETRON 2 MG/ML
4 INJECTION INTRAMUSCULAR; INTRAVENOUS EVERY 6 HOURS PRN
Status: DISCONTINUED | OUTPATIENT
Start: 2025-03-10 | End: 2025-03-14 | Stop reason: HOSPADM

## 2025-03-10 RX ORDER — SODIUM CHLORIDE 0.9 % (FLUSH) 0.9 %
10 SYRINGE (ML) INJECTION AS NEEDED
Status: DISCONTINUED | OUTPATIENT
Start: 2025-03-10 | End: 2025-03-14 | Stop reason: HOSPADM

## 2025-03-10 RX ORDER — FAMOTIDINE 20 MG/1
20 TABLET, FILM COATED ORAL 2 TIMES DAILY PRN
Status: DISCONTINUED | OUTPATIENT
Start: 2025-03-10 | End: 2025-03-14 | Stop reason: HOSPADM

## 2025-03-10 RX ORDER — HEPARIN SODIUM 5000 [USP'U]/ML
80 INJECTION, SOLUTION INTRAVENOUS; SUBCUTANEOUS ONCE
Status: COMPLETED | OUTPATIENT
Start: 2025-03-10 | End: 2025-03-10

## 2025-03-10 RX ORDER — HEPARIN SODIUM 5000 [USP'U]/ML
40-80 INJECTION, SOLUTION INTRAVENOUS; SUBCUTANEOUS EVERY 6 HOURS PRN
Status: DISCONTINUED | OUTPATIENT
Start: 2025-03-10 | End: 2025-03-14

## 2025-03-10 RX ORDER — IBUPROFEN 600 MG/1
1 TABLET ORAL
Status: DISCONTINUED | OUTPATIENT
Start: 2025-03-10 | End: 2025-03-14 | Stop reason: HOSPADM

## 2025-03-10 RX ORDER — PANTOPRAZOLE SODIUM 40 MG/10ML
40 INJECTION, POWDER, LYOPHILIZED, FOR SOLUTION INTRAVENOUS
Status: DISCONTINUED | OUTPATIENT
Start: 2025-03-10 | End: 2025-03-14 | Stop reason: HOSPADM

## 2025-03-10 RX ORDER — ACETAMINOPHEN 160 MG/5ML
650 SOLUTION ORAL EVERY 4 HOURS PRN
Status: DISCONTINUED | OUTPATIENT
Start: 2025-03-10 | End: 2025-03-14 | Stop reason: HOSPADM

## 2025-03-10 RX ORDER — ACETAMINOPHEN 325 MG/1
650 TABLET ORAL EVERY 4 HOURS PRN
Status: DISCONTINUED | OUTPATIENT
Start: 2025-03-10 | End: 2025-03-14 | Stop reason: HOSPADM

## 2025-03-10 RX ORDER — BISACODYL 5 MG/1
5 TABLET, DELAYED RELEASE ORAL DAILY PRN
Status: DISCONTINUED | OUTPATIENT
Start: 2025-03-10 | End: 2025-03-14 | Stop reason: HOSPADM

## 2025-03-10 RX ORDER — DEXTROSE MONOHYDRATE 25 G/50ML
25 INJECTION, SOLUTION INTRAVENOUS
Status: DISCONTINUED | OUTPATIENT
Start: 2025-03-10 | End: 2025-03-14 | Stop reason: HOSPADM

## 2025-03-10 RX ORDER — HEPARIN SODIUM 10000 [USP'U]/100ML
14.7 INJECTION, SOLUTION INTRAVENOUS
Status: DISCONTINUED | OUTPATIENT
Start: 2025-03-10 | End: 2025-03-14

## 2025-03-10 RX ORDER — PANTOPRAZOLE SODIUM 40 MG/10ML
40 INJECTION, POWDER, LYOPHILIZED, FOR SOLUTION INTRAVENOUS
Status: DISCONTINUED | OUTPATIENT
Start: 2025-03-10 | End: 2025-03-10

## 2025-03-10 RX ORDER — SODIUM CHLORIDE 9 MG/ML
40 INJECTION, SOLUTION INTRAVENOUS AS NEEDED
Status: DISCONTINUED | OUTPATIENT
Start: 2025-03-10 | End: 2025-03-14 | Stop reason: HOSPADM

## 2025-03-10 RX ORDER — NICOTINE POLACRILEX 4 MG
15 LOZENGE BUCCAL
Status: DISCONTINUED | OUTPATIENT
Start: 2025-03-10 | End: 2025-03-14 | Stop reason: HOSPADM

## 2025-03-10 RX ORDER — ACETAMINOPHEN 650 MG/1
650 SUPPOSITORY RECTAL EVERY 4 HOURS PRN
Status: DISCONTINUED | OUTPATIENT
Start: 2025-03-10 | End: 2025-03-14 | Stop reason: HOSPADM

## 2025-03-10 RX ORDER — BISACODYL 10 MG
10 SUPPOSITORY, RECTAL RECTAL DAILY PRN
Status: DISCONTINUED | OUTPATIENT
Start: 2025-03-10 | End: 2025-03-14 | Stop reason: HOSPADM

## 2025-03-10 RX ORDER — SODIUM CHLORIDE 0.9 % (FLUSH) 0.9 %
10 SYRINGE (ML) INJECTION EVERY 12 HOURS SCHEDULED
Status: DISCONTINUED | OUTPATIENT
Start: 2025-03-10 | End: 2025-03-14 | Stop reason: HOSPADM

## 2025-03-10 RX ORDER — AMOXICILLIN 250 MG
2 CAPSULE ORAL 2 TIMES DAILY PRN
Status: DISCONTINUED | OUTPATIENT
Start: 2025-03-10 | End: 2025-03-14 | Stop reason: HOSPADM

## 2025-03-10 RX ORDER — LOSARTAN POTASSIUM 25 MG/1
25 TABLET ORAL DAILY
Status: DISCONTINUED | OUTPATIENT
Start: 2025-03-10 | End: 2025-03-12

## 2025-03-10 RX ORDER — INSULIN LISPRO 100 [IU]/ML
2-7 INJECTION, SOLUTION INTRAVENOUS; SUBCUTANEOUS
Status: DISCONTINUED | OUTPATIENT
Start: 2025-03-10 | End: 2025-03-14 | Stop reason: HOSPADM

## 2025-03-10 RX ORDER — POLYETHYLENE GLYCOL 3350 17 G/17G
17 POWDER, FOR SOLUTION ORAL DAILY PRN
Status: DISCONTINUED | OUTPATIENT
Start: 2025-03-10 | End: 2025-03-14 | Stop reason: HOSPADM

## 2025-03-10 RX ADMIN — HEPARIN SODIUM 18.7 UNITS/KG/HR: 10000 INJECTION, SOLUTION INTRAVENOUS at 17:38

## 2025-03-10 RX ADMIN — HEPARIN SODIUM 14.7 UNITS/KG/HR: 10000 INJECTION, SOLUTION INTRAVENOUS at 09:45

## 2025-03-10 RX ADMIN — ONDANSETRON 4 MG: 2 INJECTION, SOLUTION INTRAMUSCULAR; INTRAVENOUS at 13:33

## 2025-03-10 RX ADMIN — ROSUVASTATIN CALCIUM 10 MG: 10 TABLET, FILM COATED ORAL at 13:20

## 2025-03-10 RX ADMIN — SODIUM CHLORIDE 75 ML/HR: 0.9 INJECTION, SOLUTION INTRAVENOUS at 13:20

## 2025-03-10 RX ADMIN — HEPARIN SODIUM 8200 UNITS: 5000 INJECTION INTRAVENOUS; SUBCUTANEOUS at 17:38

## 2025-03-10 RX ADMIN — Medication 10 ML: at 09:52

## 2025-03-10 RX ADMIN — PANTOPRAZOLE SODIUM 40 MG: 40 INJECTION, POWDER, LYOPHILIZED, FOR SOLUTION INTRAVENOUS at 21:11

## 2025-03-10 RX ADMIN — LOSARTAN POTASSIUM 25 MG: 25 TABLET, FILM COATED ORAL at 13:20

## 2025-03-10 RX ADMIN — HEPARIN SODIUM 8200 UNITS: 5000 INJECTION INTRAVENOUS; SUBCUTANEOUS at 09:47

## 2025-03-10 RX ADMIN — ACETAMINOPHEN 650 MG: 325 TABLET, FILM COATED ORAL at 13:34

## 2025-03-10 RX ADMIN — SODIUM CHLORIDE 100 ML/HR: 0.9 INJECTION, SOLUTION INTRAVENOUS at 03:09

## 2025-03-10 RX ADMIN — Medication 10 ML: at 21:14

## 2025-03-10 RX ADMIN — PANTOPRAZOLE SODIUM 40 MG: 40 INJECTION, POWDER, LYOPHILIZED, FOR SOLUTION INTRAVENOUS at 06:08

## 2025-03-10 NOTE — ED NOTES
Patient c/o bilateral upper abdominal pain since this afternoon. Patient states it feels like he has to burp, but is unable to burp enough to get rid of the pain. Patient states he has tried to vomit to reduce the pain, but has been unsuccessful.

## 2025-03-10 NOTE — CONSULTS
"Diabetes Education  Assessment/Teaching    Patient Name:  Ismael Pulido  YOB: 1965  MRN: [id number]  Admit Date:  3/9/2025      Assessment Date:  3/10/2025  Flowsheet Row Most Recent Value   General Information     Referral From: Database. Meet with 58 y/o at bedside on 5S to assess needs/receptivity to DM ed.    Height 172.7 cm (68\")   Height Method Stated   Weight 102 kg (225 lb 6.4 oz)   Weight Method Standing scale   Diabetes History    What type of diabetes do you have? Type 2   Do you test your blood sugar at home? yes   Have you had high blood sugar? (>140mg/dl) yes -recent a1c is 8.4.   Education Preferences    Barriers to Learning -- acuity.   Nutrition Information Pt c/o having constipation, GERD sxs at baseline (before starting Rybelsus.)    Assessment Topics    Taking Medication - Assessment Needs education -pt reports hx of not tolerating metformin and c/o GI sxs/constipation w/use of Rybelsus.   Reducing Risk - Assessment Needs education/Review. goal a1c.   Healthy Coping - Assessment Competent   Monitoring - Assessment Competent   DM Goals    Contact Plan Follow-up medical care with PCP.            Flowsheet Row Most Recent Value   DM Education Needs    Meter Has own   Medication Oral, Other injectables, Side effects -discuss different DM med options w/pt. e.g. Januvia. pt reports he does not want to take an injection/injectable or insulin.    Reducing Risks A1C testing, goal a1c   Healthy Coping Appropriate   Discharge Plan Home, Follow-up with PCP   Motivation Engaged   Teaching Method Discussion, Explanation   Patient Response Verbalized understanding        Other Comments:    Electronically signed by:  Nu Angel RN, BSN, [facility]  03/10/25 12:10 EDT  "

## 2025-03-10 NOTE — ED NOTES
Nursing report ED to floor  Ismael Pulido  59 y.o.  male    HPI :  HPI  Stated Reason for Visit: Pt to ED via PV from home. Pt reports upper abd. pain and burning in his esophagus. Pt reports he is constipated w/ solid BM today and one episode of diarrhea. Hx gerd and diverticulitis. Recently diagnosed with Flu-A.  History Obtained From: patient  Precipitating Event(s): recent illness  Duration (Days): 1  Associated Signs/Symptoms: abdominal pain    Chief Complaint  Chief Complaint   Patient presents with    Constipation    Abdominal Pain       Admitting doctor:   Vannessa Ewing MD    Admitting diagnosis:   There were no encounter diagnoses.    Code status:   Current Code Status       Date Active Code Status Order ID Comments User Context       Prior            Allergies:   Lisinopril    Isolation:   Droplet    Intake and Output  No intake or output data in the 24 hours ending 03/09/25 2358    Weight:       03/09/25 2003   Weight: 101 kg (222 lb)       Most recent vitals:   Vitals:    03/09/25 2006 03/09/25 2056 03/09/25 2126 03/09/25 2238   BP: (!) 132/104 139/100 135/97 132/94   BP Location: Right arm   Right arm   Patient Position: Sitting   Lying   Pulse:  97 97 86   Resp:  18 18 18   Temp:       TempSrc:       SpO2:  95% 91% 93%   Weight:       Height:           Active LDAs/IV Access:   Lines, Drains & Airways       Active LDAs       Name Placement date Placement time Site Days    Peripheral IV 03/09/25 2104 Right;Posterior Forearm 03/09/25 2104  Forearm  less than 1                    Labs (abnormal labs have a star):   Labs Reviewed   COMPREHENSIVE METABOLIC PANEL - Abnormal; Notable for the following components:       Result Value    Glucose 109 (*)     Alkaline Phosphatase 129 (*)     All other components within normal limits    Narrative:     GFR Categories in Chronic Kidney Disease (CKD)      GFR Category          GFR (mL/min/1.73)    Interpretation  G1                     90 or greater          Normal or high (1)  G2                      60-89                Mild decrease (1)  G3a                   45-59                Mild to moderate decrease  G3b                   30-44                Moderate to severe decrease  G4                    15-29                Severe decrease  G5                    14 or less           Kidney failure          (1)In the absence of evidence of kidney disease, neither GFR category G1 or G2 fulfill the criteria for CKD.    eGFR calculation 2021 CKD-EPI creatinine equation, which does not include race as a factor   URINALYSIS W/ MICROSCOPIC IF INDICATED (NO CULTURE) - Abnormal; Notable for the following components:    Specific Gravity, UA >1.030 (*)     Glucose,  mg/dL (2+) (*)     Ketones, UA Trace (*)     Protein, UA Trace (*)     All other components within normal limits    Narrative:     Urine microscopic not indicated.   CBC WITH AUTO DIFFERENTIAL - Abnormal; Notable for the following components:    WBC 13.27 (*)     Platelets 483 (*)     Neutrophil % 78.0 (*)     Lymphocyte % 11.1 (*)     Immature Grans % 0.6 (*)     Neutrophils, Absolute 10.35 (*)     Monocytes, Absolute 1.02 (*)     Immature Grans, Absolute 0.08 (*)     All other components within normal limits   LIPASE - Normal   LACTIC ACID, PLASMA - Normal   RAINBOW DRAW    Narrative:     The following orders were created for panel order [place] Draw.  Procedure                               Abnormality         Status                     ---------                               -----------         ------                     Green Top (Gel)[[id number]]                                  Final result               Lavender Top[[id number]]                                     Final result               Gold Top - SST[[id number]]                                   Final result               Light Blue Top[[id number]]                                   Final result                 Please view results for these tests on the  individual orders.   CBC AND DIFFERENTIAL    Narrative:     The following orders were created for panel order CBC & Differential.  Procedure                               Abnormality         Status                     ---------                               -----------         ------                     CBC Auto Differential[[id number]]        Abnormal            Final result                 Please view results for these tests on the individual orders.   GREEN TOP   LAVENDER TOP   GOLD TOP - SST   LIGHT BLUE TOP       EKG:   No orders to display       Meds given in ED:   Medications   sodium chloride 0.9 % flush 10 mL (has no administration in time range)   lactated ringers bolus 1,000 mL ( Intravenous Currently Infusing 3/9/25 2239)   famotidine (PEPCID) injection 20 mg (20 mg Intravenous Given 3/9/25 2105)   HYDROmorphone (DILAUDID) injection 0.5 mg (0.5 mg Intravenous Given 3/9/25 2105)   ondansetron (ZOFRAN) injection 4 mg (4 mg Intravenous Given 3/9/25 2105)   iopamidol (ISOVUE-300) 61 % injection 100 mL (85 mL Intravenous Given by Other 3/9/25 2216)       Imaging results:  CT Abdomen Pelvis With Contrast  Result Date: 3/9/2025   Small wedge-shaped areas of nonenhancement in the splenic upper pole, involving 10% or less of total splenic volume, 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  No other evidence of acute organic injury, no CT evidence of acute vascular abnormality.  Sigmoid diverticulosis without evidence of diverticulitis.     This report was finalized on 3/9/2025 11:19 PM by Dr. Joesph Dolan M.D on Workstation: [id number]        Ambulatory status:   - assist x 1    Social issues:   Social History     Socioeconomic History    Marital status:    Tobacco Use    Smoking status: Never    Smokeless tobacco: Never   Vaping Use    Vaping status: Never Used   Substance and Sexual Activity    Alcohol use: Never    Drug use: Never    Sexual activity: Yes     Partners: Female       Peripheral  Neurovascular  Peripheral Neurovascular (Adult)  Peripheral Neurovascular WDL: WDL    Neuro Cognitive  Neuro Cognitive (Adult)  Cognitive/Neuro/Behavioral WDL: WDL    Learning  Learning Assessment  Learning Readiness and Ability: no barriers identified    Respiratory  Respiratory WDL  Respiratory WDL: WDL    Abdominal Pain       Pain Assessments  Pain (Adult)  (0-10) Pain Rating: Rest: 7  (0-10) Pain Rating: Activity: 9  Pain Location: abdomen  Pain Side/Orientation: upper  Pain Onset/Duration: x 1 day  Response to Pain Interventions: interventions effective per patient, nonverbal indicators absent/decreased, self-care function improved  Pain Assessment Additional Detail  Pain Onset/Duration: x 1 day    NIH Stroke Scale       Flower Russell RN  03/09/25 23:58 EDT

## 2025-03-10 NOTE — CONSULTS
[facility]  Initial Inpatient Consult Note    Referring Provider: Dr. Olegario PELAYO    Reason for Consultation: Abdominal pain, splenic infarct, abnormal CT scan, recent EGD with esophagitis and gastritis    Subjective     History of present illness:    59 y.o. male who underwent a recent EGD 2 weeks ago at [place], with grade B esophagitis and gastritis found.  Admitted yesterday with upper abdominal pain acute onset.  There was nausea no vomiting.  He said his pain was relieved with belching.  He has had no GI bleeding.  Also endorsing loose stools roughly 6 loose stools in the last 24 hours.  Of note he did receive antibiotics recently for pneumonia    Past Medical History:  Past Medical History:   Diagnosis Date    Allergic 01/01/1988    5+ molds, pollens, grass    Diabetes mellitus     Not sure like 2020    Diverticulitis     Diverticulosis     Mot sure 1995    GERD (gastroesophageal reflux disease)     Hypertension     Kidney stone     Not dure 1995    Obesity     Not sure 1999     Past Surgical History:  Past Surgical History:   Procedure Laterality Date    CARDIAC ELECTROPHYSIOLOGY PROCEDURE N/A 2/21/2025    Procedure: Pacemaker DC new [place] aware;  Surgeon: Dez Loco MD;  Location: [facility] CATH INVASIVE LOCATION;  Service: Cardiovascular;  Laterality: N/A;    ENDOSCOPY N/A 2/26/2025    Procedure: ESOPHAGOGASTRODUODENOSCOPY;  Surgeon: Kiko Talbert MD;  Location: [facility] ENDOSCOPY;  Service: Gastroenterology;  Laterality: N/A;  POST-ESOPHAGITIS, GASTRITIS    HERNIA REPAIR        Social History:   Social History     Tobacco Use    Smoking status: Never    Smokeless tobacco: Never   Substance Use Topics    Alcohol use: Never      Family History:  Family History   Problem Relation Age of Onset    Anxiety disorder Mother     Arthritis Mother     Depression Mother     Diabetes Mother     Hyperlipidemia Mother     Kidney disease Mother         Kidney stones    Cancer Father           of cancer in 2009    Diabetes Father        Home Meds:  Medications Prior to Admission   Medication Sig Dispense Refill Last Dose/Taking    ascorbic acid ([employer] Vitamin C) 1000 MG tablet Take 4 tablets by mouth Daily.   Taking    clotrimazole (LOTRIMIN) 1 % cream Apply 1 Application topically to the appropriate area as directed 2 (Two) Times a Day. 85 g 3 Taking    DM-APAP-CPM (CORICIDIN HBP PO) Take 2 tablets by mouth 2 (Two) Times a Day.   Taking    empagliflozin (Jardiance) 25 MG tablet tablet Take 1 tablet by mouth Daily. 30 tablet 5 Taking    lansoprazole (PREVACID) 30 MG capsule Take 1 capsule by mouth Daily. 30 capsule 5 Taking    loratadine (Claritin) 10 MG tablet Take 1 tablet by mouth Daily.   Taking    losartan (COZAAR) 25 MG tablet TAKE 1 TABLET BY MOUTH DAILY 90 tablet 1 Taking    Misc Natural Products (NEURIVA PO) Take  by mouth Daily.   Taking    NON FORMULARY Take  by mouth Daily. Nugenix Ultimate   Taking    rosuvastatin (CRESTOR) 10 MG tablet Take 1 tablet by mouth Daily. 90 tablet 1 Taking    Semaglutide 3 MG tablet Take 1 tablet by mouth Daily. 30 tablet 0 Taking    NON FORMULARY Take  by mouth Daily. Cellucare        Current Meds:   insulin lispro, 2-7 Units, Subcutaneous, 4x Daily AC & at Bedtime  losartan, 25 mg, Oral, Daily  pantoprazole, 40 mg, Intravenous, Q AM  rosuvastatin, 10 mg, Oral, Daily  sodium chloride, 10 mL, Intravenous, Q12H      Allergies:  Allergies   Allergen Reactions    Lisinopril Nausea And Vomiting     Review of Systems  There is weakness of fatigue all other systems reviewed and negative     Objective     Vital Signs  Temp:  [98.1 °F (36.7 °C)-98.5 °F (36.9 °C)] 98.1 °F (36.7 °C)  Heart Rate:  [] 113  Resp:  [18] 18  BP: (124-139)/() 138/94  Physical Exam:  General Appearance:    Alert, cooperative, in no acute distress   Head:    Normocephalic, without obvious abnormality, atraumatic   Eyes:          Conjunctivae and sclerae normal, no icterus    Throat:   No thrush, oral mucosa moist   Neck:   Supple, no adenopathy   Lungs:     Clear to auscultation bilaterally    Heart:    Regular rhythm and normal rate    Chest Wall:    No abnormalities observed   Abdomen:     Soft, nondistended, nontender; normal bowel sounds   Extremities:   No edema, no redness   Skin:   No bruising or rash   Psychiatric:   Normal mood and insight     Results Review:   I reviewed the patient's new clinical results.    Results from last 7 days   Lab Units 03/10/25  0309 03/09/25 2014 03/04/25  1112   WBC 10*3/mm3 13.74* 13.27* 10.15   HEMOGLOBIN g/dL 13.8 15.4 15.1   HEMATOCRIT % 42.0 46.4 47.4   PLATELETS 10*3/mm3 434 483* 288     Results from last 7 days   Lab Units 03/10/25  0309 03/09/25 2014 03/04/25  1112   SODIUM mmol/L 141 139 136   POTASSIUM mmol/L 3.8 4.1 3.9   CHLORIDE mmol/L 104 103 100   CO2 mmol/L 22.0 24.2 21.5*   BUN mg/dL 9 9 15   CREATININE mg/dL 0.68* 0.89 1.00   CALCIUM mg/dL 9.4 9.9 9.2   BILIRUBIN mg/dL 0.4 0.4  --    ALK PHOS U/L 105 129*  --    ALT (SGPT) U/L 30 33  --    AST (SGOT) U/L 28 27  --    GLUCOSE mg/dL 89 109* 107*     Results from last 7 days   Lab Units 03/10/25  0853   INR  1.21*     Lab Results   Lab Value Date/Time    LIPASE 35 03/09/2025 2014    LIPASE 26 02/25/2025 1245    LIPASE 22 08/31/2018 1610       Radiology:  CT Abdomen Pelvis With Contrast   Final Result       Small wedge-shaped areas of nonenhancement in the splenic upper pole,   involving 10% or less of total splenic volume, probable acute segmental   splenic infarct.       No other evidence of acute organic injury, no CT evidence of acute   vascular abnormality.       Sigmoid diverticulosis without evidence of diverticulitis.                   This report was finalized on 3/9/2025 11:19 PM by Dr. Joesph Dolan M.D on Workstation: [id number]          CT Angiogram Chest    (Results Pending)   CT Angiogram Abdomen Pelvis    (Results Pending)       Assessment & Plan   Active  Hospital Problems    Diagnosis     **Splenic infarct     GERD with esophagitis     Status post placement of cardiac pacemaker     Mobitz type 2 second degree atrioventricular block     Mixed hyperlipidemia     Obstructive sleep apnea syndrome     Primary hypertension     Type 2 diabetes mellitus without complication, without long-term current use of insulin        Assessment:  Abdominal pain  Excess belching with a history of GERD esophagitis  Splenic infarct on CAT scan  Mild gastric distention on CT  Diarrhea    Plan:  Continue IV PPI as ordered and esophagitis found on recent EGD, can switch to Protonix 40 mg p.o. twice daily when he is taking oral intake  Check C. difficile and GI PCR as ordered by primary team  Splenic infarct per vascular surgery and primary team  There is some mild gastric distention on CT that does not need to be worked up any further as EGD just 2 weeks ago showed some mild gastritis and esophagitis.  EGD does not need to be repeated      I discussed the patient's findings and my recommendations with patient and nursing staff.    Erasmo Rodriguez MD

## 2025-03-10 NOTE — SIGNIFICANT NOTE
03/10/25 2223   OTHER   Discipline physical therapist   Therapy Assessment/Plan (PT)   Criteria for Skilled Interventions Met (PT) no problems identified which require skilled intervention     58 yo admitted with splenic infarct. Per adult pt profile, pt with no mobility issues PTA. Per adult PCS, pt is up indep and with ampac score of 24. No indication for acute PT. Continue to facilitate HLM per [facility] (250' or more)

## 2025-03-10 NOTE — H&P
Patient Name:  Ismael Pulido  YOB: 1965  MRN:  [id number]  Admit Date:  3/9/2025  Patient Care Team:  Sharon Silva MD as PCP - General (Internal Medicine & Pediatrics)      Subjective   History Present Illness     Chief Complaint   Patient presents with    Constipation    Abdominal Pain           History of Present Illness  Patient is a 59-year-old male with a history of HTN, type II DM, Mobitz type II second-degree AV block status post pacemaker, HLD, GERD, SAROJ, presented to the ED complaining of  upper abdominal pain.  Complains of nausea but no vomiting, reports he was feeling like he has to burp and tried to vomit thinking that it will improve symptoms but was unable to.  Feels full, pain is constant, belching seems to be helping.  Reports had 1 episode of diarrhea prior to admission, and 5-6 loose BMs since admission.  Nonbloody.  Denies any recent ill contacts, recently hospitalized at [facility] with similar symptoms after presented with epigastric and chest pain.  Underwent EGD 02/26/2025 which showed esophagitis .  Also was diagnosed with flu and likely superimposed bacterial pneumonia, was treated with Tamiflu and Zosyn.      Review of Systems     GENERAL: No fevers, chills, sweats.    RESPIRATORY: No cough, shortness of breath, hemoptysis or wheezing.   CVS: No chest pain, palpitations, orthopnea, dyspnea on exertion   GI: No melena or hematochezia. + abdominal pain. No nausea, vomiting, constipation, +diarrhea      Personal History     Past Medical History:   Diagnosis Date    Allergic 01/01/1988    5+ molds, pollens, grass    Diabetes mellitus     Not sure like 2020    Diverticulitis     Diverticulosis     Mot sure 1995    GERD (gastroesophageal reflux disease)     Hypertension     Kidney stone     Not dure 1995    Obesity     Not sure 1999     Past Surgical History:   Procedure Laterality Date    CARDIAC ELECTROPHYSIOLOGY PROCEDURE N/A 2/21/2025     Procedure: Pacemaker DC new [place] aware;  Surgeon: Dez Loco MD;  Location: [facility] CATH INVASIVE LOCATION;  Service: Cardiovascular;  Laterality: N/A;    ENDOSCOPY N/A 2025    Procedure: ESOPHAGOGASTRODUODENOSCOPY;  Surgeon: Kiko Talbert MD;  Location: [facility] ENDOSCOPY;  Service: Gastroenterology;  Laterality: N/A;  POST-ESOPHAGITIS, GASTRITIS    HERNIA REPAIR       Family History   Problem Relation Age of Onset    Anxiety disorder Mother     Arthritis Mother     Depression Mother     Diabetes Mother     Hyperlipidemia Mother     Kidney disease Mother         Kidney stones    Cancer Father          of cancer in 2009    Diabetes Father      Social History     Tobacco Use    Smoking status: Never    Smokeless tobacco: Never   Vaping Use    Vaping status: Never Used   Substance Use Topics    Alcohol use: Never    Drug use: Never     No current facility-administered medications on file prior to encounter.     Current Outpatient Medications on File Prior to Encounter   Medication Sig Dispense Refill    ascorbic acid ([employer] Vitamin C) 1000 MG tablet Take 4 tablets by mouth Daily.      clotrimazole (LOTRIMIN) 1 % cream Apply 1 Application topically to the appropriate area as directed 2 (Two) Times a Day. 85 g 3    DM-APAP-CPM (CORICIDIN HBP PO) Take 2 tablets by mouth 2 (Two) Times a Day.      empagliflozin (Jardiance) 25 MG tablet tablet Take 1 tablet by mouth Daily. 30 tablet 5    lansoprazole (PREVACID) 30 MG capsule Take 1 capsule by mouth Daily. 30 capsule 5    loratadine (Claritin) 10 MG tablet Take 1 tablet by mouth Daily.      losartan (COZAAR) 25 MG tablet TAKE 1 TABLET BY MOUTH DAILY 90 tablet 1    Misc Natural Products (NEURIVA PO) Take  by mouth Daily.      NON FORMULARY Take  by mouth Daily. Nugenix Ultimate      rosuvastatin (CRESTOR) 10 MG tablet Take 1 tablet by mouth Daily. 90 tablet 1    Semaglutide 3 MG tablet Take 1 tablet by mouth Daily. 30 tablet 0    NON FORMULARY  Take  by mouth Daily. Cellucare      [DISCONTINUED] pseudoephedrine (SUDAFED) 120 MG 12 hr tablet Take 1 tablet by mouth Every Morning. (Patient not taking: Reported on 3/6/2025)       Allergies   Allergen Reactions    Lisinopril Nausea And Vomiting       Objective    Objective     Vital Signs  Temp:  [98.2 °F (36.8 °C)-98.5 °F (36.9 °C)] 98.2 °F (36.8 °C)  Heart Rate:  [] 120  Resp:  [18] 18  BP: (124-139)/() 137/105  SpO2:  [91 %-97 %] 93 %  on  Flow (L/min) (Oxygen Therapy):  [2] 2;   Device (Oxygen Therapy): nasal cannula  Body mass index is 34.27 kg/m².    Physical Exam  General: Alert and oriented x3, no acute distress.  HEENT: Normocephalic, atraumatic  Eyes: PERRL, EOMI, anicteric sclerae  Lungs: Clear to auscultation bilaterally, no crackles or wheezes  CV: Regular rate and rhythm, no murmurs rubs or gallops  Abdomen: Soft, nondistended, TTP in left upper/epigastric region  Extremities: No significant peripheral edema  Skin: Clean/dry/intact, no rashes  Neuro: Cranial nerves II through XII intact, no gross focal neurological deficits appreciated  Psych: Appropriate mood and affect    Results Review:  I reviewed the patient's new clinical results.  I reviewed the patient's new imaging results and agree with the interpretation.  I reviewed the patient's other test results and agree with the interpretation  I personally viewed and interpreted the patient's EKG/Telemetry data  Discussed with ED provider.    Lab Results (last 24 hours)       Procedure Component Value Units Date/Time    CBC & Differential [[id number]]  (Abnormal) Collected: 03/09/25 2014    Specimen: Blood from Arm, Right Updated: 03/09/25 2025    Narrative:      The following orders were created for panel order CBC & Differential.  Procedure                               Abnormality         Status                     ---------                               -----------         ------                     CBC Auto Differential[[id number]]         Abnormal            Final result                 Please view results for these tests on the individual orders.    Comprehensive Metabolic Panel [[id number]]  (Abnormal) Collected: 03/09/25 2014    Specimen: Blood from Arm, Right Updated: 03/09/25 2046     Glucose 109 mg/dL      BUN 9 mg/dL      Creatinine 0.89 mg/dL      Sodium 139 mmol/L      Potassium 4.1 mmol/L      Chloride 103 mmol/L      CO2 24.2 mmol/L      Calcium 9.9 mg/dL      Total Protein 8.4 g/dL      Albumin 4.1 g/dL      ALT (SGPT) 33 U/L      AST (SGOT) 27 U/L      Alkaline Phosphatase 129 U/L      Total Bilirubin 0.4 mg/dL      Globulin 4.3 gm/dL      A/G Ratio 1.0 g/dL      BUN/Creatinine Ratio 10.1     Anion Gap 11.8 mmol/L      eGFR 98.7 mL/min/1.73     Narrative:      GFR Categories in Chronic Kidney Disease (CKD)      GFR Category          GFR (mL/min/1.73)    Interpretation  G1                     90 or greater         Normal or high (1)  G2                      60-89                Mild decrease (1)  G3a                   45-59                Mild to moderate decrease  G3b                   30-44                Moderate to severe decrease  G4                    15-29                Severe decrease  G5                    14 or less           Kidney failure          (1)In the absence of evidence of kidney disease, neither GFR category G1 or G2 fulfill the criteria for CKD.    eGFR calculation 2021 CKD-EPI creatinine equation, which does not include race as a factor    Lipase [[id number]]  (Normal) Collected: 03/09/25 2014    Specimen: Blood from Arm, Right Updated: 03/09/25 2046     Lipase 35 U/L     Lactic Acid, Plasma [[id number]]  (Normal) Collected: 03/09/25 2014    Specimen: Blood from Arm, Right Updated: 03/09/25 2042     Lactate 2.0 mmol/L     CBC Auto Differential [[id number]]  (Abnormal) Collected: 03/09/25 2014    Specimen: Blood from Arm, Right Updated: 03/09/25 2025     WBC 13.27 10*3/mm3      RBC 5.21 10*6/mm3      Hemoglobin  15.4 g/dL      Hematocrit 46.4 %      MCV 89.1 fL      MCH 29.6 pg      MCHC 33.2 g/dL      RDW 12.9 %      RDW-SD 42.2 fl      MPV 8.0 fL      Platelets 483 10*3/mm3      Neutrophil % 78.0 %      Lymphocyte % 11.1 %      Monocyte % 7.7 %      Eosinophil % 2.1 %      Basophil % 0.5 %      Immature Grans % 0.6 %      Neutrophils, Absolute 10.35 10*3/mm3      Lymphocytes, Absolute 1.47 10*3/mm3      Monocytes, Absolute 1.02 10*3/mm3      Eosinophils, Absolute 0.28 10*3/mm3      Basophils, Absolute 0.07 10*3/mm3      Immature Grans, Absolute 0.08 10*3/mm3      nRBC 0.0 /100 WBC     Urinalysis With Microscopic If Indicated (No Culture) - Urine, Clean Catch [[id number]]  (Abnormal) Collected: 03/09/25 2028    Specimen: Urine, Clean Catch Updated: 03/09/25 2039     Color, UA Yellow     Appearance, UA Clear     pH, UA <=5.0     Specific Gravity, UA >1.030     Glucose,  mg/dL (2+)     Ketones, UA Trace     Bilirubin, UA Negative     Blood, UA Negative     Protein, UA Trace     Leuk Esterase, UA Negative     Nitrite, UA Negative     Urobilinogen, UA 0.2 E.U./dL    Narrative:      Urine microscopic not indicated.    CBC (No Diff) [[id number]]  (Abnormal) Collected: 03/10/25 0309    Specimen: Blood Updated: 03/10/25 0518     WBC 13.74 10*3/mm3      RBC 4.69 10*6/mm3      Hemoglobin 13.8 g/dL      Hematocrit 42.0 %      MCV 89.6 fL      MCH 29.4 pg      MCHC 32.9 g/dL      RDW 12.8 %      RDW-SD 42.1 fl      MPV 8.7 fL      Platelets 434 10*3/mm3     Comprehensive Metabolic Panel [[id number]]  (Abnormal) Collected: 03/10/25 0309    Specimen: Blood Updated: 03/10/25 0549     Glucose 89 mg/dL      BUN 9 mg/dL      Creatinine 0.68 mg/dL      Sodium 141 mmol/L      Potassium 3.8 mmol/L      Chloride 104 mmol/L      CO2 22.0 mmol/L      Calcium 9.4 mg/dL      Total Protein 7.1 g/dL      Albumin 3.4 g/dL      ALT (SGPT) 30 U/L      AST (SGOT) 28 U/L      Alkaline Phosphatase 105 U/L      Total Bilirubin 0.4 mg/dL      Globulin 3.7  "gm/dL      A/G Ratio 0.9 g/dL      BUN/Creatinine Ratio 13.2     Anion Gap 15.0 mmol/L      eGFR 107.1 mL/min/1.73     Narrative:      GFR Categories in Chronic Kidney Disease (CKD)      GFR Category          GFR (mL/min/1.73)    Interpretation  G1                     90 or greater         Normal or high (1)  G2                      60-89                Mild decrease (1)  G3a                   45-59                Mild to moderate decrease  G3b                   30-44                Moderate to severe decrease  G4                    15-29                Severe decrease  G5                    14 or less           Kidney failure          (1)In the absence of evidence of kidney disease, neither GFR category G1 or G2 fulfill the criteria for CKD.    eGFR calculation 2021 CKD-EPI creatinine equation, which does not include race as a factor    Magnesium [[id number]]  (Normal) Collected: 03/10/25 0309    Specimen: Blood Updated: 03/10/25 0700     Magnesium 2.0 mg/dL     Phosphorus [[id number]]  (Normal) Collected: 03/10/25 0309    Specimen: Blood Updated: 03/10/25 0700     Phosphorus 3.9 mg/dL     Procalcitonin [[id number]]  (Normal) Collected: 03/10/25 0309    Specimen: Blood Updated: 03/10/25 0708     Procalcitonin 0.04 ng/mL     Narrative:      As a Marker for Sepsis (Non-Neonates):    1. <0.5 ng/mL represents a low risk of severe sepsis and/or septic shock.  2. >2 ng/mL represents a high risk of severe sepsis and/or septic shock.    As a Marker for Lower Respiratory Tract Infections that require antibiotic therapy:    PCT on Admission    Antibiotic Therapy       6-12 Hrs later    >0.5                Strongly Recommended  >0.25 - <0.5        Recommended   0.1 - 0.25          Discouraged              Remeasure/reassess PCT  <0.1                Strongly Discouraged     Remeasure/reassess PCT    As 28 day mortality risk marker: \"Change in Procalcitonin Result\" (>80% or <=80%) if Day 0 (or Day 1) and Day 4 values are " available. Refer to http://www.[facility]-pct-calculator.com    Change in PCT <=80%  A decrease of PCT levels below or equal to 80% defines a positive change in PCT test result representing a higher risk for 28-day all-cause mortality of patients diagnosed with severe sepsis for septic shock.    Change in PCT >80%  A decrease of PCT levels of more than 80% defines a negative change in PCT result representing a lower risk for 28-day all-cause mortality of patients diagnosed with severe sepsis or septic shock.       POC Glucose Once [[id number]]  (Normal) Collected: 03/10/25 0612    Specimen: Blood Updated: 03/10/25 0613     Glucose 99 mg/dL     Protime-INR [[id number]]  (Abnormal) Collected: 03/10/25 0853    Specimen: Blood from Arm, Right Updated: 03/10/25 0924     Protime 15.3 Seconds      INR 1.21    aPTT [[id number]]  (Normal) Collected: 03/10/25 0853    Specimen: Blood from Arm, Right Updated: 03/10/25 0924     PTT 28.1 seconds             Imaging Results (Last 24 Hours)       Procedure Component Value Units Date/Time    CT Abdomen Pelvis With Contrast [[id number]] Collected: 03/09/25 2309     Updated: 03/09/25 2322    Narrative:      CT  ABDOMEN & PELVIS WITH IV CONTRAST     INDICATION:   Abdominal pain. Nausea.     TECHNIQUE:  CT of the abdomen and pelvis with  IV contrast. Coronal and sagittal  reconstructions were obtained.  Radiation dose reduction techniques were  utilized, including automated exposure control, and exposure modulation  based on body size.     COMPARISON:   CT abdomen/pelvis 2/25/2025     FINDINGS:     Lung bases: Small to moderate left pleural effusion and left basilar  atelectasis versus less likely infiltrate.     Abdomen: The liver and gallbladder are normal. The pancreas is  unremarkable. Both adrenal glands are normal.  Both kidneys are normal.  There is mild gastric distention, but there is no evidence of gastric  wall thickening, or other evidence to suggest obstruction.     There is a  small wedge-shaped area of nonenhancement in the splenic  upper pole, suggesting segmental acute splenic infarct. 10% or less of  total splenic volume is involved. Liver and kidneys and pancreas all  appear to enhance normally. The aorta is normal in caliber, and major  branch vessels appear grossly patent.     Pelvis:  The appendix is clearly identified, and is normal.  There is no  pelvic inflammatory change or other abnormal fluid collection.   There  is sigmoid diverticulosis, but there is no CT evidence of  diverticulitis.  There is no pelvic adenopathy or other soft tissue  mass.  There is no CT evidence of hernia or bowel obstruction.     There is no acute bony abnormality.       Impression:         Small wedge-shaped areas of nonenhancement in the splenic upper pole,  involving 10% or less of total splenic volume, probable acute segmental  splenic infarct.     No other evidence of acute organic injury, no CT evidence of acute  vascular abnormality.     Sigmoid diverticulosis without evidence of diverticulitis.              This report was finalized on 3/9/2025 11:19 PM by Dr. Joesph Dolan M.D on Workstation: [id number]               Results for orders placed during the hospital encounter of 02/25/25    Adult Transthoracic Echo Limited W/ Cont if Necessary Per Protocol    Interpretation Summary    Left ventricular ejection fraction appears to be 51 - 55%.    The right ventricular cavity is moderately dilated.    Technically very limited study.  Valvular structure and function was not evaluated.      No orders to display        Assessment/Plan     Active Hospital Problems    Diagnosis  POA    **Splenic infarct [D73.5]  Yes    GERD with esophagitis [K21.00]  Yes    Status post placement of cardiac pacemaker [Z95.0]  Yes    Mobitz type 2 second degree atrioventricular block [I44.1]  Yes    Mixed hyperlipidemia [E78.2]  Yes    Obstructive sleep apnea syndrome [G47.33]  Yes    Primary hypertension [I10]   Yes    Type 2 diabetes mellitus without complication, without long-term current use of insulin [E11.9]  Yes      Resolved Hospital Problems   No resolved problems to display.       Patient is a 59-year-old male with a history of HTN, type II DM, Mobitz type II second-degree AV block status post pacemaker, HLD, GERD, SAROJ, presented to the ED complaining of  upper abdominal pain.    Splenic infarct  Abdominal pain  -CT scan of the abdomen on admission showed small wedge-shaped areas of nonenhancement in the splenic upper pole, involving 10% or less of total splenic volume, 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  -Vascular surgery consulted, initiated on heparin drip.  CT angiogram of the chest abdomen and pelvis pending, cardiology consulted.    Diarrhea  -Patient with recent recurrent hospitalization and treated with antibiotics for pneumonia.  C. difficile infection  is on differential, ordered C. difficile gastrointestinal panel.  -IV fluids    GERD with esophagitis  -IV PPI    HTN  -Resume outpatient losartan    HLD  -Statin    SAROJ  -Okay to use home CPAP, nightly oxygen as needed    Mobitz type II second-degree AV block s/p pacemaker      I discussed the patient's findings and my recommendations with patient.    VTE Prophylaxis - SCDs.  Code Status - Full code.       Whitney Buitrago MD  [facility]  03/10/25  09:40 EDT'

## 2025-03-10 NOTE — PROGRESS NOTES
[facility]   02/18/2025    Patient Name: Ismael Pulido   YOB: 1965   Age/Sex: 59 y.o. male   Medical Record Number: [id number]   Primary Care Physician: Sharon Silva MD     Subjective     Chief Complaint     Chief Complaint   Patient presents with    Fall     Was at work and got dizzy and fell yesterday. Hit head and having neck pain.        History of Present Illness     History of Present Illness  The patient presents for evaluation of dizziness, hypertension, diabetes mellitus, and concussion.    Dizziness  - Experienced an episode of dizziness yesterday at approximately 1530 hours while standing, likened to the sensation of muscle relaxation.  - Accompanied by a loss of balance, prompting him to lean on a nearby table for support.  - Dizziness subsided within 10 to 15 seconds, but recurred after a brief period, leading to a fall.  - Found himself on the floor, disoriented and unable to recall the process of standing up.  - A witness reported seeing him kneeling on the floor for a short duration.  - Despite feeling unwell, managed to walk without experiencing further dizziness.  - Received two notifications indicating a heart rate below 40.  - Reported experiencing shortness of breath about 1.5 weeks ago, which has since resolved.  - Still experiences facial flushing when bending over and a sensation of blood rushing to his head when lying flat.    Concussion  - Discovered a head wound, which was bleeding profusely, necessitating immediate medical attention.  - Eyes were examined with a flashlight, revealing normal pupillary response.  - Wound was cleaned and dressed with gauze.    Hypertension  - Blood pressure was checked last night.  - Has been on metoprolol for an extended period for blood pressure management.  - Has a home blood pressure monitor and checks his blood pressure daily.    Diabetes Mellitus  - Blood glucose level was recorded as 120,  "approximately 2 hours post-incident.  - Had not consumed any food or beverages prior to the incident.          Review of Systems   A medically appropriate and patient-specific review of systems was performed. Pertinent findings are mentioned in the HPI, with no additional significant findings beyond those already noted.      Patient History   The following portions of the patient's history were reviewed and updated as appropriate:   allergies, current medications, problem list, PMHx, PSHx, PFHx, past social history    Objective     Vitals:    02/18/25 1309   BP: 120/70   Pulse: (!) 39   Resp: 18   SpO2: 96%   Weight: 108 kg (237 lb 6 oz)   Height: 172.7 cm (68\")        Physical Exam   Constitutional: Alert, well-appearing, no acute distress, no generalized pallor  HENT: NCAT, mucous membranes moist, bilateral TM normal, oropharynx normal  Neck: Supple, no thyromegaly  Respiratory: No respiratory distress, good effort and air entry, clear to auscultation bilaterally   Cardiovascular: bradycardic but regular rhythm, no LE edema  Neuro: No gross focal deficits, motor intact, normal gait  Psychiatric: Appropriate mood and affect, cooperative  Skin: No apparent rashes or lesions. Area on posterior scalp from fall is healing appropriately.         ECG 12 Lead    Date/Time: 2/18/2025 1:37 PM  Performed by: Sharon Silva MD    Authorized by: Sharon Silva MD  Comparison: not compared with previous ECG   Rhythm: sinus bradycardia  Rate: bradycardic  Conduction: 1st degree AV block  ST Segments: ST segments normal  T Waves: T waves normal    Clinical impression: abnormal EKG           Assessment & Plan        Bradycardia  Potentially related to beta blocker. EKG shows sinus bradycardia with prolonged NJ interval and 1st degree AV block.   PLAN:  - Currently asymptomatic. Discussed reasons to seek emergent care at ER.   - Stop beta blocker and closely monitor heart rate, if HR persistently low despite " discontinuation of beta blocker therapy, will refer to cardiology for potential PPM placement  - ECHO will be ordered for further evaluation of structural abnormalities contributing to bradycardia  Orders:    ECG 12 Lead    Syncope, unspecified syncope type  Likely 2/2 bradycardia, see above.   He did experience a head injury during syncopal event. Area appears to be healing appropriately. He was informed that headaches might persist for a week, which is not a cause for concern.  PLAN:  He is advised to monitor for any symptoms such as confusion, slurring, or other neurological signs and report them immediately.  Orders:    ECG 12 Lead    Primary hypertension  Chronic, controlled but having side effect from medication  He will discontinue metoprolol and start losartan 25 mg. He is advised to monitor his blood pressure and report any readings consistently above 130/80. If his blood pressure drops below 100, he should inform the office immediately.  Type 2 diabetes mellitus without complication, without long-term current use of insulin  Chronic, uncontrolled  Still advised to start Rybelsus 3 mg daily for improved diabetes control. Resent prescription given pharmacy has not been able to fill it.   Orders:    Semaglutide 3 MG tablet; Take 1 tablet by mouth Daily.         Follow Up   Follow up in May as scheduled for management of chronic conditions (DM, HTN) or sooner if concerns      This medical documentation was produced in part using speech recognition software (Dragon Dictation System) and may contain errors related to that system including errors in grammar, punctuation, and spelling, as well as words and phrases that may be inappropriate and not intentional --- If there are any questions or concerns please feel free to contact me, the dictating provider, for clarification.      Patient or patient representative verbalized consent for the use of Ambient Listening during the visit with  Sharon Silva MD for  chart documentation.    Sharon Silva MD

## 2025-03-10 NOTE — ASSESSMENT & PLAN NOTE
Chronic, uncontrolled  Still advised to start Rybelsus 3 mg daily for improved diabetes control. Resent prescription given pharmacy has not been able to fill it.   Orders:    Semaglutide 3 MG tablet; Take 1 tablet by mouth Daily.

## 2025-03-10 NOTE — ED PROVIDER NOTES
" EMERGENCY DEPARTMENT ENCOUNTER    Room Number:  24/24  PCP: Sharon Silva MD  Historian: Patient, spouse    I initially evaluated the patient at 2020    HPI:  Chief Complaint: Upper abdominal pain  A complete HPI/ROS/PMH/PSH/SH/FH are unobtainable due to: Nothing  Context: Ismael Pulido is a 59 y.o. male with a medical history of esophagitis, gastritis, GERD, hypertension, diverticulitis, diabetes who presents to the ED c/o acute upper abdominal pain.  Patient began having some nausea this morning.  After eating lunch, he developed generalized upper abdominal pain.  Pain has been constant but waxing and waning.  Pain improves when he belches.  Pain is described as fullness.  He states \"it feels like I swallowed a football\".  His abdomen feels distended.  He had a normal bowel movement earlier today.  He then had 1 episode of diarrhea before coming to the ED.  He was recently hospitalized for similar symptoms.  EGD showed esophagitis and gastritis.  He denies fever, chills, chest pain, shortness of breath, flank pain, dysuria, or hematuria.  He has had 2 hernia repairs.  He was diagnosed with the flu about 1 week ago.            PAST MEDICAL HISTORY  Active Ambulatory Problems     Diagnosis Date Noted    Primary hypertension 02/09/2024    Asthma due to environmental allergies 02/09/2024    Gastroesophageal reflux disease without esophagitis 02/09/2024    Obstructive sleep apnea syndrome 02/09/2024    Type 2 diabetes mellitus without complication, without long-term current use of insulin 02/09/2024    Mixed hyperlipidemia 02/09/2024    Diverticulitis 03/18/2024    Other hemorrhoids 03/18/2024    Symptomatic bradycardia 02/18/2025    Mobitz type 2 second degree atrioventricular block 02/18/2025    Syncope and collapse 02/18/2025    Intermittent complete heart block 02/21/2025    Abdominal pain 02/25/2025    Status post placement of cardiac pacemaker 03/06/2025     Resolved Ambulatory Problems     " Diagnosis Date Noted    Pacemaker lead malfunction 2025     Past Medical History:   Diagnosis Date    Allergic 1988    Diabetes mellitus     Diverticulosis     GERD (gastroesophageal reflux disease)     Hypertension     Kidney stone     Obesity          PAST SURGICAL HISTORY  Past Surgical History:   Procedure Laterality Date    CARDIAC ELECTROPHYSIOLOGY PROCEDURE N/A 2025    Procedure: Pacemaker DC new boston aware;  Surgeon: Dez Loco MD;  Location: [facility] CATH INVASIVE LOCATION;  Service: Cardiovascular;  Laterality: N/A;    ENDOSCOPY N/A 2025    Procedure: ESOPHAGOGASTRODUODENOSCOPY;  Surgeon: Kiko Talbert MD;  Location: [facility] ENDOSCOPY;  Service: Gastroenterology;  Laterality: N/A;  POST-ESOPHAGITIS, GASTRITIS    HERNIA REPAIR           FAMILY HISTORY  Family History   Problem Relation Age of Onset    Anxiety disorder Mother     Arthritis Mother     Depression Mother     Diabetes Mother     Hyperlipidemia Mother     Kidney disease Mother         Kidney stones    Cancer Father          of cancer in 2009    Diabetes Father          SOCIAL HISTORY  Social History     Socioeconomic History    Marital status:    Tobacco Use    Smoking status: Never    Smokeless tobacco: Never   Vaping Use    Vaping status: Never Used   Substance and Sexual Activity    Alcohol use: Never    Drug use: Never    Sexual activity: Yes     Partners: Female         ALLERGIES  Lisinopril    REVIEW OF SYSTEMS  Review of Systems  Included in HPI  All systems reviewed and negative except for those discussed in HPI.      PHYSICAL EXAM  ED Triage Vitals   Temp Heart Rate Resp BP SpO2   25   98.2 °F (36.8 °C) 113 18 (!) 132/104 95 %      Temp src Heart Rate Source Patient Position BP Location FiO2 (%)   25 -- 25 --   Tympanic  Sitting Right arm        Physical Exam      GENERAL: Awake,  alert, oriented x 3.  Nontoxic-appearing male.  He appears uncomfortable   HENT: NCAT, nares patent, moist mucous membranes  EYES: no scleral icterus  CV: regular rhythm, normal rate  RESPIRATORY: normal effort, clear to auscultation bilaterally  ABDOMEN: soft, obese, there is epigastric tenderness without rebound or guarding, no CVA tenderness  MUSCULOSKELETAL: Extremities are nontender with full range of motion  NEURO: Speech is normal.  No facial droop.  PSYCH:  calm, cooperative  SKIN: warm, dry    Vital signs and nursing notes reviewed.          LAB RESULTS  Recent Results (from the past 24 hours)   Comprehensive Metabolic Panel    Collection Time: 03/09/25  8:14 PM    Specimen: Arm, Right; Blood   Result Value Ref Range    Glucose 109 (H) 65 - 99 mg/dL    BUN 9 6 - 20 mg/dL    Creatinine 0.89 0.76 - 1.27 mg/dL    Sodium 139 136 - 145 mmol/L    Potassium 4.1 3.5 - 5.2 mmol/L    Chloride 103 98 - 107 mmol/L    CO2 24.2 22.0 - 29.0 mmol/L    Calcium 9.9 8.6 - 10.5 mg/dL    Total Protein 8.4 6.0 - 8.5 g/dL    Albumin 4.1 3.5 - 5.2 g/dL    ALT (SGPT) 33 1 - 41 U/L    AST (SGOT) 27 1 - 40 U/L    Alkaline Phosphatase 129 (H) 39 - 117 U/L    Total Bilirubin 0.4 0.0 - 1.2 mg/dL    Globulin 4.3 gm/dL    A/G Ratio 1.0 g/dL    BUN/Creatinine Ratio 10.1 7.0 - 25.0    Anion Gap 11.8 5.0 - 15.0 mmol/L    eGFR 98.7 >60.0 mL/min/1.73   Lipase    Collection Time: 03/09/25  8:14 PM    Specimen: Arm, Right; Blood   Result Value Ref Range    Lipase 35 13 - 60 U/L   Lactic Acid, Plasma    Collection Time: 03/09/25  8:14 PM    Specimen: Arm, Right; Blood   Result Value Ref Range    Lactate 2.0 0.5 - 2.0 mmol/L   Green Top (Gel)    Collection Time: 03/09/25  8:14 PM   Result Value Ref Range    Extra Tube Hold for add-ons.    Lavender Top    Collection Time: 03/09/25  8:14 PM   Result Value Ref Range    Extra Tube hold for add-on    Gold Top - SST    Collection Time: 03/09/25  8:14 PM   Result Value Ref Range    Extra Tube Hold for  add-ons.    Light Blue Top    Collection Time: 03/09/25  8:14 PM   Result Value Ref Range    Extra Tube Hold for add-ons.    CBC Auto Differential    Collection Time: 03/09/25  8:14 PM    Specimen: Arm, Right; Blood   Result Value Ref Range    WBC 13.27 (H) 3.40 - 10.80 10*3/mm3    RBC 5.21 4.14 - 5.80 10*6/mm3    Hemoglobin 15.4 13.0 - 17.7 g/dL    Hematocrit 46.4 37.5 - 51.0 %    MCV 89.1 79.0 - 97.0 fL    MCH 29.6 26.6 - 33.0 pg    MCHC 33.2 31.5 - 35.7 g/dL    RDW 12.9 12.3 - 15.4 %    RDW-SD 42.2 37.0 - 54.0 fl    MPV 8.0 6.0 - 12.0 fL    Platelets 483 (H) 140 - 450 10*3/mm3    Neutrophil % 78.0 (H) 42.7 - 76.0 %    Lymphocyte % 11.1 (L) 19.6 - 45.3 %    Monocyte % 7.7 5.0 - 12.0 %    Eosinophil % 2.1 0.3 - 6.2 %    Basophil % 0.5 0.0 - 1.5 %    Immature Grans % 0.6 (H) 0.0 - 0.5 %    Neutrophils, Absolute 10.35 (H) 1.70 - 7.00 10*3/mm3    Lymphocytes, Absolute 1.47 0.70 - 3.10 10*3/mm3    Monocytes, Absolute 1.02 (H) 0.10 - 0.90 10*3/mm3    Eosinophils, Absolute 0.28 0.00 - 0.40 10*3/mm3    Basophils, Absolute 0.07 0.00 - 0.20 10*3/mm3    Immature Grans, Absolute 0.08 (H) 0.00 - 0.05 10*3/mm3    nRBC 0.0 0.0 - 0.2 /100 WBC   Urinalysis With Microscopic If Indicated (No Culture) - Urine, Clean Catch    Collection Time: 03/09/25  8:28 PM    Specimen: Urine, Clean Catch   Result Value Ref Range    Color, UA Yellow Yellow, Straw    Appearance, UA Clear Clear    pH, UA <=5.0 5.0 - 8.0    Specific Gravity, UA >1.030 (H) 1.005 - 1.030    Glucose,  mg/dL (2+) (A) Negative    Ketones, UA Trace (A) Negative    Bilirubin, UA Negative Negative    Blood, UA Negative Negative    Protein, UA Trace (A) Negative    Leuk Esterase, UA Negative Negative    Nitrite, UA Negative Negative    Urobilinogen, UA 0.2 E.U./dL 0.2 - 1.0 E.U./dL       Ordered the above labs and reviewed the results.        RADIOLOGY  CT Abdomen Pelvis With Contrast  Result Date: 3/9/2025  CT  ABDOMEN & PELVIS WITH IV CONTRAST  INDICATION: Abdominal  pain. Nausea.  TECHNIQUE: CT of the abdomen and pelvis with  IV contrast. Coronal and sagittal reconstructions were obtained.  Radiation dose reduction techniques were utilized, including automated exposure control, and exposure modulation based on body size.  COMPARISON: CT abdomen/pelvis 2/25/2025  FINDINGS:  Lung bases: Small to moderate left pleural effusion and left basilar atelectasis versus less likely infiltrate.  Abdomen: The liver and gallbladder are normal. The pancreas is unremarkable. Both adrenal glands are normal.  Both kidneys are normal. There is mild gastric distention, but there is no evidence of gastric wall thickening, or other evidence to suggest obstruction.  There is a small wedge-shaped area of nonenhancement in the splenic upper pole, suggesting segmental acute splenic infarct. 10% or less of total splenic volume is involved. Liver and kidneys and pancreas all appear to enhance normally. The aorta is normal in caliber, and major branch vessels appear grossly patent.  Pelvis:  The appendix is clearly identified, and is normal.  There is no pelvic inflammatory change or other abnormal fluid collection.   There is sigmoid diverticulosis, but there is no CT evidence of diverticulitis.  There is no pelvic adenopathy or other soft tissue mass.  There is no CT evidence of hernia or bowel obstruction.  There is no acute bony abnormality.       Small wedge-shaped areas of nonenhancement in the splenic upper pole, involving 10% or less of total splenic volume, 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  No other evidence of acute organic injury, no CT evidence of acute vascular abnormality.  Sigmoid diverticulosis without evidence of diverticulitis.     This report was finalized on 3/9/2025 11:19 PM by Dr. Joesph Dolan M.D on Workstation: [id number]        Ordered the above noted radiological studies. Reviewed by me in PACS.            PROCEDURES  Procedures        OUTPATIENT MEDICATION  MANAGEMENT:  Current Facility-Administered Medications Ordered in Epic   Medication Dose Route Frequency Provider Last Rate Last Admin    sodium chloride 0.9 % flush 10 mL  10 mL Intravenous PRN Ravinder Mas MD         Current Outpatient Medications Ordered in Epic   Medication Sig Dispense Refill    ascorbic acid ([employer] Vitamin C) 1000 MG tablet Take 4 tablets by mouth Daily.      clotrimazole (LOTRIMIN) 1 % cream Apply 1 Application topically to the appropriate area as directed 2 (Two) Times a Day. 85 g 3    DM-APAP-CPM (CORICIDIN HBP PO) Take  by mouth Daily.      empagliflozin (Jardiance) 25 MG tablet tablet Take 1 tablet by mouth Daily. 30 tablet 5    lansoprazole (PREVACID) 30 MG capsule Take 1 capsule by mouth Daily. 30 capsule 5    loratadine (Claritin) 10 MG tablet Take 1 tablet by mouth Daily.      losartan (COZAAR) 25 MG tablet TAKE 1 TABLET BY MOUTH DAILY 90 tablet 1    Misc Natural Products (NEURIVA PO) Take  by mouth Daily.      NON FORMULARY Take  by mouth Daily. Cellucare      NON FORMULARY Take  by mouth Daily. Nugenix Ultimate      pseudoephedrine (SUDAFED) 120 MG 12 hr tablet Take 1 tablet by mouth Every Morning. (Patient not taking: Reported on 3/6/2025)      rosuvastatin (CRESTOR) 10 MG tablet Take 1 tablet by mouth Daily. 90 tablet 1    Semaglutide 3 MG tablet Take 1 tablet by mouth Daily. 30 tablet 0           MEDICATIONS GIVEN IN ER  Medications   sodium chloride 0.9 % flush 10 mL (has no administration in time range)   lactated ringers bolus 1,000 mL ( Intravenous Currently Infusing 3/9/25 2239)   famotidine (PEPCID) injection 20 mg (20 mg Intravenous Given 3/9/25 2105)   HYDROmorphone (DILAUDID) injection 0.5 mg (0.5 mg Intravenous Given 3/9/25 2105)   ondansetron (ZOFRAN) injection 4 mg (4 mg Intravenous Given 3/9/25 2105)   iopamidol (ISOVUE-300) 61 % injection 100 mL (85 mL Intravenous Given by Other 3/9/25 2216)                   MEDICAL DECISION MAKING, PROGRESS, and  CONSULTS    All labs have been independently reviewed by me.  All radiology studies have been reviewed by me and I have also reviewed the radiology report.   EKG's independently viewed and interpreted by me.  Discussion below represents my analysis of pertinent findings related to patient's condition, differential diagnosis, treatment plan and final disposition.      Additional sources:    - Discussed/ obtained information from independent historians: Spouse at bedside    - External (non-ED) record review: Patient was admitted to [facility] 2/25 through 3/4/2025 for epigastric pain and chest pain.  CTA of the chest was negative for PE.  He was seen by cardiology.  Pacemaker had a dislodged lead and this was repositioned.  CT abdomen/pelvis showed moderate gastric distention.  He was seen by gastroenterology.  EGD showed erosive esophagitis and erosive gastritis.    -Prescription drug monitoring program review:     N/A    - Chronic or social conditions impacting patient care (Social Determinants of Health): None          Orders placed during this visit:  Orders Placed This Encounter   Procedures    CT Abdomen Pelvis With Contrast    [place] Draw    Comprehensive Metabolic Panel    Lipase    Urinalysis With Microscopic If Indicated (No Culture) - Urine, Clean Catch    Lactic Acid, Plasma    CBC Auto Differential    NPO Diet NPO Type: Strict NPO    Undress & Gown    [facility] (on-call MD unless specified) Details    Insert Peripheral IV    Initiate Observation Status    CBC & Differential    Green Top (Gel)    Lavender Top    Gold Top - SST    Light Blue Top         Additional orders considered but not ordered:          Differential diagnosis includes, but is not limited to:    Differential diagnosis includes but is not limited to:  - hepatobiliary pathology such as cholecystitis, cholangitis, and symptomatic cholelithiasis  - Pancreatitis  - Dyspepsia  - Small bowel obstruction  - Appendicitis  -  Diverticulitis  - UTI including pyelonephritis  - Ureteral stone  - Zoster  - Colitis, including infectious and ischemic  - Atypical ACS        Independent interpretation of labs, radiology studies, and discussions with consultants:  ED Course as of 03/10/25 0001   Sun Mar 09, 2025   2019 BP(!): 132/104 [WH]   2019 Temp: 98.2 °F (36.8 °C) [WH]   2019 Heart Rate: 113 [WH]   2019 Resp: 18 [WH]   2019 SpO2: 95 % [WH]   2040 WBC(!): 13.27 [WH]   2040 Hemoglobin: 15.4 [WH]   2041 Ketones, UA(!): Trace [WH]   2053 Lactate: 2.0 [WH]   2054 Lipase: 35 [WH]   2054 Glucose(!): 109 [WH]   2054 BUN: 9 [WH]   2054 Creatinine: 0.89 [WH]   2054 ALT (SGPT): 33 [WH]   2054 AST (SGOT): 27 [WH]   2054 Total Bilirubin: 0.4 []   2054 Alkaline Phosphatase(!): 129  76 ten days ago [WH]   2303 CT abdomen/pelvis personally interpreted by me.  My personal interpretation is: Stomach is distended and fluid-filled.  There is fluid in the small bowel.  No obstructive uropathy. [WH]   2323 Per the radiologist, CT abdomen/pelvis shows a moderate left pleural effusion.  There is a small wedge-shaped area of nonenhancement in the splenic upper pole suggestive of an acute segmental splenic infarct.  There is no bowel obstruction.  There is sigmoid diverticulosis without evidence of diverticulitis. [WH]   2332 Patient is feeling somewhat better after IV fluids and IV medications.  Test results and diagnoses were discussed with him and his wife.  Shared decision making was discussed and admission was recommended.  They are agreeable with this. [WH]   2345 Case discussed with GERARD Valdez, she agrees to admit the patient to Dr. Ellis.  Pertinent history, exam findings, test results, ED course, and diagnoses were discussed with her. [WH]   Mon Mar 10, 2025   0000 MDM: Patient presented to the ED with acute upper abdominal pain and nausea.  He was recently hospitalized for similar symptoms.  EGD showed erosive esophagitis and gastritis.  Here in the ED,  he did not have an acute abdomen.  He was afebrile.  White blood cell count was mildly elevated.  CT scan showed a small acute splenic infarct but no evidence of bowel obstruction, colitis, or diverticulitis.  His symptoms improved but did not resolve with IV fluids and IV medications.  He will be admitted for further evaluation/treatment. [WH]      ED Course User Index  [WH] Ravinder Mas MD         COMPLEXITY OF CARE  The patient requires admission.      DIAGNOSIS  Final diagnoses:   Splenic infarct   Acute upper abdominal pain         DISPOSITION  ADMISSION    Discussed treatment plan and reason for admission with pt/family and admitting physician.  Pt/family voiced understanding of the plan for admission for further testing/treatment as needed.               Latest Documented Vital Signs:  AS OF 00:01 EDT VITALS:    BP - 132/94  HR - 86  TEMP - 98.2 °F (36.8 °C) (Tympanic)  O2 SATS - 93%            --    Please note that portions of this were completed with a voice recognition program.       Note Disclaimer: At [facility], we believe that sharing information builds trust and better relationships. You are receiving this note because you are receiving care at [facility] or recently visited. It is possible you will see health information before a provider has talked with you about it. This kind of information can be easy to misunderstand. To help you fully understand what it means for your health, we urge you to discuss this note with your provider.             Ravinder Mas MD  03/10/25 0001

## 2025-03-10 NOTE — ASSESSMENT & PLAN NOTE
Chronic, controlled but having side effect from medication  He will discontinue metoprolol and start losartan 25 mg. He is advised to monitor his blood pressure and report any readings consistently above 130/80. If his blood pressure drops below 100, he should inform the office immediately.

## 2025-03-10 NOTE — OUTREACH NOTE
General Surgery Week 2 Survey      Flowsheet Row Responses   [facility] facility patient discharged from? [facility]   Does the patient have one of the following disease processes/diagnoses(primary or secondary)? General Surgery   Week 2 attempt successful? No   Unsuccessful attempts Attempt 1   Revoke Readmitted            Argenis H - Registered Nurse

## 2025-03-10 NOTE — CONSULTS
[facility]   Consult Note    Patient Name: Ismael Pulido  : 1965  MRN: [id number]  Primary Care Physician:  Sharon Silva MD  Referring Physician: Ravinder Mas MD  Date of admission: 3/9/2025    Inpatient Vascular Surgery Consult  Consult performed by: Miguel Kevin MD  Consult ordered by: Nicolasa Iyer APRN        Subjective   Subjective     Reason for Consult/ Chief Complaint: Splenic infarct.    Constipation  Associated symptoms include abdominal pain.   Abdominal Pain  Associated symptoms include constipation.     Ismael Pulido is a 59 y.o. male patient admitted for upper abdominal pain with increased gas relieved with belching.  Seen on CT scan to have a wedge small infarct of his spleen.    Review of Systems   Gastrointestinal:  Positive for abdominal pain and constipation.        Personal History     Past Medical History:   Diagnosis Date    Allergic 1988    5+ molds, pollens, grass    Diabetes mellitus     Not sure like 2020    Diverticulitis     Diverticulosis     Mot sure     GERD (gastroesophageal reflux disease)     Hypertension     Kidney stone     Not dure     Obesity     Not sure        Past Surgical History:   Procedure Laterality Date    CARDIAC ELECTROPHYSIOLOGY PROCEDURE N/A 2025    Procedure: Pacemaker DC new boston aware;  Surgeon: Dez Loco MD;  Location: [facility] CATH INVASIVE LOCATION;  Service: Cardiovascular;  Laterality: N/A;    ENDOSCOPY N/A 2025    Procedure: ESOPHAGOGASTRODUODENOSCOPY;  Surgeon: Kiko Talbert MD;  Location: [facility] ENDOSCOPY;  Service: Gastroenterology;  Laterality: N/A;  POST-ESOPHAGITIS, GASTRITIS    HERNIA REPAIR         Family History: His family history includes Anxiety disorder in his mother; Arthritis in his mother; Cancer in his father; Depression in his mother; Diabetes in his father and mother; Hyperlipidemia in his mother; Kidney disease in his mother.     Social  History: He  reports that he has never smoked. He has never used smokeless tobacco. He reports that he does not drink alcohol and does not use drugs.    Home Medications:  DM-APAP-CPM, Misc Natural Products, NON FORMULARY, Semaglutide, ascorbic acid, clotrimazole, empagliflozin, lansoprazole, loratadine, losartan, and rosuvastatin    Allergies:  He is allergic to lisinopril.    Objective    Objective     Vitals:    Temp:  [98.2 °F (36.8 °C)-98.5 °F (36.9 °C)] 98.2 °F (36.8 °C)  Heart Rate:  [] 120  Resp:  [18] 18  BP: (124-139)/() 137/105  Flow (L/min) (Oxygen Therapy):  [2] 2    Physical Exam    Result Review    Result Review:  I have personally reviewed the results from the time of this admission to 3/10/2025 08:04 EDT and agree with these findings:  [x]  Laboratory list / accordion  []  Microbiology  [x]  Radiology  []  EKG/Telemetry   []  Cardiology/Vascular   []  Pathology  []  Old records  []  Other:  Most notable findings include:   Results from last 7 days   Lab Units 03/10/25  0309 03/09/25 2014 03/04/25  1112   WBC 10*3/mm3 13.74* 13.27* 10.15   HEMOGLOBIN g/dL 13.8 15.4 15.1   PLATELETS 10*3/mm3 434 483* 288     Results from last 7 days   Lab Units 03/10/25  0309 03/09/25 2014 03/04/25  1112   SODIUM mmol/L 141 139 136   POTASSIUM mmol/L 3.8 4.1 3.9   CHLORIDE mmol/L 104 103 100   CO2 mmol/L 22.0 24.2 21.5*   BUN mg/dL 9 9 15   CREATININE mg/dL 0.68* 0.89 1.00   GLUCOSE mg/dL 89 109* 107*   MAGNESIUM mg/dL 2.0  --   --    PHOSPHORUS mg/dL 3.9  --   --    Estimated Creatinine Clearance: 135.3 mL/min (A) (by C-G formula based on SCr of 0.68 mg/dL (L)).      Lab Results   Component Value Date    HGBA1C 8.40 (H) 02/07/2025    HGBA1C 8.10 (H) 08/09/2024    HGBA1C 7.90 (H) 02/09/2024     Glucose   Date/Time Value Ref Range Status   03/10/2025 0612 99 70 - 130 mg/dL Final       IMPRESSION:     Small wedge-shaped areas of nonenhancement in the splenic upper pole,  involving 10% or less of total  splenic volume, probable acute segmental  splenic infarct.     No other evidence of acute organic injury, no CT evidence of acute  vascular abnormality.    Assessment & Plan   Assessment / Plan     Brief Patient Summary:  Ismael Pulido is a 59 y.o. male multiple medical issues with cardiac pacemaker in place.  Cared for by the cardiology service at [facility].  Has a small wedge-shaped infarct in his spleen.  Radiology interprets this as an acute segmental splenic infarct.  Unclear etiology.    Active Hospital Problems:  Active Hospital Problems    Diagnosis     **Splenic infarct     Status post placement of cardiac pacemaker     Mobitz type 2 second degree atrioventricular block     Gastroesophageal reflux disease without esophagitis     Mixed hyperlipidemia     Obstructive sleep apnea syndrome     Primary hypertension     Type 2 diabetes mellitus without complication, without long-term current use of insulin        Plan:   3/10/2025: From a splenic infarct standpoint currently this is a cryptogenic emboli to the spleen.  Will check CT angiogram of the chest abdomen and pelvis to evaluate for arterial source.  I will ask cardiology to see for possible cardiogenic source.  Will also start heparin infusion for the time being.    VTE Prophylaxis:  Mechanical VTE prophylaxis orders are present.         Miguel Kevin MD

## 2025-03-11 PROBLEM — D73.5 SPLENIC INFARCTION: Status: ACTIVE | Noted: 2025-03-11

## 2025-03-11 LAB
ANION GAP SERPL CALCULATED.3IONS-SCNC: 8.3 MMOL/L (ref 5–15)
APTT PPP: 58.9 SECONDS (ref 22.7–35.4)
APTT PPP: 76.7 SECONDS (ref 22.7–35.4)
APTT PPP: 77.5 SECONDS (ref 22.7–35.4)
APTT PPP: 92.5 SECONDS (ref 22.7–35.4)
BASOPHILS # BLD AUTO: 0.08 10*3/MM3 (ref 0–0.2)
BASOPHILS NFR BLD AUTO: 1 % (ref 0–1.5)
BUN SERPL-MCNC: 11 MG/DL (ref 6–20)
BUN/CREAT SERPL: 12.8 (ref 7–25)
CALCIUM SPEC-SCNC: 8.7 MG/DL (ref 8.6–10.5)
CHLORIDE SERPL-SCNC: 103 MMOL/L (ref 98–107)
CO2 SERPL-SCNC: 23.7 MMOL/L (ref 22–29)
CREAT SERPL-MCNC: 0.86 MG/DL (ref 0.76–1.27)
DEPRECATED RDW RBC AUTO: 41.3 FL (ref 37–54)
EGFRCR SERPLBLD CKD-EPI 2021: 99.7 ML/MIN/1.73
EOSINOPHIL # BLD AUTO: 0.3 10*3/MM3 (ref 0–0.4)
EOSINOPHIL NFR BLD AUTO: 3.9 % (ref 0.3–6.2)
ERYTHROCYTE [DISTWIDTH] IN BLOOD BY AUTOMATED COUNT: 12.8 % (ref 12.3–15.4)
GLUCOSE BLDC GLUCOMTR-MCNC: 107 MG/DL (ref 70–130)
GLUCOSE BLDC GLUCOMTR-MCNC: 92 MG/DL (ref 70–130)
GLUCOSE BLDC GLUCOMTR-MCNC: 95 MG/DL (ref 70–130)
GLUCOSE BLDC GLUCOMTR-MCNC: 98 MG/DL (ref 70–130)
GLUCOSE SERPL-MCNC: 99 MG/DL (ref 65–99)
HCT VFR BLD AUTO: 39.9 % (ref 37.5–51)
HGB BLD-MCNC: 13.3 G/DL (ref 13–17.7)
IMM GRANULOCYTES # BLD AUTO: 0.05 10*3/MM3 (ref 0–0.05)
IMM GRANULOCYTES NFR BLD AUTO: 0.7 % (ref 0–0.5)
LYMPHOCYTES # BLD AUTO: 1.89 10*3/MM3 (ref 0.7–3.1)
LYMPHOCYTES NFR BLD AUTO: 24.8 % (ref 19.6–45.3)
MAGNESIUM SERPL-MCNC: 1.8 MG/DL (ref 1.6–2.6)
MCH RBC QN AUTO: 29.5 PG (ref 26.6–33)
MCHC RBC AUTO-ENTMCNC: 33.3 G/DL (ref 31.5–35.7)
MCV RBC AUTO: 88.5 FL (ref 79–97)
MONOCYTES # BLD AUTO: 0.65 10*3/MM3 (ref 0.1–0.9)
MONOCYTES NFR BLD AUTO: 8.5 % (ref 5–12)
NEUTROPHILS NFR BLD AUTO: 4.65 10*3/MM3 (ref 1.7–7)
NEUTROPHILS NFR BLD AUTO: 61.1 % (ref 42.7–76)
NRBC BLD AUTO-RTO: 0 /100 WBC (ref 0–0.2)
PHOSPHATE SERPL-MCNC: 3.5 MG/DL (ref 2.5–4.5)
PLATELET # BLD AUTO: 298 10*3/MM3 (ref 140–450)
PMV BLD AUTO: 8.2 FL (ref 6–12)
POTASSIUM SERPL-SCNC: 3.9 MMOL/L (ref 3.5–5.2)
RBC # BLD AUTO: 4.51 10*6/MM3 (ref 4.14–5.8)
SODIUM SERPL-SCNC: 135 MMOL/L (ref 136–145)
WBC NRBC COR # BLD AUTO: 7.62 10*3/MM3 (ref 3.4–10.8)

## 2025-03-11 PROCEDURE — 84100 ASSAY OF PHOSPHORUS: CPT | Performed by: STUDENT IN AN ORGANIZED HEALTH CARE EDUCATION/TRAINING PROGRAM

## 2025-03-11 PROCEDURE — 99232 SBSQ HOSP IP/OBS MODERATE 35: CPT | Performed by: SURGERY

## 2025-03-11 PROCEDURE — 25010000002 HEPARIN (PORCINE) PER 1000 UNITS: Performed by: SURGERY

## 2025-03-11 PROCEDURE — 85025 COMPLETE CBC W/AUTO DIFF WBC: CPT | Performed by: SURGERY

## 2025-03-11 PROCEDURE — 25010000002 HEPARIN (PORCINE) 25000-0.45 UT/250ML-% SOLUTION: Performed by: SURGERY

## 2025-03-11 PROCEDURE — 85730 THROMBOPLASTIN TIME PARTIAL: CPT | Performed by: STUDENT IN AN ORGANIZED HEALTH CARE EDUCATION/TRAINING PROGRAM

## 2025-03-11 PROCEDURE — 82948 REAGENT STRIP/BLOOD GLUCOSE: CPT

## 2025-03-11 PROCEDURE — 99222 1ST HOSP IP/OBS MODERATE 55: CPT | Performed by: INTERNAL MEDICINE

## 2025-03-11 PROCEDURE — 83735 ASSAY OF MAGNESIUM: CPT | Performed by: STUDENT IN AN ORGANIZED HEALTH CARE EDUCATION/TRAINING PROGRAM

## 2025-03-11 PROCEDURE — 99232 SBSQ HOSP IP/OBS MODERATE 35: CPT | Performed by: INTERNAL MEDICINE

## 2025-03-11 PROCEDURE — 80048 BASIC METABOLIC PNL TOTAL CA: CPT | Performed by: STUDENT IN AN ORGANIZED HEALTH CARE EDUCATION/TRAINING PROGRAM

## 2025-03-11 RX ADMIN — Medication 10 ML: at 20:49

## 2025-03-11 RX ADMIN — HEPARIN SODIUM 22.7 UNITS/KG/HR: 10000 INJECTION, SOLUTION INTRAVENOUS at 14:41

## 2025-03-11 RX ADMIN — PANTOPRAZOLE SODIUM 40 MG: 40 INJECTION, POWDER, LYOPHILIZED, FOR SOLUTION INTRAVENOUS at 17:51

## 2025-03-11 RX ADMIN — HEPARIN SODIUM 18.7 UNITS/KG/HR: 10000 INJECTION, SOLUTION INTRAVENOUS at 01:01

## 2025-03-11 RX ADMIN — ROSUVASTATIN CALCIUM 10 MG: 10 TABLET, FILM COATED ORAL at 11:58

## 2025-03-11 RX ADMIN — HEPARIN SODIUM 22.7 UNITS/KG/HR: 10000 INJECTION, SOLUTION INTRAVENOUS at 09:59

## 2025-03-11 RX ADMIN — Medication 10 ML: at 09:00

## 2025-03-11 RX ADMIN — PANTOPRAZOLE SODIUM 40 MG: 40 INJECTION, POWDER, LYOPHILIZED, FOR SOLUTION INTRAVENOUS at 11:58

## 2025-03-11 RX ADMIN — HEPARIN SODIUM 5000 UNITS: 5000 INJECTION INTRAVENOUS; SUBCUTANEOUS at 09:58

## 2025-03-11 NOTE — CONSULTS
Date of Consultation: 25    Referral Provider: Ravinder Mas MD     Reason for Consultation: Splenic infarct, SAWYER evaluation.    Encounter Provider: Erasmo Aldana MD    Group of Service: [facility]     Patient Name: Ismael Pulido    :1965    Chief complaint: Constipation, abdominal pain    History of Present Illness:      This is a very pleasant 59 year-old male who normally follows with Dr. Reaves at [facility].  He has a history of an underlying left bundle branch block and Mobitz type II second-degree AV block, and underwent recent [employer] biventricular pacemaker placement on 2025.  He also has a history of severe obstructive sleep apnea, which is treated.  He also has diabetes, hypertension, and GERD.    The patient presented to [facility] on 2025 with epigastric pain and abdominal distention.  He was also nauseated. He also had moderate gastric distention suggesting possible gastroparesis versus gastric outlet obstruction.  He had an EGD performed on 2025 which showed esophagitis.    He presented to the emergency department on 3/9/2025 with upper abdominal discomfort.  He was found to have a splenic infarct, and was started on a heparin drip.  He also has had diarrhea, although his C. difficile test was negative.  Vascular surgery has been consulted.  Cardiology has been consulted for possible SAWYER.  He does have fairly severe obstructive sleep apnea, and is treated for this.     ECHO 25   Left ventricular ejection fraction appears to be 51 - 55%.    The right ventricular cavity is moderately dilated.    Technically very limited study.  Valvular structure and function was not evaluated.        Stress test 25        Myocardial perfusion imaging indicates a normal myocardial perfusion study with no evidence of ischemia. Impressions are consistent with a low risk study.    Left ventricular ejection fraction is normal  (Calculated EF = 64%).    Past Medical History:   Diagnosis Date    Allergic 01/01/1988    5+ molds, pollens, grass    Diabetes mellitus     Not sure like 2020    Diverticulitis     Diverticulosis     Mot sure 1995    GERD (gastroesophageal reflux disease)     Hypertension     Kidney stone     Not dure 1995    Obesity     Not sure 1999         Past Surgical History:   Procedure Laterality Date    CARDIAC ELECTROPHYSIOLOGY PROCEDURE N/A 2/21/2025    Procedure: Pacemaker DC new b[place] aware;  Surgeon: Dez Loco MD;  Location: [facility] CATH INVASIVE LOCATION;  Service: Cardiovascular;  Laterality: N/A;    ENDOSCOPY N/A 2/26/2025    Procedure: ESOPHAGOGASTRODUODENOSCOPY;  Surgeon: Kiko Talbert MD;  Location: [facility] ENDOSCOPY;  Service: Gastroenterology;  Laterality: N/A;  POST-ESOPHAGITIS, GASTRITIS    HERNIA REPAIR           Allergies   Allergen Reactions    Lisinopril Nausea And Vomiting         No current facility-administered medications on file prior to encounter.     Current Outpatient Medications on File Prior to Encounter   Medication Sig Dispense Refill    ascorbic acid ([employer] Vitamin C) 1000 MG tablet Take 4 tablets by mouth Daily.      clotrimazole (LOTRIMIN) 1 % cream Apply 1 Application topically to the appropriate area as directed 2 (Two) Times a Day. 85 g 3    DM-APAP-CPM (CORICIDIN HBP PO) Take 2 tablets by mouth 2 (Two) Times a Day.      empagliflozin (Jardiance) 25 MG tablet tablet Take 1 tablet by mouth Daily. 30 tablet 5    lansoprazole (PREVACID) 30 MG capsule Take 1 capsule by mouth Daily. 30 capsule 5    loratadine (Claritin) 10 MG tablet Take 1 tablet by mouth Daily.      losartan (COZAAR) 25 MG tablet TAKE 1 TABLET BY MOUTH DAILY 90 tablet 1    Misc Natural Products (NEURIVA PO) Take  by mouth Daily.      NON FORMULARY Take  by mouth Daily. Nugenix Ultimate      rosuvastatin (CRESTOR) 10 MG tablet Take 1 tablet by mouth Daily. 90 tablet 1    Semaglutide 3 MG tablet Take 1 tablet  "by mouth Daily. 30 tablet 0    NON FORMULARY Take  by mouth Daily. Cellucare           Social History     Socioeconomic History    Marital status:    Tobacco Use    Smoking status: Never    Smokeless tobacco: Never   Vaping Use    Vaping status: Never Used   Substance and Sexual Activity    Alcohol use: Never    Drug use: Never    Sexual activity: Yes     Partners: Female         Family History   Problem Relation Age of Onset    Anxiety disorder Mother     Arthritis Mother     Depression Mother     Diabetes Mother     Hyperlipidemia Mother     Kidney disease Mother         Kidney stones    Cancer Father          of cancer in 2009    Diabetes Father        REVIEW OF SYSTEMS:   Pertinent positives are noted in the HPI above.  Otherwise, all other systems were reviewed, and are negative.     Objective:     Vitals:    03/10/25 1930 03/10/25 2310 25 0833 25 1105   BP: 123/83 110/88 129/88 130/86   BP Location: Left arm Left arm Left arm Left arm   Patient Position: Lying Lying Lying Lying   Pulse: 81 86 76 108   Resp: 18 18 18 18   Temp: 97.3 °F (36.3 °C) 97.9 °F (36.6 °C) 98.1 °F (36.7 °C) 97.9 °F (36.6 °C)   TempSrc: Oral Oral Oral Oral   SpO2: 93% 91% 95% 92%   Weight:       Height:         Body mass index is 34.27 kg/m².  Flowsheet Rows      Flowsheet Row First Filed Value   Admission Height 172.7 cm (68\") Documented at 2025   Admission Weight 101 kg (222 lb) Documented at 2025             General:    No acute distress, alert and oriented x4, pleasant                   Head:    Normocephalic, atraumatic.   Eyes:          Conjunctivae and sclerae normal, no icterus.   Throat:   No oral lesions, no thrush, oral mucosa moist.    Neck:   Supple, trachea midline.   Lungs:     Clear to auscultation bilaterally     Heart:    Regular rhythm and normal rate.  No murmurs, gallops, or rubs noted.   Abdomen:     Soft, non-tender, non-distended, positive bowel sounds.  "   Extremities:   No clubbing, cyanosis, or edema.     Pulses:   Pulses palpable and equal bilaterally.    Skin:   No bleeding or rash.   Neuro:   Non-focal.  Moves all extremities well.    Psychiatric:   Normal mood and affect.           Lab Review:                Results from last 7 days   Lab Units 03/11/25  0602   SODIUM mmol/L 135*   POTASSIUM mmol/L 3.9   CHLORIDE mmol/L 103   CO2 mmol/L 23.7   BUN mg/dL 11   CREATININE mg/dL 0.86   GLUCOSE mg/dL 99   CALCIUM mg/dL 8.7         Results from last 7 days   Lab Units 03/11/25  0602   WBC 10*3/mm3 7.62   HEMOGLOBIN g/dL 13.3   HEMATOCRIT % 39.9   PLATELETS 10*3/mm3 298     Results from last 7 days   Lab Units 03/11/25  0730 03/10/25  2359 03/10/25  1603 03/10/25  0853   INR   --   --   --  1.21*   APTT seconds 58.9* 76.7* 32.3 28.1         Results from last 7 days   Lab Units 03/11/25  0602   MAGNESIUM mg/dL 1.8                           EKG (reviewed by me personally):      Assessment:   1.  Abdominal pain and splenic infarct  2.  Recent symptomatic Mobitz type II AV block with underlying bundle branch block, status post [employer] biventricular pacemaker on 2/21/2025  3.  Recent diarrhea  4.  GERD with history of recently diagnosed esophagitis by EGD in February 2025  5.  Severe obstructive sleep apnea, on CPAP  6.  Hypertension  7.  Diabetes    Plan:       Again, the etiology of the splenic infarct is in question.  A CT angiogram of his abdomen and pelvis is pending.  Vascular surgery is following.  He just recently had a pacemaker placed on 2/21/2025 as noted above.    Continue heparin drip for now.  I agree that a SAWYER is indicated here.  He does not have contraindications, although he does have fairly severe sleep apnea.  I feel that this needs to be done in the PACU with anesthesia support.  I discussed this in detail with the patient, and he wishes to proceed.  There was no availability today.  We will proceed with a SAWYER tomorrow.    Thank you very  much for this consult.    Liam Aldana MD

## 2025-03-11 NOTE — PLAN OF CARE
Goal Outcome Evaluation:   Patient will remain free from falls while on this unit and will use his call light when he requires assistance.

## 2025-03-11 NOTE — PROGRESS NOTES
Discharge Planning Assessment  [facility]     Patient Name: Ismael Pulido  MRN: [id number]  Today's Date: 3/11/2025    Admit Date: 3/9/2025    Plan: Return home with spouse denies discharge needs   Discharge Needs Assessment       Row Name 03/11/25 1607       Living Environment    People in Home spouse    Name(s) of People in Home lives with spouse  Claire [phone]    Current Living Arrangements home    Primary Care Provided by self    Family Caregiver if Needed spouse    Family Caregiver Names spouse    Quality of Family Relationships supportive;helpful;involved       Transition Planning    Patient/Family Anticipates Transition to home with family    Patient/Family Anticipated Services at Transition none    Transportation Anticipated family or friend will provide       Discharge Needs Assessment    Equipment Currently Used at Home cpap    Concerns to be Addressed no discharge needs identified    Equipment Needed After Discharge none                   Discharge Plan       Row Name 03/11/25 1610       Plan    Plan Return home with spouse denies discharge needs    Plan Comments Spoke with patient and spouse at bedside, he is independent and plans to return home with spouse                    Expected Discharge Date and Time       Expected Discharge Date Expected Discharge Time    Mar 12, 2025            Demographic Summary       Row Name 03/11/25 1606       General Information    Admission Type inpatient    Arrived From emergency department    Referral Source admission list    Reason for Consult discharge planning    Preferred Language English                   Functional Status       Row Name 03/11/25 1607       Functional Status    Usual Activity Tolerance good    Current Activity Tolerance good       Functional Status, IADL    Medications independent    Meal Preparation independent    Housekeeping independent    Laundry independent    Shopping independent                   Psychosocial    No  documentation.                  Abuse/Neglect    No documentation.                  Legal    No documentation.                  Substance Abuse    No documentation.                  Patient Forms    No documentation.                     Margo Trotter RN

## 2025-03-11 NOTE — PROGRESS NOTES
[facility]   Surgical Progress Note    Patient Name: Ismael Pulido  : 1965  MRN: [id number]  Date of admission: 3/9/2025  Surgical Procedures Since Admission:    Subjective   Subjective     Chief Complaint: Splenic infarct follow-up.    Constipation  Associated symptoms include abdominal pain.   Abdominal Pain  Associated symptoms include constipation.      Patient resting comfortably this morning.    Review of Systems   Gastrointestinal:  Positive for abdominal pain and constipation.       Objective   Objective     Vitals:   Temp:  [97.3 °F (36.3 °C)-98.3 °F (36.8 °C)] 97.9 °F (36.6 °C)  Heart Rate:  [] 86  Resp:  [18] 18  BP: (110-144)/(83-95) 110/88  Flow (L/min) (Oxygen Therapy):  [2] 2    Physical Exam  Constitutional:       Appearance: He is well-developed.   Pulmonary:      Effort: Pulmonary effort is normal. No respiratory distress.   Abdominal:      General: There is no distension.      Palpations: Abdomen is soft.   Neurological:      Mental Status: He is alert and oriented to person, place, and time.           Result Review    Result Review:  I have personally reviewed the results from the time of this admission to 3/11/2025 07:11 EDT and agree with these findings:  [x]  Laboratory list / accordion  []  Microbiology  []  Radiology  []  EKG/Telemetry   []  Cardiology/Vascular   []  Pathology  []  Old records  []  Other:  Most notable findings include:       Results from last 7 days   Lab Units 25  0602 03/10/25  0309 03/09/25  2014 03/04/25  1112   WBC 10*3/mm3 7.62 13.74* 13.27* 10.15   HEMOGLOBIN g/dL 13.3 13.8 15.4 15.1   PLATELETS 10*3/mm3 298 434 483* 288     Results from last 7 days   Lab Units 25  0602 03/10/25  03025  1112   SODIUM mmol/L 135* 141 139 136   POTASSIUM mmol/L 3.9 3.8 4.1 3.9   CHLORIDE mmol/L 103 104 103 100   CO2 mmol/L 23.7 22.0 24.2 21.5*   BUN mg/dL 11 9 9 15   CREATININE mg/dL 0.86 0.68* 0.89 1.00   GLUCOSE mg/dL 99 89  109* 107*   MAGNESIUM mg/dL 1.8 2.0  --   --    PHOSPHORUS mg/dL 3.5 3.9  --   --    Estimated Creatinine Clearance: 107 mL/min (by C-G formula based on SCr of 0.86 mg/dL).  Results from last 7 days   Lab Units 03/10/25  0853   PROTIME Seconds 15.3*   INR  1.21*     Lab Results   Component Value Date    HGBA1C 8.40 (H) 02/07/2025    HGBA1C 8.10 (H) 08/09/2024    HGBA1C 7.90 (H) 02/09/2024     Glucose   Date/Time Value Ref Range Status   03/11/2025 0601 95 70 - 130 mg/dL Final   03/10/2025 2005 97 70 - 130 mg/dL Final   03/10/2025 1612 85 70 - 130 mg/dL Final   03/10/2025 1111 92 70 - 130 mg/dL Final   03/10/2025 0612 99 70 - 130 mg/dL Final       Assessment & Plan   Assessment / Plan     Brief Patient Summary:  Ismael Pulido is a 59 y.o. male who has a cryptogenic infarct to his spleen.  Workup in progress.    Active Hospital Problems:   Active Hospital Problems    Diagnosis     **Splenic infarct     GERD with esophagitis     Status post placement of cardiac pacemaker     Mobitz type 2 second degree atrioventricular block     Mixed hyperlipidemia     Obstructive sleep apnea syndrome     Primary hypertension     Type 2 diabetes mellitus without complication, without long-term current use of insulin      Plan:   3/11/2025: Continue heparin infusion and follow-up CT angiogram of the chest abdomen and pelvis.    VTE Prophylaxis:  Pharmacologic & mechanical VTE prophylaxis orders are present.        Miguel Kevin MD

## 2025-03-11 NOTE — PROGRESS NOTES
Name: Ismael Pulido ADMIT: 3/9/2025   : 1965  PCP: Sharon Silva MD    MRN: [id number] LOS: 0 days   AGE/SEX: 59 y.o. male  ROOM: [facility]     Subjective   Subjective   Patient seen this morning.  Spouse present at bedside.  Denies abdominal pain, diarrhea resolved.  C. difficile and gastrointestinal panel was negative.  Feeling better.  Remains on heparin drip.    Review of Systems   As above  Objective   Objective   Vital Signs  Temp:  [97.3 °F (36.3 °C)-98.1 °F (36.7 °C)] 97.9 °F (36.6 °C)  Heart Rate:  [] 108  Resp:  [18] 18  BP: (110-130)/(83-90) 130/86  SpO2:  [91 %-95 %] 92 %  on  Flow (L/min) (Oxygen Therapy):  [2] 2;   Device (Oxygen Therapy): room air  Body mass index is 34.27 kg/m².  Physical Exam    General: Alert and oriented x3, no acute distress  HEENT: Normocephalic, atraumatic  CV: Regular rate and rhythm, no murmurs rubs or gallops  Lungs: Clear to auscultation bilaterally, no crackles or wheezes  Abdomen: Soft, nontender, nondistended  Extremities: No significant peripheral edema , no cyanosis     Results Review     I reviewed the patient's new clinical results.  Results from last 7 days   Lab Units 25  0602 03/10/25  0309 03/09/25  2014   WBC 10*3/mm3 7.62 13.74* 13.27*   HEMOGLOBIN g/dL 13.3 13.8 15.4   PLATELETS 10*3/mm3 298 434 483*     Results from last 7 days   Lab Units 25  0602 03/10/25  0309 03/09/25  2014   SODIUM mmol/L 135* 141 139   POTASSIUM mmol/L 3.9 3.8 4.1   CHLORIDE mmol/L 103 104 103   CO2 mmol/L 23.7 22.0 24.2   BUN mg/dL 11 9 9   CREATININE mg/dL 0.86 0.68* 0.89   GLUCOSE mg/dL 99 89 109*   Estimated Creatinine Clearance: 107 mL/min (by C-G formula based on SCr of 0.86 mg/dL).  Results from last 7 days   Lab Units 03/10/25  0309 25   ALBUMIN g/dL 3.4* 4.1   BILIRUBIN mg/dL 0.4 0.4   ALK PHOS U/L 105 129*   AST (SGOT) U/L 28 27   ALT (SGPT) U/L 30 33     Results from last 7 days   Lab Units 25  0602 03/10/25  0309  03/09/25 2014   CALCIUM mg/dL 8.7 9.4 9.9   ALBUMIN g/dL  --  3.4* 4.1   MAGNESIUM mg/dL 1.8 2.0  --    PHOSPHORUS mg/dL 3.5 3.9  --      Results from last 7 days   Lab Units 03/10/25  0309 03/09/25 2014   PROCALCITONIN ng/mL 0.04  --    LACTATE mmol/L  --  2.0     COVID19   Date Value Ref Range Status   02/28/2025 Not Detected Not Detected - Ref. Range Final   02/25/2025 Not Detected Not Detected - Ref. Range Final     Glucose   Date/Time Value Ref Range Status   03/11/2025 1106 92 70 - 130 mg/dL Final   03/11/2025 0601 95 70 - 130 mg/dL Final   03/10/2025 2005 97 70 - 130 mg/dL Final   03/10/2025 1612 85 70 - 130 mg/dL Final   03/10/2025 1111 92 70 - 130 mg/dL Final   03/10/2025 0612 99 70 - 130 mg/dL Final           CT Abdomen Pelvis With Contrast  Narrative: CT  ABDOMEN & PELVIS WITH IV CONTRAST     INDICATION:   Abdominal pain. Nausea.     TECHNIQUE:  CT of the abdomen and pelvis with  IV contrast. Coronal and sagittal  reconstructions were obtained.  Radiation dose reduction techniques were  utilized, including automated exposure control, and exposure modulation  based on body size.     COMPARISON:   CT abdomen/pelvis 2/25/2025     FINDINGS:     Lung bases: Small to moderate left pleural effusion and left basilar  atelectasis versus less likely infiltrate.     Abdomen: The liver and gallbladder are normal. The pancreas is  unremarkable. Both adrenal glands are normal.  Both kidneys are normal.  There is mild gastric distention, but there is no evidence of gastric  wall thickening, or other evidence to suggest obstruction.     There is a small wedge-shaped area of nonenhancement in the splenic  upper pole, suggesting segmental acute splenic infarct. 10% or less of  total splenic volume is involved. Liver and kidneys and pancreas all  appear to enhance normally. The aorta is normal in caliber, and major  branch vessels appear grossly patent.     Pelvis:  The appendix is clearly identified, and is normal.   There is no  pelvic inflammatory change or other abnormal fluid collection.   There  is sigmoid diverticulosis, but there is no CT evidence of  diverticulitis.  There is no pelvic adenopathy or other soft tissue  mass.  There is no CT evidence of hernia or bowel obstruction.     There is no acute bony abnormality.     Impression:    Small wedge-shaped areas of nonenhancement in the splenic upper pole,  involving 10% or less of total splenic volume, probable acute segmental  splenic infarct.     No other evidence of acute organic injury, no CT evidence of acute  vascular abnormality.     Sigmoid diverticulosis without evidence of diverticulitis.              This report was finalized on 3/9/2025 11:19 PM by Dr. Joesph Dolan M.D on Workstation: [id number]       Scheduled Medications  insulin lispro, 2-7 Units, Subcutaneous, 4x Daily AC & at Bedtime  losartan, 25 mg, Oral, Daily  pantoprazole, 40 mg, Intravenous, BID AC  rosuvastatin, 10 mg, Oral, Daily  sodium chloride, 10 mL, Intravenous, Q12H    Infusions  heparin, 14.7 Units/kg/hr, Last Rate: 22.7 Units/kg/hr (03/11/25 0959)    Diet  NPO Diet NPO Type: Strict NPO  Diet: Regular/House; Fluid Consistency: Thin (IDDSI 0)    I have personally reviewed     [x]  Laboratory   []  Microbiology   [x]  Radiology   [x]  EKG/Telemetry  []  Cardiology/Vascular   []  Pathology    []  Records       Assessment/Plan     Active Hospital Problems    Diagnosis  POA    **Splenic infarct [D73.5]  Yes    GERD with esophagitis [K21.00]  Yes    Status post placement of cardiac pacemaker [Z95.0]  Yes    Mobitz type 2 second degree atrioventricular block [I44.1]  Yes    Mixed hyperlipidemia [E78.2]  Yes    Obstructive sleep apnea syndrome [G47.33]  Yes    Primary hypertension [I10]  Yes    Type 2 diabetes mellitus without complication, without long-term current use of insulin [E11.9]  Yes      Resolved Hospital Problems   No resolved problems to display.       Patient is a 59-year-old  male with a history of HTN, type II DM, Mobitz type II second-degree AV block status post pacemaker, HLD, GERD, SAROJ, presented to the ED complaining of  upper abdominal pain.    Splenic infarct  Abdominal pain  -CT scan of the abdomen on admission showed small wedge-shaped areas of nonenhancement in the splenic upper pole, involving 10% or less of total splenic volume, 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  -Vascular surgery consulted, initiated on heparin drip.  CT angiogram of the chest abdomen and pelvis pending,   -Cardiology following, plan for SAWYER tomorrow     Diarrhea  -C. difficile and gastrointestinal panel negative.  Diarrhea improved.      GERD with esophagitis  -IV PPI     HTN  -Continue outpatient losartan     HLD  -Statin     SAROJ  -Okay to use home CPAP, nightly oxygen as needed     Mobitz type II second-degree AV block s/p pacemaker          DVT prophylaxis.  On heparin drip  Full code.  Discussed with patient and care team on multidisciplinary rounds.  Expected Discharge Date: 3/12/2025; Expected Discharge Time:        Copied text in this note has been reviewed and is accurate as of 03/11/25.         Dictated utilizing Dragon dictation        Whitney Buitrago MD  [facility]  03/11/25  14:26 EDT

## 2025-03-11 NOTE — PROGRESS NOTES
[facility]  Inpatient Progress Note    Reason for Follow Up: Diarrhea and GERD     Subjective     Interval History:   diarrhea has resolved, stool studies negative, GERD is well-controlled with PPI    Current Facility-Administered Medications:     acetaminophen (TYLENOL) tablet 650 mg, 650 mg, Oral, Q4H PRN, 650 mg at 03/10/25 1334 **OR** acetaminophen (TYLENOL) 160 MG/5ML oral solution 650 mg, 650 mg, Oral, Q4H PRN **OR** acetaminophen (TYLENOL) suppository 650 mg, 650 mg, Rectal, Q4H PRN, Nicolasa Iyer APRN    sennosides-docusate (PERICOLACE) 8.6-50 MG per tablet 2 tablet, 2 tablet, Oral, BID PRN **AND** polyethylene glycol (MIRALAX) packet 17 g, 17 g, Oral, Daily PRN **AND** bisacodyl (DULCOLAX) EC tablet 5 mg, 5 mg, Oral, Daily PRN **AND** bisacodyl (DULCOLAX) suppository 10 mg, 10 mg, Rectal, Daily PRN, Nicolasa Iyer APRN    Calcium Replacement - Follow Nurse / BPA Driven Protocol, , Not Applicable, PRN, Whitney Buitrago MD    dextrose (D50W) (25 g/50 mL) IV injection 25 g, 25 g, Intravenous, Q15 Min PRN, Nicolasa Iyer APRN    dextrose (GLUTOSE) oral gel 15 g, 15 g, Oral, Q15 Min PRN, Nicolasa Iyer APRN    famotidine (PEPCID) tablet 20 mg, 20 mg, Oral, BID PRN, Nicolasa Iyer APRN    glucagon (GLUCAGEN) injection 1 mg, 1 mg, Intramuscular, Q15 Min PRN, Nicolasa Iyer APRN    heparin (porcine) 5000 UNIT/ML injection 4,100-8,200 Units, 40-80 Units/kg, Intravenous, Q6H PRN, Miguel Kevin MD, 5,000 Units at 03/11/25 0958    heparin 56853 units/250 mL (100 units/mL) in 0.45 % NaCl infusion, 14.7 Units/kg/hr, Intravenous, Titrated, Miguel Kevin MD, Last Rate: 23.1 mL/hr at 03/11/25 1441, 22.7 Units/kg/hr at 03/11/25 1441    insulin lispro (HUMALOG/ADMELOG) injection 2-7 Units, 2-7 Units, Subcutaneous, 4x Daily AC & at Bedtime, Nicolasa Iyer APRN    losartan (COZAAR) tablet 25 mg, 25 mg, Oral, Daily, Whitney Buitrago MD, 25 mg at  03/10/25 1320    Magnesium Standard Dose Replacement - Follow Nurse / BPA Driven Protocol, , Not Applicable, PRN, Whitney Buitrago MD    melatonin tablet 5 mg, 5 mg, Oral, Nightly PRN, Nicolasa Iyer APRN    nitroglycerin (NITROSTAT) SL tablet 0.4 mg, 0.4 mg, Sublingual, Q5 Min PRN, Nicolasa Iyer APRN    ondansetron (ZOFRAN) injection 4 mg, 4 mg, Intravenous, Q6H PRN, Nicolasa Iyer APRN, 4 mg at 03/10/25 1333    pantoprazole (PROTONIX) injection 40 mg, 40 mg, Intravenous, BID AC, Whitney Buitrago MD, 40 mg at 03/11/25 1158    Phosphorus Replacement - Follow Nurse / BPA Driven Protocol, , Not Applicable, PRN, Whitney Buitrago MD    Potassium Replacement - Follow Nurse / BPA Driven Protocol, , Not Applicable, PRN, Whitney Buitrago MD    rosuvastatin (CRESTOR) tablet 10 mg, 10 mg, Oral, Daily, Whitney Buitrago MD, 10 mg at 03/11/25 1158    sodium chloride 0.9 % flush 10 mL, 10 mL, Intravenous, PRN, Ravinder Mas MD    sodium chloride 0.9 % flush 10 mL, 10 mL, Intravenous, Q12H, Nicolasa Iyer APRN, 10 mL at 03/11/25 0900    sodium chloride 0.9 % flush 10 mL, 10 mL, Intravenous, PRN, Nicolasa Iyer APRDAFNE    sodium chloride 0.9 % infusion 40 mL, 40 mL, Intravenous, PRN, Nicolasa Iyer APRDAFNE  Review of Systems:    There is weakness of fatigue all other systems reviewed and negative    Objective     Vital Signs  Temp:  [97.3 °F (36.3 °C)-98.1 °F (36.7 °C)] 97.9 °F (36.6 °C)  Heart Rate:  [] 108  Resp:  [18] 18  BP: (110-130)/(83-90) 130/86  Body mass index is 34.27 kg/m².    Intake/Output Summary (Last 24 hours) at 3/11/2025 1451  Last data filed at 3/11/2025 1400  Gross per 24 hour   Intake 720 ml   Output --   Net 720 ml     I/O this shift:  In: 480 [P.O.:480]  Out: -      Physical Exam:   General: patient awake, alert and cooperative   Eyes: Normal lids and lashes, no scleral icterus   Neck: supple, normal ROM   Skin: warm and dry, not  jaundiced   Cardiovascular: regular rhythm and rate, no murmurs auscultated   Pulm: clear to auscultation bilaterally, regular and unlabored   Abdomen: soft, nontender, nondistended; normal bowel sounds   Extremities: no rash or edema   Psychiatric: Normal mood and behavior; memory intact     Results Review:     I reviewed the patient's new clinical results.    Results from last 7 days   Lab Units 03/11/25  0602 03/10/25  0309 03/09/25  2014   WBC 10*3/mm3 7.62 13.74* 13.27*   HEMOGLOBIN g/dL 13.3 13.8 15.4   HEMATOCRIT % 39.9 42.0 46.4   PLATELETS 10*3/mm3 298 434 483*     Results from last 7 days   Lab Units 03/11/25  0602 03/10/25  0309 03/09/25  2014   SODIUM mmol/L 135* 141 139   POTASSIUM mmol/L 3.9 3.8 4.1   CHLORIDE mmol/L 103 104 103   CO2 mmol/L 23.7 22.0 24.2   BUN mg/dL 11 9 9   CREATININE mg/dL 0.86 0.68* 0.89   CALCIUM mg/dL 8.7 9.4 9.9   BILIRUBIN mg/dL  --  0.4 0.4   ALK PHOS U/L  --  105 129*   ALT (SGPT) U/L  --  30 33   AST (SGOT) U/L  --  28 27   GLUCOSE mg/dL 99 89 109*     Results from last 7 days   Lab Units 03/10/25  0853   INR  1.21*     Lab Results   Lab Value Date/Time    LIPASE 35 03/09/2025 2014    LIPASE 26 02/25/2025 1245    LIPASE 22 08/31/2018 1610       Radiology:  CT Abdomen Pelvis With Contrast   Final Result       Small wedge-shaped areas of nonenhancement in the splenic upper pole,   involving 10% or less of total splenic volume, probable acute segmental   splenic infarct.       No other evidence of acute organic injury, no CT evidence of acute   vascular abnormality.       Sigmoid diverticulosis without evidence of diverticulitis.                   This report was finalized on 3/9/2025 11:19 PM by Dr. Joesph Dolan M.D on Workstation: [id number]          CT Angiogram Chest    (Results Pending)   CT Angiogram Abdomen Pelvis    (Results Pending)       Assessment & Plan     Active Hospital Problems    Diagnosis     **Splenic infarct     GERD with esophagitis     Status post  placement of cardiac pacemaker     Mobitz type 2 second degree atrioventricular block     Mixed hyperlipidemia     Obstructive sleep apnea syndrome     Primary hypertension     Type 2 diabetes mellitus without complication, without long-term current use of insulin        Assessment:  Abdominal pain  Excess belching with a history of GERD and esophagitis  Splenic infarct on CAT scan  Mild gastric distention on CT  Diarrhea-resolved     Plan:  Continue IV PPI as ordered and esophagitis found on recent EGD, can switch to Protonix 40 mg p.o. twice daily when he is taking oral intake  C. difficile and GI PCR negative  Splenic infarct per vascular surgery and primary team  There is some mild gastric distention on CT that does not need to be worked up any further as EGD just 2 weeks ago showed some mild gastritis and esophagitis.  EGD does not need to be repeated  No further GI recommendations, to follow-up with Dr. Talbert in [facility] at discharge     I discussed the patients findings and my recommendations with patient and nursing staff.    Erasmo Rodriguez MD

## 2025-03-12 ENCOUNTER — ANESTHESIA EVENT (OUTPATIENT)
Dept: POSTOP/PACU | Facility: HOSPITAL | Age: 60
End: 2025-03-12
Payer: COMMERCIAL

## 2025-03-12 ENCOUNTER — APPOINTMENT (OUTPATIENT)
Dept: POSTOP/PACU | Facility: HOSPITAL | Age: 60
DRG: 815 | End: 2025-03-12
Payer: COMMERCIAL

## 2025-03-12 ENCOUNTER — ANESTHESIA (OUTPATIENT)
Dept: POSTOP/PACU | Facility: HOSPITAL | Age: 60
End: 2025-03-12
Payer: COMMERCIAL

## 2025-03-12 ENCOUNTER — APPOINTMENT (OUTPATIENT)
Dept: CT IMAGING | Facility: HOSPITAL | Age: 60
DRG: 815 | End: 2025-03-12
Payer: COMMERCIAL

## 2025-03-12 ENCOUNTER — TELEPHONE (OUTPATIENT)
Dept: CARDIOLOGY | Facility: CLINIC | Age: 60
End: 2025-03-12
Payer: COMMERCIAL

## 2025-03-12 LAB
ANION GAP SERPL CALCULATED.3IONS-SCNC: 10 MMOL/L (ref 5–15)
APTT PPP: 55.7 SECONDS (ref 22.7–35.4)
APTT PPP: 86.4 SECONDS (ref 22.7–35.4)
APTT PPP: 98 SECONDS (ref 22.7–35.4)
BASOPHILS # BLD AUTO: 0.04 10*3/MM3 (ref 0–0.2)
BASOPHILS NFR BLD AUTO: 0.5 % (ref 0–1.5)
BH CV ECHO MEAS - EDV(MOD-SP2): 138 ML
BH CV ECHO MEAS - EDV(MOD-SP4): 115 ML
BH CV ECHO MEAS - EF(MOD-SP2): 44.9 %
BH CV ECHO MEAS - EF(MOD-SP4): 43.5 %
BH CV ECHO MEAS - ESV(MOD-SP2): 76 ML
BH CV ECHO MEAS - ESV(MOD-SP4): 65 ML
BH CV ECHO MEAS - LVOT AREA: 4.6 CM2
BH CV ECHO MEAS - LVOT DIAM: 2.41 CM
BH CV ECHO MEAS - SV(MOD-SP2): 62 ML
BH CV ECHO MEAS - SV(MOD-SP4): 50 ML
BH CV ECHO MEAS - SVI(MOD-SP2): 28.9 ML/M2
BH CV ECHO MEAS - SVI(MOD-SP4): 23.3 ML/M2
BH CV ECHO MEAS - TR MAX PG: 18.9 MMHG
BH CV ECHO MEAS - TR MAX VEL: 217.2 CM/SEC
BH CV ECHO SHUNT ASSESSMENT PERFORMED (HIDDEN SCRIPTING): 1
BUN SERPL-MCNC: 10 MG/DL (ref 6–20)
BUN/CREAT SERPL: 11.9 (ref 7–25)
CALCIUM SPEC-SCNC: 8.6 MG/DL (ref 8.6–10.5)
CHLORIDE SERPL-SCNC: 105 MMOL/L (ref 98–107)
CO2 SERPL-SCNC: 24 MMOL/L (ref 22–29)
CREAT SERPL-MCNC: 0.84 MG/DL (ref 0.76–1.27)
DEPRECATED RDW RBC AUTO: 41.9 FL (ref 37–54)
EGFRCR SERPLBLD CKD-EPI 2021: 100.5 ML/MIN/1.73
EOSINOPHIL # BLD AUTO: 0.3 10*3/MM3 (ref 0–0.4)
EOSINOPHIL NFR BLD AUTO: 3.5 % (ref 0.3–6.2)
ERYTHROCYTE [DISTWIDTH] IN BLOOD BY AUTOMATED COUNT: 12.8 % (ref 12.3–15.4)
GLUCOSE BLDC GLUCOMTR-MCNC: 105 MG/DL (ref 70–130)
GLUCOSE BLDC GLUCOMTR-MCNC: 111 MG/DL (ref 70–130)
GLUCOSE BLDC GLUCOMTR-MCNC: 85 MG/DL (ref 70–130)
GLUCOSE BLDC GLUCOMTR-MCNC: 98 MG/DL (ref 70–130)
GLUCOSE SERPL-MCNC: 111 MG/DL (ref 65–99)
HCT VFR BLD AUTO: 39.5 % (ref 37.5–51)
HGB BLD-MCNC: 12.9 G/DL (ref 13–17.7)
IMM GRANULOCYTES # BLD AUTO: 0.06 10*3/MM3 (ref 0–0.05)
IMM GRANULOCYTES NFR BLD AUTO: 0.7 % (ref 0–0.5)
LV EF BIPLANE MOD: 45.9 %
LYMPHOCYTES # BLD AUTO: 1.93 10*3/MM3 (ref 0.7–3.1)
LYMPHOCYTES NFR BLD AUTO: 22.4 % (ref 19.6–45.3)
MAGNESIUM SERPL-MCNC: 1.8 MG/DL (ref 1.6–2.6)
MCH RBC QN AUTO: 29.1 PG (ref 26.6–33)
MCHC RBC AUTO-ENTMCNC: 32.7 G/DL (ref 31.5–35.7)
MCV RBC AUTO: 89.2 FL (ref 79–97)
MONOCYTES # BLD AUTO: 0.67 10*3/MM3 (ref 0.1–0.9)
MONOCYTES NFR BLD AUTO: 7.8 % (ref 5–12)
NEUTROPHILS NFR BLD AUTO: 5.6 10*3/MM3 (ref 1.7–7)
NEUTROPHILS NFR BLD AUTO: 65.1 % (ref 42.7–76)
NRBC BLD AUTO-RTO: 0 /100 WBC (ref 0–0.2)
PHOSPHATE SERPL-MCNC: 3.1 MG/DL (ref 2.5–4.5)
PLATELET # BLD AUTO: 199 10*3/MM3 (ref 140–450)
PMV BLD AUTO: 8 FL (ref 6–12)
POTASSIUM SERPL-SCNC: 3.6 MMOL/L (ref 3.5–5.2)
POTASSIUM SERPL-SCNC: 4 MMOL/L (ref 3.5–5.2)
RBC # BLD AUTO: 4.43 10*6/MM3 (ref 4.14–5.8)
SINUS: 3.6 CM
SODIUM SERPL-SCNC: 139 MMOL/L (ref 136–145)
STJ: 2.8 CM
WBC NRBC COR # BLD AUTO: 8.6 10*3/MM3 (ref 3.4–10.8)

## 2025-03-12 PROCEDURE — 87040 BLOOD CULTURE FOR BACTERIA: CPT | Performed by: STUDENT IN AN ORGANIZED HEALTH CARE EDUCATION/TRAINING PROGRAM

## 2025-03-12 PROCEDURE — 71275 CT ANGIOGRAPHY CHEST: CPT

## 2025-03-12 PROCEDURE — 25010000002 LIDOCAINE 2% SOLUTION: Performed by: NURSE ANESTHETIST, CERTIFIED REGISTERED

## 2025-03-12 PROCEDURE — 25010000002 HEPARIN (PORCINE) 25000-0.45 UT/250ML-% SOLUTION: Performed by: SURGERY

## 2025-03-12 PROCEDURE — 93325 DOPPLER ECHO COLOR FLOW MAPG: CPT

## 2025-03-12 PROCEDURE — 83735 ASSAY OF MAGNESIUM: CPT | Performed by: STUDENT IN AN ORGANIZED HEALTH CARE EDUCATION/TRAINING PROGRAM

## 2025-03-12 PROCEDURE — 84100 ASSAY OF PHOSPHORUS: CPT | Performed by: STUDENT IN AN ORGANIZED HEALTH CARE EDUCATION/TRAINING PROGRAM

## 2025-03-12 PROCEDURE — 93005 ELECTROCARDIOGRAM TRACING: CPT | Performed by: INTERNAL MEDICINE

## 2025-03-12 PROCEDURE — 82948 REAGENT STRIP/BLOOD GLUCOSE: CPT

## 2025-03-12 PROCEDURE — 93325 DOPPLER ECHO COLOR FLOW MAPG: CPT | Performed by: INTERNAL MEDICINE

## 2025-03-12 PROCEDURE — 80048 BASIC METABOLIC PNL TOTAL CA: CPT | Performed by: STUDENT IN AN ORGANIZED HEALTH CARE EDUCATION/TRAINING PROGRAM

## 2025-03-12 PROCEDURE — 99223 1ST HOSP IP/OBS HIGH 75: CPT | Performed by: INTERNAL MEDICINE

## 2025-03-12 PROCEDURE — 85730 THROMBOPLASTIN TIME PARTIAL: CPT | Performed by: STUDENT IN AN ORGANIZED HEALTH CARE EDUCATION/TRAINING PROGRAM

## 2025-03-12 PROCEDURE — 25510000001 IOPAMIDOL PER 1 ML: Performed by: STUDENT IN AN ORGANIZED HEALTH CARE EDUCATION/TRAINING PROGRAM

## 2025-03-12 PROCEDURE — 84132 ASSAY OF SERUM POTASSIUM: CPT | Performed by: STUDENT IN AN ORGANIZED HEALTH CARE EDUCATION/TRAINING PROGRAM

## 2025-03-12 PROCEDURE — 93312 ECHO TRANSESOPHAGEAL: CPT | Performed by: INTERNAL MEDICINE

## 2025-03-12 PROCEDURE — B24BZZ4 ULTRASONOGRAPHY OF HEART WITH AORTA, TRANSESOPHAGEAL: ICD-10-PCS | Performed by: INTERNAL MEDICINE

## 2025-03-12 PROCEDURE — 25010000002 PROPOFOL 200 MG/20ML EMULSION: Performed by: NURSE ANESTHETIST, CERTIFIED REGISTERED

## 2025-03-12 PROCEDURE — 74174 CTA ABD&PLVS W/CONTRAST: CPT

## 2025-03-12 PROCEDURE — 93010 ELECTROCARDIOGRAM REPORT: CPT | Performed by: INTERNAL MEDICINE

## 2025-03-12 PROCEDURE — 99232 SBSQ HOSP IP/OBS MODERATE 35: CPT | Performed by: INTERNAL MEDICINE

## 2025-03-12 PROCEDURE — 93320 DOPPLER ECHO COMPLETE: CPT

## 2025-03-12 PROCEDURE — 25010000002 PROPOFOL 1000 MG/100ML EMULSION: Performed by: NURSE ANESTHETIST, CERTIFIED REGISTERED

## 2025-03-12 PROCEDURE — 93312 ECHO TRANSESOPHAGEAL: CPT

## 2025-03-12 PROCEDURE — 85025 COMPLETE CBC W/AUTO DIFF WBC: CPT | Performed by: SURGERY

## 2025-03-12 PROCEDURE — 93320 DOPPLER ECHO COMPLETE: CPT | Performed by: INTERNAL MEDICINE

## 2025-03-12 PROCEDURE — 87040 BLOOD CULTURE FOR BACTERIA: CPT | Performed by: INTERNAL MEDICINE

## 2025-03-12 RX ORDER — POTASSIUM CHLORIDE 1500 MG/1
40 TABLET, EXTENDED RELEASE ORAL EVERY 4 HOURS
Status: COMPLETED | OUTPATIENT
Start: 2025-03-12 | End: 2025-03-12

## 2025-03-12 RX ORDER — CETIRIZINE HYDROCHLORIDE 10 MG/1
10 TABLET ORAL DAILY
Status: DISCONTINUED | OUTPATIENT
Start: 2025-03-12 | End: 2025-03-14 | Stop reason: HOSPADM

## 2025-03-12 RX ORDER — LOSARTAN POTASSIUM 50 MG/1
50 TABLET ORAL DAILY
Status: DISCONTINUED | OUTPATIENT
Start: 2025-03-13 | End: 2025-03-14 | Stop reason: HOSPADM

## 2025-03-12 RX ORDER — FAMOTIDINE 10 MG/ML
20 INJECTION, SOLUTION INTRAVENOUS ONCE
Status: CANCELLED | OUTPATIENT
Start: 2025-03-12 | End: 2025-03-12

## 2025-03-12 RX ORDER — FLUTICASONE PROPIONATE 50 MCG
2 SPRAY, SUSPENSION (ML) NASAL DAILY
Status: DISCONTINUED | OUTPATIENT
Start: 2025-03-12 | End: 2025-03-14 | Stop reason: HOSPADM

## 2025-03-12 RX ORDER — SODIUM CHLORIDE 9 MG/ML
75 INJECTION, SOLUTION INTRAVENOUS CONTINUOUS
Status: ACTIVE | OUTPATIENT
Start: 2025-03-12 | End: 2025-03-13

## 2025-03-12 RX ORDER — SODIUM CHLORIDE 0.9 % (FLUSH) 0.9 %
3 SYRINGE (ML) INJECTION EVERY 12 HOURS SCHEDULED
Status: CANCELLED | OUTPATIENT
Start: 2025-03-12

## 2025-03-12 RX ORDER — FENTANYL CITRATE 50 UG/ML
50 INJECTION, SOLUTION INTRAMUSCULAR; INTRAVENOUS ONCE AS NEEDED
Status: CANCELLED | OUTPATIENT
Start: 2025-03-12

## 2025-03-12 RX ORDER — LIDOCAINE HYDROCHLORIDE 20 MG/ML
INJECTION, SOLUTION INFILTRATION; PERINEURAL AS NEEDED
Status: DISCONTINUED | OUTPATIENT
Start: 2025-03-12 | End: 2025-03-12 | Stop reason: SURG

## 2025-03-12 RX ORDER — MIDAZOLAM HYDROCHLORIDE 1 MG/ML
1 INJECTION, SOLUTION INTRAMUSCULAR; INTRAVENOUS
Status: CANCELLED | OUTPATIENT
Start: 2025-03-12

## 2025-03-12 RX ORDER — SODIUM CHLORIDE 9 MG/ML
INJECTION, SOLUTION INTRAVENOUS CONTINUOUS PRN
Status: DISCONTINUED | OUTPATIENT
Start: 2025-03-12 | End: 2025-03-12 | Stop reason: SURG

## 2025-03-12 RX ORDER — PROPOFOL 10 MG/ML
INJECTION, EMULSION INTRAVENOUS AS NEEDED
Status: DISCONTINUED | OUTPATIENT
Start: 2025-03-12 | End: 2025-03-12 | Stop reason: SURG

## 2025-03-12 RX ORDER — LIDOCAINE HYDROCHLORIDE 10 MG/ML
0.5 INJECTION, SOLUTION INFILTRATION; PERINEURAL ONCE AS NEEDED
Status: CANCELLED | OUTPATIENT
Start: 2025-03-12

## 2025-03-12 RX ORDER — SODIUM CHLORIDE, SODIUM LACTATE, POTASSIUM CHLORIDE, CALCIUM CHLORIDE 600; 310; 30; 20 MG/100ML; MG/100ML; MG/100ML; MG/100ML
9 INJECTION, SOLUTION INTRAVENOUS CONTINUOUS
Status: CANCELLED | OUTPATIENT
Start: 2025-03-12 | End: 2025-03-13

## 2025-03-12 RX ORDER — SODIUM CHLORIDE 0.9 % (FLUSH) 0.9 %
3-10 SYRINGE (ML) INJECTION AS NEEDED
Status: CANCELLED | OUTPATIENT
Start: 2025-03-12

## 2025-03-12 RX ORDER — IOPAMIDOL 755 MG/ML
100 INJECTION, SOLUTION INTRAVASCULAR
Status: COMPLETED | OUTPATIENT
Start: 2025-03-12 | End: 2025-03-12

## 2025-03-12 RX ORDER — PROPOFOL 10 MG/ML
INJECTION, EMULSION INTRAVENOUS CONTINUOUS PRN
Status: DISCONTINUED | OUTPATIENT
Start: 2025-03-12 | End: 2025-03-12 | Stop reason: SURG

## 2025-03-12 RX ADMIN — PROPOFOL INJECTABLE EMULSION 80 MG: 10 INJECTION, EMULSION INTRAVENOUS at 08:42

## 2025-03-12 RX ADMIN — PANTOPRAZOLE SODIUM 40 MG: 40 INJECTION, POWDER, LYOPHILIZED, FOR SOLUTION INTRAVENOUS at 12:14

## 2025-03-12 RX ADMIN — POTASSIUM CHLORIDE 40 MEQ: 20 TABLET, EXTENDED RELEASE ORAL at 12:57

## 2025-03-12 RX ADMIN — CETIRIZINE HYDROCHLORIDE 10 MG: 10 TABLET, FILM COATED ORAL at 12:13

## 2025-03-12 RX ADMIN — SODIUM CHLORIDE: 9 INJECTION, SOLUTION INTRAVENOUS at 08:21

## 2025-03-12 RX ADMIN — Medication 10 ML: at 21:18

## 2025-03-12 RX ADMIN — TOPICAL ANESTHETIC 1 SPRAY: 200 SPRAY DENTAL; PERIODONTAL at 08:40

## 2025-03-12 RX ADMIN — IOPAMIDOL 95 ML: 755 INJECTION, SOLUTION INTRAVENOUS at 18:11

## 2025-03-12 RX ADMIN — POTASSIUM CHLORIDE 40 MEQ: 20 TABLET, EXTENDED RELEASE ORAL at 12:13

## 2025-03-12 RX ADMIN — HEPARIN SODIUM 22.65 UNITS/KG/HR: 10000 INJECTION, SOLUTION INTRAVENOUS at 01:39

## 2025-03-12 RX ADMIN — PANTOPRAZOLE SODIUM 40 MG: 40 INJECTION, POWDER, LYOPHILIZED, FOR SOLUTION INTRAVENOUS at 21:11

## 2025-03-12 RX ADMIN — PROPOFOL 100 MCG/KG/MIN: 10 INJECTION, EMULSION INTRAVENOUS at 08:43

## 2025-03-12 RX ADMIN — ROSUVASTATIN CALCIUM 10 MG: 10 TABLET, FILM COATED ORAL at 12:13

## 2025-03-12 RX ADMIN — LIDOCAINE HYDROCHLORIDE 60 MG: 20 INJECTION, SOLUTION INFILTRATION; PERINEURAL at 08:42

## 2025-03-12 RX ADMIN — HEPARIN SODIUM 20.65 UNITS/KG/HR: 10000 INJECTION, SOLUTION INTRAVENOUS at 13:56

## 2025-03-12 NOTE — PROGRESS NOTES
LOS: 1 day   Patient Care Team:  Sharon Silva MD as PCP - General (Internal Medicine & Pediatrics)  Rima Salazar APRN as Nurse Practitioner (Cardiology)    Chief Complaint:     F/u splenic infarct    Interval History:     Denies chest pain, dyspnea, palpitations, nausea.  He has a cough and sinus drainage.   Diarrhea is better.       Objective   Vital Signs  Temp:  [97.5 °F (36.4 °C)-98.6 °F (37 °C)] 98.1 °F (36.7 °C)  Heart Rate:  [] 83  Resp:  [16-18] 16  BP: (126-142)/(86-89) 126/89    Intake/Output Summary (Last 24 hours) at 3/12/2025 0712  Last data filed at 3/11/2025 1608  Gross per 24 hour   Intake 720 ml   Output --   Net 720 ml       Comfortable NAD, obese  Neck supple, no JVD or thyromegaly appreciated  S1/S2 RRR, no m/r/g  Lungs CTA B, normal effort  Abdomen S/NT/ND (+) BS, no HSM appreciated  Extremities warm, no clubbing, cyanosis, or edema  No visible or palpable skin lesions  A/Ox4, mood and affect appropriate    Results Review:      Results from last 7 days   Lab Units 03/12/25  0520 03/11/25  0602 03/10/25  0309   SODIUM mmol/L 139 135* 141   POTASSIUM mmol/L 3.6 3.9 3.8   CHLORIDE mmol/L 105 103 104   CO2 mmol/L 24.0 23.7 22.0   BUN mg/dL 10 11 9   CREATININE mg/dL 0.84 0.86 0.68*   GLUCOSE mg/dL 111* 99 89   CALCIUM mg/dL 8.6 8.7 9.4         Results from last 7 days   Lab Units 03/12/25  0520 03/11/25  0602 03/10/25  0309   WBC 10*3/mm3 8.60 7.62 13.74*   HEMOGLOBIN g/dL 12.9* 13.3 13.8   HEMATOCRIT % 39.5 39.9 42.0   PLATELETS 10*3/mm3 199 298 434     Results from last 7 days   Lab Units 03/12/25  0520 03/11/25  2256 03/11/25  1619 03/10/25  1603 03/10/25  0853   INR   --   --   --   --  1.21*   APTT seconds 98.0* 92.5* 77.5*   < > 28.1    < > = values in this interval not displayed.         Results from last 7 days   Lab Units 03/12/25  0520   MAGNESIUM mg/dL 1.8           I reviewed the patient's new clinical results.  I personally viewed and interpreted the patient's  EKG/Telemetry data        Medication Review:   insulin lispro, 2-7 Units, Subcutaneous, 4x Daily AC & at Bedtime  losartan, 25 mg, Oral, Daily  pantoprazole, 40 mg, Intravenous, BID AC  rosuvastatin, 10 mg, Oral, Daily  sodium chloride, 10 mL, Intravenous, Q12H        heparin, 14.7 Units/kg/hr, Last Rate: 20.6 Units/kg/hr (03/12/25 0635)        Assessment & Plan       Splenic infarct    Primary hypertension    Obstructive sleep apnea syndrome    Type 2 diabetes mellitus without complication, without long-term current use of insulin    Mixed hyperlipidemia    Mobitz type 2 second degree atrioventricular block    Status post placement of cardiac pacemaker    GERD with esophagitis    Splenic infarction    Abdominal pain with splenic infarct - diarrhea resolved and c diff negative  Pacemaker for AV block and BBB 2/21/25 - pacer wires look good.   GERD  Severe SAROJ - on CPAP  DM  Hypertension.   Obese.   Decrease RV function on SAWYER.   Small pfo - normal le venous 2/27 and superficial thrombus right upper extremity diagnosed 3/2/2025.    SAWYER today for splenic infarct - showed MV vegetations, small and small PFO with interatrial septal aneurysm.     Sending repeat blood cultures.      Likely this patient should be anticoagulated because of splenic artery infarct.  Remains on heparin at this time.  Will let primary service switch him to oral anticoagulation.    Will follow        Irais King MD  03/12/25  07:12 EDT

## 2025-03-12 NOTE — CONSULTS
Referring Provider: Ravinder Mas MD      Subjective   History of present illness:    59-year-old we are asked to provide evaluation opinion regarding abnormal SAWYER.  He was admitted to [facility], operated by [facility] with symptomatic bradycardia Mobitz type II AV block in February and underwent permanent pacemaker placement on 2/21.  He was readmitted from 2/25 through 3/4 with abdominal pain and underwent EGD which showed esophagitis.  His course was complicated by possible sepsis for which she tested positive for flu A and also received Zosyn.  Blood cultures were negative during that admission.  He is now readmitted to [facility] on 3/9 with abdominal pain and CT abdomen pelvis on admission showed a splenic infarct.  He has not had any fever since discharge.  Today a SAWYER reportedly showed a small mitral valve vegetation        Physical Exam:   Vital Signs   Temp:  [97.5 °F (36.4 °C)-98.6 °F (37 °C)] 98.1 °F (36.7 °C)  Heart Rate:  [63-95] 63  Resp:  [14-20] 16  BP: ()/() 120/91    GENERAL: Awake and alert, in no acute distress.   HEENT: Oropharynx is clear. Hearing is grossly normal.   EYES: . No conjunctival injection. No lid lag.   LUNGS:normal respiratory effort.   SKIN: no cutaneous eruptions in exposed areas  PSYCHIATRIC: Appropriate mood, affect, insight, and judgment.     Results Review:  White count 8.6, hemoglobin 12.9, platelet 199  Creatinine 0.84  Procalcitonin 0.04  2/27 blood cultures no growth x 3  3/10 GI PCR and C. difficile negative  3/12 blood cultures pending      A/p  Splenic infarct  Abnormal SAWYER    Patient admitted now with splenic infarct and reportedly now found to have small vegetation on mitral valve.  Unclear if these are infectious or not.  If infectious would be problematic as the treatment of endocarditis in the setting of cardiac device entails device removal.  He would also need prolonged IV antibiotics.  One blood culture has been obtained and I will order 2 more  sets.      Thank you for this consult.  We will continue to follow along and tailor antibiotics as the patient's clinical course evolves.

## 2025-03-12 NOTE — PROGRESS NOTES
Name: Ismael Pulido ADMIT: 3/9/2025   : 1965  PCP: Sharon Silva MD    MRN: [id number] LOS: 1 days   AGE/SEX: 59 y.o. male  ROOM: [facility]     Subjective   Subjective   Laying in bed, spouse present in the room.  Patient underwent SAWYER this morning, complains of cough, nasal drainage, reports has history of allergies.  Denies abdominal pain    Review of Systems   As above  Objective   Objective   Vital Signs  Temp:  [97.5 °F (36.4 °C)-98.6 °F (37 °C)] 98.1 °F (36.7 °C)  Heart Rate:  [63-95] 63  Resp:  [14-20] 16  BP: ()/() 120/91  SpO2:  [93 %-100 %] 96 %  on  Flow (L/min) (Oxygen Therapy):  [10] 10;   Device (Oxygen Therapy): room air  Body mass index is 34.27 kg/m².  Physical Exam    General: Alert, laying in bed, no distress.  HEENT: Normocephalic, atraumatic  CV: Regular rate and rhythm, no murmurs  Lungs: Clear to auscultation bilaterally, no crackles or wheezes  Abdomen: Soft, nontender, nondistended  Extremities: No significant peripheral edema , no cyanosis     Results Review     I reviewed the patient's new clinical results.  Results from last 7 days   Lab Units 03/12/25  0520 03/11/25  0602 03/10/25  0309 03/09/25  2014   WBC 10*3/mm3 8.60 7.62 13.74* 13.27*   HEMOGLOBIN g/dL 12.9* 13.3 13.8 15.4   PLATELETS 10*3/mm3 199 298 434 483*     Results from last 7 days   Lab Units 03/12/25  0520 03/11/25  0602 03/10/25  0309 03/09/25  2014   SODIUM mmol/L 139 135* 141 139   POTASSIUM mmol/L 3.6 3.9 3.8 4.1   CHLORIDE mmol/L 105 103 104 103   CO2 mmol/L 24.0 23.7 22.0 24.2   BUN mg/dL 10 11 9 9   CREATININE mg/dL 0.84 0.86 0.68* 0.89   GLUCOSE mg/dL 111* 99 89 109*   Estimated Creatinine Clearance: 109.6 mL/min (by C-G formula based on SCr of 0.84 mg/dL).  Results from last 7 days   Lab Units 03/10/25  0309 25   ALBUMIN g/dL 3.4* 4.1   BILIRUBIN mg/dL 0.4 0.4   ALK PHOS U/L 105 129*   AST (SGOT) U/L 28 27   ALT (SGPT) U/L 30 33     Results from last 7 days   Lab  Units 03/12/25  0520 03/11/25  0602 03/10/25  0309 03/09/25 2014   CALCIUM mg/dL 8.6 8.7 9.4 9.9   ALBUMIN g/dL  --   --  3.4* 4.1   MAGNESIUM mg/dL 1.8 1.8 2.0  --    PHOSPHORUS mg/dL 3.1 3.5 3.9  --      Results from last 7 days   Lab Units 03/10/25  0309 03/09/25 2014   PROCALCITONIN ng/mL 0.04  --    LACTATE mmol/L  --  2.0     COVID19   Date Value Ref Range Status   02/28/2025 Not Detected Not Detected - Ref. Range Final   02/25/2025 Not Detected Not Detected - Ref. Range Final     Glucose   Date/Time Value Ref Range Status   03/12/2025 0516 105 70 - 130 mg/dL Final   03/11/2025 2028 107 70 - 130 mg/dL Final   03/11/2025 1607 98 70 - 130 mg/dL Final   03/11/2025 1106 92 70 - 130 mg/dL Final   03/11/2025 0601 95 70 - 130 mg/dL Final   03/10/2025 2005 97 70 - 130 mg/dL Final   03/10/2025 1612 85 70 - 130 mg/dL Final           CT Abdomen Pelvis With Contrast  Narrative: CT  ABDOMEN & PELVIS WITH IV CONTRAST     INDICATION:   Abdominal pain. Nausea.     TECHNIQUE:  CT of the abdomen and pelvis with  IV contrast. Coronal and sagittal  reconstructions were obtained.  Radiation dose reduction techniques were  utilized, including automated exposure control, and exposure modulation  based on body size.     COMPARISON:   CT abdomen/pelvis 2/25/2025     FINDINGS:     Lung bases: Small to moderate left pleural effusion and left basilar  atelectasis versus less likely infiltrate.     Abdomen: The liver and gallbladder are normal. The pancreas is  unremarkable. Both adrenal glands are normal.  Both kidneys are normal.  There is mild gastric distention, but there is no evidence of gastric  wall thickening, or other evidence to suggest obstruction.     There is a small wedge-shaped area of nonenhancement in the splenic  upper pole, suggesting segmental acute splenic infarct. 10% or less of  total splenic volume is involved. Liver and kidneys and pancreas all  appear to enhance normally. The aorta is normal in caliber, and  major  branch vessels appear grossly patent.     Pelvis:  The appendix is clearly identified, and is normal.  There is no  pelvic inflammatory change or other abnormal fluid collection.   There  is sigmoid diverticulosis, but there is no CT evidence of  diverticulitis.  There is no pelvic adenopathy or other soft tissue  mass.  There is no CT evidence of hernia or bowel obstruction.     There is no acute bony abnormality.     Impression:    Small wedge-shaped areas of nonenhancement in the splenic upper pole,  involving 10% or less of total splenic volume, probable acute segmental  splenic infarct.     No other evidence of acute organic injury, no CT evidence of acute  vascular abnormality.     Sigmoid diverticulosis without evidence of diverticulitis.              This report was finalized on 3/9/2025 11:19 PM by Dr. Joesph Dolan M.D on Workstation: [id number]       Scheduled Medications  cetirizine, 10 mg, Oral, Daily  fluticasone, 2 spray, Each Nare, Daily  insulin lispro, 2-7 Units, Subcutaneous, 4x Daily AC & at Bedtime  [START ON 3/13/2025] losartan, 50 mg, Oral, Daily  pantoprazole, 40 mg, Intravenous, BID AC  potassium chloride ER, 40 mEq, Oral, Q4H  rosuvastatin, 10 mg, Oral, Daily  sodium chloride, 10 mL, Intravenous, Q12H    Infusions  heparin, 14.7 Units/kg/hr, Last Rate: 20.588 Units/kg/hr (03/12/25 0821)  sodium chloride, 75 mL/hr    Diet  Diet: Regular/House; Fluid Consistency: Thin (IDDSI 0)    I have personally reviewed     [x]  Laboratory   [x]  Microbiology   [x]  Radiology   []  EKG/Telemetry  []  Cardiology/Vascular   []  Pathology    []  Records       Assessment/Plan     Active Hospital Problems    Diagnosis  POA    **Splenic infarct [D73.5]  Yes    Splenic infarction [D73.5]  Yes    GERD with esophagitis [K21.00]  Yes    Status post placement of cardiac pacemaker [Z95.0]  Yes    Mobitz type 2 second degree atrioventricular block [I44.1]  Yes    Mixed hyperlipidemia [E78.2]  Yes     Obstructive sleep apnea syndrome [G47.33]  Yes    Primary hypertension [I10]  Yes    Type 2 diabetes mellitus without complication, without long-term current use of insulin [E11.9]  Yes      Resolved Hospital Problems   No resolved problems to display.       Patient is a 59-year-old male with a history of HTN, type II DM, Mobitz type II second-degree AV block status post pacemaker, HLD, GERD, SAROJ, presented to the ED complaining of  upper abdominal pain.    Splenic infarct  Abdominal pain  -CT scan of the abdomen on admission showed small wedge-shaped areas of nonenhancement in the splenic upper pole, involving 10% or less of total splenic volume, 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  -Vascular surgery consulted,  -Remains on heparin drip.  - CT angiogram of the chest abdomen and pelvis still pending, changed to stat  -Abdominal pain resolved      MV vegetations,   small and small PFO with interatrial septal aneurysm.   -Final report pending, however per cardiology SAWYER showed MV vegetations.  Patient's WBC normalized off antibiotics, and remains afebrile.  However noted of recurrent hospitalization and procedures, endocarditis is on differential.  Blood cultures ordered per cardiology.  Consult ID.       Diarrhea  -C. difficile and gastrointestinal panel negative.  Diarrhea  resolved      GERD with esophagitis  -IV PPI     HTN  -Continue outpatient losartan     HLD  -Statin     SAROJ  -Okay to use home CPAP, nightly oxygen as needed     Mobitz type II second-degree AV block s/p pacemaker          DVT prophylaxis.  On heparin drip  Full code.  Discussed with patient and care team on multidisciplinary rounds.  Expected Discharge Date: 3/12/2025; Expected Discharge Time:        Copied text in this note has been reviewed and is accurate as of 03/12/25.         Dictated utilizing Dragon dictation        Whitney Buitrago MD  [facility]  03/12/25  11:21 EDT

## 2025-03-12 NOTE — ANESTHESIA PREPROCEDURE EVALUATION
Anesthesia Evaluation     NPO Solid Status: N/A             Airway   Mallampati: III  TM distance: >3 FB  Neck ROM: full  Possible difficult intubation  Dental - normal exam     Pulmonary    (-) wheezes  Cardiovascular     ECG reviewed  Rhythm: regular    (+) hypertension, hyperlipidemia  (-) murmur      Neuro/Psych  GI/Hepatic/Renal/Endo    (+) GERD, renal disease- stones, diabetes mellitus type 2    Musculoskeletal     Abdominal    Substance History      OB/GYN          Other                    Anesthesia Plan    ASA 3     general     (D/W pt. MAC and possible awareness intra op.  Pt understands MAC and GA are not the same and the possibility of GA being required for failed MAC)  intravenous induction     Anesthetic plan, risks, benefits, and alternatives have been provided, discussed and informed consent has been obtained with: patient.    CODE STATUS:    Code Status (Patient has no pulse and is not breathing): CPR (Attempt to Resuscitate)  Medical Interventions (Patient has pulse or is breathing): Full Support

## 2025-03-12 NOTE — TELEPHONE ENCOUNTER
Caller: Ismael Pulido    Relationship: Self    Best call back number: [phone]      PATIENT IS IN THE HOSPITAL AT [facility] IN [place]    HAS A BLOOD CLOT IN HIS SPLEEN    THEY DID A SAWYER AND THEY FOUND SOMETHING ON THE LEFT MITRAL VALVE    THEY ARE ALSO LOOKING FOR INFECTION IN THE BLOOD    THAT IS ALL THAT HE KNOWS FOR NOW

## 2025-03-12 NOTE — PROGRESS NOTES
Still awaiting CT angiogram of the chest abdomen and pelvis.  Chart reviewed.  Will see tomorrow after scans hopefully.

## 2025-03-13 LAB
ANION GAP SERPL CALCULATED.3IONS-SCNC: 10.3 MMOL/L (ref 5–15)
APTT PPP: 163.6 SECONDS (ref 22.7–35.4)
APTT PPP: 59.4 SECONDS (ref 22.7–35.4)
APTT PPP: 71.4 SECONDS (ref 22.7–35.4)
BASOPHILS # BLD AUTO: 0.08 10*3/MM3 (ref 0–0.2)
BASOPHILS NFR BLD AUTO: 0.9 % (ref 0–1.5)
BUN SERPL-MCNC: 6 MG/DL (ref 6–20)
BUN/CREAT SERPL: 7.8 (ref 7–25)
CALCIUM SPEC-SCNC: 8.9 MG/DL (ref 8.6–10.5)
CHLORIDE SERPL-SCNC: 106 MMOL/L (ref 98–107)
CO2 SERPL-SCNC: 25.7 MMOL/L (ref 22–29)
CREAT SERPL-MCNC: 0.77 MG/DL (ref 0.76–1.27)
DEPRECATED RDW RBC AUTO: 42.7 FL (ref 37–54)
EGFRCR SERPLBLD CKD-EPI 2021: 103.1 ML/MIN/1.73
EOSINOPHIL # BLD AUTO: 0.29 10*3/MM3 (ref 0–0.4)
EOSINOPHIL NFR BLD AUTO: 3.4 % (ref 0.3–6.2)
ERYTHROCYTE [DISTWIDTH] IN BLOOD BY AUTOMATED COUNT: 12.9 % (ref 12.3–15.4)
GLUCOSE BLDC GLUCOMTR-MCNC: 100 MG/DL (ref 70–130)
GLUCOSE BLDC GLUCOMTR-MCNC: 106 MG/DL (ref 70–130)
GLUCOSE BLDC GLUCOMTR-MCNC: 108 MG/DL (ref 70–130)
GLUCOSE SERPL-MCNC: 92 MG/DL (ref 65–99)
HCT VFR BLD AUTO: 40.1 % (ref 37.5–51)
HGB BLD-MCNC: 13.1 G/DL (ref 13–17.7)
IMM GRANULOCYTES # BLD AUTO: 0.04 10*3/MM3 (ref 0–0.05)
IMM GRANULOCYTES NFR BLD AUTO: 0.5 % (ref 0–0.5)
LYMPHOCYTES # BLD AUTO: 1.89 10*3/MM3 (ref 0.7–3.1)
LYMPHOCYTES NFR BLD AUTO: 22.4 % (ref 19.6–45.3)
MAGNESIUM SERPL-MCNC: 1.7 MG/DL (ref 1.6–2.6)
MCH RBC QN AUTO: 29.4 PG (ref 26.6–33)
MCHC RBC AUTO-ENTMCNC: 32.7 G/DL (ref 31.5–35.7)
MCV RBC AUTO: 89.9 FL (ref 79–97)
MONOCYTES # BLD AUTO: 0.69 10*3/MM3 (ref 0.1–0.9)
MONOCYTES NFR BLD AUTO: 8.2 % (ref 5–12)
NEUTROPHILS NFR BLD AUTO: 5.44 10*3/MM3 (ref 1.7–7)
NEUTROPHILS NFR BLD AUTO: 64.6 % (ref 42.7–76)
NRBC BLD AUTO-RTO: 0 /100 WBC (ref 0–0.2)
PHOSPHATE SERPL-MCNC: 3.2 MG/DL (ref 2.5–4.5)
PLATELET # BLD AUTO: 183 10*3/MM3 (ref 140–450)
PMV BLD AUTO: 8.7 FL (ref 6–12)
POTASSIUM SERPL-SCNC: 4 MMOL/L (ref 3.5–5.2)
QT INTERVAL: 444 MS
QTC INTERVAL: 597 MS
RBC # BLD AUTO: 4.46 10*6/MM3 (ref 4.14–5.8)
SODIUM SERPL-SCNC: 142 MMOL/L (ref 136–145)
WBC NRBC COR # BLD AUTO: 8.43 10*3/MM3 (ref 3.4–10.8)

## 2025-03-13 PROCEDURE — 84100 ASSAY OF PHOSPHORUS: CPT | Performed by: STUDENT IN AN ORGANIZED HEALTH CARE EDUCATION/TRAINING PROGRAM

## 2025-03-13 PROCEDURE — 99232 SBSQ HOSP IP/OBS MODERATE 35: CPT | Performed by: SURGERY

## 2025-03-13 PROCEDURE — 99233 SBSQ HOSP IP/OBS HIGH 50: CPT | Performed by: INTERNAL MEDICINE

## 2025-03-13 PROCEDURE — 82948 REAGENT STRIP/BLOOD GLUCOSE: CPT

## 2025-03-13 PROCEDURE — 25010000002 HEPARIN (PORCINE) PER 1000 UNITS: Performed by: SURGERY

## 2025-03-13 PROCEDURE — 83735 ASSAY OF MAGNESIUM: CPT | Performed by: STUDENT IN AN ORGANIZED HEALTH CARE EDUCATION/TRAINING PROGRAM

## 2025-03-13 PROCEDURE — 99232 SBSQ HOSP IP/OBS MODERATE 35: CPT | Performed by: INTERNAL MEDICINE

## 2025-03-13 PROCEDURE — 85025 COMPLETE CBC W/AUTO DIFF WBC: CPT | Performed by: SURGERY

## 2025-03-13 PROCEDURE — 85730 THROMBOPLASTIN TIME PARTIAL: CPT | Performed by: INTERNAL MEDICINE

## 2025-03-13 PROCEDURE — 85730 THROMBOPLASTIN TIME PARTIAL: CPT | Performed by: STUDENT IN AN ORGANIZED HEALTH CARE EDUCATION/TRAINING PROGRAM

## 2025-03-13 PROCEDURE — 25010000002 HEPARIN (PORCINE) 25000-0.45 UT/250ML-% SOLUTION: Performed by: SURGERY

## 2025-03-13 PROCEDURE — 80048 BASIC METABOLIC PNL TOTAL CA: CPT | Performed by: STUDENT IN AN ORGANIZED HEALTH CARE EDUCATION/TRAINING PROGRAM

## 2025-03-13 RX ADMIN — PANTOPRAZOLE SODIUM 40 MG: 40 INJECTION, POWDER, LYOPHILIZED, FOR SOLUTION INTRAVENOUS at 16:57

## 2025-03-13 RX ADMIN — LOSARTAN POTASSIUM 50 MG: 50 TABLET, FILM COATED ORAL at 08:23

## 2025-03-13 RX ADMIN — HEPARIN SODIUM 24.61 UNITS/KG/HR: 10000 INJECTION, SOLUTION INTRAVENOUS at 00:21

## 2025-03-13 RX ADMIN — HEPARIN SODIUM 21.57 UNITS/KG/HR: 10000 INJECTION, SOLUTION INTRAVENOUS at 13:47

## 2025-03-13 RX ADMIN — PANTOPRAZOLE SODIUM 40 MG: 40 INJECTION, POWDER, LYOPHILIZED, FOR SOLUTION INTRAVENOUS at 08:24

## 2025-03-13 RX ADMIN — FLUTICASONE PROPIONATE 2 SPRAY: 50 SPRAY, METERED NASAL at 08:24

## 2025-03-13 RX ADMIN — Medication 10 ML: at 08:24

## 2025-03-13 RX ADMIN — HEPARIN SODIUM 5000 UNITS: 5000 INJECTION INTRAVENOUS; SUBCUTANEOUS at 23:51

## 2025-03-13 RX ADMIN — ROSUVASTATIN CALCIUM 10 MG: 10 TABLET, FILM COATED ORAL at 08:23

## 2025-03-13 RX ADMIN — Medication 10 ML: at 22:18

## 2025-03-13 RX ADMIN — CETIRIZINE HYDROCHLORIDE 10 MG: 10 TABLET, FILM COATED ORAL at 08:23

## 2025-03-13 NOTE — PROGRESS NOTES
Name: Ismael Pulido ADMIT: 3/9/2025   : 1965  PCP: Sharon Silva MD    MRN: [id number] LOS: 2 days   AGE/SEX: 59 y.o. 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    Physical Exam  Constitutional:       Appearance: He is obese.   HENT:      Head: Normocephalic and atraumatic.   Eyes:      General: No scleral icterus.  Cardiovascular:      Rate and Rhythm: Normal rate and regular rhythm.      Heart sounds: Normal heart sounds.   Pulmonary:      Effort: Pulmonary effort is normal. No respiratory distress.      Breath sounds: Normal breath sounds.   Abdominal:      General: There is no distension.      Palpations: Abdomen is soft.      Tenderness: There is no abdominal tenderness.   Musculoskeletal:      Cervical back: Neck supple.   Neurological:      Mental Status: He is alert.   Psychiatric:         Behavior: Behavior normal.         Results Review:       I reviewed the patient's new clinical results.  Results from last 7 days   Lab Units 25  0545 25  0520 25  0602 03/10/25  0309   WBC 10*3/mm3 8.43 8.60 7.62 13.74*   HEMOGLOBIN g/dL 13.1 12.9* 13.3 13.8   PLATELETS 10*3/mm3 183 199 298 434     Results from last 7 days   Lab Units 25  0545 25  1755 25  0520 25  0602 03/10/25  0309   SODIUM mmol/L 142  --  139 135* 141   POTASSIUM mmol/L 4.0 4.0 3.6 3.9 3.8   CHLORIDE mmol/L 106  --  105  103 104   CO2 mmol/L 25.7  --  24.0 23.7 22.0   BUN mg/dL 6  --  10 11 9   CREATININE mg/dL 0.77  --  0.84 0.86 0.68*   GLUCOSE mg/dL 92  --  111* 99 89   Estimated Creatinine Clearance: 119.5 mL/min (by C-G formula based on SCr of 0.77 mg/dL).  Results from last 7 days   Lab Units 03/10/25  0309 03/09/25  2014   ALBUMIN g/dL 3.4* 4.1   BILIRUBIN mg/dL 0.4 0.4   ALK PHOS U/L 105 129*   AST (SGOT) U/L 28 27   ALT (SGPT) U/L 30 33     Results from last 7 days   Lab Units 03/13/25 0545 03/12/25  0520 03/11/25  0602 03/10/25  0309 03/09/25  2014   CALCIUM mg/dL 8.9 8.6 8.7 9.4 9.9   ALBUMIN g/dL  --   --   --  3.4* 4.1   MAGNESIUM mg/dL 1.7 1.8 1.8 2.0  --    PHOSPHORUS mg/dL 3.2 3.1 3.5 3.9  --      Results from last 7 days   Lab Units 03/10/25  0309 03/09/25  2014   PROCALCITONIN ng/mL 0.04  --    LACTATE mmol/L  --  2.0       Coag   Results from last 7 days   Lab Units 03/13/25  0545 03/12/25  2155 03/12/25  1116 03/12/25  0520 03/11/25  2256 03/11/25  1619 03/11/25  0730 03/10/25  1603 03/10/25  0853   INR   --   --   --   --   --   --   --   --  1.21*   APTT seconds 163.6* 55.7* 86.4* 98.0* 92.5* 77.5* 58.9*   < > 28.1    < > = values in this interval not displayed.     HbA1C   Lab Results   Component Value Date    HGBA1C 8.40 (H) 02/07/2025    HGBA1C 8.10 (H) 08/09/2024    HGBA1C 7.90 (H) 02/09/2024     Infection   Results from last 7 days   Lab Units 03/12/25  1116 03/10/25  0309   BLOODCX  No growth at 24 hours  --    PROCALCITONIN ng/mL  --  0.04     Radiology(recent) CT Angiogram Abdomen Pelvis  Addendum Date: 3/12/2025  Call Report: Dr. Jane was notified by telephone of the above findings on March 12, 2025 at 7:12 p.m.  This report was finalized on 3/12/2025 7:12 PM by Dr. Ismael Romano M.D on Workstation: [id number]      Result Date: 3/12/2025   1. Wedge-shaped area of hypoattenuation in the superior spleen, consistent with a splenic infarct. No arterial source for the infarct seen. 2. Poor  "opacification the pulmonary arterial system however there is suspicion for segmental/subsegmental pulmonary embolism. Follow-up CTA chest could better evaluate. 3. Mild left and small right pleural effusions are stable, with adjacent passive partial atelectasis in the left lower lobe. 4. Stable mild mediastinal lymphadenopathy. 5. Small amount of free fluid seen in the abdomen and pelvis. 6. Colonic diverticulosis  This report was finalized on 3/12/2025 6:55 PM by Dr. Ismael Romano M.D on Workstation: [id number]      CT Angiogram Chest  Addendum Date: 3/12/2025  Call Report: Dr. Jane was notified by telephone of the above findings on March 12, 2025 at 7:12 p.m.  This report was finalized on 3/12/2025 7:12 PM by Dr. Ismael Romano M.D on Workstation: [id number]      Result Date: 3/12/2025   1. Wedge-shaped area of hypoattenuation in the superior spleen, consistent with a splenic infarct. No arterial source for the infarct seen. 2. Poor opacification the pulmonary arterial system however there is suspicion for segmental/subsegmental pulmonary embolism. Follow-up CTA chest could better evaluate. 3. Mild left and small right pleural effusions are stable, with adjacent passive partial atelectasis in the left lower lobe. 4. Stable mild mediastinal lymphadenopathy. 5. Small amount of free fluid seen in the abdomen and pelvis. 6. Colonic diverticulosis  This report was finalized on 3/12/2025 6:55 PM by Dr. Ismael Romano M.D on Workstation: [id number]      No results found for: \"TROPONINT\", \"TROPONINI\", \"BNP\"  No components found for: \"TSH;2\"    cetirizine, 10 mg, Oral, Daily  fluticasone, 2 spray, Each Nare, Daily  insulin lispro, 2-7 Units, Subcutaneous, 4x Daily AC & at Bedtime  losartan, 50 mg, Oral, Daily  pantoprazole, 40 mg, Intravenous, BID AC  rosuvastatin, 10 mg, Oral, Daily  sodium chloride, 10 mL, Intravenous, Q12H      heparin, 14.7 Units/kg/hr, Last Rate: 21.6 Units/kg/hr (03/13/25 0853)    Diet: " Regular/House; Fluid Consistency: Thin (IDDSI 0)      Assessment & Plan      Active Hospital Problems    Diagnosis  POA    **Splenic infarct [D73.5]  Yes    Splenic infarction [D73.5]  Yes    GERD with esophagitis [K21.00]  Yes    Status post placement of cardiac pacemaker [Z95.0]  Yes    Mobitz type 2 second degree atrioventricular block [I44.1]  Yes    Mixed hyperlipidemia [E78.2]  Yes    Obstructive sleep apnea syndrome [G47.33]  Yes    Primary hypertension [I10]  Yes    Type 2 diabetes mellitus without complication, without long-term current use of insulin [E11.9]  Yes      Resolved Hospital Problems   No resolved problems to display.     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 Seen by Vascular Surgery and Cardiology. Likely cardioembolic. Continue heparin until vegetation workup complete  Abnormal SAWYER. Cardiology and ID following, will follow blood cultures. If endocarditis would need removal of pacemaker but hopefully this won't be the case  Agents for HTN, monitor BP  PPI for GERD  SAROJ on CPAP  Currently on sliding scale insulin. With GI issues probably would not resume Semaglutide at [place] with GI issues. Is on jardiance as outpatient. DM educator also discussed januvia       DW family  Reviewed records      Luis Felipe Lu MD  [facility]  03/13/25  12:49 EDT

## 2025-03-13 NOTE — PROGRESS NOTES
LOS: 2 days   Patient Care Team:  Sharon Silva MD as PCP - General (Internal Medicine & Pediatrics)  Rima Salazar APRN as Nurse Practitioner (Cardiology)    Chief Complaint: Follow-up splenic infarct, PFO, pacemaker.    Interval History: No acute events.  No chest pain or shortness of breath.    Vital Signs:  Temp:  [97.8 °F (36.6 °C)-98.1 °F (36.7 °C)] 97.8 °F (36.6 °C)  Heart Rate:  [] 95  Resp:  [18] 18  BP: (129-138)/() 133/90    Intake/Output Summary (Last 24 hours) at 3/13/2025 1443  Last data filed at 3/13/2025 1409  Gross per 24 hour   Intake 720 ml   Output --   Net 720 ml       Physical Exam:   General Appearance:    No acute distress, alert and oriented x4   Lungs:     Clear to auscultation bilaterally     Heart:    Regular rhythm and normal rate.  No murmurs, gallops, or rubs.   Abdomen:     Soft, nontender, nondistended.    Extremities:   No clubbing, cyanosis, or edema.     Results Review:    Results from last 7 days   Lab Units 03/13/25  0545   SODIUM mmol/L 142   POTASSIUM mmol/L 4.0   CHLORIDE mmol/L 106   CO2 mmol/L 25.7   BUN mg/dL 6   CREATININE mg/dL 0.77   GLUCOSE mg/dL 92   CALCIUM mg/dL 8.9         Results from last 7 days   Lab Units 03/13/25  0545   WBC 10*3/mm3 8.43   HEMOGLOBIN g/dL 13.1   HEMATOCRIT % 40.1   PLATELETS 10*3/mm3 183     Results from last 7 days   Lab Units 03/13/25  0545 03/12/25  2155 03/12/25  1116 03/10/25  1603 03/10/25  0853   INR   --   --   --   --  1.21*   APTT seconds 163.6* 55.7* 86.4*   < > 28.1    < > = values in this interval not displayed.         Results from last 7 days   Lab Units 03/13/25  0545   MAGNESIUM mg/dL 1.7           I reviewed the patient's new clinical results.        Assessment:  1.  Abdominal pain secondary to splenic infarct  2.  Recent diarrhea, resolved  3.  Small PFO by SAWYER on 3/12/2025  4.  Small mobile echodensities on the mitral valve of uncertain etiology 3/12/2025  5.  Recent symptomatic Mobitz type II AV  block with underlying bundle branch block, status post [employer] biventricular pacemaker on 2/21/2025  6.  Mildly enlarged right ventricle and mildly reduced function by SAWYER on 3/12/2025  7.  GERD with history of recently diagnosed esophagitis by EGD in February 2025  8.  Severe obstructive sleep apnea, on CPAP  9.  Superficial thrombus of right upper extremity on 3/2/2025  10.  Hypertension  11.  Diabetes    Plan:  -Reviewed SAWYER.  Unclear etiology of the mitral valve echodensities. Doubtful that these are infectious given lack of symptoms.  He also has had negative cultures thus far.  Discussed with Dr. Karimi.    -Other possibility would be marantic endocarditis, although I also feel that this is less likely.  This may actually just be degenerative changes or fibrin stranding on the valve.    -I still suspect most common source of this would have been recent pacemaker placement with a PFO.  I would recommend indefinite systemic anticoagulation.  Continue heparin through today.  If cultures remain negative tomorrow, I would start him on Eliquis 5 mg twice daily.     -With follow with Dr. Reaves as an outpatient.    Erasmo Aldana MD  03/13/25  14:43 EDT

## 2025-03-13 NOTE — PROGRESS NOTES
ID note for endocarditis?  Subjective: He is feeling okay today.  Afebrile.  On room air.  Physical Exam:   Vital Signs   Temp:  [98.1 °F (36.7 °C)] 98.1 °F (36.7 °C)  Heart Rate:  [] 120  Resp:  [16-18] 18  BP: ()/() 138/106    GENERAL: Awake and alert, in no acute distress.   HEENT: Oropharynx is clear. Hearing is grossly normal.   EYES: . No conjunctival injection. No lid lag.   LUNGS:normal respiratory effort.   SKIN: no cutaneous eruptions in exposed areas  PSYCHIATRIC: Appropriate mood, affect, insight, and judgment.     Results Review:  White count 8.43  Creatinine 0.77     2/27 blood cultures no growth x 3  3/10 GI PCR and C. difficile negative  3/12 blood cultures pending      A/p  Splenic infarct  Abnormal SAWYER    Blood cultures are pending.  They were negative at [place] last month.  He does have some risk factors for endocarditis.  Discussed with Dr. Aldana and echo findings could also be explained by fibrin stranding.    Discussed with patient and his wife in detail.  Infectious endocarditis treatment would entail removal of the pacemaker, insertion of a temporary pacemaker followed by reimplantation of a permanent pacemaker in addition to 6 weeks of IV antibiotics.    If blood cultures remain negative, the negative predictive value would, while not 100%, be very high go a very long way to excluding infective endocarditis.    We discussed consequences of untreated infective endocarditis including stroke and recurrent sepsis. They are in undertsanding and absent positive blood cultures or other evidence of endocarditis we will hold antibiotics. They are in agreement

## 2025-03-13 NOTE — CASE MANAGEMENT/SOCIAL WORK
Case Management Readmission Assessment Note    Case Management Readmission Assessment (all recorded)       Readmission Interview       Row Name 03/13/25 1628 02/27/25 1528          Readmission Indications    Is the patient and/or family able to complete the readmission assessment questions? Yes Yes     Is this hospitalization related to the prior hospital diagnosis? Yes No       Row Name 03/13/25 1628 02/27/25 1528          Recommendation for rehospitalization    Did you speak with your physician prior to coming to the hospital Yes No       Row Name 03/13/25 1628 02/27/25 1528          Follow-up Appointments    Do you have a PCP? Yes Yes     Did you have an appointment with PCP after your hospitalization? Yes No  3/06 Dr. Silva     Did you go to appointment? Yes --     Did you have an appointment with a Specialist? No No  Cardiology 3/06     Are you current with the Pulmonary Clinic? No No     Are you current with the CHF Clinic? No No       [facility] Name 03/13/25 1628 02/27/25 1528          Medications    Did you have newly prescribed medications at discharge? Yes Yes  Keflex     Did you understand the reasons for your medications at discharge and how to take them? Yes Yes     Did you understand the side effects of your medications? Yes Yes     Are you taking all of you prescribed medications? Yes Yes     [facility] to [facility]     Were medications picked up? Yes Yes       [facility] Name 03/13/25 1628 02/27/25 1528          Discharge Instructions    Did you understand your discharge instructions? Yes Yes     Did your family/caregiver hear your instructions? Yes Yes     Were you told to eat a special diet? No No     Did you adhere to the diet? -- No     Were you given a number of someone to call if you had questions or concerns? Yes Yes       [facility] Name 03/13/25 1628 02/27/25 1528          Index discharge location/services    Where did you go upon discharge? Home Home     Do you  have supportive family or friends in the home? Yes Yes       Row Name 03/13/25 1628 02/27/25 1528          Discharge Readiness    On a scale of 1-5 (5 being well prepared), how ready were you for discharge 5 4     Recommendation based on interview -- Education on diagnosis/self management       Row Name 03/13/25 1628 02/27/25 1528          Palliative Care/Hospice    Are you current with Palliative Care? No No     Are you current with Hospice Care? No No       Row Name 02/27/25 1528 02/25/25 1858          Advance Directives (For Healthcare)    Pre-existing AND/MOST/POLST Order No No     Advance Directive Status Patient does not have advance directive Patient does not have advance directive     Have you reviewed your Advance Directive and is it valid for this stay? No No     Literature Provided on Advance Directives No No     Patient Requests Assistance on Advance Directives Patient Declined Patient Declined       Row Name 02/27/25 1528             Readmission Assessment Final Comments    Final Comments 2/18-2/22 Bradycardia and pacemaker placement. 2/25 to ER for abdominal/ epigastric pain.Cta chest negative for PE. Small left pleural effusion. Mild bilateral dependent atelectasis. IV antibiotics. GI and Cardiology following. EGD 2/26 withgrade B erosive esophagitis; Protonix started

## 2025-03-13 NOTE — PROGRESS NOTES
[facility]   Surgical Progress Note    Patient Name: Ismael Pulido  : 1965  MRN: [id number]  Date of admission: 3/9/2025  Surgical Procedures Since Admission:    Subjective   Subjective     Chief Complaint: Splenic infarct follow-up.    Constipation  Associated symptoms include abdominal pain.   Abdominal Pain  Associated symptoms include constipation.      Patient resting comfortably this morning.    Review of Systems   Gastrointestinal:  Positive for abdominal pain and constipation.       Objective   Objective     Vitals:   Temp:  [98.1 °F (36.7 °C)] 98.1 °F (36.7 °C)  Heart Rate:  [] 120  Resp:  [16-18] 18  BP: (127-138)/() 138/106    Physical Exam  Constitutional:       Appearance: He is well-developed.   Pulmonary:      Effort: Pulmonary effort is normal. No respiratory distress.   Abdominal:      General: There is no distension.      Palpations: Abdomen is soft.   Neurological:      Mental Status: He is alert and oriented to person, place, and time.           Result Review    Result Review:  I have personally reviewed the results from the time of this admission to 3/13/2025 11:13 EDT and agree with these findings:  [x]  Laboratory list / accordion  []  Microbiology  []  Radiology  []  EKG/Telemetry   []  Cardiology/Vascular   []  Pathology  []  Old records  []  Other:  Most notable findings include:       Results from last 7 days   Lab Units 25  0545 03/12/25  0520 03/11/25  0602 03/10/25  0309 03/09/25  2014   WBC 10*3/mm3 8.43 8.60 7.62 13.74* 13.27*   HEMOGLOBIN g/dL 13.1 12.9* 13.3 13.8 15.4   PLATELETS 10*3/mm3 183 199 298 434 483*     Results from last 7 days   Lab Units 25  0545 25  1755 25  0520 03/11/25  0602 03/10/25  0309 03/09/25  2014   SODIUM mmol/L 142  --  139 135* 141 139   POTASSIUM mmol/L 4.0 4.0 3.6 3.9 3.8 4.1   CHLORIDE mmol/L 106  --  105 103 104 103   CO2 mmol/L 25.7  --  24.0 23.7 22.0 24.2   BUN mg/dL 6  --  10 11 9 9   CREATININE  mg/dL 0.77  --  0.84 0.86 0.68* 0.89   GLUCOSE mg/dL 92  --  111* 99 89 109*   MAGNESIUM mg/dL 1.7  --  1.8 1.8 2.0  --    PHOSPHORUS mg/dL 3.2  --  3.1 3.5 3.9  --    Estimated Creatinine Clearance: 119.5 mL/min (by C-G formula based on SCr of 0.77 mg/dL).  Results from last 7 days   Lab Units 03/10/25  0853   PROTIME Seconds 15.3*   INR  1.21*     Lab Results   Component Value Date    HGBA1C 8.40 (H) 02/07/2025    HGBA1C 8.10 (H) 08/09/2024    HGBA1C 7.90 (H) 02/09/2024     Glucose   Date/Time Value Ref Range Status   03/13/2025 0741 100 70 - 130 mg/dL Final   03/12/2025 2115 111 70 - 130 mg/dL Final   03/12/2025 1541 98 70 - 130 mg/dL Final   03/12/2025 1128 85 70 - 130 mg/dL Final   03/12/2025 0516 105 70 - 130 mg/dL Final   03/11/2025 2028 107 70 - 130 mg/dL Final   03/11/2025 1607 98 70 - 130 mg/dL Final   03/11/2025 1106 92 70 - 130 mg/dL Final       Assessment & Plan   Assessment / Plan     Brief Patient Summary:  Ismael Pulido is a 59 y.o. male who has a cryptogenic infarct to his spleen.  Workup in progress.    Active Hospital Problems:   Active Hospital Problems    Diagnosis     **Splenic infarct     Splenic infarction     GERD with esophagitis     Status post placement of cardiac pacemaker     Mobitz type 2 second degree atrioventricular block     Mixed hyperlipidemia     Obstructive sleep apnea syndrome     Primary hypertension     Type 2 diabetes mellitus without complication, without long-term current use of insulin      Plan:   3/11/2025: Continue heparin infusion and follow-up CT angiogram of the chest abdomen and pelvis.    3/13/2025: CT angiogram reviewed.  No arterial source for splenic artery infarct.  Appears to likely originate from the heart from his vegetation.  From a vascular standpoint we will sign off and defer management to the cardiology, infectious disease, and primary service.  Patient requires no specific follow-up with me.  Please feel free to call with further  questions.    VTE Prophylaxis:  Pharmacologic & mechanical VTE prophylaxis orders are present.        Miguel Kevin MD

## 2025-03-13 NOTE — CASE MANAGEMENT/SOCIAL WORK
Continued Stay Note  [facility]     Patient Name: Ismael Pulido  MRN: [id number]  Today's Date: 3/13/2025    Admit Date: 3/9/2025    Plan: Home with spouse   Discharge Plan       Row Name 03/13/25 1520       Plan    Plan Home with spouse    Plan Comments ID and other provider notes reviewed.  Plan is to return home with his spouse upon DC.  CCP will continue to follow for any DC needs. ..........Sumaya YEE/ CCP                   Discharge Codes    No documentation.                 Expected Discharge Date and Time       Expected Discharge Date Expected Discharge Time    Mar 14, 2025               Sumaya Gonzalez RN

## 2025-03-13 NOTE — ANESTHESIA POSTPROCEDURE EVALUATION
Patient: Ismael Pulido    Procedure Summary       Date: 03/12/25 Room / Location: [facility] PACU    Anesthesia Start: 0821 Anesthesia Stop: 0910    Procedure: ADULT TRANSESOPHAGEAL ECHO (SAWYER) W/ CONT IF NECESSARY PER PROTOCOL Diagnosis: (Valvular Disease)    Scheduled Providers: Irais King MD Provider: Konstantin Nassar MD    Anesthesia Type: general ASA Status: 3            Anesthesia Type: general    Vitals  Vitals Value Taken Time   /91 03/12/25 10:03   Temp     Pulse 64 03/12/25 10:06   Resp 16 03/12/25 10:00   SpO2 95 % 03/12/25 10:06   Vitals shown include unfiled device data.        Post Anesthesia Care and Evaluation    Patient location: did not personally evaluate patient.    Comments: Discharge criteria met per RN

## 2025-03-13 NOTE — CASE MANAGEMENT/SOCIAL WORK
Continued Stay Note  [facility]     Patient Name: Ismael Pulido  MRN: [id number]  Today's Date: 3/13/2025    Admit Date: 3/9/2025    Plan: Home with spouse   Discharge Plan       Row Name 03/13/25 1520       Plan    Plan Home with spouse    Plan Comments ID and other provider notes reviewed.  Plan is to return home with his spouse upon DC.  CCP will continue to follow for any DC needs. ..........Sumaya YEE/ CCP                   Discharge Codes    No documentation.                 Expected Discharge Date and Time       Expected Discharge Date Expected Discharge Time    Mar 15, 2025               Sumaya Gonzalez RN

## 2025-03-14 ENCOUNTER — READMISSION MANAGEMENT (OUTPATIENT)
Dept: CALL CENTER | Facility: HOSPITAL | Age: 60
End: 2025-03-14
Payer: COMMERCIAL

## 2025-03-14 VITALS
TEMPERATURE: 99.1 F | HEART RATE: 73 BPM | HEIGHT: 68 IN | BODY MASS INDEX: 34.56 KG/M2 | SYSTOLIC BLOOD PRESSURE: 142 MMHG | RESPIRATION RATE: 18 BRPM | WEIGHT: 228 LBS | DIASTOLIC BLOOD PRESSURE: 96 MMHG | OXYGEN SATURATION: 94 %

## 2025-03-14 LAB
ANION GAP SERPL CALCULATED.3IONS-SCNC: 10.8 MMOL/L (ref 5–15)
APTT PPP: 165.8 SECONDS (ref 22.7–35.4)
BASOPHILS # BLD AUTO: 0.08 10*3/MM3 (ref 0–0.2)
BASOPHILS NFR BLD AUTO: 1 % (ref 0–1.5)
BUN SERPL-MCNC: 6 MG/DL (ref 6–20)
BUN/CREAT SERPL: 6.4 (ref 7–25)
CALCIUM SPEC-SCNC: 8.9 MG/DL (ref 8.6–10.5)
CHLORIDE SERPL-SCNC: 107 MMOL/L (ref 98–107)
CO2 SERPL-SCNC: 25.2 MMOL/L (ref 22–29)
CREAT SERPL-MCNC: 0.94 MG/DL (ref 0.76–1.27)
DEPRECATED RDW RBC AUTO: 42.7 FL (ref 37–54)
EGFRCR SERPLBLD CKD-EPI 2021: 93.4 ML/MIN/1.73
EOSINOPHIL # BLD AUTO: 0.26 10*3/MM3 (ref 0–0.4)
EOSINOPHIL NFR BLD AUTO: 3.1 % (ref 0.3–6.2)
ERYTHROCYTE [DISTWIDTH] IN BLOOD BY AUTOMATED COUNT: 13 % (ref 12.3–15.4)
GLUCOSE BLDC GLUCOMTR-MCNC: 90 MG/DL (ref 70–130)
GLUCOSE BLDC GLUCOMTR-MCNC: 93 MG/DL (ref 70–130)
GLUCOSE SERPL-MCNC: 99 MG/DL (ref 65–99)
HCT VFR BLD AUTO: 40.6 % (ref 37.5–51)
HGB BLD-MCNC: 13.1 G/DL (ref 13–17.7)
IMM GRANULOCYTES # BLD AUTO: 0.04 10*3/MM3 (ref 0–0.05)
IMM GRANULOCYTES NFR BLD AUTO: 0.5 % (ref 0–0.5)
LYMPHOCYTES # BLD AUTO: 2.16 10*3/MM3 (ref 0.7–3.1)
LYMPHOCYTES NFR BLD AUTO: 26.1 % (ref 19.6–45.3)
MAGNESIUM SERPL-MCNC: 1.8 MG/DL (ref 1.6–2.6)
MCH RBC QN AUTO: 29.2 PG (ref 26.6–33)
MCHC RBC AUTO-ENTMCNC: 32.3 G/DL (ref 31.5–35.7)
MCV RBC AUTO: 90.6 FL (ref 79–97)
MONOCYTES # BLD AUTO: 0.63 10*3/MM3 (ref 0.1–0.9)
MONOCYTES NFR BLD AUTO: 7.6 % (ref 5–12)
NEUTROPHILS NFR BLD AUTO: 5.11 10*3/MM3 (ref 1.7–7)
NEUTROPHILS NFR BLD AUTO: 61.7 % (ref 42.7–76)
NRBC BLD AUTO-RTO: 0 /100 WBC (ref 0–0.2)
PHOSPHATE SERPL-MCNC: 3.6 MG/DL (ref 2.5–4.5)
PLATELET # BLD AUTO: 149 10*3/MM3 (ref 140–450)
PMV BLD AUTO: 8.4 FL (ref 6–12)
POTASSIUM SERPL-SCNC: 3.7 MMOL/L (ref 3.5–5.2)
RBC # BLD AUTO: 4.48 10*6/MM3 (ref 4.14–5.8)
SODIUM SERPL-SCNC: 143 MMOL/L (ref 136–145)
WBC NRBC COR # BLD AUTO: 8.28 10*3/MM3 (ref 3.4–10.8)

## 2025-03-14 PROCEDURE — 85730 THROMBOPLASTIN TIME PARTIAL: CPT | Performed by: INTERNAL MEDICINE

## 2025-03-14 PROCEDURE — 99232 SBSQ HOSP IP/OBS MODERATE 35: CPT | Performed by: NURSE PRACTITIONER

## 2025-03-14 PROCEDURE — 83735 ASSAY OF MAGNESIUM: CPT | Performed by: STUDENT IN AN ORGANIZED HEALTH CARE EDUCATION/TRAINING PROGRAM

## 2025-03-14 PROCEDURE — 80048 BASIC METABOLIC PNL TOTAL CA: CPT | Performed by: STUDENT IN AN ORGANIZED HEALTH CARE EDUCATION/TRAINING PROGRAM

## 2025-03-14 PROCEDURE — 99232 SBSQ HOSP IP/OBS MODERATE 35: CPT | Performed by: INTERNAL MEDICINE

## 2025-03-14 PROCEDURE — 84100 ASSAY OF PHOSPHORUS: CPT | Performed by: STUDENT IN AN ORGANIZED HEALTH CARE EDUCATION/TRAINING PROGRAM

## 2025-03-14 PROCEDURE — 85025 COMPLETE CBC W/AUTO DIFF WBC: CPT | Performed by: SURGERY

## 2025-03-14 PROCEDURE — 25010000002 HEPARIN (PORCINE) 25000-0.45 UT/250ML-% SOLUTION: Performed by: SURGERY

## 2025-03-14 PROCEDURE — 82948 REAGENT STRIP/BLOOD GLUCOSE: CPT

## 2025-03-14 RX ORDER — LOSARTAN POTASSIUM 50 MG/1
50 TABLET ORAL DAILY
Qty: 30 TABLET | Refills: 0 | Status: SHIPPED | OUTPATIENT
Start: 2025-03-14

## 2025-03-14 RX ORDER — PANTOPRAZOLE SODIUM 40 MG/1
40 TABLET, DELAYED RELEASE ORAL 2 TIMES DAILY
Qty: 60 TABLET | Refills: 0 | Status: SHIPPED | OUTPATIENT
Start: 2025-03-14

## 2025-03-14 RX ADMIN — PANTOPRAZOLE SODIUM 40 MG: 40 INJECTION, POWDER, LYOPHILIZED, FOR SOLUTION INTRAVENOUS at 06:45

## 2025-03-14 RX ADMIN — Medication 10 ML: at 07:55

## 2025-03-14 RX ADMIN — ROSUVASTATIN CALCIUM 10 MG: 10 TABLET, FILM COATED ORAL at 07:59

## 2025-03-14 RX ADMIN — HEPARIN SODIUM 25.49 UNITS/KG/HR: 10000 INJECTION, SOLUTION INTRAVENOUS at 00:33

## 2025-03-14 RX ADMIN — LOSARTAN POTASSIUM 50 MG: 50 TABLET, FILM COATED ORAL at 07:58

## 2025-03-14 RX ADMIN — APIXABAN 5 MG: 5 TABLET, FILM COATED ORAL at 11:31

## 2025-03-14 RX ADMIN — CETIRIZINE HYDROCHLORIDE 10 MG: 10 TABLET, FILM COATED ORAL at 07:58

## 2025-03-14 NOTE — NURSING NOTE
Report called to Madeline COLEMAN. Pt is transferring to 4E rm 462 with Heparin gtt on going at 22 ml/hr.

## 2025-03-14 NOTE — OUTREACH NOTE
Prep Survey      Flowsheet Row Responses   [place] facility patient discharged from? [place]   Is LACE score < 7 ? No   Eligibility [facility]   Date of Admission 03/09/25   Date of Discharge 03/14/25   Discharge Disposition Home or Self Care   Discharge diagnosis Splenic infarct   Does the patient have one of the following disease processes/diagnoses(primary or secondary)? Other   Does the patient have Home health ordered? No   Is there a DME ordered? No   Prep survey completed? Yes            BETHANY RODRIGUEZ - Registered Nurse

## 2025-03-14 NOTE — PAYOR COMM NOTE
"Ismael Pulido (59 y.o. Male)      PATIENT DISCHARGED 2025:   REF# [id number]     UR: [phone],  [phone]    [facility]: NPI [id number]  [facility]# [id number]    SWAPNA CRAWFORD RN,[facility]     Date of Birth   1965    Social Security Number       Address   [address] [place] [facility]    Home Phone   [phone]    MRN   [id number]       Latter day   Scientologist    Marital Status                               Admission Date   3/9/2025    Admission Type   Emergency    Admitting Provider   Whitney Buitrago MD    Attending Provider       Department, Room/Bed   [address], [address]       Discharge Date   3/14/2025    Discharge Disposition   Home or Self Care    Discharge Destination                                 Attending Provider: (none)   Allergies: Lisinopril    Isolation: None   Infection: None   Code Status: Prior    Ht: 172.7 cm (68\")   Wt: 103 kg (228 lb)    Admission Cmt: None   Principal Problem: Splenic infarct [D73.5]                   Active Insurance as of 3/9/2025       Primary Coverage       Payor Plan Insurance Group Employer/Plan Group    [employer]ERCIAL AETNA [id number]       Payor Plan Address Payor Plan Phone Number Payor Plan Fax Number Effective Dates    [address] [phone]  3/25/2021 - None Entered    [facility] [id number]         Subscriber Name Subscriber Birth Date Member ID       ISMAEL PULIDO 1965 [id number]                     Emergency Contacts        (Rel.) Home Phone Work Phone Mobile Phone    KELL PULIDO (Spouse) [phone] -- [phone]                 Discharge Summary        Luis Felipe Lu MD at 25 1113                 NAME: Ismael Pulido ADMIT: 3/9/2025   : 1965  PCP: Sharon Silva MD    MRN: [id number] LOS: 3 days   AGE/SEX: 59 y.o. 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       SECONDARY DIAGNOSES  Past Medical History:   Diagnosis Date    Allergic 01/01/1988    5+ molds, pollens, grass    Diabetes mellitus     Not sure like 2020    Diverticulitis     Diverticulosis     Mot sure 1995    GERD (gastroesophageal reflux disease)     Hypertension     Kidney stone     Not dure 1995    Obesity     Not sure 1999       CONSULTS   Vascular  GI  Cardiology  ID    HOSPITAL COURSE  60 yo with history of DM, previous pacemaker, GERD, SAROJ who presented with abdominal pain and found to have a cryptogenic infarct to his spleen     Seen by Vascular Surgery and Cardiology. Likely cardioembolic on SAWYER with small PFO and mitral valve echodensities. He was seen by Cardiology and ID. Blood cultures were negative so did not feel this was endocarditis. Cardiology recommended indefinite systemic anticoagulation with eliquis with his recement pacemaker, PFO and possible degenerative changes or fibrin stranding on the valve with the spleen infacrtion. He feels well for dc today and will follow with his Cardiologist as outpatient.    Has been having some GERD/GI issues with Semaglutide as an outpatient so will stop. Is on jardiance as outpatient. DM educator also discussed januvia which I have written an Rx and he will follow with  Sharon Silva MD as outpatient.     DIAGNOSTICS    03/14/2025 0515 03/14/2025 0609 Basic Metabolic Panel [[id number]]    (Abnormal)   Blood    Final result Component Value Units   Glucose 99 mg/dL   BUN 6 mg/dL   Creatinine 0.94 mg/dL   Sodium 143 mmol/L   Potassium 3.7  mmol/L   Chloride 107 mmol/L   CO2 25.2 mmol/L   Calcium 8.9 mg/dL   BUN/Creatinine Ratio 6.4 Low     Anion Gap 10.8 mmol/L   eGFR 93.4 mL/min/1.73          03/14/2025 0515 03/14/2025 0609 Magnesium [[id number]]   Blood    Final result Component Value Units   Magnesium 1.8 mg/dL          03/14/2025 0515 03/14/2025 0609 Phosphorus [[id number]]   Blood    Final result Component Value Units   Phosphorus 3.6 mg/dL          03/14/2025 0515 03/14/2025 0549 CBC Auto Differential [[id number]]   Blood    Final result Component Value Units   WBC 8.28 10*3/mm3   RBC 4.48 10*6/mm3   Hemoglobin 13.1 g/dL   Hematocrit 40.6 %   MCV 90.6 fL   MCH 29.2 pg   MCHC 32.3 g/dL   RDW 13.0 %   RDW-SD 42.7 fl   MPV 8.4 fL   Platelets 149 10*3/mm3   Neutrophil % 61.7 %   Lymphocyte % 26.1 %   Monocyte % 7.6 %        03/12/2025 2042 03/13/2025 2115 Blood Culture - Blood, Hand, Left [[id number]]   Blood from Hand, Left    Preliminary result Component Value   Blood Culture No growth at 24 hours P             03/12/2025 2021 03/13/2025 2115 Blood Culture - Blood, Hand, Right [[id number]]   Blood from Hand, Right    Preliminary result Component Value   Blood Culture No growth at 24 hours P             03/12/2025 1905 03/13/2025 1915 Blood Culture - Blood, Hand, Right [[id number]]   Blood from Hand, Right    Preliminary result Component Value   Blood Culture No growth at 24 hours P             03/12/2025 1755 03/12/2025 1813 Potassium [[id number]]   Blood    Final result Component Value Units   Potassium 4.0 mmol/L          03/12/2025 1116 03/13/2025 1145 Blood Culture - Blood, Arm, Left [[id number]]   Blood from Arm, Left    Preliminary result      SAWYER 3.12.25    Left  ventricular systolic function is low normal. Calculated left ventricular EF = 45.9%    The following left ventricular wall segments are hypokinetic: apical lateral, apical septal and apex hypokinetic.    Mildly reduced right ventricular systolic function noted.    The right ventricular cavity is mildly dilated.    Small patent foramen ovale present.    Saline test results are positive for right to left atrial level shunt.        CT Angiogram Abdomen Pelvis [[id number]] Juan F as Reviewed   Order Status: Completed Collected: 03/12/25 1831    Updated: 03/12/25 1916   Addenda:       Call Report: Dr. Jane was notified by telephone of the above findings on March 12, 2025 at 7:12 p.m.    This report was finalized on 3/12/2025 7:12 PM by Dr. Ismael Romano M.D on Workstation: [id number]    Signed: 03/12/25 1912 by Ismael Romano MD   Narrative:     CT ANGIOGRAM CHEST-, CT ANGIOGRAM ABDOMEN PELVIS-     INDICATION: Evaluate for arterial source of cryptogenic emboli to the  spleen     COMPARISON: CT abdomen pelvis March 9, 2025 and CT chest February 27, 2025     TECHNIQUE:  CTA chest, abdomen and pelvis with IV contrast. Coronal and sagittal  reformats. Three dimensional reconstructions. Radiation dose reduction  techniques were utilized, including automated exposure control and  exposure modulation based on body size.     FINDINGS:     Chest wall: No lymphadenopathy. Left-sided cardiac resynchronization  therapy pacemaker with a right atrial, right ventricular and coronary  sinus lead.     Mediastinum: Heart is normal in size. No pericardial effusion. No  thoracic aortic aneurysm or dissection. Mild mediastinal adenopathy. For  example, right paratracheal/2R lymph node, series 4, axial image 39,  measures 1.2 cm, unchanged. For example, subcarinal lymph node, series  4, axial mage 78, measures 1.4 cm, unchanged. Right hilar lymph node,  series 4, axial mage 74, measures 8 mm in short axis, unchanged.  Poor  opacification of the pulmonary arterial system though there our areas  suspicious for segmental and subsegmental pulmonary embolism. For  example, suspicion for subsegmental pulmonary embolism in the apical  right upper lobe, series 4, axial mage 48. For example, suspicion for  segmental/subsegmental pulmonary embolism in the superior segment right  lower lobe on series 4, axial mage 66. For example, suspicion for  subsegmental pulmonary embolism in the posterior basilar right lower  lobe on series 4, axial mage 82.     Lungs/pleura: Mild left and small right pleural effusions. Small amount  of secretions seen in the dependent trachea. Posterior dependent and  basilar subsegmental atelectasis seen in the right lung. Partial  atelectasis seen in the dependent and basilar left lower lobe.     ABDOMEN: Elevated right hemidiaphragm. Low attenuating lesion seen  beneath the capsule of segment 6 of the right hepatic lobe, suspect a  small cyst. Normal gallbladder. No biliary ductal dilatation.  Wedge-shaped area of hypoattenuation in the superior spleen, consistent  with infarct, appears stable. Spleen is normal in size. No pancreatic  mass or pancreatic ductal dilatation seen. No adrenal nodules. No renal  mass or hydronephrosis.     Pelvis: Prostate is normal in size. Moderate bladder distention. No  bladder calculus. Trace free fluid seen in the pelvic cavity.     Bowel: No small bowel obstruction. Colonic diverticulosis. Small amount  of free fluid seen in the left paracolic gutter. Normal appendix.     Abdominal wall: Rectus diastases. Periumbilical abdominal wall mesh.  Fat-containing left inguinal hernia.     Retroperitoneum: No lymphadenopathy.     Vasculature: No abdominal aortic aneurysm or dissection. Splenic artery  is patent, without stenosis. Mild aortoiliac atherosclerotic  calcification.     Osseous structures: No destructive osseous lesions.      Impression:        1. Wedge-shaped area of  "hypoattenuation in the superior spleen,  consistent with a splenic infarct. No arterial source for the infarct  seen.  2. Poor opacification the pulmonary arterial system however there is  suspicion for segmental/subsegmental pulmonary embolism. Follow-up CTA  chest could better evaluate.  3. Mild left and small right pleural effusions are stable, with adjacent  passive partial atelectasis in the left lower lobe.  4. Stable mild mediastinal lymphadenopathy.  5. Small amount of free fluid seen in the abdomen and pelvis.  6. Colonic diverticulosis          PHYSICAL EXAM  Objective:  Vital signs: (most recent): Blood pressure 142/96, pulse 73, temperature 99.1 °F (37.3 °C), temperature source Oral, resp. rate 18, height 172.7 cm (68\"), weight 103 kg (228 lb), SpO2 94%.                Alert  nad  No resp distress  RRR  Wishes for discharge today     CONDITION ON DISCHARGE  Stable.      DISCHARGE DISPOSITION   Home or Self Care      DISCHARGE MEDICATIONS       Your medication list        START taking these medications        Instructions Last Dose Given Next Dose Due   apixaban 5 MG tablet tablet  Commonly known as: ELIQUIS      Take 1 tablet by mouth Every 12 (Twelve) Hours. Indications: Other - full anticoagulation       pantoprazole 40 MG EC tablet  Commonly known as: PROTONIX      Take 1 tablet by mouth 2 (Two) Times a Day.       SITagliptin 100 MG tablet  Commonly known as: Januvia      Take 1 tablet by mouth Daily.              CHANGE how you take these medications        Instructions Last Dose Given Next Dose Due   losartan 50 MG tablet  Commonly known as: COZAAR  What changed:   medication strength  how much to take      Take 1 tablet by mouth Daily.              CONTINUE taking these medications        Instructions Last Dose Given Next Dose Due   Claritin 10 MG tablet  Generic drug: loratadine      Take 1 tablet by mouth Daily.       clotrimazole 1 % cream  Commonly known as: LOTRIMIN      Apply 1 Application " topically to the appropriate area as directed 2 (Two) Times a Day.       CORICIDIN HBP PO      Take 2 tablets by mouth 2 (Two) Times a Day.       [facility] Vitamin C 1000 MG tablet  Generic drug: ascorbic acid      Take 4 tablets by mouth Daily.       empagliflozin 25 MG tablet tablet  Commonly known as: Jardiance      Take 1 tablet by mouth Daily.       NEURIVA PO      Take  by mouth Daily.       NON FORMULARY      Take  by mouth Daily. Cellucare       NON FORMULARY      Take  by mouth Daily. Nugenix Ultimate       rosuvastatin 10 MG tablet  Commonly known as: CRESTOR      Take 1 tablet by mouth Daily.              STOP taking these medications      lansoprazole 30 MG capsule  Commonly known as: PREVACID        Semaglutide 3 MG tablet                  Where to Get Your Medications        These medications were sent to [place] [address]      Hours: Monday to Friday 7 AM to 6 PM, Saturday & Sunday 8 AM to 4:30 PM (Closed 12 PM to 12:30 PM) Phone: [phone]   apixaban 5 MG tablet tablet  losartan 50 MG tablet  pantoprazole 40 MG EC tablet  SITagliptin 100 MG tablet          Future Appointments   Date Time Provider Department Center   4/10/2025  9:30 AM Rima Salazar APRN [facility]   4/17/2025 11:30 AM Dez Loco MD [facility]   5/9/2025 11:00 AM Sharon Silva MD [facility]     Additional Instructions for the Follow-ups that You Need to Schedule       Discharge Follow-up with Specialty: PCP in 1-2 weeks, Cardiology in 2 weeks   As directed      Specialty: PCP in 1-2 weeks, Cardiology in 2 weeks               Follow-up Information       Sharon Silva MD .    Specialty: Internal Medicine & Pediatrics  Contact information:  [address]  [address]  [place] [address] [phone]                             TEST  RESULTS PENDING AT DISCHARGE  Pending Labs       Order Current Status    Blood Culture -  Blood, Arm, Left Preliminary result    Blood Culture - Blood, Hand, Left Preliminary result    Blood Culture - Blood, Hand, Right Preliminary result    Blood Culture - Blood, Hand, Right Preliminary result               Luis Felipe Lu MD  [facility]  03/14/25  11:13 EDT      Time: greater than 32 minutes on discharge  It was a pleasure taking care of this patient while in the hospital.       Electronically signed by Luis Felipe Lu MD at 03/14/25 1121       Discharge Order (From admission, onward)       Start     Ordered    03/14/25 1106  Discharge patient  Once        Expected Discharge Date: 03/14/25   Discharge Disposition: Home or Self Care   Physician of Record for Attribution - Please select from Treatment Team: LUIS FELIPE LU [[id number]]   Review needed by CMO to determine Physician of Record: No      Question Answer Comment   Physician of Record for Attribution - Please select from Treatment Team LUIS FELIPE LU    Review needed by CMO to determine Physician of Record No        03/14/25 1113                      Ruiz Patton RN     Case Management     Case Management/Social Work     Signed     Date of Service: 03/14/25 1219  Creation Time: 03/14/25 1219     Signed         Case Management Discharge Note        Final Note: Home with Spouse on Eliquis. Eliquis Copay $10/month with copay card. Family transport        Selected Continued Care - Admitted Since 3/9/2025         Destination    No services have been selected for the patient.                    Durable Medical Equipment    No services have been selected for the patient.                    Dialysis/Infusion    No services have been selected for the patient.                    Home Medical Care    No services have been selected for the patient.                    Therapy    No services have been selected for the patient.                    Community Resources    No services have been selected for  the patient.                    Community & [facility]    No services have been selected for the patient.                         Transportation Services  Private: Car     Final Discharge Disposition Code: 01 - home or self-care

## 2025-03-14 NOTE — PROGRESS NOTES
"    Patient Name: Ismael Pulido  :1965  59 y.o.      Patient Care Team:  Sharon Silva MD as PCP - General (Internal Medicine & Pediatrics)  Rima Salazar APRN as Nurse Practitioner (Cardiology)    Chief Complaint: follow up splenic infarct, PFO    Interval History: he is resting in bed. He feels really well and hopes to go home.        Objective   Vital Signs  Temp:  [97.8 °F (36.6 °C)-99.1 °F (37.3 °C)] 99.1 °F (37.3 °C)  Heart Rate:  [67-95] 73  Resp:  [18] 18  BP: (123-144)/(82-97) 142/96    Intake/Output Summary (Last 24 hours) at 3/14/2025 1024  Last data filed at 3/14/2025 0923  Gross per 24 hour   Intake 480 ml   Output --   Net 480 ml     Flowsheet Rows      Flowsheet Row First Filed Value   Admission Height 172.7 cm (68\") Documented at 2025   Admission Weight 101 kg (222 lb) Documented at 2025            Physical Exam:   General Appearance:    Alert, cooperative, in no acute distress   Lungs:     Clear to auscultation.  Normal respiratory effort and rate.      Heart:    Regular rhythm and normal rate, normal S1 and S2, no murmurs, gallops or rubs.     Chest Wall:    No abnormalities observed   Abdomen:     Soft, nontender, positive bowel sounds.     Extremities:   no cyanosis, clubbing or edema.  No marked joint deformities.  Adequate musculoskeletal strength.       Results Review:    Results from last 7 days   Lab Units 25  0515   SODIUM mmol/L 143   POTASSIUM mmol/L 3.7   CHLORIDE mmol/L 107   CO2 mmol/L 25.2   BUN mg/dL 6   CREATININE mg/dL 0.94   GLUCOSE mg/dL 99   CALCIUM mg/dL 8.9         Results from last 7 days   Lab Units 25  0515   WBC 10*3/mm3 8.28   HEMOGLOBIN g/dL 13.1   HEMATOCRIT % 40.6   PLATELETS 10*3/mm3 149     Results from last 7 days   Lab Units 25  0515 25  2305 25  1438 03/10/25  1603 03/10/25  0853   INR   --   --   --   --  1.21*   APTT seconds 165.8* 59.4* 71.4*   < > 28.1    < > = values in this interval " not displayed.     Results from last 7 days   Lab Units 03/14/25  0515   MAGNESIUM mg/dL 1.8                   Medication Review:   cetirizine, 10 mg, Oral, Daily  fluticasone, 2 spray, Each Nare, Daily  insulin lispro, 2-7 Units, Subcutaneous, 4x Daily AC & at Bedtime  losartan, 50 mg, Oral, Daily  pantoprazole, 40 mg, Intravenous, BID AC  rosuvastatin, 10 mg, Oral, Daily  sodium chloride, 10 mL, Intravenous, Q12H         heparin, 14.7 Units/kg/hr, Last Rate: 23 Units/kg/hr (03/14/25 0844)        Assessment & Plan   Abdominal pain secondary to splenic infarct - felt to be paradoxical emboli due to PFO and recent PPM placement.   Small PFO by SAWYER 3/12/2025  Small mobile echo densities on the mitral valve . Doubful infection or marantic endocarditis. Likely degenerative changes of fibrin stranding on the valve.   Recent symptomatic Mobitz type II AV  block and complete heart block with underlying conduction disease status post biventricular pacemaker 2/21/2025.  GERD with recent esophagitis by EGD February 2025  Obstructive sleep apnea on CPAP  Superficial thrombus of the right upper extremity 3/2/2025  Hypertension  Diabetes mellitus type II     Life long anticoagulation recommended.   Stop heparin. Start apixaban 5 mg BID.   No objection to discharge later today. He will keep routine follow up with primary cardiologists Dr. Reaves and Dr. Molina.     TAMIKO Ray  [facility]  03/14/25  10:24 EDT

## 2025-03-14 NOTE — PLAN OF CARE
Goal Outcome Evaluation:              Outcome Evaluation: Pt admitted from 5S last night. Continued tx for splenic infarct. VSS. Room air. Wears home CPAP machine when sleeping. Heparin gtt continued. Standby assist. Pt safety maintained.

## 2025-03-14 NOTE — CASE MANAGEMENT/SOCIAL WORK
Case Management Discharge Note      Final Note: Home with Spouse on Eliquis. Eliquis Copay $10/month with copay card. Family transport         Selected Continued Care - Admitted Since 3/9/2025       Destination    No services have been selected for the patient.                Durable Medical Equipment    No services have been selected for the patient.                Dialysis/Infusion    No services have been selected for the patient.                Home Medical Care    No services have been selected for the patient.                Therapy    No services have been selected for the patient.                Community Resources    No services have been selected for the patient.                Community & DME    No services have been selected for the patient.                    Transportation Services  Private: Car    Final Discharge Disposition Code: 01 - home or self-care

## 2025-03-14 NOTE — PROGRESS NOTES
ID note for endocarditis?  Subjective: He is feeling okay today.  Afebrile.  On room air.  No significant pain  Physical Exam:   Vital Signs   Temp:  [97.8 °F (36.6 °C)-99.1 °F (37.3 °C)] 99.1 °F (37.3 °C)  Heart Rate:  [67-95] 73  Resp:  [18] 18  BP: (123-144)/(82-97) 142/96    GENERAL: Awake and alert, in no acute distress.   HEENT: Oropharynx is clear. Hearing is grossly normal.   EYES: . No conjunctival injection. No lid lag.   LUNGS:normal respiratory effort.   SKIN: no cutaneous eruptions in exposed areas  PSYCHIATRIC: Appropriate mood, affect, insight, and judgment.     Results Review:  White count 8.28  Creatinine 0.94     2/27 blood cultures no growth x 3  3/10 GI PCR and C. difficile negative  3/12 blood cultures no growth to date      A/p  Splenic infarct  Abnormal SAWYER    Blood cultures remain no growth to date here.  Unlikely to have infective endocarditis (see full discussion from 3/13).  Would hold antibiotics and we will sign off.

## 2025-03-14 NOTE — DISCHARGE SUMMARY
NAME: Ismael Pulido ADMIT: 3/9/2025   : 1965  PCP: Sharon Silva MD    MRN: [id number] LOS: 3 days   AGE/SEX: 59 y.o. male  ROOM: 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 Likely cardioembolic on SAWYER with small PFO and mitral valve echodensities. He was seen by Cardiology and ID. Blood cultures were negative so did not feel this was endocarditis. Cardiology recommended indefinite systemic anticoagulation with eliquis with his recement pacemaker, PFO and possible degenerative changes or fibrin stranding on the valve with the spleen infacrtion. He feels well for dc today and will follow with his  Cardiologist as outpatient.    Has been having some GERD/GI issues with Semaglutide as an outpatient so will stop. Is on jardiance as outpatient. DM educator also discussed januvia which I have written an Rx and he will follow with Sharon Silva MD as outpatient.     DIAGNOSTICS    03/14/2025 0515 03/14/2025 0609 Basic Metabolic Panel [[id number]]    (Abnormal)   Blood    Final result Component Value Units   Glucose 99 mg/dL   BUN 6 mg/dL   Creatinine 0.94 mg/dL   Sodium 143 mmol/L   Potassium 3.7  mmol/L   Chloride 107 mmol/L   CO2 25.2 mmol/L   Calcium 8.9 mg/dL   BUN/Creatinine Ratio 6.4 Low     Anion Gap 10.8 mmol/L   eGFR 93.4 mL/min/1.73          03/14/2025 0515 03/14/2025 0609 Magnesium [[id number]]   Blood    Final result Component Value Units   Magnesium 1.8 mg/dL          03/14/2025 0515 03/14/2025 0609 Phosphorus [[id number]]   Blood    Final result Component Value Units   Phosphorus 3.6 mg/dL          03/14/2025 0515 03/14/2025 0549 CBC Auto Differential [[id number]]   Blood    Final result Component Value Units   WBC 8.28 10*3/mm3   RBC 4.48 10*6/mm3   Hemoglobin 13.1 g/dL   Hematocrit 40.6 %   MCV 90.6 fL   MCH 29.2 pg   MCHC 32.3 g/dL   RDW 13.0 %   RDW-SD 42.7 fl   MPV 8.4 fL   Platelets 149 10*3/mm3   Neutrophil % 61.7 %   Lymphocyte % 26.1 %   Monocyte % 7.6 %        03/12/2025 2042 03/13/2025 2115 Blood Culture - Blood, Hand, Left [[id number]]   Blood from Hand, Left    Preliminary result Component Value   Blood Culture No growth at 24 hours P             03/12/2025 2021 03/13/2025 2115 Blood Culture - Blood, Hand, Right [[id number]]   Blood from Hand, Right    Preliminary result Component Value   Blood Culture No growth at 24 hours P             03/12/2025 1905 03/13/2025 1915 Blood Culture - Blood, Hand, Right [[id number]]   Blood from Hand, Right    Preliminary result Component Value   Blood Culture No growth at 24 hours P             03/12/2025 1755 03/12/2025 1813 Potassium [[id number]]    Blood    Final result Component Value Units   Potassium 4.0 mmol/L          03/12/2025 1116 03/13/2025 1145 Blood Culture - Blood, Arm, Left [[id number]]   Blood from Arm, Left    Preliminary result      SAWYER 3.12.25    Left ventricular systolic function is low normal. Calculated left ventricular EF = 45.9%    The following left ventricular wall segments are hypokinetic: apical lateral, apical septal and apex hypokinetic.    Mildly reduced right ventricular systolic function noted.    The right ventricular cavity is mildly dilated.    Small patent foramen ovale present.    Saline test results are positive for right to left atrial level shunt.        CT Angiogram Abdomen Pelvis [[id number]] Juan F as Reviewed   Order Status: Completed Collected: 03/12/25 1831    Updated: 03/12/25 1916   Addenda:       Call Report: Dr. Jane was notified by telephone of the above findings on March 12, 2025 at 7:12 p.m.    This report was finalized on 3/12/2025 7:12 PM by Dr. Ismael Romano M.D on Workstation: [id number]    Signed: 03/12/25 1912 by Ismael Romano MD   Narrative:     CT ANGIOGRAM CHEST-, CT ANGIOGRAM ABDOMEN PELVIS-     INDICATION: Evaluate for arterial source of cryptogenic emboli to the  spleen     COMPARISON: CT abdomen pelvis March 9, 2025 and CT chest February 27, 2025     TECHNIQUE:  CTA chest, abdomen and pelvis with IV contrast. Coronal and sagittal  reformats. Three dimensional reconstructions. Radiation dose reduction  techniques were utilized, including automated exposure control and  exposure modulation based on body size.     FINDINGS:     Chest wall: No lymphadenopathy. Left-sided cardiac resynchronization  therapy pacemaker with a right atrial, right ventricular and coronary  sinus lead.     Mediastinum: Heart is normal in size. No pericardial effusion. No  thoracic aortic aneurysm or dissection. Mild mediastinal adenopathy. For  example, right paratracheal/2R lymph node, series 4, axial image  39,  measures 1.2 cm, unchanged. For example, subcarinal lymph node, series  4, axial mage 78, measures 1.4 cm, unchanged. Right hilar lymph node,  series 4, axial mage 74, measures 8 mm in short axis, unchanged. Poor  opacification of the pulmonary arterial system though there our areas  suspicious for segmental and subsegmental pulmonary embolism. For  example, suspicion for subsegmental pulmonary embolism in the apical  right upper lobe, series 4, axial mage 48. For example, suspicion for  segmental/subsegmental pulmonary embolism in the superior segment right  lower lobe on series 4, axial mage 66. For example, suspicion for  subsegmental pulmonary embolism in the posterior basilar right lower  lobe on series 4, axial mage 82.     Lungs/pleura: Mild left and small right pleural effusions. Small amount  of secretions seen in the dependent trachea. Posterior dependent and  basilar subsegmental atelectasis seen in the right lung. Partial  atelectasis seen in the dependent and basilar left lower lobe.     ABDOMEN: Elevated right hemidiaphragm. Low attenuating lesion seen  beneath the capsule of segment 6 of the right hepatic lobe, suspect a  small cyst. Normal gallbladder. No biliary ductal dilatation.  Wedge-shaped area of hypoattenuation in the superior spleen, consistent  with infarct, appears stable. Spleen is normal in size. No pancreatic  mass or pancreatic ductal dilatation seen. No adrenal nodules. No renal  mass or hydronephrosis.     Pelvis: Prostate is normal in size. Moderate bladder distention. No  bladder calculus. Trace free fluid seen in the pelvic cavity.     Bowel: No small bowel obstruction. Colonic diverticulosis. Small amount  of free fluid seen in the left paracolic gutter. Normal appendix.     Abdominal wall: Rectus diastases. Periumbilical abdominal wall mesh.  Fat-containing left inguinal hernia.     Retroperitoneum: No lymphadenopathy.     Vasculature: No abdominal aortic aneurysm or  "dissection. Splenic artery  is patent, without stenosis. Mild aortoiliac atherosclerotic  calcification.     Osseous structures: No destructive osseous lesions.      Impression:        1. Wedge-shaped area of hypoattenuation in the superior spleen,  consistent with a splenic infarct. No arterial source for the infarct  seen.  2. Poor opacification the pulmonary arterial system however there is  suspicion for segmental/subsegmental pulmonary embolism. Follow-up CTA  chest could better evaluate.  3. Mild left and small right pleural effusions are stable, with adjacent  passive partial atelectasis in the left lower lobe.  4. Stable mild mediastinal lymphadenopathy.  5. Small amount of free fluid seen in the abdomen and pelvis.  6. Colonic diverticulosis          PHYSICAL EXAM  Objective:  Vital signs: (most recent): Blood pressure 142/96, pulse 73, temperature 99.1 °F (37.3 °C), temperature source Oral, resp. rate 18, height 172.7 cm (68\"), weight 103 kg (228 lb), SpO2 94%.                Alert  nad  No resp distress  RRR  Wishes for discharge today     CONDITION ON DISCHARGE  Stable.      DISCHARGE DISPOSITION   Home or Self Care      DISCHARGE MEDICATIONS       Your medication list        START taking these medications        Instructions Last Dose Given Next Dose Due   apixaban 5 MG tablet tablet  Commonly known as: ELIQUIS      Take 1 tablet by mouth Every 12 (Twelve) Hours. Indications: Other - full anticoagulation       pantoprazole 40 MG EC tablet  Commonly known as: PROTONIX      Take 1 tablet by mouth 2 (Two) Times a Day.       SITagliptin 100 MG tablet  Commonly known as: Januvia      Take 1 tablet by mouth Daily.              CHANGE how you take these medications        Instructions Last Dose Given Next Dose Due   losartan 50 MG tablet  Commonly known as: COZAAR  What changed:   medication strength  how much to take      Take 1 tablet by mouth Daily.              CONTINUE taking these medications        " Instructions Last Dose Given Next Dose Due   Claritin 10 MG tablet  Generic drug: loratadine      Take 1 tablet by mouth Daily.       clotrimazole 1 % cream  Commonly known as: LOTRIMIN      Apply 1 Application topically to the appropriate area as directed 2 (Two) Times a Day.       CORICIDIN HBP PO      Take 2 tablets by mouth 2 (Two) Times a Day.       [facility] Vitamin C 1000 MG tablet  Generic drug: ascorbic acid      Take 4 tablets by mouth Daily.       empagliflozin 25 MG tablet tablet  Commonly known as: Jardiance      Take 1 tablet by mouth Daily.       NEURIVA PO      Take  by mouth Daily.       NON FORMULARY      Take  by mouth Daily. Cellucare       NON FORMULARY      Take  by mouth Daily. Nugenix Ultimate       rosuvastatin 10 MG tablet  Commonly known as: CRESTOR      Take 1 tablet by mouth Daily.              STOP taking these medications      lansoprazole 30 MG capsule  Commonly known as: PREVACID        Semaglutide 3 MG tablet                  Where to Get Your Medications        These medications were sent to [place] [facility] [id number]      Hours: Monday to Friday 7 AM to 6 PM, Saturday & Sunday 8 AM to 4:30 PM (Closed 12 PM to 12:30 PM) Phone: [phone]   apixaban 5 MG tablet tablet  losartan 50 MG tablet  pantoprazole 40 MG EC tablet  SITagliptin 100 MG tablet          Future Appointments   Date Time Provider Department Center   4/10/2025  9:30 AM Rima Salazar APRN [facility]   4/17/2025 11:30 AM Dez Loco MD [facility]   5/9/2025 11:00 AM Sharon Silva MD [facility]     Additional Instructions for the Follow-ups that You Need to Schedule       Discharge Follow-up with Specialty: PCP in 1-2 weeks, Cardiology in 2 weeks   As directed      Specialty: PCP in 1-2 weeks, Cardiology in 2 weeks               Follow-up Information       Sharon Silva MD .    Specialty: Internal Medicine &  Pediatrics  Contact information:  [address]  [address]  [place] [address]  [phone]                             TEST  RESULTS PENDING AT DISCHARGE  Pending Labs       Order Current Status    Blood Culture - Blood, Arm, Left Preliminary result    Blood Culture - Blood, Hand, Left Preliminary result    Blood Culture - Blood, Hand, Right Preliminary result    Blood Culture - Blood, Hand, Right Preliminary result               Luis Felipe Lu MD  [facility]  03/14/25  11:13 EDT      Time: greater than 32 minutes on discharge  It was a pleasure taking care of this patient while in the hospital.

## 2025-03-17 ENCOUNTER — TRANSITIONAL CARE MANAGEMENT TELEPHONE ENCOUNTER (OUTPATIENT)
Dept: CALL CENTER | Facility: HOSPITAL | Age: 60
End: 2025-03-17
Payer: COMMERCIAL

## 2025-03-17 LAB
BACTERIA SPEC AEROBE CULT: NORMAL

## 2025-03-17 NOTE — OUTREACH NOTE
"Call Center TCM Note      Flowsheet Row Responses   [facility] patient discharged from? [place]   Does the patient have one of the following disease processes/diagnoses(primary or secondary)? Other   TCM attempt successful? Yes   Call start time 1258   Call end time 1306   Meds reviewed with patient/caregiver? Yes   Is the patient having any side effects they believe may be caused by any medication additions or changes? Yes   Side effects comments  Pt feels he has developed hives possibly r/t januvia, which he has stopped   Does the patient have all medications ordered at discharge? Yes   Is the patient taking all medications as directed (includes completed medication regime)? Yes   Comments Hospital d/c f/u appt on 3/25/25 @11:30am   Does the patient have an appointment with their PCP within 7-14 days of discharge? Yes   Has home health visited the patient within 72 hours of discharge? N/A   Psychosocial issues? No   Did the patient receive a copy of their discharge instructions? Yes   Nursing interventions Reviewed instructions with patient   What is the patient's perception of their health status since discharge? New symptoms unrelated to diagnosis  [scattered hives noted on his body, reports he has a \"catch\" in his left hip, when moving from left to right and sitting position. Enc pt to seek medical help if symptoms worsen or having facial, throat swelling, difficuly breathing, cp/sob. Pt VU]   Is the patient/caregiver able to teach back the hierarchy of who to call/visit for symptoms/problems? PCP, Specialist, Home health nurse, Urgent Care, ED, 911 Yes   TCM call completed? Yes   Call end time 1306   Would this patient benefit from a Referral to Amb Social Work? No   Is the patient interested in additional calls from an ambulatory ? No            Latosha Ontiveros RN    3/17/2025, 13:08 EDT        "

## 2025-03-24 DIAGNOSIS — E11.9 TYPE 2 DIABETES MELLITUS WITHOUT COMPLICATION, WITHOUT LONG-TERM CURRENT USE OF INSULIN: ICD-10-CM

## 2025-03-24 RX ORDER — EMPAGLIFLOZIN 25 MG/1
25 TABLET, FILM COATED ORAL DAILY
Qty: 30 TABLET | Refills: 5 | Status: ON HOLD | OUTPATIENT
Start: 2025-03-24

## 2025-03-25 ENCOUNTER — READMISSION MANAGEMENT (OUTPATIENT)
Dept: CALL CENTER | Facility: HOSPITAL | Age: 60
End: 2025-03-25
Payer: COMMERCIAL

## 2025-03-25 ENCOUNTER — OFFICE VISIT (OUTPATIENT)
Dept: FAMILY MEDICINE CLINIC | Facility: CLINIC | Age: 60
End: 2025-03-25
Payer: COMMERCIAL

## 2025-03-25 VITALS
HEIGHT: 68 IN | DIASTOLIC BLOOD PRESSURE: 84 MMHG | WEIGHT: 230.25 LBS | HEART RATE: 48 BPM | RESPIRATION RATE: 18 BRPM | SYSTOLIC BLOOD PRESSURE: 120 MMHG | BODY MASS INDEX: 34.9 KG/M2 | OXYGEN SATURATION: 97 %

## 2025-03-25 DIAGNOSIS — Z95.0 S/P PLACEMENT OF CARDIAC PACEMAKER: ICD-10-CM

## 2025-03-25 DIAGNOSIS — R00.1 BRADYCARDIA: Primary | ICD-10-CM

## 2025-03-25 NOTE — PROGRESS NOTES
Transitional Care Follow Up Visit  Subjective     Ismael Pulido is a 59 y.o. male who presents for a transitional care management visit.    Within 48 business hours after discharge our office contacted him via telephone to coordinate his care and needs.      I reviewed and discussed the details of that call along with the discharge summary, hospital problems, inpatient lab results, inpatient diagnostic studies, and consultation reports with Ismael.     Current outpatient and discharge medications have been reconciled for the patient.  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 Likely cardioembolic on SAWYER with small PFO and mitral valve echodensities. He was seen by Cardiology and ID. Blood cultures were negative so did not feel this was endocarditis. Cardiology recommended indefinite systemic anticoagulation with eliquis with his recement pacemaker, PFO and possible degenerative changes or fibrin stranding on the valve with the spleen infacrtion. He feels well for dc today and will follow with his Cardiologist as outpatient.     Has been having some GERD/GI issues with Semaglutide as an outpatient so will stop. Is on jardiance as outpatient.      HPI:  History of Present Illness  The patient presents for evaluation of bradycardia, blood clot in the spleen, allergic reaction to Januvia, and sinus congestion.    Bradycardia  - He has a pacemaker in place, with a setting in the 60s.  - He had a scheduled appointment with Dr. Angel last week, but  it was deemed unnecessary.    Blood clot in the spleen  - He has a history of cardiac complications and a blood clot in the spleen.  - He was advised to continue Eliquis indefinitely.  - He was seen by a cardiologist at another hospital, who expressed concern about potential blood clots.  - A transesophageal echocardiogram was performed, revealing some buildup on the mitral valve, but no evidence of endocarditis.  - Blood cultures were negative.  - No other significant sources of potential blood clots were identified.  - He reports that his blood clots are less hard and solid than before, suggesting that the Eliquis is effective.  - He is curious if his increased dose of losartan could be contributing to his blood clots.  - He notes that his blood flows more easily and takes longer to clot since starting Eliquis.  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  - His wife has suggested that he move to an office job to avoid dangerous activities.    Allergic reaction to Januvia  - He was previously on Januvia, which was discontinued due to gastrointestinal side effects.  - He experienced a severe allergic reaction to Januvia, manifesting as hives, necessitating discontinuation of the medication.  - The hives were managed with a 3-day course of Benadryl.  - He reports no recurrence of the rash after the initial 3-day course of Benadryl.    Sinus congestion  - He reports tinnitus and ear congestion, which he attributes to sinus issues.  - He had difficulty hearing in both ears this morning, but this was resolved with Coricidin and Claritin.  - He has been taking Claritin for an extended period.    Supplemental information: He has a history of influenza. He has a known mitral valve issue, which is neither viral nor bacterial in origin, as confirmed by negative blood cultures. He underwent an upper endoscopy, which revealed no ulcers but identified a blockage in the  "intestines. He was subsequently rehydrated with fluids. He reports no symptoms suggestive of diverticulitis. He has a small hole in his heart, which can be manipulated to cause complications. He was informed that a small triangular part of his spleen is necrotic, potentially contributing to his pain. He was also on Protonix during his hospital stay. He has been experiencing rashes, which are slightly inflamed but tend to resolve spontaneously.    FAMILY HISTORY  - Daughter has an Rh factor that causes more blood clots           The following portions of the patient's history were reviewed and updated as appropriate: allergies, current medications, past family history, past medical history, past social history, past surgical history, and problem list.    Review of Systems   Constitutional: Negative.    HENT: Negative.     Respiratory: Negative.     Cardiovascular: Negative.    Gastrointestinal: Negative.    Skin: Negative.        Objective   Resp 18   Ht 172.7 cm (68\")   Wt 104 kg (230 lb 4 oz)   BMI 35.01 kg/m²   Physical Exam  Constitutional:       General: He is not in acute distress.     Appearance: Normal appearance. He is not ill-appearing.   HENT:      Mouth/Throat:      Mouth: Mucous membranes are moist.   Eyes:      Conjunctiva/sclera: Conjunctivae normal.   Cardiovascular:      Rate and Rhythm: Normal rate and regular rhythm.      Heart sounds: Normal heart sounds.   Pulmonary:      Effort: Pulmonary effort is normal.      Breath sounds: Normal breath sounds.   Musculoskeletal:      Cervical back: Normal range of motion and neck supple.      Right lower leg: No edema.      Left lower leg: No edema.   Neurological:      Mental Status: He is alert.         ECG 12 Lead    Date/Time: 3/25/2025 12:00 PM  Performed by: Sharon Silva MD    Authorized by: Sharon Silva MD  Rhythm: paced  Rate: normal  Pacing: atrial sensed rhythm and ventricular paced rhythm  Clinical impression: " non-specific ECG  Comments: Atrial-sensed, ventricular-paced rhythm is intact. P waves are consistently followed by paced QRS complexes, indicating appropriate tracking and ventricular capture. No evidence of loss of capture, oversensing, or undersensing on this strip.           Assessment & Plan   Diagnoses and all orders for this visit:    1. Bradycardia (Primary)  -     ECG 12 Lead    2. S/P placement of cardiac pacemaker  -     ECG 12 Lead      Assessment & Plan  Bradycardia  The patient's heart rate has been consistently low, with readings in the 40s over the past week, despite having a pacemaker. This could be due to the pacemaker not functioning optimally.  PLAN:  Diagnostic plan: An EKG will be conducted today to assess the pacing of his heartbeats. If the EKG results indicate any abnormalities, a consultation with the cardiologist will be arranged.    Blood clot in the spleen  The patient has a history of a blood clot in the spleen, which could be causing his pain. He is currently on Eliquis to prevent further clots.  PLAN:  Clinical decision making: The potential for internal bleeding due to his occupation in a lumber yard was discussed, and he was advised to consider transitioning to a safer job role.    Allergic reaction to Januvia  The patient experienced hives after taking Januvia and has since discontinued its use. He took Benadryl for 3 days to alleviate the hives, which resolved the issue.  PLAN:  Treatment plan: No new medications will be added at this time.    Sinus congestion  The patient reports sinus congestion and ringing in the ears, which he believes is due to his sinuses. He has been using Claritin, which helps manage his symptoms.  PLAN:  Treatment plan: He was advised to continue using Claritin and consider switching to Zyrtec or Allegra if needed.

## 2025-03-25 NOTE — OUTREACH NOTE
Medical Week 2 Survey      Flowsheet Row Responses   [facility] patient discharged from? [place]   Does the patient have one of the following disease processes/diagnoses(primary or secondary)? Other   Week 2 attempt successful? No   Unsuccessful attempts Attempt 1  [Pt at f/u appt]            Sofía DOHERTY - Licensed Nurse

## 2025-03-27 ENCOUNTER — HOSPITAL ENCOUNTER (INPATIENT)
Facility: HOSPITAL | Age: 60
LOS: 6 days | Discharge: HOME | End: 2025-04-02
Attending: EMERGENCY MEDICINE | Admitting: INTERNAL MEDICINE
Payer: COMMERCIAL

## 2025-03-27 ENCOUNTER — TELEPHONE (OUTPATIENT)
Dept: CARDIOLOGY | Facility: CLINIC | Age: 60
End: 2025-03-27
Payer: COMMERCIAL

## 2025-03-27 ENCOUNTER — READMISSION MANAGEMENT (OUTPATIENT)
Dept: CALL CENTER | Facility: HOSPITAL | Age: 60
End: 2025-03-27
Payer: COMMERCIAL

## 2025-03-27 ENCOUNTER — APPOINTMENT (OUTPATIENT)
Dept: GENERAL RADIOLOGY | Facility: HOSPITAL | Age: 60
End: 2025-03-27
Payer: COMMERCIAL

## 2025-03-27 DIAGNOSIS — Z95.0 STATUS POST PLACEMENT OF CARDIAC PACEMAKER: ICD-10-CM

## 2025-03-27 DIAGNOSIS — I44.2 INTERMITTENT COMPLETE HEART BLOCK: ICD-10-CM

## 2025-03-27 DIAGNOSIS — I47.20 VENTRICULAR TACHYCARDIA: Primary | ICD-10-CM

## 2025-03-27 DIAGNOSIS — I44.1 MOBITZ TYPE 2 SECOND DEGREE ATRIOVENTRICULAR BLOCK: ICD-10-CM

## 2025-03-27 PROBLEM — R00.0 WIDE-COMPLEX TACHYCARDIA: Status: ACTIVE | Noted: 2025-03-27

## 2025-03-27 LAB
ANION GAP SERPL CALCULATED.3IONS-SCNC: 12.9 MMOL/L (ref 5–15)
APTT PPP: 29.6 SECONDS (ref 22.7–35.4)
B PARAPERT DNA SPEC QL NAA+PROBE: NOT DETECTED
B PERT DNA SPEC QL NAA+PROBE: NOT DETECTED
BASOPHILS # BLD AUTO: 0.15 10*3/MM3 (ref 0–0.2)
BASOPHILS NFR BLD AUTO: 1.3 % (ref 0–1.5)
BUN SERPL-MCNC: 11 MG/DL (ref 6–20)
BUN/CREAT SERPL: 10.4 (ref 7–25)
C PNEUM DNA NPH QL NAA+NON-PROBE: NOT DETECTED
CALCIUM SPEC-SCNC: 9.7 MG/DL (ref 8.6–10.5)
CHLORIDE SERPL-SCNC: 101 MMOL/L (ref 98–107)
CO2 SERPL-SCNC: 25.1 MMOL/L (ref 22–29)
CREAT SERPL-MCNC: 1.06 MG/DL (ref 0.76–1.27)
DEPRECATED RDW RBC AUTO: 43.5 FL (ref 37–54)
EGFRCR SERPLBLD CKD-EPI 2021: 80.8 ML/MIN/1.73
EOSINOPHIL # BLD AUTO: 0.58 10*3/MM3 (ref 0–0.4)
EOSINOPHIL NFR BLD AUTO: 5.1 % (ref 0.3–6.2)
ERYTHROCYTE [DISTWIDTH] IN BLOOD BY AUTOMATED COUNT: 13.2 % (ref 12.3–15.4)
FLUAV SUBTYP SPEC NAA+PROBE: NOT DETECTED
FLUBV RNA ISLT QL NAA+PROBE: NOT DETECTED
GEN 5 1HR TROPONIN T REFLEX: 42 NG/L
GLUCOSE BLDC GLUCOMTR-MCNC: 190 MG/DL (ref 70–105)
GLUCOSE SERPL-MCNC: 141 MG/DL (ref 65–99)
HADV DNA SPEC NAA+PROBE: NOT DETECTED
HCOV 229E RNA SPEC QL NAA+PROBE: NOT DETECTED
HCOV HKU1 RNA SPEC QL NAA+PROBE: NOT DETECTED
HCOV NL63 RNA SPEC QL NAA+PROBE: NOT DETECTED
HCOV OC43 RNA SPEC QL NAA+PROBE: NOT DETECTED
HCT VFR BLD AUTO: 49.4 % (ref 37.5–51)
HGB BLD-MCNC: 15.8 G/DL (ref 13–17.7)
HMPV RNA NPH QL NAA+NON-PROBE: NOT DETECTED
HOLD SPECIMEN: NORMAL
HOLD SPECIMEN: NORMAL
HPIV1 RNA ISLT QL NAA+PROBE: NOT DETECTED
HPIV2 RNA SPEC QL NAA+PROBE: NOT DETECTED
HPIV3 RNA NPH QL NAA+PROBE: NOT DETECTED
HPIV4 P GENE NPH QL NAA+PROBE: NOT DETECTED
IMM GRANULOCYTES # BLD AUTO: 0.2 10*3/MM3 (ref 0–0.05)
IMM GRANULOCYTES NFR BLD AUTO: 1.8 % (ref 0–0.5)
INR PPP: 1.22 (ref 0.9–1.1)
LYMPHOCYTES # BLD AUTO: 2.83 10*3/MM3 (ref 0.7–3.1)
LYMPHOCYTES NFR BLD AUTO: 25 % (ref 19.6–45.3)
M PNEUMO IGG SER IA-ACNC: NOT DETECTED
MAGNESIUM SERPL-MCNC: 2.3 MG/DL (ref 1.6–2.6)
MCH RBC QN AUTO: 28.7 PG (ref 26.6–33)
MCHC RBC AUTO-ENTMCNC: 32 G/DL (ref 31.5–35.7)
MCV RBC AUTO: 89.8 FL (ref 79–97)
MONOCYTES # BLD AUTO: 0.71 10*3/MM3 (ref 0.1–0.9)
MONOCYTES NFR BLD AUTO: 6.3 % (ref 5–12)
MRSA DNA SPEC QL NAA+PROBE: NORMAL
NEUTROPHILS NFR BLD AUTO: 6.87 10*3/MM3 (ref 1.7–7)
NEUTROPHILS NFR BLD AUTO: 60.5 % (ref 42.7–76)
NRBC BLD AUTO-RTO: 0 /100 WBC (ref 0–0.2)
PLATELET # BLD AUTO: 255 10*3/MM3 (ref 140–450)
PMV BLD AUTO: 8.9 FL (ref 6–12)
POTASSIUM SERPL-SCNC: 4.3 MMOL/L (ref 3.5–5.2)
PROTHROMBIN TIME: 15.3 SECONDS (ref 11.7–14.2)
RBC # BLD AUTO: 5.5 10*6/MM3 (ref 4.14–5.8)
RHINOVIRUS RNA SPEC NAA+PROBE: NOT DETECTED
RSV RNA NPH QL NAA+NON-PROBE: NOT DETECTED
SARS-COV-2 RNA RESP QL NAA+PROBE: NOT DETECTED
SODIUM SERPL-SCNC: 139 MMOL/L (ref 136–145)
TROPONIN T % DELTA: 5
TROPONIN T NUMERIC DELTA: 2 NG/L
TROPONIN T SERPL HS-MCNC: 40 NG/L
WBC NRBC COR # BLD AUTO: 11.34 10*3/MM3 (ref 3.4–10.8)
WHOLE BLOOD HOLD COAG: NORMAL
WHOLE BLOOD HOLD SPECIMEN: NORMAL

## 2025-03-27 PROCEDURE — 92960 CARDIOVERSION ELECTRIC EXT: CPT

## 2025-03-27 PROCEDURE — 25010000002 LIDOCAINE PER 10 MG: Performed by: INTERNAL MEDICINE

## 2025-03-27 PROCEDURE — 25810000003 LACTATED RINGERS SOLUTION: Performed by: EMERGENCY MEDICINE

## 2025-03-27 PROCEDURE — 85730 THROMBOPLASTIN TIME PARTIAL: CPT | Performed by: EMERGENCY MEDICINE

## 2025-03-27 PROCEDURE — 36415 COLL VENOUS BLD VENIPUNCTURE: CPT

## 2025-03-27 PROCEDURE — 99291 CRITICAL CARE FIRST HOUR: CPT

## 2025-03-27 PROCEDURE — 93005 ELECTROCARDIOGRAM TRACING: CPT | Performed by: EMERGENCY MEDICINE

## 2025-03-27 PROCEDURE — 83735 ASSAY OF MAGNESIUM: CPT | Performed by: EMERGENCY MEDICINE

## 2025-03-27 PROCEDURE — 5A2204Z RESTORATION OF CARDIAC RHYTHM, SINGLE: ICD-10-PCS | Performed by: INTERNAL MEDICINE

## 2025-03-27 PROCEDURE — 25010000002 AMIODARONE IN DEXTROSE 5% 150-4.21 MG/100ML-% SOLUTION

## 2025-03-27 PROCEDURE — 71045 X-RAY EXAM CHEST 1 VIEW: CPT

## 2025-03-27 PROCEDURE — 85025 COMPLETE CBC W/AUTO DIFF WBC: CPT | Performed by: EMERGENCY MEDICINE

## 2025-03-27 PROCEDURE — 80048 BASIC METABOLIC PNL TOTAL CA: CPT | Performed by: EMERGENCY MEDICINE

## 2025-03-27 PROCEDURE — 84484 ASSAY OF TROPONIN QUANT: CPT | Performed by: EMERGENCY MEDICINE

## 2025-03-27 PROCEDURE — 0202U NFCT DS 22 TRGT SARS-COV-2: CPT | Performed by: NURSE PRACTITIONER

## 2025-03-27 PROCEDURE — 92960 CARDIOVERSION ELECTRIC EXT: CPT | Performed by: INTERNAL MEDICINE

## 2025-03-27 PROCEDURE — 85610 PROTHROMBIN TIME: CPT | Performed by: EMERGENCY MEDICINE

## 2025-03-27 PROCEDURE — 99223 1ST HOSP IP/OBS HIGH 75: CPT | Performed by: INTERNAL MEDICINE

## 2025-03-27 PROCEDURE — 82948 REAGENT STRIP/BLOOD GLUCOSE: CPT

## 2025-03-27 PROCEDURE — 87641 MR-STAPH DNA AMP PROBE: CPT | Performed by: NURSE PRACTITIONER

## 2025-03-27 PROCEDURE — 25010000002 AMIODARONE IN DEXTROSE 5% 360-4.14 MG/200ML-% SOLUTION

## 2025-03-27 PROCEDURE — 82164 ANGIOTENSIN I ENZYME TEST: CPT | Performed by: INTERNAL MEDICINE

## 2025-03-27 RX ORDER — SODIUM CHLORIDE 0.9 % (FLUSH) 0.9 %
10 SYRINGE (ML) INJECTION AS NEEDED
Status: DISCONTINUED | OUTPATIENT
Start: 2025-03-27 | End: 2025-04-02 | Stop reason: HOSPADM

## 2025-03-27 RX ORDER — ALBUTEROL SULFATE 0.83 MG/ML
2.5 SOLUTION RESPIRATORY (INHALATION) ONCE
Status: DISCONTINUED | OUTPATIENT
Start: 2025-03-27 | End: 2025-03-27

## 2025-03-27 RX ORDER — LOSARTAN POTASSIUM 50 MG/1
50 TABLET ORAL DAILY
Status: DISCONTINUED | OUTPATIENT
Start: 2025-03-28 | End: 2025-03-28

## 2025-03-27 RX ORDER — ACETAMINOPHEN 325 MG/1
650 TABLET ORAL EVERY 4 HOURS PRN
Status: DISCONTINUED | OUTPATIENT
Start: 2025-03-27 | End: 2025-04-02 | Stop reason: HOSPADM

## 2025-03-27 RX ORDER — SODIUM CHLORIDE 0.9 % (FLUSH) 0.9 %
10 SYRINGE (ML) INJECTION EVERY 12 HOURS SCHEDULED
Status: DISCONTINUED | OUTPATIENT
Start: 2025-03-27 | End: 2025-04-02 | Stop reason: HOSPADM

## 2025-03-27 RX ORDER — ONDANSETRON 2 MG/ML
4 INJECTION INTRAMUSCULAR; INTRAVENOUS EVERY 6 HOURS PRN
Status: DISCONTINUED | OUTPATIENT
Start: 2025-03-27 | End: 2025-03-27 | Stop reason: SDUPTHER

## 2025-03-27 RX ORDER — ONDANSETRON 2 MG/ML
4 INJECTION INTRAMUSCULAR; INTRAVENOUS EVERY 6 HOURS PRN
Status: DISCONTINUED | OUTPATIENT
Start: 2025-03-27 | End: 2025-04-02 | Stop reason: HOSPADM

## 2025-03-27 RX ORDER — NITROGLYCERIN 0.4 MG/1
0.4 TABLET SUBLINGUAL
Status: DISCONTINUED | OUTPATIENT
Start: 2025-03-27 | End: 2025-04-02 | Stop reason: HOSPADM

## 2025-03-27 RX ORDER — IPRATROPIUM BROMIDE AND ALBUTEROL SULFATE 2.5; .5 MG/3ML; MG/3ML
3 SOLUTION RESPIRATORY (INHALATION) EVERY 6 HOURS PRN
Status: DISCONTINUED | OUTPATIENT
Start: 2025-03-27 | End: 2025-03-27

## 2025-03-27 RX ORDER — BISACODYL 5 MG/1
5 TABLET, DELAYED RELEASE ORAL DAILY PRN
Status: DISCONTINUED | OUTPATIENT
Start: 2025-03-27 | End: 2025-04-02 | Stop reason: HOSPADM

## 2025-03-27 RX ORDER — BISACODYL 10 MG
10 SUPPOSITORY, RECTAL RECTAL DAILY PRN
Status: DISCONTINUED | OUTPATIENT
Start: 2025-03-27 | End: 2025-04-02 | Stop reason: HOSPADM

## 2025-03-27 RX ORDER — AMOXICILLIN 250 MG
2 CAPSULE ORAL 2 TIMES DAILY PRN
Status: DISCONTINUED | OUTPATIENT
Start: 2025-03-27 | End: 2025-04-02 | Stop reason: HOSPADM

## 2025-03-27 RX ORDER — ONDANSETRON 4 MG/1
4 TABLET, ORALLY DISINTEGRATING ORAL EVERY 6 HOURS PRN
Status: DISCONTINUED | OUTPATIENT
Start: 2025-03-27 | End: 2025-03-27 | Stop reason: SDUPTHER

## 2025-03-27 RX ORDER — ACETAMINOPHEN 650 MG/1
650 SUPPOSITORY RECTAL EVERY 4 HOURS PRN
Status: DISCONTINUED | OUTPATIENT
Start: 2025-03-27 | End: 2025-04-02 | Stop reason: HOSPADM

## 2025-03-27 RX ORDER — SODIUM CHLORIDE 9 MG/ML
40 INJECTION, SOLUTION INTRAVENOUS AS NEEDED
Status: DISCONTINUED | OUTPATIENT
Start: 2025-03-27 | End: 2025-04-02 | Stop reason: HOSPADM

## 2025-03-27 RX ORDER — AMIODARONE HYDROCHLORIDE 150 MG/3ML
INJECTION, SOLUTION INTRAVENOUS
Status: DISCONTINUED
Start: 2025-03-27 | End: 2025-03-27 | Stop reason: WASHOUT

## 2025-03-27 RX ORDER — ETOMIDATE 2 MG/ML
INJECTION INTRAVENOUS
Status: ACTIVE
Start: 2025-03-27 | End: 2025-03-28

## 2025-03-27 RX ORDER — PANTOPRAZOLE SODIUM 40 MG/1
40 TABLET, DELAYED RELEASE ORAL 2 TIMES DAILY
Status: DISCONTINUED | OUTPATIENT
Start: 2025-03-27 | End: 2025-04-02 | Stop reason: HOSPADM

## 2025-03-27 RX ORDER — ALUMINA, MAGNESIA, AND SIMETHICONE 2400; 2400; 240 MG/30ML; MG/30ML; MG/30ML
15 SUSPENSION ORAL EVERY 6 HOURS PRN
Status: DISCONTINUED | OUTPATIENT
Start: 2025-03-27 | End: 2025-04-02 | Stop reason: HOSPADM

## 2025-03-27 RX ORDER — ROSUVASTATIN CALCIUM 10 MG/1
10 TABLET, COATED ORAL NIGHTLY
Status: DISCONTINUED | OUTPATIENT
Start: 2025-03-27 | End: 2025-04-02 | Stop reason: HOSPADM

## 2025-03-27 RX ORDER — LIDOCAINE HYDROCHLORIDE ANHYDROUS AND DEXTROSE MONOHYDRATE 5; 400 G/100ML; MG/100ML
2 INJECTION, SOLUTION INTRAVENOUS CONTINUOUS
Status: DISCONTINUED | OUTPATIENT
Start: 2025-03-27 | End: 2025-03-28

## 2025-03-27 RX ORDER — LOSARTAN POTASSIUM 50 MG/1
50 TABLET ORAL
Status: DISCONTINUED | OUTPATIENT
Start: 2025-03-27 | End: 2025-03-27 | Stop reason: SDUPTHER

## 2025-03-27 RX ORDER — POLYETHYLENE GLYCOL 3350 17 G/17G
17 POWDER, FOR SOLUTION ORAL DAILY PRN
Status: DISCONTINUED | OUTPATIENT
Start: 2025-03-27 | End: 2025-04-02 | Stop reason: HOSPADM

## 2025-03-27 RX ORDER — ETOMIDATE 2 MG/ML
INJECTION INTRAVENOUS
Status: COMPLETED | OUTPATIENT
Start: 2025-03-27 | End: 2025-03-27

## 2025-03-27 RX ORDER — ONDANSETRON 4 MG/1
4 TABLET, ORALLY DISINTEGRATING ORAL EVERY 6 HOURS PRN
Status: DISCONTINUED | OUTPATIENT
Start: 2025-03-27 | End: 2025-04-02 | Stop reason: HOSPADM

## 2025-03-27 RX ORDER — CETIRIZINE HYDROCHLORIDE 10 MG/1
10 TABLET ORAL DAILY
Status: DISCONTINUED | OUTPATIENT
Start: 2025-03-28 | End: 2025-04-02 | Stop reason: HOSPADM

## 2025-03-27 RX ADMIN — Medication 10 ML: at 20:30

## 2025-03-27 RX ADMIN — MUPIROCIN 1 APPLICATION: 20 OINTMENT TOPICAL at 18:25

## 2025-03-27 RX ADMIN — AMIODARONE HYDROCHLORIDE 150 MG: 1.5 INJECTION, SOLUTION INTRAVENOUS at 14:43

## 2025-03-27 RX ADMIN — ETOMIDATE 10 MG: 2 INJECTION INTRAVENOUS at 16:37

## 2025-03-27 RX ADMIN — APIXABAN 5 MG: 5 TABLET, FILM COATED ORAL at 20:31

## 2025-03-27 RX ADMIN — LIDOCAINE HYDROCHLORIDE 2 MG/MIN: 4 INJECTION, SOLUTION INTRAVENOUS at 16:05

## 2025-03-27 RX ADMIN — ROSUVASTATIN CALCIUM 10 MG: 10 TABLET, FILM COATED ORAL at 20:31

## 2025-03-27 RX ADMIN — PANTOPRAZOLE SODIUM 40 MG: 40 TABLET, DELAYED RELEASE ORAL at 20:31

## 2025-03-27 RX ADMIN — SODIUM CHLORIDE, SODIUM LACTATE, POTASSIUM CHLORIDE, AND CALCIUM CHLORIDE 500 ML: .6; .31; .03; .02 INJECTION, SOLUTION INTRAVENOUS at 14:57

## 2025-03-27 RX ADMIN — AMIODARONE HYDROCHLORIDE 1 MG/MIN: 1.8 INJECTION, SOLUTION INTRAVENOUS at 14:53

## 2025-03-27 RX ADMIN — Medication 10 ML: at 20:31

## 2025-03-27 NOTE — CONSULTS
CARDIOLOGY CONSULT:    Ismael Pulido  1965  male  [id number]      Referring Provider: Intensivist  Reason for Consultation: Ventricular tachycardia tachycardia    Patient Care Team:  Sharon Silva MD as PCP - General (Internal Medicine & Pediatrics)  Rima Salazar APRN as Nurse Practitioner (Cardiology)    Chief complaint palpitations and shortness of breath palpitations and shortness of breath    Subjective .     History of present illness:  Ismael Pulido is a 59 y.o. male with history of sick sinus syndrome status post pacemaker placement history of recent splenic infarct diabetes hypertension hyperlipidemia and other medical problems presented to the hospital with complaints of palpitations and shortness of breath and had sustained ventricular tachycardia at a rate of 180 bpm and hence he was sent to the ER.  Patient was started on amiodarone and it did not help him and hence he was placed on lidocaine after stopping amiodarone but still he had ventricular tachycardia and hence he was admitted and underwent cardioversion and is currently sinus rhythm.  Patient states that has been taking his medicines regular.  No complaint of any chest pain.    Review of Systems   Constitutional: Negative for fever and malaise/fatigue.   HENT:  Negative for ear pain and nosebleeds.    Eyes:  Negative for blurred vision and double vision.   Cardiovascular:  Positive for palpitations. Negative for chest pain and dyspnea on exertion.   Respiratory:  Positive for shortness of breath. Negative for cough.    Skin:  Negative for rash.   Musculoskeletal:  Negative for joint pain.   Gastrointestinal:  Negative for abdominal pain, nausea and vomiting.   Neurological:  Negative for focal weakness and headaches.   Psychiatric/Behavioral:  Negative for depression. The patient is not nervous/anxious.    All other systems reviewed and are negative.      History  Past Medical History:   Diagnosis Date    Allergic  Called [employer] and the only manufacture that was affected was [employer] and is only the combination of the losartan with hydrochlorotizide. Not other and only the ones that was done on 10 08 18. There for no need to changed   1988    5+ molds, pollens, grass    Diabetes mellitus     Not sure like     Diverticulitis     Diverticulosis     Mot sure     GERD (gastroesophageal reflux disease)     Hypertension     Kidney stone     Not dure     Obesity     Not sure        Past Surgical History:   Procedure Laterality Date    CARDIAC ELECTROPHYSIOLOGY PROCEDURE N/A 2025    Procedure: Pacemaker DC new [place] aware;  Surgeon: Dez Loco MD;  Location: [facility] CATH INVASIVE LOCATION;  Service: Cardiovascular;  Laterality: N/A;    ENDOSCOPY N/A 2025    Procedure: ESOPHAGOGASTRODUODENOSCOPY;  Surgeon: Kiko Talbert MD;  Location: [facility] ENDOSCOPY;  Service: Gastroenterology;  Laterality: N/A;  POST-ESOPHAGITIS, GASTRITIS    HERNIA REPAIR         Family History   Problem Relation Age of Onset    Anxiety disorder Mother     Arthritis Mother     Depression Mother     Diabetes Mother     Hyperlipidemia Mother     Kidney disease Mother         Kidney stones    Cancer Father          of cancer in 2009    Diabetes Father        Social History     Tobacco Use    Smoking status: Never    Smokeless tobacco: Never   Vaping Use    Vaping status: Never Used   Substance Use Topics    Alcohol use: Never    Drug use: Never        (Not in a hospital admission)      Allergies: Januvia [sitagliptin] and Lisinopril    Scheduled Meds:apixaban, 5 mg, Oral, Q12H  [START ON 3/28/2025] cetirizine, 10 mg, Oral, Daily  [START ON 3/28/2025] empagliflozin, 25 mg, Oral, Daily  etomidate, , ,   losartan, 50 mg, Oral, Q24H  [Held by provider] losartan, 50 mg, Oral, Daily  mupirocin, 1 Application, Each Nare, BID  pantoprazole, 40 mg, Oral, BID  rosuvastatin, 10 mg, Oral, Nightly  sodium chloride, 10 mL, Intravenous, Q12H  sodium chloride, 10 mL, Intravenous, Q12H      Continuous Infusions:lidocaine cardiac, 2 mg/min, Last Rate: 2 mg/min (25 1605)      PRN Meds:.  acetaminophen **OR** acetaminophen     "aluminum-magnesium hydroxide-simethicone    senna-docusate sodium **AND** polyethylene glycol **AND** bisacodyl **AND** bisacodyl    Calcium Replacement - Follow Nurse / BPA Driven Protocol    etomidate    etomidate    Magnesium Low Dose Replacement - Follow Nurse / BPA Driven Protocol    nitroglycerin    ondansetron ODT **OR** ondansetron    Phosphorus Replacement - Follow Nurse / BPA Driven Protocol    Potassium Replacement - Follow Nurse / BPA Driven Protocol    [COMPLETED] Insert Peripheral IV **AND** sodium chloride    sodium chloride    sodium chloride    sodium chloride    sodium chloride    Objective     VITAL SIGNS  Vitals:    03/27/25 1617 03/27/25 1625 03/27/25 1641 03/27/25 1646   BP: (!) 88/64 (!) 88/68 111/84 114/87   Pulse: (!) 176 (!) 176 106 94   Resp:       Temp:       TempSrc:       SpO2: 96% 96% 93% 99%   Weight:       Height:           Flowsheet Rows      Flowsheet Row First Filed Value   Admission Height 172.7 cm (68\") Documented at 03/27/2025 1428   Admission Weight 104 kg (229 lb 4.5 oz) Documented at 03/27/2025 1428             TELEMETRY: Sustained ventricular tachycardia    Physical Exam:  Constitutional:       Appearance: Well-developed.   Eyes:      General: No scleral icterus.     Conjunctiva/sclera: Conjunctivae normal.      Pupils: Pupils are equal, round, and reactive to light.   HENT:      Head: Normocephalic and atraumatic.   Neck:      Vascular: No carotid bruit or JVD.   Pulmonary:      Effort: Pulmonary effort is normal.      Breath sounds: Normal breath sounds. No wheezing. No rales.   Cardiovascular:      Normal rate. Regular rhythm.   Pulses:     Intact distal pulses.   Abdominal:      General: Bowel sounds are normal.      Palpations: Abdomen is soft.   Musculoskeletal: Normal range of motion.      Cervical back: Normal range of motion and neck supple. Skin:     General: Skin is warm and dry.      Findings: No rash.   Neurological:      Mental Status: Alert.      Comments: No " focal deficits          Results Review:   I reviewed the patient's new clinical results.  Lab Results (last 24 hours)       Procedure Component Value Units Date/Time    MRSA Screen, PCR (Inpatient) - Swab, Nares [[id number]]  (Normal) Collected: 03/27/25 1514    Specimen: Swab from Nares Updated: 03/27/25 1633     MRSA PCR No MRSA Detected    Narrative:      The negative predictive value of this diagnostic test is high and should only be used to consider de-escalating anti-MRSA therapy. A positive result may indicate colonization with MRSA and must be correlated clinically.    High Sensitivity Troponin T 1Hr [[id number]]  (Abnormal) Collected: 03/27/25 1545    Specimen: Blood Updated: 03/27/25 1613     HS Troponin T 42 ng/L      Troponin T Numeric Delta 2 ng/L      Troponin T % Delta 5    Narrative:      High Sensitive Troponin T Reference Range:  <14.0 ng/L- Negative Female for AMI  <22.0 ng/L- Negative Male for AMI  >=14 - Abnormal Female indicating possible myocardial injury.  >=22 - Abnormal Male indicating possible myocardial injury.   Clinicians would have to utilize clinical acumen, EKG, Troponin, and serial changes to determine if it is an Acute Myocardial Infarction or myocardial injury due to an underlying chronic condition.         Respiratory Panel PCR w/COVID-19(SARS-CoV-2) [facility] In-House, NP Swab in UTM/VTM, 2 HR TAT - Swab, Nasopharynx [[id number]]  (Normal) Collected: 03/27/25 1514    Specimen: Swab from Nasopharynx Updated: 03/27/25 1608     ADENOVIRUS, PCR Not Detected     Coronavirus 229E Not Detected     Coronavirus HKU1 Not Detected     Coronavirus NL63 Not Detected     Coronavirus OC43 Not Detected     COVID19 Not Detected     Human Metapneumovirus Not Detected     Human Rhinovirus/Enterovirus Not Detected     Influenza A PCR Not Detected     Influenza B PCR Not Detected     Parainfluenza Virus 1 Not Detected     Parainfluenza Virus 2 Not Detected     Parainfluenza Virus 3 Not  Detected     Parainfluenza Virus 4 Not Detected     RSV, PCR Not Detected     Bordetella pertussis pcr Not Detected     Bordetella parapertussis PCR Not Detected     Chlamydophila pneumoniae PCR Not Detected     Mycoplasma pneumo by PCR Not Detected    Narrative:      In the setting of a positive respiratory panel with a viral infection PLUS a negative procalcitonin without other underlying concern for bacterial infection, consider observing off antibiotics or discontinuation of antibiotics and continue supportive care. If the respiratory panel is positive for atypical bacterial infection (Bordetella pertussis, Chlamydophila pneumoniae, or Mycoplasma pneumoniae), consider antibiotic de-escalation to target atypical bacterial infection.    Protime-INR [[id number]]  (Abnormal) Collected: 03/27/25 1432    Specimen: Blood Updated: 03/27/25 1535     Protime 15.3 Seconds      INR 1.22    aPTT [[id number]]  (Normal) Collected: 03/27/25 1432    Specimen: Blood Updated: 03/27/25 1535     PTT 29.6 seconds     High Sensitivity Troponin T [[id number]]  (Abnormal) Collected: 03/27/25 1432    Specimen: Blood Updated: 03/27/25 1500     HS Troponin T 40 ng/L     Narrative:      High Sensitive Troponin T Reference Range:  <14.0 ng/L- Negative Female for AMI  <22.0 ng/L- Negative Male for AMI  >=14 - Abnormal Female indicating possible myocardial injury.  >=22 - Abnormal Male indicating possible myocardial injury.   Clinicians would have to utilize clinical acumen, EKG, Troponin, and serial changes to determine if it is an Acute Myocardial Infarction or myocardial injury due to an underlying chronic condition.         Basic Metabolic Panel [[id number]]  (Abnormal) Collected: 03/27/25 1432    Specimen: Blood Updated: 03/27/25 1500     Glucose 141 mg/dL      BUN 11 mg/dL      Creatinine 1.06 mg/dL      Sodium 139 mmol/L      Potassium 4.3 mmol/L      Chloride 101 mmol/L      CO2 25.1 mmol/L      Calcium 9.7 mg/dL      BUN/Creatinine Ratio  10.4     Anion Gap 12.9 mmol/L      eGFR 80.8 mL/min/1.73     Narrative:      GFR Categories in Chronic Kidney Disease (CKD)      GFR Category          GFR (mL/min/1.73)    Interpretation  G1                     90 or greater         Normal or high (1)  G2                      60-89                Mild decrease (1)  G3a                   45-59                Mild to moderate decrease  G3b                   30-44                Moderate to severe decrease  G4                    15-29                Severe decrease  G5                    14 or less           Kidney failure          (1)In the absence of evidence of kidney disease, neither GFR category G1 or G2 fulfill the criteria for CKD.    eGFR calculation 2021 CKD-EPI creatinine equation, which does not include race as a factor    Magnesium [[id number]]  (Normal) Collected: 03/27/25 1432    Specimen: Blood Updated: 03/27/25 1500     Magnesium 2.3 mg/dL     [place] Draw [[id number]] Collected: 03/27/25 1432    Specimen: Blood Updated: 03/27/25 1445    Narrative:      The following orders were created for panel order [place] Draw.  Procedure                               Abnormality         Status                     ---------                               -----------         ------                     Green Top (Gel)[[id number]]                                  Final result               Lavender Top[[id number]]                                     Final result               Gold Top - SST[[id number]]                                   Final result               Light Blue Top[[id number]]                                   Final result                 Please view results for these tests on the individual orders.    Green Top (Gel) [[id number]] Collected: 03/27/25 1432    Specimen: Blood Updated: 03/27/25 1445     Extra Tube Hold for add-ons.     Comment: Auto resulted.       Lavender Top [[id number]] Collected: 03/27/25 1432    Specimen: Blood Updated: 03/27/25 1445     Extra  Tube hold for add-on     Comment: Auto resulted       Gold Top - SST [[id number]] Collected: 03/27/25 1432    Specimen: Blood Updated: 03/27/25 1445     Extra Tube Hold for add-ons.     Comment: Auto resulted.       Light Blue Top [[id number]] Collected: 03/27/25 1432    Specimen: Blood Updated: 03/27/25 1445     Extra Tube Hold for add-ons.     Comment: Auto resulted       CBC & Differential [[id number]]  (Abnormal) Collected: 03/27/25 1432    Specimen: Blood Updated: 03/27/25 1439    Narrative:      The following orders were created for panel order CBC & Differential.  Procedure                               Abnormality         Status                     ---------                               -----------         ------                     CBC Auto Differential[[id number]]        Abnormal            Final result                 Please view results for these tests on the individual orders.    CBC Auto Differential [[id number]]  (Abnormal) Collected: 03/27/25 1432    Specimen: Blood Updated: 03/27/25 1439     WBC 11.34 10*3/mm3      RBC 5.50 10*6/mm3      Hemoglobin 15.8 g/dL      Hematocrit 49.4 %      MCV 89.8 fL      MCH 28.7 pg      MCHC 32.0 g/dL      RDW 13.2 %      RDW-SD 43.5 fl      MPV 8.9 fL      Platelets 255 10*3/mm3      Neutrophil % 60.5 %      Lymphocyte % 25.0 %      Monocyte % 6.3 %      Eosinophil % 5.1 %      Basophil % 1.3 %      Immature Grans % 1.8 %      Neutrophils, Absolute 6.87 10*3/mm3      Lymphocytes, Absolute 2.83 10*3/mm3      Monocytes, Absolute 0.71 10*3/mm3      Eosinophils, Absolute 0.58 10*3/mm3      Basophils, Absolute 0.15 10*3/mm3      Immature Grans, Absolute 0.20 10*3/mm3      nRBC 0.0 /100 WBC             Imaging Results (Last 24 Hours)       Procedure Component Value Units Date/Time    XR Chest 1 View [[id number]] Collected: 03/27/25 1525     Updated: 03/27/25 1528    Narrative:      XR CHEST 1 VW    Date of Exam: 3/27/2025 3:08 PM EDT    Indication: Irregular  heartbeat    Comparison: CT chest 3/12/2025    Findings:  Heart size within normal limits allowing for exam technique. Multichamber AICD. Lordotic positioning. Defibrillator pads overlie the chest. No discrete lobar consolidation, edema, effusion or pneumothorax. Pleural effusions noted on recent CT not clearly   visualized. Degenerative elated osseous change. Low lung volumes.      Impression:      Impression:  No convincing acute cardiopulmonary abnormality. Pleural effusions on recent chest CT are not visualized on today's radiograph.      Electronically Signed: Nicholas Becerra MD    3/27/2025 3:26 PM EDT    Workstation ID: [id number]            EKG      I personally viewed and interpreted the patient's EKG/Telemetry data:    ECHOCARDIOGRAM:  Results for orders placed during the hospital encounter of 03/09/25    Adult Transesophageal Echo ([facility]) W/ Cont if Necessary Per Protocol    Interpretation Summary    Left ventricular systolic function is low normal. Calculated left ventricular EF = 45.9%    The following left ventricular wall segments are hypokinetic: apical lateral, apical septal and apex hypokinetic.    Mildly reduced right ventricular systolic function noted.    The right ventricular cavity is mildly dilated.    Small patent foramen ovale present.    Saline test results are positive for right to left atrial level shunt.         STRESS MYOVIEW:  Results for orders placed during the hospital encounter of 02/18/25    Stress Test With Myocardial Perfusion One Day    Interpretation Summary    Myocardial perfusion imaging indicates a normal myocardial perfusion study with no evidence of ischemia. Impressions are consistent with a low risk study.    Left ventricular ejection fraction is normal (Calculated EF = 64%).       CARDIAC CATHETERIZATION:    No results found for this or any previous visit.       OTHER:         MDM      Sustained ventricular tachycardia  Patient had sustained ventricular tachycardia and  was given IV amiodarone which did not help him and he was placed on lidocaine which also did not help him and hence he underwent synchronized cardioversion with 200 J and is converted to sinus rhythm and also paced rhythm now  Patient replaced on lidocaine overnight and then aortic physiology is already consulted and will address this duration  Patient is being ruled out for MI by EKG and enzymes  Patient also will be evaluated for sarcoidosis and also electrolytes will be checked.    Splenic infarct  Patient had a recent splenic infarct with a PFO and hence she has been checked for hypercoagulable state but he is on Eliquis which we will continue.    Sick sinus syndrome status post pacemaker placement  Patient had recent pacemaker placement which is working very well.    Hyperlipidemia  Patient is on Crestor and the lipid levels will be followed by the primary care doctor    Hypertension  Patient blood pressure currently stable on losartan    Diabetes  Patient is on sliding scale insulin and hemoglobin A1c still high.      I discussed the patients findings and my recommendations with patient and family and nurse    Win Reaves MD  03/27/25  16:48 EDT

## 2025-03-27 NOTE — ED PROVIDER NOTES
Subjective   History of Present Illness  Chief complaint rapid heartbeat    History of present illness a 59-year-old gentleman who has a history of heart disease with a pacemaker placed back in February.  The patient states his hearts been racing since about 2:00 in the morning.  Patient went to a [facility] urgent care and had his pacemaker interrogated and EKGs and was found to be in V. tach he was stable.  He has no complaints of pain just heart racing.  There is no chest pain neck arm jaw pain fever chills sweats cough congestion vomiting diarrhea no syncope.  He denies any change in medicines other than he was in the hospital at [facility] and discharged on 14 March he had a splenic infarct thought to be due to embolism from his heart he was placed on Eliquis at that time.  Pacemaker was adjusted.  No leg pain or swelling no recent long car ride plane ride or immobilization other than the recent hospitalization      Review of Systems   Constitutional:  Negative for chills and fever.   Respiratory:  Negative for chest tightness and shortness of breath.    Cardiovascular:  Positive for palpitations. Negative for chest pain and leg swelling.   Gastrointestinal:  Negative for abdominal pain and vomiting.   Musculoskeletal:  Negative for back pain and neck pain.   Skin:  Negative for rash.   Neurological:  Negative for dizziness and light-headedness.   Psychiatric/Behavioral:  Negative for confusion.        Past Medical History:   Diagnosis Date    Allergic 01/01/1988    5+ molds, pollens, grass    Diabetes mellitus     Not sure like 2020    Diverticulitis     Diverticulosis     Mot sure 1995    GERD (gastroesophageal reflux disease)     Hypertension     Kidney stone     Not dure 1995    Obesity     Not sure 1999       Allergies   Allergen Reactions    Januvia [Sitagliptin] Hives    Lisinopril Nausea And Vomiting       Past Surgical History:   Procedure Laterality Date    CARDIAC ELECTROPHYSIOLOGY PROCEDURE N/A  2025    Procedure: Pacemaker DC new boston aware;  Surgeon: Dez Loco MD;  Location: [facility] CATH INVASIVE LOCATION;  Service: Cardiovascular;  Laterality: N/A;    ENDOSCOPY N/A 2025    Procedure: ESOPHAGOGASTRODUODENOSCOPY;  Surgeon: Kiko Talbetr MD;  Location: [facility] ENDOSCOPY;  Service: Gastroenterology;  Laterality: N/A;  POST-ESOPHAGITIS, GASTRITIS    HERNIA REPAIR         Family History   Problem Relation Age of Onset    Anxiety disorder Mother     Arthritis Mother     Depression Mother     Diabetes Mother     Hyperlipidemia Mother     Kidney disease Mother         Kidney stones    Cancer Father          of cancer in 2009    Diabetes Father        Social History     Socioeconomic History    Marital status:    Tobacco Use    Smoking status: Never    Smokeless tobacco: Never   Vaping Use    Vaping status: Never Used   Substance and Sexual Activity    Alcohol use: Never    Drug use: Never    Sexual activity: Yes     Partners: Female     Prior to Admission medications    Medication Sig Start Date End Date Taking? Authorizing Provider   apixaban (ELIQUIS) 5 MG tablet tablet Take 1 tablet by mouth Every 12 (Twelve) Hours. Indications: Other - full anticoagulation 3/14/25  Yes Luis Felipe Lu MD   ascorbic acid ([employer] Vitamin C) 1000 MG tablet Take 4 tablets by mouth Daily.   Yes Albertina Goff MD   DM-APAP-CPM (CORICIDIN HBP PO) Take 2 tablets by mouth 2 (Two) Times a Day.   Yes Albertina Goff MD   Jardiance 25 MG tablet tablet TAKE 1 TABLET BY MOUTH DAILY 3/24/25  Yes Sharon Silva MD   loratadine (Claritin) 10 MG tablet Take 1 tablet by mouth Daily.   Yes Albertina Goff MD   losartan (COZAAR) 50 MG tablet Take 1 tablet by mouth Daily. 3/14/25  Yes Luis Felipe Lu MD   Misc Natural Products (NEURIVA PO) Take  by mouth Daily.   Yes Ablertina Goff MD   NON FORMULARY Take  by mouth Daily. Cellucare   Yes Albertina Goff  MD   NON FORMULARY Take  by mouth Daily. Nugenix Ultimate   Yes Provider, MD Albertina   pantoprazole (PROTONIX) 40 MG EC tablet Take 1 tablet by mouth 2 (Two) Times a Day. 3/14/25  Yes Luis Felipe Lu MD   rosuvastatin (CRESTOR) 10 MG tablet Take 1 tablet by mouth Daily. 2/7/25  Yes Sharon Silva MD   clotrimazole (LOTRIMIN) 1 % cream Apply 1 Application topically to the appropriate area as directed 2 (Two) Times a Day. 6/14/24   Sharon Silva MD          Objective   Physical Exam  Constitutional is a 59-year-old awake alert no acute distress sitting upright well-appearing nontoxic-appearing triage vital signs reviewed.  HEENT extraocular muscles are intact pupils equal round reactive sclera clear mouth clear.  Neck supple no adenopathy no JV no bruits no meningeal signs lungs clear no retraction no use of accessory heart regular tachycardic without murmur rub abdomen soft nontender good bowel sounds no pulsatile masses extremities pulses equal upper and lower extremities no edema cords or Homans' sign or evidence of DVT skin is warm and dry without rashes or cellulitic changes neurologic awake alert and follows commands motor strength normal without focal weakness  Procedures           ED Course      Results for orders placed or performed during the hospital encounter of 03/27/25   ECG 12 Lead Rhythm Change    Collection Time: 03/27/25  2:27 PM   Result Value Ref Range    QT Interval 304 ms    QTC Interval 531 ms   Basic Metabolic Panel    Collection Time: 03/27/25  2:32 PM    Specimen: Blood   Result Value Ref Range    Glucose 141 (H) 65 - 99 mg/dL    BUN 11 6 - 20 mg/dL    Creatinine 1.06 0.76 - 1.27 mg/dL    Sodium 139 136 - 145 mmol/L    Potassium 4.3 3.5 - 5.2 mmol/L    Chloride 101 98 - 107 mmol/L    CO2 25.1 22.0 - 29.0 mmol/L    Calcium 9.7 8.6 - 10.5 mg/dL    BUN/Creatinine Ratio 10.4 7.0 - 25.0    Anion Gap 12.9 5.0 - 15.0 mmol/L    eGFR 80.8 >60.0 mL/min/1.73   High  Sensitivity Troponin T    Collection Time: 03/27/25  2:32 PM    Specimen: Blood   Result Value Ref Range    HS Troponin T 40 (H) <22 ng/L   Magnesium    Collection Time: 03/27/25  2:32 PM    Specimen: Blood   Result Value Ref Range    Magnesium 2.3 1.6 - 2.6 mg/dL   Protime-INR    Collection Time: 03/27/25  2:32 PM    Specimen: Blood   Result Value Ref Range    Protime 15.3 (H) 11.7 - 14.2 Seconds    INR 1.22 (H) 0.90 - 1.10   aPTT    Collection Time: 03/27/25  2:32 PM    Specimen: Blood   Result Value Ref Range    PTT 29.6 22.7 - 35.4 seconds   CBC Auto Differential    Collection Time: 03/27/25  2:32 PM    Specimen: Blood   Result Value Ref Range    WBC 11.34 (H) 3.40 - 10.80 10*3/mm3    RBC 5.50 4.14 - 5.80 10*6/mm3    Hemoglobin 15.8 13.0 - 17.7 g/dL    Hematocrit 49.4 37.5 - 51.0 %    MCV 89.8 79.0 - 97.0 fL    MCH 28.7 26.6 - 33.0 pg    MCHC 32.0 31.5 - 35.7 g/dL    RDW 13.2 12.3 - 15.4 %    RDW-SD 43.5 37.0 - 54.0 fl    MPV 8.9 6.0 - 12.0 fL    Platelets 255 140 - 450 10*3/mm3    Neutrophil % 60.5 42.7 - 76.0 %    Lymphocyte % 25.0 19.6 - 45.3 %    Monocyte % 6.3 5.0 - 12.0 %    Eosinophil % 5.1 0.3 - 6.2 %    Basophil % 1.3 0.0 - 1.5 %    Immature Grans % 1.8 (H) 0.0 - 0.5 %    Neutrophils, Absolute 6.87 1.70 - 7.00 10*3/mm3    Lymphocytes, Absolute 2.83 0.70 - 3.10 10*3/mm3    Monocytes, Absolute 0.71 0.10 - 0.90 10*3/mm3    Eosinophils, Absolute 0.58 (H) 0.00 - 0.40 10*3/mm3    Basophils, Absolute 0.15 0.00 - 0.20 10*3/mm3    Immature Grans, Absolute 0.20 (H) 0.00 - 0.05 10*3/mm3    nRBC 0.0 0.0 - 0.2 /100 WBC   Green Top (Gel)    Collection Time: 03/27/25  2:32 PM   Result Value Ref Range    Extra Tube Hold for add-ons.    Lavender Top    Collection Time: 03/27/25  2:32 PM   Result Value Ref Range    Extra Tube hold for add-on    Gold Top - SST    Collection Time: 03/27/25  2:32 PM   Result Value Ref Range    Extra Tube Hold for add-ons.    Light Blue Top    Collection Time: 03/27/25  2:32 PM   Result  Value Ref Range    Extra Tube Hold for add-ons.    MRSA Screen, PCR (Inpatient) - Swab, Nares    Collection Time: 03/27/25  3:14 PM    Specimen: Nares; Swab   Result Value Ref Range    MRSA PCR No MRSA Detected No MRSA Detected   Respiratory Panel PCR w/COVID-19(SARS-CoV-2) MAC/DEVIN/RALPH/PAD/[facility]ANNER In-House, NP Swab in UTM/VTM, 2 HR TAT - Swab, Nasopharynx    Collection Time: 03/27/25  3:14 PM    Specimen: Nasopharynx; Swab   Result Value Ref Range    ADENOVIRUS, PCR Not Detected Not Detected    Coronavirus 229E Not Detected Not Detected    Coronavirus HKU1 Not Detected Not Detected    Coronavirus NL63 Not Detected Not Detected    Coronavirus OC43 Not Detected Not Detected    COVID19 Not Detected Not Detected - Ref. Range    Human Metapneumovirus Not Detected Not Detected    Human Rhinovirus/Enterovirus Not Detected Not Detected    Influenza A PCR Not Detected Not Detected    Influenza B PCR Not Detected Not Detected    Parainfluenza Virus 1 Not Detected Not Detected    Parainfluenza Virus 2 Not Detected Not Detected    Parainfluenza Virus 3 Not Detected Not Detected    Parainfluenza Virus 4 Not Detected Not Detected    RSV, PCR Not Detected Not Detected    Bordetella pertussis pcr Not Detected Not Detected    Bordetella parapertussis PCR Not Detected Not Detected    Chlamydophila pneumoniae PCR Not Detected Not Detected    Mycoplasma pneumo by PCR Not Detected Not Detected   High Sensitivity Troponin T 1Hr    Collection Time: 03/27/25  3:45 PM    Specimen: Blood   Result Value Ref Range    HS Troponin T 42 (H) <22 ng/L    Troponin T Numeric Delta 2 ng/L    Troponin T % Delta 5 Abnormal if >/= 20%   POC Glucose Once    Collection Time: 03/27/25  8:38 PM    Specimen: Blood   Result Value Ref Range    Glucose 190 (H) 70 - 105 mg/dL     XR Chest 1 View  Result Date: 3/27/2025  Impression: No convincing acute cardiopulmonary abnormality. Pleural effusions on recent chest CT are not visualized on today's radiograph.  Electronically Signed: Nicholsa Becerra MD  3/27/2025 3:26 PM EDT  Workstation ID: [id number]    Medications   sodium chloride 0.9 % flush 10 mL (has no administration in time range)   nitroglycerin (NITROSTAT) SL tablet 0.4 mg (has no administration in time range)   sodium chloride 0.9 % flush 10 mL (10 mL Intravenous Given 3/27/25 2031)   sodium chloride 0.9 % flush 10 mL (has no administration in time range)   sodium chloride 0.9 % infusion 40 mL (has no administration in time range)   mupirocin (BACTROBAN) 2 % nasal ointment 1 Application (1 Application Each Nare Not Given 3/27/25 2031)   aluminum-magnesium hydroxide-simethicone (MAALOX MAX) 400-400-40 MG/5ML suspension 15 mL (has no administration in time range)   sennosides-docusate (PERICOLACE) 8.6-50 MG per tablet 2 tablet (has no administration in time range)     And   polyethylene glycol (MIRALAX) packet 17 g (has no administration in time range)     And   bisacodyl (DULCOLAX) EC tablet 5 mg (has no administration in time range)     And   bisacodyl (DULCOLAX) suppository 10 mg (has no administration in time range)   acetaminophen (TYLENOL) tablet 650 mg (has no administration in time range)     Or   acetaminophen (TYLENOL) suppository 650 mg (has no administration in time range)   ondansetron ODT (ZOFRAN-ODT) disintegrating tablet 4 mg (has no administration in time range)     Or   ondansetron (ZOFRAN) injection 4 mg (has no administration in time range)   Potassium Replacement - Follow Nurse / BPA Driven Protocol (has no administration in time range)   Magnesium Low Dose Replacement - Follow Nurse / BPA Driven Protocol (has no administration in time range)   Phosphorus Replacement - Follow Nurse / BPA Driven Protocol (has no administration in time range)   Calcium Replacement - Follow Nurse / BPA Driven Protocol (has no administration in time range)   apixaban (ELIQUIS) tablet 5 mg (5 mg Oral Given 3/27/25 2031)   lidocaine bolus from bag 4 mg/mL solution 75  mg (75 mg Intravenous Bolus from Bag 3/27/25 1602)     And   lidocaine 4 mg/mL in dextrose 5% infusion (2 mg/min Intravenous New Bag 3/27/25 1605)   sodium chloride 0.9 % flush 10 mL (10 mL Intravenous Given 3/27/25 2030)   sodium chloride 0.9 % flush 10 mL (has no administration in time range)   sodium chloride 0.9 % infusion 40 mL (has no administration in time range)   rosuvastatin (CRESTOR) tablet 10 mg (10 mg Oral Given 3/27/25 2031)   empagliflozin (JARDIANCE) tablet 25 mg (has no administration in time range)   cetirizine (zyrTEC) tablet 10 mg (has no administration in time range)   losartan (COZAAR) tablet 50 mg ( Oral Dose Auto Held 4/5/25 0900)   pantoprazole (PROTONIX) EC tablet 40 mg (40 mg Oral Given 3/27/25 2031)   amiodarone 150 mg in 100 mL D5W (loading dose) (0 mg Intravenous Stopped 3/27/25 1453)   lactated ringers bolus 500 mL (0 mL Intravenous Stopped 3/27/25 1529)   etomidate (AMIDATE) injection (10 mg Intravenous Given 3/27/25 1637)                                            EKG my interpretation wide-complex tachycardia rate of 180 left bundle branch block QTc of 531 change from 3/12/2020 findings paced rhythm            Medical Decision Making  Records reviewed patient was discharged on 3/14/2025 he had a splenic infarct at that time he has some fibrin looking stuff on a valve he was anticoagulated.  This was embolic in nature pacemaker was adjusted.    Medical decision making.  IV established monitor placed my review shows a wide-complex tachycardia consistent with ventricular tachycardia but vital signs otherwise stable.  Resting comfortable looks well.  EKG obtained my interpretation is wide-complex tachycardia to 180 left bundle branch block QTc of 531 change from previous on 3/12/2020 when he had a paced rhythm.  Patient was made aware of this finding we will start him on a drip but was told that he may have to be cardioverted with electricity.  But wanted to try the medications first.   Labs obtained by independent reviewed patient's basic metabolic profile unremarkable potassium unremarkable magnesium was 2.3 patient is anticoagulated with DOAC made INR 1.2.  Troponin was 40 with repeat of 42.  Respiratory panel was negative.  CBC unremarkable and a white count 11.3.  Chest x-ray my dependent review no pneumonia pneumothorax failure acute findings radiology review the same.  The patient is stable he looks well no complaints initially was loaded on amiodarone 150 and placed on a drip.  He had no change in his status at that point.  He had been given a 500 cc lactated ringer bolus as well.  He remained stable blood pressure looked good but he was right about 100 but he had a MAP of 75.  He had no complaints of chest pain he looked well.  I do not see evidence suggest acute ST elevation myocardial infarction DVT pulmonary embolism aortic dissection pericarditis myocarditis pericardial effusion sepsis or bacteremia based on my history and physical clinic findings although not complete list of all possibilities.  The patient will be admitted to the ICU I did talk to the ICU.  The patient was also started on lidocaine and subsequently had no change as well.  Cardiac consultation placed hemoglobin ICU for further care.  Critical care 35 minutes.    Patient subsequently failed both amiodarone and lidocaine although he remained stable otherwise he was seen in the ER by cardiology and ICU and was subsequently cardioverted with 1 shock and converted out of the tachycardia.    Problems Addressed:  Ventricular tachycardia: complicated acute illness or injury    Amount and/or Complexity of Data Reviewed  External Data Reviewed: ECG.  Labs: ordered. Decision-making details documented in ED Course.  Radiology: ordered and independent interpretation performed. Decision-making details documented in ED Course.  ECG/medicine tests: ordered and independent interpretation performed. Decision-making details documented  in ED Course.    Risk  Drug therapy requiring intensive monitoring for toxicity.  Decision regarding hospitalization.        Final diagnoses:   Ventricular tachycardia       ED Disposition  ED Disposition       ED Disposition   Decision to Admit    Condition   --    Comment   Level of Care: Critical Care [6]   Diagnosis: Wide-complex tachycardia [497258]   Admitting Physician: PILLO SCHERER [[id number]]   Attending Physician: PILLO SCHERER [[id number]]   Certification: I Certify That Inpatient Hospital Services Are Medically Necessary For Greater Than 2 Midnights                 No follow-up provider specified.       Medication List      No changes were made to your prescriptions during this visit.            Moe Scherer MD  03/27/25 6527

## 2025-03-27 NOTE — H&P
"History and Physical   Ismael Pulido : 1965 MRN:[id number] LOS:0     Reason for admission: Wide-complex tachycardia     Assessment / Plan     Stable VT  -Received 150 mg IV amio bolus in ambulance and 360 mg x 2 IV boluses in ER w/o conversion  -Case d/w Dr.'s Angel and Jelly--gave 75 mg bolus IV lidocaine and started gtt at 2  -S/p cardioversion in ER with return to paced rhythm  -Continue Eliquis  -ACE level sent per cardiology request      Recent Splenic infarction  -Thought 2/2 PFO  -Started Eliquis 3/14  -Consult hematology for hypercoagulable w/u      DM-II, not well-controlled  -Cardiac, diabetic diet  -A1c 8.4% 25  -SSI for now      HLD  -Continue Crestor 10 mg po qhs      HTN  -Continue losartan 50 mg po daily       Code Status (Patient has no pulse and is not breathing): CPR (Attempt to Resuscitate)  Medical Interventions (Patient has pulse or is breathing): Full Support  Level Of Support Discussed With: Patient       Nutrition: NPO Diet NPO Type: Strict NPO     DVT Prophylaxis: Active VTE Prophylaxis:  Pharmacologic:        Start     Dose Route Frequency Stop    25 2100  apixaban (ELIQUIS) tablet 5 mg         5 mg PO Every 12 Hours Scheduled --                  Select Mechanical VTE Prophylaxis if Desired & Appropriate       History of Present illness     A 59 y.o. old male patient with PMH of PPM placement, DM-II, HTN, HLD, PFO, and recent splenic infarction presents to the hospital with complaints of heart racing and feeling \"funny\" in his chest.      He was recently in [facility] for splenic infarction thought 2/2 his PFO.  He was started on Eliquis on 3/14.      ED course: Received 150 mg IV amio bolus in ambulance and 360 mg x 2 IV boluses in ER w/o conversion.  Gave bolus of 75 mg IV lidocaine and started on gtt.  All labs in ER unremarkable.      Subjective / Review of systems     Review of Systems   Pt complains of feeling \"funny\" in his chest.  Denies CP.  " Remainder of a full 12 point ROS was negative.    Past Medical/Surgical/Social/Family History & Allergies     Past Medical History:   Diagnosis Date    Allergic 1988    5+ molds, pollens, grass    Diabetes mellitus     Not sure like 2020    Diverticulitis     Diverticulosis     Mot sure     GERD (gastroesophageal reflux disease)     Hypertension     Kidney stone     Not dure     Obesity     Not sure       Past Surgical History:   Procedure Laterality Date    CARDIAC ELECTROPHYSIOLOGY PROCEDURE N/A 2025    Procedure: Pacemaker DC new boston aware;  Surgeon: Dez Loco MD;  Location: [facility] CATH INVASIVE LOCATION;  Service: Cardiovascular;  Laterality: N/A;    ENDOSCOPY N/A 2025    Procedure: ESOPHAGOGASTRODUODENOSCOPY;  Surgeon: Kiko Talbert MD;  Location: [facility] ENDOSCOPY;  Service: Gastroenterology;  Laterality: N/A;  POST-ESOPHAGITIS, GASTRITIS    HERNIA REPAIR        Social History     Socioeconomic History    Marital status:    Tobacco Use    Smoking status: Never    Smokeless tobacco: Never   Vaping Use    Vaping status: Never Used   Substance and Sexual Activity    Alcohol use: Never    Drug use: Never    Sexual activity: Yes     Partners: Female      Family History   Problem Relation Age of Onset    Anxiety disorder Mother     Arthritis Mother     Depression Mother     Diabetes Mother     Hyperlipidemia Mother     Kidney disease Mother         Kidney stones    Cancer Father          of cancer in 2009    Diabetes Father       Allergies   Allergen Reactions    Januvia [Sitagliptin] Hives    Lisinopril Nausea And Vomiting        Home Medications     Prior to Admission medications    Medication Sig Start Date End Date Taking? Authorizing Provider   apixaban (ELIQUIS) 5 MG tablet tablet Take 1 tablet by mouth Every 12 (Twelve) Hours. Indications: Other - full anticoagulation 3/14/25  Yes Luis Felipe Lu MD   ascorbic acid ([employer] Vitamin C) 1000 MG  tablet Take 4 tablets by mouth Daily.   Yes ProviderAlbertina MD   DM-APAP-CPM (CORICIDIN HBP PO) Take 2 tablets by mouth 2 (Two) Times a Day.   Yes Albertina Goff MD   Jardiance 25 MG tablet tablet TAKE 1 TABLET BY MOUTH DAILY 3/24/25  Yes Sharon Silva MD   loratadine (Claritin) 10 MG tablet Take 1 tablet by mouth Daily.   Yes Albertina Goff MD   losartan (COZAAR) 50 MG tablet Take 1 tablet by mouth Daily. 3/14/25  Yes Luis Felipe Lu MD   Misc Natural Products (NEURIVA PO) Take  by mouth Daily.   Yes Albertina Goff MD   NON FORMULARY Take  by mouth Daily. Cellucare   Yes Albertina Goff MD   NON FORMULARY Take  by mouth Daily. Nugenix Ultimate   Yes Albertina Goff MD   pantoprazole (PROTONIX) 40 MG EC tablet Take 1 tablet by mouth 2 (Two) Times a Day. 3/14/25  Yes Luis Felipe Lu MD   rosuvastatin (CRESTOR) 10 MG tablet Take 1 tablet by mouth Daily. 2/7/25  Yes Sharon Silva MD   clotrimazole (LOTRIMIN) 1 % cream Apply 1 Application topically to the appropriate area as directed 2 (Two) Times a Day. 6/14/24   Sharon Silva MD      Objective / Physical Exam   Vital signs:  Temp: 98.2 °F (36.8 °C)  BP: 102/69  Heart Rate: (!) 179  Resp: 24  SpO2: 96 %  Weight: 104 kg (229 lb 4.5 oz)    Admission Weight: Weight: 104 kg (229 lb 4.5 oz)    Physical Exam     GEN:  Pleasant.  Appears appropriate for stated age.  WD/WN/WH.  Sitting up in bed on RA.  NAD.  NEURO:  Brainstem reflexes intact.  No obvious focal deficit.  Moves all 4 ext.  HEENT:  N/AT.  PERRL.  MMM.  Oropharynx non-erythematous.  No drainage from the eyes/ears/nose.  No conjunctival petechiae.  No oral thrush.  Auditory and visual acuity grossly wnl.  Good dentition.  Voice normal.  NECK:  Supple, NT, trachea midline.  No meningismus.  No ROM limitation.  No torticollis.  No JVD.  No thyromegaly.    CHEST/LUNGS:  Breath sounds are clear and equal bilaterally.  No w/r/r.  Chest  excursion equal bilaterally.    CARDIOVASCULAR:  Very tachycardic, regular rhythm  GI:  Abdomen soft, NT, ND, +BS.    :  No Granda cath in place.  EXTREMITIES:  No deformity or amputation.  No cyanosis, edema, or asymmetry.  Pulses 2+ and equal in BLE's.    SKIN:  Warm, slightly diaphoretic, and pink.  No rash, breakdown, or track marks noted.  LYMPHATICS/HEME:  No overt LAD or abnormal bruising.  No lymphedema.  MSK:  Normal ROM.  No joint abnormalities noted.  Strength is 5/5 and equal in BUE and BLE's.  PSYCH:  Pleasant.  A&Ox 3.  Normal mood and affect.  Responds appropriately to commands and appears to comprehend instructions.            Labs     Results from last 7 days   Lab Units 03/27/25  1432   WBC 10*3/mm3 11.34*   HEMATOCRIT % 49.4   PLATELETS 10*3/mm3 255      Results from last 7 days   Lab Units 03/27/25  1432   SODIUM mmol/L 139   POTASSIUM mmol/L 4.3   CHLORIDE mmol/L 101   CO2 mmol/L 25.1   BUN mg/dL 11   CREATININE mg/dL 1.06        Imaging     Chest X ray: My independent assessment showed no infiltrates or effusions    EKG: My independent evaluation showed stable VT, rate 180.    Current Medications   Scheduled Meds:apixaban, 5 mg, Oral, Q12H  lidocaine, 75 mg, Intravenous, Once  mupirocin, 1 Application, Each Nare, BID  sodium chloride, 10 mL, Intravenous, Q12H  sodium chloride, 10 mL, Intravenous, Q12H         Continuous Infusions:lidocaine cardiac, 2 mg/min         Mateo Scherer DO  Critical Care  03/27/25   15:55 EDT

## 2025-03-27 NOTE — Clinical Note
A 8 fr sheath was successfully inserted using micropuncture technique into the left subclavian vein. Sheath insertion not delayed.

## 2025-03-27 NOTE — TELEPHONE ENCOUNTER
Pepper from [facility]'s Office   [phone]. [phone]     Wants to know if we have FLMA paperwork.

## 2025-03-27 NOTE — Clinical Note
Level of Care: Critical Care [6]   Admitting Physician: PILLO MCCONNELL [[id number]]   Attending Physician: PILLO MCCONNELL [[id number]]

## 2025-03-27 NOTE — PROCEDURES
Electrical Cardioversion    Date/Time: 3/27/2025 4:56 PM    Performed by: Mateo Scherer DO  Authorized by: Mateo Scherer DO  Consent: The procedure was performed in an emergent situation. Verbal consent obtained  Consent given by: patient  Patient consent: the patient's understanding of the procedure matches consent given  Required items: required blood products, implants, devices, and special equipment available  Patient identity confirmed: verbally with patient and arm band    Sedation:  Patient sedated: yes  Sedatives: etomidate (10 mg)  Vitals: Vital signs were monitored during sedation.    Cardioversion basis: emergent  Pre-procedure rhythm: ventricular tachycardia  Patient position: patient was placed in a supine position  Chest area: chest area exposed  Electrodes: pads  Electrodes placed: anterior-posterior  Number of attempts: 1  Attempt 1 mode: synchronous  Attempt 1 waveform: biphasic  Attempt 1 shock (in Joules): 200  Attempt 1 outcome: conversion to normal sinus rhythm  Post-procedure rhythm: normal sinus rhythm  Complications: no complications  Patient tolerance: patient tolerated the procedure well with no immediate complications      I provided conscious sedation for the pt during procedure and monitored him until he was back to his pre-procedure baseline.  See nursing notes for sedation times.

## 2025-03-27 NOTE — ED PROCEDURE NOTE
INDICATION: Sustained ventricular tachycardia        PROCEDURE DESCRIPTION    Patient was  in the ERand had sustained ventricular tachycardia and placed on appropriate monitoring equipment.  Intensivist was present during the procedure and gave  sedation for the procedure.  After adequate sedation was achieved a single 200joule shock was delivered with successful conversion of ventricular tachycardia to sinus rhythm. No immediate complications were noted.    IMPRESSION:    Successful DC cardioversion from sustained ventricular  to sinus rhythm    PLAN: Continue IV lidocaine and physiology consultation

## 2025-03-27 NOTE — TELEPHONE ENCOUNTER
Called Pepper at Dr Loco's office back and stated that we have not received the FMLA forms through via fax or from the patient.    Pepper stated patient has been sent to the ED through ambulance due being in V-Tach (VT).    Beronica will be filling out the FMLA forms.

## 2025-03-28 PROBLEM — I47.20 VENTRICULAR TACHYCARDIA: Status: ACTIVE | Noted: 2025-03-27

## 2025-03-28 LAB
ALBUMIN SERPL-MCNC: 3.9 G/DL (ref 3.5–5.2)
ALBUMIN/GLOB SERPL: 1.1 G/DL
ALP SERPL-CCNC: 99 U/L (ref 39–117)
ALT SERPL W P-5'-P-CCNC: 45 U/L (ref 1–41)
ANION GAP SERPL CALCULATED.3IONS-SCNC: 12.1 MMOL/L (ref 5–15)
AST SERPL-CCNC: 40 U/L (ref 1–40)
BASOPHILS # BLD AUTO: 0.13 10*3/MM3 (ref 0–0.2)
BASOPHILS NFR BLD AUTO: 1.3 % (ref 0–1.5)
BILIRUB SERPL-MCNC: 0.5 MG/DL (ref 0–1.2)
BUN SERPL-MCNC: 14 MG/DL (ref 6–20)
BUN/CREAT SERPL: 14.3 (ref 7–25)
CALCIUM SPEC-SCNC: 9.5 MG/DL (ref 8.6–10.5)
CHLORIDE SERPL-SCNC: 104 MMOL/L (ref 98–107)
CHOLEST SERPL-MCNC: 124 MG/DL (ref 0–200)
CO2 SERPL-SCNC: 23.9 MMOL/L (ref 22–29)
CREAT SERPL-MCNC: 0.98 MG/DL (ref 0.76–1.27)
DEPRECATED RDW RBC AUTO: 43.3 FL (ref 37–54)
EGFRCR SERPLBLD CKD-EPI 2021: 88.8 ML/MIN/1.73
EOSINOPHIL # BLD AUTO: 0.69 10*3/MM3 (ref 0–0.4)
EOSINOPHIL NFR BLD AUTO: 6.7 % (ref 0.3–6.2)
ERYTHROCYTE [DISTWIDTH] IN BLOOD BY AUTOMATED COUNT: 13.2 % (ref 12.3–15.4)
GLOBULIN UR ELPH-MCNC: 3.6 GM/DL
GLUCOSE SERPL-MCNC: 136 MG/DL (ref 65–99)
HCT VFR BLD AUTO: 44.7 % (ref 37.5–51)
HDLC SERPL-MCNC: 31 MG/DL (ref 40–60)
HGB BLD-MCNC: 14.4 G/DL (ref 13–17.7)
IMM GRANULOCYTES # BLD AUTO: 0.06 10*3/MM3 (ref 0–0.05)
IMM GRANULOCYTES NFR BLD AUTO: 0.6 % (ref 0–0.5)
LDLC SERPL CALC-MCNC: 61 MG/DL (ref 0–100)
LDLC/HDLC SERPL: 1.77 {RATIO}
LYMPHOCYTES # BLD AUTO: 2.52 10*3/MM3 (ref 0.7–3.1)
LYMPHOCYTES NFR BLD AUTO: 24.4 % (ref 19.6–45.3)
MAGNESIUM SERPL-MCNC: 2.1 MG/DL (ref 1.6–2.6)
MCH RBC QN AUTO: 28.8 PG (ref 26.6–33)
MCHC RBC AUTO-ENTMCNC: 32.2 G/DL (ref 31.5–35.7)
MCV RBC AUTO: 89.4 FL (ref 79–97)
MONOCYTES # BLD AUTO: 0.79 10*3/MM3 (ref 0.1–0.9)
MONOCYTES NFR BLD AUTO: 7.6 % (ref 5–12)
NEUTROPHILS NFR BLD AUTO: 59.4 % (ref 42.7–76)
NEUTROPHILS NFR BLD AUTO: 6.15 10*3/MM3 (ref 1.7–7)
NRBC BLD AUTO-RTO: 0 /100 WBC (ref 0–0.2)
PHOSPHATE SERPL-MCNC: 4.4 MG/DL (ref 2.5–4.5)
PLATELET # BLD AUTO: 227 10*3/MM3 (ref 140–450)
PMV BLD AUTO: 8.8 FL (ref 6–12)
POTASSIUM SERPL-SCNC: 4.5 MMOL/L (ref 3.5–5.2)
PROT SERPL-MCNC: 7.5 G/DL (ref 6–8.5)
QT INTERVAL: 304 MS
QT INTERVAL: 425 MS
QTC INTERVAL: 531 MS
QTC INTERVAL: 531 MS
RBC # BLD AUTO: 5 10*6/MM3 (ref 4.14–5.8)
SODIUM SERPL-SCNC: 140 MMOL/L (ref 136–145)
TRIGL SERPL-MCNC: 191 MG/DL (ref 0–150)
VLDLC SERPL-MCNC: 32 MG/DL (ref 5–40)
WBC NRBC COR # BLD AUTO: 10.34 10*3/MM3 (ref 3.4–10.8)

## 2025-03-28 PROCEDURE — 81241 F5 GENE: CPT | Performed by: INTERNAL MEDICINE

## 2025-03-28 PROCEDURE — 86431 RHEUMATOID FACTOR QUANT: CPT | Performed by: INTERNAL MEDICINE

## 2025-03-28 PROCEDURE — 83735 ASSAY OF MAGNESIUM: CPT | Performed by: NURSE PRACTITIONER

## 2025-03-28 PROCEDURE — 84100 ASSAY OF PHOSPHORUS: CPT | Performed by: NURSE PRACTITIONER

## 2025-03-28 PROCEDURE — 99233 SBSQ HOSP IP/OBS HIGH 50: CPT | Performed by: INTERNAL MEDICINE

## 2025-03-28 PROCEDURE — 85025 COMPLETE CBC W/AUTO DIFF WBC: CPT | Performed by: NURSE PRACTITIONER

## 2025-03-28 PROCEDURE — 93010 ELECTROCARDIOGRAM REPORT: CPT | Performed by: INTERNAL MEDICINE

## 2025-03-28 PROCEDURE — 80176 ASSAY OF LIDOCAINE: CPT | Performed by: INTERNAL MEDICINE

## 2025-03-28 PROCEDURE — 93005 ELECTROCARDIOGRAM TRACING: CPT | Performed by: NURSE PRACTITIONER

## 2025-03-28 PROCEDURE — 86038 ANTINUCLEAR ANTIBODIES: CPT | Performed by: INTERNAL MEDICINE

## 2025-03-28 PROCEDURE — 80053 COMPREHEN METABOLIC PANEL: CPT | Performed by: NURSE PRACTITIONER

## 2025-03-28 PROCEDURE — 80061 LIPID PANEL: CPT | Performed by: NURSE PRACTITIONER

## 2025-03-28 PROCEDURE — 99221 1ST HOSP IP/OBS SF/LOW 40: CPT | Performed by: INTERNAL MEDICINE

## 2025-03-28 PROCEDURE — 99223 1ST HOSP IP/OBS HIGH 75: CPT | Performed by: INTERNAL MEDICINE

## 2025-03-28 RX ORDER — METOPROLOL SUCCINATE 25 MG/1
25 TABLET, EXTENDED RELEASE ORAL
Status: DISCONTINUED | OUTPATIENT
Start: 2025-03-28 | End: 2025-03-28

## 2025-03-28 RX ORDER — LABETALOL HYDROCHLORIDE 5 MG/ML
10 INJECTION, SOLUTION INTRAVENOUS ONCE
Status: DISCONTINUED | OUTPATIENT
Start: 2025-03-28 | End: 2025-03-28

## 2025-03-28 RX ORDER — LABETALOL HYDROCHLORIDE 5 MG/ML
10 INJECTION, SOLUTION INTRAVENOUS EVERY 4 HOURS PRN
Status: DISCONTINUED | OUTPATIENT
Start: 2025-03-28 | End: 2025-04-02 | Stop reason: HOSPADM

## 2025-03-28 RX ORDER — LOSARTAN POTASSIUM 50 MG/1
50 TABLET ORAL
Status: DISCONTINUED | OUTPATIENT
Start: 2025-03-29 | End: 2025-04-02 | Stop reason: HOSPADM

## 2025-03-28 RX ORDER — HYDRALAZINE HYDROCHLORIDE 20 MG/ML
10 INJECTION INTRAMUSCULAR; INTRAVENOUS EVERY 6 HOURS PRN
Status: DISCONTINUED | OUTPATIENT
Start: 2025-03-28 | End: 2025-03-28

## 2025-03-28 RX ORDER — METOPROLOL SUCCINATE 50 MG/1
50 TABLET, EXTENDED RELEASE ORAL
Status: DISCONTINUED | OUTPATIENT
Start: 2025-03-29 | End: 2025-04-01

## 2025-03-28 RX ADMIN — Medication 10 ML: at 20:17

## 2025-03-28 RX ADMIN — PANTOPRAZOLE SODIUM 40 MG: 40 TABLET, DELAYED RELEASE ORAL at 20:17

## 2025-03-28 RX ADMIN — MUPIROCIN 1 APPLICATION: 20 OINTMENT TOPICAL at 20:17

## 2025-03-28 RX ADMIN — ROSUVASTATIN CALCIUM 10 MG: 10 TABLET, FILM COATED ORAL at 20:17

## 2025-03-28 RX ADMIN — Medication 10 ML: at 08:38

## 2025-03-28 RX ADMIN — PANTOPRAZOLE SODIUM 40 MG: 40 TABLET, DELAYED RELEASE ORAL at 08:08

## 2025-03-28 RX ADMIN — MUPIROCIN 1 APPLICATION: 20 OINTMENT TOPICAL at 08:09

## 2025-03-28 RX ADMIN — APIXABAN 5 MG: 5 TABLET, FILM COATED ORAL at 20:17

## 2025-03-28 RX ADMIN — APIXABAN 5 MG: 5 TABLET, FILM COATED ORAL at 08:08

## 2025-03-28 RX ADMIN — CETIRIZINE HYDROCHLORIDE 10 MG: 10 TABLET, FILM COATED ORAL at 08:08

## 2025-03-28 RX ADMIN — Medication 10 ML: at 08:39

## 2025-03-28 RX ADMIN — LOSARTAN POTASSIUM 50 MG: 50 TABLET, FILM COATED ORAL at 08:08

## 2025-03-28 RX ADMIN — EMPAGLIFLOZIN 25 MG: 25 TABLET, FILM COATED ORAL at 08:08

## 2025-03-28 RX ADMIN — SENNOSIDES AND DOCUSATE SODIUM 2 TABLET: 50; 8.6 TABLET ORAL at 20:16

## 2025-03-28 RX ADMIN — METOPROLOL SUCCINATE 25 MG: 25 TABLET, EXTENDED RELEASE ORAL at 11:22

## 2025-03-28 NOTE — CONSULTS
Hematology/Oncology Inpatient Consultation    Patient name: Ismael Pulido  : 1965  MRN: [id number]  Referring Provider: Dr. Scherer  Reason for Consultation: Hypercoagulable workup    Chief complaint: Palpitations, shortness of breath    History of present illness:    Ismael Pulido is a 59 y.o. male with history of sick sinus syndrome status post pacemaker placement history of recent splenic infarct diabetes hypertension hyperlipidemia and other medical problems, who presented to [facility] on 3/27/2025 with complaints of palpitations and shortness of breath.  He was found to be in sustained ventricular tachycardia and was sent to the ED.  In the ED, he received 150 mg IV amio bolus in ambulance and 360 mg x 2 IV boluses in ER w/o conversion.  Gave bolus of 75 mg IV lidocaine and started on gtt .  He was admitted and underwent cardioversion with cardiology.    Of note, patient was recently hospitalized for splenic infarct.  He was started on Eliquis on 3/14/2025.  During that hospitalization he was seen by vascular surgery, cardiology, and infectious disease.  Had SAWYER with small PFO on mitral valve echodensities.  Splenic infarct thought to originate from the heart from his vegetation.Cardiology recommended indefinite systemic anticoagulation with apixaban.    25  Hematology/Oncology was consulted with request for hypercoagulable workup.    He/She  has a past medical history of Allergic (1988), Diabetes mellitus, Diverticulitis, Diverticulosis, GERD (gastroesophageal reflux disease), Hypertension, Kidney stone, and Obesity.    PCP: Sharon Silva MD    History:  Past Medical History:   Diagnosis Date    Allergic 1988    5+ molds, pollens, grass    Diabetes mellitus     Not sure like     Diverticulitis     Diverticulosis     Mot sure     GERD (gastroesophageal reflux disease)     Hypertension     Kidney stone     Not dure     Obesity     Not  sure    ,   Past Surgical History:   Procedure Laterality Date    CARDIAC ELECTROPHYSIOLOGY PROCEDURE N/A 2025    Procedure: Pacemaker DC new [place] aware;  Surgeon: Dez Loco MD;  Location: [facility] CATH INVASIVE LOCATION;  Service: Cardiovascular;  Laterality: N/A;    ENDOSCOPY N/A 2025    Procedure: ESOPHAGOGASTRODUODENOSCOPY;  Surgeon: Kiko Talbert MD;  Location: [facility] ENDOSCOPY;  Service: Gastroenterology;  Laterality: N/A;  POST-ESOPHAGITIS, GASTRITIS    HERNIA REPAIR     ,   Family History   Problem Relation Age of Onset    Anxiety disorder Mother     Arthritis Mother     Depression Mother     Diabetes Mother     Hyperlipidemia Mother     Kidney disease Mother         Kidney stones    Cancer Father          of cancer in 2009    Diabetes Father    ,   Social History     Tobacco Use    Smoking status: Never    Smokeless tobacco: Never   Vaping Use    Vaping status: Never Used   Substance Use Topics    Alcohol use: Never    Drug use: Never   ,   Medications Prior to Admission   Medication Sig Dispense Refill Last Dose/Taking    apixaban (ELIQUIS) 5 MG tablet tablet Take 1 tablet by mouth Every 12 (Twelve) Hours. Indications: Other - full anticoagulation 60 tablet 0 3/27/2025 Morning    ascorbic acid ([employer] Vitamin C) 1000 MG tablet Take 4 tablets by mouth Daily.   3/27/2025    DM-APAP-CPM (CORICIDIN HBP PO) Take 2 tablets by mouth 2 (Two) Times a Day.   3/27/2025    Jardiance 25 MG tablet tablet TAKE 1 TABLET BY MOUTH DAILY 30 tablet 5 3/27/2025    loratadine (Claritin) 10 MG tablet Take 1 tablet by mouth Daily.   3/27/2025    losartan (COZAAR) 50 MG tablet Take 1 tablet by mouth Daily. 30 tablet 0 3/27/2025    Misc Natural Products (NEURIVA PO) Take  by mouth Daily.   3/27/2025    NON FORMULARY Take  by mouth Daily. Cellucare   3/27/2025    NON FORMULARY Take  by mouth Daily. Nugenix Ultimate   3/26/2025    pantoprazole (PROTONIX) 40 MG EC tablet Take 1 tablet by mouth 2  "(Two) Times a Day. 60 tablet 0 3/26/2025    rosuvastatin (CRESTOR) 10 MG tablet Take 1 tablet by mouth Daily. 90 tablet 1 3/27/2025    clotrimazole (LOTRIMIN) 1 % cream Apply 1 Application topically to the appropriate area as directed 2 (Two) Times a Day. 85 g 3    , Scheduled Meds:  apixaban, 5 mg, Oral, Q12H  cetirizine, 10 mg, Oral, Daily  empagliflozin, 25 mg, Oral, Daily  losartan, 50 mg, Oral, Daily  mupirocin, 1 Application, Each Nare, BID  pantoprazole, 40 mg, Oral, BID  rosuvastatin, 10 mg, Oral, Nightly  sodium chloride, 10 mL, Intravenous, Q12H  sodium chloride, 10 mL, Intravenous, Q12H    , Continuous Infusions:   , PRN Meds:    acetaminophen **OR** acetaminophen    aluminum-magnesium hydroxide-simethicone    senna-docusate sodium **AND** polyethylene glycol **AND** bisacodyl **AND** bisacodyl    Calcium Replacement - Follow Nurse / BPA Driven Protocol    hydrALAZINE    labetalol    Magnesium Low Dose Replacement - Follow Nurse / BPA Driven Protocol    nitroglycerin    ondansetron ODT **OR** ondansetron    Phosphorus Replacement - Follow Nurse / BPA Driven Protocol    Potassium Replacement - Follow Nurse / BPA Driven Protocol    [COMPLETED] Insert Peripheral IV **AND** sodium chloride    sodium chloride    sodium chloride    sodium chloride    sodium chloride   Allergies:  Januvia [sitagliptin] and Lisinopril    Subjective     ROS:  Review of Systems     Objective   Vital Signs:   BP (!) 158/124   Pulse 107   Temp 98.1 °F (36.7 °C) (Oral)   Resp 16   Ht 172.7 cm (68\")   Wt 103 kg (227 lb 8.2 oz)   SpO2 93%   BMI 34.59 kg/m²     Physical Exam: (performed by MD)  Physical Exam    Results Review:  Lab Results (last 48 hours)       Procedure Component Value Units Date/Time    Lipid Panel [[id number]]  (Abnormal) Collected: 03/28/25 0517    Specimen: Blood from Arm, Left Updated: 03/28/25 0604     Total Cholesterol 124 mg/dL      Triglycerides 191 mg/dL      HDL Cholesterol 31 mg/dL      LDL Cholesterol "  61 mg/dL      VLDL Cholesterol 32 mg/dL      LDL/HDL Ratio 1.77    Narrative:      Cholesterol Reference Ranges  (U.S. Department of Health and Human Services ATP III Classifications)    Desirable          <200 mg/dL  Borderline High    200-239 mg/dL  High Risk          >240 mg/dL      Triglyceride Reference Ranges  (U.S. Department of Health and Human Services ATP III Classifications)    Normal           <150 mg/dL  Borderline High  150-199 mg/dL  High             200-499 mg/dL  Very High        >500 mg/dL    HDL Reference Ranges  (U.S. Department of Health and Human Services ATP III Classifications)    Low     <40 mg/dl (major risk factor for CHD)  High    >60 mg/dl ('negative' risk factor for CHD)        LDL Reference Ranges  (U.S. Department of Health and Human Services ATP III Classifications)    Optimal          <100 mg/dL  Near Optimal     100-129 mg/dL  Borderline High  130-159 mg/dL  High             160-189 mg/dL  Very High        >189 mg/dL    LDL is calculated using the NIH LDL-C calculation.      Magnesium [[id number]]  (Normal) Collected: 03/28/25 0517    Specimen: Blood from Arm, Left Updated: 03/28/25 0551     Magnesium 2.1 mg/dL     Comprehensive Metabolic Panel [[id number]]  (Abnormal) Collected: 03/28/25 0517    Specimen: Blood from Arm, Left Updated: 03/28/25 0551     Glucose 136 mg/dL      BUN 14 mg/dL      Creatinine 0.98 mg/dL      Sodium 140 mmol/L      Potassium 4.5 mmol/L      Comment: Slight hemolysis detected by analyzer. Result may be falsely elevated.        Chloride 104 mmol/L      CO2 23.9 mmol/L      Calcium 9.5 mg/dL      Total Protein 7.5 g/dL      Albumin 3.9 g/dL      ALT (SGPT) 45 U/L      AST (SGOT) 40 U/L      Alkaline Phosphatase 99 U/L      Total Bilirubin 0.5 mg/dL      Globulin 3.6 gm/dL      A/G Ratio 1.1 g/dL      BUN/Creatinine Ratio 14.3     Anion Gap 12.1 mmol/L      eGFR 88.8 mL/min/1.73     Narrative:      GFR Categories in Chronic Kidney Disease (CKD)      GFR  Category          GFR (mL/min/1.73)    Interpretation  G1                     90 or greater         Normal or high (1)  G2                      60-89                Mild decrease (1)  G3a                   45-59                Mild to moderate decrease  G3b                   30-44                Moderate to severe decrease  G4                    15-29                Severe decrease  G5                    14 or less           Kidney failure          (1)In the absence of evidence of kidney disease, neither GFR category G1 or G2 fulfill the criteria for CKD.    eGFR calculation 2021 CKD-EPI creatinine equation, which does not include race as a factor    Phosphorus [[id number]]  (Normal) Collected: 03/28/25 0517    Specimen: Blood from Arm, Left Updated: 03/28/25 0549     Phosphorus 4.4 mg/dL     CBC & Differential [[id number]]  (Abnormal) Collected: 03/28/25 0517    Specimen: Blood from Arm, Left Updated: 03/28/25 0522    Narrative:      The following orders were created for panel order CBC & Differential.  Procedure                               Abnormality         Status                     ---------                               -----------         ------                     CBC Auto Differential[[id number]]        Abnormal            Final result                 Please view results for these tests on the individual orders.    CBC Auto Differential [[id number]]  (Abnormal) Collected: 03/28/25 0517    Specimen: Blood from Arm, Left Updated: 03/28/25 0522     WBC 10.34 10*3/mm3      RBC 5.00 10*6/mm3      Hemoglobin 14.4 g/dL      Hematocrit 44.7 %      MCV 89.4 fL      MCH 28.8 pg      MCHC 32.2 g/dL      RDW 13.2 %      RDW-SD 43.3 fl      MPV 8.8 fL      Platelets 227 10*3/mm3      Neutrophil % 59.4 %      Lymphocyte % 24.4 %      Monocyte % 7.6 %      Eosinophil % 6.7 %      Basophil % 1.3 %      Immature Grans % 0.6 %      Neutrophils, Absolute 6.15 10*3/mm3      Lymphocytes, Absolute 2.52 10*3/mm3      Monocytes,  Absolute 0.79 10*3/mm3      Eosinophils, Absolute 0.69 10*3/mm3      Basophils, Absolute 0.13 10*3/mm3      Immature Grans, Absolute 0.06 10*3/mm3      nRBC 0.0 /100 WBC     Lidocaine Level [[id number]] Collected: 03/28/25 0517    Specimen: Blood from Arm, Left Updated: 03/28/25 0519    POC Glucose Once [[id number]]  (Abnormal) Collected: 03/27/25 2038    Specimen: Blood Updated: 03/27/25 2040     Glucose 190 mg/dL      Comment: Serial Number: [id number]:  [id number]       Angiotensin Converting Enzyme [[id number]] Collected: 03/27/25 1824    Specimen: Blood Updated: 03/27/25 1828    MRSA Screen, PCR (Inpatient) - Swab, Nares [[id number]]  (Normal) Collected: 03/27/25 1514    Specimen: Swab from Nares Updated: 03/27/25 1633     MRSA PCR No MRSA Detected    Narrative:      The negative predictive value of this diagnostic test is high and should only be used to consider de-escalating anti-MRSA therapy. A positive result may indicate colonization with MRSA and must be correlated clinically.    High Sensitivity Troponin T 1Hr [[id number]]  (Abnormal) Collected: 03/27/25 1545    Specimen: Blood Updated: 03/27/25 1613     HS Troponin T 42 ng/L      Troponin T Numeric Delta 2 ng/L      Troponin T % Delta 5    Narrative:      High Sensitive Troponin T Reference Range:  <14.0 ng/L- Negative Female for AMI  <22.0 ng/L- Negative Male for AMI  >=14 - Abnormal Female indicating possible myocardial injury.  >=22 - Abnormal Male indicating possible myocardial injury.   Clinicians would have to utilize clinical acumen, EKG, Troponin, and serial changes to determine if it is an Acute Myocardial Infarction or myocardial injury due to an underlying chronic condition.         Respiratory Panel PCR w/COVID-19(SARS-CoV-2) [facility]PH/[facility] In-House, NP Swab in UTM/VTM, 2 HR TAT - Swab, Nasopharynx [[id number]]  (Normal) Collected: 03/27/25 1514    Specimen: Swab from Nasopharynx Updated: 03/27/25 1608     ADENOVIRUS, PCR Not  Detected     Coronavirus 229E Not Detected     Coronavirus HKU1 Not Detected     Coronavirus NL63 Not Detected     Coronavirus OC43 Not Detected     COVID19 Not Detected     Human Metapneumovirus Not Detected     Human Rhinovirus/Enterovirus Not Detected     Influenza A PCR Not Detected     Influenza B PCR Not Detected     Parainfluenza Virus 1 Not Detected     Parainfluenza Virus 2 Not Detected     Parainfluenza Virus 3 Not Detected     Parainfluenza Virus 4 Not Detected     RSV, PCR Not Detected     Bordetella pertussis pcr Not Detected     Bordetella parapertussis PCR Not Detected     Chlamydophila pneumoniae PCR Not Detected     Mycoplasma pneumo by PCR Not Detected    Narrative:      In the setting of a positive respiratory panel with a viral infection PLUS a negative procalcitonin without other underlying concern for bacterial infection, consider observing off antibiotics or discontinuation of antibiotics and continue supportive care. If the respiratory panel is positive for atypical bacterial infection (Bordetella pertussis, Chlamydophila pneumoniae, or Mycoplasma pneumoniae), consider antibiotic de-escalation to target atypical bacterial infection.    Protime-INR [[id number]]  (Abnormal) Collected: 03/27/25 1432    Specimen: Blood Updated: 03/27/25 1535     Protime 15.3 Seconds      INR 1.22    aPTT [[id number]]  (Normal) Collected: 03/27/25 1432    Specimen: Blood Updated: 03/27/25 1535     PTT 29.6 seconds     High Sensitivity Troponin T [[id number]]  (Abnormal) Collected: 03/27/25 1432    Specimen: Blood Updated: 03/27/25 1500     HS Troponin T 40 ng/L     Narrative:      High Sensitive Troponin T Reference Range:  <14.0 ng/L- Negative Female for AMI  <22.0 ng/L- Negative Male for AMI  >=14 - Abnormal Female indicating possible myocardial injury.  >=22 - Abnormal Male indicating possible myocardial injury.   Clinicians would have to utilize clinical acumen, EKG, Troponin, and serial changes to determine if  it is an Acute Myocardial Infarction or myocardial injury due to an underlying chronic condition.         Basic Metabolic Panel [[id number]]  (Abnormal) Collected: 03/27/25 1432    Specimen: Blood Updated: 03/27/25 1500     Glucose 141 mg/dL      BUN 11 mg/dL      Creatinine 1.06 mg/dL      Sodium 139 mmol/L      Potassium 4.3 mmol/L      Chloride 101 mmol/L      CO2 25.1 mmol/L      Calcium 9.7 mg/dL      BUN/Creatinine Ratio 10.4     Anion Gap 12.9 mmol/L      eGFR 80.8 mL/min/1.73     Narrative:      GFR Categories in Chronic Kidney Disease (CKD)      GFR Category          GFR (mL/min/1.73)    Interpretation  G1                     90 or greater         Normal or high (1)  G2                      60-89                Mild decrease (1)  G3a                   45-59                Mild to moderate decrease  G3b                   30-44                Moderate to severe decrease  G4                    15-29                Severe decrease  G5                    14 or less           Kidney failure          (1)In the absence of evidence of kidney disease, neither GFR category G1 or G2 fulfill the criteria for CKD.    eGFR calculation 2021 CKD-EPI creatinine equation, which does not include race as a factor    Magnesium [[id number]]  (Normal) Collected: 03/27/25 1432    Specimen: Blood Updated: 03/27/25 1500     Magnesium 2.3 mg/dL     Ramsay Draw [[id number]] Collected: 03/27/25 1432    Specimen: Blood Updated: 03/27/25 1445    Narrative:      The following orders were created for panel order Ramsay Draw.  Procedure                               Abnormality         Status                     ---------                               -----------         ------                     Green Top (Gel)[[id number]]                                  Final result               Lavender Top[[id number]]                                     Final result               Gold Top - SST[[id number]]                                   Final result                Light Blue Top[[id number]]                                   Final result                 Please view results for these tests on the individual orders.    Green Top (Gel) [[id number]] Collected: 03/27/25 1432    Specimen: Blood Updated: 03/27/25 1445     Extra Tube Hold for add-ons.     Comment: Auto resulted.       Lavender Top [[id number]] Collected: 03/27/25 1432    Specimen: Blood Updated: 03/27/25 1445     Extra Tube hold for add-on     Comment: Auto resulted       Gold Top - SST [[id number]] Collected: 03/27/25 1432    Specimen: Blood Updated: 03/27/25 1445     Extra Tube Hold for add-ons.     Comment: Auto resulted.       Light Blue Top [[id number]] Collected: 03/27/25 1432    Specimen: Blood Updated: 03/27/25 1445     Extra Tube Hold for add-ons.     Comment: Auto resulted       CBC & Differential [[id number]]  (Abnormal) Collected: 03/27/25 1432    Specimen: Blood Updated: 03/27/25 1439    Narrative:      The following orders were created for panel order CBC & Differential.  Procedure                               Abnormality         Status                     ---------                               -----------         ------                     CBC Auto Differential[[id number]]        Abnormal            Final result                 Please view results for these tests on the individual orders.    CBC Auto Differential [[id number]]  (Abnormal) Collected: 03/27/25 1432    Specimen: Blood Updated: 03/27/25 1439     WBC 11.34 10*3/mm3      RBC 5.50 10*6/mm3      Hemoglobin 15.8 g/dL      Hematocrit 49.4 %      MCV 89.8 fL      MCH 28.7 pg      MCHC 32.0 g/dL      RDW 13.2 %      RDW-SD 43.5 fl      MPV 8.9 fL      Platelets 255 10*3/mm3      Neutrophil % 60.5 %      Lymphocyte % 25.0 %      Monocyte % 6.3 %      Eosinophil % 5.1 %      Basophil % 1.3 %      Immature Grans % 1.8 %      Neutrophils, Absolute 6.87 10*3/mm3      Lymphocytes, Absolute 2.83 10*3/mm3      Monocytes, Absolute 0.71 10*3/mm3       "Eosinophils, Absolute 0.58 10*3/mm3      Basophils, Absolute 0.15 10*3/mm3      Immature Grans, Absolute 0.20 10*3/mm3      nRBC 0.0 /100 WBC              Pending Results:     Imaging Reviewed:   XR Chest 1 View  Result Date: 3/27/2025  Impression: No convincing acute cardiopulmonary abnormality. Pleural effusions on recent chest CT are not visualized on today's radiograph. Electronically Signed: Nicholas Becerra MD  3/27/2025 3:26 PM EDT  Workstation ID: [id number]           Assessment & Plan   ASSESSMENT  Recent splenic infarct.  Likely secondary to cardioembolic etiology with PFO.  At this time hypercoagulable workup is not necessary.  However, since patient reports he has daughters with factor V Leiden it would be reasonable to check. Anticoagulation as per cardiology recommendations  Stable VT. received amiodarone in the ED without conversion.  Status post cardioversion in the ED with return to paced rhythm.  Currently on apixaban as per cardiology recommendations.    PLAN  As above  Further recommendations per Dr. Portillo    Above note was prepared for Dr. Danny Portillo -physical exam and review of systems were performed by physician.      Electronically signed by Sloane Ruiz PA-C, 03/28/25    3/28/2025: I was asked to see Mr. Pulido who was admitted to the hospital earlier in 2025 with a syncopal episode secondary to bradycardia. He was found to have an A/V block and somewhat later he came in with abdominal pain, found to be the result of splenic infarction. This was felt to secondary to cardioembolic events. He had a persistent foramen ovale and low normal ejection fraction. He was felt to be a candidate for chronic anticoagulation. He presented to the hospital with palpitations and dyspnea and was found to have sustained ventricular tachycardia. He received cardioversion. I was asked to see him for \"hypercoagulable w/o\". He is feeling somewhat better but is now fatigued. The palpitations have " resolved. He has no chest pain and he no longer has abdominal pain. He has been able to eat some. No nausea or vomiting. Denies diarrhea or dysuria. His past medical history is significant for obesity, hyperlipidemia, hypertension and diabetes mellitus; he has no personal history of thrombosis. His family history is significant for heterozygosity for the factor V Leiden in two of his three daughters, one of who had a foot injury complicated by deep vein thrombosis. On exam chronically ill appearing. No distress. No jaundice. No oral lesions and respirations not labored. Lungs clear bilaterally and heart regular and tachycardic. Abdomen protuberant and soft. No edema. His blood count reveals mild eosinophilia. His lipid profile discloses severe hypertriglyceridemia. Mr. Pulido's splenic infarction was found to be the result of a cardioembolic event and he has been felt to be a candidate for lifelong anticoagulation. On the basis of these two factors an extensive battery of tests is not justified, mainly because the result would not change the conduct, but also because there is an alternative explanation for the infarction. However, given his family history it is reasonable to investigate the presence of the factor V Leiden variant. Discussed with him at length.   I reviewed and discussed with Ms. Ruiz the medical record and concur with her note. I formulated the analysis and the plans.     Danny Portillo MD on 3/28/2025 at 17:21

## 2025-03-28 NOTE — SIGNIFICANT NOTE
03/28/25 1747   Readmission Indications   Is the patient and/or family able to complete the readmission assessment questions? Yes  (patient/spouse)   Is this hospitalization related to the prior hospital diagnosis? Yes   Recommendation for rehospitalization   Did you speak with your physician prior to coming to the hospital Yes   If yes, what physician did you speak with? Cardiologist   Follow-up Appointments   Do you have a PCP? Yes   Did you have an appointment with PCP after your hospitalization? Yes   When was your appointment scheduled? 03/26/25   Did you go to appointment? Yes   Did you have an appointment with a Specialist? Yes  (Cardio)   When was your appointment scheduled? 03/27/25   Did you go to appointment? Yes   Are you current with the Pulmonary Clinic? No   Are you current with the CHF Clinic? No   Medications   Did you have newly prescribed medications at discharge? Yes  (Eliquis, protonix)   Did you understand the reasons for your medications at discharge and how to take them? Yes   Did you understand the side effects of your medications? Yes   Are you taking all of you prescribed medications? Yes   What pharmacy was used to fill prescription(s)? [facility]   Were medications picked up? Yes   Discharge Instructions   Did you understand your discharge instructions? Yes   Did your family/caregiver hear your instructions? Yes   Were you told to eat a special diet? No   Did you adhere to the diet? Yes   Were you given a number of someone to call if you had questions or concerns? Yes   Index discharge location/services   Where did you go upon discharge? Home   Do you have supportive family or friends in the home? Yes   Discharge Readiness   On a scale of 1-5 (5 being well prepared), how ready were you for discharge 5   Palliative Care/Hospice   Are you current with Palliative Care? No   Are you current with Hospice Care? No   Readmission Assessment Final Comments   Final Comments PREVIOUS ADMIT 3/9  -3/14/25: Came to [facility] ER but wait time was too long so he went to [facility] with c/o abd pain and was admitted for blood clot of the spleen, SAWYER showed small PFO and fibrin stranding on the valve.  Started on Eliquis & Jardiance. PRESENT ADMIT 3/27/25: Hx PPM - HR at Urgent care showed +. Admit for IV Amio, Lidocaine gtt then finally cardioverted.  NSR now - PM interrogation and possible AICD. LACE 12

## 2025-03-28 NOTE — CONSULTS
[facility] CARDIOLOGY CONSULT  [facility]        Subjective:     Encounter Date:03/27/2025      Patient ID: Ismael Pulido is a 59 y.o. male.    Chief Complaint: Palpitations      HPI:  Ismael Pulido is a 59 y.o. male known to Dr. Loco and Dr. Reaves with a recent cardiac history of symptomatic bradycardia with Mobitz 2 AV block and underlying LBBB s/p [employer] BiV PPM 2/21/25.  2D echo 2/2025 showed an EF 56-60% with moderate TR and mid moderate MR.  Nuclear stress test 2/2025 shows no ischemia.  PMH includes HTN, HLD, and DM.      Patient returned 2/2025 with c/o chest pain and device interrogation suggested lead dislodgement.  He was attempted for RV lead revision but fluoroscopy revealed normal lead position.    Patient returned to the ER 3/2025 and found splenic infarct likely  cardioembolic.  SAWYER 3/2025 showed an EF = 46% with mild RV dysfunction, small PFO with right to left shunting, and MV vegetation with negative blood cultures.  He was started on Eliquis.    Patient returned to the ER 3/2025 with complaints of palpitations and found with sustained ventricular tachycardia refractory to IV amiodarone and IV lidocaine in which he was cardioverted to sinus.  EP was consulted for further evaluation and management.    Patient tells me that for the last 2 nights he has had intermittent feelings of palpitations and would go sit and watch TV until it calmed down.  This last time it was persistent, prompting him to seek care.  He denies any accompanied chest pain shortness of breath or dizziness.  He does not report any device shocks.  He reports doing well since his last admission and was able to perform activities such as loading and unloading groceries.  He is currently AS  on the monitor.  He is not on any drips currently.          Past Medical History:   Diagnosis Date    Allergic 01/01/1988    5+ molds, pollens, grass    Diabetes mellitus     Not  sure like 2020    Diverticulitis     Diverticulosis     Mot sure     GERD (gastroesophageal reflux disease)     Hypertension     Kidney stone     Not dure     Obesity     Not sure          Past Surgical History:   Procedure Laterality Date    CARDIAC ELECTROPHYSIOLOGY PROCEDURE N/A 2025    Procedure: Pacemaker DC new boston aware;  Surgeon: Dez Loco MD;  Location: [facility] CATH INVASIVE LOCATION;  Service: Cardiovascular;  Laterality: N/A;    ENDOSCOPY N/A 2025    Procedure: ESOPHAGOGASTRODUODENOSCOPY;  Surgeon: Kiko Talbert MD;  Location: [facility] ENDOSCOPY;  Service: Gastroenterology;  Laterality: N/A;  POST-ESOPHAGITIS, GASTRITIS    HERNIA REPAIR           Social History     Socioeconomic History    Marital status:    Tobacco Use    Smoking status: Never    Smokeless tobacco: Never   Vaping Use    Vaping status: Never Used   Substance and Sexual Activity    Alcohol use: Never    Drug use: Never    Sexual activity: Yes     Partners: Female       Family History   Problem Relation Age of Onset    Anxiety disorder Mother     Arthritis Mother     Depression Mother     Diabetes Mother     Hyperlipidemia Mother     Kidney disease Mother         Kidney stones    Cancer Father          of cancer in 2009    Diabetes Father          Allergies   Allergen Reactions    Januvia [Sitagliptin] Hives    Lisinopril Nausea And Vomiting       Current Medications:   Scheduled Meds:apixaban, 5 mg, Oral, Q12H  cetirizine, 10 mg, Oral, Daily  empagliflozin, 25 mg, Oral, Daily  [START ON 3/29/2025] losartan, 50 mg, Oral, Q24H  metoprolol succinate XL, 25 mg, Oral, Q24H  mupirocin, 1 Application, Each Nare, BID  pantoprazole, 40 mg, Oral, BID  rosuvastatin, 10 mg, Oral, Nightly  sodium chloride, 10 mL, Intravenous, Q12H  sodium chloride, 10 mL, Intravenous, Q12H      Continuous Infusions:     ROS  All other systems reviewed and are negative.       Objective:         BP (!) 161/114    "Pulse 94   Temp 98.2 °F (36.8 °C) (Oral)   Resp 22   Ht 172.7 cm (68\")   Wt 103 kg (227 lb 8.2 oz)   SpO2 94%   BMI 34.59 kg/m²       General: Well-developed in NAD.  Neuro: AAOx3. No gross deficits.  HEENT: Sclerae clear, no xanthelasmas.  CV: S1S2, RRR. No murmurs or gallops.  Resp: Breathing is unlabored. Lungs CTA throughout.  GI: BS+. Abdomen soft and NTTP.  Ext: Pedal pulses are palpable. Extremities are nonedematous.  MS: moves all extremities, no weakness.  Skin: warm, dry.  Psych: calm and cooperative.            Lab Review:     Results from last 7 days   Lab Units 03/28/25  0517 03/27/25  1432   SODIUM mmol/L 140 139   POTASSIUM mmol/L 4.5 4.3   CHLORIDE mmol/L 104 101   CO2 mmol/L 23.9 25.1   BUN mg/dL 14 11   CREATININE mg/dL 0.98 1.06   GLUCOSE mg/dL 136* 141*   CALCIUM mg/dL 9.5 9.7   AST (SGOT) U/L 40  --    ALT (SGPT) U/L 45*  --      Results from last 7 days   Lab Units 03/27/25  1545 03/27/25  1432   HSTROP T ng/L 42* 40*     Results from last 7 days   Lab Units 03/28/25  0517 03/27/25  1432   WBC 10*3/mm3 10.34 11.34*   HEMOGLOBIN g/dL 14.4 15.8   HEMATOCRIT % 44.7 49.4   PLATELETS 10*3/mm3 227 255     Results from last 7 days   Lab Units 03/27/25  1432   INR  1.22*   APTT seconds 29.6     Results from last 7 days   Lab Units 03/28/25  0517 03/27/25  1432   MAGNESIUM mg/dL 2.1 2.3     Results from last 7 days   Lab Units 03/28/25  0517   CHOLESTEROL mg/dL 124   TRIGLYCERIDES mg/dL 191*   HDL CHOL mg/dL 31*               Recent Radiology:  Imaging Results (Most Recent)       Procedure Component Value Units Date/Time    XR Chest 1 View [[id number]] Collected: 03/27/25 1525     Updated: 03/27/25 1528    Narrative:      XR CHEST 1 VW    Date of Exam: 3/27/2025 3:08 PM EDT    Indication: Irregular heartbeat    Comparison: CT chest 3/12/2025    Findings:  Heart size within normal limits allowing for exam technique. Multichamber AICD. Lordotic positioning. Defibrillator pads overlie the chest. No " discrete lobar consolidation, edema, effusion or pneumothorax. Pleural effusions noted on recent CT not clearly   visualized. Degenerative elated osseous change. Low lung volumes.      Impression:      Impression:  No convincing acute cardiopulmonary abnormality. Pleural effusions on recent chest CT are not visualized on today's radiograph.      Electronically Signed: Nicholas Becerra MD    3/27/2025 3:26 PM EDT    Workstation ID: [id number]              ECHOCARDIOGRAM:    Results for orders placed during the hospital encounter of 03/09/25    Adult Transesophageal Echo (SAWYER) W/ Cont if Necessary Per Protocol    Interpretation Summary    Left ventricular systolic function is low normal. Calculated left ventricular EF = 45.9%    The following left ventricular wall segments are hypokinetic: apical lateral, apical septal and apex hypokinetic.    Mildly reduced right ventricular systolic function noted.    The right ventricular cavity is mildly dilated.    Small patent foramen ovale present.    Saline test results are positive for right to left atrial level shunt.            Assessment:         Active Hospital Problems    Diagnosis  POA    **Wide-complex tachycardia [R00.0]  Yes     1) Sustained Monomorphic Ventricular Tachycardia s/p cardioversion 3/27/25  - High-sensitivity troponin 40, 42  - K 4.5, Mg 2.1  - TSH WNL 2/2025  - refractory to IV amiodarone and IV lidocaine   - Nuclear stress test 2/2025 shows no ischemia    - started on beta blocker    2) symptomatic bradycardia with Mobitz 2 AV block and underlying LBBB s/p [employer] BiV PPM 2/21/25.    - 2D echo 2/2025 showed an EF 56-60% with moderate TR and mid moderate MR.      3) recent splenic infarct  - SAWYER 3/2025 showed an EF = 46% with mild RV dysfunction, small PFO with right to left shunting, and MV vegetation with negative blood cultures.  - on Eliquis    4) hypertensive urgency    5) HLD    6) DM           Plan:   Will perform a device interrogation.   Anticipate patient will need invasive ischemic evaluation.  He has eaten and received Eliquis today.  Further recommendations per attending cardiologist.         Electronically signed by TAMIKO Godfrey, 03/28/25, 12:06 PM EDT.       Patient seen and examined.  Patient was originally seen for left bundle branch block with intermittent complete heart block  Subsequently underwent a biventricular pacemaker implantation  Presented with flu illness with elevated RV threshold and elevated procalcitonin and repeat evaluation of RV lead fluoroscopically was unchanged with normal parameters on pacing  Patient was seen in the office and was found to have sustained monomorphic VT unresponsive to amiodarone and I got called by intensivist and I told him to start IV lidocaine and after discussing with me by the ER physician and intensivist and by Dr. Reaves , patient underwent external cardioversion to get the patient to sinus rhythm.  After flu illness patient had a splenic infarct and went to [facility] and was found to have PFO with EF of 45% and EF before pacemaker implantation was within normal limits.  Patient is comfortable and denies any symptoms  Patient has a family history of factor V Leiden mutation      Physical Exam    General:      well developed, well nourished, in no acute distress.    Head:      normocephalic and atraumatic.    Eyes:      PERRL/EOM intact, conjunctivae and sclerae clear without nystagmus.    Neck:      no  thyromegaly, trachea central with normal respiratory effort  Lungs:      clear bilaterally to auscultation.    Heart:       regular rate and rhythm, S1, S2 without murmurs, rubs, or gallops  Skin:      intact without lesions or rashes.    Psych:      alert and cooperative; normal mood and affect; normal attention span and concentration.      Pacemaker site is clean        Patient has conduction system disease with left bundle branch block, intermittent complete heart  block needing pacemaker implantation  This was followed by flulike illness  This was followed by splenic infarct with presence of PFO  Patient had spontaneous VT which was monomorphic  History of diabetes for 5+ years  Stress test in the past without ischemia and recent echocardiography revealing EF of 45%  Patient home monitoring reviewed and patient also has paroxysmal atrial fibrillation    Reevaluate echocardiography to exclude any clinical picture of Takotsubo which seems to be unlikely  Myocarditis is also unlikely since troponins were not really elevated from recent flulike illness    Other differential diagnosis is to exclude any coronary artery disease because patient being a diabetic and cardiac catheterization pending to conclusively exclude coronary artery disease    The clinical picture is most suggestive of systemic disorder in the differential diagnosis can be sarcoidosis/laminin cardiomyopathy  Serum ACE levels have been ordered  Recheck echocardiogram  Check ESR/rheumatological factors  Check for factor V Leiden mutation  If the patient's cardiac catheterization does not show significant coronary artery stenosis, patient will need cardiac MRI in future to evaluate for diseases like sarcoidosis  Gene evaluation will be important to exclude laminin cardiomyopathy    Since patient had sustained monomorphic VT, patient will need upgrading his device to a CRT system with defibrillation for secondary prevention  Patient will need lifetime anticoagulation  Increase metoprolol dose to 50 mg a day  I discussed at length with patient and family and Dr. Reaves      Electronically signed by Dez Loco MD, 03/28/25, 5:48 PM EDT.

## 2025-03-28 NOTE — CONSULTS
"Nutrition Services    Patient Name: Ismael Pulido  YOB: 1965  MRN: [id number]  Admission date: 3/27/2025      NUTRITION SCREENING      Trending Narrative: 3/28: Admitted 3/27 for heart racing and \"funny\" feeling in his chest.  Begin followed by cardiology.  S/P cardioversion 3/27.  Downgraded out of ICU level of care 3/28.  RD visited patient at bedside.  Patient reports he has been in the hospital off and on in the last few weeks and therefore has had lower appetite and maybe some weight loss during that time.  Patient reports at baseline he eats normally and has stable weight.  Daughter at bedside, patient reports he encourages him to be healthy.  NFPE completed and not consistent with nutrition diagnosis of malnutrition at this time using AND/ASPEN criteria.         PO Diet: Diet: Cardiac, Diabetic; Healthy Heart (2-3 Na+); Consistent Carbohydrate; Fluid Consistency: Thin (IDDSI 0)   PO Supplements: None ordered    Trending PO Intake:  100% x 2 documented meals        Nutritionally-Pertinent Medications RDN Reviewed, C/W clinical course         Labs (reviewed below): Reviewed, management per attending      Results from last 7 days   Lab Units 03/28/25  0517 03/27/25  1432   SODIUM mmol/L 140 139   POTASSIUM mmol/L 4.5 4.3   CHLORIDE mmol/L 104 101   CO2 mmol/L 23.9 25.1   BUN mg/dL 14 11   CREATININE mg/dL 0.98 1.06   CALCIUM mg/dL 9.5 9.7   BILIRUBIN mg/dL 0.5  --    ALK PHOS U/L 99  --    ALT (SGPT) U/L 45*  --    AST (SGOT) U/L 40  --    GLUCOSE mg/dL 136* 141*     Results from last 7 days   Lab Units 03/28/25  0517 03/27/25  1432   MAGNESIUM mg/dL 2.1 2.3   PHOSPHORUS mg/dL 4.4  --    HEMOGLOBIN g/dL 14.4 15.8   HEMATOCRIT % 44.7 49.4   TRIGLYCERIDES mg/dL 191*  --      Lab Results   Component Value Date    HGBA1C 8.40 (H) 02/07/2025          GI Function:  No BM since admission (x 1 day ago)       Skin: Pink to gluteal - WOCN not consulted        Weight Review: Estimated body mass index is " "34.59 kg/m² as calculated from the following:    Height as of this encounter: 172.7 cm (68\").    Weight as of this encounter: 103 kg (227 lb 8.2 oz).    Comment:   3/28: 227#  Slight weight loss noted, nothing significant   Wt Readings from Last 30 Encounters:   03/27/25 1714 103 kg (227 lb 8.2 oz)   03/27/25 1428 104 kg (229 lb 4.5 oz)   03/25/25 1128 104 kg (230 lb 4 oz)   03/13/25 2235 103 kg (228 lb)   03/10/25 0202 102 kg (225 lb 6.4 oz)   03/09/25 2003 101 kg (222 lb)   03/06/25 1102 104 kg (230 lb)   02/28/25 0415 105 kg (230 lb 9.6 oz)   02/27/25 0345 103 kg (227 lb 1.2 oz)   02/26/25 0415 103 kg (227 lb 1.2 oz)   02/25/25 1740 103 kg (227 lb 11.8 oz)   02/25/25 1110 104 kg (229 lb 4.5 oz)   02/22/25 0500 104 kg (230 lb 6.1 oz)   02/21/25 0500 103 kg (226 lb 3.1 oz)   02/20/25 0450 105 kg (230 lb 13.2 oz)   02/19/25 0135 107 kg (235 lb 0.2 oz)   02/18/25 2036 108 kg (238 lb 15.7 oz)   02/18/25 1309 108 kg (237 lb 6 oz)   02/07/25 1047 108 kg (239 lb 2 oz)   12/30/24 1724 104 kg (230 lb)   08/09/24 1102 108 kg (239 lb)   06/14/24 1110 108 kg (239 lb)   04/05/24 0923 110 kg (242 lb)   03/18/24 1115 108 kg (239 lb)   02/09/24 1020 110 kg (242 lb 9.6 oz)   11/18/22 0617 112 kg (246 lb 14.6 oz)   11/15/22 1018 111 kg (244 lb)   02/18/20 1806 113 kg (250 lb)   11/03/18 1455 115 kg (254 lb)   08/20/18 1732 110 kg (242 lb)   12/22/17 1645 108 kg (238 lb 3.2 oz)   11/28/16 1540 120 kg (264 lb)   02/17/16 1015 112 kg (248 lb)          --       Nutrition Problem Statement: No nutritional diagnosis noted at this time, RD will continue to monitor for any nutritional diagnosis that may arise.          Nutrition Intervention: Continue current diet and encourage good PO intakes.           Monitoring/Evaluation Per protocol, I&O, PO intake, Supplement intake, Pertinent labs, Weight          RD to follow up per protocol.    Electronically signed by:  Carolina Bonds RD  03/28/25 15:41 EDT      "

## 2025-03-28 NOTE — CONSULTS
"    [facility] MEDICINE SERVICE  TRANSFER OF CARE/ACCEPTANCE NOTE    PATIENT NAME: Ismael Pulido  : 1965  MRN: [id number]     Active Hospital Problems    Diagnosis  POA    **Wide-complex tachycardia [R00.0]  Yes    Ventricular tachycardia [I47.20]  Unknown      Resolved Hospital Problems   No resolved problems to display.       Patient seen and examined by me on 25 at 1915.    History/Course:  59 y.o. male with PMH of PPM placement, DM-II, HTN, HLD, PFO, and recent splenic infarction presents to the hospital with complaints of heart racing and feeling \"funny\" in his chest. Found to have wide complex tachycardia, treated with amiodarone and lidocaine drip without conversion, underwent cardioversion in ED with cardiology. Lidocaine drip discontinued this morning, pt had maintained paced rhythm overnight. Antihypertensives resumed this am. Cardiology/EP and heme/onc following. Plan for LHC on Monday, stop eliquis Saturday night. Stable for transfer to PCU, hospitalist service consulted to assume care.    Patient reports feeling well overall, denies any chest pain/SOA/palpitations. Tolerating PO intake without issue.     I have reviewed the H&P, diagnostic data and plan of care for Ismael Pulido.  The hospitalist service will be taking over care of this patient during the current hospitalization.        Signature: Electronically signed by Teofilo Belcher MD, 25, 19:51 EDT.  [facility] Hospitalist Team      This note is not for billing purposes.   "

## 2025-03-28 NOTE — PROGRESS NOTES
CARDIOLOGY PROGRESS NOTE:    Ismael Pulido  59 y.o.  male  1965  [id number]      Referring Provider: Intensivist    Reason for follow-up: Sustained ventricular tachycardia     Patient Care Team:  Sharon Silva MD as PCP - General (Internal Medicine & Pediatrics)  Rima Salazar APRN as Nurse Practitioner (Cardiology)    Subjective .  Patient doing well without any symptom    Objective lying in bed comfortably     Review of Systems   Constitutional: Negative for fever and malaise/fatigue.   HENT:  Negative for ear pain and nosebleeds.    Eyes:  Negative for blurred vision and double vision.   Cardiovascular:  Negative for chest pain, dyspnea on exertion and palpitations.   Respiratory:  Negative for cough and shortness of breath.    Skin:  Negative for rash.   Musculoskeletal:  Negative for joint pain.   Gastrointestinal:  Negative for abdominal pain, nausea and vomiting.   Neurological:  Negative for focal weakness and headaches.   Psychiatric/Behavioral:  Negative for depression. The patient is not nervous/anxious.    All other systems reviewed and are negative.      Allergies: Januvia [sitagliptin] and Lisinopril    Scheduled Meds:apixaban, 5 mg, Oral, Q12H  cetirizine, 10 mg, Oral, Daily  empagliflozin, 25 mg, Oral, Daily  [START ON 3/29/2025] losartan, 50 mg, Oral, Q24H  metoprolol succinate XL, 25 mg, Oral, Q24H  mupirocin, 1 Application, Each Nare, BID  pantoprazole, 40 mg, Oral, BID  rosuvastatin, 10 mg, Oral, Nightly  sodium chloride, 10 mL, Intravenous, Q12H  sodium chloride, 10 mL, Intravenous, Q12H      Continuous Infusions:   PRN Meds:.  acetaminophen **OR** acetaminophen    aluminum-magnesium hydroxide-simethicone    senna-docusate sodium **AND** polyethylene glycol **AND** bisacodyl **AND** bisacodyl    Calcium Replacement - Follow Nurse / BPA Driven Protocol    labetalol    Magnesium Low Dose Replacement - Follow Nurse / BPA Driven Protocol    nitroglycerin    ondansetron ODT  "**OR** ondansetron    Phosphorus Replacement - Follow Nurse / BPA Driven Protocol    Potassium Replacement - Follow Nurse / BPA Driven Protocol    [COMPLETED] Insert Peripheral IV **AND** sodium chloride    sodium chloride    sodium chloride    sodium chloride    sodium chloride        VITAL SIGNS  Vitals:    03/28/25 0900 03/28/25 1000 03/28/25 1100 03/28/25 1200   BP:  (!) 152/105 (!) 161/114 141/96   Pulse: 109 99 94 99   Resp: 25 20 22 23   Temp:    98.3 °F (36.8 °C)   TempSrc:    Oral   SpO2: 95% 93% 94% 93%   Weight:       Height:           Flowsheet Rows      Flowsheet Row First Filed Value   Admission Height 172.7 cm (68\") Documented at 03/27/2025 1428   Admission Weight 104 kg (229 lb 4.5 oz) Documented at 03/27/2025 1428             TELEMETRY: Ventricular pacemaker rhythm    Physical Exam:  Constitutional:       Appearance: Well-developed.   Eyes:      General: No scleral icterus.     Conjunctiva/sclera: Conjunctivae normal.      Pupils: Pupils are equal, round, and reactive to light.   HENT:      Head: Normocephalic and atraumatic.   Neck:      Vascular: No carotid bruit or JVD.   Pulmonary:      Effort: Pulmonary effort is normal.      Breath sounds: Normal breath sounds. No wheezing. No rales.   Cardiovascular:      Normal rate. Regular rhythm.   Pulses:     Intact distal pulses.   Abdominal:      General: Bowel sounds are normal.      Palpations: Abdomen is soft.   Musculoskeletal: Normal range of motion.      Cervical back: Normal range of motion and neck supple. Skin:     General: Skin is warm and dry.      Findings: No rash.   Neurological:      Mental Status: Alert.      Comments: No focal deficits          Results Review:   I reviewed the patient's new clinical results.  Lab Results (last 24 hours)       Procedure Component Value Units Date/Time    Factor 5 Leiden [[id number]] Collected: 03/28/25 1052    Specimen: Blood from Arm, Left Updated: 03/28/25 1132    Lidocaine Level [[id number]] Collected: " 03/28/25 0517    Specimen: Blood from Arm, Left Updated: 03/28/25 0936     Lidocaine See Attached Report    Lipid Panel [[id number]]  (Abnormal) Collected: 03/28/25 0517    Specimen: Blood from Arm, Left Updated: 03/28/25 0604     Total Cholesterol 124 mg/dL      Triglycerides 191 mg/dL      HDL Cholesterol 31 mg/dL      LDL Cholesterol  61 mg/dL      VLDL Cholesterol 32 mg/dL      LDL/HDL Ratio 1.77    Narrative:      Cholesterol Reference Ranges  (U.S. Department of Health and Human Services ATP III Classifications)    Desirable          <200 mg/dL  Borderline High    200-239 mg/dL  High Risk          >240 mg/dL      Triglyceride Reference Ranges  (U.S. Department of Health and Human Services ATP III Classifications)    Normal           <150 mg/dL  Borderline High  150-199 mg/dL  High             200-499 mg/dL  Very High        >500 mg/dL    HDL Reference Ranges  (U.S. Department of Health and Human Services ATP III Classifications)    Low     <40 mg/dl (major risk factor for CHD)  High    >60 mg/dl ('negative' risk factor for CHD)        LDL Reference Ranges  (U.S. Department of Health and Human Services ATP III Classifications)    Optimal          <100 mg/dL  Near Optimal     100-129 mg/dL  Borderline High  130-159 mg/dL  High             160-189 mg/dL  Very High        >189 mg/dL    LDL is calculated using the NIH LDL-C calculation.      Magnesium [[id number]]  (Normal) Collected: 03/28/25 0517    Specimen: Blood from Arm, Left Updated: 03/28/25 0551     Magnesium 2.1 mg/dL     Comprehensive Metabolic Panel [[id number]]  (Abnormal) Collected: 03/28/25 0517    Specimen: Blood from Arm, Left Updated: 03/28/25 0551     Glucose 136 mg/dL      BUN 14 mg/dL      Creatinine 0.98 mg/dL      Sodium 140 mmol/L      Potassium 4.5 mmol/L      Comment: Slight hemolysis detected by analyzer. Result may be falsely elevated.        Chloride 104 mmol/L      CO2 23.9 mmol/L      Calcium 9.5 mg/dL      Total Protein 7.5 g/dL       Albumin 3.9 g/dL      ALT (SGPT) 45 U/L      AST (SGOT) 40 U/L      Alkaline Phosphatase 99 U/L      Total Bilirubin 0.5 mg/dL      Globulin 3.6 gm/dL      A/G Ratio 1.1 g/dL      BUN/Creatinine Ratio 14.3     Anion Gap 12.1 mmol/L      eGFR 88.8 mL/min/1.73     Narrative:      GFR Categories in Chronic Kidney Disease (CKD)      GFR Category          GFR (mL/min/1.73)    Interpretation  G1                     90 or greater         Normal or high (1)  G2                      60-89                Mild decrease (1)  G3a                   45-59                Mild to moderate decrease  G3b                   30-44                Moderate to severe decrease  G4                    15-29                Severe decrease  G5                    14 or less           Kidney failure          (1)In the absence of evidence of kidney disease, neither GFR category G1 or G2 fulfill the criteria for CKD.    eGFR calculation 2021 CKD-EPI creatinine equation, which does not include race as a factor    Phosphorus [[id number]]  (Normal) Collected: 03/28/25 0517    Specimen: Blood from Arm, Left Updated: 03/28/25 0549     Phosphorus 4.4 mg/dL     CBC & Differential [[id number]]  (Abnormal) Collected: 03/28/25 0517    Specimen: Blood from Arm, Left Updated: 03/28/25 0522    Narrative:      The following orders were created for panel order CBC & Differential.  Procedure                               Abnormality         Status                     ---------                               -----------         ------                     CBC Auto Differential[[id number]]        Abnormal            Final result                 Please view results for these tests on the individual orders.    CBC Auto Differential [[id number]]  (Abnormal) Collected: 03/28/25 0517    Specimen: Blood from Arm, Left Updated: 03/28/25 0522     WBC 10.34 10*3/mm3      RBC 5.00 10*6/mm3      Hemoglobin 14.4 g/dL      Hematocrit 44.7 %      MCV 89.4 fL      MCH 28.8 pg      MCHC  32.2 g/dL      RDW 13.2 %      RDW-SD 43.3 fl      MPV 8.8 fL      Platelets 227 10*3/mm3      Neutrophil % 59.4 %      Lymphocyte % 24.4 %      Monocyte % 7.6 %      Eosinophil % 6.7 %      Basophil % 1.3 %      Immature Grans % 0.6 %      Neutrophils, Absolute 6.15 10*3/mm3      Lymphocytes, Absolute 2.52 10*3/mm3      Monocytes, Absolute 0.79 10*3/mm3      Eosinophils, Absolute 0.69 10*3/mm3      Basophils, Absolute 0.13 10*3/mm3      Immature Grans, Absolute 0.06 10*3/mm3      nRBC 0.0 /100 WBC     POC Glucose Once [[id number]]  (Abnormal) Collected: 03/27/25 2038    Specimen: Blood Updated: 03/27/25 2040     Glucose 190 mg/dL      Comment: Serial Number: [id number]:  [id number]       Angiotensin Converting Enzyme [[id number]] Collected: 03/27/25 1824    Specimen: Blood Updated: 03/27/25 1828    MRSA Screen, PCR (Inpatient) - Swab, Nares [[id number]]  (Normal) Collected: 03/27/25 1514    Specimen: Swab from Nares Updated: 03/27/25 1633     MRSA PCR No MRSA Detected    Narrative:      The negative predictive value of this diagnostic test is high and should only be used to consider de-escalating anti-MRSA therapy. A positive result may indicate colonization with MRSA and must be correlated clinically.    High Sensitivity Troponin T 1Hr [[id number]]  (Abnormal) Collected: 03/27/25 1545    Specimen: Blood Updated: 03/27/25 1613     HS Troponin T 42 ng/L      Troponin T Numeric Delta 2 ng/L      Troponin T % Delta 5    Narrative:      High Sensitive Troponin T Reference Range:  <14.0 ng/L- Negative Female for AMI  <22.0 ng/L- Negative Male for AMI  >=14 - Abnormal Female indicating possible myocardial injury.  >=22 - Abnormal Male indicating possible myocardial injury.   Clinicians would have to utilize clinical acumen, EKG, Troponin, and serial changes to determine if it is an Acute Myocardial Infarction or myocardial injury due to an underlying chronic condition.         Respiratory Panel PCR  w/COVID-19(SARS-CoV-2) [facility] In-House, NP Swab in UTM/VTM, 2 HR TAT - Swab, Nasopharynx [[id number]]  (Normal) Collected: 03/27/25 1514    Specimen: Swab from Nasopharynx Updated: 03/27/25 1608     ADENOVIRUS, PCR Not Detected     Coronavirus 229E Not Detected     Coronavirus HKU1 Not Detected     Coronavirus NL63 Not Detected     Coronavirus OC43 Not Detected     COVID19 Not Detected     Human Metapneumovirus Not Detected     Human Rhinovirus/Enterovirus Not Detected     Influenza A PCR Not Detected     Influenza B PCR Not Detected     Parainfluenza Virus 1 Not Detected     Parainfluenza Virus 2 Not Detected     Parainfluenza Virus 3 Not Detected     Parainfluenza Virus 4 Not Detected     RSV, PCR Not Detected     Bordetella pertussis pcr Not Detected     Bordetella parapertussis PCR Not Detected     Chlamydophila pneumoniae PCR Not Detected     Mycoplasma pneumo by PCR Not Detected    Narrative:      In the setting of a positive respiratory panel with a viral infection PLUS a negative procalcitonin without other underlying concern for bacterial infection, consider observing off antibiotics or discontinuation of antibiotics and continue supportive care. If the respiratory panel is positive for atypical bacterial infection (Bordetella pertussis, Chlamydophila pneumoniae, or Mycoplasma pneumoniae), consider antibiotic de-escalation to target atypical bacterial infection.    Protime-INR [[id number]]  (Abnormal) Collected: 03/27/25 1432    Specimen: Blood Updated: 03/27/25 1535     Protime 15.3 Seconds      INR 1.22    aPTT [[id number]]  (Normal) Collected: 03/27/25 1432    Specimen: Blood Updated: 03/27/25 1535     PTT 29.6 seconds     High Sensitivity Troponin T [[id number]]  (Abnormal) Collected: 03/27/25 1432    Specimen: Blood Updated: 03/27/25 1500     HS Troponin T 40 ng/L     Narrative:      High Sensitive Troponin T Reference Range:  <14.0 ng/L- Negative Female for AMI  <22.0 ng/L- Negative Male  for AMI  >=14 - Abnormal Female indicating possible myocardial injury.  >=22 - Abnormal Male indicating possible myocardial injury.   Clinicians would have to utilize clinical acumen, EKG, Troponin, and serial changes to determine if it is an Acute Myocardial Infarction or myocardial injury due to an underlying chronic condition.         Basic Metabolic Panel [[id number]]  (Abnormal) Collected: 03/27/25 1432    Specimen: Blood Updated: 03/27/25 1500     Glucose 141 mg/dL      BUN 11 mg/dL      Creatinine 1.06 mg/dL      Sodium 139 mmol/L      Potassium 4.3 mmol/L      Chloride 101 mmol/L      CO2 25.1 mmol/L      Calcium 9.7 mg/dL      BUN/Creatinine Ratio 10.4     Anion Gap 12.9 mmol/L      eGFR 80.8 mL/min/1.73     Narrative:      GFR Categories in Chronic Kidney Disease (CKD)      GFR Category          GFR (mL/min/1.73)    Interpretation  G1                     90 or greater         Normal or high (1)  G2                      60-89                Mild decrease (1)  G3a                   45-59                Mild to moderate decrease  G3b                   30-44                Moderate to severe decrease  G4                    15-29                Severe decrease  G5                    14 or less           Kidney failure          (1)In the absence of evidence of kidney disease, neither GFR category G1 or G2 fulfill the criteria for CKD.    eGFR calculation 2021 CKD-EPI creatinine equation, which does not include race as a factor    Magnesium [[id number]]  (Normal) Collected: 03/27/25 1432    Specimen: Blood Updated: 03/27/25 1500     Magnesium 2.3 mg/dL     [place] Draw [[id number]] Collected: 03/27/25 1432    Specimen: Blood Updated: 03/27/25 1445    Narrative:      The following orders were created for panel order [place] Draw.  Procedure                               Abnormality         Status                     ---------                               -----------         ------                     Eliazar Bernal  (Gel)[[id number]]                                  Final result               Lavender Top[[id number]]                                     Final result               Gold Top - SST[[id number]]                                   Final result               Light Blue Top[[id number]]                                   Final result                 Please view results for these tests on the individual orders.    Green Top (Gel) [[id number]] Collected: 03/27/25 1432    Specimen: Blood Updated: 03/27/25 1445     Extra Tube Hold for add-ons.     Comment: Auto resulted.       Lavender Top [[id number]] Collected: 03/27/25 1432    Specimen: Blood Updated: 03/27/25 1445     Extra Tube hold for add-on     Comment: Auto resulted       Gold Top - SST [[id number]] Collected: 03/27/25 1432    Specimen: Blood Updated: 03/27/25 1445     Extra Tube Hold for add-ons.     Comment: Auto resulted.       Light Blue Top [[id number]] Collected: 03/27/25 1432    Specimen: Blood Updated: 03/27/25 1445     Extra Tube Hold for add-ons.     Comment: Auto resulted       CBC & Differential [[id number]]  (Abnormal) Collected: 03/27/25 1432    Specimen: Blood Updated: 03/27/25 1439    Narrative:      The following orders were created for panel order CBC & Differential.  Procedure                               Abnormality         Status                     ---------                               -----------         ------                     CBC Auto Differential[[id number]]        Abnormal            Final result                 Please view results for these tests on the individual orders.    CBC Auto Differential [[id number]]  (Abnormal) Collected: 03/27/25 1432    Specimen: Blood Updated: 03/27/25 1439     WBC 11.34 10*3/mm3      RBC 5.50 10*6/mm3      Hemoglobin 15.8 g/dL      Hematocrit 49.4 %      MCV 89.8 fL      MCH 28.7 pg      MCHC 32.0 g/dL      RDW 13.2 %      RDW-SD 43.5 fl      MPV 8.9 fL      Platelets 255 10*3/mm3      Neutrophil % 60.5 %       Lymphocyte % 25.0 %      Monocyte % 6.3 %      Eosinophil % 5.1 %      Basophil % 1.3 %      Immature Grans % 1.8 %      Neutrophils, Absolute 6.87 10*3/mm3      Lymphocytes, Absolute 2.83 10*3/mm3      Monocytes, Absolute 0.71 10*3/mm3      Eosinophils, Absolute 0.58 10*3/mm3      Basophils, Absolute 0.15 10*3/mm3      Immature Grans, Absolute 0.20 10*3/mm3      nRBC 0.0 /100 WBC             Imaging Results (Last 24 Hours)       Procedure Component Value Units Date/Time    XR Chest 1 View [[id number]] Collected: 03/27/25 1525     Updated: 03/27/25 1528    Narrative:      XR CHEST 1 VW    Date of Exam: 3/27/2025 3:08 PM EDT    Indication: Irregular heartbeat    Comparison: CT chest 3/12/2025    Findings:  Heart size within normal limits allowing for exam technique. Multichamber AICD. Lordotic positioning. Defibrillator pads overlie the chest. No discrete lobar consolidation, edema, effusion or pneumothorax. Pleural effusions noted on recent CT not clearly   visualized. Degenerative elated osseous change. Low lung volumes.      Impression:      Impression:  No convincing acute cardiopulmonary abnormality. Pleural effusions on recent chest CT are not visualized on today's radiograph.      Electronically Signed: Nicholas Becerra MD    3/27/2025 3:26 PM EDT    Workstation ID: [id number]            EKG      I personally viewed and interpreted the patient's EKG/Telemetry data:    ECHOCARDIOGRAM:  Results for orders placed during the hospital encounter of 03/09/25    Adult Transesophageal Echo ([facility]) W/ Cont if Necessary Per Protocol    Interpretation Summary    Left ventricular systolic function is low normal. Calculated left ventricular EF = 45.9%    The following left ventricular wall segments are hypokinetic: apical lateral, apical septal and apex hypokinetic.    Mildly reduced right ventricular systolic function noted.    The right ventricular cavity is mildly dilated.    Small patent foramen ovale present.    Saline test  results are positive for right to left atrial level shunt.       STRESS MYOVIEW:  Results for orders placed during the hospital encounter of 02/18/25    Stress Test With Myocardial Perfusion One Day    Interpretation Summary    Myocardial perfusion imaging indicates a normal myocardial perfusion study with no evidence of ischemia. Impressions are consistent with a low risk study.    Left ventricular ejection fraction is normal (Calculated EF = 64%).       CARDIAC CATHETERIZATION:  No results found for this or any previous visit.       OTHER:         Assessment & Plan     Sustained ventricular tachycardia  Patient had sustained ventricular tachycardia in the ER and did not respond to IV amiodarone and hence he underwent cardioversion after starting him on lidocaine without any complications  Patient is currently in a paced rhythm  Patient is ruled out for MI by EKG and enzymes  Patient electrolytes are normal.  Patient is on Eliquis  Electrophysiology has been consulted and they want a cath which I will perform on Monday after stopping the Eliquis on Saturday night.  Patient is also having workup done for complete heart block and ventricular tachycardia mainly to rule out sarcoidosis.    PFO  Patient has recently diagnosed PFO for which he was placed on Eliquis because of splenic infarction but he will be better off with PFO closure soon.    Diabetes  Patient is on sliding scale insulin.    Hypertension  Patient is on losartan but will also start him on beta-blockers with metoprolol    Hyperlipidemia  Patient on statins and the lipid levels are well within normal limits.    I discussed the patients findings and my recommendations with patient and nurse    Win Reaves MD  03/28/25  12:49 EDT

## 2025-03-28 NOTE — OUTREACH NOTE
Medical Week 2 Survey      Flowsheet Row Responses   [facility] patient discharged from? [place]   Does the patient have one of the following disease processes/diagnoses(primary or secondary)? Other   Week 2 attempt successful? No   Unsuccessful attempts Attempt 1   Revoke Readmitted   Revoke Readmitted            Lisbeth HAYES - Registered Nurse

## 2025-03-28 NOTE — CASE MANAGEMENT/SOCIAL WORK
Discharge Planning Assessment   [facility]     Patient Name: Ismael Pulido  MRN: [id number]  Today's Date: 3/28/2025    Admit Date: 3/27/2025    Plan: Plan to return home with spouse.   Discharge Needs Assessment       Row Name 03/28/25 6585       Living Environment    People in Home spouse    Name(s) of People in Home Claire - spouse    Current Living Arrangements home    Potentially Unsafe Housing Conditions none    In the past 12 months has the electric, gas, oil, or water company threatened to shut off services in your home? No    Primary Care Provided by self    Provides Primary Care For no one    Family Caregiver if Needed spouse    Family Caregiver Names Claire - spouse    Quality of Family Relationships helpful;involved;supportive    Able to Return to Prior Arrangements yes       Resource/Environmental Concerns    Resource/Environmental Concerns none    Transportation Concerns none       Transportation Needs    In the past 12 months, has lack of transportation kept you from medical appointments or from getting medications? no    In the past 12 months, has lack of transportation kept you from meetings, work, or from getting things needed for daily living? No       Food Insecurity    Within the past 12 months, you worried that your food would run out before you got the money to buy more. Never true    Within the past 12 months, the food you bought just didn't last and you didn't have money to get more. Never true       Transition Planning    Patient/Family Anticipates Transition to home with family    Patient/Family Anticipated Services at Transition none    Transportation Anticipated car, drives self;family or friend will provide       Discharge Needs Assessment    Readmission Within the Last 30 Days no previous admission in last 30 days    Equipment Currently Used at Home cpap;bp cuff;glucometer    Concerns to be Addressed discharge planning    Anticipated Changes Related to Illness none    Equipment Needed  After Discharge none                   Discharge Plan       Row Name 03/28/25 1751       Plan    Plan Plan to return home with spouse.    Patient/Family in Agreement with Plan yes    Plan Comments CM met with patient/spouse at bedside. Patient A&Ox4. Patient lives at home with spouse who will transport at discharge. Patient performs ADLs, has a glucometer/b/p cuff and CPAP at home. PCP and pharmacy confirmed. Agreeable to M2B.  Denies financial assistance needs for medication and/or food. Denies any current DME, HH, Caregiver, or rehab services. DC Barriers:  PM Interrogation, cardiac monitor, Cardio/HemOnc following.                    Expected Discharge Date and Time       Expected Discharge Date Expected Discharge Time    Mar 31, 2025            Demographic Summary       Row Name 03/28/25 1750       General Information    Admission Type inpatient    Arrived From emergency department    Referral Source admission list    Reason for Consult discharge planning    Preferred Language English                   Functional Status       Row Name 03/28/25 1750       Functional Status    Usual Activity Tolerance good    Current Activity Tolerance moderate       Functional Status, IADL    Medications independent    Meal Preparation independent    Housekeeping independent    Laundry independent    Shopping independent       Mental Status    General Appearance WDL WDL       Mental Status Summary    Recent Changes in Mental Status/Cognitive Functioning no changes             O. Rosalinda Vera RN  ICU/CVU   O: [phone]  C: [phone]  [email or web address]

## 2025-03-28 NOTE — PROGRESS NOTES
"Critical Care Progress Note     Ismael Pulido : 1965 MRN:[id number] LOS:1  Rm: 2314/1     Principal Problem: Wide-complex tachycardia     Reason for follow up: All the medical problems listed below    Summary     Ismael Pulido is a 59 y.o. male with PMH of PPM placement, DM-II, HTN, HLD, PFO, and recent splenic infarction presents to the hospital with complaints of heart racing and feeling \"funny\" in his chest.       He was recently in [facility], [place] for splenic infarction thought 2/2 his PFO.  He was started on Eliquis on 3/14.     ED course: Received 150 mg IV amio bolus in ambulance and 360 mg x 2 IV boluses in ER w/o conversion.  Gave bolus of 75 mg IV lidocaine and started on gtt.  All labs in ER unremarkable.      Patient underwent cardioversion in the ED after discussion with cardiology.    Patient is on Hospital Day: 2.    Significant Events / Subjective     25 : Lidocaine drip discontinued this morning, patient has maintained paced rhythm overnight.  Cardiac EP has been consulted.  Stable for downgrade out of ICU, transfer to PCU with hospitalist service consultation.    Assessment / Plan     Stable VT / Recent permanent pacemaker placement  -Received 150 mg IV amio bolus in ambulance and 360 mg x 2 IV boluses in ER w/o conversion  -Case d/w Dr.'s Angel and Jelly--gave 75 mg bolus IV lidocaine and started gtt at 2  -S/p cardioversion in ER with return to paced rhythm  -Continue Eliquis  -ACE level sent per cardiology request  -Lidocaine gt discontinued this AM.         PFO  Recent Splenic infarction  -Thought 2/2 PFO  -Started Eliquis 3/14, continue  -Consult hematology for hypercoagulable w/u        DM-II, not well-controlled  -Cardiac, diabetic diet  -A1c 8.4% 25  -SSI for now        HLD  -Continue Crestor 10 mg po qhs        HTN  -Continue losartan 50 mg po daily.  Labetalol IV as needed.    Disposition:  Downgrade out of ICU, transfer for to PCU and hospitalist " service.    Code status:   Code Status (Patient has no pulse and is not breathing): CPR (Attempt to Resuscitate)  Medical Interventions (Patient has pulse or is breathing): Full Support  Level Of Support Discussed With: Patient       Nutrition:   Diet: Cardiac, Diabetic; Healthy Heart (2-3 Na+); Consistent Carbohydrate; Fluid Consistency: Thin (IDDSI 0)   Patient isn't on Tube Feeding     VTE Prophylaxis:  Pharmacologic VTE prophylaxis orders are present.           Objective / Physical Exam     Vital signs:  Temp: 98.2 °F (36.8 °C)  BP: (!) 158/124  Heart Rate: 107  Resp: 16  SpO2: 93 %  Weight: 103 kg (227 lb 8.2 oz)    Admission Weight: Weight: 104 kg (229 lb 4.5 oz)  Current Weight: Weight: 103 kg (227 lb 8.2 oz)    Input/Output in last 24 hours:    Intake/Output Summary (Last 24 hours) at 3/28/2025 0847  Last data filed at 3/28/2025 0700  Gross per 24 hour   Intake 340 ml   Output 1300 ml   Net -960 ml      Net IO Since Admission: -960 mL [03/28/25 0847]     Physical Exam  Vitals and nursing note reviewed.   Constitutional:       General: He is not in acute distress.     Appearance: He is not ill-appearing, toxic-appearing or diaphoretic.   HENT:      Head: Normocephalic.      Nose: Nose normal.      Mouth/Throat:      Mouth: Mucous membranes are moist.      Pharynx: Oropharynx is clear.   Eyes:      General: No scleral icterus.     Conjunctiva/sclera: Conjunctivae normal.   Cardiovascular:      Rate and Rhythm: Normal rate.      Pulses: Normal pulses.      Comments: Paced rhythm  Pulmonary:      Effort: Pulmonary effort is normal. No respiratory distress.   Abdominal:      General: There is no distension.      Tenderness: There is no abdominal tenderness.   Musculoskeletal:      Right lower leg: No edema.      Left lower leg: No edema.   Skin:     General: Skin is warm and dry.   Neurological:      General: No focal deficit present.      Mental Status: He is alert and oriented to person, place, and time.    Psychiatric:         Mood and Affect: Mood normal.         Behavior: Behavior normal.          Radiology and Labs     Results from last 7 days   Lab Units 03/28/25  0517 03/27/25  1432   WBC 10*3/mm3 10.34 11.34*   HEMOGLOBIN g/dL 14.4 15.8   HEMATOCRIT % 44.7 49.4   PLATELETS 10*3/mm3 227 255      Results from last 7 days   Lab Units 03/27/25  1432   PROTIME Seconds 15.3*   INR  1.22*   APTT seconds 29.6      Results from last 7 days   Lab Units 03/28/25  0517 03/27/25  1432   SODIUM mmol/L 140 139   POTASSIUM mmol/L 4.5 4.3   CHLORIDE mmol/L 104 101   CO2 mmol/L 23.9 25.1   ANION GAP mmol/L 12.1 12.9   BUN mg/dL 14 11   CREATININE mg/dL 0.98 1.06   GLUCOSE mg/dL 136* 141*   PHOSPHORUS mg/dL 4.4  --    MAGNESIUM mg/dL 2.1 2.3   ALT (SGPT) U/L 45*  --    AST (SGOT) U/L 40  --    ALK PHOS U/L 99  --       Results from last 7 days   Lab Units 03/28/25  0517   ALT (SGPT) U/L 45*   AST (SGOT) U/L 40   ALK PHOS U/L 99           XR Chest 1 View  Result Date: 3/27/2025  Impression: No convincing acute cardiopulmonary abnormality. Pleural effusions on recent chest CT are not visualized on today's radiograph. Electronically Signed: Nicholas Becerra MD  3/27/2025 3:26 PM EDT  Workstation ID: [id number]      Current medications     Scheduled Meds:   apixaban, 5 mg, Oral, Q12H  cetirizine, 10 mg, Oral, Daily  empagliflozin, 25 mg, Oral, Daily  losartan, 50 mg, Oral, Daily  mupirocin, 1 Application, Each Nare, BID  pantoprazole, 40 mg, Oral, BID  rosuvastatin, 10 mg, Oral, Nightly  sodium chloride, 10 mL, Intravenous, Q12H  sodium chloride, 10 mL, Intravenous, Q12H        Continuous Infusions:          Plan discussed with RN. Reviewed all other data in the last 24 hours, including but not limited to vitals, labs, microbiology, imaging and pertinent notes from other providers.  Plan also discussed with patient at the bedside.      TAMIKO Cassidy   Critical Care  03/28/25   08:47 EDT

## 2025-03-29 ENCOUNTER — APPOINTMENT (OUTPATIENT)
Dept: CARDIOLOGY | Facility: HOSPITAL | Age: 60
End: 2025-03-29
Payer: COMMERCIAL

## 2025-03-29 LAB
ALBUMIN SERPL-MCNC: 3.9 G/DL (ref 3.5–5.2)
ALBUMIN/GLOB SERPL: 1 G/DL
ALP SERPL-CCNC: 108 U/L (ref 39–117)
ALT SERPL W P-5'-P-CCNC: 42 U/L (ref 1–41)
ANION GAP SERPL CALCULATED.3IONS-SCNC: 12.7 MMOL/L (ref 5–15)
AORTIC DIMENSIONLESS INDEX: 0.65 (DI)
AST SERPL-CCNC: 44 U/L (ref 1–40)
AV MEAN PRESS GRAD SYS DOP V1V2: 2 MMHG
AV VMAX SYS DOP: 97.2 CM/SEC
BASOPHILS # BLD AUTO: 0.09 10*3/MM3 (ref 0–0.2)
BASOPHILS NFR BLD AUTO: 1 % (ref 0–1.5)
BH CV ECHO LEFT VENTRICLE GLOBAL LONGITUDINAL STRAIN: -10.3 %
BH CV ECHO MEAS - AO MAX PG: 3.8 MMHG
BH CV ECHO MEAS - AO V2 VTI: 16.9 CM
BH CV ECHO MEAS - AVA(I,D): 4 CM2
BH CV ECHO MEAS - EDV(CUBED): 103.8 ML
BH CV ECHO MEAS - EDV(MOD-SP4): 92.3 ML
BH CV ECHO MEAS - EF(MOD-SP4): 50.9 %
BH CV ECHO MEAS - ESV(CUBED): 46.7 ML
BH CV ECHO MEAS - ESV(MOD-SP4): 45.3 ML
BH CV ECHO MEAS - FS: 23.4 %
BH CV ECHO MEAS - IVS/LVPW: 1 CM
BH CV ECHO MEAS - IVSD: 1.2 CM
BH CV ECHO MEAS - LA DIMENSION: 3.7 CM
BH CV ECHO MEAS - LAT PEAK E' VEL: 9.8 CM/SEC
BH CV ECHO MEAS - LV DIASTOLIC VOL/BSA (35-75): 42.8 CM2
BH CV ECHO MEAS - LV MASS(C)D: 212 GRAMS
BH CV ECHO MEAS - LV MAX PG: 2.7 MMHG
BH CV ECHO MEAS - LV MEAN PG: 1 MMHG
BH CV ECHO MEAS - LV SYSTOLIC VOL/BSA (12-30): 21 CM2
BH CV ECHO MEAS - LV V1 MAX: 82.3 CM/SEC
BH CV ECHO MEAS - LV V1 VTI: 11 CM
BH CV ECHO MEAS - LVIDD: 4.7 CM
BH CV ECHO MEAS - LVIDS: 3.6 CM
BH CV ECHO MEAS - LVOT AREA: 6.2 CM2
BH CV ECHO MEAS - LVOT DIAM: 2.8 CM
BH CV ECHO MEAS - LVPWD: 1.2 CM
BH CV ECHO MEAS - MED PEAK E' VEL: 5.4 CM/SEC
BH CV ECHO MEAS - MV A MAX VEL: 60.3 CM/SEC
BH CV ECHO MEAS - MV DEC SLOPE: 342 CM/SEC2
BH CV ECHO MEAS - MV DEC TIME: 0.11 SEC
BH CV ECHO MEAS - MV E MAX VEL: 40.9 CM/SEC
BH CV ECHO MEAS - MV E/A: 0.68
BH CV ECHO MEAS - MV MAX PG: 1.44 MMHG
BH CV ECHO MEAS - MV MEAN PG: 1 MMHG
BH CV ECHO MEAS - MV P1/2T: 35.5 MSEC
BH CV ECHO MEAS - MV V2 VTI: 12.6 CM
BH CV ECHO MEAS - MVA(P1/2T): 6.2 CM2
BH CV ECHO MEAS - MVA(VTI): 5.4 CM2
BH CV ECHO MEAS - PA ACC TIME: 0.12 SEC
BH CV ECHO MEAS - PA V2 MAX: 74.7 CM/SEC
BH CV ECHO MEAS - RV MAX PG: 0.54 MMHG
BH CV ECHO MEAS - RV V1 MAX: 36.7 CM/SEC
BH CV ECHO MEAS - RV V1 VTI: 7.4 CM
BH CV ECHO MEAS - SV(LVOT): 67.7 ML
BH CV ECHO MEAS - SV(MOD-SP4): 47 ML
BH CV ECHO MEAS - SVI(LVOT): 31.4 ML/M2
BH CV ECHO MEAS - SVI(MOD-SP4): 21.8 ML/M2
BH CV ECHO MEAS - TAPSE (>1.6): 1.22 CM
BH CV ECHO MEAS - TR MAX PG: 13.8 MMHG
BH CV ECHO MEAS - TR MAX VEL: 186 CM/SEC
BH CV ECHO MEASUREMENTS AVERAGE E/E' RATIO: 5.38
BH CV XLRA - TDI S': 7 CM/SEC
BILIRUB SERPL-MCNC: 0.4 MG/DL (ref 0–1.2)
BUN SERPL-MCNC: 14 MG/DL (ref 6–20)
BUN/CREAT SERPL: 15.1 (ref 7–25)
CALCIUM SPEC-SCNC: 9.5 MG/DL (ref 8.6–10.5)
CHLORIDE SERPL-SCNC: 101 MMOL/L (ref 98–107)
CHROMATIN AB SERPL-ACNC: <10 IU/ML (ref 0–14)
CO2 SERPL-SCNC: 23.3 MMOL/L (ref 22–29)
CREAT SERPL-MCNC: 0.93 MG/DL (ref 0.76–1.27)
DEPRECATED RDW RBC AUTO: 41.7 FL (ref 37–54)
EGFRCR SERPLBLD CKD-EPI 2021: 94.6 ML/MIN/1.73
EOSINOPHIL # BLD AUTO: 0.48 10*3/MM3 (ref 0–0.4)
EOSINOPHIL NFR BLD AUTO: 5.5 % (ref 0.3–6.2)
ERYTHROCYTE [DISTWIDTH] IN BLOOD BY AUTOMATED COUNT: 13 % (ref 12.3–15.4)
ERYTHROCYTE [SEDIMENTATION RATE] IN BLOOD: 46 MM/HR (ref 0–20)
GLOBULIN UR ELPH-MCNC: 3.8 GM/DL
GLUCOSE SERPL-MCNC: 136 MG/DL (ref 65–99)
HCT VFR BLD AUTO: 46.3 % (ref 37.5–51)
HGB BLD-MCNC: 15 G/DL (ref 13–17.7)
IMM GRANULOCYTES # BLD AUTO: 0.05 10*3/MM3 (ref 0–0.05)
IMM GRANULOCYTES NFR BLD AUTO: 0.6 % (ref 0–0.5)
LYMPHOCYTES # BLD AUTO: 1.87 10*3/MM3 (ref 0.7–3.1)
LYMPHOCYTES NFR BLD AUTO: 21.6 % (ref 19.6–45.3)
MAGNESIUM SERPL-MCNC: 2.1 MG/DL (ref 1.6–2.6)
MCH RBC QN AUTO: 28.6 PG (ref 26.6–33)
MCHC RBC AUTO-ENTMCNC: 32.4 G/DL (ref 31.5–35.7)
MCV RBC AUTO: 88.4 FL (ref 79–97)
MONOCYTES # BLD AUTO: 0.72 10*3/MM3 (ref 0.1–0.9)
MONOCYTES NFR BLD AUTO: 8.3 % (ref 5–12)
NEUTROPHILS NFR BLD AUTO: 5.46 10*3/MM3 (ref 1.7–7)
NEUTROPHILS NFR BLD AUTO: 63 % (ref 42.7–76)
NRBC BLD AUTO-RTO: 0 /100 WBC (ref 0–0.2)
PHOSPHATE SERPL-MCNC: 4.6 MG/DL (ref 2.5–4.5)
PLATELET # BLD AUTO: 193 10*3/MM3 (ref 140–450)
PMV BLD AUTO: 9 FL (ref 6–12)
POTASSIUM SERPL-SCNC: 4.2 MMOL/L (ref 3.5–5.2)
PROT SERPL-MCNC: 7.7 G/DL (ref 6–8.5)
RBC # BLD AUTO: 5.24 10*6/MM3 (ref 4.14–5.8)
SINUS: 3.7 CM
SODIUM SERPL-SCNC: 137 MMOL/L (ref 136–145)
STJ: 2.9 CM
WBC NRBC COR # BLD AUTO: 8.67 10*3/MM3 (ref 3.4–10.8)

## 2025-03-29 PROCEDURE — 80053 COMPREHEN METABOLIC PANEL: CPT | Performed by: NURSE PRACTITIONER

## 2025-03-29 PROCEDURE — 83735 ASSAY OF MAGNESIUM: CPT | Performed by: NURSE PRACTITIONER

## 2025-03-29 PROCEDURE — 84100 ASSAY OF PHOSPHORUS: CPT | Performed by: NURSE PRACTITIONER

## 2025-03-29 PROCEDURE — 93356 MYOCRD STRAIN IMG SPCKL TRCK: CPT | Performed by: INTERNAL MEDICINE

## 2025-03-29 PROCEDURE — 25010000002 SULFUR HEXAFLUORIDE MICROSPH 60.7-25 MG RECONSTITUTED SUSPENSION: Performed by: STUDENT IN AN ORGANIZED HEALTH CARE EDUCATION/TRAINING PROGRAM

## 2025-03-29 PROCEDURE — 93306 TTE W/DOPPLER COMPLETE: CPT | Performed by: INTERNAL MEDICINE

## 2025-03-29 PROCEDURE — 85025 COMPLETE CBC W/AUTO DIFF WBC: CPT | Performed by: NURSE PRACTITIONER

## 2025-03-29 PROCEDURE — 85652 RBC SED RATE AUTOMATED: CPT | Performed by: INTERNAL MEDICINE

## 2025-03-29 PROCEDURE — 93306 TTE W/DOPPLER COMPLETE: CPT

## 2025-03-29 PROCEDURE — 93356 MYOCRD STRAIN IMG SPCKL TRCK: CPT

## 2025-03-29 PROCEDURE — 99233 SBSQ HOSP IP/OBS HIGH 50: CPT | Performed by: INTERNAL MEDICINE

## 2025-03-29 RX ADMIN — Medication 10 ML: at 08:27

## 2025-03-29 RX ADMIN — Medication 10 ML: at 20:38

## 2025-03-29 RX ADMIN — EMPAGLIFLOZIN 25 MG: 25 TABLET, FILM COATED ORAL at 08:25

## 2025-03-29 RX ADMIN — ROSUVASTATIN CALCIUM 10 MG: 10 TABLET, FILM COATED ORAL at 20:37

## 2025-03-29 RX ADMIN — PANTOPRAZOLE SODIUM 40 MG: 40 TABLET, DELAYED RELEASE ORAL at 08:25

## 2025-03-29 RX ADMIN — CETIRIZINE HYDROCHLORIDE 10 MG: 10 TABLET, FILM COATED ORAL at 08:25

## 2025-03-29 RX ADMIN — SULFUR HEXAFLUORIDE 3 ML: KIT at 02:06

## 2025-03-29 RX ADMIN — Medication 10 ML: at 20:37

## 2025-03-29 RX ADMIN — MUPIROCIN 1 APPLICATION: 20 OINTMENT TOPICAL at 08:27

## 2025-03-29 RX ADMIN — LOSARTAN POTASSIUM 50 MG: 50 TABLET, FILM COATED ORAL at 20:37

## 2025-03-29 RX ADMIN — PANTOPRAZOLE SODIUM 40 MG: 40 TABLET, DELAYED RELEASE ORAL at 20:37

## 2025-03-29 RX ADMIN — MUPIROCIN 1 APPLICATION: 20 OINTMENT TOPICAL at 20:37

## 2025-03-29 RX ADMIN — APIXABAN 5 MG: 5 TABLET, FILM COATED ORAL at 08:25

## 2025-03-29 RX ADMIN — METOPROLOL SUCCINATE 50 MG: 50 TABLET, EXTENDED RELEASE ORAL at 08:25

## 2025-03-29 NOTE — PROGRESS NOTES
[facility] MEDICINE SERVICE  DAILY PROGRESS NOTE    NAME: Ismael Pulido  : 1965  MRN: [id number]      LOS: 2 days     PROVIDER OF SERVICE: Kristian Thurston MD    Chief Complaint: Wide-complex tachycardia    Subjective:     Interval History:  History taken from: patient    Patient seen and evaluated bedside.  All questions answered.  No complaints.  Treatment plan communicated to the patient.        Review of Systems:   Review of Systems    Objective:     Vital Signs  Temp:  [97.9 °F (36.6 °C)-98.4 °F (36.9 °C)] 98.2 °F (36.8 °C)  Heart Rate:  [78-94] 82  Resp:  [14-22] 22  BP: (125-160)/() 142/110   Body mass index is 34.22 kg/m².    Physical Exam  Physical Exam  Constitutional:       General: He is not in acute distress.  Cardiovascular:      Rate and Rhythm: Normal rate.      Heart sounds: No murmur heard.  Pulmonary:      Effort: Pulmonary effort is normal.      Breath sounds: Normal breath sounds.   Abdominal:      General: Bowel sounds are normal.      Palpations: Abdomen is soft.   Neurological:      Mental Status: He is alert.            Diagnostic Data    Results from last 7 days   Lab Units 25  0510   WBC 10*3/mm3 8.67   HEMOGLOBIN g/dL 15.0   HEMATOCRIT % 46.3   PLATELETS 10*3/mm3 193   GLUCOSE mg/dL 136*   CREATININE mg/dL 0.93   BUN mg/dL 14   SODIUM mmol/L 137   POTASSIUM mmol/L 4.2   AST (SGOT) U/L 44*   ALT (SGPT) U/L 42*   ALK PHOS U/L 108   BILIRUBIN mg/dL 0.4   ANION GAP mmol/L 12.7       No radiology results for the last day      I reviewed the patient's new clinical results.    Assessment/Plan:     Active and Resolved Problems  Active Hospital Problems    Diagnosis  POA    **Wide-complex tachycardia [R00.0]  Yes    Ventricular tachycardia [I47.20]  Unknown      Resolved Hospital Problems   No resolved problems to display.       Diagnoses  Sustained monomorphic ventricular tachycardia s/p DCCV  Wide-complex tachycardia  PFO  Splenic infarct  Biventricular  pacemaker in situ  HFmrEF  Diabetes  HTN  HLD    PLAN  -In reference to the patient's sustained monomorphic ventricular tachycardia, patient received shock in ER and IV Amio for cardioversion.  Patient currently in sinus rhythm with BiV pacing.  EP cardiology following, possibly needs an upgrade of pacemaker to BiV ICD for secondary prevention of SCD. Pt currently on Metoprolol.  -In reference to PFO, paradoxical emboli, and splenic infarct patient started on Eliquis however AC is held due to pending left heart cath on Monday. Per Cardiology, may need PFO closure.    VTE Prophylaxis:  Pharmacologic VTE prophylaxis orders are present.             Disposition Planning:     Barriers to Discharge:Consultation Clearance  Anticipated Date of Discharge: > 48 hours  Place of Discharge: Per PT/OT/CM      Time: +35 minutes     Code Status and Medical Interventions: CPR (Attempt to Resuscitate); Full Support   Ordered at: 03/27/25 1547     Code Status (Patient has no pulse and is not breathing):    CPR (Attempt to Resuscitate)     Medical Interventions (Patient has pulse or is breathing):    Full Support     Level Of Support Discussed With:    Patient       Signature: Electronically signed by Kristian Thurston MD, 03/29/25, 18:05 EDT.  [facility] Hospitalist Team

## 2025-03-29 NOTE — PLAN OF CARE
Goal Outcome Evaluation:         Pt stable with no complaints of pain. Plan for cath Monday morning.

## 2025-03-29 NOTE — PROGRESS NOTES
"    Reason for follow-up: Complete heart block, ventricular tachycardia     Patient Care Team:  Sharon Silva MD as PCP - General (Internal Medicine & Pediatrics)  Rima Salazar APRN as Nurse Practitioner (Cardiology)    Subjective .   Ismael NGUYEN Tess is doing well today     ROS    Januvia [sitagliptin] and Lisinopril    Scheduled Meds:[Held by provider] apixaban, 5 mg, Oral, Q12H  cetirizine, 10 mg, Oral, Daily  empagliflozin, 25 mg, Oral, Daily  losartan, 50 mg, Oral, Q24H  metoprolol succinate XL, 50 mg, Oral, Q24H  mupirocin, 1 Application, Each Nare, BID  pantoprazole, 40 mg, Oral, BID  rosuvastatin, 10 mg, Oral, Nightly  sodium chloride, 10 mL, Intravenous, Q12H  sodium chloride, 10 mL, Intravenous, Q12H      Continuous Infusions:   PRN Meds:.  acetaminophen **OR** acetaminophen    aluminum-magnesium hydroxide-simethicone    senna-docusate sodium **AND** polyethylene glycol **AND** bisacodyl **AND** bisacodyl    Calcium Replacement - Follow Nurse / BPA Driven Protocol    labetalol    Magnesium Low Dose Replacement - Follow Nurse / BPA Driven Protocol    nitroglycerin    ondansetron ODT **OR** ondansetron    Phosphorus Replacement - Follow Nurse / BPA Driven Protocol    Potassium Replacement - Follow Nurse / BPA Driven Protocol    [COMPLETED] Insert Peripheral IV **AND** sodium chloride    sodium chloride    sodium chloride    sodium chloride    sodium chloride      VITAL SIGNS  Vitals:    03/29/25 0630 03/29/25 0700 03/29/25 0800 03/29/25 1200   BP: (!) 145/105 (!) 147/108 (!) 142/110    BP Location:       Patient Position:       Pulse: 84 82 82    Resp:       Temp:   97.9 °F (36.6 °C) 98.4 °F (36.9 °C)   TempSrc:   Oral Oral   SpO2: 93% 93% 94%    Weight:       Height:           Flowsheet Rows      Flowsheet Row First Filed Value   Admission Height 172.7 cm (68\") Documented at 03/27/2025 1428   Admission Weight 104 kg (229 lb 4.5 oz) Documented at 03/27/2025 1428               Physical " Exam  VITALS REVIEWED    General:      well developed, in no acute distress.    Head:      normocephalic and atraumatic.    Eyes:      PERRL/EOM intact, conjunctiva and sclera clear with out nystagmus.    Neck:      no masses, thyromegaly,  trachea central with normal respiratory effort and PMI displaced laterally  Lungs:      Clear  Heart:       Regular rate and rhythm  Msk:      no deformity or scoliosis noted of thoracic or lumbar spine.    Pulses:      pulses normal in all 4 extremities.    Extremities:       No lower extremity edema  Neurologic:      no focal deficits.   alert oriented x3  Skin:      intact without lesions or rashes.    Psych:      alert and cooperative; normal mood and affect; normal attention span and concentration.          LAB RESULTS (LAST 7 DAYS)    CBC  Results from last 7 days   Lab Units 03/29/25  0510 03/28/25  0517 03/27/25  1432   WBC 10*3/mm3 8.67 10.34 11.34*   RBC 10*6/mm3 5.24 5.00 5.50   HEMOGLOBIN g/dL 15.0 14.4 15.8   HEMATOCRIT % 46.3 44.7 49.4   MCV fL 88.4 89.4 89.8   PLATELETS 10*3/mm3 193 227 255       BMP  Results from last 7 days   Lab Units 03/29/25  0510 03/28/25  0517 03/27/25  1432   SODIUM mmol/L 137 140 139   POTASSIUM mmol/L 4.2 4.5 4.3   CHLORIDE mmol/L 101 104 101   CO2 mmol/L 23.3 23.9 25.1   BUN mg/dL 14 14 11   CREATININE mg/dL 0.93 0.98 1.06   GLUCOSE mg/dL 136* 136* 141*   MAGNESIUM mg/dL 2.1 2.1 2.3   PHOSPHORUS mg/dL 4.6* 4.4  --        CMP   Results from last 7 days   Lab Units 03/29/25  0510 03/28/25  0517 03/27/25  1432   SODIUM mmol/L 137 140 139   POTASSIUM mmol/L 4.2 4.5 4.3   CHLORIDE mmol/L 101 104 101   CO2 mmol/L 23.3 23.9 25.1   BUN mg/dL 14 14 11   CREATININE mg/dL 0.93 0.98 1.06   GLUCOSE mg/dL 136* 136* 141*   ALBUMIN g/dL 3.9 3.9  --    BILIRUBIN mg/dL 0.4 0.5  --    ALK PHOS U/L 108 99  --    AST (SGOT) U/L 44* 40  --    ALT (SGPT) U/L 42* 45*  --          BNP        TROPONIN  Results from last 7 days   Lab Units 03/27/25  1545   HSTROP  T ng/L 42*       CoAg  Results from last 7 days   Lab Units 03/27/25  1432   INR  1.22*   APTT seconds 29.6       Creatinine Clearance  Estimated Creatinine Clearance: 99 mL/min (by C-G formula based on SCr of 0.93 mg/dL).    ABG          EKG    I personally reviewed the patient's EKG/Telemetry data: Sinus rhythm with BiV pacing      Assessment & Plan       Wide-complex tachycardia    Ventricular tachycardia      Ismael Pulido is a 59-year-old male patient who received a biventricular pacemaker on 2/21/2025.  Patient had abdominal pain and was treated at [facility] for splenic infarct, it was thought to be due to PFO and paradoxical emboli and he was placed on Eliquis.  Patient however was found to have ventricular tachycardia on device check and he was sent to the hospital and he received a shock in the ER.  Currently he is in sinus with BiV pacing.  Echocardiogram showed mildly reduced EF.  Plan for heart catheterization early next week.  He might need upgrade of his pacemaker to a BiV ICD for secondary prevention of sudden cardiac death.     I discussed the patients findings and my recommendations with patient and agrees with outlined plan    Shruthi Gilbert MD  03/29/25  14:13 EDT      Electronically signed by Shruthi Gilbert MD, 03/29/25, 2:17 PM EDT.

## 2025-03-29 NOTE — PLAN OF CARE
Goal Outcome Evaluation:           Progress: no change  Outcome Evaluation: Pt resting abed throughout this shift, able to make needs known. DBP remained elevated throughout the noc, pt did not met order parameters for IV labetaolol administration. Pt anticipated to have a cardiac cath on Monday. Pt aware eliquis to be held in anticipation of cardiac cath. HR paced during this shift and WDL. Home cpap at bedside, pt compliant with usage during this shift. Call light within reach of pt. Plan of care remains ongoing.

## 2025-03-30 LAB
ALBUMIN SERPL-MCNC: 3.9 G/DL (ref 3.5–5.2)
ALBUMIN/GLOB SERPL: 1 G/DL
ALP SERPL-CCNC: 106 U/L (ref 39–117)
ALT SERPL W P-5'-P-CCNC: 36 U/L (ref 1–41)
ANION GAP SERPL CALCULATED.3IONS-SCNC: 13.9 MMOL/L (ref 5–15)
AST SERPL-CCNC: 36 U/L (ref 1–40)
BASOPHILS # BLD AUTO: 0.1 10*3/MM3 (ref 0–0.2)
BASOPHILS NFR BLD AUTO: 1 % (ref 0–1.5)
BILIRUB SERPL-MCNC: 0.5 MG/DL (ref 0–1.2)
BUN SERPL-MCNC: 15 MG/DL (ref 6–20)
BUN/CREAT SERPL: 16 (ref 7–25)
CALCIUM SPEC-SCNC: 9.6 MG/DL (ref 8.6–10.5)
CHLORIDE SERPL-SCNC: 99 MMOL/L (ref 98–107)
CO2 SERPL-SCNC: 21.1 MMOL/L (ref 22–29)
CREAT SERPL-MCNC: 0.94 MG/DL (ref 0.76–1.27)
DEPRECATED RDW RBC AUTO: 41.5 FL (ref 37–54)
EGFRCR SERPLBLD CKD-EPI 2021: 93.4 ML/MIN/1.73
EOSINOPHIL # BLD AUTO: 0.63 10*3/MM3 (ref 0–0.4)
EOSINOPHIL NFR BLD AUTO: 6.3 % (ref 0.3–6.2)
ERYTHROCYTE [DISTWIDTH] IN BLOOD BY AUTOMATED COUNT: 13 % (ref 12.3–15.4)
GLOBULIN UR ELPH-MCNC: 3.9 GM/DL
GLUCOSE BLDC GLUCOMTR-MCNC: 200 MG/DL (ref 70–105)
GLUCOSE SERPL-MCNC: 117 MG/DL (ref 65–99)
HBA1C MFR BLD: 7 % (ref 4.8–5.6)
HCT VFR BLD AUTO: 48.2 % (ref 37.5–51)
HGB BLD-MCNC: 15.6 G/DL (ref 13–17.7)
IMM GRANULOCYTES # BLD AUTO: 0.06 10*3/MM3 (ref 0–0.05)
IMM GRANULOCYTES NFR BLD AUTO: 0.6 % (ref 0–0.5)
LYMPHOCYTES # BLD AUTO: 2.08 10*3/MM3 (ref 0.7–3.1)
LYMPHOCYTES NFR BLD AUTO: 20.8 % (ref 19.6–45.3)
MAGNESIUM SERPL-MCNC: 2.1 MG/DL (ref 1.6–2.6)
MCH RBC QN AUTO: 28.6 PG (ref 26.6–33)
MCHC RBC AUTO-ENTMCNC: 32.4 G/DL (ref 31.5–35.7)
MCV RBC AUTO: 88.4 FL (ref 79–97)
MONOCYTES # BLD AUTO: 0.89 10*3/MM3 (ref 0.1–0.9)
MONOCYTES NFR BLD AUTO: 8.9 % (ref 5–12)
NEUTROPHILS NFR BLD AUTO: 6.25 10*3/MM3 (ref 1.7–7)
NEUTROPHILS NFR BLD AUTO: 62.4 % (ref 42.7–76)
NRBC BLD AUTO-RTO: 0 /100 WBC (ref 0–0.2)
PHOSPHATE SERPL-MCNC: 4.2 MG/DL (ref 2.5–4.5)
PLATELET # BLD AUTO: 204 10*3/MM3 (ref 140–450)
PMV BLD AUTO: 9.1 FL (ref 6–12)
POTASSIUM SERPL-SCNC: 4.3 MMOL/L (ref 3.5–5.2)
PROT SERPL-MCNC: 7.8 G/DL (ref 6–8.5)
RBC # BLD AUTO: 5.45 10*6/MM3 (ref 4.14–5.8)
SODIUM SERPL-SCNC: 134 MMOL/L (ref 136–145)
WBC NRBC COR # BLD AUTO: 10.01 10*3/MM3 (ref 3.4–10.8)

## 2025-03-30 PROCEDURE — 83036 HEMOGLOBIN GLYCOSYLATED A1C: CPT | Performed by: STUDENT IN AN ORGANIZED HEALTH CARE EDUCATION/TRAINING PROGRAM

## 2025-03-30 PROCEDURE — 86618 LYME DISEASE ANTIBODY: CPT | Performed by: HOSPITALIST

## 2025-03-30 PROCEDURE — 85025 COMPLETE CBC W/AUTO DIFF WBC: CPT | Performed by: NURSE PRACTITIONER

## 2025-03-30 PROCEDURE — 82948 REAGENT STRIP/BLOOD GLUCOSE: CPT

## 2025-03-30 PROCEDURE — 80053 COMPREHEN METABOLIC PANEL: CPT | Performed by: NURSE PRACTITIONER

## 2025-03-30 PROCEDURE — 63710000001 INSULIN LISPRO (HUMAN) PER 5 UNITS: Performed by: STUDENT IN AN ORGANIZED HEALTH CARE EDUCATION/TRAINING PROGRAM

## 2025-03-30 PROCEDURE — 84100 ASSAY OF PHOSPHORUS: CPT | Performed by: NURSE PRACTITIONER

## 2025-03-30 PROCEDURE — 83735 ASSAY OF MAGNESIUM: CPT | Performed by: NURSE PRACTITIONER

## 2025-03-30 RX ORDER — IBUPROFEN 600 MG/1
1 TABLET ORAL
Status: DISCONTINUED | OUTPATIENT
Start: 2025-03-30 | End: 2025-04-02 | Stop reason: HOSPADM

## 2025-03-30 RX ORDER — DEXTROSE MONOHYDRATE 25 G/50ML
25 INJECTION, SOLUTION INTRAVENOUS
Status: DISCONTINUED | OUTPATIENT
Start: 2025-03-30 | End: 2025-04-02 | Stop reason: HOSPADM

## 2025-03-30 RX ORDER — NICOTINE POLACRILEX 4 MG
15 LOZENGE BUCCAL
Status: DISCONTINUED | OUTPATIENT
Start: 2025-03-30 | End: 2025-04-02 | Stop reason: HOSPADM

## 2025-03-30 RX ORDER — INSULIN LISPRO 100 [IU]/ML
2-7 INJECTION, SOLUTION INTRAVENOUS; SUBCUTANEOUS
Status: DISCONTINUED | OUTPATIENT
Start: 2025-03-30 | End: 2025-04-02 | Stop reason: HOSPADM

## 2025-03-30 RX ADMIN — PANTOPRAZOLE SODIUM 40 MG: 40 TABLET, DELAYED RELEASE ORAL at 08:22

## 2025-03-30 RX ADMIN — Medication 10 ML: at 08:21

## 2025-03-30 RX ADMIN — PANTOPRAZOLE SODIUM 40 MG: 40 TABLET, DELAYED RELEASE ORAL at 21:10

## 2025-03-30 RX ADMIN — MUPIROCIN 1 APPLICATION: 20 OINTMENT TOPICAL at 21:10

## 2025-03-30 RX ADMIN — INSULIN LISPRO 3 UNITS: 100 INJECTION, SOLUTION INTRAVENOUS; SUBCUTANEOUS at 21:23

## 2025-03-30 RX ADMIN — Medication 10 ML: at 21:10

## 2025-03-30 RX ADMIN — MUPIROCIN 1 APPLICATION: 20 OINTMENT TOPICAL at 08:22

## 2025-03-30 RX ADMIN — EMPAGLIFLOZIN 25 MG: 25 TABLET, FILM COATED ORAL at 08:22

## 2025-03-30 RX ADMIN — ROSUVASTATIN CALCIUM 10 MG: 10 TABLET, FILM COATED ORAL at 21:10

## 2025-03-30 RX ADMIN — LOSARTAN POTASSIUM 50 MG: 50 TABLET, FILM COATED ORAL at 21:10

## 2025-03-30 RX ADMIN — METOPROLOL SUCCINATE 50 MG: 50 TABLET, EXTENDED RELEASE ORAL at 08:22

## 2025-03-30 RX ADMIN — CETIRIZINE HYDROCHLORIDE 10 MG: 10 TABLET, FILM COATED ORAL at 08:22

## 2025-03-30 NOTE — PLAN OF CARE
Goal Outcome Evaluation:   Pt getting heart cath done (3/30/25), consent is signed and in the chart. Collected a blood sample for lyme test. Waiting for results.

## 2025-03-30 NOTE — PROGRESS NOTES
[facility] MEDICINE SERVICE  DAILY PROGRESS NOTE    NAME: Ismael Pulido  : 1965  MRN: [id number]      LOS: 3 days     PROVIDER OF SERVICE: Timothy Duane Brammell, MD    Chief Complaint: Wide-complex tachycardia    Subjective:     Interval History:  History taken from: patient    Patient concerned over possible Lyme's disease with encouragement from family member.  Denies any arthralgias.  Denies any rash.  Really denies any symptoms when his pulse was elevated.  Denies any chest pain.  History of sleep apnea wearing apparatus.  Denies any other additional acute issues.        Review of Systems:   Review of Systems   All other systems reviewed and are negative.      Objective:     Vital Signs  Temp:  [97.6 °F (36.4 °C)-98.4 °F (36.9 °C)] 97.8 °F (36.6 °C)  Heart Rate:  [82-93] 90  Resp:  [16-25] 22  BP: (117-159)/() 129/92   Body mass index is 34.46 kg/m².    Physical Exam  Physical Exam  Vitals reviewed.   Constitutional:       General: He is not in acute distress.  HENT:      Head: Normocephalic.   Cardiovascular:      Rate and Rhythm: Normal rate and regular rhythm.   Pulmonary:      Effort: Pulmonary effort is normal.      Breath sounds: Normal breath sounds.   Abdominal:      General: Bowel sounds are normal.      Palpations: Abdomen is soft.      Tenderness: There is no abdominal tenderness.   Neurological:      Mental Status: He is alert.            Diagnostic Data    Results from last 7 days   Lab Units 25  0406   WBC 10*3/mm3 10.01   HEMOGLOBIN g/dL 15.6   HEMATOCRIT % 48.2   PLATELETS 10*3/mm3 204   GLUCOSE mg/dL 117*   CREATININE mg/dL 0.94   BUN mg/dL 15   SODIUM mmol/L 134*   POTASSIUM mmol/L 4.3   AST (SGOT) U/L 36   ALT (SGPT) U/L 36   ALK PHOS U/L 106   BILIRUBIN mg/dL 0.5   ANION GAP mmol/L 13.9       No radiology results for the last day          Assessment:      Wide-complex tachycardia with concern over V. tach status post cardioversion  PFO  Type 2  diabetes  Obstructive sleep apnea       Plan.  Agreed that I would order a Lyme study.  Cardiology with planned catheterization.  Anticoagulation on hold at present.      Active and Resolved Problems  Active Hospital Problems    Diagnosis  POA    **Wide-complex tachycardia [R00.0]  Yes    Ventricular tachycardia [I47.20]  Unknown      Resolved Hospital Problems   No resolved problems to display.           VTE Prophylaxis:  Pharmacologic VTE prophylaxis orders are present.             Disposition Planning:     Barriers to Discharge:cardiology clearance  Anticipated Date of Discharge: 4/1  Place of Discharge: home      Time: 30 minutes     Code Status and Medical Interventions: CPR (Attempt to Resuscitate); Full Support   Ordered at: 03/27/25 1547     Code Status (Patient has no pulse and is not breathing):    CPR (Attempt to Resuscitate)     Medical Interventions (Patient has pulse or is breathing):    Full Support     Level Of Support Discussed With:    Patient       Signature: Electronically signed by Timothy Duane Brammell, MD, 03/30/25, 11:47 EDT.  [facility] Hospitalist Team

## 2025-03-30 NOTE — PROGRESS NOTES
Transitional Care Follow Up Visit  Subjective     Ismael Pulido is a 59 y.o. male who presents for a transitional care management visit.    Within 48 business hours after discharge our office contacted him via telephone to coordinate his care and needs.      I reviewed and discussed the details of that call along with the discharge summary, hospital problems, inpatient lab results, inpatient diagnostic studies, and consultation reports with Ismael.     Current outpatient and discharge medications have been reconciled for the patient.  Reviewed by: Sharon Silva MD          3/4/2025    Date of TCM Phone Call   [facility]   Date of Admission 2/25/25   Date of Discharge 3/4/2025   Discharge Disposition Home or Self Care     Risk for Readmission (LACE) >7       Course During Hospital Stay:      The patient was admitted for evaluation of chest pain. Troponin levels were mildly elevated (38, 39), and D-dimer was 3.54. Chest X-ray revealed low lung volumes with probable mild bibasilar atelectasis. CTA of the chest was negative for pulmonary embolism but showed a small left pleural effusion and mild bilateral dependent atelectasis. EKG demonstrated a rate of 85 with PVCs, a pacemaker, and prolonged AK interval. Cardiology was consulted and cleared the patient for discharge. Pacemaker interrogation revealed a lead dislodgement; the patient underwent lead repositioning on 3/3 with normalization of lead position and device function.    Concurrent evaluation for abdominal pain revealed a lipase of 26. CT abdomen showed moderate gastric distention with air and fluid, raising concern for possible gastroparesis; recommendations included further evaluation with a gastric emptying study or endoscopy if clinically indicated. The urinary bladder appeared moderately distended; however, the patient passed a voiding trial. Gastroenterology was consulted, and EGD on 2/26 revealed esophagitis; the patient was  "continued on PPI therapy.    During the hospital stay, the patient developed fever likely related to evolving pneumonia. Imaging showed progressive left lower lobe consolidation. UA was unremarkable, and serial blood cultures (2/25 and 2/27) remained negative. RVP and MRSA PCR were also negative. Procalcitonin increased from 0.08 to 2.39 over two days. He tested positive for influenza and was treated with Tamiflu. Empiric antibiotics with Zosyn were administered. Bilateral lower extremity venous duplex was negative for DVT.    The patient also reported right upper extremity swelling, which improved with supportive care. Ultrasound demonstrated acute superficial thrombophlebitis in the cephalic and basilic veins. Management included arm elevation and cold compresses.    HPI:    Bradycardia/Pacemaker:  Patient reports feeling like \"a new person\" since pacemaker placement. Prior to hospitalization, he experienced significant fatigue, especially after work, which he now attributes to the bradycardia.  He reports a fall at work due to dizziness, which prompted his initial evaluation. His heart rate dropped to 28 bpm in the emergency room, at which point he experienced tingling sensations in his chest. He notes that his wife had a laser ablation procedure shortly after his hospitalization. He is anxious about his upcoming staple removal from the pacemaker site.    Small Bowel Obstruction:  Patient reports a prior episode of similar abdominal pain. During the recent hospitalization, he experienced severe stomach pain due to a complete blockage. A nasogastric tube was placed for decompression, and after several days of persistent pain, an upper endoscopy was performed to suction out stomach contents and administer fluids. He reports an episode last night, after eating spicy food, where he experienced similar symptoms of stomach \"clogging up.\" He managed this episode by sitting upright and applying ice, which eventually " "resolved the symptoms. This morning, he ate oatmeal, coffee, and hot chocolate without any issues.    Influenza/Pneumonia:  Patient developed flu-like symptoms two days into his hospitalization, which were complicated by bacterial pneumonia. Symptoms included coughing and shallow breathing due to pain with deeper breaths. He was treated with antibiotics and Tamiflu. He reports being able to take deeper breaths now without coughing.    Superficial Blood Clot  - Developed a superficial blood clot during hospital stay  - Clot increased in size and became sore  - Using a heat pack for relief  - Overall improving but still sore, no surrounding or expanding redness    Diabetes  - On Rybelsus for 2 days  - Recorded blood sugar level of 124  - Plans to monitor blood sugar levels daily    Reports satisfactory control of his blood pressure with losartan. Plans to switch from a 12-hour decongestant to Coricidin as needed       The following portions of the patient's history were reviewed and updated as appropriate: allergies, current medications, past family history, past medical history, past social history, past surgical history, and problem list.    Review of Systems  Pertinent symptoms noted in HPI, otherwise a brief disease specific review of symptoms was negative.       Objective   /68   Pulse 79   Resp 18   Ht 172.7 cm (68\")   Wt 104 kg (230 lb)   SpO2 96%   BMI 34.97 kg/m²   Physical Exam  Constitutional: Alert, well-appearing, no acute distress  HENT: NCAT, mucous membranes moist  Neck: Supple, no thyromegaly, no lymphadenopathy  Respiratory: No respiratory distress, good effort and air entry, clear to auscultation bilaterally   Cardiovascular: RRR, no LE edema  GI: Soft, non-distended, non-tender, +BS  Neuro: No gross focal deficits, normal gait  Psychiatric: Appropriate mood and affect, cooperative  Skin: Pacemaker site examined, healing well.  Right arm with resolving superficial thrombophlebitis at a " previous IV site, firm to palpation but less painful.      Assessment & Plan     Assessment & Plan  Hospital discharge follow-up  Hospital documentation including provider notes, labs, imaging, and discharge summary was thoroughly reviewed and discussed with the patient. Medication reconciliation was completed, and outpatient medication list was updated accordingly. Patient is clinically stable, reports improved symptoms, and demonstrates understanding of hospital diagnoses and discharge instructions. No home health needs identified; patient has reliable transportation and appropriate caregiver support. Continue current management. Follow up with cardiology as scheduled.        Bradycardia s/p PPM  Patient reports significant improvement in symptoms and overall quality of life since pacemaker placement.  PLAN:  - Continue current management.  - Follow up with cardiology for staple removal tomorrow, 03/31/2025, and routine pacemaker checks.  - Encourage the patient to report any new symptoms or concerns promptly.       Generalized abdominal pain  Patient experienced an episode of similar symptoms last night after eating spicy food, which resolved with positional changes and time. Overall, bowel movements have been regular since discharge.  PLAN:  - Continue current management, including avoiding late-night meals.  - Encourage adequate hydration  - Advise the patient to monitor for recurrent symptoms and report if persistent.  - Discussed potential risks with Rybelsus, but patient feels symptoms are improved and wants to continue trial of Rybelsus. Consider a gastric emptying study if symptoms recur despite lifestyle modifications.       Superficial thrombophlebitis of right upper extremity   Patient notes improvement in pain at the previous IV site, but the area remains firm. No signs of cellulitis.   PLAN:  - Continue to monitor for improvement.  - Apply warm compresses as needed for comfort.  - Avoid blood pressure  measurements or blood draws from the affected arm until fully resolved.       Recent Influenza and Pneumonia  Patient reports resolution of respiratory symptoms and is now able to take deeper breaths without coughing.  PLAN:  - No further treatment needed at this time.  - Encourage continued deep breathing exercises to promote full lung expansion.

## 2025-03-30 NOTE — NURSING NOTE
Pt being transferred to [facility] per w/c with all personal belongings, including dental appliance and home cpap that have been at bedside.

## 2025-03-30 NOTE — ASSESSMENT & PLAN NOTE
Patient experienced an episode of similar symptoms last night after eating spicy food, which resolved with positional changes and time. Overall, bowel movements have been regular since discharge.  PLAN:  - Continue current management, including avoiding late-night meals.  - Encourage adequate hydration  - Advise the patient to monitor for recurrent symptoms and report if persistent.  - Discussed potential risks with Rybelsus, but patient feels symptoms are improved and wants to continue trial of Rybelsus. Consider a gastric emptying study if symptoms recur despite lifestyle modifications.

## 2025-03-31 ENCOUNTER — ANESTHESIA EVENT (OUTPATIENT)
Dept: CARDIOLOGY | Facility: HOSPITAL | Age: 60
End: 2025-03-31
Payer: COMMERCIAL

## 2025-03-31 LAB
ACE SERPL-CCNC: 77 U/L (ref 14–82)
ALBUMIN SERPL-MCNC: 4.2 G/DL (ref 3.5–5.2)
ALBUMIN/GLOB SERPL: 1.1 G/DL
ALP SERPL-CCNC: 109 U/L (ref 39–117)
ALT SERPL W P-5'-P-CCNC: 33 U/L (ref 1–41)
ANA SER QL: NEGATIVE
ANION GAP SERPL CALCULATED.3IONS-SCNC: 14.3 MMOL/L (ref 5–15)
AST SERPL-CCNC: 27 U/L (ref 1–40)
BASOPHILS # BLD AUTO: 0.1 10*3/MM3 (ref 0–0.2)
BASOPHILS NFR BLD AUTO: 1 % (ref 0–1.5)
BILIRUB SERPL-MCNC: 0.3 MG/DL (ref 0–1.2)
BUN SERPL-MCNC: 17 MG/DL (ref 6–20)
BUN/CREAT SERPL: 19.1 (ref 7–25)
CALCIUM SPEC-SCNC: 9.7 MG/DL (ref 8.6–10.5)
CHLORIDE SERPL-SCNC: 101 MMOL/L (ref 98–107)
CO2 SERPL-SCNC: 22.7 MMOL/L (ref 22–29)
CREAT SERPL-MCNC: 0.89 MG/DL (ref 0.76–1.27)
DEPRECATED RDW RBC AUTO: 41.8 FL (ref 37–54)
EGFRCR SERPLBLD CKD-EPI 2021: 98.7 ML/MIN/1.73
EOSINOPHIL # BLD AUTO: 0.61 10*3/MM3 (ref 0–0.4)
EOSINOPHIL NFR BLD AUTO: 6.4 % (ref 0.3–6.2)
ERYTHROCYTE [DISTWIDTH] IN BLOOD BY AUTOMATED COUNT: 13.2 % (ref 12.3–15.4)
F5 GENE MUT ANL BLD/T: NORMAL
GLOBULIN UR ELPH-MCNC: 3.7 GM/DL
GLUCOSE BLDC GLUCOMTR-MCNC: 103 MG/DL (ref 70–105)
GLUCOSE BLDC GLUCOMTR-MCNC: 119 MG/DL (ref 70–105)
GLUCOSE BLDC GLUCOMTR-MCNC: 131 MG/DL (ref 70–105)
GLUCOSE BLDC GLUCOMTR-MCNC: 134 MG/DL (ref 70–105)
GLUCOSE SERPL-MCNC: 120 MG/DL (ref 65–99)
HCT VFR BLD AUTO: 47.9 % (ref 37.5–51)
HGB BLD-MCNC: 15.8 G/DL (ref 13–17.7)
IMM GRANULOCYTES # BLD AUTO: 0.04 10*3/MM3 (ref 0–0.05)
IMM GRANULOCYTES NFR BLD AUTO: 0.4 % (ref 0–0.5)
LIDOCAIN SERPL-MCNC: NORMAL UG/ML
LYMPHOCYTES # BLD AUTO: 2.54 10*3/MM3 (ref 0.7–3.1)
LYMPHOCYTES NFR BLD AUTO: 26.5 % (ref 19.6–45.3)
MAGNESIUM SERPL-MCNC: 2.4 MG/DL (ref 1.6–2.6)
MCH RBC QN AUTO: 28.9 PG (ref 26.6–33)
MCHC RBC AUTO-ENTMCNC: 33 G/DL (ref 31.5–35.7)
MCV RBC AUTO: 87.7 FL (ref 79–97)
MONOCYTES # BLD AUTO: 0.95 10*3/MM3 (ref 0.1–0.9)
MONOCYTES NFR BLD AUTO: 9.9 % (ref 5–12)
NEUTROPHILS NFR BLD AUTO: 5.33 10*3/MM3 (ref 1.7–7)
NEUTROPHILS NFR BLD AUTO: 55.8 % (ref 42.7–76)
NRBC BLD AUTO-RTO: 0 /100 WBC (ref 0–0.2)
PHOSPHATE SERPL-MCNC: 4.4 MG/DL (ref 2.5–4.5)
PLATELET # BLD AUTO: 228 10*3/MM3 (ref 140–450)
PMV BLD AUTO: 9.3 FL (ref 6–12)
POTASSIUM SERPL-SCNC: 4.1 MMOL/L (ref 3.5–5.2)
PROT SERPL-MCNC: 7.9 G/DL (ref 6–8.5)
RBC # BLD AUTO: 5.46 10*6/MM3 (ref 4.14–5.8)
SODIUM SERPL-SCNC: 138 MMOL/L (ref 136–145)
WBC NRBC COR # BLD AUTO: 9.57 10*3/MM3 (ref 3.4–10.8)

## 2025-03-31 PROCEDURE — 25810000003 SODIUM CHLORIDE 0.9 % SOLUTION: Performed by: INTERNAL MEDICINE

## 2025-03-31 PROCEDURE — 99232 SBSQ HOSP IP/OBS MODERATE 35: CPT | Performed by: INTERNAL MEDICINE

## 2025-03-31 PROCEDURE — 93454 CORONARY ARTERY ANGIO S&I: CPT | Performed by: INTERNAL MEDICINE

## 2025-03-31 PROCEDURE — C1894 INTRO/SHEATH, NON-LASER: HCPCS | Performed by: INTERNAL MEDICINE

## 2025-03-31 PROCEDURE — 85025 COMPLETE CBC W/AUTO DIFF WBC: CPT | Performed by: NURSE PRACTITIONER

## 2025-03-31 PROCEDURE — C1769 GUIDE WIRE: HCPCS | Performed by: INTERNAL MEDICINE

## 2025-03-31 PROCEDURE — 80053 COMPREHEN METABOLIC PANEL: CPT | Performed by: NURSE PRACTITIONER

## 2025-03-31 PROCEDURE — 84100 ASSAY OF PHOSPHORUS: CPT | Performed by: NURSE PRACTITIONER

## 2025-03-31 PROCEDURE — 25010000002 NITROGLYCERIN 5 MG/ML SOLUTION: Performed by: INTERNAL MEDICINE

## 2025-03-31 PROCEDURE — 82948 REAGENT STRIP/BLOOD GLUCOSE: CPT

## 2025-03-31 PROCEDURE — 25010000002 HEPARIN (PORCINE) PER 1000 UNITS: Performed by: INTERNAL MEDICINE

## 2025-03-31 PROCEDURE — 4A023N7 MEASUREMENT OF CARDIAC SAMPLING AND PRESSURE, LEFT HEART, PERCUTANEOUS APPROACH: ICD-10-PCS | Performed by: INTERNAL MEDICINE

## 2025-03-31 PROCEDURE — B2111ZZ FLUOROSCOPY OF MULTIPLE CORONARY ARTERIES USING LOW OSMOLAR CONTRAST: ICD-10-PCS | Performed by: INTERNAL MEDICINE

## 2025-03-31 PROCEDURE — 82948 REAGENT STRIP/BLOOD GLUCOSE: CPT | Performed by: INTERNAL MEDICINE

## 2025-03-31 PROCEDURE — 83735 ASSAY OF MAGNESIUM: CPT | Performed by: NURSE PRACTITIONER

## 2025-03-31 PROCEDURE — 25010000002 MIDAZOLAM PER 1 MG: Performed by: INTERNAL MEDICINE

## 2025-03-31 PROCEDURE — 25010000002 LIDOCAINE 2% SOLUTION: Performed by: INTERNAL MEDICINE

## 2025-03-31 PROCEDURE — 25010000002 NICARDIPINE 2.5 MG/ML SOLUTION: Performed by: INTERNAL MEDICINE

## 2025-03-31 PROCEDURE — 25010000002 FENTANYL CITRATE (PF) 100 MCG/2ML SOLUTION: Performed by: INTERNAL MEDICINE

## 2025-03-31 PROCEDURE — 25510000001 IOPAMIDOL PER 1 ML: Performed by: INTERNAL MEDICINE

## 2025-03-31 RX ORDER — SODIUM CHLORIDE 9 MG/ML
INJECTION, SOLUTION INTRAVENOUS
Status: COMPLETED | OUTPATIENT
Start: 2025-03-31 | End: 2025-03-31

## 2025-03-31 RX ORDER — ACETAMINOPHEN 325 MG/1
650 TABLET ORAL EVERY 4 HOURS PRN
Status: DISCONTINUED | OUTPATIENT
Start: 2025-03-31 | End: 2025-04-02 | Stop reason: HOSPADM

## 2025-03-31 RX ORDER — IOPAMIDOL 755 MG/ML
INJECTION, SOLUTION INTRAVASCULAR
Status: DISCONTINUED | OUTPATIENT
Start: 2025-03-31 | End: 2025-03-31 | Stop reason: HOSPADM

## 2025-03-31 RX ORDER — HEPARIN SODIUM 1000 [USP'U]/ML
INJECTION, SOLUTION INTRAVENOUS; SUBCUTANEOUS
Status: DISCONTINUED | OUTPATIENT
Start: 2025-03-31 | End: 2025-03-31 | Stop reason: HOSPADM

## 2025-03-31 RX ORDER — SODIUM CHLORIDE 0.9 % (FLUSH) 0.9 %
3-10 SYRINGE (ML) INJECTION AS NEEDED
Status: CANCELLED | OUTPATIENT
Start: 2025-03-31

## 2025-03-31 RX ORDER — LIDOCAINE HYDROCHLORIDE 20 MG/ML
INJECTION, SOLUTION INFILTRATION; PERINEURAL
Status: DISCONTINUED | OUTPATIENT
Start: 2025-03-31 | End: 2025-03-31 | Stop reason: HOSPADM

## 2025-03-31 RX ORDER — FENTANYL CITRATE 50 UG/ML
INJECTION, SOLUTION INTRAMUSCULAR; INTRAVENOUS
Status: DISCONTINUED | OUTPATIENT
Start: 2025-03-31 | End: 2025-03-31 | Stop reason: HOSPADM

## 2025-03-31 RX ORDER — SODIUM CHLORIDE 9 MG/ML
40 INJECTION, SOLUTION INTRAVENOUS AS NEEDED
Status: CANCELLED | OUTPATIENT
Start: 2025-03-31

## 2025-03-31 RX ORDER — NITROGLYCERIN 5 MG/ML
INJECTION, SOLUTION INTRAVENOUS
Status: DISCONTINUED | OUTPATIENT
Start: 2025-03-31 | End: 2025-03-31 | Stop reason: HOSPADM

## 2025-03-31 RX ORDER — MIDAZOLAM HYDROCHLORIDE 1 MG/ML
INJECTION, SOLUTION INTRAMUSCULAR; INTRAVENOUS
Status: DISCONTINUED | OUTPATIENT
Start: 2025-03-31 | End: 2025-03-31 | Stop reason: HOSPADM

## 2025-03-31 RX ORDER — SODIUM CHLORIDE 0.9 % (FLUSH) 0.9 %
3 SYRINGE (ML) INJECTION EVERY 12 HOURS SCHEDULED
Status: CANCELLED | OUTPATIENT
Start: 2025-03-31

## 2025-03-31 RX ORDER — NICARDIPINE HYDROCHLORIDE 2.5 MG/ML
INJECTION INTRAVENOUS
Status: DISCONTINUED | OUTPATIENT
Start: 2025-03-31 | End: 2025-03-31 | Stop reason: HOSPADM

## 2025-03-31 RX ADMIN — CETIRIZINE HYDROCHLORIDE 10 MG: 10 TABLET, FILM COATED ORAL at 08:06

## 2025-03-31 RX ADMIN — METOPROLOL SUCCINATE 50 MG: 50 TABLET, EXTENDED RELEASE ORAL at 08:05

## 2025-03-31 RX ADMIN — EMPAGLIFLOZIN 25 MG: 25 TABLET, FILM COATED ORAL at 08:05

## 2025-03-31 RX ADMIN — PANTOPRAZOLE SODIUM 40 MG: 40 TABLET, DELAYED RELEASE ORAL at 08:05

## 2025-03-31 RX ADMIN — MUPIROCIN 1 APPLICATION: 20 OINTMENT TOPICAL at 08:12

## 2025-03-31 RX ADMIN — PANTOPRAZOLE SODIUM 40 MG: 40 TABLET, DELAYED RELEASE ORAL at 22:12

## 2025-03-31 RX ADMIN — Medication 10 ML: at 08:06

## 2025-03-31 RX ADMIN — ROSUVASTATIN CALCIUM 10 MG: 10 TABLET, FILM COATED ORAL at 22:13

## 2025-03-31 RX ADMIN — Medication 10 ML: at 22:12

## 2025-03-31 RX ADMIN — MUPIROCIN 1 APPLICATION: 20 OINTMENT TOPICAL at 22:13

## 2025-03-31 RX ADMIN — Medication 10 ML: at 22:14

## 2025-03-31 NOTE — PAYOR COMM NOTE
"This is a clinical update for Ismael Pulido  Reference/Auth # [id number]     RECONSIDERATION REQUEST    Please call or fax determination to contact below.   Thank you.    Paradise Ritter, RN, BSN  Utilization Review Nurse  [facility]  Direct & confidential phone # [phone]  Fax # [phone]      Ismael Pulido (59 y.o. Male)       Date of Birth   1965    Social Security Number       Address   [address] [place] [address] [facility] [place] [address]    Home Phone   [phone]    MRN   [id number]       [employer]beterian   Latter-day    Marital Status                               Admission Date   3/27/2025    Admission Type   Emergency    Admitting Provider   Kristian Thurston MD    Attending Provider   Brammell, Timothy Duane, MD    Department, Room/Bed   [facility] CATH LAB, Pool/Cath       Discharge Date       Discharge Disposition       Discharge Destination                                 Attending Provider: Brammell, Timothy Duane, MD    Allergies: Januvia [Sitagliptin], Lisinopril    Isolation: None   Infection: None   Code Status: CPR    Ht: 172.7 cm (68\")   Wt: 102 kg (223 lb 15.8 oz)    Admission Cmt: None   Principal Problem: Wide-complex tachycardia [R00.0]                   Active Insurance as of 3/27/2025       Primary Coverage       Payor Plan Insurance Group Employer/Plan Group    AET[employer] COMMERCIAL AETNA [id number]       Payor Plan Address Payor Plan Phone Number Payor Plan Fax Number Effective Dates    [address] [phone]  3/25/2021 - None Entered    [facility] [id number]         Subscriber Name Subscriber Birth Date Member ID       ISMAEL PULIDO 1965 [id number]                     Emergency Contacts        (Rel.) Home Phone Work Phone Mobile Phone    KELL PULIDO (Spouse) [phone] -- [phone]                 History & Physical        Mateo Scherer DO at 03/27/25 1555            History and " "Physical   Ismael Pulido : 1965 MRN:[id number] LOS:0     Reason for admission: Wide-complex tachycardia     Assessment / Plan     Stable VT  -Received 150 mg IV amio bolus in ambulance and 360 mg x 2 IV boluses in ER w/o conversion  -Case d/w Dr.'s Angel and Jelly--gave 75 mg bolus IV lidocaine and started gtt at 2  -S/p cardioversion in ER with return to paced rhythm  -Continue Eliquis  -ACE level sent per cardiology request      Recent Splenic infarction  -Thought 2/2 PFO  -Started Eliquis 3/14  -Consult hematology for hypercoagulable w/u      DM-II, not well-controlled  -Cardiac, diabetic diet  -A1c 8.4% 25  -SSI for now      HLD  -Continue Crestor 10 mg po qhs      HTN  -Continue losartan 50 mg po daily       Code Status (Patient has no pulse and is not breathing): CPR (Attempt to Resuscitate)  Medical Interventions (Patient has pulse or is breathing): Full Support  Level Of Support Discussed With: Patient       Nutrition: NPO Diet NPO Type: Strict NPO     DVT Prophylaxis: Active VTE Prophylaxis:  Pharmacologic:        Start     Dose Route Frequency Stop    25 2100  apixaban (ELIQUIS) tablet 5 mg         5 mg PO Every 12 Hours Scheduled --                  Select Mechanical VTE Prophylaxis if Desired & Appropriate       History of Present illness     A 59 y.o. old male patient with PMH of PPM placement, DM-II, HTN, HLD, PFO, and recent splenic infarction presents to the hospital with complaints of heart racing and feeling \"funny\" in his chest.      He was recently in [facility] for splenic infarction thought 2/2 his PFO.  He was started on Eliquis on 3/14.      ED course: Received 150 mg IV amio bolus in ambulance and 360 mg x 2 IV boluses in ER w/o conversion.  Gave bolus of 75 mg IV lidocaine and started on gtt.  All labs in ER unremarkable.      Subjective / Review of systems     Review of Systems   Pt complains of feeling \"funny\" in his chest.  Denies CP.  Remainder of " a full 12 point ROS was negative.    Past Medical/Surgical/Social/Family History & Allergies     Past Medical History:   Diagnosis Date    Allergic 1988    5+ molds, pollens, grass    Diabetes mellitus     Not sure like 2020    Diverticulitis     Diverticulosis     Mot sure     GERD (gastroesophageal reflux disease)     Hypertension     Kidney stone     Not dure     Obesity     Not sure       Past Surgical History:   Procedure Laterality Date    CARDIAC ELECTROPHYSIOLOGY PROCEDURE N/A 2025    Procedure: Pacemaker DC new boston aware;  Surgeon: Dez Loco MD;  Location: [facility] CATH INVASIVE LOCATION;  Service: Cardiovascular;  Laterality: N/A;    ENDOSCOPY N/A 2025    Procedure: ESOPHAGOGASTRODUODENOSCOPY;  Surgeon: Kiko Talbert MD;  Location: [facility] ENDOSCOPY;  Service: Gastroenterology;  Laterality: N/A;  POST-ESOPHAGITIS, GASTRITIS    HERNIA REPAIR        Social History     Socioeconomic History    Marital status:    Tobacco Use    Smoking status: Never    Smokeless tobacco: Never   Vaping Use    Vaping status: Never Used   Substance and Sexual Activity    Alcohol use: Never    Drug use: Never    Sexual activity: Yes     Partners: Female      Family History   Problem Relation Age of Onset    Anxiety disorder Mother     Arthritis Mother     Depression Mother     Diabetes Mother     Hyperlipidemia Mother     Kidney disease Mother         Kidney stones    Cancer Father          of cancer in 2009    Diabetes Father       Allergies   Allergen Reactions    Januvia [Sitagliptin] Hives    Lisinopril Nausea And Vomiting        Home Medications     Prior to Admission medications    Medication Sig Start Date End Date Taking? Authorizing Provider   apixaban (ELIQUIS) 5 MG tablet tablet Take 1 tablet by mouth Every 12 (Twelve) Hours. Indications: Other - full anticoagulation 3/14/25  Yes Luis Felipe Lu MD   ascorbic acid ([employer] Vitamin C) 1000 MG tablet Take 4  tablets by mouth Daily.   Yes Albertina Goff MD   DM-APAP-CPM (CORICIDIN HBP PO) Take 2 tablets by mouth 2 (Two) Times a Day.   Yes Albertina Goff MD   Jardiance 25 MG tablet tablet TAKE 1 TABLET BY MOUTH DAILY 3/24/25  Yes Sharon Silva MD   loratadine (Claritin) 10 MG tablet Take 1 tablet by mouth Daily.   Yes Albertina Goff MD   losartan (COZAAR) 50 MG tablet Take 1 tablet by mouth Daily. 3/14/25  Yes Luis Felipe Lu MD   Misc Natural Products (NEURIVA PO) Take  by mouth Daily.   Yes Albertina Goff MD   NON FORMULARY Take  by mouth Daily. Cellucare   Yes Albertina Goff MD   NON FORMULARY Take  by mouth Daily. Nugenix Ultimate   Yes Albertina Goff MD   pantoprazole (PROTONIX) 40 MG EC tablet Take 1 tablet by mouth 2 (Two) Times a Day. 3/14/25  Yes Luis Felipe Lu MD   rosuvastatin (CRESTOR) 10 MG tablet Take 1 tablet by mouth Daily. 2/7/25  Yes Sharon Silva MD   clotrimazole (LOTRIMIN) 1 % cream Apply 1 Application topically to the appropriate area as directed 2 (Two) Times a Day. 6/14/24   Sharon Silva MD      Objective / Physical Exam   Vital signs:  Temp: 98.2 °F (36.8 °C)  BP: 102/69  Heart Rate: (!) 179  Resp: 24  SpO2: 96 %  Weight: 104 kg (229 lb 4.5 oz)    Admission Weight: Weight: 104 kg (229 lb 4.5 oz)    Physical Exam     GEN:  Pleasant.  Appears appropriate for stated age.  WD/WN/WH.  Sitting up in bed on RA.  NAD.  NEURO:  Brainstem reflexes intact.  No obvious focal deficit.  Moves all 4 ext.  HEENT:  N/AT.  PERRL.  MMM.  Oropharynx non-erythematous.  No drainage from the eyes/ears/nose.  No conjunctival petechiae.  No oral thrush.  Auditory and visual acuity grossly wnl.  Good dentition.  Voice normal.  NECK:  Supple, NT, trachea midline.  No meningismus.  No ROM limitation.  No torticollis.  No JVD.  No thyromegaly.    CHEST/LUNGS:  Breath sounds are clear and equal bilaterally.  No w/r/r.  Chest excursion equal  bilaterally.    CARDIOVASCULAR:  Very tachycardic, regular rhythm  GI:  Abdomen soft, NT, ND, +BS.    :  No Granda cath in place.  EXTREMITIES:  No deformity or amputation.  No cyanosis, edema, or asymmetry.  Pulses 2+ and equal in BLE's.    SKIN:  Warm, slightly diaphoretic, and pink.  No rash, breakdown, or track marks noted.  LYMPHATICS/HEME:  No overt LAD or abnormal bruising.  No lymphedema.  MSK:  Normal ROM.  No joint abnormalities noted.  Strength is 5/5 and equal in BUE and BLE's.  PSYCH:  Pleasant.  A&Ox 3.  Normal mood and affect.  Responds appropriately to commands and appears to comprehend instructions.            Labs     Results from last 7 days   Lab Units 03/27/25  1432   WBC 10*3/mm3 11.34*   HEMATOCRIT % 49.4   PLATELETS 10*3/mm3 255      Results from last 7 days   Lab Units 03/27/25  1432   SODIUM mmol/L 139   POTASSIUM mmol/L 4.3   CHLORIDE mmol/L 101   CO2 mmol/L 25.1   BUN mg/dL 11   CREATININE mg/dL 1.06        Imaging     Chest X ray: My independent assessment showed no infiltrates or effusions    EKG: My independent evaluation showed stable VT, rate 180.    Current Medications   Scheduled Meds:apixaban, 5 mg, Oral, Q12H  lidocaine, 75 mg, Intravenous, Once  mupirocin, 1 Application, Each Nare, BID  sodium chloride, 10 mL, Intravenous, Q12H  sodium chloride, 10 mL, Intravenous, Q12H         Continuous Infusions:lidocaine cardiac, 2 mg/min         Mateo Scherer DO  Critical Care  03/27/25   15:55 EDT         Electronically signed by Mateo Scherer DO at 03/27/25 1702          Emergency Department Notes        Moe Scherer MD at 03/27/25 1501          Subjective   History of Present Illness  Chief complaint rapid heartbeat    History of present illness a 59-year-old gentleman who has a history of heart disease with a pacemaker placed back in February.  The patient states his hearts been racing since about 2:00 in the morning.  Patient went to a [facility] urgent care and had  his pacemaker interrogated and EKGs and was found to be in V. tach he was stable.  He has no complaints of pain just heart racing.  There is no chest pain neck arm jaw pain fever chills sweats cough congestion vomiting diarrhea no syncope.  He denies any change in medicines other than he was in the hospital at [facility] and discharged on 14 March he had a splenic infarct thought to be due to embolism from his heart he was placed on Eliquis at that time.  Pacemaker was adjusted.  No leg pain or swelling no recent long car ride plane ride or immobilization other than the recent hospitalization      Review of Systems   Constitutional:  Negative for chills and fever.   Respiratory:  Negative for chest tightness and shortness of breath.    Cardiovascular:  Positive for palpitations. Negative for chest pain and leg swelling.   Gastrointestinal:  Negative for abdominal pain and vomiting.   Musculoskeletal:  Negative for back pain and neck pain.   Skin:  Negative for rash.   Neurological:  Negative for dizziness and light-headedness.   Psychiatric/Behavioral:  Negative for confusion.        Past Medical History:   Diagnosis Date    Allergic 01/01/1988    5+ molds, pollens, grass    Diabetes mellitus     Not sure like 2020    Diverticulitis     Diverticulosis     Mot sure 1995    GERD (gastroesophageal reflux disease)     Hypertension     Kidney stone     Not dure 1995    Obesity     Not sure 1999       Allergies   Allergen Reactions    Januvia [Sitagliptin] Hives    Lisinopril Nausea And Vomiting       Past Surgical History:   Procedure Laterality Date    CARDIAC ELECTROPHYSIOLOGY PROCEDURE N/A 2/21/2025    Procedure: Pacemaker DC new boston aware;  Surgeon: Dez Loco MD;  Location: [facility] CATH INVASIVE LOCATION;  Service: Cardiovascular;  Laterality: N/A;    ENDOSCOPY N/A 2/26/2025    Procedure: ESOPHAGOGASTRODUODENOSCOPY;  Surgeon: Kiko Talbert MD;  Location: [facility] ENDOSCOPY;  Service:  Gastroenterology;  Laterality: N/A;  POST-ESOPHAGITIS, GASTRITIS    HERNIA REPAIR         Family History   Problem Relation Age of Onset    Anxiety disorder Mother     Arthritis Mother     Depression Mother     Diabetes Mother     Hyperlipidemia Mother     Kidney disease Mother         Kidney stones    Cancer Father          of cancer in 2009    Diabetes Father        Social History     Socioeconomic History    Marital status:    Tobacco Use    Smoking status: Never    Smokeless tobacco: Never   Vaping Use    Vaping status: Never Used   Substance and Sexual Activity    Alcohol use: Never    Drug use: Never    Sexual activity: Yes     Partners: Female     Prior to Admission medications    Medication Sig Start Date End Date Taking? Authorizing Provider   apixaban (ELIQUIS) 5 MG tablet tablet Take 1 tablet by mouth Every 12 (Twelve) Hours. Indications: Other - full anticoagulation 3/14/25  Yes Luis Felipe Lu MD   ascorbic acid ([employer]S Vitamin C) 1000 MG tablet Take 4 tablets by mouth Daily.   Yes Albertina Goff MD   DM-APAP-CPM (CORICIDIN HBP PO) Take 2 tablets by mouth 2 (Two) Times a Day.   Yes Albertina Goff MD   Jardiance 25 MG tablet tablet TAKE 1 TABLET BY MOUTH DAILY 3/24/25  Yes Sharon Silva MD   loratadine (Claritin) 10 MG tablet Take 1 tablet by mouth Daily.   Yes Albertina Goff MD   losartan (COZAAR) 50 MG tablet Take 1 tablet by mouth Daily. 3/14/25  Yes Luis Felipe Lu MD   Misc Natural Products (NEURIVA PO) Take  by mouth Daily.   Yes Albertina Goff MD   NON FORMULARY Take  by mouth Daily. Cellucare   Yes Albertina Goff MD   NON FORMULARY Take  by mouth Daily. Nugenix Ultimate   Yes Albertina Goff MD   pantoprazole (PROTONIX) 40 MG EC tablet Take 1 tablet by mouth 2 (Two) Times a Day. 3/14/25  Yes Luis Felipe Lu MD   rosuvastatin (CRESTOR) 10 MG tablet Take 1 tablet by mouth Daily. 25  Yes Sharon Silva MD    clotrimazole (LOTRIMIN) 1 % cream Apply 1 Application topically to the appropriate area as directed 2 (Two) Times a Day. 6/14/24   Sharon Silva MD          Objective   Physical Exam  Constitutional is a 59-year-old awake alert no acute distress sitting upright well-appearing nontoxic-appearing triage vital signs reviewed.  HEENT extraocular muscles are intact pupils equal round reactive sclera clear mouth clear.  Neck supple no adenopathy no JV no bruits no meningeal signs lungs clear no retraction no use of accessory heart regular tachycardic without murmur rub abdomen soft nontender good bowel sounds no pulsatile masses extremities pulses equal upper and lower extremities no edema cords or Homans' sign or evidence of DVT skin is warm and dry without rashes or cellulitic changes neurologic awake alert and follows commands motor strength normal without focal weakness  Procedures          ED Course      Results for orders placed or performed during the hospital encounter of 03/27/25   ECG 12 Lead Rhythm Change    Collection Time: 03/27/25  2:27 PM   Result Value Ref Range    QT Interval 304 ms    QTC Interval 531 ms   Basic Metabolic Panel    Collection Time: 03/27/25  2:32 PM    Specimen: Blood   Result Value Ref Range    Glucose 141 (H) 65 - 99 mg/dL    BUN 11 6 - 20 mg/dL    Creatinine 1.06 0.76 - 1.27 mg/dL    Sodium 139 136 - 145 mmol/L    Potassium 4.3 3.5 - 5.2 mmol/L    Chloride 101 98 - 107 mmol/L    CO2 25.1 22.0 - 29.0 mmol/L    Calcium 9.7 8.6 - 10.5 mg/dL    BUN/Creatinine Ratio 10.4 7.0 - 25.0    Anion Gap 12.9 5.0 - 15.0 mmol/L    eGFR 80.8 >60.0 mL/min/1.73   High Sensitivity Troponin T    Collection Time: 03/27/25  2:32 PM    Specimen: Blood   Result Value Ref Range    HS Troponin T 40 (H) <22 ng/L   Magnesium    Collection Time: 03/27/25  2:32 PM    Specimen: Blood   Result Value Ref Range    Magnesium 2.3 1.6 - 2.6 mg/dL   Protime-INR    Collection Time: 03/27/25  2:32 PM    Specimen:  Blood   Result Value Ref Range    Protime 15.3 (H) 11.7 - 14.2 Seconds    INR 1.22 (H) 0.90 - 1.10   aPTT    Collection Time: 03/27/25  2:32 PM    Specimen: Blood   Result Value Ref Range    PTT 29.6 22.7 - 35.4 seconds   CBC Auto Differential    Collection Time: 03/27/25  2:32 PM    Specimen: Blood   Result Value Ref Range    WBC 11.34 (H) 3.40 - 10.80 10*3/mm3    RBC 5.50 4.14 - 5.80 10*6/mm3    Hemoglobin 15.8 13.0 - 17.7 g/dL    Hematocrit 49.4 37.5 - 51.0 %    MCV 89.8 79.0 - 97.0 fL    MCH 28.7 26.6 - 33.0 pg    MCHC 32.0 31.5 - 35.7 g/dL    RDW 13.2 12.3 - 15.4 %    RDW-SD 43.5 37.0 - 54.0 fl    MPV 8.9 6.0 - 12.0 fL    Platelets 255 140 - 450 10*3/mm3    Neutrophil % 60.5 42.7 - 76.0 %    Lymphocyte % 25.0 19.6 - 45.3 %    Monocyte % 6.3 5.0 - 12.0 %    Eosinophil % 5.1 0.3 - 6.2 %    Basophil % 1.3 0.0 - 1.5 %    Immature Grans % 1.8 (H) 0.0 - 0.5 %    Neutrophils, Absolute 6.87 1.70 - 7.00 10*3/mm3    Lymphocytes, Absolute 2.83 0.70 - 3.10 10*3/mm3    Monocytes, Absolute 0.71 0.10 - 0.90 10*3/mm3    Eosinophils, Absolute 0.58 (H) 0.00 - 0.40 10*3/mm3    Basophils, Absolute 0.15 0.00 - 0.20 10*3/mm3    Immature Grans, Absolute 0.20 (H) 0.00 - 0.05 10*3/mm3    nRBC 0.0 0.0 - 0.2 /100 WBC   Green Top (Gel)    Collection Time: 03/27/25  2:32 PM   Result Value Ref Range    Extra Tube Hold for add-ons.    Lavender Top    Collection Time: 03/27/25  2:32 PM   Result Value Ref Range    Extra Tube hold for add-on    Gold Top - SST    Collection Time: 03/27/25  2:32 PM   Result Value Ref Range    Extra Tube Hold for add-ons.    Light Blue Top    Collection Time: 03/27/25  2:32 PM   Result Value Ref Range    Extra Tube Hold for add-ons.    MRSA Screen, PCR (Inpatient) - Swab, Nares    Collection Time: 03/27/25  3:14 PM    Specimen: Nares; Swab   Result Value Ref Range    MRSA PCR No MRSA Detected No MRSA Detected   Respiratory Panel PCR w/COVID-19(SARS-CoV-2) MAC/DEVIN/RALPH/PAD/COR/TANNER In-House, NP Swab in UTM/VTM, 2 HR  TAT - Swab, Nasopharynx    Collection Time: 03/27/25  3:14 PM    Specimen: Nasopharynx; Swab   Result Value Ref Range    ADENOVIRUS, PCR Not Detected Not Detected    Coronavirus 229E Not Detected Not Detected    Coronavirus HKU1 Not Detected Not Detected    Coronavirus NL63 Not Detected Not Detected    Coronavirus OC43 Not Detected Not Detected    COVID19 Not Detected Not Detected - Ref. Range    Human Metapneumovirus Not Detected Not Detected    Human Rhinovirus/Enterovirus Not Detected Not Detected    Influenza A PCR Not Detected Not Detected    Influenza B PCR Not Detected Not Detected    Parainfluenza Virus 1 Not Detected Not Detected    Parainfluenza Virus 2 Not Detected Not Detected    Parainfluenza Virus 3 Not Detected Not Detected    Parainfluenza Virus 4 Not Detected Not Detected    RSV, PCR Not Detected Not Detected    Bordetella pertussis pcr Not Detected Not Detected    Bordetella parapertussis PCR Not Detected Not Detected    Chlamydophila pneumoniae PCR Not Detected Not Detected    Mycoplasma pneumo by PCR Not Detected Not Detected   High Sensitivity Troponin T 1Hr    Collection Time: 03/27/25  3:45 PM    Specimen: Blood   Result Value Ref Range    HS Troponin T 42 (H) <22 ng/L    Troponin T Numeric Delta 2 ng/L    Troponin T % Delta 5 Abnormal if >/= 20%   POC Glucose Once    Collection Time: 03/27/25  8:38 PM    Specimen: Blood   Result Value Ref Range    Glucose 190 (H) 70 - 105 mg/dL     XR Chest 1 View  Result Date: 3/27/2025  Impression: No convincing acute cardiopulmonary abnormality. Pleural effusions on recent chest CT are not visualized on today's radiograph. Electronically Signed: Nicholas Becerra MD  3/27/2025 3:26 PM EDT  Workstation ID: [id number]    Medications   sodium chloride 0.9 % flush 10 mL (has no administration in time range)   nitroglycerin (NITROSTAT) SL tablet 0.4 mg (has no administration in time range)   sodium chloride 0.9 % flush 10 mL (10 mL Intravenous Given 3/27/25 2031)    sodium chloride 0.9 % flush 10 mL (has no administration in time range)   sodium chloride 0.9 % infusion 40 mL (has no administration in time range)   mupirocin (BACTROBAN) 2 % nasal ointment 1 Application (1 Application Each Nare Not Given 3/27/25 2031)   aluminum-magnesium hydroxide-simethicone (MAALOX MAX) 400-400-40 MG/5ML suspension 15 mL (has no administration in time range)   sennosides-docusate (PERICOLACE) 8.6-50 MG per tablet 2 tablet (has no administration in time range)     And   polyethylene glycol (MIRALAX) packet 17 g (has no administration in time range)     And   bisacodyl (DULCOLAX) EC tablet 5 mg (has no administration in time range)     And   bisacodyl (DULCOLAX) suppository 10 mg (has no administration in time range)   acetaminophen (TYLENOL) tablet 650 mg (has no administration in time range)     Or   acetaminophen (TYLENOL) suppository 650 mg (has no administration in time range)   ondansetron ODT (ZOFRAN-ODT) disintegrating tablet 4 mg (has no administration in time range)     Or   ondansetron (ZOFRAN) injection 4 mg (has no administration in time range)   Potassium Replacement - Follow Nurse / BPA Driven Protocol (has no administration in time range)   Magnesium Low Dose Replacement - Follow Nurse / BPA Driven Protocol (has no administration in time range)   Phosphorus Replacement - Follow Nurse / BPA Driven Protocol (has no administration in time range)   Calcium Replacement - Follow Nurse / BPA Driven Protocol (has no administration in time range)   apixaban (ELIQUIS) tablet 5 mg (5 mg Oral Given 3/27/25 2031)   lidocaine bolus from bag 4 mg/mL solution 75 mg (75 mg Intravenous Bolus from Bag 3/27/25 1602)     And   lidocaine 4 mg/mL in dextrose 5% infusion (2 mg/min Intravenous New Bag 3/27/25 1605)   sodium chloride 0.9 % flush 10 mL (10 mL Intravenous Given 3/27/25 2030)   sodium chloride 0.9 % flush 10 mL (has no administration in time range)   sodium chloride 0.9 % infusion 40 mL  (has no administration in time range)   rosuvastatin (CRESTOR) tablet 10 mg (10 mg Oral Given 3/27/25 2031)   empagliflozin (JARDIANCE) tablet 25 mg (has no administration in time range)   cetirizine (zyrTEC) tablet 10 mg (has no administration in time range)   losartan (COZAAR) tablet 50 mg ( Oral Dose Auto Held 4/5/25 0900)   pantoprazole (PROTONIX) EC tablet 40 mg (40 mg Oral Given 3/27/25 2031)   amiodarone 150 mg in 100 mL D5W (loading dose) (0 mg Intravenous Stopped 3/27/25 1453)   lactated ringers bolus 500 mL (0 mL Intravenous Stopped 3/27/25 1529)   etomidate (AMIDATE) injection (10 mg Intravenous Given 3/27/25 1637)                                            EKG my interpretation wide-complex tachycardia rate of 180 left bundle branch block QTc of 531 change from 3/12/2020 findings paced rhythm            Medical Decision Making  Records reviewed patient was discharged on 3/14/2025 he had a splenic infarct at that time he has some fibrin looking stuff on a valve he was anticoagulated.  This was embolic in nature pacemaker was adjusted.    Medical decision making.  IV established monitor placed my review shows a wide-complex tachycardia consistent with ventricular tachycardia but vital signs otherwise stable.  Resting comfortable looks well.  EKG obtained my interpretation is wide-complex tachycardia to 180 left bundle branch block QTc of 531 change from previous on 3/12/2020 when he had a paced rhythm.  Patient was made aware of this finding we will start him on a drip but was told that he may have to be cardioverted with electricity.  But wanted to try the medications first.  Labs obtained by independent reviewed patient's basic metabolic profile unremarkable potassium unremarkable magnesium was 2.3 patient is anticoagulated with DOAC made INR 1.2.  Troponin was 40 with repeat of 42.  Respiratory panel was negative.  CBC unremarkable and a white count 11.3.  Chest x-ray my dependent review no pneumonia  pneumothorax failure acute findings radiology review the same.  The patient is stable he looks well no complaints initially was loaded on amiodarone 150 and placed on a drip.  He had no change in his status at that point.  He had been given a 500 cc lactated ringer bolus as well.  He remained stable blood pressure looked good but he was right about 100 but he had a MAP of 75.  He had no complaints of chest pain he looked well.  I do not see evidence suggest acute ST elevation myocardial infarction DVT pulmonary embolism aortic dissection pericarditis myocarditis pericardial effusion sepsis or bacteremia based on my history and physical clinic findings although not complete list of all possibilities.  The patient will be admitted to the ICU I did talk to the ICU.  The patient was also started on lidocaine and subsequently had no change as well.  Cardiac consultation placed hemoglobin ICU for further care.  Critical care 35 minutes.    Patient subsequently failed both amiodarone and lidocaine although he remained stable otherwise he was seen in the ER by cardiology and ICU and was subsequently cardioverted with 1 shock and converted out of the tachycardia.    Problems Addressed:  Ventricular tachycardia: complicated acute illness or injury    Amount and/or Complexity of Data Reviewed  External Data Reviewed: ECG.  Labs: ordered. Decision-making details documented in ED Course.  Radiology: ordered and independent interpretation performed. Decision-making details documented in ED Course.  ECG/medicine tests: ordered and independent interpretation performed. Decision-making details documented in ED Course.    Risk  Drug therapy requiring intensive monitoring for toxicity.  Decision regarding hospitalization.        Final diagnoses:   Ventricular tachycardia       ED Disposition  ED Disposition       ED Disposition   Decision to Admit    Condition   --    Comment   Level of Care: Critical Care [6]   Diagnosis:  Wide-complex tachycardia [319873]   Admitting Physician: PILLO SCHERER [[id number]]   Attending Physician: PILLO SCEHRER [[id number]]   Certification: I Certify That Inpatient Hospital Services Are Medically Necessary For Greater Than 2 Midnights                 No follow-up provider specified.       Medication List      No changes were made to your prescriptions during this visit.            Moe Scherer MD  03/27/25 2137      Electronically signed by Moe Scherer MD at 03/27/25 2137       Vital Signs (last 3 days)       Date/Time Temp Temp src Pulse Resp BP Patient Position SpO2    03/31/25 10:20:36 -- -- 96 19 120/69 -- 94    03/31/25 10:00:54 -- -- 80 18 129/95 -- 97    03/31/25 0805 97.6 (36.4) Oral 75 16 112/89 Lying 97    03/31/25 0405 -- -- 81 16 -- -- 93    03/31/25 0032 97.3 (36.3) Oral 88 18 123/78 Lying 90    03/30/25 2027 98.6 (37) Oral 84 15 141/96 Lying 94    03/30/25 1603 97.1 (36.2) Tympanic -- -- -- -- --    03/30/25 1558 -- -- 86 18 117/91 -- 93    03/30/25 1058 97.1 (36.2) Tympanic -- 19 -- Lying --    03/30/25 0819 97.8 (36.6) Oral 90 22 129/92 Lying 93    03/30/25 0405 97.6 (36.4) Axillary 89 22 124/90 Lying 91    03/29/25 2252 98 (36.7) -- 89 16 129/85 -- 94    03/29/25 2200 -- -- 82 -- 117/89 -- 91    03/29/25 2130 -- -- 84 -- 132/98 -- 92    03/29/25 2100 -- -- 84 -- 127/101 -- 91    03/29/25 2038 -- -- 89 25 139/103 Lying 92    03/29/25 2037 -- -- 89 -- 139/103 -- --    03/29/25 2000 98.2 (36.8) Oral 91 -- 140/101 Lying 91    03/29/25 1930 -- -- 87 -- 130/97 -- 92    03/29/25 1900 -- -- 89 -- 134/75 -- 92    03/29/25 1830 -- -- 90 -- 159/118 -- 93    03/29/25 1800 -- -- 93 -- 150/107 -- 95    03/29/25 1700 -- -- 86 -- 135/106 -- 91    03/29/25 1600 98.2 (36.8) Oral 91 24 129/102 Lying 92    03/29/25 1500 -- -- 88 -- 126/93 -- 91    03/29/25 1400 -- -- 93 -- 124/93 -- 90    03/29/25 1300 -- -- 85 -- 131/89 -- 93    03/29/25 1200 98.4 (36.9) Oral 93 20 130/92 Sitting 92     03/29/25 1100 -- -- 93 -- 134/94 -- 91    03/29/25 1000 -- -- 86 -- 130/94 -- 92    03/29/25 0900 -- -- 84 -- 129/94 -- 91    03/29/25 0800 97.9 (36.6) Oral 82 -- 142/110 -- 94    03/29/25 0700 -- -- 82 -- 147/108 -- 93    03/29/25 0630 -- -- 84 -- 145/105 -- 93    03/29/25 0600 -- -- 81 -- 148/115 -- 92    03/29/25 0530 -- -- 80 -- 143/106 -- 92    03/29/25 0500 -- -- 85 -- 149/93 -- 92    03/29/25 0430 -- -- 78 22 148/94 Lying 92    03/29/25 0400 97.9 (36.6) Oral 87 -- 138/102 -- 92    03/29/25 0330 -- -- 85 -- 146/105 -- 92    03/29/25 0300 -- -- 85 -- 149/112 -- 92    03/29/25 0230 -- -- 81 -- 139/98 -- 93    03/29/25 0200 -- -- 86 -- 133/97 -- 92    03/29/25 0130 -- -- 85 -- 133/108 -- 92    03/29/25 0100 -- -- 89 -- 140/99 -- 92    03/29/25 0030 -- -- 80 -- 141/96 -- 92    03/29/25 0000 98.1 (36.7) Oral 82 20 140/101 Lying 93    03/28/25 2330 -- -- 88 -- 138/100 -- 92    03/28/25 2300 -- -- 86 -- 143/107 -- 90    03/28/25 2230 -- -- 89 -- 140/108 -- 91    03/28/25 2200 -- -- 93 -- 141/108 -- 91    03/28/25 2143 -- -- 79 -- 157/111 -- 93    03/28/25 2006 -- -- 92 14 125/90 Lying 93    03/28/25 2001 -- -- 94 -- 152/111 -- 92    03/28/25 2000 98.3 (36.8) Oral -- -- -- -- --    03/28/25 1931 -- -- 92 -- 158/117 -- 94    03/28/25 1901 -- -- 88 -- 160/116 -- 95    03/28/25 1800 -- -- 85 24 154/114 -- 92    03/28/25 1700 -- -- 84 16 129/93 -- 92    03/28/25 1600 97.9 (36.6) Oral 88 23 148/114 -- 92    03/28/25 1500 -- -- 90 15 156/116 -- 93    03/28/25 1400 -- -- 91 24 143/105 -- 93    03/28/25 1300 -- -- 80 22 157/101 -- 96    03/28/25 1200 98.3 (36.8) Oral 99 23 141/96 -- 93    03/28/25 1100 -- -- 94 22 161/114 -- 94    03/28/25 1000 -- -- 99 20 152/105 -- 93    03/28/25 0900 -- -- 109 25 -- -- 95    03/28/25 0800 98.2 (36.8) Oral 90 16 134/99 -- 94    03/28/25 0700 -- -- 107 16 158/124 -- 93    03/28/25 0600 -- -- 87 -- 127/91 -- 95    03/28/25 0530 -- -- 90 -- 131/101 -- 95    03/28/25 0500 -- -- 89 -- 127/96 --  94    03/28/25 0430 -- -- 86 -- 131/103 -- 95    03/28/25 0400 -- -- 87 -- 151/114 -- 94    03/28/25 0330 -- -- 88 -- 133/100 -- 94    03/28/25 0300 -- -- 92 -- 133/101 -- 93    03/28/25 0230 -- -- 86 -- 130/102 -- 93    03/28/25 0200 -- -- 92 -- 140/105 -- 94    03/28/25 0130 -- -- 93 -- 128/100 -- 93    03/28/25 0100 98.1 (36.7) Oral 84 -- 147/102 -- 95    03/28/25 0030 -- -- 89 -- 138/91 -- 95    03/28/25 0000 -- -- 88 -- 138/90 -- 94                   Physician Progress Notes (all)        Win Reaves MD at 03/31/25 1027          CARDIOLOGY PROGRESS NOTE:    Ismael Guevaraley  59 y.o.  male  1965  [id number]      Referring Provider: Hospitalist    Reason for follow-up: Ventricular tachycardia     Patient Care Team:  Sharon Silva MD as PCP - General (Internal Medicine & Pediatrics)  Rima Salazar APRN as Nurse Practitioner (Cardiology)    Subjective .  Patient doing well without any symptoms    Objective lying in bed comfortably     Review of Systems   Constitutional: Negative for malaise/fatigue.   Cardiovascular:  Negative for chest pain, dyspnea on exertion, leg swelling and palpitations.   Respiratory:  Negative for cough and shortness of breath.    Gastrointestinal:  Negative for abdominal pain, nausea and vomiting.   Neurological:  Negative for dizziness, focal weakness, headaches, light-headedness and numbness.   All other systems reviewed and are negative.      Allergies: Januvia [sitagliptin] and Lisinopril    Scheduled Meds:[Held by provider] apixaban, 5 mg, Oral, Q12H  [Transfer Hold] cetirizine, 10 mg, Oral, Daily  [Transfer Hold] empagliflozin, 25 mg, Oral, Daily  [Transfer Hold] insulin lispro, 2-7 Units, Subcutaneous, 4x Daily AC & at Bedtime  losartan, 50 mg, Oral, Q24H  metoprolol succinate XL, 50 mg, Oral, Q24H  [Transfer Hold] mupirocin, 1 Application, Each Nare, BID  [Transfer Hold] pantoprazole, 40 mg, Oral, BID  [Transfer Hold] rosuvastatin, 10 mg, Oral,  "Nightly  [Transfer Hold] sodium chloride, 10 mL, Intravenous, Q12H  [Transfer Hold] sodium chloride, 10 mL, Intravenous, Q12H      Continuous Infusions:sodium chloride, , Last Rate: 75 mL/hr (03/31/25 1001)      PRN Meds:.  [Transfer Hold] acetaminophen **OR** [Transfer Hold] acetaminophen    [Transfer Hold] aluminum-magnesium hydroxide-simethicone    [Transfer Hold] senna-docusate sodium **AND** [Transfer Hold] polyethylene glycol **AND** [Transfer Hold] bisacodyl **AND** [Transfer Hold] bisacodyl    [Transfer Hold] Calcium Replacement - Follow Nurse / BPA Driven Protocol    [Transfer Hold] dextrose    [Transfer Hold] dextrose    fentaNYL citrate (PF)    [Transfer Hold] glucagon (human recombinant)    heparin (porcine)    iopamidol    labetalol    lidocaine    [Transfer Hold] Magnesium Low Dose Replacement - Follow Nurse / BPA Driven Protocol    midazolam    niCARdipine    [Transfer Hold] nitroglycerin    nitroglycerin    [Transfer Hold] ondansetron ODT **OR** [Transfer Hold] ondansetron    [Transfer Hold] Phosphorus Replacement - Follow Nurse / BPA Driven Protocol    Potassium Replacement - Follow Nurse / BPA Driven Protocol    [COMPLETED] Insert Peripheral IV **AND** [Transfer Hold] sodium chloride    [Transfer Hold] sodium chloride    [Transfer Hold] sodium chloride    [Transfer Hold] sodium chloride    [Transfer Hold] sodium chloride    sodium chloride        VITAL SIGNS  Vitals:    03/31/25 0621 03/31/25 0805 03/31/25 1000 03/31/25 1020   BP:  112/89 129/95 120/69   BP Location:  Right arm     Patient Position:  Lying     Pulse:  75 80 96   Resp:  16 18 19   Temp:  97.6 °F (36.4 °C)     TempSrc:  Oral     SpO2:  97% 97% 94%   Weight: 102 kg (223 lb 15.8 oz)      Height:           Flowsheet Rows      Flowsheet Row First Filed Value   Admission Height 172.7 cm (68\") Documented at 03/27/2025 1428   Admission Weight 104 kg (229 lb 4.5 oz) Documented at 03/27/2025 1428             TELEMETRY: Ventricular pacemaker " rhythm    Physical Exam:  Constitutional:       Appearance: Well-developed.   Eyes:      General: No scleral icterus.     Conjunctiva/sclera: Conjunctivae normal.   HENT:      Head: Normocephalic and atraumatic.   Neck:      Vascular: No carotid bruit or JVD.   Pulmonary:      Effort: Pulmonary effort is normal.      Breath sounds: Normal breath sounds. No wheezing. No rales.   Cardiovascular:      Normal rate. Regular rhythm.   Pulses:     Intact distal pulses.   Abdominal:      General: Bowel sounds are normal.      Palpations: Abdomen is soft.   Musculoskeletal:      Cervical back: Normal range of motion and neck supple. Skin:     General: Skin is warm and dry.      Findings: No rash.   Neurological:      Mental Status: Alert.          Results Review:   I reviewed the patient's new clinical results.  Lab Results (last 24 hours)       Procedure Component Value Units Date/Time    Lidocaine Level [[id number]] Collected: 03/28/25 0517    Specimen: Blood from Arm, Left Updated: 03/31/25 1000     Lidocaine See Attached Report    VIRI [[id number]]  (Normal) Collected: 03/28/25 1816    Specimen: Blood from Arm, Left Updated: 03/31/25 0946     Antinuclear Antibodies (VIRI) Negative    POC Glucose Once [[id number]]  (Abnormal) Collected: 03/31/25 0606    Specimen: Blood Updated: 03/31/25 0607     Glucose 119 mg/dL      Comment: Serial Number: [id number]:  [id number]       Magnesium [[id number]]  (Normal) Collected: 03/31/25 0035    Specimen: Blood Updated: 03/31/25 0202     Magnesium 2.4 mg/dL     Phosphorus [[id number]]  (Normal) Collected: 03/31/25 0035    Specimen: Blood Updated: 03/31/25 0202     Phosphorus 4.4 mg/dL     Comprehensive Metabolic Panel [[id number]]  (Abnormal) Collected: 03/31/25 0035    Specimen: Blood Updated: 03/31/25 0202     Glucose 120 mg/dL      BUN 17 mg/dL      Creatinine 0.89 mg/dL      Sodium 138 mmol/L      Potassium 4.1 mmol/L      Chloride 101 mmol/L      CO2 22.7 mmol/L      Calcium 9.7  mg/dL      Total Protein 7.9 g/dL      Albumin 4.2 g/dL      ALT (SGPT) 33 U/L      AST (SGOT) 27 U/L      Alkaline Phosphatase 109 U/L      Total Bilirubin 0.3 mg/dL      Globulin 3.7 gm/dL      A/G Ratio 1.1 g/dL      BUN/Creatinine Ratio 19.1     Anion Gap 14.3 mmol/L      eGFR 98.7 mL/min/1.73     Narrative:      GFR Categories in Chronic Kidney Disease (CKD)      GFR Category          GFR (mL/min/1.73)    Interpretation  G1                     90 or greater         Normal or high (1)  G2                      60-89                Mild decrease (1)  G3a                   45-59                Mild to moderate decrease  G3b                   30-44                Moderate to severe decrease  G4                    15-29                Severe decrease  G5                    14 or less           Kidney failure          (1)In the absence of evidence of kidney disease, neither GFR category G1 or G2 fulfill the criteria for CKD.    eGFR calculation 2021 CKD-EPI creatinine equation, which does not include race as a factor    CBC & Differential [[id number]]  (Abnormal) Collected: 03/31/25 0035    Specimen: Blood Updated: 03/31/25 0136    Narrative:      The following orders were created for panel order CBC & Differential.  Procedure                               Abnormality         Status                     ---------                               -----------         ------                     CBC Auto Differential[[id number]]        Abnormal            Final result                 Please view results for these tests on the individual orders.    CBC Auto Differential [[id number]]  (Abnormal) Collected: 03/31/25 0035    Specimen: Blood Updated: 03/31/25 0136     WBC 9.57 10*3/mm3      RBC 5.46 10*6/mm3      Hemoglobin 15.8 g/dL      Hematocrit 47.9 %      MCV 87.7 fL      MCH 28.9 pg      MCHC 33.0 g/dL      RDW 13.2 %      RDW-SD 41.8 fl      MPV 9.3 fL      Platelets 228 10*3/mm3      Neutrophil % 55.8 %      Lymphocyte %  26.5 %      Monocyte % 9.9 %      Eosinophil % 6.4 %      Basophil % 1.0 %      Immature Grans % 0.4 %      Neutrophils, Absolute 5.33 10*3/mm3      Lymphocytes, Absolute 2.54 10*3/mm3      Monocytes, Absolute 0.95 10*3/mm3      Eosinophils, Absolute 0.61 10*3/mm3      Basophils, Absolute 0.10 10*3/mm3      Immature Grans, Absolute 0.04 10*3/mm3      nRBC 0.0 /100 WBC     POC Glucose Once [[id number]]  (Abnormal) Collected: 03/30/25 2106    Specimen: Blood Updated: 03/30/25 2108     Glucose 200 mg/dL      Comment: Serial Number: [id number]:  [id number]       Hemoglobin A1c [[id number]]  (Abnormal) Collected: 03/30/25 0406    Specimen: Blood Updated: 03/30/25 2021     Hemoglobin A1C 7.00 %     Lyme Disease Total Antibody With Reflex to Immunoassay [[id number]] Collected: 03/30/25 1430    Specimen: Blood from Arm, Left Updated: 03/30/25 1440            Imaging Results (Last 24 Hours)       ** No results found for the last 24 hours. **            EKG      I personally viewed and interpreted the patient's EKG/Telemetry data:    ECHOCARDIOGRAM:  Results for orders placed during the hospital encounter of 03/27/25    Adult Transthoracic Echo Complete W/ Cont if Necessary Per Protocol    Interpretation Summary    Left ventricular ejection fraction appears to be 46 - 50%.    Left ventricular wall thickness is consistent with mild concentric hypertrophy.    Left ventricular diastolic function is consistent with (grade I) impaired relaxation.    Moderately reduced right ventricular systolic function noted.    The right ventricular cavity is moderately dilated.    The left atrial cavity is mild to moderately dilated.       STRESS MYOVIEW:  Results for orders placed during the hospital encounter of 02/18/25    Stress Test With Myocardial Perfusion One Day    Interpretation Summary    Myocardial perfusion imaging indicates a normal myocardial perfusion study with no evidence of ischemia. Impressions are consistent with a low  risk study.    Left ventricular ejection fraction is normal (Calculated EF = 64%).       CARDIAC CATHETERIZATION:  Results for orders placed during the hospital encounter of 25    Cardiac Catheterization/Vascular Study (Needs Review)       OTHER:         Assessment & Plan   Ventricular tachycardia  Patient presented with sustained ventricular tachycardia and did not get better with amiodarone or IV lidocaine and hence patient was cardioverted and since then has been in a paced rhythm  Patient is ruled out for MI by EKG and enzymes  Patient had an echocardiogram which showed EF of 45 to 50%  Patient also had a cardiac catheterization which showed no coronary artery disease  Electrophysiology seeing the patient will need ICD but he already has a pacemaker should be upgraded.    PFO  Patient had been recently diagnosed with a PFO and hence he was placed on Eliquis and will restart it after the cardiac intervention is complete.    Diabetes  Patient is on sliding scale insulin    Sick sinus syndrome status post pacemaker placement  Patient had complete heart block now with sustained ventricular tachycardia and hence he is having workup done to rule out sarcoidosis and Lyme disease  Patient will need a upgrade of his pacemaker to ICD.    Hypertension  Patient blood pressure currently stable on beta-blockers    Hyperlipidemia  Patient on statins and the lipid levels are followed by the primary care doctor.    I discussed the patients findings and my recommendations with patient and nurse    Win Reaves MD  25  10:27 EDT                Electronically signed by Win Reaves MD at 25 1030       Brammell, Timothy Duane, MD at 25 1147              [facility] MEDICINE SERVICE  DAILY PROGRESS NOTE    NAME: Ismael Pulido  : 1965  MRN: [id number]      LOS: 3 days     PROVIDER OF SERVICE: Timothy Duane Brammell, MD    Chief Complaint: Wide-complex tachycardia    Subjective:     Interval  History:  History taken from: patient    Patient concerned over possible Lyme's disease with encouragement from family member.  Denies any arthralgias.  Denies any rash.  Really denies any symptoms when his pulse was elevated.  Denies any chest pain.  History of sleep apnea wearing apparatus.  Denies any other additional acute issues.        Review of Systems:   Review of Systems   All other systems reviewed and are negative.      Objective:     Vital Signs  Temp:  [97.6 °F (36.4 °C)-98.4 °F (36.9 °C)] 97.8 °F (36.6 °C)  Heart Rate:  [82-93] 90  Resp:  [16-25] 22  BP: (117-159)/() 129/92   Body mass index is 34.46 kg/m².    Physical Exam  Physical Exam  Vitals reviewed.   Constitutional:       General: He is not in acute distress.  HENT:      Head: Normocephalic.   Cardiovascular:      Rate and Rhythm: Normal rate and regular rhythm.   Pulmonary:      Effort: Pulmonary effort is normal.      Breath sounds: Normal breath sounds.   Abdominal:      General: Bowel sounds are normal.      Palpations: Abdomen is soft.      Tenderness: There is no abdominal tenderness.   Neurological:      Mental Status: He is alert.            Diagnostic Data    Results from last 7 days   Lab Units 03/30/25  0406   WBC 10*3/mm3 10.01   HEMOGLOBIN g/dL 15.6   HEMATOCRIT % 48.2   PLATELETS 10*3/mm3 204   GLUCOSE mg/dL 117*   CREATININE mg/dL 0.94   BUN mg/dL 15   SODIUM mmol/L 134*   POTASSIUM mmol/L 4.3   AST (SGOT) U/L 36   ALT (SGPT) U/L 36   ALK PHOS U/L 106   BILIRUBIN mg/dL 0.5   ANION GAP mmol/L 13.9       No radiology results for the last day          Assessment:      Wide-complex tachycardia with concern over V. tach status post cardioversion  PFO  Type 2 diabetes  Obstructive sleep apnea       Plan.  Agreed that I would order a Lyme study.  Cardiology with planned catheterization.  Anticoagulation on hold at present.      Active and Resolved Problems  Active Hospital Problems    Diagnosis  POA    **Wide-complex tachycardia  [R00.0]  Yes    Ventricular tachycardia [I47.20]  Unknown      Resolved Hospital Problems   No resolved problems to display.           VTE Prophylaxis:  Pharmacologic VTE prophylaxis orders are present.             Disposition Planning:     Barriers to Discharge:cardiology clearance  Anticipated Date of Discharge:   Place of Discharge: home      Time: 30 minutes     Code Status and Medical Interventions: CPR (Attempt to Resuscitate); Full Support   Ordered at: 25 1547     Code Status (Patient has no pulse and is not breathing):    CPR (Attempt to Resuscitate)     Medical Interventions (Patient has pulse or is breathing):    Full Support     Level Of Support Discussed With:    Patient       Signature: Electronically signed by Timothy Duane Brammell, MD, 25, 11:47 EDT.  [facility]     Electronically signed by Brammell, Timothy Duane, MD at 25 1153       Shruthi Gilbert MD at 25 1000          No change in cardiac status today.  Patient scheduled for heart catheterization tomorrow.    Electronically signed by Shruthi Gilbert MD at 25 1000       Kristian Thurston MD at 25 [facility]5              [facility] MEDICINE SERVICE  DAILY PROGRESS NOTE    NAME: Ismael Pulido  : 1965  MRN: [id number]      LOS: 2 days     PROVIDER OF SERVICE: Kristian Thurston MD    Chief Complaint: Wide-complex tachycardia    Subjective:     Interval History:  History taken from: patient    Patient seen and evaluated bedside.  All questions answered.  No complaints.  Treatment plan communicated to the patient.        Review of Systems:   Review of Systems    Objective:     Vital Signs  Temp:  [97.9 °F (36.6 °C)-98.4 °F (36.9 °C)] 98.2 °F (36.8 °C)  Heart Rate:  [78-94] 82  Resp:  [14-22] 22  BP: (125-160)/() 142/110   Body mass index is 34.22 kg/m².    Physical Exam  Physical Exam  Constitutional:       General: He is not in acute distress.  Cardiovascular:       Rate and Rhythm: Normal rate.      Heart sounds: No murmur heard.  Pulmonary:      Effort: Pulmonary effort is normal.      Breath sounds: Normal breath sounds.   Abdominal:      General: Bowel sounds are normal.      Palpations: Abdomen is soft.   Neurological:      Mental Status: He is alert.            Diagnostic Data    Results from last 7 days   Lab Units 03/29/25  0510   WBC 10*3/mm3 8.67   HEMOGLOBIN g/dL 15.0   HEMATOCRIT % 46.3   PLATELETS 10*3/mm3 193   GLUCOSE mg/dL 136*   CREATININE mg/dL 0.93   BUN mg/dL 14   SODIUM mmol/L 137   POTASSIUM mmol/L 4.2   AST (SGOT) U/L 44*   ALT (SGPT) U/L 42*   ALK PHOS U/L 108   BILIRUBIN mg/dL 0.4   ANION GAP mmol/L 12.7       No radiology results for the last day      I reviewed the patient's new clinical results.    Assessment/Plan:     Active and Resolved Problems  Active Hospital Problems    Diagnosis  POA    **Wide-complex tachycardia [R00.0]  Yes    Ventricular tachycardia [I47.20]  Unknown      Resolved Hospital Problems   No resolved problems to display.       Diagnoses  Sustained monomorphic ventricular tachycardia s/p DCCV  Wide-complex tachycardia  PFO  Splenic infarct  Biventricular pacemaker in situ  HFmrEF  Diabetes  HTN  HLD    PLAN  -In reference to the patient's sustained monomorphic ventricular tachycardia, patient received shock in ER and IV Amio for cardioversion.  Patient currently in sinus rhythm with BiV pacing.  EP cardiology following, possibly needs an upgrade of pacemaker to BiV ICD for secondary prevention of SCD. Pt currently on Metoprolol.  -In reference to PFO, paradoxical emboli, and splenic infarct patient started on Eliquis however AC is held due to pending left heart cath on Monday. Per Cardiology, may need PFO closure.    VTE Prophylaxis:  Pharmacologic VTE prophylaxis orders are present.             Disposition Planning:     Barriers to Discharge:Consultation Clearance  Anticipated Date of Discharge: > 48 hours  Place of Discharge:  Per PT/OT/CM      Time: +35 minutes     Code Status and Medical Interventions: CPR (Attempt to Resuscitate); Full Support   Ordered at: 03/27/25 1547     Code Status (Patient has no pulse and is not breathing):    CPR (Attempt to Resuscitate)     Medical Interventions (Patient has pulse or is breathing):    Full Support     Level Of Support Discussed With:    Patient       Signature: Electronically signed by Kristian Thurston MD, 03/29/25, 18:05 EDT.  [facility] Hospitalist Team      Electronically signed by Kristian Thurston MD at 03/30/25 0227       Shruthi Gilbert MD at 03/29/25 1413              Reason for follow-up: Complete heart block, ventricular tachycardia     Patient Care Team:  Sharon Silva MD as PCP - General (Internal Medicine & Pediatrics)  Rima Salazar APRN as Nurse Practitioner (Cardiology)    Subjective .   Ismael NGUYEN Tess is doing well today     ROS    Januvia [sitagliptin] and Lisinopril    Scheduled Meds:[Held by provider] apixaban, 5 mg, Oral, Q12H  cetirizine, 10 mg, Oral, Daily  empagliflozin, 25 mg, Oral, Daily  losartan, 50 mg, Oral, Q24H  metoprolol succinate XL, 50 mg, Oral, Q24H  mupirocin, 1 Application, Each Nare, BID  pantoprazole, 40 mg, Oral, BID  rosuvastatin, 10 mg, Oral, Nightly  sodium chloride, 10 mL, Intravenous, Q12H  sodium chloride, 10 mL, Intravenous, Q12H      Continuous Infusions:   PRN Meds:.  acetaminophen **OR** acetaminophen    aluminum-magnesium hydroxide-simethicone    senna-docusate sodium **AND** polyethylene glycol **AND** bisacodyl **AND** bisacodyl    Calcium Replacement - Follow Nurse / BPA Driven Protocol    labetalol    Magnesium Low Dose Replacement - Follow Nurse / BPA Driven Protocol    nitroglycerin    ondansetron ODT **OR** ondansetron    Phosphorus Replacement - Follow Nurse / BPA Driven Protocol    Potassium Replacement - Follow Nurse / BPA Driven Protocol    [COMPLETED] Insert Peripheral IV **AND** sodium chloride    sodium  "chloride    sodium chloride    sodium chloride    sodium chloride      VITAL SIGNS  Vitals:    03/29/25 0630 03/29/25 0700 03/29/25 0800 03/29/25 1200   BP: (!) 145/105 (!) 147/108 (!) 142/110    BP Location:       Patient Position:       Pulse: 84 82 82    Resp:       Temp:   97.9 °F (36.6 °C) 98.4 °F (36.9 °C)   TempSrc:   Oral Oral   SpO2: 93% 93% 94%    Weight:       Height:           Flowsheet Rows      Flowsheet Row First Filed Value   Admission Height 172.7 cm (68\") Documented at 03/27/2025 1428   Admission Weight 104 kg (229 lb 4.5 oz) Documented at 03/27/2025 1428               Physical Exam  VITALS REVIEWED    General:      well developed, in no acute distress.    Head:      normocephalic and atraumatic.    Eyes:      PERRL/EOM intact, conjunctiva and sclera clear with out nystagmus.    Neck:      no masses, thyromegaly,  trachea central with normal respiratory effort and PMI displaced laterally  Lungs:      Clear  Heart:       Regular rate and rhythm  Msk:      no deformity or scoliosis noted of thoracic or lumbar spine.    Pulses:      pulses normal in all 4 extremities.    Extremities:       No lower extremity edema  Neurologic:      no focal deficits.   alert oriented x3  Skin:      intact without lesions or rashes.    Psych:      alert and cooperative; normal mood and affect; normal attention span and concentration.          LAB RESULTS (LAST 7 DAYS)    CBC  Results from last 7 days   Lab Units 03/29/25  0510 03/28/25  0517 03/27/25  1432   WBC 10*3/mm3 8.67 10.34 11.34*   RBC 10*6/mm3 5.24 5.00 5.50   HEMOGLOBIN g/dL 15.0 14.4 15.8   HEMATOCRIT % 46.3 44.7 49.4   MCV fL 88.4 89.4 89.8   PLATELETS 10*3/mm3 193 227 255       BMP  Results from last 7 days   Lab Units 03/29/25  0510 03/28/25  0517 03/27/25  1432   SODIUM mmol/L 137 140 139   POTASSIUM mmol/L 4.2 4.5 4.3   CHLORIDE mmol/L 101 104 101   CO2 mmol/L 23.3 23.9 25.1   BUN mg/dL 14 14 11   CREATININE mg/dL 0.93 0.98 1.06   GLUCOSE mg/dL 136* " 136* 141*   MAGNESIUM mg/dL 2.1 2.1 2.3   PHOSPHORUS mg/dL 4.6* 4.4  --        CMP   Results from last 7 days   Lab Units 03/29/25  0510 03/28/25  0517 03/27/25  1432   SODIUM mmol/L 137 140 139   POTASSIUM mmol/L 4.2 4.5 4.3   CHLORIDE mmol/L 101 104 101   CO2 mmol/L 23.3 23.9 25.1   BUN mg/dL 14 14 11   CREATININE mg/dL 0.93 0.98 1.06   GLUCOSE mg/dL 136* 136* 141*   ALBUMIN g/dL 3.9 3.9  --    BILIRUBIN mg/dL 0.4 0.5  --    ALK PHOS U/L 108 99  --    AST (SGOT) U/L 44* 40  --    ALT (SGPT) U/L 42* 45*  --          BNP        TROPONIN  Results from last 7 days   Lab Units 03/27/25  1545   HSTROP T ng/L 42*       CoAg  Results from last 7 days   Lab Units 03/27/25  1432   INR  1.22*   APTT seconds 29.6       Creatinine Clearance  Estimated Creatinine Clearance: 99 mL/min (by C-G formula based on SCr of 0.93 mg/dL).    ABG          EKG    I personally reviewed the patient's EKG/Telemetry data: Sinus rhythm with BiV pacing      Assessment & Plan       Wide-complex tachycardia    Ventricular tachycardia      Ismael Pulido is a 59-year-old male patient who received a biventricular pacemaker on 2/21/2025.  Patient had abdominal pain and was treated at [facility] for splenic infarct, it was thought to be due to PFO and paradoxical emboli and he was placed on Eliquis.  Patient however was found to have ventricular tachycardia on device check and he was sent to the hospital and he received a shock in the ER.  Currently he is in sinus with BiV pacing.  Echocardiogram showed mildly reduced EF.  Plan for heart catheterization early next week.  He might need upgrade of his pacemaker to a BiV ICD for secondary prevention of sudden cardiac death.     I discussed the patients findings and my recommendations with patient and agrees with outlined plan    Shruthi Gilbert MD  03/29/25  14:13 EDT      Electronically signed by Shruthi Gilbert MD, 03/29/25, 2:17 PM EDT.               Electronically signed by Vladimir  MD Shruthi at 03/29/25 1417       Win Reaves MD at 03/28/25 1249          CARDIOLOGY PROGRESS NOTE:    Ismael Pulido  59 y.o.  male  1965  [id number]      Referring Provider: Intensivist    Reason for follow-up: Sustained ventricular tachycardia     Patient Care Team:  Sharon Silva MD as PCP - General (Internal Medicine & Pediatrics)  Rima Salazar APRN as Nurse Practitioner (Cardiology)    Subjective .  Patient doing well without any symptom    Objective lying in bed comfortably     Review of Systems   Constitutional: Negative for fever and malaise/fatigue.   HENT:  Negative for ear pain and nosebleeds.    Eyes:  Negative for blurred vision and double vision.   Cardiovascular:  Negative for chest pain, dyspnea on exertion and palpitations.   Respiratory:  Negative for cough and shortness of breath.    Skin:  Negative for rash.   Musculoskeletal:  Negative for joint pain.   Gastrointestinal:  Negative for abdominal pain, nausea and vomiting.   Neurological:  Negative for focal weakness and headaches.   Psychiatric/Behavioral:  Negative for depression. The patient is not nervous/anxious.    All other systems reviewed and are negative.      Allergies: Januvia [sitagliptin] and Lisinopril    Scheduled Meds:apixaban, 5 mg, Oral, Q12H  cetirizine, 10 mg, Oral, Daily  empagliflozin, 25 mg, Oral, Daily  [START ON 3/29/2025] losartan, 50 mg, Oral, Q24H  metoprolol succinate XL, 25 mg, Oral, Q24H  mupirocin, 1 Application, Each Nare, BID  pantoprazole, 40 mg, Oral, BID  rosuvastatin, 10 mg, Oral, Nightly  sodium chloride, 10 mL, Intravenous, Q12H  sodium chloride, 10 mL, Intravenous, Q12H      Continuous Infusions:   PRN Meds:.  acetaminophen **OR** acetaminophen    aluminum-magnesium hydroxide-simethicone    senna-docusate sodium **AND** polyethylene glycol **AND** bisacodyl **AND** bisacodyl    Calcium Replacement - Follow Nurse / BPA Driven Protocol    labetalol    Magnesium Low Dose Replacement -  "Follow Nurse / BPA Driven Protocol    nitroglycerin    ondansetron ODT **OR** ondansetron    Phosphorus Replacement - Follow Nurse / BPA Driven Protocol    Potassium Replacement - Follow Nurse / BPA Driven Protocol    [COMPLETED] Insert Peripheral IV **AND** sodium chloride    sodium chloride    sodium chloride    sodium chloride    sodium chloride        VITAL SIGNS  Vitals:    03/28/25 0900 03/28/25 1000 03/28/25 1100 03/28/25 1200   BP:  (!) 152/105 (!) 161/114 141/96   Pulse: 109 99 94 99   Resp: 25 20 22 23   Temp:    98.3 °F (36.8 °C)   TempSrc:    Oral   SpO2: 95% 93% 94% 93%   Weight:       Height:           Flowsheet Rows      Flowsheet Row First Filed Value   Admission Height 172.7 cm (68\") Documented at 03/27/2025 1428   Admission Weight 104 kg (229 lb 4.5 oz) Documented at 03/27/2025 1428             TELEMETRY: Ventricular pacemaker rhythm    Physical Exam:  Constitutional:       Appearance: Well-developed.   Eyes:      General: No scleral icterus.     Conjunctiva/sclera: Conjunctivae normal.      Pupils: Pupils are equal, round, and reactive to light.   HENT:      Head: Normocephalic and atraumatic.   Neck:      Vascular: No carotid bruit or JVD.   Pulmonary:      Effort: Pulmonary effort is normal.      Breath sounds: Normal breath sounds. No wheezing. No rales.   Cardiovascular:      Normal rate. Regular rhythm.   Pulses:     Intact distal pulses.   Abdominal:      General: Bowel sounds are normal.      Palpations: Abdomen is soft.   Musculoskeletal: Normal range of motion.      Cervical back: Normal range of motion and neck supple. Skin:     General: Skin is warm and dry.      Findings: No rash.   Neurological:      Mental Status: Alert.      Comments: No focal deficits          Results Review:   I reviewed the patient's new clinical results.  Lab Results (last 24 hours)       Procedure Component Value Units Date/Time    Factor 5 Le[place]an [[id number]] Collected: 03/28/25 1052    Specimen: Blood from " Arm, Left Updated: 03/28/25 1132    Lidocaine Level [[id number]] Collected: 03/28/25 0517    Specimen: Blood from Arm, Left Updated: 03/28/25 0936     Lidocaine See Attached Report    Lipid Panel [[id number]]  (Abnormal) Collected: 03/28/25 0517    Specimen: Blood from Arm, Left Updated: 03/28/25 0604     Total Cholesterol 124 mg/dL      Triglycerides 191 mg/dL      HDL Cholesterol 31 mg/dL      LDL Cholesterol  61 mg/dL      VLDL Cholesterol 32 mg/dL      LDL/HDL Ratio 1.77    Narrative:      Cholesterol Reference Ranges  (U.S. Department of Health and Human Services ATP III Classifications)    Desirable          <200 mg/dL  Borderline High    200-239 mg/dL  High Risk          >240 mg/dL      Triglyceride Reference Ranges  (U.S. Department of Health and Human Services ATP III Classifications)    Normal           <150 mg/dL  Borderline High  150-199 mg/dL  High             200-499 mg/dL  Very High        >500 mg/dL    HDL Reference Ranges  (U.S. Department of Health and Human Services ATP III Classifications)    Low     <40 mg/dl (major risk factor for CHD)  High    >60 mg/dl ('negative' risk factor for CHD)        LDL Reference Ranges  (U.S. Department of Health and Human Services ATP III Classifications)    Optimal          <100 mg/dL  Near Optimal     100-129 mg/dL  Borderline High  130-159 mg/dL  High             160-189 mg/dL  Very High        >189 mg/dL    LDL is calculated using the NIH LDL-C calculation.      Magnesium [[id number]]  (Normal) Collected: 03/28/25 0517    Specimen: Blood from Arm, Left Updated: 03/28/25 0551     Magnesium 2.1 mg/dL     Comprehensive Metabolic Panel [[id number]]  (Abnormal) Collected: 03/28/25 0517    Specimen: Blood from Arm, Left Updated: 03/28/25 0551     Glucose 136 mg/dL      BUN 14 mg/dL      Creatinine 0.98 mg/dL      Sodium 140 mmol/L      Potassium 4.5 mmol/L      Comment: Slight hemolysis detected by analyzer. Result may be falsely elevated.        Chloride 104 mmol/L       CO2 23.9 mmol/L      Calcium 9.5 mg/dL      Total Protein 7.5 g/dL      Albumin 3.9 g/dL      ALT (SGPT) 45 U/L      AST (SGOT) 40 U/L      Alkaline Phosphatase 99 U/L      Total Bilirubin 0.5 mg/dL      Globulin 3.6 gm/dL      A/G Ratio 1.1 g/dL      BUN/Creatinine Ratio 14.3     Anion Gap 12.1 mmol/L      eGFR 88.8 mL/min/1.73     Narrative:      GFR Categories in Chronic Kidney Disease (CKD)      GFR Category          GFR (mL/min/1.73)    Interpretation  G1                     90 or greater         Normal or high (1)  G2                      60-89                Mild decrease (1)  G3a                   45-59                Mild to moderate decrease  G3b                   30-44                Moderate to severe decrease  G4                    15-29                Severe decrease  G5                    14 or less           Kidney failure          (1)In the absence of evidence of kidney disease, neither GFR category G1 or G2 fulfill the criteria for CKD.    eGFR calculation 2021 CKD-EPI creatinine equation, which does not include race as a factor    Phosphorus [[id number]]  (Normal) Collected: 03/28/25 0517    Specimen: Blood from Arm, Left Updated: 03/28/25 0549     Phosphorus 4.4 mg/dL     CBC & Differential [[id number]]  (Abnormal) Collected: 03/28/25 0517    Specimen: Blood from Arm, Left Updated: 03/28/25 0522    Narrative:      The following orders were created for panel order CBC & Differential.  Procedure                               Abnormality         Status                     ---------                               -----------         ------                     CBC Auto Differential[[id number]]        Abnormal            Final result                 Please view results for these tests on the individual orders.    CBC Auto Differential [[id number]]  (Abnormal) Collected: 03/28/25 0517    Specimen: Blood from Arm, Left Updated: 03/28/25 0522     WBC 10.34 10*3/mm3      RBC 5.00 10*6/mm3      Hemoglobin 14.4  g/dL      Hematocrit 44.7 %      MCV 89.4 fL      MCH 28.8 pg      MCHC 32.2 g/dL      RDW 13.2 %      RDW-SD 43.3 fl      MPV 8.8 fL      Platelets 227 10*3/mm3      Neutrophil % 59.4 %      Lymphocyte % 24.4 %      Monocyte % 7.6 %      Eosinophil % 6.7 %      Basophil % 1.3 %      Immature Grans % 0.6 %      Neutrophils, Absolute 6.15 10*3/mm3      Lymphocytes, Absolute 2.52 10*3/mm3      Monocytes, Absolute 0.79 10*3/mm3      Eosinophils, Absolute 0.69 10*3/mm3      Basophils, Absolute 0.13 10*3/mm3      Immature Grans, Absolute 0.06 10*3/mm3      nRBC 0.0 /100 WBC     POC Glucose Once [[id number]]  (Abnormal) Collected: 03/27/25 2038    Specimen: Blood Updated: 03/27/25 2040     Glucose 190 mg/dL      Comment: Serial Number: [id number]:  [id number]       Angiotensin Converting Enzyme [[id number]] Collected: 03/27/25 1824    Specimen: Blood Updated: 03/27/25 1828    MRSA Screen, PCR (Inpatient) - Swab, Nares [[id number]]  (Normal) Collected: 03/27/25 1514    Specimen: Swab from Nares Updated: 03/27/25 1633     MRSA PCR No MRSA Detected    Narrative:      The negative predictive value of this diagnostic test is high and should only be used to consider de-escalating anti-MRSA therapy. A positive result may indicate colonization with MRSA and must be correlated clinically.    High Sensitivity Troponin T 1Hr [[id number]]  (Abnormal) Collected: 03/27/25 1545    Specimen: Blood Updated: 03/27/25 1613     HS Troponin T 42 ng/L      Troponin T Numeric Delta 2 ng/L      Troponin T % Delta 5    Narrative:      High Sensitive Troponin T Reference Range:  <14.0 ng/L- Negative Female for AMI  <22.0 ng/L- Negative Male for AMI  >=14 - Abnormal Female indicating possible myocardial injury.  >=22 - Abnormal Male indicating possible myocardial injury.   Clinicians would have to utilize clinical acumen, EKG, Troponin, and serial changes to determine if it is an Acute Myocardial Infarction or myocardial injury due to an  underlying chronic condition.         Respiratory Panel PCR w/COVID-19(SARS-CoV-2) [facility] In-House, NP Swab in UTM/VTM, 2 HR TAT - Swab, Nasopharynx [[id number]]  (Normal) Collected: 03/27/25 1514    Specimen: Swab from Nasopharynx Updated: 03/27/25 1608     ADENOVIRUS, PCR Not Detected     Coronavirus 229E Not Detected     Coronavirus HKU1 Not Detected     Coronavirus NL63 Not Detected     Coronavirus OC43 Not Detected     COVID19 Not Detected     Human Metapneumovirus Not Detected     Human Rhinovirus/Enterovirus Not Detected     Influenza A PCR Not Detected     Influenza B PCR Not Detected     Parainfluenza Virus 1 Not Detected     Parainfluenza Virus 2 Not Detected     Parainfluenza Virus 3 Not Detected     Parainfluenza Virus 4 Not Detected     RSV, PCR Not Detected     Bordetella pertussis pcr Not Detected     Bordetella parapertussis PCR Not Detected     Chlamydophila pneumoniae PCR Not Detected     Mycoplasma pneumo by PCR Not Detected    Narrative:      In the setting of a positive respiratory panel with a viral infection PLUS a negative procalcitonin without other underlying concern for bacterial infection, consider observing off antibiotics or discontinuation of antibiotics and continue supportive care. If the respiratory panel is positive for atypical bacterial infection (Bordetella pertussis, Chlamydophila pneumoniae, or Mycoplasma pneumoniae), consider antibiotic de-escalation to target atypical bacterial infection.    Protime-INR [[id number]]  (Abnormal) Collected: 03/27/25 1432    Specimen: Blood Updated: 03/27/25 1535     Protime 15.3 Seconds      INR 1.22    aPTT [[id number]]  (Normal) Collected: 03/27/25 1432    Specimen: Blood Updated: 03/27/25 1535     PTT 29.6 seconds     High Sensitivity Troponin T [[id number]]  (Abnormal) Collected: 03/27/25 1432    Specimen: Blood Updated: 03/27/25 1500     HS Troponin T 40 ng/L     Narrative:      High Sensitive Troponin T Reference  Range:  <14.0 ng/L- Negative Female for AMI  <22.0 ng/L- Negative Male for AMI  >=14 - Abnormal Female indicating possible myocardial injury.  >=22 - Abnormal Male indicating possible myocardial injury.   Clinicians would have to utilize clinical acumen, EKG, Troponin, and serial changes to determine if it is an Acute Myocardial Infarction or myocardial injury due to an underlying chronic condition.         Basic Metabolic Panel [[id number]]  (Abnormal) Collected: 03/27/25 1432    Specimen: Blood Updated: 03/27/25 1500     Glucose 141 mg/dL      BUN 11 mg/dL      Creatinine 1.06 mg/dL      Sodium 139 mmol/L      Potassium 4.3 mmol/L      Chloride 101 mmol/L      CO2 25.1 mmol/L      Calcium 9.7 mg/dL      BUN/Creatinine Ratio 10.4     Anion Gap 12.9 mmol/L      eGFR 80.8 mL/min/1.73     Narrative:      GFR Categories in Chronic Kidney Disease (CKD)      GFR Category          GFR (mL/min/1.73)    Interpretation  G1                     90 or greater         Normal or high (1)  G2                      60-89                Mild decrease (1)  G3a                   45-59                Mild to moderate decrease  G3b                   30-44                Moderate to severe decrease  G4                    15-29                Severe decrease  G5                    14 or less           Kidney failure          (1)In the absence of evidence of kidney disease, neither GFR category G1 or G2 fulfill the criteria for CKD.    eGFR calculation 2021 CKD-EPI creatinine equation, which does not include race as a factor    Magnesium [[id number]]  (Normal) Collected: 03/27/25 1432    Specimen: Blood Updated: 03/27/25 1500     Magnesium 2.3 mg/dL     [place] Draw [[id number]] Collected: 03/27/25 1432    Specimen: Blood Updated: 03/27/25 1445    Narrative:      The following orders were created for panel order [place] Draw.  Procedure                               Abnormality         Status                     ---------                                -----------         ------                     Green Top (Gel)[[id number]]                                  Final result               Lavender Top[[id number]]                                     Final result               Gold Top - SST[[id number]]                                   Final result               Light Blue Top[[id number]]                                   Final result                 Please view results for these tests on the individual orders.    Green Top (Gel) [[id number]] Collected: 03/27/25 1432    Specimen: Blood Updated: 03/27/25 1445     Extra Tube Hold for add-ons.     Comment: Auto resulted.       Lavender Top [[id number]] Collected: 03/27/25 1432    Specimen: Blood Updated: 03/27/25 1445     Extra Tube hold for add-on     Comment: Auto resulted       Gold Top - SST [[id number]] Collected: 03/27/25 1432    Specimen: Blood Updated: 03/27/25 1445     Extra Tube Hold for add-ons.     Comment: Auto resulted.       Light Blue Top [[id number]] Collected: 03/27/25 1432    Specimen: Blood Updated: 03/27/25 1445     Extra Tube Hold for add-ons.     Comment: Auto resulted       CBC & Differential [[id number]]  (Abnormal) Collected: 03/27/25 1432    Specimen: Blood Updated: 03/27/25 1439    Narrative:      The following orders were created for panel order CBC & Differential.  Procedure                               Abnormality         Status                     ---------                               -----------         ------                     CBC Auto Differential[[id number]]        Abnormal            Final result                 Please view results for these tests on the individual orders.    CBC Auto Differential [[id number]]  (Abnormal) Collected: 03/27/25 1432    Specimen: Blood Updated: 03/27/25 1439     WBC 11.34 10*3/mm3      RBC 5.50 10*6/mm3      Hemoglobin 15.8 g/dL      Hematocrit 49.4 %      MCV 89.8 fL      MCH 28.7 pg      MCHC 32.0 g/dL      RDW 13.2 %      RDW-SD 43.5 fl      MPV 8.9  fL      Platelets 255 10*3/mm3      Neutrophil % 60.5 %      Lymphocyte % 25.0 %      Monocyte % 6.3 %      Eosinophil % 5.1 %      Basophil % 1.3 %      Immature Grans % 1.8 %      Neutrophils, Absolute 6.87 10*3/mm3      Lymphocytes, Absolute 2.83 10*3/mm3      Monocytes, Absolute 0.71 10*3/mm3      Eosinophils, Absolute 0.58 10*3/mm3      Basophils, Absolute 0.15 10*3/mm3      Immature Grans, Absolute 0.20 10*3/mm3      nRBC 0.0 /100 WBC             Imaging Results (Last 24 Hours)       Procedure Component Value Units Date/Time    XR Chest 1 View [[id number]] Collected: 03/27/25 1525     Updated: 03/27/25 1528    Narrative:      XR CHEST 1 VW    Date of Exam: 3/27/2025 3:08 PM EDT    Indication: Irregular heartbeat    Comparison: CT chest 3/12/2025    Findings:  Heart size within normal limits allowing for exam technique. Multichamber AICD. Lordotic positioning. Defibrillator pads overlie the chest. No discrete lobar consolidation, edema, effusion or pneumothorax. Pleural effusions noted on recent CT not clearly   visualized. Degenerative elated osseous change. Low lung volumes.      Impression:      Impression:  No convincing acute cardiopulmonary abnormality. Pleural effusions on recent chest CT are not visualized on today's radiograph.      Electronically Signed: Nicholas Becerra MD    3/27/2025 3:26 PM EDT    Workstation ID: [id number]            EKG      I personally viewed and interpreted the patient's EKG/Telemetry data:    ECHOCARDIOGRAM:  Results for orders placed during the hospital encounter of 03/09/25    Adult Transesophageal Echo ([facility]) W/ Cont if Necessary Per Protocol    Interpretation Summary    Left ventricular systolic function is low normal. Calculated left ventricular EF = 45.9%    The following left ventricular wall segments are hypokinetic: apical lateral, apical septal and apex hypokinetic.    Mildly reduced right ventricular systolic function noted.    The right ventricular cavity is mildly  dilated.    Small patent foramen ovale present.    Saline test results are positive for right to left atrial level shunt.       STRESS MYOVIEW:  Results for orders placed during the hospital encounter of 02/18/25    Stress Test With Myocardial Perfusion One Day    Interpretation Summary    Myocardial perfusion imaging indicates a normal myocardial perfusion study with no evidence of ischemia. Impressions are consistent with a low risk study.    Left ventricular ejection fraction is normal (Calculated EF = 64%).       CARDIAC CATHETERIZATION:  No results found for this or any previous visit.       OTHER:         Assessment & Plan     Sustained ventricular tachycardia  Patient had sustained ventricular tachycardia in the ER and did not respond to IV amiodarone and hence he underwent cardioversion after starting him on lidocaine without any complications  Patient is currently in a paced rhythm  Patient is ruled out for MI by EKG and enzymes  Patient electrolytes are normal.  Patient is on Eliquis  Electrophysiology has been consulted and they want a cath which I will perform on Monday after stopping the Eliquis on Saturday night.  Patient is also having workup done for complete heart block and ventricular tachycardia mainly to rule out sarcoidosis.    PFO  Patient has recently diagnosed PFO for which he was placed on Eliquis because of splenic infarction but he will be better off with PFO closure soon.    Diabetes  Patient is on sliding scale insulin.    Hypertension  Patient is on losartan but will also start him on beta-blockers with metoprolol    Hyperlipidemia  Patient on statins and the lipid levels are well within normal limits.    I discussed the patients findings and my recommendations with patient and nurse    Win Reaves MD  03/28/25  12:49 EDT                Electronically signed by Win Reaves MD at 03/28/25 4882       Rayshawn Herrera APRN at 03/28/25 1583       Attestation signed by George Kenny  "MD at 25 1302      I have reviewed this documentation and agree.                        Critical Care Progress Note     Ismael Pulido : 1965 MRN:[id number] LOS:1  Rm: 231/     Principal Problem: Wide-complex tachycardia     Reason for follow up: All the medical problems listed below    Summary     Ismael Pulido is a 59 y.o. male with PMH of PPM placement, DM-II, HTN, HLD, PFO, and recent splenic infarction presents to the hospital with complaints of heart racing and feeling \"funny\" in his chest.       He was recently in [facility] for splenic infarction thought 2/2 his PFO.  He was started on Eliquis on 3/14.     ED course: Received 150 mg IV amio bolus in ambulance and 360 mg x 2 IV boluses in ER w/o conversion.  Gave bolus of 75 mg IV lidocaine and started on gtt.  All labs in ER unremarkable.      Patient underwent cardioversion in the ED after discussion with cardiology.    Patient is on Hospital Day: 2.    Significant Events / Subjective     25 : Lidocaine drip discontinued this morning, patient has maintained paced rhythm overnight.  Cardiac EP has been consulted.  Stable for downgrade out of ICU, transfer to PCU with hospitalist service consultation.    Assessment / Plan     Stable VT / Recent permanent pacemaker placement  -Received 150 mg IV amio bolus in ambulance and 360 mg x 2 IV boluses in ER w/o conversion  -Case d/w Dr.'s Angel and Jelly--gave 75 mg bolus IV lidocaine and started gtt at 2  -S/p cardioversion in ER with return to paced rhythm  -Continue Eliquis  -ACE level sent per cardiology request  -Lidocaine gt discontinued this AM.         PFO  Recent Splenic infarction  -Thought 2/2 PFO  -Started Eliquis 3/14, continue  -Consult hematology for hypercoagulable w/u        DM-II, not well-controlled  -Cardiac, diabetic diet  -A1c 8.4% 25  -SSI for now        HLD  -Continue Crestor 10 mg po qhs        HTN  -Continue losartan 50 mg po daily.  " Labetalol IV as needed.    Disposition:  Downgrade out of ICU, transfer for to PCU and hospitalist service.    Code status:   Code Status (Patient has no pulse and is not breathing): CPR (Attempt to Resuscitate)  Medical Interventions (Patient has pulse or is breathing): Full Support  Level Of Support Discussed With: Patient       Nutrition:   Diet: Cardiac, Diabetic; Healthy Heart (2-3 Na+); Consistent Carbohydrate; Fluid Consistency: Thin (IDDSI 0)   Patient isn't on Tube Feeding     VTE Prophylaxis:  Pharmacologic VTE prophylaxis orders are present.           Objective / Physical Exam     Vital signs:  Temp: 98.2 °F (36.8 °C)  BP: (!) 158/124  Heart Rate: 107  Resp: 16  SpO2: 93 %  Weight: 103 kg (227 lb 8.2 oz)    Admission Weight: Weight: 104 kg (229 lb 4.5 oz)  Current Weight: Weight: 103 kg (227 lb 8.2 oz)    Input/Output in last 24 hours:    Intake/Output Summary (Last 24 hours) at 3/28/2025 0847  Last data filed at 3/28/2025 0700  Gross per 24 hour   Intake 340 ml   Output 1300 ml   Net -960 ml      Net IO Since Admission: -960 mL [03/28/25 0847]     Physical Exam  Vitals and nursing note reviewed.   Constitutional:       General: He is not in acute distress.     Appearance: He is not ill-appearing, toxic-appearing or diaphoretic.   HENT:      Head: Normocephalic.      Nose: Nose normal.      Mouth/Throat:      Mouth: Mucous membranes are moist.      Pharynx: Oropharynx is clear.   Eyes:      General: No scleral icterus.     Conjunctiva/sclera: Conjunctivae normal.   Cardiovascular:      Rate and Rhythm: Normal rate.      Pulses: Normal pulses.      Comments: Paced rhythm  Pulmonary:      Effort: Pulmonary effort is normal. No respiratory distress.   Abdominal:      General: There is no distension.      Tenderness: There is no abdominal tenderness.   Musculoskeletal:      Right lower leg: No edema.      Left lower leg: No edema.   Skin:     General: Skin is warm and dry.   Neurological:      General: No  focal deficit present.      Mental Status: He is alert and oriented to person, place, and time.   Psychiatric:         Mood and Affect: Mood normal.         Behavior: Behavior normal.          Radiology and Labs     Results from last 7 days   Lab Units 03/28/25  0517 03/27/25  1432   WBC 10*3/mm3 10.34 11.34*   HEMOGLOBIN g/dL 14.4 15.8   HEMATOCRIT % 44.7 49.4   PLATELETS 10*3/mm3 227 255      Results from last 7 days   Lab Units 03/27/25  1432   PROTIME Seconds 15.3*   INR  1.22*   APTT seconds 29.6      Results from last 7 days   Lab Units 03/28/25  0517 03/27/25  1432   SODIUM mmol/L 140 139   POTASSIUM mmol/L 4.5 4.3   CHLORIDE mmol/L 104 101   CO2 mmol/L 23.9 25.1   ANION GAP mmol/L 12.1 12.9   BUN mg/dL 14 11   CREATININE mg/dL 0.98 1.06   GLUCOSE mg/dL 136* 141*   PHOSPHORUS mg/dL 4.4  --    MAGNESIUM mg/dL 2.1 2.3   ALT (SGPT) U/L 45*  --    AST (SGOT) U/L 40  --    ALK PHOS U/L 99  --       Results from last 7 days   Lab Units 03/28/25  0517   ALT (SGPT) U/L 45*   AST (SGOT) U/L 40   ALK PHOS U/L 99           XR Chest 1 View  Result Date: 3/27/2025  Impression: No convincing acute cardiopulmonary abnormality. Pleural effusions on recent chest CT are not visualized on today's radiograph. Electronically Signed: Nicholas Becerra MD  3/27/2025 3:26 PM EDT  Workstation ID: [id number]      Current medications     Scheduled Meds:   apixaban, 5 mg, Oral, Q12H  cetirizine, 10 mg, Oral, Daily  empagliflozin, 25 mg, Oral, Daily  losartan, 50 mg, Oral, Daily  mupirocin, 1 Application, Each Nare, BID  pantoprazole, 40 mg, Oral, BID  rosuvastatin, 10 mg, Oral, Nightly  sodium chloride, 10 mL, Intravenous, Q12H  sodium chloride, 10 mL, Intravenous, Q12H        Continuous Infusions:          Plan discussed with RN. Reviewed all other data in the last 24 hours, including but not limited to vitals, labs, microbiology, imaging and pertinent notes from other providers.  Plan also discussed with patient at the  "bedside.      Rayshawn Herrera APRN   Critical Care  25   08:47 EDT      Electronically signed by George Kenny MD at 25 1302          Consult Notes (most recent note)        Teofilo Belcher MD at 25              [facility] MEDICINE SERVICE  TRANSFER OF CARE/ACCEPTANCE NOTE    PATIENT NAME: Ismael Pulido  : 1965  MRN: [id number]     Active Hospital Problems    Diagnosis  POA    **Wide-complex tachycardia [R00.0]  Yes    Ventricular tachycardia [I47.20]  Unknown      Resolved Hospital Problems   No resolved problems to display.       Patient seen and examined by me on 25 at 1915.    History/Course:  59 y.o. male with PMH of PPM placement, DM-II, HTN, HLD, PFO, and recent splenic infarction presents to the hospital with complaints of heart racing and feeling \"funny\" in his chest. Found to have wide complex tachycardia, treated with amiodarone and lidocaine drip without conversion, underwent cardioversion in ED with cardiology. Lidocaine drip discontinued this morning, pt had maintained paced rhythm overnight. Antihypertensives resumed this am. Cardiology/EP and heme/onc following. Plan for LHC on Monday, stop eliquis Saturday night. Stable for transfer to PCU, hospitalist service consulted to assume care.    Patient reports feeling well overall, denies any chest pain/SOA/palpitations. Tolerating PO intake without issue.     I have reviewed the H&P, diagnostic data and plan of care for Ismael Pulido.  The hospitalist service will be taking over care of this patient during the current hospitalization.        Signature: Electronically signed by Teofilo Belcher MD, 25, 19:51 EDT.  [facility] Hospitalist Team      This note is not for billing purposes.     Electronically signed by Teofilo Belcher MD at 25       "

## 2025-03-31 NOTE — ANESTHESIA PREPROCEDURE EVALUATION
Anesthesia Evaluation     Patient summary reviewed and Nursing notes reviewed   no history of anesthetic complications:   NPO Solid Status: > 8 hours  NPO Liquid Status: > 2 hours           Airway   Dental      Pulmonary    (+) asthma,sleep apnea  Cardiovascular     ECG reviewed  PT is on anticoagulation therapy    (+) pacemaker pacemaker, hypertension, valvular problems/murmurs MR and TI, dysrhythmias PVC, Bradycardia, hyperlipidemia      Neuro/Psych  (+) syncope  GI/Hepatic/Renal/Endo    (+) obesity, GERD, renal disease- stones, diabetes mellitus    Musculoskeletal     Abdominal    Substance History      OB/GYN          Other        ROS/Med Hx Other: Additional History:  Mobitz type 2 AVB, complete heart block, VT, allergies, abd pain    Semaglutide use    Echo:  Echocardiogram Findings    Left Ventricle Left ventricular ejection fraction appears to be 46 - 50%.   Abnormal global longitudinal LV strain (GLS) = -10.3%. Left ventricle strain data was reviewed by the physician. Normal left ventricular cavity size noted. Left ventricular wall thickness is consistent with mild concentric hypertrophy. Left ventricular diastolic function is consistent with (grade I) impaired relaxation.  Right Ventricle The right ventricular cavity is moderately dilated. Moderately reduced right ventricular systolic function noted.  Left Atrium The left atrial cavity is mild to moderately dilated.  Right Atrium Normal right atrial cavity size noted.  Aortic Valve The aortic valve is grossly normal in structure. The aortic valve appears trileaflet. No significant aortic valve regurgitation is present. No hemodynamically significant aortic valve stenosis is present.  Mitral Valve The mitral valve is grossly normal in structure. No significant mitral valve regurgitation is present. No significant mitral valve stenosis is present.  Tricuspid Valve Tricuspid valve not well visualized. Trace tricuspid valve regurgitation is present. No  evidence of significant tricuspid valve stenosis is present.  Pulmonic Valve The pulmonic valve is not well visualized. There is trace pulmonic valve regurgitation present. There is no pulmonic valve stenosis present.  Greater Vessels No dilation of the aortic root is present.  Pericardium The pericardium is normal. There is no evidence of pericardial effusion. .        Echocardiogram Findings    Left Ventricle Left ventricular ejection fraction appears to be 51 - 55%.     Normal left ventricular cavity size and wall thickness noted.  Right Ventricle Normal right ventricular wall thickness and systolic function noted. The right ventricular cavity is moderately dilated.  Pericardium There is no evidence of pericardial effusion. .      Echocardiogram Findings    Left Ventricle Left ventricular ejection fraction appears to be 56 - 60%.   Global longitudinal LV strain (GLS) = -15.0%. Normal left ventricular cavity size and wall thickness noted.  Right Ventricle Normal right ventricular wall thickness and systolic function noted. The right ventricular cavity is moderately dilated.  Left Atrium The left atrial cavity is mildly dilated.  Right Atrium Normal right atrial cavity size noted.  Aortic Valve The aortic valve is grossly normal in structure.  Mitral Valve Mild to moderate mitral valve regurgitation is present.  Tricuspid Valve The tricuspid valve is grossly normal in structure. Moderate tricuspid valve regurgitation is present. Estimated right ventricular systolic pressure from tricuspid regurgitation is moderately elevated (45-55 mmHg).  Pulmonic Valve The pulmonic valve is not well visualized.  Greater Vessels No dilation of the aortic root is present.  Pericardium There is no evidence of pericardial effusion. .        Stress Test:  ·  Myocardial perfusion imaging indicates a normal myocardial perfusion study with no evidence of ischemia. Impressions are consistent with a low risk study.  ·  Left ventricular  ejection fraction is normal (Calculated EF = 64%).    Cath:  FINDINGS:    1. HEMODYNAMICS: /60.    2. . CORONARY ANGIOGRAPHY:     Left main: 0    LAD: 0    LCX: 0    RCA: 0      SUMMARY: Normal coronaries  Ventricular tachycardia    RECOMMENDATIONS: Medical therapy with EP consultation       PSH:  HERNIA REPAIR CARDIAC ELECTROPHYSIOLOGY PROCEDURE  ENDOSCOPY                 Anesthesia Plan    ASA 3     general   total IV anesthesia  (Patient identified; pre-operative vital signs, all relevant labs/studies, complete medical/surgical/anesthetic history, full medication list, full allergy list, and NPO status obtained/reviewed; physical assessment performed; anesthetic options, side effects, potential complications, risks, and benefits discussed; questions answered; written anesthesia consent obtained; patient cleared for procedure; anesthesia machine and equipment checked and functioning)  intravenous induction     Anesthetic plan, risks, benefits, and alternatives have been provided, discussed and informed consent has been obtained with: patient.    Plan discussed with CRNA and CAA.    CODE STATUS:    Code Status (Patient has no pulse and is not breathing): CPR (Attempt to Resuscitate)  Medical Interventions (Patient has pulse or is breathing): Full Support  Level Of Support Discussed With: Patient

## 2025-03-31 NOTE — PROGRESS NOTES
[facility] MEDICINE SERVICE  DAILY PROGRESS NOTE    NAME: Ismael Pulido  : 1965  MRN: [id number]      LOS: 4 days     PROVIDER OF SERVICE: Timothy Duane Brammell, MD    Chief Complaint: Wide-complex tachycardia    Subjective:     Interval History:  History taken from: patient    Complaints of chest pain or shortness of breath.  He tolerated his cardiac catheterization.  He is planned for ICD implantation and replacement of his pacemaker.  Denies any gastric bowel or urinary symptoms.  Denies any other additional acute issues.        Review of Systems:   Review of Systems   All other systems reviewed and are negative.      Objective:     Vital Signs  Temp:  [97.1 °F (36.2 °C)-98.6 °F (37 °C)] 97.7 °F (36.5 °C)  Heart Rate:  [75-96] 92  Resp:  [15-19] 18  BP: (103-141)/(69-96) 125/88  Flow (L/min) (Oxygen Therapy):  [2] 2   Body mass index is 34.06 kg/m².    Physical Exam  Physical Exam  Vitals reviewed.   Constitutional:       General: He is not in acute distress.     Appearance: He is obese.   HENT:      Head: Normocephalic.   Cardiovascular:      Rate and Rhythm: Normal rate and regular rhythm.   Pulmonary:      Effort: Pulmonary effort is normal.      Breath sounds: Normal breath sounds.   Abdominal:      General: Bowel sounds are normal.      Palpations: Abdomen is soft.      Tenderness: There is no abdominal tenderness.   Musculoskeletal:         General: No swelling.   Neurological:      Mental Status: He is alert. Mental status is at baseline.            Diagnostic Data    Results from last 7 days   Lab Units 25  0035   WBC 10*3/mm3 9.57   HEMOGLOBIN g/dL 15.8   HEMATOCRIT % 47.9   PLATELETS 10*3/mm3 228   GLUCOSE mg/dL 120*   CREATININE mg/dL 0.89   BUN mg/dL 17   SODIUM mmol/L 138   POTASSIUM mmol/L 4.1   AST (SGOT) U/L 27   ALT (SGPT) U/L 33   ALK PHOS U/L 109   BILIRUBIN mg/dL 0.3   ANION GAP mmol/L 14.3       No radiology results for the last  day          Assessment:  Wide-complex tachycardia with concern over V. tach status post cardioversion  PFO  Type 2 diabetes  Obstructive sleep apnea       Plan.  Cardiology with planned ICD implantation.  Anticoagulant on hold at present.      Active and Resolved Problems  Active Hospital Problems    Diagnosis  POA    **Wide-complex tachycardia [R00.0]  Yes    Ventricular tachycardia [I47.20]  Unknown    Status post placement of cardiac pacemaker [Z95.0]  Unknown    Intermittent complete heart block [I44.2]  Unknown    Mobitz type 2 second degree atrioventricular block [I44.1]  Unknown      Resolved Hospital Problems   No resolved problems to display.           VTE Prophylaxis:  Pharmacologic VTE prophylaxis orders are present.             Disposition Planning:     Barriers to Discharge:cardiology clearance  Anticipated Date of Discharge: 4/2  Place of Discharge: home      Time: 30 minutes     Code Status and Medical Interventions: CPR (Attempt to Resuscitate); Full Support   Ordered at: 03/27/25 1547     Code Status (Patient has no pulse and is not breathing):    CPR (Attempt to Resuscitate)     Medical Interventions (Patient has pulse or is breathing):    Full Support     Level Of Support Discussed With:    Patient       Signature: Electronically signed by Timothy Duane Brammell, MD, 03/31/25, 15:06 EDT.  [facility] Hospitalist Team

## 2025-03-31 NOTE — PLAN OF CARE
Goal Outcome Evaluation:  Plan of Care Reviewed With: patient        Progress: no change  Outcome Evaluation: pt rested during the night with home cpap on, scd's on, no complaints during the shift, NPO since midnight for cardiac cath today at 1000

## 2025-03-31 NOTE — PLAN OF CARE
Goal Outcome Evaluation:  Plan of Care Reviewed With: patient, spouse        Progress: improving  Outcome Evaluation: Patient has had no complaints of chest pain. Cardiac cath this morning. Right radial site. Orginally had complaints of numbness and tingling to fingers while band was in place and post removal. Small hematoma noted post removal. No numbness or tingling now. NPO at MN for PPM upgrade. Patient resting comfortably at this time. Spouse at bedside. Call light within reach,.

## 2025-03-31 NOTE — PROGRESS NOTES
CARDIOLOGY PROGRESS NOTE:    Ismael Pulido  59 y.o.  male  1965  [id number]      Referring Provider: Hospitalist    Reason for follow-up: Ventricular tachycardia     Patient Care Team:  Sharon Silva MD as PCP - General (Internal Medicine & Pediatrics)  Rima Salazar APRN as Nurse Practitioner (Cardiology)    Subjective .  Patient doing well without any symptoms    Objective lying in bed comfortably     Review of Systems   Constitutional: Negative for malaise/fatigue.   Cardiovascular:  Negative for chest pain, dyspnea on exertion, leg swelling and palpitations.   Respiratory:  Negative for cough and shortness of breath.    Gastrointestinal:  Negative for abdominal pain, nausea and vomiting.   Neurological:  Negative for dizziness, focal weakness, headaches, light-headedness and numbness.   All other systems reviewed and are negative.      Allergies: Januvia [sitagliptin] and Lisinopril    Scheduled Meds:[Held by provider] apixaban, 5 mg, Oral, Q12H  [Transfer Hold] cetirizine, 10 mg, Oral, Daily  [Transfer Hold] empagliflozin, 25 mg, Oral, Daily  [Transfer Hold] insulin lispro, 2-7 Units, Subcutaneous, 4x Daily AC & at Bedtime  losartan, 50 mg, Oral, Q24H  metoprolol succinate XL, 50 mg, Oral, Q24H  [Transfer Hold] mupirocin, 1 Application, Each Nare, BID  [Transfer Hold] pantoprazole, 40 mg, Oral, BID  [Transfer Hold] rosuvastatin, 10 mg, Oral, Nightly  [Transfer Hold] sodium chloride, 10 mL, Intravenous, Q12H  [Transfer Hold] sodium chloride, 10 mL, Intravenous, Q12H      Continuous Infusions:sodium chloride, , Last Rate: 75 mL/hr (03/31/25 1001)      PRN Meds:.  [Transfer Hold] acetaminophen **OR** [Transfer Hold] acetaminophen    [Transfer Hold] aluminum-magnesium hydroxide-simethicone    [Transfer Hold] senna-docusate sodium **AND** [Transfer Hold] polyethylene glycol **AND** [Transfer Hold] bisacodyl **AND** [Transfer Hold] bisacodyl    [Transfer Hold] Calcium Replacement - Follow Nurse  "/ BPA Driven Protocol    [Transfer Hold] dextrose    [Transfer Hold] dextrose    fentaNYL citrate (PF)    [Transfer Hold] glucagon (human recombinant)    heparin (porcine)    iopamidol    labetalol    lidocaine    [Transfer Hold] Magnesium Low Dose Replacement - Follow Nurse / BPA Driven Protocol    midazolam    niCARdipine    [Transfer Hold] nitroglycerin    nitroglycerin    [Transfer Hold] ondansetron ODT **OR** [Transfer Hold] ondansetron    [Transfer Hold] Phosphorus Replacement - Follow Nurse / BPA Driven Protocol    Potassium Replacement - Follow Nurse / BPA Driven Protocol    [COMPLETED] Insert Peripheral IV **AND** [Transfer Hold] sodium chloride    [Transfer Hold] sodium chloride    [Transfer Hold] sodium chloride    [Transfer Hold] sodium chloride    [Transfer Hold] sodium chloride    sodium chloride        VITAL SIGNS  Vitals:    03/31/25 0621 03/31/25 0805 03/31/25 1000 03/31/25 1020   BP:  112/89 129/95 120/69   BP Location:  Right arm     Patient Position:  Lying     Pulse:  75 80 96   Resp:  16 18 19   Temp:  97.6 °F (36.4 °C)     TempSrc:  Oral     SpO2:  97% 97% 94%   Weight: 102 kg (223 lb 15.8 oz)      Height:           Flowsheet Rows      Flowsheet Row First Filed Value   Admission Height 172.7 cm (68\") Documented at 03/27/2025 1428   Admission Weight 104 kg (229 lb 4.5 oz) Documented at 03/27/2025 1428             TELEMETRY: Ventricular pacemaker rhythm    Physical Exam:  Constitutional:       Appearance: Well-developed.   Eyes:      General: No scleral icterus.     Conjunctiva/sclera: Conjunctivae normal.   HENT:      Head: Normocephalic and atraumatic.   Neck:      Vascular: No carotid bruit or JVD.   Pulmonary:      Effort: Pulmonary effort is normal.      Breath sounds: Normal breath sounds. No wheezing. No rales.   Cardiovascular:      Normal rate. Regular rhythm.   Pulses:     Intact distal pulses.   Abdominal:      General: Bowel sounds are normal.      Palpations: Abdomen is soft. "   Musculoskeletal:      Cervical back: Normal range of motion and neck supple. Skin:     General: Skin is warm and dry.      Findings: No rash.   Neurological:      Mental Status: Alert.          Results Review:   I reviewed the patient's new clinical results.  Lab Results (last 24 hours)       Procedure Component Value Units Date/Time    Lidocaine Level [[id number]] Collected: 03/28/25 0517    Specimen: Blood from Arm, Left Updated: 03/31/25 1000     Lidocaine See Attached Report    VIRI [[id number]]  (Normal) Collected: 03/28/25 1816    Specimen: Blood from Arm, Left Updated: 03/31/25 0946     Antinuclear Antibodies (VIRI) Negative    POC Glucose Once [[id number]]  (Abnormal) Collected: 03/31/25 0606    Specimen: Blood Updated: 03/31/25 0607     Glucose 119 mg/dL      Comment: Serial Number: [id number]:  [id number]       Magnesium [[id number]]  (Normal) Collected: 03/31/25 0035    Specimen: Blood Updated: 03/31/25 0202     Magnesium 2.4 mg/dL     Phosphorus [[id number]]  (Normal) Collected: 03/31/25 0035    Specimen: Blood Updated: 03/31/25 0202     Phosphorus 4.4 mg/dL     Comprehensive Metabolic Panel [[id number]]  (Abnormal) Collected: 03/31/25 0035    Specimen: Blood Updated: 03/31/25 0202     Glucose 120 mg/dL      BUN 17 mg/dL      Creatinine 0.89 mg/dL      Sodium 138 mmol/L      Potassium 4.1 mmol/L      Chloride 101 mmol/L      CO2 22.7 mmol/L      Calcium 9.7 mg/dL      Total Protein 7.9 g/dL      Albumin 4.2 g/dL      ALT (SGPT) 33 U/L      AST (SGOT) 27 U/L      Alkaline Phosphatase 109 U/L      Total Bilirubin 0.3 mg/dL      Globulin 3.7 gm/dL      A/G Ratio 1.1 g/dL      BUN/Creatinine Ratio 19.1     Anion Gap 14.3 mmol/L      eGFR 98.7 mL/min/1.73     Narrative:      GFR Categories in Chronic Kidney Disease (CKD)      GFR Category          GFR (mL/min/1.73)    Interpretation  G1                     90 or greater         Normal or high (1)  G2                      60-89                Mild decrease  (1)  G3a                   45-59                Mild to moderate decrease  G3b                   30-44                Moderate to severe decrease  G4                    15-29                Severe decrease  G5                    14 or less           Kidney failure          (1)In the absence of evidence of kidney disease, neither GFR category G1 or G2 fulfill the criteria for CKD.    eGFR calculation 2021 CKD-EPI creatinine equation, which does not include race as a factor    CBC & Differential [[id number]]  (Abnormal) Collected: 03/31/25 0035    Specimen: Blood Updated: 03/31/25 0136    Narrative:      The following orders were created for panel order CBC & Differential.  Procedure                               Abnormality         Status                     ---------                               -----------         ------                     CBC Auto Differential[[id number]]        Abnormal            Final result                 Please view results for these tests on the individual orders.    CBC Auto Differential [[id number]]  (Abnormal) Collected: 03/31/25 0035    Specimen: Blood Updated: 03/31/25 0136     WBC 9.57 10*3/mm3      RBC 5.46 10*6/mm3      Hemoglobin 15.8 g/dL      Hematocrit 47.9 %      MCV 87.7 fL      MCH 28.9 pg      MCHC 33.0 g/dL      RDW 13.2 %      RDW-SD 41.8 fl      MPV 9.3 fL      Platelets 228 10*3/mm3      Neutrophil % 55.8 %      Lymphocyte % 26.5 %      Monocyte % 9.9 %      Eosinophil % 6.4 %      Basophil % 1.0 %      Immature Grans % 0.4 %      Neutrophils, Absolute 5.33 10*3/mm3      Lymphocytes, Absolute 2.54 10*3/mm3      Monocytes, Absolute 0.95 10*3/mm3      Eosinophils, Absolute 0.61 10*3/mm3      Basophils, Absolute 0.10 10*3/mm3      Immature Grans, Absolute 0.04 10*3/mm3      nRBC 0.0 /100 WBC     POC Glucose Once [[id number]]  (Abnormal) Collected: 03/30/25 2106    Specimen: Blood Updated: 03/30/25 2108     Glucose 200 mg/dL      Comment: Serial Number: [id number]:   [id number]       Hemoglobin A1c [[id number]]  (Abnormal) Collected: 03/30/25 0406    Specimen: Blood Updated: 03/30/25 2021     Hemoglobin A1C 7.00 %     Lyme Disease Total Antibody With Reflex to Immunoassay [[id number]] Collected: 03/30/25 1430    Specimen: Blood from Arm, Left Updated: 03/30/25 1440            Imaging Results (Last 24 Hours)       ** No results found for the last 24 hours. **            EKG      I personally viewed and interpreted the patient's EKG/Telemetry data:    ECHOCARDIOGRAM:  Results for orders placed during the hospital encounter of 03/27/25    Adult Transthoracic Echo Complete W/ Cont if Necessary Per Protocol    Interpretation Summary    Left ventricular ejection fraction appears to be 46 - 50%.    Left ventricular wall thickness is consistent with mild concentric hypertrophy.    Left ventricular diastolic function is consistent with (grade I) impaired relaxation.    Moderately reduced right ventricular systolic function noted.    The right ventricular cavity is moderately dilated.    The left atrial cavity is mild to moderately dilated.       STRESS MYOVIEW:  Results for orders placed during the hospital encounter of 02/18/25    Stress Test With Myocardial Perfusion One Day    Interpretation Summary    Myocardial perfusion imaging indicates a normal myocardial perfusion study with no evidence of ischemia. Impressions are consistent with a low risk study.    Left ventricular ejection fraction is normal (Calculated EF = 64%).       CARDIAC CATHETERIZATION:  Results for orders placed during the hospital encounter of 03/27/25    Cardiac Catheterization/Vascular Study (Needs Review)       OTHER:         Assessment & Plan   Ventricular tachycardia  Patient presented with sustained ventricular tachycardia and did not get better with amiodarone or IV lidocaine and hence patient was cardioverted and since then has been in a paced rhythm  Patient is ruled out for MI by EKG and enzymes  Patient had an  echocardiogram which showed EF of 45 to 50%  Patient also had a cardiac catheterization which showed no coronary artery disease  Electrophysiology seeing the patient will need ICD but he already has a pacemaker should be upgraded.    PFO  Patient had been recently diagnosed with a PFO and hence he was placed on Eliquis and will restart it after the cardiac intervention is complete.    Diabetes  Patient is on sliding scale insulin    Sick sinus syndrome status post pacemaker placement  Patient had complete heart block now with sustained ventricular tachycardia and hence he is having workup done to rule out sarcoidosis and Lyme disease  Patient will need a upgrade of his pacemaker to ICD.    Hypertension  Patient blood pressure currently stable on beta-blockers    Hyperlipidemia  Patient on statins and the lipid levels are followed by the primary care doctor.    I discussed the patients findings and my recommendations with patient and nurse    Win Reaves MD  03/31/25  10:27 EDT

## 2025-04-01 ENCOUNTER — APPOINTMENT (OUTPATIENT)
Dept: GENERAL RADIOLOGY | Facility: HOSPITAL | Age: 60
End: 2025-04-01
Payer: COMMERCIAL

## 2025-04-01 ENCOUNTER — ANESTHESIA (OUTPATIENT)
Dept: CARDIOLOGY | Facility: HOSPITAL | Age: 60
End: 2025-04-01
Payer: COMMERCIAL

## 2025-04-01 PROBLEM — I48.0 PAF (PAROXYSMAL ATRIAL FIBRILLATION): Status: ACTIVE | Noted: 2025-04-01

## 2025-04-01 LAB
ALBUMIN SERPL-MCNC: 4.1 G/DL (ref 3.5–5.2)
ALBUMIN/GLOB SERPL: 1.1 G/DL
ALP SERPL-CCNC: 110 U/L (ref 39–117)
ALT SERPL W P-5'-P-CCNC: 23 U/L (ref 1–41)
ANION GAP SERPL CALCULATED.3IONS-SCNC: 12.6 MMOL/L (ref 5–15)
AST SERPL-CCNC: 29 U/L (ref 1–40)
B BURGDOR IGG+IGM SER QL IA: NEGATIVE
BASOPHILS # BLD AUTO: 0.09 10*3/MM3 (ref 0–0.2)
BASOPHILS NFR BLD AUTO: 0.9 % (ref 0–1.5)
BILIRUB SERPL-MCNC: 0.5 MG/DL (ref 0–1.2)
BUN SERPL-MCNC: 20 MG/DL (ref 6–20)
BUN/CREAT SERPL: 22.2 (ref 7–25)
CALCIUM SPEC-SCNC: 9.3 MG/DL (ref 8.6–10.5)
CHLORIDE SERPL-SCNC: 101 MMOL/L (ref 98–107)
CO2 SERPL-SCNC: 23.4 MMOL/L (ref 22–29)
CREAT SERPL-MCNC: 0.9 MG/DL (ref 0.76–1.27)
DEPRECATED RDW RBC AUTO: 42.3 FL (ref 37–54)
EGFRCR SERPLBLD CKD-EPI 2021: 98.4 ML/MIN/1.73
EOSINOPHIL # BLD AUTO: 0.7 10*3/MM3 (ref 0–0.4)
EOSINOPHIL NFR BLD AUTO: 7 % (ref 0.3–6.2)
ERYTHROCYTE [DISTWIDTH] IN BLOOD BY AUTOMATED COUNT: 13.2 % (ref 12.3–15.4)
GLOBULIN UR ELPH-MCNC: 3.9 GM/DL
GLUCOSE BLDC GLUCOMTR-MCNC: 129 MG/DL (ref 70–105)
GLUCOSE BLDC GLUCOMTR-MCNC: 130 MG/DL (ref 70–105)
GLUCOSE BLDC GLUCOMTR-MCNC: 140 MG/DL (ref 70–105)
GLUCOSE SERPL-MCNC: 125 MG/DL (ref 65–99)
HCT VFR BLD AUTO: 48.1 % (ref 37.5–51)
HGB BLD-MCNC: 15.6 G/DL (ref 13–17.7)
IMM GRANULOCYTES # BLD AUTO: 0.06 10*3/MM3 (ref 0–0.05)
IMM GRANULOCYTES NFR BLD AUTO: 0.6 % (ref 0–0.5)
LYMPHOCYTES # BLD AUTO: 1.9 10*3/MM3 (ref 0.7–3.1)
LYMPHOCYTES NFR BLD AUTO: 19.1 % (ref 19.6–45.3)
MAGNESIUM SERPL-MCNC: 2.2 MG/DL (ref 1.6–2.6)
MCH RBC QN AUTO: 28.6 PG (ref 26.6–33)
MCHC RBC AUTO-ENTMCNC: 32.4 G/DL (ref 31.5–35.7)
MCV RBC AUTO: 88.3 FL (ref 79–97)
MONOCYTES # BLD AUTO: 0.81 10*3/MM3 (ref 0.1–0.9)
MONOCYTES NFR BLD AUTO: 8.1 % (ref 5–12)
NEUTROPHILS NFR BLD AUTO: 6.39 10*3/MM3 (ref 1.7–7)
NEUTROPHILS NFR BLD AUTO: 64.3 % (ref 42.7–76)
NRBC BLD AUTO-RTO: 0 /100 WBC (ref 0–0.2)
PHOSPHATE SERPL-MCNC: 4.2 MG/DL (ref 2.5–4.5)
PLATELET # BLD AUTO: 238 10*3/MM3 (ref 140–450)
PMV BLD AUTO: 9.2 FL (ref 6–12)
POTASSIUM SERPL-SCNC: 4.4 MMOL/L (ref 3.5–5.2)
PROT SERPL-MCNC: 8 G/DL (ref 6–8.5)
RBC # BLD AUTO: 5.45 10*6/MM3 (ref 4.14–5.8)
SODIUM SERPL-SCNC: 137 MMOL/L (ref 136–145)
WBC NRBC COR # BLD AUTO: 9.95 10*3/MM3 (ref 3.4–10.8)

## 2025-04-01 PROCEDURE — 99232 SBSQ HOSP IP/OBS MODERATE 35: CPT

## 2025-04-01 PROCEDURE — 33233 REMOVAL OF PM GENERATOR: CPT | Performed by: INTERNAL MEDICINE

## 2025-04-01 PROCEDURE — C1882 AICD, OTHER THAN SING/DUAL: HCPCS | Performed by: INTERNAL MEDICINE

## 2025-04-01 PROCEDURE — 3E0102A INTRODUCTION OF ANTI-INFECTIVE ENVELOPE INTO SUBCUTANEOUS TISSUE, OPEN APPROACH: ICD-10-PCS | Performed by: INTERNAL MEDICINE

## 2025-04-01 PROCEDURE — 33249 INSJ/RPLCMT DEFIB W/LEAD(S): CPT | Performed by: INTERNAL MEDICINE

## 2025-04-01 PROCEDURE — 85025 COMPLETE CBC W/AUTO DIFF WBC: CPT | Performed by: INTERNAL MEDICINE

## 2025-04-01 PROCEDURE — 80053 COMPREHEN METABOLIC PANEL: CPT | Performed by: INTERNAL MEDICINE

## 2025-04-01 PROCEDURE — 25010000002 FENTANYL CITRATE (PF) 100 MCG/2ML SOLUTION: Performed by: NURSE ANESTHETIST, CERTIFIED REGISTERED

## 2025-04-01 PROCEDURE — 33234 REMOVAL OF PACEMAKER SYSTEM: CPT | Performed by: INTERNAL MEDICINE

## 2025-04-01 PROCEDURE — C1777 LEAD, AICD, ENDO SINGLE COIL: HCPCS | Performed by: INTERNAL MEDICINE

## 2025-04-01 PROCEDURE — 25010000002 PROPOFOL 1000 MG/100ML EMULSION: Performed by: NURSE ANESTHETIST, CERTIFIED REGISTERED

## 2025-04-01 PROCEDURE — C1894 INTRO/SHEATH, NON-LASER: HCPCS | Performed by: INTERNAL MEDICINE

## 2025-04-01 PROCEDURE — 0JH609Z INSERTION OF CARDIAC RESYNCHRONIZATION DEFIBRILLATOR PULSE GENERATOR INTO CHEST SUBCUTANEOUS TISSUE AND FASCIA, OPEN APPROACH: ICD-10-PCS | Performed by: INTERNAL MEDICINE

## 2025-04-01 PROCEDURE — 0JPT0PZ REMOVAL OF CARDIAC RHYTHM RELATED DEVICE FROM TRUNK SUBCUTANEOUS TISSUE AND FASCIA, OPEN APPROACH: ICD-10-PCS | Performed by: INTERNAL MEDICINE

## 2025-04-01 PROCEDURE — 25010000002 LIDOCAINE PF 2% 2 % SOLUTION: Performed by: NURSE ANESTHETIST, CERTIFIED REGISTERED

## 2025-04-01 PROCEDURE — 71045 X-RAY EXAM CHEST 1 VIEW: CPT

## 2025-04-01 PROCEDURE — 82948 REAGENT STRIP/BLOOD GLUCOSE: CPT

## 2025-04-01 PROCEDURE — 83735 ASSAY OF MAGNESIUM: CPT | Performed by: INTERNAL MEDICINE

## 2025-04-01 PROCEDURE — 02HK3KZ INSERTION OF DEFIBRILLATOR LEAD INTO RIGHT VENTRICLE, PERCUTANEOUS APPROACH: ICD-10-PCS | Performed by: INTERNAL MEDICINE

## 2025-04-01 PROCEDURE — 25010000002 VANCOMYCIN 1 G RECONSTITUTED SOLUTION: Performed by: NURSE ANESTHETIST, CERTIFIED REGISTERED

## 2025-04-01 PROCEDURE — 84100 ASSAY OF PHOSPHORUS: CPT | Performed by: INTERNAL MEDICINE

## 2025-04-01 PROCEDURE — 25010000002 LIDOCAINE 1 % SOLUTION: Performed by: INTERNAL MEDICINE

## 2025-04-01 PROCEDURE — 25010000002 ONDANSETRON PER 1 MG: Performed by: NURSE ANESTHETIST, CERTIFIED REGISTERED

## 2025-04-01 PROCEDURE — 25810000003 SODIUM CHLORIDE 0.9 % SOLUTION: Performed by: NURSE ANESTHETIST, CERTIFIED REGISTERED

## 2025-04-01 PROCEDURE — 02PA3MZ REMOVAL OF CARDIAC LEAD FROM HEART, PERCUTANEOUS APPROACH: ICD-10-PCS | Performed by: INTERNAL MEDICINE

## 2025-04-01 PROCEDURE — C1889 IMPLANT/INSERT DEVICE, NOC: HCPCS | Performed by: INTERNAL MEDICINE

## 2025-04-01 DEVICE — CARDIAC RESYNCHRONIZATION THERAPY DEFIBRILLATOR
Type: IMPLANTABLE DEVICE | Site: CHEST | Status: FUNCTIONAL
Brand: RESONATE™ HF CRT-D

## 2025-04-01 DEVICE — ENV PM AIGISRX ANTIBAC RESORB 2.9X3.3IN LG: Type: IMPLANTABLE DEVICE | Site: CHEST | Status: FUNCTIONAL

## 2025-04-01 DEVICE — INTEGRATED BIPOLAR PACE/SENSE AND DEFIBRILLATION LEAD
Type: IMPLANTABLE DEVICE | Site: HEART | Status: FUNCTIONAL
Brand: RELIANCE 4-FRONT™

## 2025-04-01 RX ORDER — HYDROCODONE BITARTRATE AND ACETAMINOPHEN 5; 325 MG/1; MG/1
1 TABLET ORAL EVERY 6 HOURS PRN
Status: DISCONTINUED | OUTPATIENT
Start: 2025-04-01 | End: 2025-04-02 | Stop reason: HOSPADM

## 2025-04-01 RX ORDER — ONDANSETRON 2 MG/ML
4 INJECTION INTRAMUSCULAR; INTRAVENOUS ONCE AS NEEDED
Status: DISCONTINUED | OUTPATIENT
Start: 2025-04-01 | End: 2025-04-02 | Stop reason: HOSPADM

## 2025-04-01 RX ORDER — METOPROLOL TARTRATE 1 MG/ML
INJECTION, SOLUTION INTRAVENOUS AS NEEDED
Status: DISCONTINUED | OUTPATIENT
Start: 2025-04-01 | End: 2025-04-01 | Stop reason: SURG

## 2025-04-01 RX ORDER — LABETALOL HYDROCHLORIDE 5 MG/ML
5 INJECTION, SOLUTION INTRAVENOUS
Status: DISCONTINUED | OUTPATIENT
Start: 2025-04-01 | End: 2025-04-02 | Stop reason: HOSPADM

## 2025-04-01 RX ORDER — DIPHENHYDRAMINE HYDROCHLORIDE 50 MG/ML
12.5 INJECTION, SOLUTION INTRAMUSCULAR; INTRAVENOUS ONCE AS NEEDED
Status: DISCONTINUED | OUTPATIENT
Start: 2025-04-01 | End: 2025-04-02 | Stop reason: HOSPADM

## 2025-04-01 RX ORDER — ACETAMINOPHEN 160 MG/5ML
650 SOLUTION ORAL EVERY 8 HOURS
Status: DISCONTINUED | OUTPATIENT
Start: 2025-04-01 | End: 2025-04-02 | Stop reason: HOSPADM

## 2025-04-01 RX ORDER — ONDANSETRON 2 MG/ML
INJECTION INTRAMUSCULAR; INTRAVENOUS AS NEEDED
Status: DISCONTINUED | OUTPATIENT
Start: 2025-04-01 | End: 2025-04-01 | Stop reason: SURG

## 2025-04-01 RX ORDER — EPHEDRINE SULFATE 5 MG/ML
5 INJECTION INTRAVENOUS ONCE AS NEEDED
Status: DISCONTINUED | OUTPATIENT
Start: 2025-04-01 | End: 2025-04-02 | Stop reason: HOSPADM

## 2025-04-01 RX ORDER — SODIUM CHLORIDE 9 MG/ML
9 INJECTION, SOLUTION INTRAVENOUS CONTINUOUS PRN
Status: DISPENSED | OUTPATIENT
Start: 2025-04-01 | End: 2025-04-02

## 2025-04-01 RX ORDER — METOPROLOL SUCCINATE 50 MG/1
100 TABLET, EXTENDED RELEASE ORAL
Status: DISCONTINUED | OUTPATIENT
Start: 2025-04-02 | End: 2025-04-02 | Stop reason: HOSPADM

## 2025-04-01 RX ORDER — LIDOCAINE HYDROCHLORIDE 20 MG/ML
INJECTION, SOLUTION EPIDURAL; INFILTRATION; INTRACAUDAL; PERINEURAL AS NEEDED
Status: DISCONTINUED | OUTPATIENT
Start: 2025-04-01 | End: 2025-04-01 | Stop reason: SURG

## 2025-04-01 RX ORDER — LIDOCAINE HYDROCHLORIDE 10 MG/ML
INJECTION, SOLUTION INFILTRATION; PERINEURAL
Status: DISCONTINUED | OUTPATIENT
Start: 2025-04-01 | End: 2025-04-01 | Stop reason: HOSPADM

## 2025-04-01 RX ORDER — ACETAMINOPHEN 325 MG/1
650 TABLET ORAL EVERY 8 HOURS
Status: DISCONTINUED | OUTPATIENT
Start: 2025-04-01 | End: 2025-04-02 | Stop reason: HOSPADM

## 2025-04-01 RX ORDER — ACETAMINOPHEN 650 MG/1
650 SUPPOSITORY RECTAL EVERY 8 HOURS
Status: DISCONTINUED | OUTPATIENT
Start: 2025-04-01 | End: 2025-04-02 | Stop reason: HOSPADM

## 2025-04-01 RX ORDER — NOREPINEPHRINE BITARTRATE 0.03 MG/ML
INJECTION, SOLUTION INTRAVENOUS CONTINUOUS PRN
Status: DISCONTINUED | OUTPATIENT
Start: 2025-04-01 | End: 2025-04-01 | Stop reason: SURG

## 2025-04-01 RX ORDER — SODIUM CHLORIDE 0.9 % (FLUSH) 0.9 %
10 SYRINGE (ML) INJECTION AS NEEDED
Status: DISCONTINUED | OUTPATIENT
Start: 2025-04-01 | End: 2025-04-01 | Stop reason: HOSPADM

## 2025-04-01 RX ORDER — SODIUM CHLORIDE 0.9 % (FLUSH) 0.9 %
10 SYRINGE (ML) INJECTION EVERY 12 HOURS SCHEDULED
Status: DISCONTINUED | OUTPATIENT
Start: 2025-04-01 | End: 2025-04-01 | Stop reason: HOSPADM

## 2025-04-01 RX ORDER — PROPOFOL 10 MG/ML
INJECTION, EMULSION INTRAVENOUS AS NEEDED
Status: DISCONTINUED | OUTPATIENT
Start: 2025-04-01 | End: 2025-04-01 | Stop reason: SURG

## 2025-04-01 RX ORDER — IPRATROPIUM BROMIDE AND ALBUTEROL SULFATE 2.5; .5 MG/3ML; MG/3ML
3 SOLUTION RESPIRATORY (INHALATION) ONCE AS NEEDED
Status: DISCONTINUED | OUTPATIENT
Start: 2025-04-01 | End: 2025-04-02 | Stop reason: HOSPADM

## 2025-04-01 RX ORDER — OXYCODONE HYDROCHLORIDE 5 MG/1
10 TABLET ORAL EVERY 4 HOURS PRN
Status: DISCONTINUED | OUTPATIENT
Start: 2025-04-01 | End: 2025-04-02 | Stop reason: HOSPADM

## 2025-04-01 RX ORDER — FLUMAZENIL 0.1 MG/ML
0.2 INJECTION INTRAVENOUS AS NEEDED
Status: ACTIVE | OUTPATIENT
Start: 2025-04-01 | End: 2025-04-02

## 2025-04-01 RX ORDER — OXYCODONE HYDROCHLORIDE 5 MG/1
5 TABLET ORAL ONCE AS NEEDED
Status: COMPLETED | OUTPATIENT
Start: 2025-04-01 | End: 2025-04-01

## 2025-04-01 RX ORDER — SODIUM CHLORIDE 9 MG/ML
INJECTION, SOLUTION INTRAVENOUS CONTINUOUS PRN
Status: DISCONTINUED | OUTPATIENT
Start: 2025-04-01 | End: 2025-04-01 | Stop reason: SURG

## 2025-04-01 RX ORDER — DIPHENHYDRAMINE HYDROCHLORIDE 50 MG/ML
12.5 INJECTION, SOLUTION INTRAMUSCULAR; INTRAVENOUS
Status: DISCONTINUED | OUTPATIENT
Start: 2025-04-01 | End: 2025-04-02 | Stop reason: HOSPADM

## 2025-04-01 RX ORDER — VANCOMYCIN HYDROCHLORIDE 1 G/20ML
INJECTION, POWDER, LYOPHILIZED, FOR SOLUTION INTRAVENOUS AS NEEDED
Status: DISCONTINUED | OUTPATIENT
Start: 2025-04-01 | End: 2025-04-01 | Stop reason: SURG

## 2025-04-01 RX ORDER — FENTANYL CITRATE 50 UG/ML
INJECTION, SOLUTION INTRAMUSCULAR; INTRAVENOUS AS NEEDED
Status: DISCONTINUED | OUTPATIENT
Start: 2025-04-01 | End: 2025-04-01 | Stop reason: SURG

## 2025-04-01 RX ORDER — FENTANYL CITRATE 50 UG/ML
50 INJECTION, SOLUTION INTRAMUSCULAR; INTRAVENOUS
Status: DISCONTINUED | OUTPATIENT
Start: 2025-04-01 | End: 2025-04-02 | Stop reason: HOSPADM

## 2025-04-01 RX ORDER — HYDRALAZINE HYDROCHLORIDE 20 MG/ML
5 INJECTION INTRAMUSCULAR; INTRAVENOUS
Status: DISCONTINUED | OUTPATIENT
Start: 2025-04-01 | End: 2025-04-02 | Stop reason: HOSPADM

## 2025-04-01 RX ORDER — NALOXONE HCL 0.4 MG/ML
0.4 VIAL (ML) INJECTION AS NEEDED
Status: ACTIVE | OUTPATIENT
Start: 2025-04-01 | End: 2025-04-02

## 2025-04-01 RX ADMIN — VANCOMYCIN HYDROCHLORIDE 1 G: 1 INJECTION, POWDER, LYOPHILIZED, FOR SOLUTION INTRAVENOUS at 08:33

## 2025-04-01 RX ADMIN — Medication 10 ML: at 20:36

## 2025-04-01 RX ADMIN — PANTOPRAZOLE SODIUM 40 MG: 40 TABLET, DELAYED RELEASE ORAL at 20:35

## 2025-04-01 RX ADMIN — Medication 10 ML: at 11:40

## 2025-04-01 RX ADMIN — PROPOFOL 70 MG: 10 INJECTION, EMULSION INTRAVENOUS at 08:18

## 2025-04-01 RX ADMIN — PROPOFOL 30 MG: 10 INJECTION, EMULSION INTRAVENOUS at 08:20

## 2025-04-01 RX ADMIN — PANTOPRAZOLE SODIUM 40 MG: 40 TABLET, DELAYED RELEASE ORAL at 11:39

## 2025-04-01 RX ADMIN — EMPAGLIFLOZIN 25 MG: 25 TABLET, FILM COATED ORAL at 11:39

## 2025-04-01 RX ADMIN — LOSARTAN POTASSIUM 50 MG: 50 TABLET, FILM COATED ORAL at 20:35

## 2025-04-01 RX ADMIN — Medication 10 ML: at 20:35

## 2025-04-01 RX ADMIN — ROSUVASTATIN CALCIUM 10 MG: 10 TABLET, FILM COATED ORAL at 20:35

## 2025-04-01 RX ADMIN — ACETAMINOPHEN 650 MG: 325 TABLET, FILM COATED ORAL at 20:35

## 2025-04-01 RX ADMIN — ONDANSETRON 8 MG: 2 INJECTION, SOLUTION INTRAMUSCULAR; INTRAVENOUS at 09:40

## 2025-04-01 RX ADMIN — FENTANYL CITRATE 50 MCG: 50 INJECTION, SOLUTION INTRAMUSCULAR; INTRAVENOUS at 09:08

## 2025-04-01 RX ADMIN — SODIUM CHLORIDE: 9 INJECTION, SOLUTION INTRAVENOUS at 08:12

## 2025-04-01 RX ADMIN — FENTANYL CITRATE 50 MCG: 50 INJECTION, SOLUTION INTRAMUSCULAR; INTRAVENOUS at 08:17

## 2025-04-01 RX ADMIN — OXYCODONE HYDROCHLORIDE 5 MG: 5 TABLET ORAL at 22:18

## 2025-04-01 RX ADMIN — CETIRIZINE HYDROCHLORIDE 10 MG: 10 TABLET, FILM COATED ORAL at 11:39

## 2025-04-01 RX ADMIN — METOPROLOL TARTRATE 5 MG: 5 INJECTION INTRAVENOUS at 09:25

## 2025-04-01 RX ADMIN — Medication 0.02 MCG/KG/MIN: at 08:25

## 2025-04-01 RX ADMIN — METOPROLOL SUCCINATE 50 MG: 50 TABLET, EXTENDED RELEASE ORAL at 11:41

## 2025-04-01 RX ADMIN — LIDOCAINE HYDROCHLORIDE 100 MG: 20 INJECTION, SOLUTION EPIDURAL; INFILTRATION; INTRACAUDAL; PERINEURAL at 08:17

## 2025-04-01 NOTE — PLAN OF CARE
Pt had device upgrade today. Dressing clean and intact. No complaints of pain. On room air. Wife at bedside. Pt wearing sling. Bedrest tomorrow til 6am per order                            Problem: Adult Inpatient Plan of Care  Goal: Plan of Care Review  Outcome: Progressing  Goal: Patient-Specific Goal (Individualized)  Outcome: Progressing  Goal: Absence of Hospital-Acquired Illness or Injury  Outcome: Progressing  Intervention: Identify and Manage Fall Risk  Goal: Optimal Comfort and Wellbeing  Outcome: Progressing  Goal: Readiness for Transition of Care  Outcome: Progressing     Problem: Wound  Goal: Optimal Coping  Outcome: Progressing  Goal: Optimal Functional Ability  Outcome: Progressing  Goal: Absence of Infection Signs and Symptoms  Outcome: Progressing  Goal: Improved Oral Intake  Outcome: Progressing  Goal: Optimal Pain Control and Function  Outcome: Progressing  Goal: Skin Health and Integrity  Outcome: Progressing  Goal: Optimal Wound Healing  Outcome: Progressing     Problem: Noninvasive Ventilation Acute  Goal: Effective Unassisted Ventilation and Oxygenation  Outcome: Progressing     Problem: Fall Injury Risk  Goal: Absence of Fall and Fall-Related Injury  Outcome: Progressing  Intervention: Promote Injury-Free Environment   Goal Outcome Evaluation:

## 2025-04-01 NOTE — PAYOR COMM NOTE
"This is a clinical update for Ismael Pulido  Reference/Auth # [id number]     EXTENDED AUTHORIZATION PENDING:     Please call or fax determination to contact below.   Thank you.    Paradise Ritter, RN, BSN  Utilization Review Nurse  [facility]  Direct & confidential phone # [phone]  Fax # [phone]      Ismael Pulido (59 y.o. Male)       Date of Birth   1965    Social Security Number       Address   [address] [facility] [place] [address]    Home Phone   [phone]    MRN   [id number]       Rastafari   Hoahaoism    Marital Status                               Admission Date   3/27/2025    Admission Type   Emergency    Admitting Provider   Kristian Thurston MD    Attending Provider   Nils Brewster MD    Department, Room/Bed   [facility] 2D, 259/1       Discharge Date       Discharge Disposition       Discharge Destination                                 Attending Provider: Nils Brewster MD    Allergies: Januvia [Sitagliptin], Lisinopril    Isolation: None   Infection: None   Code Status: CPR    Ht: 172.7 cm (68\")   Wt: 101 kg (223 lb 12.3 oz)    Admission Cmt: None   Principal Problem: Wide-complex tachycardia [R00.0]                   Active Insurance as of 3/27/2025       Primary Coverage       Payor Plan Insurance Group Employer/Plan Group    [employer]CIAL AETNA [id number]       Payor Plan Address Payor Plan Phone Number Payor Plan Fax Number Effective Dates    [address] [phone]  3/25/2021 - None Entered    [facility] [address]         Subscriber Name Subscriber Birth Date Member ID       ISMAEL PULIDO 1965 [id number]                     Emergency Contacts        (Rel.) Home Phone Work Phone Mobile Phone    KELL PULIDO (Spouse) [phone] -- [phone]              Vital Signs (last day)       Date/Time Temp Temp src Pulse Resp BP Patient Position SpO2    04/01/25 1100 -- " Oral 80 18 105/77 Lying 93    04/01/25 1055 -- -- 92 -- 121/68 -- 92    04/01/25 1050 -- -- 89 -- 106/71 -- 91    04/01/25 1045 -- -- 90 -- 91/69 -- 88    04/01/25 1040 -- -- 90 -- 111/73 -- 90    04/01/25 1035 -- -- 91 -- 98/70 -- 91    04/01/25 1030 -- -- 90 -- 124/65 -- 90    04/01/25 1025 -- -- 84 -- 121/79 -- 91    04/01/25 1020 -- -- 90 -- 116/71 -- 91    04/01/25 1015 -- -- 90 -- 114/97 -- 90    04/01/25 1010 -- -- 93 -- 128/104 -- 93    04/01/25 1005 -- -- 93 -- 125/79 -- 93    04/01/25 0433 98 (36.7) Oral 79 20 115/76 Lying 96    03/31/25 2359 98.2 (36.8) Oral 86 15 101/62 Lying 94    03/31/25 2057 98.3 (36.8) Oral 90 13 94/50 Lying 95    03/31/25 1641 98 (36.7) Oral 87 16 110/79 Lying 91    03/31/25 1200 -- -- 92 -- 125/88 -- 92    03/31/25 1145 -- -- 87 -- 121/95 -- --    03/31/25 1130 -- -- 84 -- 104/80 -- 92    03/31/25 1126 97.7 (36.5) Oral 92 18 108/86 Lying 91    03/31/25 1115 -- -- 94 -- 103/74 -- --    03/31/25 1100 -- -- 92 -- 116/86 -- 91    03/31/25 1047 -- -- 96 -- 112/72 -- 90    03/31/25 10:20:36 -- -- 96 19 120/69 -- 94    03/31/25 10:00:54 -- -- 80 18 129/95 -- 97    03/31/25 0805 97.6 (36.4) Oral 75 16 112/89 Lying 97    03/31/25 0405 -- -- 81 16 -- -- 93    03/31/25 0032 97.3 (36.3) Oral 88 18 123/78 Lying 90          Oxygen Therapy (last day)       Date/Time SpO2 Device (Oxygen Therapy) Flow (L/min) (Oxygen Therapy) Oxygen Concentration (%) ETCO2 (mmHg)    04/01/25 1100 93 -- -- -- --    04/01/25 1055 92 -- -- -- --    04/01/25 1050 91 -- -- -- --    04/01/25 1045 88 -- -- -- --    04/01/25 1040 90 -- -- -- --    04/01/25 1035 91 -- -- -- --    04/01/25 1030 90 -- -- -- --    04/01/25 1025 91 -- -- -- --    04/01/25 1020 91 -- -- -- --    04/01/25 1015 90 -- -- -- --    04/01/25 1010 93 -- -- -- --    04/01/25 1005 93 -- -- -- --    04/01/25 1000 -- room air -- -- --    04/01/25 0433 96 CPAP -- -- --    04/01/25 0026 -- CPAP -- -- --    03/31/25 2359 94 room air -- -- --    03/31/25 2057  95 room air -- -- --    03/31/25 1641 91 -- -- -- --    03/31/25 1624 -- room air -- -- --    03/31/25 1200 92 room air -- -- --    03/31/25 1130 92 -- -- -- --    03/31/25 1126 91 room air -- -- --    03/31/25 1100 91 -- -- -- --    03/31/25 1047 90 -- -- -- --    03/31/25 10:20:36 94 -- -- -- --    03/31/25 10:01:18 -- nasal cannula 2 -- --    03/31/25 10:00:54 97 -- -- -- --    03/31/25 0805 97 room air -- -- --    03/31/25 0405 93 CPAP -- -- --    03/31/25 0032 90 CPAP -- -- --          Current Facility-Administered Medications   Medication Dose Route Frequency Provider Last Rate Last Admin    acetaminophen (TYLENOL) tablet 650 mg  650 mg Oral Q4H PRN Win Reaves MD        Or    acetaminophen (TYLENOL) suppository 650 mg  650 mg Rectal Q4H PRN Win Reaves MD        acetaminophen (TYLENOL) tablet 650 mg  650 mg Oral Q4H PRN Win Reaves MD        aluminum-magnesium hydroxide-simethicone (MAALOX MAX) 400-400-40 MG/5ML suspension 15 mL  15 mL Oral Q6H PRN Win Reaves MD        [Held by provider] apixaban (ELIQUIS) tablet 5 mg  5 mg Oral Q12H Win Reaves MD   5 mg at 03/29/25 0825    sennosides-docusate (PERICOLACE) 8.6-50 MG per tablet 2 tablet  2 tablet Oral BID PRN Win Reaves MD   2 tablet at 03/28/25 2016    And    polyethylene glycol (MIRALAX) packet 17 g  17 g Oral Daily PRN Win Reaves MD        And    bisacodyl (DULCOLAX) EC tablet 5 mg  5 mg Oral Daily PRN Win Reaves MD        And    bisacodyl (DULCOLAX) suppository 10 mg  10 mg Rectal Daily PRN Win Reaves MD        Calcium Replacement - Follow Nurse / BPA Driven Protocol   Not Applicable PRN Win Reaves MD        cetirizine (zyrTEC) tablet 10 mg  10 mg Oral Daily Win Reaves MD   10 mg at 04/01/25 1139    dextrose (D50W) (25 g/50 mL) IV injection 25 g  25 g Intravenous Q15 Min PRN Win Reaves MD        dextrose (GLUTOSE) oral gel 15 g  15 g Oral Q15 Min PRN Win Reaves MD        empagliflozin (JARDIANCE) tablet 25  mg  25 mg Oral Daily Win Reaves MD   25 mg at 04/01/25 1139    glucagon (GLUCAGEN) injection 1 mg  1 mg Intramuscular Q15 Min PRN Win Reaves MD        insulin lispro (HUMALOG/ADMELOG) injection 2-7 Units  2-7 Units Subcutaneous 4x Daily AC & at Bedtime Win Reaves MD   3 Units at 03/30/25 2123    labetalol (NORMODYNE,TRANDATE) injection 10 mg  10 mg Intravenous Q4H PRN Win Reaves MD        losartan (COZAAR) tablet 50 mg  50 mg Oral Q24H Win Reaves MD   50 mg at 03/30/25 2110    Magnesium Low Dose Replacement - Follow Nurse / BPA Driven Protocol   Not Applicable PRN Win Reaves MD        metoprolol succinate XL (TOPROL-XL) 24 hr tablet 50 mg  50 mg Oral Q24H Win Reaves MD   50 mg at 03/31/25 0805    nitroglycerin (NITROSTAT) SL tablet 0.4 mg  0.4 mg Sublingual Q5 Min PRN Win Reaves MD        ondansetron ODT (ZOFRAN-ODT) disintegrating tablet 4 mg  4 mg Oral Q6H PRN Win Reaves MD        Or    ondansetron (ZOFRAN) injection 4 mg  4 mg Intravenous Q6H PRN Win Reaves MD        pantoprazole (PROTONIX) EC tablet 40 mg  40 mg Oral BID Win Reaves MD   40 mg at 04/01/25 1139    Phosphorus Replacement - Follow Nurse / BPA Driven Protocol   Not Applicable PRN Win Reaves MD        Potassium Replacement - Follow Nurse / BPA Driven Protocol   Not Applicable PRN Win Reaves MD        rosuvastatin (CRESTOR) tablet 10 mg  10 mg Oral Nightly Win Reaves MD   10 mg at 03/31/25 2213    sodium chloride 0.9 % flush 10 mL  10 mL Intravenous PRN Win Reaves MD        sodium chloride 0.9 % flush 10 mL  10 mL Intravenous Q12H Win Reaves MD   10 mL at 03/31/25 2214    sodium chloride 0.9 % flush 10 mL  10 mL Intravenous PRN Win Reaves MD        sodium chloride 0.9 % flush 10 mL  10 mL Intravenous Q12H Win Reaves MD   10 mL at 04/01/25 1140    sodium chloride 0.9 % flush 10 mL  10 mL Intravenous PRN Win Reaves MD        sodium chloride 0.9 % infusion 40 mL  40 mL Intravenous  PRN Win Reaves MD        sodium chloride 0.9 % infusion 40 mL  40 mL Intravenous PRN Win Reaves MD        sodium chloride 0.9 % infusion  9 mL/hr Intravenous Continuous PRN Joesph Okeefe MD         Lab Results (last 24 hours)       Procedure Component Value Units Date/Time    Magnesium [[id number]]  (Normal) Collected: 04/01/25 0524    Specimen: Blood from Hand, Left Updated: 04/01/25 0612     Magnesium 2.2 mg/dL     Phosphorus [[id number]]  (Normal) Collected: 04/01/25 0524    Specimen: Blood from Hand, Left Updated: 04/01/25 0612     Phosphorus 4.2 mg/dL     Comprehensive Metabolic Panel [[id number]]  (Abnormal) Collected: 04/01/25 0524    Specimen: Blood from Hand, Left Updated: 04/01/25 0612     Glucose 125 mg/dL      BUN 20 mg/dL      Creatinine 0.90 mg/dL      Sodium 137 mmol/L      Potassium 4.4 mmol/L      Chloride 101 mmol/L      CO2 23.4 mmol/L      Calcium 9.3 mg/dL      Total Protein 8.0 g/dL      Albumin 4.1 g/dL      ALT (SGPT) 23 U/L      AST (SGOT) 29 U/L      Alkaline Phosphatase 110 U/L      Total Bilirubin 0.5 mg/dL      Globulin 3.9 gm/dL      A/G Ratio 1.1 g/dL      BUN/Creatinine Ratio 22.2     Anion Gap 12.6 mmol/L      eGFR 98.4 mL/min/1.73     Narrative:      GFR Categories in Chronic Kidney Disease (CKD)      GFR Category          GFR (mL/min/1.73)    Interpretation  G1                     90 or greater         Normal or high (1)  G2                      60-89                Mild decrease (1)  G3a                   45-59                Mild to moderate decrease  G3b                   30-44                Moderate to severe decrease  G4                    15-29                Severe decrease  G5                    14 or less           Kidney failure          (1)In the absence of evidence of kidney disease, neither GFR category G1 or G2 fulfill the criteria for CKD.    eGFR calculation 2021 CKD-EPI creatinine equation, which does not include race as a factor    CBC & Differential  [[id number]]  (Abnormal) Collected: 04/01/25 0524    Specimen: Blood from Hand, Left Updated: 04/01/25 0547    Narrative:      The following orders were created for panel order CBC & Differential.  Procedure                               Abnormality         Status                     ---------                               -----------         ------                     CBC Auto Differential[[id number]]        Abnormal            Final result                 Please view results for these tests on the individual orders.    CBC Auto Differential [[id number]]  (Abnormal) Collected: 04/01/25 0524    Specimen: Blood from Hand, Left Updated: 04/01/25 0547     WBC 9.95 10*3/mm3      RBC 5.45 10*6/mm3      Hemoglobin 15.6 g/dL      Hematocrit 48.1 %      MCV 88.3 fL      MCH 28.6 pg      MCHC 32.4 g/dL      RDW 13.2 %      RDW-SD 42.3 fl      MPV 9.2 fL      Platelets 238 10*3/mm3      Neutrophil % 64.3 %      Lymphocyte % 19.1 %      Monocyte % 8.1 %      Eosinophil % 7.0 %      Basophil % 0.9 %      Immature Grans % 0.6 %      Neutrophils, Absolute 6.39 10*3/mm3      Lymphocytes, Absolute 1.90 10*3/mm3      Monocytes, Absolute 0.81 10*3/mm3      Eosinophils, Absolute 0.70 10*3/mm3      Basophils, Absolute 0.09 10*3/mm3      Immature Grans, Absolute 0.06 10*3/mm3      nRBC 0.0 /100 WBC     POC Glucose Once [[id number]]  (Normal) Collected: 03/31/25 2056    Specimen: Blood Updated: 03/31/25 2057     Glucose 103 mg/dL      Comment: Serial Number: [id number]:  [id number]       POC Glucose Once [[id number]]  (Abnormal) Collected: 03/31/25 1640    Specimen: Blood Updated: 03/31/25 1643     Glucose 131 mg/dL      Comment: Serial Number: [id number]:  [id number]       Angiotensin Converting Enzyme [[id number]] Collected: 03/27/25 1824    Specimen: Blood Updated: 03/31/25 1611     Angiotensin Converting Enzyme 77 U/L     Narrative:      Performed at:  [address] [place] [address]  [id number]  :  Jono Abrams PhD, Phone:  [phone]          Imaging Results (Last 24 Hours)       ** No results found for the last 24 hours. **             [facility] CATH LAB  [address] [facility] [place] [address] [phone]          [facility] CATH LAB  [address] [facility] [place] [address] [phone]           Patient Information    Patient Name  Ismael Pulido MRN  [id number] Legal Sex  Male  (Age)  1965 (59 y.o.)       EP/CRM Study    Accession Number: [id number]   Date of Study: 25   Ordering Provider: Dez Loco MD    Clinical Indications: Ventricular tachycardia [I47.20 (ICD-10-CM)], Mobitz type 2 second degree atrioventricular block [I44.1 (ICD-10-CM)], Intermittent complete heart block [I44.2 (ICD-10-CM)], Status post placement of cardiac pacemaker [Z95.0 (ICD-10-CM)]        Performing Physician  Performing Staff   Primary: Dez Loco MD     Scrub Person: Kinza Ang    Documenter: Ines Linda    Invasive Nurse: David Johnson RN    Invasive Nurse: Marcia Jaimes RN    Other: [facility] Jasmina             Anesthesiology Staff    Anesthesiologist: Joesph Okeefe MD    CRNA: Sigrid García CRNA           Procedures    Biventricular Device Upgrade-[facility]        Admission Information    Admission Date/Time Discharge Date/Time Room/Bed   25  1429  259/1       Patient Hx Of Height, Weight, and Vitals    Height Weight BSA (Calculated - sq m) BMI (Calculated) Retired BMI (kg/m2) Pulse BP                Physicians    Panel Physicians Referring Physician Case Authorizing Physician   Dez Loco MD (Primary) Dez Loco MD Garimella, Satya V, MD       Indications    Ventricular tachycardia [I47.20 (ICD-10-CM)]   Mobitz type 2 second degree atrioventricular block [I44.1 (ICD-10-CM)]   Intermittent complete heart block [I44.2 (ICD-10-CM)]   Status post placement of cardiac pacemaker [Z95.0 (ICD-10-CM)]       Pre Procedure  Diagnosis    vt         Conclusion    Procedure indication  Complete heart block with recent placement of biventricular pacemaker  Patient presented with sustained monomorphic VT highly suspicious for likely underlying sarcoidosis  Patient recommended upgrading the pacing system to biventricular ICD system for secondary prevention        Patient placed under general anesthesia by the anesthetic team.  Vancomycin before procedure Timeout before procedure complications none estimated blood loss less than 20 cc        Procedures done  #1 upgrading of biventricular pacing system to biventricular ICD system  #2 removal of old RV pacing lead  #3 implantation of a new right ventricular ICD lead  #4 explanting the previous biventricular pacemaker generator  #5 implanting a new biventricular ICD generator  #6 fluoroscopy and lead testing  #7 biventricular ICD programming        Procedure note  After obtaining valid consent patient was placed under anesthesia by anesthetic team and vancomycin given before procedure and timeout before procedure.  The old incision was infiltrated local anesthesia and incision was opened and hemostasis was acquired by cautery.  By gentle dissection pacemaker generator which was recently implanted manufactured by [employer] model [id number] was explanted  Micropuncture kit was used to access subclavian vein for placement of new ICD lead  9 Maltese venous sheath was used for placement of ICD lead and ICD lead is manufactured by [employer] model [id number].  The lead was directed to the RV apex and the lead was tested with a threshold of 0.6 V with impedance of 441 ohms and later high-voltage impedance which was tested was 70 ohms.  After the lead was adequately placed, the stylette was removed and the lead was anchored to underlying muscular area by nonabsorbable suture material.  By gentle dissection the sleeve of the old RV pacing lead was freed from the lead and the  previously placed RV pacing lead is manufactured by [employer] model [id number].  Stylette was placed and lead could be easily removed from the RV septal area  The pocket was then extended medially and inferiorly to accommodate the new device  New ICD generator manufactured by [employer] model [id number] was taken prior  The previously placed RA lead and coronary sinus lead and the newly placed RV ICD lead were attached to the new generator  Pocket was irrigated and the generator was placed in a Tyrex pouch and the generator and leads were placed in the pocket.  Incision was closed in several layers and final programming of the device attached to chart and the style dressing applied without complications  Right atrial pacing lead manufactured by [employer] model [id number] sensing is 7 mV threshold of 0.6 V with impedance of 673 ohms  Coronary sinus lead  [employer] model [id number] threshold is 0.9 V with impedance of 525 ohms        Plan  Increase metoprolol to 100 mg a day  After few weeks, patient will need cardiac MRI to evaluate for sarcoidosis and gene testing to exclude any laminin cardiomyopathy  Resume Eliquis tomorrow        Electronically signed by Dez Loco MD, 04/01/25, 10:03 AM EDT.                Procedure Narrative    Procedure narrative under conclusion tabRisks, benefits and alternatives were discussed with the patient and/or family. Plan is for monitored anesthesia care. Less than 20 mL of estimated blood loss during the case. No specimen was collected during the case.       Access Details    Micropuncture kit access of left subclavian vein         Radiation Dose Tracking    Panel Cumulative Air Kerma (mGy) Fluoro Time (min) DAP (mGy-cm2) Physician   Panel 1 154.000 4.3 [phone] Dez Loco MD       Medications  As of 04/01/25 0958  (Filter): BH CV CONTRAST GROUPER Medications Shown  None                       Generator  Removed                        Implants    ICD      Icd Resonate Hf Crt/D Enduralife - [id number] - [id number] - Implanted    Inventory item: ICD RESONATE HF CRT/D ENDURALIFE Model/Cat number: G547   Serial number: [id number] : [employer]   Lot number: [id number] Implant Date: 2025     As of 2025    Status: Implanted     Lead      Ld Defib Reliance4/Front 1coil Act 64cm - [id number] - [id number] - Implanted    Inventory item: LD DEFIB RELIANCE4/FRONT 1COIL ACT 64CM Model/Cat number: [id number]   Serial number: [id number] : [employer]   Lot number: [id number]       As of 2025    Status: Implanted     Implant      Env Pm Aigisrx Antibac Resorb 2.9x3.3in Lg - [id number] - Implanted    Inventory item: ENV PM AIGISRX ANTIBAC RESORB 2.9X3.3IN LG Model/Cat number: [id number]   : [employer] Lot number: [id number]     As of 2025    Status: Implanted             ISCV PACS Images     Show images for EP/CRM Study              Signed    Electronically signed by Dez Loco MD on 25 at 1003 EDT         Link to Procedure Log    Procedure Log       Printable Result Report    Result Report      Encounter    View Encounter               Results Routing Tracking    View Results Routing Information     Order-Level Documents:    Scan on 2025 0955 by [employer] Onbase, [place]: EP STUDY - SCAN               Order Report     Order Details       Physician Progress Notes (last 24 hours)        Brammell, Timothy Duane, MD at 25 [address] [place] [address] MEDICINE SERVICE  DAILY PROGRESS NOTE    NAME: Ismael Pulido  : 1965  MRN: [id number]      LOS: 4 days     PROVIDER OF SERVICE: Timothy Duane Brammell, MD    Chief Complaint: Wide-complex tachycardia    Subjective:     Interval History:  History taken from: patient    Complaints of chest pain or shortness of breath.  He tolerated his cardiac catheterization.  He is planned for ICD implantation and replacement of his  pacemaker.  Denies any gastric bowel or urinary symptoms.  Denies any other additional acute issues.        Review of Systems:   Review of Systems   All other systems reviewed and are negative.      Objective:     Vital Signs  Temp:  [97.1 °F (36.2 °C)-98.6 °F (37 °C)] 97.7 °F (36.5 °C)  Heart Rate:  [75-96] 92  Resp:  [15-19] 18  BP: (103-141)/(69-96) 125/88  Flow (L/min) (Oxygen Therapy):  [2] 2   Body mass index is 34.06 kg/m².    Physical Exam  Physical Exam  Vitals reviewed.   Constitutional:       General: He is not in acute distress.     Appearance: He is obese.   HENT:      Head: Normocephalic.   Cardiovascular:      Rate and Rhythm: Normal rate and regular rhythm.   Pulmonary:      Effort: Pulmonary effort is normal.      Breath sounds: Normal breath sounds.   Abdominal:      General: Bowel sounds are normal.      Palpations: Abdomen is soft.      Tenderness: There is no abdominal tenderness.   Musculoskeletal:         General: No swelling.   Neurological:      Mental Status: He is alert. Mental status is at baseline.            Diagnostic Data    Results from last 7 days   Lab Units 03/31/25  0035   WBC 10*3/mm3 9.57   HEMOGLOBIN g/dL 15.8   HEMATOCRIT % 47.9   PLATELETS 10*3/mm3 228   GLUCOSE mg/dL 120*   CREATININE mg/dL 0.89   BUN mg/dL 17   SODIUM mmol/L 138   POTASSIUM mmol/L 4.1   AST (SGOT) U/L 27   ALT (SGPT) U/L 33   ALK PHOS U/L 109   BILIRUBIN mg/dL 0.3   ANION GAP mmol/L 14.3       No radiology results for the last day          Assessment:  Wide-complex tachycardia with concern over V. tach status post cardioversion  PFO  Type 2 diabetes  Obstructive sleep apnea       Plan.  Cardiology with planned ICD implantation.  Anticoagulant on hold at present.      Active and Resolved Problems  Active Hospital Problems    Diagnosis  POA    **Wide-complex tachycardia [R00.0]  Yes    Ventricular tachycardia [I47.20]  Unknown    Status post placement of cardiac pacemaker [Z95.0]  Unknown    Intermittent  complete heart block [I44.2]  Unknown    Mobitz type 2 second degree atrioventricular block [I44.1]  Unknown      Resolved Hospital Problems   No resolved problems to display.           VTE Prophylaxis:  Pharmacologic VTE prophylaxis orders are present.             Disposition Planning:     Barriers to Discharge:cardiology clearance  Anticipated Date of Discharge: 4/2  Place of Discharge: home      Time: 30 minutes     Code Status and Medical Interventions: CPR (Attempt to Resuscitate); Full Support   Ordered at: 03/27/25 1547     Code Status (Patient has no pulse and is not breathing):    CPR (Attempt to Resuscitate)     Medical Interventions (Patient has pulse or is breathing):    Full Support     Level Of Support Discussed With:    Patient       Signature: Electronically signed by Timothy Duane Brammell, MD, 03/31/25, 15:06 EDT.  [facility] Hospitalist Team     Electronically signed by Brammell, Timothy Duane, MD at 03/31/25 1509       Consult Notes (last 24 hours)  Notes from 03/31/25 1140 through 04/01/25 1140   No notes of this type exist for this encounter.

## 2025-04-01 NOTE — ANESTHESIA PROCEDURE NOTES
Airway  Reason: elective    Date/Time: 4/1/2025 8:22 AM  Airway not difficult    General Information and Staff    Patient location during procedure: OR  Anesthesiologist: Joesph Okeefe MD  CRNA/CAA: Margarette Marino CRNA    Indications and Patient Condition  Indications for airway management: airway protection    Preoxygenated: yes    Mask difficulty assessment: 0 - not attempted    Final Airway Details    Final airway type: supraglottic airway      Successful airway: classic  Size: 4   Number of attempts at approach: 1  Assessment: lips, teeth, and gum same as pre-op and atraumatic intubation

## 2025-04-01 NOTE — PLAN OF CARE
Problem: Adult Inpatient Plan of Care  Goal: Absence of Hospital-Acquired Illness or Injury  Intervention: Identify and Manage Fall Risk  Recent Flowsheet Documentation  Taken 4/1/2025 0026 by Escobar Faulkner RN  Safety Promotion/Fall Prevention: safety round/check completed  Taken 3/31/2025 2200 by Escobar Faulkner RN  Safety Promotion/Fall Prevention:   safety round/check completed   nonskid shoes/slippers when out of bed   fall prevention program maintained   clutter free environment maintained   assistive device/personal items within reach   room organization consistent  Taken 3/31/2025 2000 by Escobar Faulkner RN  Safety Promotion/Fall Prevention:   safety round/check completed   room organization consistent   nonskid shoes/slippers when out of bed   fall prevention program maintained   assistive device/personal items within reach   clutter free environment maintained  Intervention: Prevent Skin Injury  Recent Flowsheet Documentation  Taken 4/1/2025 0026 by Escobar Faulkner RN  Body Position: position changed independently  Taken 3/31/2025 2000 by Escobar Faulkner RN  Body Position: position changed independently  Skin Protection: transparent dressing maintained  Intervention: Prevent Infection  Recent Flowsheet Documentation  Taken 4/1/2025 0026 by Escobar Faulkner RN  Infection Prevention:   environmental surveillance performed   hand hygiene promoted   personal protective equipment utilized   rest/sleep promoted   single patient room provided     Problem: Wound  Goal: Skin Health and Integrity  Intervention: Optimize Skin Protection  Recent Flowsheet Documentation  Taken 4/1/2025 0026 by Escobar Faulkner RN  Pressure Reduction Techniques: frequent weight shift encouraged  Head of Bed (HOB) Positioning: HOB elevated  Pressure Reduction Devices: pressure-redistributing mattress utilized  Taken 3/31/2025 2000 by Escobar Faulkner RN  Pressure Reduction Techniques: frequent weight shift  encouraged  Head of Bed (HOB) Positioning: HOB elevated  Pressure Reduction Devices: pressure-redistributing mattress utilized     Problem: Fall Injury Risk  Goal: Absence of Fall and Fall-Related Injury  Intervention: Identify and Manage Contributors  Recent Flowsheet Documentation  Taken 4/1/2025 0026 by Escobar Faulkner RN  Medication Review/Management: medications reviewed  Intervention: Promote Injury-Free Environment  Recent Flowsheet Documentation  Taken 4/1/2025 0026 by Escobar Faulkner RN  Safety Promotion/Fall Prevention: safety round/check completed  Taken 3/31/2025 2200 by Escobar Faulkner RN  Safety Promotion/Fall Prevention:   safety round/check completed   nonskid shoes/slippers when out of bed   fall prevention program maintained   clutter free environment maintained   assistive device/personal items within reach   room organization consistent  Taken 3/31/2025 2000 by Escobar Faulkner RN  Safety Promotion/Fall Prevention:   safety round/check completed   room organization consistent   nonskid shoes/slippers when out of bed   fall prevention program maintained   assistive device/personal items within reach   clutter free environment maintained   Goal Outcome Evaluation:      Patient will have a pacemaker upgrade today. Patient has been pleasant calm and cooperative with care.

## 2025-04-01 NOTE — PROGRESS NOTES
Nutrition Services  Patient Name: Ismael Pulido  YOB: 1965  MRN: [id number]  Admission date: 3/27/2025    PROGRESS NOTE      Nutrition Intervention Updates: Monitor for diet advancement        Encounter Information: Progress note to check on PO intake. Pt is NPO for ICD implantation and replacement of his pacemaker.        PO Diet: NPO Diet NPO Type: Strict NPO   PO Supplements: NPO   PO Intake:  NPO       Current nutrition support: -   Nutrition support review: -       Labs (reviewed below): Reviewed. Management per MD        GI Function:  BM 3/30, pt has stool softeners ordered PRN        Brief weight review   Wt Readings from Last 10 Encounters:   04/01/25 0433 101 kg (223 lb 12.3 oz)   03/31/25 0621 102 kg (223 lb 15.8 oz)   03/30/25 0405 103 kg (226 lb 10.1 oz)   03/29/25 0400 102 kg (225 lb 1.4 oz)   03/27/25 1714 103 kg (227 lb 8.2 oz)   03/27/25 1428 104 kg (229 lb 4.5 oz)   03/25/25 1128 104 kg (230 lb 4 oz)   03/13/25 2235 103 kg (228 lb)   03/10/25 0202 102 kg (225 lb 6.4 oz)   03/09/25 2003 101 kg (222 lb)   03/06/25 1102 104 kg (230 lb)   02/28/25 0415 105 kg (230 lb 9.6 oz)   02/27/25 0345 103 kg (227 lb 1.2 oz)   02/26/25 0415 103 kg (227 lb 1.2 oz)   02/25/25 1740 103 kg (227 lb 11.8 oz)   02/25/25 1110 104 kg (229 lb 4.5 oz)   02/22/25 0500 104 kg (230 lb 6.1 oz)   02/21/25 0500 103 kg (226 lb 3.1 oz)   02/20/25 0450 105 kg (230 lb 13.2 oz)   02/19/25 0135 107 kg (235 lb 0.2 oz)   02/18/25 2036 108 kg (238 lb 15.7 oz)   02/18/25 1309 108 kg (237 lb 6 oz)   02/07/25 1047 108 kg (239 lb 2 oz)   12/30/24 1724 104 kg (230 lb)   08/09/24 1102 108 kg (239 lb)        Results from last 7 days   Lab Units 04/01/25  0524 03/31/25  0035 03/30/25  0406   SODIUM mmol/L 137 138 134*   POTASSIUM mmol/L 4.4 4.1 4.3   CHLORIDE mmol/L 101 101 99   CO2 mmol/L 23.4 22.7 21.1*   BUN mg/dL 20 17 15   CREATININE mg/dL 0.90 0.89 0.94   CALCIUM mg/dL 9.3 9.7 9.6   BILIRUBIN mg/dL 0.5 0.3 0.5   ALK PHOS  U/L 110 109 106   ALT (SGPT) U/L 23 33 36   AST (SGOT) U/L 29 27 36   GLUCOSE mg/dL 125* 120* 117*     Results from last 7 days   Lab Units 04/01/25  0524 03/31/25  0035 03/30/25  0406 03/29/25  0510 03/28/25  0517   MAGNESIUM mg/dL 2.2 2.4 2.1   < > 2.1   PHOSPHORUS mg/dL 4.2 4.4 4.2   < > 4.4   HEMOGLOBIN g/dL 15.6 15.8 15.6   < > 14.4   HEMATOCRIT % 48.1 47.9 48.2   < > 44.7   TRIGLYCERIDES mg/dL  --   --   --   --  191*    < > = values in this interval not displayed.         RD to follow up per protocol.    Electronically signed by:  Alejandra Lang RD  04/01/25 08:41 EDT

## 2025-04-01 NOTE — PROGRESS NOTES
CARDIOLOGY PROGRESS NOTE:    Ismael Pulido  59 y.o.  male  1965  [id number]      Referring Provider: Hospitalist    Reason for follow-up: Ventricular tachycardia     Patient Care Team:  Sharon Silva MD as PCP - General (Internal Medicine & Pediatrics)  Rima Salazar APRN as Nurse Practitioner (Cardiology)    Subjective .  Patient seen and examined.  Labs and chart reviewed.  Patient denies chest pain, shortness of breath, palpitations, lightheadedness/dizziness.  His only complaint is mild surgical site pain following device upgrade this morning    Objective Patient lying in bed resting comfortably on room air with family at bedside.      Review of Systems   Constitutional: Negative for malaise/fatigue.   Cardiovascular:  Negative for chest pain, dyspnea on exertion, leg swelling and palpitations.   Respiratory:  Negative for cough and shortness of breath.    Gastrointestinal:  Negative for abdominal pain, nausea and vomiting.   Neurological:  Negative for dizziness, focal weakness, headaches, light-headedness and numbness.   All other systems reviewed and are negative.      Allergies: Januvia [sitagliptin] and Lisinopril    Scheduled Meds:acetaminophen, 650 mg, Oral, Q8H   Or  acetaminophen, 650 mg, Oral, Q8H   Or  acetaminophen, 650 mg, Rectal, Q8H  [Held by provider] apixaban, 5 mg, Oral, Q12H  cetirizine, 10 mg, Oral, Daily  empagliflozin, 25 mg, Oral, Daily  insulin lispro, 2-7 Units, Subcutaneous, 4x Daily AC & at Bedtime  losartan, 50 mg, Oral, Q24H  [START ON 4/2/2025] metoprolol succinate XL, 100 mg, Oral, Q24H  pantoprazole, 40 mg, Oral, BID  rosuvastatin, 10 mg, Oral, Nightly  sodium chloride, 10 mL, Intravenous, Q12H  sodium chloride, 10 mL, Intravenous, Q12H  [START ON 4/2/2025] vancomycin, 1,000 mg, Intravenous, Once      Continuous Infusions:sodium chloride, 9 mL/hr      PRN Meds:.  acetaminophen **OR** acetaminophen    acetaminophen    aluminum-magnesium hydroxide-simethicone    " senna-docusate sodium **AND** polyethylene glycol **AND** bisacodyl **AND** bisacodyl    Calcium Replacement - Follow Nurse / BPA Driven Protocol    dextrose    dextrose    glucagon (human recombinant)    HYDROcodone-acetaminophen    labetalol    Magnesium Low Dose Replacement - Follow Nurse / BPA Driven Protocol    nitroglycerin    ondansetron ODT **OR** ondansetron    Phosphorus Replacement - Follow Nurse / BPA Driven Protocol    Potassium Replacement - Follow Nurse / BPA Driven Protocol    [COMPLETED] Insert Peripheral IV **AND** sodium chloride    sodium chloride    sodium chloride    sodium chloride    sodium chloride    sodium chloride        VITAL SIGNS  Vitals:    04/01/25 1045 04/01/25 1050 04/01/25 1055 04/01/25 1100   BP: 91/69 106/71 121/68 105/77   BP Location:    Right arm   Patient Position:    Lying   Pulse: 90 89 92 80   Resp:    18   Temp:    95.9 °F (35.5 °C)   TempSrc:    Oral   SpO2: (!) 88% 91% 92% 93%   Weight:       Height:           Flowsheet Rows      Flowsheet Row First Filed Value   Admission Height 172.7 cm (68\") Documented at 03/27/2025 1428   Admission Weight 104 kg (229 lb 4.5 oz) Documented at 03/27/2025 1428             TELEMETRY: Ventricular pacemaker rhythm    Physical Exam:  Vitals reviewed.   Constitutional:       Appearance: Not in distress. Obese.   Eyes:      General: No scleral icterus.     Conjunctiva/sclera: Conjunctivae normal.   HENT:      Head: Normocephalic and atraumatic.   Neck:      Vascular: No carotid bruit or JVD.   Pulmonary:      Effort: Pulmonary effort is normal.      Breath sounds: Normal breath sounds. No wheezing. No rales.   Cardiovascular:      Normal rate. Regular rhythm.   Pulses:     Intact distal pulses.   Abdominal:      General: Bowel sounds are normal.      Palpations: Abdomen is soft.   Musculoskeletal:      Cervical back: Normal range of motion and neck supple. Skin:     General: Skin is warm and dry.      Findings: No rash.   Neurological:     "  General: No focal deficit present.      Mental Status: Alert and oriented to person, place and time.          Results Review:   I reviewed the patient's new clinical results.  Lab Results (last 24 hours)       Procedure Component Value Units Date/Time    POC Glucose Once [[id number]]  (Abnormal) Collected: 04/01/25 1151    Specimen: Blood Updated: 04/01/25 1154     Glucose 130 mg/dL      Comment: Serial Number: [id number]:  [id number]       Magnesium [[id number]]  (Normal) Collected: 04/01/25 0524    Specimen: Blood from Hand, Left Updated: 04/01/25 0612     Magnesium 2.2 mg/dL     Phosphorus [[id number]]  (Normal) Collected: 04/01/25 0524    Specimen: Blood from Hand, Left Updated: 04/01/25 0612     Phosphorus 4.2 mg/dL     Comprehensive Metabolic Panel [[id number]]  (Abnormal) Collected: 04/01/25 0524    Specimen: Blood from Hand, Left Updated: 04/01/25 0612     Glucose 125 mg/dL      BUN 20 mg/dL      Creatinine 0.90 mg/dL      Sodium 137 mmol/L      Potassium 4.4 mmol/L      Chloride 101 mmol/L      CO2 23.4 mmol/L      Calcium 9.3 mg/dL      Total Protein 8.0 g/dL      Albumin 4.1 g/dL      ALT (SGPT) 23 U/L      AST (SGOT) 29 U/L      Alkaline Phosphatase 110 U/L      Total Bilirubin 0.5 mg/dL      Globulin 3.9 gm/dL      A/G Ratio 1.1 g/dL      BUN/Creatinine Ratio 22.2     Anion Gap 12.6 mmol/L      eGFR 98.4 mL/min/1.73     Narrative:      GFR Categories in Chronic Kidney Disease (CKD)      GFR Category          GFR (mL/min/1.73)    Interpretation  G1                     90 or greater         Normal or high (1)  G2                      60-89                Mild decrease (1)  G3a                   45-59                Mild to moderate decrease  G3b                   30-44                Moderate to severe decrease  G4                    15-29                Severe decrease  G5                    14 or less           Kidney failure          (1)In the absence of evidence of kidney disease, neither GFR  category G1 or G2 fulfill the criteria for CKD.    eGFR calculation 2021 CKD-EPI creatinine equation, which does not include race as a factor    CBC & Differential [[id number]]  (Abnormal) Collected: 04/01/25 0524    Specimen: Blood from Hand, Left Updated: 04/01/25 0547    Narrative:      The following orders were created for panel order CBC & Differential.  Procedure                               Abnormality         Status                     ---------                               -----------         ------                     CBC Auto Differential[[id number]]        Abnormal            Final result                 Please view results for these tests on the individual orders.    CBC Auto Differential [[id number]]  (Abnormal) Collected: 04/01/25 0524    Specimen: Blood from Hand, Left Updated: 04/01/25 0547     WBC 9.95 10*3/mm3      RBC 5.45 10*6/mm3      Hemoglobin 15.6 g/dL      Hematocrit 48.1 %      MCV 88.3 fL      MCH 28.6 pg      MCHC 32.4 g/dL      RDW 13.2 %      RDW-SD 42.3 fl      MPV 9.2 fL      Platelets 238 10*3/mm3      Neutrophil % 64.3 %      Lymphocyte % 19.1 %      Monocyte % 8.1 %      Eosinophil % 7.0 %      Basophil % 0.9 %      Immature Grans % 0.6 %      Neutrophils, Absolute 6.39 10*3/mm3      Lymphocytes, Absolute 1.90 10*3/mm3      Monocytes, Absolute 0.81 10*3/mm3      Eosinophils, Absolute 0.70 10*3/mm3      Basophils, Absolute 0.09 10*3/mm3      Immature Grans, Absolute 0.06 10*3/mm3      nRBC 0.0 /100 WBC     POC Glucose Once [[id number]]  (Normal) Collected: 03/31/25 2056    Specimen: Blood Updated: 03/31/25 2057     Glucose 103 mg/dL      Comment: Serial Number: [id number]:  [id number]       POC Glucose Once [[id number]]  (Abnormal) Collected: 03/31/25 1640    Specimen: Blood Updated: 03/31/25 1643     Glucose 131 mg/dL      Comment: Serial Number: [id number]:  [id number]       Angiotensin Converting Enzyme [[id number]] Collected: 03/27/25 1824    Specimen: Blood Updated:  03/31/25 1611     Angiotensin Converting Enzyme 77 U/L     Narrative:      Performed at:  01 - [facility]  [id number]  : Jono Abrams PhD, Phone:  [phone]            Imaging Results (Last 24 Hours)       ** No results found for the last 24 hours. **            EKG      I personally viewed and interpreted the patient's EKG/Telemetry data:    ECHOCARDIOGRAM:  Results for orders placed during the hospital encounter of 03/27/25    Adult Transthoracic Echo Complete W/ Cont if Necessary Per Protocol    Interpretation Summary    Left ventricular ejection fraction appears to be 46 - 50%.    Left ventricular wall thickness is consistent with mild concentric hypertrophy.    Left ventricular diastolic function is consistent with (grade I) impaired relaxation.    Moderately reduced right ventricular systolic function noted.    The right ventricular cavity is moderately dilated.    The left atrial cavity is mild to moderately dilated.       STRESS MYOVIEW:  Results for orders placed during the hospital encounter of 02/18/25    Stress Test With Myocardial Perfusion One Day    Interpretation Summary    Myocardial perfusion imaging indicates a normal myocardial perfusion study with no evidence of ischemia. Impressions are consistent with a low risk study.    Left ventricular ejection fraction is normal (Calculated EF = 64%).       CARDIAC CATHETERIZATION:  Results for orders placed during the hospital encounter of 03/27/25    Cardiac Catheterization/Vascular Study (Needs Review)       OTHER:         Assessment & Plan     Ventricular tachycardia  Patient presented with sustained ventricular tachycardia  Status post cardioversion  EP consulted   Echocardiogram with midrange LVEF of 46 to 50%, grade 1 diastolic dysfunction and moderately reduced RV function  Cardiac catheterization with coronary arteries angiographically free of disease  Status post BiV ICD upgrade   Beta  blocker  Cardiac MRI in future to evaluate for sarcoidosis in presence of normal coronaries     Sick sinus syndrome  Status post biventricular pacemaker 2/21/2025    PFO  SAWYER 3/12 with small PFO   Recent admission at [facility] for splenic infarct thought to be secondary to PFO/paradoxical emboli  Eliquis for anticoagulation, restart tomorrow     Diabetes  Jardiance  SSI  A1c 7.0%    Hypertension  Blood pressure stable  Losartan 50 mg, metoprolol succinate 50 mg    Hyperlipidemia  Statin therapy    I discussed the patients findings and my recommendations with patient and nurse    TAMIKO Briceno  04/01/25  12:17 EDT

## 2025-04-01 NOTE — ANESTHESIA POSTPROCEDURE EVALUATION
Patient: Ismael Pulido    Procedure Summary       Date: 04/01/25 Room / Location: [place] CATH LAB 3 / [facility] CATH INVASIVE LOCATION    Anesthesia Start: 0812 Anesthesia Stop: 1002    Procedure: Biventricular Device Upgrade-[place] aware (Left: Chest) Diagnosis:       Ventricular tachycardia      Mobitz type 2 second degree atrioventricular block      Intermittent complete heart block      Status post placement of cardiac pacemaker      (vt)    Providers: Dez Loco MD Provider: Joesph Okeefe MD    Anesthesia Type: general ASA Status: 3            Anesthesia Type: general    Vitals  Vitals Value Taken Time   /97 04/01/25 10:17   Temp     Pulse 90 04/01/25 10:18   Resp     SpO2 92 % 04/01/25 10:18   Vitals shown include unfiled device data.        Post Anesthesia Care and Evaluation    Patient location during evaluation: PACU  Patient participation: complete - patient participated  Level of consciousness: awake  Pain scale: See nurse's notes for pain score.  Pain management: adequate    Airway patency: patent  Anesthetic complications: No anesthetic complications  PONV Status: none  Cardiovascular status: acceptable  Respiratory status: acceptable and spontaneous ventilation  Hydration status: acceptable    Comments: Patient seen and examined postoperatively; vital signs stable; SpO2 greater than or equal to 90%; cardiopulmonary status stable; nausea/vomiting adequately controlled; pain adequately controlled; no apparent anesthesia complications; patient discharged from anesthesia care when discharge criteria were met

## 2025-04-01 NOTE — PROGRESS NOTES
[facility] MEDICINE SERVICE  DAILY PROGRESS NOTE    NAME: Ismael Pulido  : 1965  MRN: [id number]      LOS: 5 days     PROVIDER OF SERVICE: Nils Brewster MD    Chief Complaint: Wide-complex tachycardia    Subjective:     Interval History:  History taken from: patient chart family RN  To cath lab for BiV PM upgrade to BiV ICD      Review of Systems:   Review of Systems  All negative except as above  Objective:     Vital Signs  Temp:  [95.9 °F (35.5 °C)-98.3 °F (36.8 °C)] 95.9 °F (35.5 °C)  Heart Rate:  [79-93] 80  Resp:  [13-20] 18  BP: ()/() 105/77   Body mass index is 34.02 kg/m².    Physical Exam  Physical Exam  AOx3 NAD  RRR S1-S2 audible  Lungs with fair air entry  Abdomen soft nontender nondistended     Diagnostic Data    Results from last 7 days   Lab Units 25  0524   WBC 10*3/mm3 9.95   HEMOGLOBIN g/dL 15.6   HEMATOCRIT % 48.1   PLATELETS 10*3/mm3 238   GLUCOSE mg/dL 125*   CREATININE mg/dL 0.90   BUN mg/dL 20   SODIUM mmol/L 137   POTASSIUM mmol/L 4.4   AST (SGOT) U/L 29   ALT (SGPT) U/L 23   ALK PHOS U/L 110   BILIRUBIN mg/dL 0.5   ANION GAP mmol/L 12.6       No radiology results for the last day      I reviewed the patient's new clinical results.  I reviewed the patient's new imaging results and agree with the interpretation.    Assessment/Plan:     Active and Resolved Problems  Active Hospital Problems    Diagnosis  POA    **Wide-complex tachycardia [R00.0]  Yes    PAF (paroxysmal atrial fibrillation) [I48.0]  Yes    Ventricular tachycardia [I47.20]  Yes    Status post placement of cardiac pacemaker [Z95.0]  Yes    Intermittent complete heart block [I44.2]  Yes    Mobitz type 2 second degree atrioventricular block [I44.1]  Yes    Primary hypertension [I10]  Yes    Obstructive sleep apnea syndrome [G47.33]  Yes      Resolved Hospital Problems   No resolved problems to display.       #Ventricular tachycardia  #PFO  #SSS status post biventricular  pacer  #Hypertension  Status post cardioversion  Cardiology and EP following emergency room 3/27  Status post cardiac cath 3/30.  Normal coronaries  TTE with mildly reduced EF 46-50    Status post biventricular pacer upgrade to biventricular ICD  Resume Eliquis once cleared from   Telemonitoring  Monitor H&H  Currently on Toprol 100 mg daily  Currently on losartan 50 mg daily along with Jardiance 25 mg daily  Continue statins    #History of recent splenic infarct  Thought to be cardioembolic in etiology  Resume Eliquis once cleared from EP standpoint  Seen by hematology.  Reported family history of factor V Leyden mutation     #DM2  ISS lispro  Diabetic diet  A1c 7    VTE Prophylaxis:  Pharmacologic VTE prophylaxis orders are present.             Disposition Planning:     Barriers to Discharge:medical workup   Anticipated Date of Discharge: 4/2  Place of Discharge: home      Time: 45 minutes     Code Status and Medical Interventions: CPR (Attempt to Resuscitate); Full Support   Ordered at: 03/27/25 1547     Code Status (Patient has no pulse and is not breathing):    CPR (Attempt to Resuscitate)     Medical Interventions (Patient has pulse or is breathing):    Full Support     Level Of Support Discussed With:    Patient       Signature: Electronically signed by Nils Brewster MD, 04/01/25, 13:41 EDT.  [facility] Hospitalist Team

## 2025-04-02 ENCOUNTER — READMISSION MANAGEMENT (OUTPATIENT)
Dept: CALL CENTER | Facility: HOSPITAL | Age: 60
End: 2025-04-02
Payer: COMMERCIAL

## 2025-04-02 VITALS
TEMPERATURE: 97.5 F | SYSTOLIC BLOOD PRESSURE: 128 MMHG | DIASTOLIC BLOOD PRESSURE: 82 MMHG | HEIGHT: 68 IN | WEIGHT: 223.77 LBS | OXYGEN SATURATION: 93 % | HEART RATE: 86 BPM | RESPIRATION RATE: 20 BRPM | BODY MASS INDEX: 33.91 KG/M2

## 2025-04-02 LAB
ALBUMIN SERPL-MCNC: 3.9 G/DL (ref 3.5–5.2)
ALBUMIN/GLOB SERPL: 1 G/DL
ALP SERPL-CCNC: 103 U/L (ref 39–117)
ALT SERPL W P-5'-P-CCNC: 24 U/L (ref 1–41)
ANION GAP SERPL CALCULATED.3IONS-SCNC: 12.3 MMOL/L (ref 5–15)
AST SERPL-CCNC: 23 U/L (ref 1–40)
BASOPHILS # BLD AUTO: 0.1 10*3/MM3 (ref 0–0.2)
BASOPHILS NFR BLD AUTO: 1 % (ref 0–1.5)
BILIRUB SERPL-MCNC: 0.4 MG/DL (ref 0–1.2)
BUN SERPL-MCNC: 20 MG/DL (ref 6–20)
BUN/CREAT SERPL: 22 (ref 7–25)
CALCIUM SPEC-SCNC: 9.3 MG/DL (ref 8.6–10.5)
CHLORIDE SERPL-SCNC: 102 MMOL/L (ref 98–107)
CO2 SERPL-SCNC: 22.7 MMOL/L (ref 22–29)
CREAT SERPL-MCNC: 0.91 MG/DL (ref 0.76–1.27)
DEPRECATED RDW RBC AUTO: 42.3 FL (ref 37–54)
EGFRCR SERPLBLD CKD-EPI 2021: 97.1 ML/MIN/1.73
EOSINOPHIL # BLD AUTO: 0.61 10*3/MM3 (ref 0–0.4)
EOSINOPHIL NFR BLD AUTO: 6 % (ref 0.3–6.2)
ERYTHROCYTE [DISTWIDTH] IN BLOOD BY AUTOMATED COUNT: 13.1 % (ref 12.3–15.4)
GLOBULIN UR ELPH-MCNC: 3.9 GM/DL
GLUCOSE BLDC GLUCOMTR-MCNC: 118 MG/DL (ref 70–105)
GLUCOSE BLDC GLUCOMTR-MCNC: 180 MG/DL (ref 70–105)
GLUCOSE SERPL-MCNC: 116 MG/DL (ref 65–99)
HCT VFR BLD AUTO: 46.1 % (ref 37.5–51)
HGB BLD-MCNC: 15.1 G/DL (ref 13–17.7)
IMM GRANULOCYTES # BLD AUTO: 0.03 10*3/MM3 (ref 0–0.05)
IMM GRANULOCYTES NFR BLD AUTO: 0.3 % (ref 0–0.5)
LYMPHOCYTES # BLD AUTO: 1.92 10*3/MM3 (ref 0.7–3.1)
LYMPHOCYTES NFR BLD AUTO: 18.9 % (ref 19.6–45.3)
MAGNESIUM SERPL-MCNC: 2.2 MG/DL (ref 1.6–2.6)
MCH RBC QN AUTO: 29 PG (ref 26.6–33)
MCHC RBC AUTO-ENTMCNC: 32.8 G/DL (ref 31.5–35.7)
MCV RBC AUTO: 88.7 FL (ref 79–97)
MONOCYTES # BLD AUTO: 0.85 10*3/MM3 (ref 0.1–0.9)
MONOCYTES NFR BLD AUTO: 8.4 % (ref 5–12)
NEUTROPHILS NFR BLD AUTO: 6.65 10*3/MM3 (ref 1.7–7)
NEUTROPHILS NFR BLD AUTO: 65.4 % (ref 42.7–76)
NRBC BLD AUTO-RTO: 0 /100 WBC (ref 0–0.2)
PHOSPHATE SERPL-MCNC: 4.4 MG/DL (ref 2.5–4.5)
PLATELET # BLD AUTO: 239 10*3/MM3 (ref 140–450)
PMV BLD AUTO: 9.2 FL (ref 6–12)
POTASSIUM SERPL-SCNC: 4.3 MMOL/L (ref 3.5–5.2)
PROT SERPL-MCNC: 7.8 G/DL (ref 6–8.5)
RBC # BLD AUTO: 5.2 10*6/MM3 (ref 4.14–5.8)
SODIUM SERPL-SCNC: 137 MMOL/L (ref 136–145)
WBC NRBC COR # BLD AUTO: 10.16 10*3/MM3 (ref 3.4–10.8)

## 2025-04-02 PROCEDURE — 63710000001 INSULIN LISPRO (HUMAN) PER 5 UNITS: Performed by: INTERNAL MEDICINE

## 2025-04-02 PROCEDURE — 80053 COMPREHEN METABOLIC PANEL: CPT | Performed by: INTERNAL MEDICINE

## 2025-04-02 PROCEDURE — 93005 ELECTROCARDIOGRAM TRACING: CPT | Performed by: INTERNAL MEDICINE

## 2025-04-02 PROCEDURE — 25810000003 SODIUM CHLORIDE 0.9 % SOLUTION 250 ML FLEX CONT: Performed by: INTERNAL MEDICINE

## 2025-04-02 PROCEDURE — 83735 ASSAY OF MAGNESIUM: CPT | Performed by: INTERNAL MEDICINE

## 2025-04-02 PROCEDURE — 85025 COMPLETE CBC W/AUTO DIFF WBC: CPT | Performed by: INTERNAL MEDICINE

## 2025-04-02 PROCEDURE — 93010 ELECTROCARDIOGRAM REPORT: CPT | Performed by: INTERNAL MEDICINE

## 2025-04-02 PROCEDURE — 99232 SBSQ HOSP IP/OBS MODERATE 35: CPT

## 2025-04-02 PROCEDURE — 84100 ASSAY OF PHOSPHORUS: CPT | Performed by: INTERNAL MEDICINE

## 2025-04-02 PROCEDURE — 82948 REAGENT STRIP/BLOOD GLUCOSE: CPT | Performed by: INTERNAL MEDICINE

## 2025-04-02 PROCEDURE — 25010000002 VANCOMYCIN 1 G RECONSTITUTED SOLUTION 1 EACH VIAL: Performed by: INTERNAL MEDICINE

## 2025-04-02 RX ORDER — METOPROLOL SUCCINATE 100 MG/1
100 TABLET, EXTENDED RELEASE ORAL
Qty: 30 TABLET | Refills: 0 | Status: SHIPPED | OUTPATIENT
Start: 2025-04-02 | End: 2025-05-02

## 2025-04-02 RX ORDER — HYDROCODONE BITARTRATE AND ACETAMINOPHEN 5; 325 MG/1; MG/1
1 TABLET ORAL EVERY 6 HOURS PRN
Qty: 12 TABLET | Refills: 0 | Status: SHIPPED | OUTPATIENT
Start: 2025-04-02 | End: 2025-04-05

## 2025-04-02 RX ORDER — LOSARTAN POTASSIUM 25 MG/1
25 TABLET ORAL DAILY
Qty: 30 TABLET | Refills: 0 | Status: SHIPPED | OUTPATIENT
Start: 2025-04-02 | End: 2025-05-02

## 2025-04-02 RX ADMIN — PANTOPRAZOLE SODIUM 40 MG: 40 TABLET, DELAYED RELEASE ORAL at 08:45

## 2025-04-02 RX ADMIN — CETIRIZINE HYDROCHLORIDE 10 MG: 10 TABLET, FILM COATED ORAL at 08:47

## 2025-04-02 RX ADMIN — INSULIN LISPRO 2 UNITS: 100 INJECTION, SOLUTION INTRAVENOUS; SUBCUTANEOUS at 13:43

## 2025-04-02 RX ADMIN — EMPAGLIFLOZIN 25 MG: 25 TABLET, FILM COATED ORAL at 08:45

## 2025-04-02 RX ADMIN — SODIUM CHLORIDE 1000 MG: 9 INJECTION, SOLUTION INTRAVENOUS at 05:17

## 2025-04-02 RX ADMIN — ACETAMINOPHEN 650 MG: 325 TABLET, FILM COATED ORAL at 13:43

## 2025-04-02 RX ADMIN — ACETAMINOPHEN 650 MG: 325 TABLET, FILM COATED ORAL at 05:16

## 2025-04-02 RX ADMIN — Medication 10 ML: at 08:44

## 2025-04-02 RX ADMIN — METOPROLOL SUCCINATE 100 MG: 50 TABLET, EXTENDED RELEASE ORAL at 08:44

## 2025-04-02 NOTE — PROGRESS NOTES
CARDIOLOGY PROGRESS NOTE:    Ismael Pulido  59 y.o.  male  1965  [id number]      Referring Provider: Hospitalist    Reason for follow-up: Ventricular tachycardia     Patient Care Team:  Sharon Silva MD as PCP - General (Internal Medicine & Pediatrics)  Rima Salazar APRN as Nurse Practitioner (Cardiology)    Subjective .  Patient seen and examined.  Labs and chart reviewed.  Patient denies chest pain, shortness of breath, palpitations, lightheadedness/dizziness.     Objective Patient lying in bed resting comfortably on room air with family at bedside.      Review of Systems   Constitutional: Negative for malaise/fatigue.   Cardiovascular:  Negative for chest pain, dyspnea on exertion, leg swelling and palpitations.   Respiratory:  Negative for cough and shortness of breath.    Gastrointestinal:  Negative for abdominal pain, nausea and vomiting.   Neurological:  Negative for dizziness, focal weakness, headaches, light-headedness and numbness.   All other systems reviewed and are negative.      Allergies: Januvia [sitagliptin] and Lisinopril    Scheduled Meds:acetaminophen, 650 mg, Oral, Q8H   Or  acetaminophen, 650 mg, Oral, Q8H   Or  acetaminophen, 650 mg, Rectal, Q8H  [Held by provider] apixaban, 5 mg, Oral, Q12H  cetirizine, 10 mg, Oral, Daily  empagliflozin, 25 mg, Oral, Daily  insulin lispro, 2-7 Units, Subcutaneous, 4x Daily AC & at Bedtime  losartan, 50 mg, Oral, Q24H  metoprolol succinate XL, 100 mg, Oral, Q24H  pantoprazole, 40 mg, Oral, BID  rosuvastatin, 10 mg, Oral, Nightly  sodium chloride, 10 mL, Intravenous, Q12H  sodium chloride, 10 mL, Intravenous, Q12H      Continuous Infusions:     PRN Meds:.  acetaminophen **OR** acetaminophen    acetaminophen    aluminum-magnesium hydroxide-simethicone    atropine    senna-docusate sodium **AND** polyethylene glycol **AND** bisacodyl **AND** bisacodyl    Calcium Replacement - Follow Nurse / BPA Driven Protocol    dextrose    dextrose     "diphenhydrAMINE    diphenhydrAMINE    ePHEDrine Sulfate (Pressors)    fentanyl    flumazenil    glucagon (human recombinant)    hydrALAZINE    HYDROcodone-acetaminophen    HYDROmorphone    ipratropium-albuterol    labetalol    labetalol    lidocaine (cardiac)    Magnesium Low Dose Replacement - Follow Nurse / BPA Driven Protocol    naloxone    nitroglycerin    ondansetron ODT **OR** ondansetron    ondansetron    oxyCODONE    Phosphorus Replacement - Follow Nurse / BPA Driven Protocol    Potassium Replacement - Follow Nurse / BPA Driven Protocol    [COMPLETED] Insert Peripheral IV **AND** sodium chloride    sodium chloride    sodium chloride    sodium chloride    sodium chloride        VITAL SIGNS  Vitals:    04/01/25 2019 04/02/25 0020 04/02/25 0250 04/02/25 0837   BP: 120/78 117/83 102/75 108/71   BP Location: Right arm Right arm Right arm Right arm   Patient Position: Lying Lying Lying Lying   Pulse: 97 68 79 89   Resp: 16 18 18 18   Temp: 98.4 °F (36.9 °C) 98.3 °F (36.8 °C) 98 °F (36.7 °C) 97 °F (36.1 °C)   TempSrc: Oral Oral Oral Tympanic   SpO2: 92% 94% 93% 94%   Weight:       Height:           Flowsheet Rows      Flowsheet Row First Filed Value   Admission Height 172.7 cm (68\") Documented at 03/27/2025 1428   Admission Weight 104 kg (229 lb 4.5 oz) Documented at 03/27/2025 1428             TELEMETRY: Ventricular pacemaker rhythm    Physical Exam:  Vitals reviewed.   Constitutional:       Appearance: Not in distress. Obese.   Eyes:      General: No scleral icterus.     Conjunctiva/sclera: Conjunctivae normal.   HENT:      Head: Normocephalic and atraumatic.   Neck:      Vascular: No carotid bruit or JVD.   Pulmonary:      Effort: Pulmonary effort is normal.      Breath sounds: Normal breath sounds. No wheezing. No rales.   Cardiovascular:      Normal rate. Regular rhythm.   Pulses:     Intact distal pulses.   Abdominal:      General: Bowel sounds are normal.      Palpations: Abdomen is soft.   Musculoskeletal: "      Cervical back: Normal range of motion and neck supple. Skin:     General: Skin is warm and dry.      Findings: No rash.   Neurological:      General: No focal deficit present.      Mental Status: Alert and oriented to person, place and time.          Results Review:   I reviewed the patient's new clinical results.  Lab Results (last 24 hours)       Procedure Component Value Units Date/Time    POC Glucose 4x Daily Before Meals & at Bedtime [[id number]]  (Abnormal) Collected: 04/02/25 0724    Specimen: Blood Updated: 04/02/25 0728     Glucose 118 mg/dL      Comment: Serial Number: [id number]:  [id number]       Magnesium [[id number]]  (Normal) Collected: 04/02/25 0246    Specimen: Blood from Hand, Left Updated: 04/02/25 0341     Magnesium 2.2 mg/dL     Comprehensive Metabolic Panel [[id number]]  (Abnormal) Collected: 04/02/25 0246    Specimen: Blood from Hand, Left Updated: 04/02/25 0341     Glucose 116 mg/dL      BUN 20 mg/dL      Creatinine 0.91 mg/dL      Sodium 137 mmol/L      Potassium 4.3 mmol/L      Chloride 102 mmol/L      CO2 22.7 mmol/L      Calcium 9.3 mg/dL      Total Protein 7.8 g/dL      Albumin 3.9 g/dL      ALT (SGPT) 24 U/L      AST (SGOT) 23 U/L      Alkaline Phosphatase 103 U/L      Total Bilirubin 0.4 mg/dL      Globulin 3.9 gm/dL      A/G Ratio 1.0 g/dL      BUN/Creatinine Ratio 22.0     Anion Gap 12.3 mmol/L      eGFR 97.1 mL/min/1.73     Narrative:      GFR Categories in Chronic Kidney Disease (CKD)      GFR Category          GFR (mL/min/1.73)    Interpretation  G1                     90 or greater         Normal or high (1)  G2                      60-89                Mild decrease (1)  G3a                   45-59                Mild to moderate decrease  G3b                   30-44                Moderate to severe decrease  G4                    15-29                Severe decrease  G5                    14 or less           Kidney failure          (1)In the absence of evidence of  kidney disease, neither GFR category G1 or G2 fulfill the criteria for CKD.    eGFR calculation 2021 CKD-EPI creatinine equation, which does not include race as a factor    Phosphorus [[id number]]  (Normal) Collected: 04/02/25 0246    Specimen: Blood from Hand, Left Updated: 04/02/25 0341     Phosphorus 4.4 mg/dL     CBC & Differential [[id number]]  (Abnormal) Collected: 04/02/25 0246    Specimen: Blood from Hand, Left Updated: 04/02/25 0314    Narrative:      The following orders were created for panel order CBC & Differential.  Procedure                               Abnormality         Status                     ---------                               -----------         ------                     CBC Auto Differential[[id number]]        Abnormal            Final result                 Please view results for these tests on the individual orders.    CBC Auto Differential [[id number]]  (Abnormal) Collected: 04/02/25 0246    Specimen: Blood from Hand, Left Updated: 04/02/25 0314     WBC 10.16 10*3/mm3      RBC 5.20 10*6/mm3      Hemoglobin 15.1 g/dL      Hematocrit 46.1 %      MCV 88.7 fL      MCH 29.0 pg      MCHC 32.8 g/dL      RDW 13.1 %      RDW-SD 42.3 fl      MPV 9.2 fL      Platelets 239 10*3/mm3      Neutrophil % 65.4 %      Lymphocyte % 18.9 %      Monocyte % 8.4 %      Eosinophil % 6.0 %      Basophil % 1.0 %      Immature Grans % 0.3 %      Neutrophils, Absolute 6.65 10*3/mm3      Lymphocytes, Absolute 1.92 10*3/mm3      Monocytes, Absolute 0.85 10*3/mm3      Eosinophils, Absolute 0.61 10*3/mm3      Basophils, Absolute 0.10 10*3/mm3      Immature Grans, Absolute 0.03 10*3/mm3      nRBC 0.0 /100 WBC     POC Glucose Once [[id number]]  (Abnormal) Collected: 04/01/25 2105    Specimen: Blood Updated: 04/01/25 2107     Glucose 140 mg/dL      Comment: Serial Number: [id number]:  [id number]       Lyme Disease Total Antibody With Reflex to Immunoassay [[id number]] Collected: 03/30/25 1430    Specimen: Blood from  Arm, Left Updated: 04/01/25 1709     Lyme Total Antibody EIA Negative     Comment: Lyme antibodies not detected. Reflex testing is not indicated.  No laboratory evidence of infection with B. burgdorferi (Lyme disease).  Negative results may occur in patients recently infected (less than  or equal to 14 days) with B. burgdorferi.  If recent infection is  suspected, repeat testing on a new sample collected in 7 to 14 days is  recommended.       Narrative:      Performed at:  [address] [place] [address]  [id number]  : Jono Abrams PhD, Phone:  [phone]    POC Glucose Once [[id number]]  (Abnormal) Collected: 04/01/25 1635    Specimen: Blood Updated: 04/01/25 1637     Glucose 129 mg/dL      Comment: Serial Number: [id number]:  [id number]       POC Glucose Once [[id number]]  (Abnormal) Collected: 04/01/25 1151    Specimen: Blood Updated: 04/01/25 1154     Glucose 130 mg/dL      Comment: Serial Number: [id number]:  [id number]               Imaging Results (Last 24 Hours)       Procedure Component Value Units Date/Time    XR Chest 1 View [[id number]] Collected: 04/01/25 1807     Updated: 04/01/25 1810    Narrative:      XR CHEST 1 VW    Date of Exam: 4/1/2025 5:30 PM EDT    Indication: Post ICD / Pacer Implant    Comparison: 3/27/2025    Findings:  Left chest wall pacemaker/ICD. Skin staple line overlies the device. Electrodes appear to be in expected positions. No definite pneumothorax is identified. No pleural effusion or pulmonary infiltrate. Heart size and mediastinal contour appear within   normal limits and unchanged.      Impression:      Impression:  1.Left chest wall pacemaker/ICD.  2.No definite pneumothorax or other acute cardiopulmonary abnormality is identified.          Electronically Signed: Urbano Villavicencio    4/1/2025 6:08 PM EDT    Workstation ID: [id number]            EKG      I personally viewed and interpreted the patient's EKG/Telemetry  data:    ECHOCARDIOGRAM:  Results for orders placed during the hospital encounter of 03/27/25    Adult Transthoracic Echo Complete W/ Cont if Necessary Per Protocol    Interpretation Summary    Left ventricular ejection fraction appears to be 46 - 50%.    Left ventricular wall thickness is consistent with mild concentric hypertrophy.    Left ventricular diastolic function is consistent with (grade I) impaired relaxation.    Moderately reduced right ventricular systolic function noted.    The right ventricular cavity is moderately dilated.    The left atrial cavity is mild to moderately dilated.       STRESS MYOVIEW:  Results for orders placed during the hospital encounter of 02/18/25    Stress Test With Myocardial Perfusion One Day    Interpretation Summary    Myocardial perfusion imaging indicates a normal myocardial perfusion study with no evidence of ischemia. Impressions are consistent with a low risk study.    Left ventricular ejection fraction is normal (Calculated EF = 64%).       CARDIAC CATHETERIZATION:  Results for orders placed during the hospital encounter of 03/27/25    Cardiac Catheterization/Vascular Study (Needs Review)       OTHER:         Assessment & Plan     Ventricular tachycardia  Patient presented with sustained ventricular tachycardia  Status post cardioversion  EP consulted   Echocardiogram with midrange LVEF of 46 to 50%, grade 1 diastolic dysfunction and moderately reduced RV function  Cardiac catheterization with coronary arteries angiographically free of disease  Status post BiV ICD upgrade   Beta blocker  On going workup for sarcoidosis   Plan for cardiac MRI in 4-6 weeks to evaluate for sarcoidosis     Sick sinus syndrome  Status post biventricular pacemaker 2/21/2025    PFO  SAWYER 3/12 with small PFO   Recent admission at [facility] [place] for splenic infarct thought to be secondary to PFO/paradoxical emboli  Eliquis for anticoagulation    Diabetes  Jardiance  SSI  A1c  7.0%    Hypertension  Blood pressure stable  Losartan 25 mg, metoprolol succinate 50 mg    Hyperlipidemia  Statin therapy    Patient is okay to discharge from cardiology standpoint and follow-up as outpatient next week for device interrogation/wound check    I discussed the patients findings and my recommendations with patient and nurse    TAMIKO Briceno  04/02/25  10:25 EDT

## 2025-04-02 NOTE — PLAN OF CARE
Problem: Adult Inpatient Plan of Care  Goal: Plan of Care Review  Outcome: Progressing  Goal: Patient-Specific Goal (Individualized)  Outcome: Progressing  Goal: Absence of Hospital-Acquired Illness or Injury  Outcome: Progressing  Intervention: Identify and Manage Fall Risk  Recent Flowsheet Documentation  Taken 4/2/2025 1404 by Antionette Alonso LPN  Safety Promotion/Fall Prevention:   activity supervised   assistive device/personal items within reach   clutter free environment maintained   lighting adjusted   room organization consistent   safety round/check completed  Taken 4/2/2025 1232 by Antionette Alonso LPN  Safety Promotion/Fall Prevention:   activity supervised   assistive device/personal items within reach   clutter free environment maintained   lighting adjusted   room organization consistent   safety round/check completed  Taken 4/2/2025 1024 by Antionette Alonso LPN  Safety Promotion/Fall Prevention:   activity supervised   assistive device/personal items within reach   clutter free environment maintained   lighting adjusted   room organization consistent   safety round/check completed  Taken 4/2/2025 0837 by Antionette Alonso LPN  Safety Promotion/Fall Prevention:   activity supervised   assistive device/personal items within reach   clutter free environment maintained   lighting adjusted   room organization consistent   safety round/check completed  Intervention: Prevent Skin Injury  Recent Flowsheet Documentation  Taken 4/2/2025 0837 by Antionette Alonso LPN  Skin Protection: incontinence pads utilized  Intervention: Prevent and Manage VTE (Venous Thromboembolism) Risk  Recent Flowsheet Documentation  Taken 4/2/2025 0837 by Antionette Alonso LPN  VTE Prevention/Management:   bilateral   SCDs (sequential compression devices) on  Intervention: Prevent Infection  Recent Flowsheet Documentation  Taken 4/2/2025 1404 by Antionette Alonso LPN  Infection Prevention:   cohorting utilized   environmental  surveillance performed   hand hygiene promoted   personal protective equipment utilized   rest/sleep promoted   single patient room provided   visitors restricted/screened  Taken 4/2/2025 1232 by Antionette Alonso LPN  Infection Prevention:   cohorting utilized   environmental surveillance performed   hand hygiene promoted   personal protective equipment utilized   rest/sleep promoted   single patient room provided   visitors restricted/screened  Taken 4/2/2025 1024 by Antionette Alonso LPN  Infection Prevention:   cohorting utilized   environmental surveillance performed   hand hygiene promoted   personal protective equipment utilized   rest/sleep promoted   single patient room provided   visitors restricted/screened  Taken 4/2/2025 0837 by Antionette Alonso LPN  Infection Prevention:   cohorting utilized   environmental surveillance performed   hand hygiene promoted   personal protective equipment utilized   rest/sleep promoted   single patient room provided   visitors restricted/screened  Goal: Optimal Comfort and Wellbeing  Outcome: Progressing  Intervention: Monitor Pain and Promote Comfort  Recent Flowsheet Documentation  Taken 4/2/2025 1232 by Antionette Alonso LPN  Pain Management Interventions:   care clustered   diversional activity provided   position adjusted   pillow support provided  Taken 4/2/2025 0837 by Antionette Alonso LPN  Pain Management Interventions:   care clustered   diversional activity provided   position adjusted  Intervention: Provide Person-Centered Care  Recent Flowsheet Documentation  Taken 4/2/2025 1232 by Antionette Alonso LPN  Trust Relationship/Rapport:   care explained   choices provided   thoughts/feelings acknowledged  Taken 4/2/2025 0837 by Antionette Alonso LPN  Trust Relationship/Rapport:   care explained   choices provided   thoughts/feelings acknowledged  Goal: Readiness for Transition of Care  Outcome: Progressing   Goal Outcome Evaluation:      Patient is to be discharged home  today.

## 2025-04-02 NOTE — PAYOR COMM NOTE
"This is a clinical update for Ismael Pulido PATRICK  Reference/Auth # [id number]     EXTENDED AUTHORIZATION PENDING:     PRELIMINARY DISCHARGE ORDERS NOTED ON 4/2 @ 8130    Please call or fax determination to contact below.   Thank you.    Paradise Ritter, RN, BSN  Utilization Review Nurse  [facility]  Direct & confidential phone # [phone]  Fax # [phone]      Ismael Pulido (59 y.o. Male)       Date of Birth   1965    Social Security Number       Address   [address] [place] [address]    Home Phone   [phone]    MRN   [id number]       [employer]    Marital Status                               Admission Date   3/27/2025    Admission Type   Emergency    Admitting Provider   Kristian Thurston MD    Attending Provider   Nils Brewster MD    Department, Room/Bed   [facility] 2D, 259/1       Discharge Date       Discharge Disposition   Home or Self Care    Discharge Destination                                 Attending Provider: Nils Brewster MD    Allergies: Januvia [Sitagliptin], Lisinopril    Isolation: None   Infection: None   Code Status: CPR    Ht: 172.7 cm (68\")   Wt: 101 kg (223 lb 12.3 oz)    Admission Cmt: None   Principal Problem: Wide-complex tachycardia [R00.0]                   Active Insurance as of 3/27/2025       Primary Coverage       Payor Plan Insurance Group Employer/Plan Group    [employer]CIAL A[employer] [id number]       Payor Plan Address Payor Plan Phone Number Payor Plan Fax Number Effective Dates    [address] [phone]  3/25/2021 - None Entered    [facility] [id number]         Subscriber Name Subscriber Birth Date Member ID       ISMAEL PULIDO PATRICK 1965 [id number]                     Emergency Contacts        (Rel.) Home Phone Work Phone Mobile Phone    KELL PULIDO (Spouse) [phone] -- [phone]              Vital Signs (last day)       Date/Time " Temp Temp src Pulse Resp BP Patient Position SpO2    04/02/25 0837 97 (36.1) Tympanic 89 18 108/71 Lying 94    04/02/25 0250 98 (36.7) Oral 79 18 102/75 Lying 93    04/02/25 0020 98.3 (36.8) Oral 68 18 117/83 Lying 94    04/01/25 2019 98.4 (36.9) Oral 97 16 120/78 Lying 92    04/01/25 1300 -- -- 91 -- 126/86 -- 93    04/01/25 1200 -- -- 87 -- 117/88 -- 93    04/01/25 1100 95.9 (35.5) Oral 80 18 105/77 Lying 93    04/01/25 1055 -- -- 92 -- 121/68 -- 92    04/01/25 1050 -- -- 89 -- 106/71 -- 91    04/01/25 1045 -- -- 90 -- 91/69 -- 88    04/01/25 1040 -- -- 90 -- 111/73 -- 90    04/01/25 1035 -- -- 91 -- 98/70 -- 91    04/01/25 1030 -- -- 90 -- 124/65 -- 90    04/01/25 1025 -- -- 84 -- 121/79 -- 91    04/01/25 1020 -- -- 90 -- 116/71 -- 91    04/01/25 1015 -- -- 90 -- 114/97 -- 90    04/01/25 1010 -- -- 93 -- 128/104 -- 93    04/01/25 1005 -- -- 93 -- 125/79 -- 93    04/01/25 0433 98 (36.7) Oral 79 20 115/76 Lying 96          Oxygen Therapy (last day)       Date/Time SpO2 Device (Oxygen Therapy) Flow (L/min) (Oxygen Therapy) Oxygen Concentration (%) ETCO2 (mmHg)    04/02/25 0837 94 room air -- -- --    04/02/25 0400 -- room air -- -- --    04/02/25 0250 93 room air -- -- --    04/02/25 0020 94 room air -- -- --    04/01/25 2019 92 room air -- -- --    04/01/25 2000 -- room air -- -- --    04/01/25 1300 93 -- -- -- --    04/01/25 1200 93 -- -- -- --    04/01/25 1100 93 -- -- -- --    04/01/25 1055 92 -- -- -- --    04/01/25 1050 91 -- -- -- --    04/01/25 1045 88 -- -- -- --    04/01/25 1040 90 -- -- -- --    04/01/25 1035 91 -- -- -- --    04/01/25 1030 90 -- -- -- --    04/01/25 1025 91 -- -- -- --    04/01/25 1020 91 -- -- -- --    04/01/25 1015 90 -- -- -- --    04/01/25 1010 93 -- -- -- --    04/01/25 1005 93 -- -- -- --    04/01/25 1000 -- room air -- -- --    04/01/25 0433 96 CPAP -- -- --    04/01/25 0026 -- CPAP -- -- --          Current Facility-Administered Medications   Medication Dose Route Frequency  Provider Last Rate Last Admin    acetaminophen (TYLENOL) tablet 650 mg  650 mg Oral Q8H Dez Loco MD   650 mg at 04/02/25 0516    Or    acetaminophen (TYLENOL) 160 MG/5ML oral solution 650 mg  650 mg Oral Q8H Dez Loco MD        Or    acetaminophen (TYLENOL) suppository 650 mg  650 mg Rectal Q8H Dez Loco MD        acetaminophen (TYLENOL) tablet 650 mg  650 mg Oral Q4H PRN Win Reaves MD        Or    acetaminophen (TYLENOL) suppository 650 mg  650 mg Rectal Q4H PRN Win Reaves MD        acetaminophen (TYLENOL) tablet 650 mg  650 mg Oral Q4H PRN Win Reaves MD        aluminum-magnesium hydroxide-simethicone (MAALOX MAX) 400-400-40 MG/5ML suspension 15 mL  15 mL Oral Q6H PRN Win Reaves MD        [Held by provider] apixaban (ELIQUIS) tablet 5 mg  5 mg Oral Q12H Dez Loco MD        atropine sulfate injection 0.5 mg  0.5 mg Intravenous Once PRN Sigrid García CRNA        sennosides-docusate (PERICOLACE) 8.6-50 MG per tablet 2 tablet  2 tablet Oral BID PRN Win Reaves MD   2 tablet at 03/28/25 2016    And    polyethylene glycol (MIRALAX) packet 17 g  17 g Oral Daily PRN Win Reaves MD        And    bisacodyl (DULCOLAX) EC tablet 5 mg  5 mg Oral Daily PRN Win Reaves MD        And    bisacodyl (DULCOLAX) suppository 10 mg  10 mg Rectal Daily PRN Win Reaves MD        Calcium Replacement - Follow Nurse / BPA Driven Protocol   Not Applicable PRN Win Reaves MD        cetirizine (zyrTEC) tablet 10 mg  10 mg Oral Daily Win Reaves MD   10 mg at 04/02/25 0847    dextrose (D50W) (25 g/50 mL) IV injection 25 g  25 g Intravenous Q15 Min PRN Win Reaves MD        dextrose (GLUTOSE) oral gel 15 g  15 g Oral Q15 Min PRN Win Reaves MD        diphenhydrAMINE (BENADRYL) injection 12.5 mg  12.5 mg Intravenous Once PRN Sigrid García CRNA        diphenhydrAMINE (BENADRYL) injection 12.5 mg  12.5 mg Intravenous Q15 Min PRN Sigrid García CRNA        empagliflozin  (JARDIANCE) tablet 25 mg  25 mg Oral Daily Win Reaves MD   25 mg at 04/02/25 0845    ePHEDrine Sulfate (Pressors) 5 MG/ML injection 5 mg  5 mg Intravenous Once PRN Sigrid García CRNA        fentaNYL citrate (PF) (SUBLIMAZE) injection 50 mcg  50 mcg Intravenous Q15 Min PRN Sigrid García CRNA        flumazenil (ROMAZICON) injection 0.2 mg  0.2 mg Intravenous PRN Sigrid García CRNA        glucagon (GLUCAGEN) injection 1 mg  1 mg Intramuscular Q15 Min PRN Win Reaves MD        hydrALAZINE (APRESOLINE) injection 5 mg  5 mg Intravenous Q5 Min PRN Sigrid García CRNA        HYDROcodone-acetaminophen (NORCO) 5-325 MG per tablet 1 tablet  1 tablet Oral Q6H PRN Dez Loco MD        HYDROmorphone (DILAUDID) injection 1 mg  1 mg Intravenous Q15 Min PRN Sigrid García CRNA        insulin lispro (HUMALOG/ADMELOG) injection 2-7 Units  2-7 Units Subcutaneous 4x Daily AC & at Bedtime Win Reaves MD   3 Units at 03/30/25 2123    ipratropium-albuterol (DUO-NEB) nebulizer solution 3 mL  3 mL Nebulization Once PRN Sigrid García CRNA        labetalol (NORMODYNE,TRANDATE) injection 10 mg  10 mg Intravenous Q4H PRN Win Reaves MD        labetalol (NORMODYNE,TRANDATE) injection 5 mg  5 mg Intravenous Q5 Min PRN Sigrid García CRNA        lidocaine (cardiac) (XYLOCAINE) injection 100 mg  100 mg Intravenous Q5 Min PRN Sigrid García CRNA        losartan (COZAAR) tablet 50 mg  50 mg Oral Q24H Win Reaves MD   50 mg at 04/01/25 2035    Magnesium Low Dose Replacement - Follow Nurse / BPA Driven Protocol   Not Applicable PRN Win Reaves MD        metoprolol succinate XL (TOPROL-XL) 24 hr tablet 100 mg  100 mg Oral Q24H Dez Loco MD   100 mg at 04/02/25 0844    naloxone (NARCAN) injection 0.4 mg  0.4 mg Intravenous PRN Sigrid García CRNA        nitroglycerin (NITROSTAT) SL tablet 0.4 mg  0.4 mg Sublingual Q5 Min PRN Win Reaves MD        ondansetron ODT (ZOFRAN-ODT) disintegrating tablet 4 mg  4 mg Oral Q6H PRN  Win Reaves MD        Or    ondansetron (ZOFRAN) injection 4 mg  4 mg Intravenous Q6H PRN Win Reaves MD        ondansetron (ZOFRAN) injection 4 mg  4 mg Intravenous Once PRN Sigrid García CRNA        oxyCODONE (ROXICODONE) immediate release tablet 10 mg  10 mg Oral Q4H PRN Sigrid García CRNA        pantoprazole (PROTONIX) EC tablet 40 mg  40 mg Oral BID Win Reaves MD   40 mg at 04/02/25 0845    Phosphorus Replacement - Follow Nurse / BPA Driven Protocol   Not Applicable PRN Win Reaves MD        Potassium Replacement - Follow Nurse / BPA Driven Protocol   Not Applicable PRWin Miller MD        rosuvastatin (CRESTOR) tablet 10 mg  10 mg Oral Nightly Win Reaves MD   10 mg at 04/01/25 2035    sodium chloride 0.9 % flush 10 mL  10 mL Intravenous PRN Win Reaves MD        sodium chloride 0.9 % flush 10 mL  10 mL Intravenous Q12H Win Reaves MD   10 mL at 04/02/25 0844    sodium chloride 0.9 % flush 10 mL  10 mL Intravenous PRN Win Reaves MD        sodium chloride 0.9 % flush 10 mL  10 mL Intravenous Q12H Win Reaves MD   10 mL at 04/02/25 0844    sodium chloride 0.9 % flush 10 mL  10 mL Intravenous PRN Win Reaves MD        sodium chloride 0.9 % infusion 40 mL  40 mL Intravenous PRN Win Reaves MD        sodium chloride 0.9 % infusion 40 mL  40 mL Intravenous PRN Win Reaves MD         Lab Results (last 24 hours)       Procedure Component Value Units Date/Time    POC Glucose 4x Daily Before Meals & at Bedtime [[id number]]  (Abnormal) Collected: 04/02/25 0724    Specimen: Blood Updated: 04/02/25 0728     Glucose 118 mg/dL      Comment: Serial Number: [id number]:  [id number]       Magnesium [[id number]]  (Normal) Collected: 04/02/25 0246    Specimen: Blood from Hand, Left Updated: 04/02/25 0341     Magnesium 2.2 mg/dL     Comprehensive Metabolic Panel [[id number]]  (Abnormal) Collected: 04/02/25 0246    Specimen: Blood from Hand, Left Updated: 04/02/25 0341     Glucose 116  mg/dL      BUN 20 mg/dL      Creatinine 0.91 mg/dL      Sodium 137 mmol/L      Potassium 4.3 mmol/L      Chloride 102 mmol/L      CO2 22.7 mmol/L      Calcium 9.3 mg/dL      Total Protein 7.8 g/dL      Albumin 3.9 g/dL      ALT (SGPT) 24 U/L      AST (SGOT) 23 U/L      Alkaline Phosphatase 103 U/L      Total Bilirubin 0.4 mg/dL      Globulin 3.9 gm/dL      A/G Ratio 1.0 g/dL      BUN/Creatinine Ratio 22.0     Anion Gap 12.3 mmol/L      eGFR 97.1 mL/min/1.73     Narrative:      GFR Categories in Chronic Kidney Disease (CKD)      GFR Category          GFR (mL/min/1.73)    Interpretation  G1                     90 or greater         Normal or high (1)  G2                      60-89                Mild decrease (1)  G3a                   45-59                Mild to moderate decrease  G3b                   30-44                Moderate to severe decrease  G4                    15-29                Severe decrease  G5                    14 or less           Kidney failure          (1)In the absence of evidence of kidney disease, neither GFR category G1 or G2 fulfill the criteria for CKD.    eGFR calculation 2021 CKD-EPI creatinine equation, which does not include race as a factor    Phosphorus [[id number]]  (Normal) Collected: 04/02/25 0246    Specimen: Blood from Hand, Left Updated: 04/02/25 0341     Phosphorus 4.4 mg/dL     CBC & Differential [[id number]]  (Abnormal) Collected: 04/02/25 0246    Specimen: Blood from Hand, Left Updated: 04/02/25 0314    Narrative:      The following orders were created for panel order CBC & Differential.  Procedure                               Abnormality         Status                     ---------                               -----------         ------                     CBC Auto Differential[[id number]]        Abnormal            Final result                 Please view results for these tests on the individual orders.    CBC Auto Differential [[id number]]  (Abnormal) Collected:  04/02/25 0246    Specimen: Blood from Hand, Left Updated: 04/02/25 0314     WBC 10.16 10*3/mm3      RBC 5.20 10*6/mm3      Hemoglobin 15.1 g/dL      Hematocrit 46.1 %      MCV 88.7 fL      MCH 29.0 pg      MCHC 32.8 g/dL      RDW 13.1 %      RDW-SD 42.3 fl      MPV 9.2 fL      Platelets 239 10*3/mm3      Neutrophil % 65.4 %      Lymphocyte % 18.9 %      Monocyte % 8.4 %      Eosinophil % 6.0 %      Basophil % 1.0 %      Immature Grans % 0.3 %      Neutrophils, Absolute 6.65 10*3/mm3      Lymphocytes, Absolute 1.92 10*3/mm3      Monocytes, Absolute 0.85 10*3/mm3      Eosinophils, Absolute 0.61 10*3/mm3      Basophils, Absolute 0.10 10*3/mm3      Immature Grans, Absolute 0.03 10*3/mm3      nRBC 0.0 /100 WBC     POC Glucose Once [[id number]]  (Abnormal) Collected: 04/01/25 2105    Specimen: Blood Updated: 04/01/25 2107     Glucose 140 mg/dL      Comment: Serial Number: [id number]:  [id number]       Lyme Disease Total Antibody With Reflex to Immunoassay [[id number]] Collected: 03/30/25 1430    Specimen: Blood from Arm, Left Updated: 04/01/25 1709     Lyme Total Antibody EIA Negative     Comment: Lyme antibodies not detected. Reflex testing is not indicated.  No laboratory evidence of infection with B. burgdorferi (Lyme disease).  Negative results may occur in patients recently infected (less than  or equal to 14 days) with B. burgdorferi.  If recent infection is  suspected, repeat testing on a new sample collected in 7 to 14 days is  recommended.       Narrative:      Performed at:  [address] [place] [address]  [id number]  : Jono Abrams PhD, Phone:  [phone]    POC Glucose Once [[id number]]  (Abnormal) Collected: 04/01/25 1635    Specimen: Blood Updated: 04/01/25 1637     Glucose 129 mg/dL      Comment: Serial Number: [id number]:  [id number]       POC Glucose Once [[id number]]  (Abnormal) Collected: 04/01/25 1151    Specimen: Blood Updated: 04/01/25 1154     Glucose 130  mg/dL      Comment: Serial Number: [id number]:  [id number]             Imaging Results (Last 24 Hours)       Procedure Component Value Units Date/Time    XR Chest 1 View [[id number]] Collected: 04/01/25 1807     Updated: 04/01/25 1810    Narrative:      XR CHEST 1 VW    Date of Exam: 4/1/2025 5:30 PM EDT    Indication: Post ICD / Pacer Implant    Comparison: 3/27/2025    Findings:  Left chest wall pacemaker/ICD. Skin staple line overlies the device. Electrodes appear to be in expected positions. No definite pneumothorax is identified. No pleural effusion or pulmonary infiltrate. Heart size and mediastinal contour appear within   normal limits and unchanged.      Impression:      Impression:  1.Left chest wall pacemaker/ICD.  2.No definite pneumothorax or other acute cardiopulmonary abnormality is identified.          Electronically Signed: Urbano Villavicencio    4/1/2025 6:08 PM EDT    Workstation ID: [id number]          Operative/Procedure Notes (last 24 hours)  Notes from 04/01/25 0939 through 04/02/25 0939   No notes of this type exist for this encounter.          Physician Progress Notes (last 24 hours)        Rima Salazar APRN at 04/01/25 1207          CARDIOLOGY PROGRESS NOTE:    Ismael Pulido  59 y.o.  male  1965  [id number]      Referring Provider: Hospitalist    Reason for follow-up: Ventricular tachycardia     Patient Care Team:  Sharon Silva MD as PCP - General (Internal Medicine & Pediatrics)  Rima Salazar APRN as Nurse Practitioner (Cardiology)    Subjective .  Patient seen and examined.  Labs and chart reviewed.  Patient denies chest pain, shortness of breath, palpitations, lightheadedness/dizziness.  His only complaint is mild surgical site pain following device upgrade this morning    Objective Patient lying in bed resting comfortably on room air with family at bedside.      Review of Systems   Constitutional: Negative for malaise/fatigue.   Cardiovascular:  Negative for chest  pain, dyspnea on exertion, leg swelling and palpitations.   Respiratory:  Negative for cough and shortness of breath.    Gastrointestinal:  Negative for abdominal pain, nausea and vomiting.   Neurological:  Negative for dizziness, focal weakness, headaches, light-headedness and numbness.   All other systems reviewed and are negative.      Allergies: Januvia [sitagliptin] and Lisinopril    Scheduled Meds:acetaminophen, 650 mg, Oral, Q8H   Or  acetaminophen, 650 mg, Oral, Q8H   Or  acetaminophen, 650 mg, Rectal, Q8H  [Held by provider] apixaban, 5 mg, Oral, Q12H  cetirizine, 10 mg, Oral, Daily  empagliflozin, 25 mg, Oral, Daily  insulin lispro, 2-7 Units, Subcutaneous, 4x Daily AC & at Bedtime  losartan, 50 mg, Oral, Q24H  [START ON 4/2/2025] metoprolol succinate XL, 100 mg, Oral, Q24H  pantoprazole, 40 mg, Oral, BID  rosuvastatin, 10 mg, Oral, Nightly  sodium chloride, 10 mL, Intravenous, Q12H  sodium chloride, 10 mL, Intravenous, Q12H  [START ON 4/2/2025] vancomycin, 1,000 mg, Intravenous, Once      Continuous Infusions:sodium chloride, 9 mL/hr      PRN Meds:.  acetaminophen **OR** acetaminophen    acetaminophen    aluminum-magnesium hydroxide-simethicone    senna-docusate sodium **AND** polyethylene glycol **AND** bisacodyl **AND** bisacodyl    Calcium Replacement - Follow Nurse / BPA Driven Protocol    dextrose    dextrose    glucagon (human recombinant)    HYDROcodone-acetaminophen    labetalol    Magnesium Low Dose Replacement - Follow Nurse / BPA Driven Protocol    nitroglycerin    ondansetron ODT **OR** ondansetron    Phosphorus Replacement - Follow Nurse / BPA Driven Protocol    Potassium Replacement - Follow Nurse / BPA Driven Protocol    [COMPLETED] Insert Peripheral IV **AND** sodium chloride    sodium chloride    sodium chloride    sodium chloride    sodium chloride    sodium chloride        VITAL SIGNS  Vitals:    04/01/25 1045 04/01/25 1050 04/01/25 1055 04/01/25 1100   BP: 91/69 106/71 121/68 105/77  "  BP Location:    Right arm   Patient Position:    Lying   Pulse: 90 89 92 80   Resp:    18   Temp:    95.9 °F (35.5 °C)   TempSrc:    Oral   SpO2: (!) 88% 91% 92% 93%   Weight:       Height:           Flowsheet Rows      Flowsheet Row First Filed Value   Admission Height 172.7 cm (68\") Documented at 03/27/2025 1428   Admission Weight 104 kg (229 lb 4.5 oz) Documented at 03/27/2025 1428             TELEMETRY: Ventricular pacemaker rhythm    Physical Exam:  Vitals reviewed.   Constitutional:       Appearance: Not in distress. Obese.   Eyes:      General: No scleral icterus.     Conjunctiva/sclera: Conjunctivae normal.   HENT:      Head: Normocephalic and atraumatic.   Neck:      Vascular: No carotid bruit or JVD.   Pulmonary:      Effort: Pulmonary effort is normal.      Breath sounds: Normal breath sounds. No wheezing. No rales.   Cardiovascular:      Normal rate. Regular rhythm.   Pulses:     Intact distal pulses.   Abdominal:      General: Bowel sounds are normal.      Palpations: Abdomen is soft.   Musculoskeletal:      Cervical back: Normal range of motion and neck supple. Skin:     General: Skin is warm and dry.      Findings: No rash.   Neurological:      General: No focal deficit present.      Mental Status: Alert and oriented to person, place and time.          Results Review:   I reviewed the patient's new clinical results.  Lab Results (last 24 hours)       Procedure Component Value Units Date/Time    POC Glucose Once [[id number]]  (Abnormal) Collected: 04/01/25 1151    Specimen: Blood Updated: 04/01/25 1154     Glucose 130 mg/dL      Comment: Serial Number: [id number]:  [id number]       Magnesium [[id number]]  (Normal) Collected: 04/01/25 0524    Specimen: Blood from Hand, Left Updated: 04/01/25 0612     Magnesium 2.2 mg/dL     Phosphorus [[id number]]  (Normal) Collected: 04/01/25 0524    Specimen: Blood from Hand, Left Updated: 04/01/25 0612     Phosphorus 4.2 mg/dL     Comprehensive Metabolic Panel " [[id number]]  (Abnormal) Collected: 04/01/25 0524    Specimen: Blood from Hand, Left Updated: 04/01/25 0612     Glucose 125 mg/dL      BUN 20 mg/dL      Creatinine 0.90 mg/dL      Sodium 137 mmol/L      Potassium 4.4 mmol/L      Chloride 101 mmol/L      CO2 23.4 mmol/L      Calcium 9.3 mg/dL      Total Protein 8.0 g/dL      Albumin 4.1 g/dL      ALT (SGPT) 23 U/L      AST (SGOT) 29 U/L      Alkaline Phosphatase 110 U/L      Total Bilirubin 0.5 mg/dL      Globulin 3.9 gm/dL      A/G Ratio 1.1 g/dL      BUN/Creatinine Ratio 22.2     Anion Gap 12.6 mmol/L      eGFR 98.4 mL/min/1.73     Narrative:      GFR Categories in Chronic Kidney Disease (CKD)      GFR Category          GFR (mL/min/1.73)    Interpretation  G1                     90 or greater         Normal or high (1)  G2                      60-89                Mild decrease (1)  G3a                   45-59                Mild to moderate decrease  G3b                   30-44                Moderate to severe decrease  G4                    15-29                Severe decrease  G5                    14 or less           Kidney failure          (1)In the absence of evidence of kidney disease, neither GFR category G1 or G2 fulfill the criteria for CKD.    eGFR calculation 2021 CKD-EPI creatinine equation, which does not include race as a factor    CBC & Differential [[id number]]  (Abnormal) Collected: 04/01/25 0524    Specimen: Blood from Hand, Left Updated: 04/01/25 0547    Narrative:      The following orders were created for panel order CBC & Differential.  Procedure                               Abnormality         Status                     ---------                               -----------         ------                     CBC Auto Differential[[id number]]        Abnormal            Final result                 Please view results for these tests on the individual orders.    CBC Auto Differential [[id number]]  (Abnormal) Collected: 04/01/25 0524    Specimen:  Blood from Hand, Left Updated: 04/01/25 0547     WBC 9.95 10*3/mm3      RBC 5.45 10*6/mm3      Hemoglobin 15.6 g/dL      Hematocrit 48.1 %      MCV 88.3 fL      MCH 28.6 pg      MCHC 32.4 g/dL      RDW 13.2 %      RDW-SD 42.3 fl      MPV 9.2 fL      Platelets 238 10*3/mm3      Neutrophil % 64.3 %      Lymphocyte % 19.1 %      Monocyte % 8.1 %      Eosinophil % 7.0 %      Basophil % 0.9 %      Immature Grans % 0.6 %      Neutrophils, Absolute 6.39 10*3/mm3      Lymphocytes, Absolute 1.90 10*3/mm3      Monocytes, Absolute 0.81 10*3/mm3      Eosinophils, Absolute 0.70 10*3/mm3      Basophils, Absolute 0.09 10*3/mm3      Immature Grans, Absolute 0.06 10*3/mm3      nRBC 0.0 /100 WBC     POC Glucose Once [[id number]]  (Normal) Collected: 03/31/25 2056    Specimen: Blood Updated: 03/31/25 2057     Glucose 103 mg/dL      Comment: Serial Number: [id number]:  [id number]       POC Glucose Once [[id number]]  (Abnormal) Collected: 03/31/25 1640    Specimen: Blood Updated: 03/31/25 1643     Glucose 131 mg/dL      Comment: Serial Number: [id number]:  [id number]       Angiotensin Converting Enzyme [[id number]] Collected: 03/27/25 1824    Specimen: Blood Updated: 03/31/25 1611     Angiotensin Converting Enzyme 77 U/L     Narrative:      Performed at:  [address] [place] [address]  [id number]  : Jono Abrams PhD, Phone:  [phone]            Imaging Results (Last 24 Hours)       ** No results found for the last 24 hours. **            EKG      I personally viewed and interpreted the patient's EKG/Telemetry data:    ECHOCARDIOGRAM:  Results for orders placed during the hospital encounter of 03/27/25    Adult Transthoracic Echo Complete W/ Cont if Necessary Per Protocol    Interpretation Summary    Left ventricular ejection fraction appears to be 46 - 50%.    Left ventricular wall thickness is consistent with mild concentric hypertrophy.    Left ventricular diastolic function is consistent  with (grade I) impaired relaxation.    Moderately reduced right ventricular systolic function noted.    The right ventricular cavity is moderately dilated.    The left atrial cavity is mild to moderately dilated.       STRESS MYOVIEW:  Results for orders placed during the hospital encounter of 02/18/25    Stress Test With Myocardial Perfusion One Day    Interpretation Summary    Myocardial perfusion imaging indicates a normal myocardial perfusion study with no evidence of ischemia. Impressions are consistent with a low risk study.    Left ventricular ejection fraction is normal (Calculated EF = 64%).       CARDIAC CATHETERIZATION:  Results for orders placed during the hospital encounter of 03/27/25    Cardiac Catheterization/Vascular Study (Needs Review)       OTHER:         Assessment & Plan     Ventricular tachycardia  Patient presented with sustained ventricular tachycardia  Status post cardioversion  EP consulted   Echocardiogram with midrange LVEF of 46 to 50%, grade 1 diastolic dysfunction and moderately reduced RV function  Cardiac catheterization with coronary arteries angiographically free of disease  Status post BiV ICD upgrade   Beta blocker  Cardiac MRI in future to evaluate for sarcoidosis in presence of normal coronaries     Sick sinus syndrome  Status post biventricular pacemaker 2/21/2025    PFO  SAWYER 3/12 with small PFO   Recent admission at [facility] for splenic infarct thought to be secondary to PFO/paradoxical emboli  Eliquis for anticoagulation, restart tomorrow     Diabetes  Jardiance  SSI  A1c 7.0%    Hypertension  Blood pressure stable  Losartan 50 mg, metoprolol succinate 50 mg    Hyperlipidemia  Statin therapy    I discussed the patients findings and my recommendations with patient and nurse    TAMIKO Briceno  04/01/25  12:17 EDT                Electronically signed by Rima Salazar APRN at 04/01/25 1221       Consult Notes (last 24 hours)  Notes from 04/01/25 0939 through 04/02/25  0939   No notes of this type exist for this encounter.       Discharge Summary    No notes of this type exist for this encounter.          Juan Pagan, RN   Registered Nurse  Nursing     Plan of Care     Addendum     Date of Service: 04/02/25 0438  Creation Time: 04/02/25 0438       Goal Outcome Evaluation:  C/o pain at pacemaker incision site.  Roxicodone 5mg PO given x 1 with relief.    Denies shortness of air.  Wore home CPAP.  BP and HR WNL.  Blood sugar 140.  Bedrest until 06:00 this morning.    900 ml urine out during the shift.  Patient rested well during the night.                      Revision History

## 2025-04-02 NOTE — DISCHARGE SUMMARY
"             [facility] Medicine Services  Discharge Summary    Date of Service: 2025  Patient Name: Ismael Pulido  : 1965  MRN: [id number]    Date of Admission: 3/27/2025  Discharge Diagnosis: #Ventricular tachycardia  #PFO  Date of Discharge: 2025  Primary Care Physician: Sharon Silva MD      Presenting Problem:   Ventricular tachycardia [I47.20]  Wide-complex tachycardia [R00.0]    Active and Resolved Hospital Problems:  Active Hospital Problems    Diagnosis POA    **Wide-complex tachycardia [R00.0] Yes    PAF (paroxysmal atrial fibrillation) [I48.0] Yes    Ventricular tachycardia [I47.20] Yes    Status post placement of cardiac pacemaker [Z95.0] Yes    Intermittent complete heart block [I44.2] Yes    Mobitz type 2 second degree atrioventricular block [I44.1] Yes    Primary hypertension [I10] Yes    Obstructive sleep apnea syndrome [G47.33] Yes      Resolved Hospital Problems   No resolved problems to display.         Hospital Course     HPI:  Admitting team HPI   59 y.o. old male patient with PMH of PPM placement, DM-II, HTN, HLD, PFO, and recent splenic infarction presents to the hospital with complaints of heart racing and feeling \"funny\" in his chest.       He was recently in [facility] for splenic infarction thought 2/2 his PFO.  He was started on Eliquis on 3/14.    Hospital Course:  #Ventricular tachycardia  #PFO  #SSS status post biventricular pacer  #Hypertension  Status post cardioversion in emergency room 3/27  Seen by Cardiology and EP   Status post cardiac cath 3/30.  Normal coronaries  TTE with mildly reduced EF 46-50     Status post biventricular pacer upgrade to biventricular ICD . Device interrogated .  Found to be functioning appropriately  Resume Eliquis on discharge   H/H stable   Currently on Toprol 100 mg daily  BP soft. Decrease losartan to 25 mg QD. Continue Jardiance 25 mg daily  Continue statins     #History of recent splenic " infarct  Thought to be cardioembolic in etiology  Resume Eliquis on discharge  Seen by hematology.  Reported family history of factor V Leyden mutation.  Factor V  levels within normal limits     #DM2  Continue home regimen  A1c 7        DISCHARGE Follow Up Recommendations for labs and diagnostics:   Follow up with cardiology and EP         Day of Discharge     Vital Signs:  Temp:  [95.9 °F (35.5 °C)-98.4 °F (36.9 °C)] 97 °F (36.1 °C)  Heart Rate:  [68-97] 89  Resp:  [16-18] 18  BP: ()/(68-88) 108/71    Physical Exam:  Physical Exam   AOx3 NAD  RRR S1-S2 audible  L ant chest ICD in placed. No bleeding  Lungs with fair air entry  Abdomen soft nontender nondistended          Pertinent  and/or Most Recent Results     LAB RESULTS:      Lab 04/02/25  0246 04/01/25  0524 03/31/25  0035 03/30/25  0406 03/29/25  0510 03/28/25  0517 03/27/25  1432   WBC 10.16 9.95 9.57 10.01 8.67   < > 11.34*   HEMOGLOBIN 15.1 15.6 15.8 15.6 15.0   < > 15.8   HEMATOCRIT 46.1 48.1 47.9 48.2 46.3   < > 49.4   PLATELETS 239 238 228 204 193   < > 255   NEUTROS ABS 6.65 6.39 5.33 6.25 5.46   < > 6.87   IMMATURE GRANS (ABS) 0.03 0.06* 0.04 0.06* 0.05   < > 0.20*   LYMPHS ABS 1.92 1.90 2.54 2.08 1.87   < > 2.83   MONOS ABS 0.85 0.81 0.95* 0.89 0.72   < > 0.71   EOS ABS 0.61* 0.70* 0.61* 0.63* 0.48*   < > 0.58*   MCV 88.7 88.3 87.7 88.4 88.4   < > 89.8   SED RATE  --   --   --   --  46*  --   --    PROTIME  --   --   --   --   --   --  15.3*   APTT  --   --   --   --   --   --  29.6    < > = values in this interval not displayed.         Lab 04/02/25  0246 04/01/25  0524 03/31/25  0035 03/30/25  0406 03/29/25  0510   SODIUM 137 137 138 134* 137   POTASSIUM 4.3 4.4 4.1 4.3 4.2   CHLORIDE 102 101 101 99 101   CO2 22.7 23.4 22.7 21.1* 23.3   ANION GAP 12.3 12.6 14.3 13.9 12.7   BUN 20 20 17 15 14   CREATININE 0.91 0.90 0.89 0.94 0.93   EGFR 97.1 98.4 98.7 93.4 94.6   GLUCOSE 116* 125* 120* 117* 136*   CALCIUM 9.3 9.3 9.7 9.6 9.5   MAGNESIUM 2.2  2.2 2.4 2.1 2.1   PHOSPHORUS 4.4 4.2 4.4 4.2 4.6*   HEMOGLOBIN A1C  --   --   --  7.00*  --          Lab 04/02/25  0246 04/01/25  0524 03/31/25  0035 03/30/25  0406 03/29/25  0510   TOTAL PROTEIN 7.8 8.0 7.9 7.8 7.7   ALBUMIN 3.9 4.1 4.2 3.9 3.9   GLOBULIN 3.9 3.9 3.7 3.9 3.8   ALT (SGPT) 24 23 33 36 42*   AST (SGOT) 23 29 27 36 44*   BILIRUBIN 0.4 0.5 0.3 0.5 0.4   ALK PHOS 103 110 109 106 108         Lab 03/27/25  1545 03/27/25  1432   HSTROP T 42* 40*   PROTIME  --  15.3*   INR  --  1.22*         Lab 03/28/25  0517   CHOLESTEROL 124   LDL CHOL 61   HDL CHOL 31*   TRIGLYCERIDES 191*             Brief Urine Lab Results  (Last result in the past 365 days)        Color   Clarity   Blood   Leuk Est   Nitrite   Protein   CREAT   Urine HCG        03/09/25 2028 Yellow   Clear   Negative   Negative   Negative   Trace                 Microbiology Results (last 10 days)       Procedure Component Value - Date/Time    MRSA Screen, PCR (Inpatient) - Swab, Nares [[id number]]  (Normal) Collected: 03/27/25 1514    Lab Status: Final result Specimen: Swab from Nares Updated: 03/27/25 1633     MRSA PCR No MRSA Detected    Narrative:      The negative predictive value of this diagnostic test is high and should only be used to consider de-escalating anti-MRSA therapy. A positive result may indicate colonization with MRSA and must be correlated clinically.    Respiratory Panel PCR w/COVID-19(SARS-CoV-2) [facility] In-House, NP Swab in UTM/VTM, 2 HR TAT - Swab, Nasopharynx [[id number]]  (Normal) Collected: 03/27/25 1514    Lab Status: Final result Specimen: Swab from Nasopharynx Updated: 03/27/25 1608     ADENOVIRUS, PCR Not Detected     Coronavirus 229E Not Detected     Coronavirus HKU1 Not Detected     Coronavirus NL63 Not Detected     Coronavirus OC43 Not Detected     COVID19 Not Detected     Human Metapneumovirus Not Detected     Human Rhinovirus/Enterovirus Not Detected     Influenza A PCR Not Detected     Influenza B  PCR Not Detected     Parainfluenza Virus 1 Not Detected     Parainfluenza Virus 2 Not Detected     Parainfluenza Virus 3 Not Detected     Parainfluenza Virus 4 Not Detected     RSV, PCR Not Detected     Bordetella pertussis pcr Not Detected     Bordetella parapertussis PCR Not Detected     Chlamydophila pneumoniae PCR Not Detected     Mycoplasma pneumo by PCR Not Detected    Narrative:      In the setting of a positive respiratory panel with a viral infection PLUS a negative procalcitonin without other underlying concern for bacterial infection, consider observing off antibiotics or discontinuation of antibiotics and continue supportive care. If the respiratory panel is positive for atypical bacterial infection (Bordetella pertussis, Chlamydophila pneumoniae, or Mycoplasma pneumoniae), consider antibiotic de-escalation to target atypical bacterial infection.            XR Chest 1 View  Result Date: 4/1/2025  Impression: Impression: 1.Left chest wall pacemaker/ICD. 2.No definite pneumothorax or other acute cardiopulmonary abnormality is identified. Electronically Signed: Urbano Villavicencio  4/1/2025 6:08 PM EDT  Workstation ID: [id number]    XR Chest 1 View  Result Date: 3/27/2025  Impression: Impression: No convincing acute cardiopulmonary abnormality. Pleural effusions on recent chest CT are not visualized on today's radiograph. Electronically Signed: Nicholas Becerra MD  3/27/2025 3:26 PM EDT  Workstation ID: [id number]    CT Angiogram Abdomen Pelvis  Addendum Date: 3/12/2025  Call Report: Dr. Jane was notified by telephone of the above findings on March 12, 2025 at 7:12 p.m.  This report was finalized on 3/12/2025 7:12 PM by Dr. Ismael Romano M.D on Workstation: [id number]      Result Date: 3/12/2025  Impression:  1. Wedge-shaped area of hypoattenuation in the superior spleen, consistent with a splenic infarct. No arterial source for the infarct seen. 2. Poor opacification the pulmonary arterial system however there  is suspicion for segmental/subsegmental pulmonary embolism. Follow-up CTA chest could better evaluate. 3. Mild left and small right pleural effusions are stable, with adjacent passive partial atelectasis in the left lower lobe. 4. Stable mild mediastinal lymphadenopathy. 5. Small amount of free fluid seen in the abdomen and pelvis. 6. Colonic diverticulosis  This report was finalized on 3/12/2025 6:55 PM by Dr. Ismael Romano M.D on Workstation: [id number]      CT Angiogram Chest  Addendum Date: 3/12/2025  Call Report: Dr. Jane was notified by telephone of the above findings on March 12, 2025 at 7:12 p.m.  This report was finalized on 3/12/2025 7:12 PM by Dr. Ismael Romano M.D on Workstation: [id number]      Result Date: 3/12/2025  Impression:  1. Wedge-shaped area of hypoattenuation in the superior spleen, consistent with a splenic infarct. No arterial source for the infarct seen. 2. Poor opacification the pulmonary arterial system however there is suspicion for segmental/subsegmental pulmonary embolism. Follow-up CTA chest could better evaluate. 3. Mild left and small right pleural effusions are stable, with adjacent passive partial atelectasis in the left lower lobe. 4. Stable mild mediastinal lymphadenopathy. 5. Small amount of free fluid seen in the abdomen and pelvis. 6. Colonic diverticulosis  This report was finalized on 3/12/2025 6:55 PM by Dr. Ismael Romano M.D on Workstation: [id number]      CT Abdomen Pelvis With Contrast  Result Date: 3/9/2025  Impression:  Small wedge-shaped areas of nonenhancement in the splenic upper pole, involving 10% or less of total splenic volume, 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  No other evidence of acute organic injury, no CT evidence of acute vascular abnormality.  Sigmoid diverticulosis without evidence of diverticulitis.     This report was finalized on 3/9/2025 11:19 PM by Dr. Joesph Dolan M.D on Workstation: [id number]        Results for  orders placed during the hospital encounter of 02/25/25    Duplex Venous Upper Extremity - Right CAR    Interpretation Summary    There is no acute deep vein thrombosis in the right upper extremity.    Acute superficial vein thrombosis involving the right cephalic and basilic veins, associated with PICC line.    All other right sided vessels appear normal.      Results for orders placed during the hospital encounter of 02/25/25    Duplex Venous Upper Extremity - Right CAR    Interpretation Summary    There is no acute deep vein thrombosis in the right upper extremity.    Acute superficial vein thrombosis involving the right cephalic and basilic veins, associated with PICC line.    All other right sided vessels appear normal.      Results for orders placed during the hospital encounter of 03/27/25    Adult Transthoracic Echo Complete W/ Cont if Necessary Per Protocol    Interpretation Summary    Left ventricular ejection fraction appears to be 46 - 50%.    Left ventricular wall thickness is consistent with mild concentric hypertrophy.    Left ventricular diastolic function is consistent with (grade I) impaired relaxation.    Moderately reduced right ventricular systolic function noted.    The right ventricular cavity is moderately dilated.    The left atrial cavity is mild to moderately dilated.      Labs Pending at Discharge:  Pending Results       None            Procedures Performed  Procedure(s):  Biventricular Device Upgrade-[facility]         Consults:   Consults       Date and Time Order Name Status Description    3/28/2025  8:47 AM Inpatient Hospitalist Consult Completed     3/27/2025  4:27 PM Hematology & Oncology Inpatient Consult Completed     3/27/2025  3:09 PM Inpatient Cardiology Consult Completed     3/27/2025  2:57 PM Cardiology (on-call MD unless specified) Completed     3/27/2025  2:57 PM Intensivist (on-call MD unless specified)      3/12/2025 11:28 AM Inpatient Infectious Diseases Consult  Completed     3/10/2025  8:07 AM Inpatient Cardiology Consult Completed     3/10/2025  1:20 AM Inpatient Vascular Surgery Consult Completed     3/10/2025  1:20 AM Inpatient Gastroenterology Consult Completed     2/26/2025  1:56 PM Inpatient Hospitalist Consult Completed     2/26/2025  9:39 AM Inpatient Cardiology Consult Completed     2/26/2025  8:05 AM Inpatient Gastroenterology Consult Completed               Discharge Details        Discharge Medications        New Medications        Instructions Start Date   HYDROcodone-acetaminophen 5-325 MG per tablet  Commonly known as: NORCO   1 tablet, Oral, Every 6 Hours PRN      metoprolol succinate  MG 24 hr tablet  Commonly known as: TOPROL-XL   100 mg, Oral, Every 24 Hours Scheduled             Changes to Medications        Instructions Start Date   losartan 25 MG tablet  Commonly known as: COZAAR  What changed:   medication strength  how much to take   25 mg, Oral, Daily             Continue These Medications        Instructions Start Date   Claritin 10 MG tablet  Generic drug: loratadine   10 mg, Daily      clotrimazole 1 % cream  Commonly known as: LOTRIMIN   1 Application, Topical, 2 Times Daily      [employer] Vitamin C 1000 MG tablet  Generic drug: ascorbic acid   4,000 mg, Daily      Eliquis 5 MG tablet tablet  Generic drug: apixaban   5 mg, Oral, Every 12 Hours Scheduled      Jardiance 25 MG tablet tablet  Generic drug: empagliflozin   25 mg, Oral, Daily      NEURIVA PO   Daily      NON FORMULARY   Daily      NON FORMULARY   Daily      pantoprazole 40 MG EC tablet  Commonly known as: PROTONIX   40 mg, Oral, 2 Times Daily      rosuvastatin 10 MG tablet  Commonly known as: CRESTOR   10 mg, Oral, Daily             Stop These Medications      CORICIDIN HBP PO              Allergies   Allergen Reactions    Januvia [Sitagliptin] Hives    Lisinopril Nausea And Vomiting         Discharge Disposition:   Home or Self Care    Diet:  Hospital:  Diet Order   Procedures     Diet: Cardiac, Diabetic; Healthy Heart (2-3 Na+); Consistent Carbohydrate; Fluid Consistency: Thin (IDDSI 0)         Discharge Activity:         CODE STATUS:  Code Status and Medical Interventions: CPR (Attempt to Resuscitate); Full Support   Ordered at: 03/27/25 1547     Code Status (Patient has no pulse and is not breathing):    CPR (Attempt to Resuscitate)     Medical Interventions (Patient has pulse or is breathing):    Full Support     Level Of Support Discussed With:    Patient         Future Appointments   Date Time Provider Department Center   4/10/2025  9:30 AM Rima Salazar APRN [facility]   5/9/2025 11:00 AM Sharon Silva MD [facility]           Time spent on Discharge including face to face service:  45 minutes    Signature: Electronically signed by Nils Brewster MD, 04/02/25, 10:37 EDT.  [facility] Hospitalist Team

## 2025-04-02 NOTE — PLAN OF CARE
Goal Outcome Evaluation:        C/o pain at pacemaker incision site.  Roxicodone 5mg PO given x 1 with relief.    Denies shortness of air.  Wore home CPAP.  BP and HR WNL.  Blood sugar 140.  Bedrest until 06:00 this morning.    900 ml urine out during the shift.  Patient rested well during the night.

## 2025-04-02 NOTE — SIGNIFICANT NOTE
Patient was seen this morning and reports doing well.  He has ice to his implant site, dressing is clean and dry.  No swelling or bruising noted.  Discharge instructions were reviewed with the patient and he verbalized understanding.  Patient will require genetic testing which can be scheduled at follow-up.  No antibiotic required for surgical prophylaxis at discharge.  Continue Toprol- mg daily.  Follow-up appointment scheduled in our office with NP when Dr. Loco also present for wound check, device check and staple removal.

## 2025-04-02 NOTE — OUTREACH NOTE
Prep Survey      Flowsheet Row Responses   [facility] patient discharged from? [facility]   Is LACE score < 7 ? No   Eligibility [facility]   Date of Admission 03/27/25   Date of Discharge 04/02/25   Discharge Disposition Home or Self Care   Discharge diagnosis Ventricular tachycardia   Does the patient have one of the following disease processes/diagnoses(primary or secondary)? Other   Does the patient have Home health ordered? No   Is there a DME ordered? No   Prep survey completed? Yes            LUIS RODRIGUEZ - Registered Nurse

## 2025-04-03 ENCOUNTER — TRANSITIONAL CARE MANAGEMENT TELEPHONE ENCOUNTER (OUTPATIENT)
Dept: CALL CENTER | Facility: HOSPITAL | Age: 60
End: 2025-04-03
Payer: COMMERCIAL

## 2025-04-03 NOTE — OUTREACH NOTE
Call Center TCM Note      Flowsheet Row Responses   [facility] patient discharged from? [facility]   Does the patient have one of the following disease processes/diagnoses(primary or secondary)? Other   TCM attempt successful? Yes   Call start time 1018   Call end time 1019   Discharge diagnosis Ventricular tachycardia   Meds reviewed with patient/caregiver? Yes   Is the patient having any side effects they believe may be caused by any medication additions or changes? No   Does the patient have all medications ordered at discharge? Yes   Is the patient taking all medications as directed (includes completed medication regime)? Yes   Does the patient have an appointment with their PCP within 7-14 days of discharge? No   Nursing Interventions Patient declined scheduling/rescheduling appointment at this time   Has home health visited the patient within 72 hours of discharge? N/A   Psychosocial issues? No   Did the patient receive a copy of their discharge instructions? Yes   Nursing interventions Reviewed instructions with patient   What is the patient's perception of their health status since discharge? Improving   Is the patient/caregiver able to teach back the hierarchy of who to call/visit for symptoms/problems? PCP, Specialist, Home health nurse, Urgent Care, ED, 911 Yes   TCM call completed? Yes   Call end time 1019   Would this patient benefit from a Referral to Amb Social Work? No   Is the patient interested in additional calls from an ambulatory ? No            Latosha HAYES - Registered Nurse    4/3/2025, 10:19 EDT

## 2025-04-03 NOTE — PAYOR COMM NOTE
"This is discharge notification for Ismael Pulido   Reference/Auth # [id number]   Pt discharged on 4/2/25    Pending one additional day approval    Paradise Ritter RN, BSN  Utilization Review Nurse  [facility]  Direct & confidential phone # [phone]  Fax # [phone]      Ismael Pulido (59 y.o. Male)       Date of Birth   1965    Social Security Number       Address   [address] [place] [address]    Home Phone   [phone]    MRN   [id number]       Mosque   Hindu    Marital Status                               Admission Date   3/27/2025    Admission Type   Emergency    Admitting Provider   Kristian Thurston MD    Attending Provider       Department, Room/Bed   [facility] 2D, 259/1       Discharge Date   4/2/2025    Discharge Disposition   Home or Self Care    Discharge Destination                                 Attending Provider: (none)   Allergies: Januvia [Sitagliptin], Lisinopril    Isolation: None   Infection: None   Code Status: Prior    Ht: 172.7 cm (68\")   Wt: 101 kg (223 lb 12.3 oz)    Admission Cmt: None   Principal Problem: Wide-complex tachycardia [R00.0]                   Active Insurance as of 3/27/2025       Primary Coverage       Payor Plan Insurance Group Employer/Plan Group    [employer] COMMERCIAL AETNA [id number]       Payor Plan Address Payor Plan Phone Number Payor Plan Fax Number Effective Dates    [address] [phone]  3/25/2021 - None Entered    [facility] [id number]         Subscriber Name Subscriber Birth Date Member ID       ISMAEL PULIDO 1965 [id number]                     Emergency Contacts        (Rel.) Home Phone Work Phone Mobile Phone    KELL PULIDO (Spouse) [phone] -- [phone]                 Discharge Summary        Nils Brewster MD at 04/02/25 [address] [place] [address] Medicine Services  Discharge Summary    Date of Service: " "2025  Patient Name: Ismael Pulido  : 1965  MRN: [id number]    Date of Admission: 3/27/2025  Discharge Diagnosis: #Ventricular tachycardia  #PFO  Date of Discharge: 2025  Primary Care Physician: Sharon Silva MD      Presenting Problem:   Ventricular tachycardia [I47.20]  Wide-complex tachycardia [R00.0]    Active and Resolved Hospital Problems:  Active Hospital Problems    Diagnosis POA    **Wide-complex tachycardia [R00.0] Yes    PAF (paroxysmal atrial fibrillation) [I48.0] Yes    Ventricular tachycardia [I47.20] Yes    Status post placement of cardiac pacemaker [Z95.0] Yes    Intermittent complete heart block [I44.2] Yes    Mobitz type 2 second degree atrioventricular block [I44.1] Yes    Primary hypertension [I10] Yes    Obstructive sleep apnea syndrome [G47.33] Yes      Resolved Hospital Problems   No resolved problems to display.         Hospital Course     HPI:  Admitting team HPI   59 y.o. old male patient with PMH of PPM placement, DM-II, HTN, HLD, PFO, and recent splenic infarction presents to the hospital with complaints of heart racing and feeling \"funny\" in his chest.       He was recently in [facility] for splenic infarction thought 2/2 his PFO.  He was started on Eliquis on 3/14.    Hospital Course:  #Ventricular tachycardia  #PFO  #SSS status post biventricular pacer  #Hypertension  Status post cardioversion in emergency room 3/27  Seen by Cardiology and EP   Status post cardiac cath 3/30.  Normal coronaries  TTE with mildly reduced EF 46-50     Status post biventricular pacer upgrade to biventricular ICD . Device interrogated .  Found to be functioning appropriately  Resume Eliquis on discharge   H/H stable   Currently on Toprol 100 mg daily  BP soft. Decrease losartan to 25 mg QD. Continue Jardiance 25 mg daily  Continue statins     #History of recent splenic infarct  Thought to be cardioembolic in etiology  Resume Eliquis on discharge  Seen by " hematology.  Reported family history of factor V Leyden mutation.  Factor V  levels within normal limits     #DM2  Continue home regimen  A1c 7        DISCHARGE Follow Up Recommendations for labs and diagnostics:   Follow up with cardiology and EP         Day of Discharge     Vital Signs:  Temp:  [95.9 °F (35.5 °C)-98.4 °F (36.9 °C)] 97 °F (36.1 °C)  Heart Rate:  [68-97] 89  Resp:  [16-18] 18  BP: ()/(68-88) 108/71    Physical Exam:  Physical Exam   AOx3 NAD  RRR S1-S2 audible  L ant chest ICD in placed. No bleeding  Lungs with fair air entry  Abdomen soft nontender nondistended          Pertinent  and/or Most Recent Results     LAB RESULTS:      Lab 04/02/25 0246 04/01/25 0524 03/31/25  0035 03/30/25  0406 03/29/25  0510 03/28/25  0517 03/27/25  1432   WBC 10.16 9.95 9.57 10.01 8.67   < > 11.34*   HEMOGLOBIN 15.1 15.6 15.8 15.6 15.0   < > 15.8   HEMATOCRIT 46.1 48.1 47.9 48.2 46.3   < > 49.4   PLATELETS 239 238 228 204 193   < > 255   NEUTROS ABS 6.65 6.39 5.33 6.25 5.46   < > 6.87   IMMATURE GRANS (ABS) 0.03 0.06* 0.04 0.06* 0.05   < > 0.20*   LYMPHS ABS 1.92 1.90 2.54 2.08 1.87   < > 2.83   MONOS ABS 0.85 0.81 0.95* 0.89 0.72   < > 0.71   EOS ABS 0.61* 0.70* 0.61* 0.63* 0.48*   < > 0.58*   MCV 88.7 88.3 87.7 88.4 88.4   < > 89.8   SED RATE  --   --   --   --  46*  --   --    PROTIME  --   --   --   --   --   --  15.3*   APTT  --   --   --   --   --   --  29.6    < > = values in this interval not displayed.         Lab 04/02/25 0246 04/01/25 0524 03/31/25  0035 03/30/25  0406 03/29/25  0510   SODIUM 137 137 138 134* 137   POTASSIUM 4.3 4.4 4.1 4.3 4.2   CHLORIDE 102 101 101 99 101   CO2 22.7 23.4 22.7 21.1* 23.3   ANION GAP 12.3 12.6 14.3 13.9 12.7   BUN 20 20 17 15 14   CREATININE 0.91 0.90 0.89 0.94 0.93   EGFR 97.1 98.4 98.7 93.4 94.6   GLUCOSE 116* 125* 120* 117* 136*   CALCIUM 9.3 9.3 9.7 9.6 9.5   MAGNESIUM 2.2 2.2 2.4 2.1 2.1   PHOSPHORUS 4.4 4.2 4.4 4.2 4.6*   HEMOGLOBIN A1C  --   --   --  7.00*   --          Lab 04/02/25  0246 04/01/25  0524 03/31/25  0035 03/30/25  0406 03/29/25  0510   TOTAL PROTEIN 7.8 8.0 7.9 7.8 7.7   ALBUMIN 3.9 4.1 4.2 3.9 3.9   GLOBULIN 3.9 3.9 3.7 3.9 3.8   ALT (SGPT) 24 23 33 36 42*   AST (SGOT) 23 29 27 36 44*   BILIRUBIN 0.4 0.5 0.3 0.5 0.4   ALK PHOS 103 110 109 106 108         Lab 03/27/25  1545 03/27/25  1432   HSTROP T 42* 40*   PROTIME  --  15.3*   INR  --  1.22*         Lab 03/28/25  0517   CHOLESTEROL 124   LDL CHOL 61   HDL CHOL 31*   TRIGLYCERIDES 191*             Brief Urine Lab Results  (Last result in the past 365 days)        Color   Clarity   Blood   Leuk Est   Nitrite   Protein   CREAT   Urine HCG        03/09/25 2028 Yellow   Clear   Negative   Negative   Negative   Trace                 Microbiology Results (last 10 days)       Procedure Component Value - Date/Time    MRSA Screen, PCR (Inpatient) - Swab, Nares [[id number]]  (Normal) Collected: 03/27/25 1514    Lab Status: Final result Specimen: Swab from Nares Updated: 03/27/25 1633     MRSA PCR No MRSA Detected    Narrative:      The negative predictive value of this diagnostic test is high and should only be used to consider de-escalating anti-MRSA therapy. A positive result may indicate colonization with MRSA and must be correlated clinically.    Respiratory Panel PCR w/COVID-19(SARS-CoV-2) [facility] In-House, NP Swab in UTM/VTM, 2 HR TAT - Swab, Nasopharynx [[id number]]  (Normal) Collected: 03/27/25 1514    Lab Status: Final result Specimen: Swab from Nasopharynx Updated: 03/27/25 1608     ADENOVIRUS, PCR Not Detected     Coronavirus 229E Not Detected     Coronavirus HKU1 Not Detected     Coronavirus NL63 Not Detected     Coronavirus OC43 Not Detected     COVID19 Not Detected     Human Metapneumovirus Not Detected     Human Rhinovirus/Enterovirus Not Detected     Influenza A PCR Not Detected     Influenza B PCR Not Detected     Parainfluenza Virus 1 Not Detected     Parainfluenza Virus 2 Not  Detected     Parainfluenza Virus 3 Not Detected     Parainfluenza Virus 4 Not Detected     RSV, PCR Not Detected     Bordetella pertussis pcr Not Detected     Bordetella parapertussis PCR Not Detected     Chlamydophila pneumoniae PCR Not Detected     Mycoplasma pneumo by PCR Not Detected    Narrative:      In the setting of a positive respiratory panel with a viral infection PLUS a negative procalcitonin without other underlying concern for bacterial infection, consider observing off antibiotics or discontinuation of antibiotics and continue supportive care. If the respiratory panel is positive for atypical bacterial infection (Bordetella pertussis, Chlamydophila pneumoniae, or Mycoplasma pneumoniae), consider antibiotic de-escalation to target atypical bacterial infection.            XR Chest 1 View  Result Date: 4/1/2025  Impression: Impression: 1.Left chest wall pacemaker/ICD. 2.No definite pneumothorax or other acute cardiopulmonary abnormality is identified. Electronically Signed: Urbano Villavicencio  4/1/2025 6:08 PM EDT  Workstation ID: [id number]    XR Chest 1 View  Result Date: 3/27/2025  Impression: Impression: No convincing acute cardiopulmonary abnormality. Pleural effusions on recent chest CT are not visualized on today's radiograph. Electronically Signed: Nicholas Becerra MD  3/27/2025 3:26 PM EDT  Workstation ID: [id number]    CT Angiogram Abdomen Pelvis  Addendum Date: 3/12/2025  Call Report: Dr. Jane was notified by telephone of the above findings on March 12, 2025 at 7:12 p.m.  This report was finalized on 3/12/2025 7:12 PM by Dr. Ismael Romano M.D on Workstation: [id number]      Result Date: 3/12/2025  Impression:  1. Wedge-shaped area of hypoattenuation in the superior spleen, consistent with a splenic infarct. No arterial source for the infarct seen. 2. Poor opacification the pulmonary arterial system however there is suspicion for segmental/subsegmental pulmonary embolism. Follow-up CTA chest could  better evaluate. 3. Mild left and small right pleural effusions are stable, with adjacent passive partial atelectasis in the left lower lobe. 4. Stable mild mediastinal lymphadenopathy. 5. Small amount of free fluid seen in the abdomen and pelvis. 6. Colonic diverticulosis  This report was finalized on 3/12/2025 6:55 PM by Dr. Ismael Romano M.D on Workstation: [id number]      CT Angiogram Chest  Addendum Date: 3/12/2025  Call Report: Dr. Jane was notified by telephone of the above findings on March 12, 2025 at 7:12 p.m.  This report was finalized on 3/12/2025 7:12 PM by Dr. Ismael Romano M.D on Workstation: [id number]      Result Date: 3/12/2025  Impression:  1. Wedge-shaped area of hypoattenuation in the superior spleen, consistent with a splenic infarct. No arterial source for the infarct seen. 2. Poor opacification the pulmonary arterial system however there is suspicion for segmental/subsegmental pulmonary embolism. Follow-up CTA chest could better evaluate. 3. Mild left and small right pleural effusions are stable, with adjacent passive partial atelectasis in the left lower lobe. 4. Stable mild mediastinal lymphadenopathy. 5. Small amount of free fluid seen in the abdomen and pelvis. 6. Colonic diverticulosis  This report was finalized on 3/12/2025 6:55 PM by Dr. Ismael Romano M.D on Workstation: [id number]      CT Abdomen Pelvis With Contrast  Result Date: 3/9/2025  Impression:  Small wedge-shaped areas of nonenhancement in the splenic upper pole, involving 10% or less of total splenic volume, 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  No other evidence of acute organic injury, no CT evidence of acute vascular abnormality.  Sigmoid diverticulosis without evidence of diverticulitis.     This report was finalized on 3/9/2025 11:19 PM by Dr. Joesph Dolan M.D on Workstation: [id number]        Results for orders placed during the hospital encounter of 02/25/25    Duplex Venous Upper Extremity  - Right CAR    Interpretation Summary    There is no acute deep vein thrombosis in the right upper extremity.    Acute superficial vein thrombosis involving the right cephalic and basilic veins, associated with PICC line.    All other right sided vessels appear normal.      Results for orders placed during the hospital encounter of 02/25/25    Duplex Venous Upper Extremity - Right CAR    Interpretation Summary    There is no acute deep vein thrombosis in the right upper extremity.    Acute superficial vein thrombosis involving the right cephalic and basilic veins, associated with PICC line.    All other right sided vessels appear normal.      Results for orders placed during the hospital encounter of 03/27/25    Adult Transthoracic Echo Complete W/ Cont if Necessary Per Protocol    Interpretation Summary    Left ventricular ejection fraction appears to be 46 - 50%.    Left ventricular wall thickness is consistent with mild concentric hypertrophy.    Left ventricular diastolic function is consistent with (grade I) impaired relaxation.    Moderately reduced right ventricular systolic function noted.    The right ventricular cavity is moderately dilated.    The left atrial cavity is mild to moderately dilated.      Labs Pending at Discharge:  Pending Results       None            Procedures Performed  Procedure(s):  Biventricular Device Upgrade-Caldwell TargAnox         Consults:   Consults       Date and Time Order Name Status Description    3/28/2025  8:47 AM Inpatient Hospitalist Consult Completed     3/27/2025  4:27 PM Hematology & Oncology Inpatient Consult Completed     3/27/2025  3:09 PM Inpatient Cardiology Consult Completed     3/27/2025  2:57 PM Cardiology (on-call MD unless specified) Completed     3/27/2025  2:57 PM Intensivist (on-call MD unless specified)      3/12/2025 11:28 AM Inpatient Infectious Diseases Consult Completed     3/10/2025  8:07 AM Inpatient Cardiology Consult Completed     3/10/2025  1:20 AM  Inpatient Vascular Surgery Consult Completed     3/10/2025  1:20 AM Inpatient Gastroenterology Consult Completed     2/26/2025  1:56 PM Inpatient Hospitalist Consult Completed     2/26/2025  9:39 AM Inpatient Cardiology Consult Completed     2/26/2025  8:05 AM Inpatient Gastroenterology Consult Completed               Discharge Details        Discharge Medications        New Medications        Instructions Start Date   HYDROcodone-acetaminophen 5-325 MG per tablet  Commonly known as: NORCO   1 tablet, Oral, Every 6 Hours PRN      metoprolol succinate  MG 24 hr tablet  Commonly known as: TOPROL-XL   100 mg, Oral, Every 24 Hours Scheduled             Changes to Medications        Instructions Start Date   losartan 25 MG tablet  Commonly known as: COZAAR  What changed:   medication strength  how much to take   25 mg, Oral, Daily             Continue These Medications        Instructions Start Date   Claritin 10 MG tablet  Generic drug: loratadine   10 mg, Daily      clotrimazole 1 % cream  Commonly known as: LOTRIMIN   1 Application, Topical, 2 Times Daily      [employer] Vitamin C 1000 MG tablet  Generic drug: ascorbic acid   4,000 mg, Daily      Eliquis 5 MG tablet tablet  Generic drug: apixaban   5 mg, Oral, Every 12 Hours Scheduled      Jardiance 25 MG tablet tablet  Generic drug: empagliflozin   25 mg, Oral, Daily      NEURIVA PO   Daily      NON FORMULARY   Daily      NON FORMULARY   Daily      pantoprazole 40 MG EC tablet  Commonly known as: PROTONIX   40 mg, Oral, 2 Times Daily      rosuvastatin 10 MG tablet  Commonly known as: CRESTOR   10 mg, Oral, Daily             Stop These Medications      CORICIDIN HBP PO              Allergies   Allergen Reactions    Januvia [Sitagliptin] Hives    Lisinopril Nausea And Vomiting         Discharge Disposition:   Home or Self Care    Diet:  Hospital:  Diet Order   Procedures    Diet: Cardiac, Diabetic; Healthy Heart (2-3 Na+); Consistent Carbohydrate; Fluid Consistency:  Thin (IDDSI 0)         Discharge Activity:         CODE STATUS:  Code Status and Medical Interventions: CPR (Attempt to Resuscitate); Full Support   Ordered at: 03/27/25 1547     Code Status (Patient has no pulse and is not breathing):    CPR (Attempt to Resuscitate)     Medical Interventions (Patient has pulse or is breathing):    Full Support     Level Of Support Discussed With:    Patient         Future Appointments   Date Time Provider Department Center   4/10/2025  9:30 AM Rima Salazar APRN [facility]   5/9/2025 11:00 AM Sharon Silva MD [facility]           Time spent on Discharge including face to face service:  45 minutes    Signature: Electronically signed by Nils Brewster MD, 04/02/25, 10:37 EDT.  [facility] Hospitalist Team      Electronically signed by Nils Brewster MD at 04/02/25 1038

## 2025-04-05 LAB
QT INTERVAL: 425 MS
QTC INTERVAL: 483 MS

## 2025-04-08 ENCOUNTER — OFFICE VISIT (OUTPATIENT)
Dept: FAMILY MEDICINE CLINIC | Facility: CLINIC | Age: 60
End: 2025-04-08
Payer: COMMERCIAL

## 2025-04-08 ENCOUNTER — TELEPHONE (OUTPATIENT)
Dept: CARDIOLOGY | Facility: CLINIC | Age: 60
End: 2025-04-08

## 2025-04-08 VITALS
SYSTOLIC BLOOD PRESSURE: 132 MMHG | WEIGHT: 232.25 LBS | HEART RATE: 69 BPM | OXYGEN SATURATION: 93 % | DIASTOLIC BLOOD PRESSURE: 72 MMHG | HEIGHT: 68 IN | RESPIRATION RATE: 18 BRPM | BODY MASS INDEX: 35.2 KG/M2

## 2025-04-08 DIAGNOSIS — E11.9 TYPE 2 DIABETES MELLITUS WITHOUT COMPLICATION, WITHOUT LONG-TERM CURRENT USE OF INSULIN: ICD-10-CM

## 2025-04-08 DIAGNOSIS — I47.20 VENTRICULAR TACHYCARDIA: ICD-10-CM

## 2025-04-08 DIAGNOSIS — I10 PRIMARY HYPERTENSION: ICD-10-CM

## 2025-04-08 DIAGNOSIS — I44.1 MOBITZ TYPE 2 SECOND DEGREE ATRIOVENTRICULAR BLOCK: ICD-10-CM

## 2025-04-08 DIAGNOSIS — Z95.810 PRESENCE OF BIVENTRICULAR IMPLANTABLE CARDIOVERTER-DEFIBRILLATOR (ICD): ICD-10-CM

## 2025-04-08 DIAGNOSIS — I48.0 PAF (PAROXYSMAL ATRIAL FIBRILLATION): Primary | ICD-10-CM

## 2025-04-08 PROCEDURE — 99495 TRANSJ CARE MGMT MOD F2F 14D: CPT | Performed by: STUDENT IN AN ORGANIZED HEALTH CARE EDUCATION/TRAINING PROGRAM

## 2025-04-08 NOTE — TELEPHONE ENCOUNTER
Caller: Ismael Pulido    Relationship to patient: Self    Best call back number: [phone]    Patient is needing: PATIENT MISSED CALL FROM THE OFFICE. NOT SEEING ANYTHING OTHER THAN UPCOMMING APPOINTMENT

## 2025-04-08 NOTE — PROGRESS NOTES
Transitional Care Follow Up Visit  Subjective     Ismael Pulido is a 59 y.o. male who presents for a transitional care management visit.    Within 48 business hours after discharge our office contacted him via telephone to coordinate his care and needs.      I reviewed and discussed the details of that call along with the discharge summary, hospital problems, inpatient lab results, inpatient diagnostic studies, and consultation reports with Ismael.     Current outpatient and discharge medications have been reconciled for the patient.  Reviewed by: Sharon Silva MD          4/2/2025     7:07 PM   Date of TCM Phone Call   [facility]   Date of Admission 3/27/2025   Date of Discharge 4/2/2025   Discharge Disposition Home or Self Care     Risk for Readmission (LACE) Score: 13 (4/2/2025  6:00 AM)    Course During Hospital Stay:   59-year-old male with history of biventricular pacemaker, DM2, HTN, HLD, PFO, and recent splenic infarction (likely cardioembolic) presented with palpitations and chest discomfort. Found to be in ventricular tachycardia; underwent successful cardioversion in ED on 3/27. Cardiology and EP consulted. Cardiac cath on 3/30 showed normal coronaries; TTE showed mildly reduced EF (46-50%). Underwent upgrade to biventricular ICD on 4/1; device interrogation on 4/2 confirmed appropriate function. Blood counts remained stable. BP soft; losartan reduced to 25 mg daily. Discharged on Eliquis, Toprol 100 mg daily, Jardiance 25 mg daily, and statin therapy. Hematology evaluation revealed normal Factor V levels despite family history of Factor V Leiden. Diabetes management unchanged (A1c 7).       The following portions of the patient's history were reviewed and updated as appropriate: allergies, current medications, past family history, past medical history, past social history, past surgical history, and problem list.    HPI:     Patient reports recent discharge from hospital  after cardiac issues requiring pacemaker placement. Patient had an episode last night at approximately 8:30 PM lasting 23 seconds where the pacemaker detected an abnormal rhythm but did not deliver a shock. Patient denies feeling any symptoms during this episode. Patient was eating ice cream at the time of the episode. Patient reports that during hospitalization, heart rate was elevated to 190-200 bpm for approximately 12 hours and did not respond to interventions. Patient denies having experienced chest pain, shortness of breath, or feeling of pressure on chest during cardiac events.  - Current treatment includes pacemaker with defibrillator capability  - Patient reports incision site from pacemaker placement has two slightly puffy areas between sutures but denies pain, redness, or warmth at the site  - Patient has follow-up appointment with cardiology tomorrow  - Patient reports Losartan dose was reduced from 50mg to 25mg during hospitalization.    Patient reports Dr. Angel has ordered genetic testing and a cardiac MRI to investigate potential underlying causes for cardiac issues. Patient tested negative for Factor V Leiden (which his daughter has)      Diabetes  Patient reports recent A1C of 7.0%, which is an improvement.    Lab Results  Patient reports some abnormal findings on recent CBC with differential. His lymphocyte percentage was slightly decreased by 0.5% but absolute count was normal. Also had elevated eosinophils, which was attributed to allergic reaction.    Allergic Reaction  Patient reports having experienced an allergic reaction recently. Patient suspects possible sensitivity to cinnamon, noting that cardiac episode occurred after consuming cinnamon both recently and prior to initial cardiac event.    Upcoming Travel  Patient reports plans for a [place] cruise at the end of next month, from 05/23/2025 to 05/31/2025.      Review of Systems  Pertinent symptoms noted in HPI, otherwise a  "comprehensive review of symptoms was negative.       Objective   /72   Pulse 69   Resp 18   Ht 172.7 cm (68\")   Wt 105 kg (232 lb 4 oz)   SpO2 93%   BMI 35.31 kg/m²   Constitutional: Alert, well-appearing, no acute distress  HENT: NCAT, mucous membranes moist  Neck: Supple, no thyromegaly  Respiratory: No respiratory distress, good effort and air entry, clear to auscultation bilaterally   Cardiovascular: RRR, no LE edema  Neuro: No gross focal deficits, normal gait  Psychiatric: Appropriate mood and affect, cooperative  Skin: Examination of pacemaker incision site reveals two slightly puffy areas between sutures. No significant erythema, warmth, or drainage noted. Patient denies pain on palpation of the site.      Assessment & Plan              Cardiac Arrhythmia (pAfib, pVT) s/p Biventricular Defibrillator and PPM  Patient recently discharged from hospital after cardiac arrhythmia requiring upgraded PPM to PPM/defibrillator placement. Patient had brief episode last night without requiring shock. Incision site appears to be healing appropriately with minimal inflammation.  PLAN:  - Patient to follow up with Dr. Loco (EP cardiologist) tomorrow  - Cardiac MRI and genetic testing for cardiac diseases as ordered by cardiologist to investigate potential underlying causes (discussed potential sarcoidosis, amyloidosis)  - Patient to continue current medications including reduced dose of metoprolol 25mg  - Patient to monitor blood pressure daily  - Patient counseled on pacemaker function, including what to do if device delivers shock: if shock occurs at night or on weekend or if multiple shocks occur, go to ER; if during weekday, call cardiologist's office  - Patient counseled that travel on [place] cruise should be safe with pacemaker in place  - Patient advised to avoid cinnamon due to temporal relationship with cardiac events    Diabetes Mellitus Type 2  Improved control with recent A1C of 7.0%.  PLAN:  - " Continue Jardiance 25 mg daily  - No changes to regimen at this time given recent hospitalization and upcoming travel  - Continue home blood glucose monitoring  - Will reassess A1C at next visit    Hypertension  Chronic, stable  Controlled with lower dose of Losartan 25 mg, which was decreased in the hospital.   PLAN:  - Continue losartan 25mg daily  - Continue home blood pressure monitoring    Hx of Splenic Infarct, pAfib  Currently on Eliquis 5 mg BID. Family hx of Factor V Leiden but patient negative on testing.   Continue Eliquis 5 mg BID.       Today’s face-to-face visit occurred within 14 days of discharge and included a review of the hospitalization, management of new and existing conditions, and coordination of necessary community and health services.      Follow Up: Reschedule upcoming appointment from 05/09/2025 to July given today's appointment. Will reassess A1C at that time.

## 2025-04-09 ENCOUNTER — CLINICAL SUPPORT NO REQUIREMENTS (OUTPATIENT)
Dept: CARDIOLOGY | Facility: CLINIC | Age: 60
End: 2025-04-09
Payer: COMMERCIAL

## 2025-04-09 ENCOUNTER — OFFICE VISIT (OUTPATIENT)
Dept: CARDIOLOGY | Facility: CLINIC | Age: 60
End: 2025-04-09
Payer: COMMERCIAL

## 2025-04-09 VITALS
DIASTOLIC BLOOD PRESSURE: 79 MMHG | HEIGHT: 68 IN | WEIGHT: 234 LBS | HEART RATE: 73 BPM | BODY MASS INDEX: 35.46 KG/M2 | SYSTOLIC BLOOD PRESSURE: 120 MMHG | OXYGEN SATURATION: 100 %

## 2025-04-09 DIAGNOSIS — I50.22 CHRONIC SYSTOLIC CONGESTIVE HEART FAILURE: ICD-10-CM

## 2025-04-09 DIAGNOSIS — I47.20 VENTRICULAR TACHYCARDIA (PAROXYSMAL): ICD-10-CM

## 2025-04-09 DIAGNOSIS — E78.2 MIXED HYPERLIPIDEMIA: ICD-10-CM

## 2025-04-09 DIAGNOSIS — E11.9 TYPE 2 DIABETES MELLITUS WITHOUT COMPLICATION, WITHOUT LONG-TERM CURRENT USE OF INSULIN: ICD-10-CM

## 2025-04-09 DIAGNOSIS — I47.20 VENTRICULAR TACHYCARDIA: Primary | ICD-10-CM

## 2025-04-09 DIAGNOSIS — Z95.810 PRESENCE OF AUTOMATIC CARDIOVERTER/DEFIBRILLATOR (AICD): ICD-10-CM

## 2025-04-09 DIAGNOSIS — R00.1 SYMPTOMATIC BRADYCARDIA: ICD-10-CM

## 2025-04-09 DIAGNOSIS — I10 PRIMARY HYPERTENSION: Primary | ICD-10-CM

## 2025-04-09 DIAGNOSIS — G47.33 OBSTRUCTIVE SLEEP APNEA SYNDROME: ICD-10-CM

## 2025-04-09 PROBLEM — Z95.0 STATUS POST PLACEMENT OF CARDIAC PACEMAKER: Status: RESOLVED | Noted: 2025-03-06 | Resolved: 2025-04-09

## 2025-04-09 NOTE — PROGRESS NOTES
Subjective:     Encounter Date:04/09/2025      Patient ID: Ismael Pulido is a 59 y.o. male.    Chief Complaint:  History of Present Illness 59-year-old white male with history of mild congestive heart failure with symptomatic bradycardia status post pacemaker placement which later has to be upgraded to a biventricular ICD because of ventricular tachycardia hypertension hyperlipidemia sleep apnea presents to my office for follow-up.  Patient is currently stable without any symptoms of chest pain or shortness of breath at rest or exertion.  No complaint any PND orthopnea.  No palpitations dizziness syncope or swelling of the feet.  Patient is taking all the medicines regularly.  Patient does not smoke.  No ICD discharges.    The following portions of the patient's history were reviewed and updated as appropriate: allergies, current medications, past family history, past medical history, past social history, past surgical history, and problem list.  Past Medical History:   Diagnosis Date    Allergic 01/01/1988    5+ molds, pollens, grass    Diabetes mellitus     Not sure like 2020    Diverticulitis     Diverticulosis     Mot sure 1995    GERD (gastroesophageal reflux disease)     Hypertension     Kidney stone     Not dure 1995    Obesity     Not sure 1999     Past Surgical History:   Procedure Laterality Date    CARDIAC CATHETERIZATION Right 3/31/2025    Procedure: Coronary angiography;  Surgeon: Win Reaves MD;  Location: [facility] CATH INVASIVE LOCATION;  Service: Cardiovascular;  Laterality: Right;    CARDIAC CATHETERIZATION N/A 3/31/2025    Procedure: Left Heart Cath;  Surgeon: Win Reaves MD;  Location: [facility] CATH INVASIVE LOCATION;  Service: Cardiovascular;  Laterality: N/A;    CARDIAC ELECTROPHYSIOLOGY PROCEDURE N/A 2/21/2025    Procedure: Pacemaker DC new [place]oston aware;  Surgeon: Dez Loco MD;  Location: [facility] CATH INVASIVE LOCATION;  Service: Cardiovascular;  Laterality: N/A;    CARDIAC  "ELECTROPHYSIOLOGY PROCEDURE Left 2025    Procedure: Biventricular Device Upgrade-[place] aware;  Surgeon: Dez Loco MD;  Location: [facility] CATH INVASIVE LOCATION;  Service: Cardiovascular;  Laterality: Left;    ENDOSCOPY N/A 2025    Procedure: ESOPHAGOGASTRODUODENOSCOPY;  Surgeon: Kiko Talbert MD;  Location: [facility] ENDOSCOPY;  Service: Gastroenterology;  Laterality: N/A;  POST-ESOPHAGITIS, GASTRITIS    HERNIA REPAIR       /79   Pulse 73   Ht 172.7 cm (67.99\")   Wt 106 kg (234 lb)   SpO2 100%   BMI 35.59 kg/m²   Family History   Problem Relation Age of Onset    Anxiety disorder Mother     Arthritis Mother     Depression Mother     Diabetes Mother     Hyperlipidemia Mother     Kidney disease Mother         Kidney stones    Cancer Father          of cancer in 2009    Diabetes Father        Current Outpatient Medications:     apixaban (ELIQUIS) 5 MG tablet tablet, Take 1 tablet by mouth Every 12 (Twelve) Hours. Indications: Other - full anticoagulation, Disp: 60 tablet, Rfl: 0    ascorbic acid ([employer] Vitamin C) 1000 MG tablet, Take 4 tablets by mouth Daily., Disp: , Rfl:     clotrimazole (LOTRIMIN) 1 % cream, Apply 1 Application topically to the appropriate area as directed 2 (Two) Times a Day., Disp: 85 g, Rfl: 3    Jardiance 25 MG tablet tablet, TAKE 1 TABLET BY MOUTH DAILY, Disp: 30 tablet, Rfl: 5    loratadine (Claritin) 10 MG tablet, Take 1 tablet by mouth Daily., Disp: , Rfl:     losartan (COZAAR) 25 MG tablet, Take 1 tablet by mouth Daily for 30 days., Disp: 30 tablet, Rfl: 0    metoprolol succinate XL (TOPROL-XL) 100 MG 24 hr tablet, Take 1 tablet by mouth Daily for 30 days., Disp: 30 tablet, Rfl: 0    Misc Natural Products (NEURIVA PO), Take  by mouth Daily., Disp: , Rfl:     NON FORMULARY, Take  by mouth Daily. Cellucare, Disp: , Rfl:     NON FORMULARY, Take  by mouth Daily. Nugenix Ultimate, Disp: , Rfl:     pantoprazole (PROTONIX) 40 MG EC tablet, Take 1 tablet by " mouth 2 (Two) Times a Day., Disp: 60 tablet, Rfl: 0    rosuvastatin (CRESTOR) 10 MG tablet, Take 1 tablet by mouth Daily., Disp: 90 tablet, Rfl: 1  Allergies   Allergen Reactions    Januvia [Sitagliptin] Hives    Lisinopril Nausea And Vomiting     Social History     Socioeconomic History    Marital status:    Tobacco Use    Smoking status: Never    Smokeless tobacco: Never   Vaping Use    Vaping status: Never Used   Substance and Sexual Activity    Alcohol use: Never    Drug use: Never    Sexual activity: Yes     Partners: Female     Review of Systems   Constitutional: Negative for malaise/fatigue.   Cardiovascular:  Negative for chest pain, dyspnea on exertion, leg swelling and palpitations.   Respiratory:  Negative for cough and shortness of breath.    Gastrointestinal:  Negative for abdominal pain, nausea and vomiting.   Neurological:  Negative for dizziness, focal weakness, headaches, light-headedness and numbness.   All other systems reviewed and are negative.             Objective:     Constitutional:       Appearance: Well-developed.   Eyes:      General: No scleral icterus.     Conjunctiva/sclera: Conjunctivae normal.   HENT:      Head: Normocephalic and atraumatic.   Neck:      Vascular: No carotid bruit or JVD.   Pulmonary:      Effort: Pulmonary effort is normal.      Breath sounds: Normal breath sounds. No wheezing. No rales.   Cardiovascular:      Normal rate. Regular rhythm.   Pulses:     Intact distal pulses.   Abdominal:      General: Bowel sounds are normal.      Palpations: Abdomen is soft.   Musculoskeletal:      Cervical back: Normal range of motion and neck supple. Skin:     General: Skin is warm and dry.      Findings: No rash.   Neurological:      Mental Status: Alert.       Procedures    Lab Review:         MDM    #1 symptomatic bradycardia status post pacemaker placement/ventricular tachycardia/ICD placement  Patient initially had symptomatic bradycardia status post permanent  pacemaker placemen but later he had sustained ventricular tachycardia and hence he was upgraded to a biventricular ICD  Patient is not having any more episodes and is currently on beta-blockers.  Patient has signs and findings of sarcoidosis and hence he will have a cardiac MRI after 6 weeks because of his new ICD    2.  Presumed sarcoidosis  Patient has elevated sed rate and ACE enzyme and hence will have a cardiac MRI to rule out cardiac sarcoidosis which could have caused all his problems including the bradycardia and ventricular tachycardia    3.  Hypertension  Patient blood pressure currently stable on metoprolol and losartan    4.  Paroxysmal atrial fibrillation  Patient does not have any more episodes and is on Eliquis which I will discuss with the electrophysiologist and stop it if needed.    5.  HFmEF  Patient has mild LV systolic dysfunction and is currently on losartan Jardiance and metoprolol.    6.  Hyperlipidemia  Patient on Crestor in the lipid levels are well within normal meds.    patient's previous medical records, labs, and EKG were reviewed and discussed with the patient at today's visit.

## 2025-04-10 ENCOUNTER — TELEPHONE (OUTPATIENT)
Dept: CARDIOLOGY | Facility: CLINIC | Age: 60
End: 2025-04-10
Payer: COMMERCIAL

## 2025-04-10 RX ORDER — PANTOPRAZOLE SODIUM 40 MG/1
40 TABLET, DELAYED RELEASE ORAL 2 TIMES DAILY
Qty: 180 TABLET | Refills: 1 | Status: SHIPPED | OUTPATIENT
Start: 2025-04-10

## 2025-04-10 RX ORDER — PANTOPRAZOLE SODIUM 40 MG/1
40 TABLET, DELAYED RELEASE ORAL 2 TIMES DAILY
Qty: 60 TABLET | Refills: 5 | Status: SHIPPED | OUTPATIENT
Start: 2025-04-10 | End: 2025-04-10

## 2025-04-10 NOTE — TELEPHONE ENCOUNTER
----- Message from Dez Loco sent at 4/9/2025  8:27 AM EDT -----  Geremias COX  ----- Message -----  From: Pepper Gomes MA  Sent: 4/8/2025  11:12 AM EDT  To: Dez BURCH MD    Pt received ATP therapy last night for 23 sec of Vtach. I have spoke with the pt he states he was watching TV at the time and did not feel anything. Remote has been sent in [employer].

## 2025-04-15 ENCOUNTER — READMISSION MANAGEMENT (OUTPATIENT)
Dept: CALL CENTER | Facility: HOSPITAL | Age: 60
End: 2025-04-15
Payer: COMMERCIAL

## 2025-04-15 NOTE — OUTREACH NOTE
Medical Week 2 Survey      Flowsheet Row Responses   [facility] patient discharged from? [facility]   Does the patient have one of the following disease processes/diagnoses(primary or secondary)? Other   Week 2 attempt successful? No   Unsuccessful attempts Attempt 1   oke Carol Jhaveri Registered Nurse

## 2025-04-16 ENCOUNTER — OFFICE VISIT (OUTPATIENT)
Dept: CARDIOLOGY | Facility: CLINIC | Age: 60
End: 2025-04-16
Payer: COMMERCIAL

## 2025-04-16 DIAGNOSIS — I47.20 VENTRICULAR TACHYCARDIA: Primary | ICD-10-CM

## 2025-04-16 DIAGNOSIS — Z95.810 PRESENCE OF BIVENTRICULAR IMPLANTABLE CARDIOVERTER-DEFIBRILLATOR (ICD): ICD-10-CM

## 2025-04-16 NOTE — PROGRESS NOTES
Patient seen today for wound check s/p [employer] BiV ICD.  Left subclavian incision is well approximated without erythema or drainage.  No staples present.  Staples have already been removed in primary cardiology office per patient report.  Device interrogation will be performed.  Patient has a remote monitor.  Patient given post-op light duty work note.    RTC in one month.

## 2025-04-16 NOTE — LETTER
April 16, 2025     Patient: Ismael Pulido   YOB: 1965   Date of Visit: 4/16/2025       To Whom It May Concern:    It is my medical opinion that Ismael Pulido may return to light duty immediately with the following restrictions: no lifting over 10 lbs, vigorous pushing/pulling, or over-head arm reaching with left arm until after 5-1-25 .           Sincerely,        TAMIKO Godfrey

## 2025-04-23 ENCOUNTER — TELEPHONE (OUTPATIENT)
Dept: CARDIOLOGY | Facility: CLINIC | Age: 60
End: 2025-04-23
Payer: COMMERCIAL

## 2025-04-23 NOTE — TELEPHONE ENCOUNTER
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 WILL NEED A LETTER. UPLOAD TO [employer].

## 2025-05-01 ENCOUNTER — TELEPHONE (OUTPATIENT)
Dept: FAMILY MEDICINE CLINIC | Facility: CLINIC | Age: 60
End: 2025-05-01
Payer: COMMERCIAL

## 2025-05-02 ENCOUNTER — TELEPHONE (OUTPATIENT)
Dept: CARDIOLOGY | Facility: CLINIC | Age: 60
End: 2025-05-02
Payer: COMMERCIAL

## 2025-05-02 NOTE — TELEPHONE ENCOUNTER
Called pt advised we need cardiac clearance pt stated that they have rescheduled him until August for a dental cleaning

## 2025-05-02 NOTE — TELEPHONE ENCOUNTER
MARIE AT THE HUB STATED THAT HIS DENTIST OFFICE IS SENDING OVER A CLEARANCE. PATIENT IS IN THE CHAIR RIGHT NOW. MARIE GAVE DENTIST OFFICE THE FAX NUMBER AND SHE HUNG UP. PLEASE BE ON THE LOOK OUT. JS

## 2025-05-04 RX ORDER — METOPROLOL SUCCINATE 100 MG/1
100 TABLET, EXTENDED RELEASE ORAL DAILY
Qty: 90 TABLET | Refills: 1 | Status: SHIPPED | OUTPATIENT
Start: 2025-05-04

## 2025-05-04 RX ORDER — LOSARTAN POTASSIUM 25 MG/1
25 TABLET ORAL DAILY
Qty: 90 TABLET | Refills: 1 | Status: SHIPPED | OUTPATIENT
Start: 2025-05-04

## 2025-05-05 ENCOUNTER — OFFICE VISIT (OUTPATIENT)
Dept: CARDIOLOGY | Facility: CLINIC | Age: 60
End: 2025-05-05
Payer: COMMERCIAL

## 2025-05-05 VITALS
DIASTOLIC BLOOD PRESSURE: 78 MMHG | HEART RATE: 97 BPM | BODY MASS INDEX: 35.01 KG/M2 | HEIGHT: 68 IN | WEIGHT: 231 LBS | SYSTOLIC BLOOD PRESSURE: 138 MMHG

## 2025-05-05 DIAGNOSIS — E11.9 TYPE 2 DIABETES MELLITUS WITHOUT COMPLICATION, WITHOUT LONG-TERM CURRENT USE OF INSULIN: ICD-10-CM

## 2025-05-05 DIAGNOSIS — I47.20 VENTRICULAR TACHYCARDIA: Primary | ICD-10-CM

## 2025-05-05 DIAGNOSIS — I44.2 INTERMITTENT COMPLETE HEART BLOCK: ICD-10-CM

## 2025-05-05 DIAGNOSIS — I10 PRIMARY HYPERTENSION: ICD-10-CM

## 2025-05-05 DIAGNOSIS — Z95.810 PRESENCE OF BIVENTRICULAR IMPLANTABLE CARDIOVERTER-DEFIBRILLATOR (ICD): ICD-10-CM

## 2025-05-05 DIAGNOSIS — E78.2 MIXED HYPERLIPIDEMIA: ICD-10-CM

## 2025-05-05 DIAGNOSIS — R00.1 SYMPTOMATIC BRADYCARDIA: ICD-10-CM

## 2025-05-05 PROCEDURE — 93284 PRGRMG EVAL IMPLANTABLE DFB: CPT | Performed by: INTERNAL MEDICINE

## 2025-05-05 PROCEDURE — 93000 ELECTROCARDIOGRAM COMPLETE: CPT | Performed by: INTERNAL MEDICINE

## 2025-05-05 PROCEDURE — 99214 OFFICE O/P EST MOD 30 MIN: CPT | Performed by: INTERNAL MEDICINE

## 2025-05-05 RX ORDER — AMIODARONE HYDROCHLORIDE 200 MG/1
200 TABLET ORAL DAILY
Qty: 100 TABLET | Refills: 0 | Status: SHIPPED | OUTPATIENT
Start: 2025-05-05

## 2025-05-05 NOTE — PROGRESS NOTES
CC--- recurrent VT    Sub  59-year-old male patient started have recurrent ICD therapy for VT and came to the office.  Patient presented with left bundle branch block with intermittent complete heart block with near syncope and subsequently underwent biventricular pacemaker.  After pacemaker implantation patient presented again to the hospital with flulike illness with elevated RV threshold and elevated procalcitonin and repeat evaluation of RV lead was unchanged with normal RV lead function.  Patient was subsequently seen for sustained monomorphic VT unresponsive to amiodarone and patient admitted to the hospital  Prior to VT presentation patient had a splenic infarct and was in [facility] and had a PFO with EF of 45% and subsequently placed on apixaban.  Patient has a family history of factor V Leiden mutation however patient's testing was within normal limits.  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  He was discharged home and started have recurrent VT and patient presented back to the office today.      Most recent echo attached below     Left ventricular ejection fraction appears to be 46 - 50%.  •  Left ventricular wall thickness is consistent with mild concentric hypertrophy.  •  Left ventricular diastolic function is consistent with (grade I) impaired relaxation.  •  Moderately reduced right ventricular systolic function noted.  •  The right ventricular cavity is moderately dilated.  •  The left atrial cavity is mild to moderately dilated.      Past Medical History:   Diagnosis Date   • Allergic 01/01/1988    5+ molds, pollens, grass   • Diabetes mellitus     Not sure like 2020   • Diverticulitis    • Diverticulosis     Mot sure 1995   • GERD (gastroesophageal reflux disease)    • Hypertension    • Kidney stone     Not dure 1995   • Obesity     Not sure 1999   • Status post placement of  cardiac pacemaker 2025     Past Surgical History:   Procedure Laterality Date   • CARDIAC CATHETERIZATION Right 3/31/2025    Procedure: Coronary angiography;  Surgeon: Win Reaves MD;  Location: [facility] CATH INVASIVE LOCATION;  Service: Cardiovascular;  Laterality: Right;   • CARDIAC CATHETERIZATION N/A 3/31/2025    Procedure: Left Heart Cath;  Surgeon: Win Reaves MD;  Location: [facility] CATH INVASIVE LOCATION;  Service: Cardiovascular;  Laterality: N/A;   • CARDIAC ELECTROPHYSIOLOGY PROCEDURE N/A 2025    Procedure: Pacemaker DC new boston aware;  Surgeon: Dez Loco MD;  Location: [facility] CATH INVASIVE LOCATION;  Service: Cardiovascular;  Laterality: N/A;   • CARDIAC ELECTROPHYSIOLOGY PROCEDURE Left 2025    Procedure: Biventricular Device Upgrade-[place] aware;  Surgeon: Dez Loco MD;  Location: [facility] CATH INVASIVE LOCATION;  Service: Cardiovascular;  Laterality: Left;   • ENDOSCOPY N/A 2025    Procedure: ESOPHAGOGASTRODUODENOSCOPY;  Surgeon: Kiko Talbert MD;  Location: [facility] ENDOSCOPY;  Service: Gastroenterology;  Laterality: N/A;  POST-ESOPHAGITIS, GASTRITIS   • HERNIA REPAIR       Family History   Problem Relation Age of Onset   • Anxiety disorder Mother    • Arthritis Mother    • Depression Mother    • Diabetes Mother    • Hyperlipidemia Mother    • Kidney disease Mother         Kidney stones   • Cancer Father          of cancer in 2009   • Diabetes Father      Social History     Tobacco Use   • Smoking status: Never   • Smokeless tobacco: Never   Vaping Use   • Vaping status: Never Used   Substance Use Topics   • Alcohol use: Never   • Drug use: Never        Physical Exam    General:      well developed, well nourished, in no acute distress.    Head:      normocephalic and atraumatic.    Eyes:      PERRL/EOM intact, conjunctivae and sclerae clear without nystagmus.    Neck:      no  thyromegaly, trachea central with normal respiratory  effort  Lungs:      clear bilaterally to auscultation.    Heart:       regular rate and rhythm, S1, S2 without murmurs, rubs, or gallops  Skin:      intact without lesions or rashes.    Psych:      alert and cooperative; normal mood and affect; normal attention span and concentration.      ICD site is clean      Assessment plan  Patient clinical picture suspicious for cardiac sarcoidosis with recurrent VT  Patient to be started on amiodarone 200 mg every 8 hours for 14 days followed by 200 mg orally once daily.  Patient to be scheduled for cardiac CT PET for evaluation of sarcoidosis  Biventricular ICD in situ and interrogation attached to the chart with normal function  Patient had splenic infarct with presence of PFO status post initiation of apixaban  Hypertension currently on metoprolol  Hyperlipidemia on rosuvastatin  Patient's ESR and ACE levels are elevated  Nonischemic cardiomyopathy currently on losartan, Toprol and Jardiance  Patient to be scheduled for gene testing with [employer] for evaluation of Lahman and cardiomyopathy  ICD reprogrammed to include ATP in the VT zone which is usually between 193-195  Cardiac PET sarcoidosis was ordered  Patient to be started on amiodarone and prescription given  Close follow-up in 2 weeks  Patient and family strongly educated about not driving because of recurrent VT        ECG 12 Lead    Date/Time: 5/5/2025 9:24 AM  Performed by: Dez Loco MD    Authorized by: Dez Loco MD  Comparison: compared with previous ECG   Similar to previous ECG  Rhythm: sinus rhythm and paced  Rate: normal      Electronically signed by Dez Loco MD, 05/05/25, 9:24 AM EDT.

## 2025-05-05 NOTE — LETTER
May 5, 2025     Sharon Silva MD  [address]  [address]  [place] [address]    Patient: Ismael Pulido   YOB: 1965   Date of Visit: 5/5/2025     Dear Sharon Silva MD:       Thank you for referring Ismael Pulido to me for evaluation. Below are the relevant portions of my assessment and plan of care.    If you have questions, please do not hesitate to call me. I look forward to following Ismael along with you.         Sincerely,        Dez Loco MD        CC: No Recipients    Dez Loco MD  05/05/25 0924  Sign when Signing Visit  CC--- recurrent VT    Sub  59-year-old male patient started have recurrent ICD therapy for VT and came to the office.  Patient presented with left bundle branch block with intermittent complete heart block with near syncope and subsequently underwent biventricular pacemaker.  After pacemaker implantation patient presented again to the hospital with flulike illness with elevated RV threshold and elevated procalcitonin and repeat evaluation of RV lead was unchanged with normal RV lead function.  Patient was subsequently seen for sustained monomorphic VT unresponsive to amiodarone and patient admitted to the hospital  Prior to VT presentation patient had a splenic infarct and was in [facility] and had a PFO with EF of 45% and subsequently placed on apixaban.  Patient has a family history of factor V Leiden mutation however patient's testing was within normal limits.  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  He was discharged home and started have recurrent VT and patient presented back to the office today.      Most recent echo attached below     Left ventricular ejection fraction appears to be 46 - 50%.  •  Left ventricular wall thickness is consistent with mild concentric hypertrophy.  •  Left ventricular  diastolic function is consistent with (grade I) impaired relaxation.  •  Moderately reduced right ventricular systolic function noted.  •  The right ventricular cavity is moderately dilated.  •  The left atrial cavity is mild to moderately dilated.      Past Medical History:   Diagnosis Date   • Allergic 01/01/1988    5+ molds, pollens, grass   • Diabetes mellitus     Not sure like 2020   • Diverticulitis    • Diverticulosis     Mot sure 1995   • GERD (gastroesophageal reflux disease)    • Hypertension    • Kidney stone     Not dure 1995   • Obesity     Not sure 1999   • Status post placement of cardiac pacemaker 03/06/2025     Past Surgical History:   Procedure Laterality Date   • CARDIAC CATHETERIZATION Right 3/31/2025    Procedure: Coronary angiography;  Surgeon: Win Reaves MD;  Location: [facility] CATH INVASIVE LOCATION;  Service: Cardiovascular;  Laterality: Right;   • CARDIAC CATHETERIZATION N/A 3/31/2025    Procedure: Left Heart Cath;  Surgeon: Win Reaves MD;  Location: [facility] CATH INVASIVE LOCATION;  Service: Cardiovascular;  Laterality: N/A;   • CARDIAC ELECTROPHYSIOLOGY PROCEDURE N/A 2/21/2025    Procedure: Pacemaker DC new b[place] aware;  Surgeon: Dez Loco MD;  Location: [facility] CATH INVASIVE LOCATION;  Service: Cardiovascular;  Laterality: N/A;   • CARDIAC ELECTROPHYSIOLOGY PROCEDURE Left 4/1/2025    Procedure: Biventricular Device Upgrade-[place] aware;  Surgeon: Dez Loco MD;  Location: [facility] CATH INVASIVE LOCATION;  Service: Cardiovascular;  Laterality: Left;   • ENDOSCOPY N/A 2/26/2025    Procedure: ESOPHAGOGASTRODUODENOSCOPY;  Surgeon: Kiko Talbert MD;  Location: [facility] ENDOSCOPY;  Service: Gastroenterology;  Laterality: N/A;  POST-ESOPHAGITIS, GASTRITIS   • HERNIA REPAIR       Family History   Problem Relation Age of Onset   • Anxiety disorder Mother    • Arthritis Mother    • Depression Mother    • Diabetes Mother    • Hyperlipidemia Mother    • Kidney disease  Mother         Kidney stones   • Cancer Father          of cancer in 2009   • Diabetes Father      Social History     Tobacco Use   • Smoking status: Never   • Smokeless tobacco: Never   Vaping Use   • Vaping status: Never Used   Substance Use Topics   • Alcohol use: Never   • Drug use: Never        Physical Exam    General:      well developed, well nourished, in no acute distress.    Head:      normocephalic and atraumatic.    Eyes:      PERRL/EOM intact, conjunctivae and sclerae clear without nystagmus.    Neck:      no  thyromegaly, trachea central with normal respiratory effort  Lungs:      clear bilaterally to auscultation.    Heart:       regular rate and rhythm, S1, S2 without murmurs, rubs, or gallops  Skin:      intact without lesions or rashes.    Psych:      alert and cooperative; normal mood and affect; normal attention span and concentration.      ICD site is clean      Assessment plan  Patient clinical picture suspicious for cardiac sarcoidosis with recurrent VT  Patient to be started on amiodarone 200 mg every 8 hours for 14 days followed by 200 mg orally once daily.  Patient to be scheduled for cardiac CT PET for evaluation of sarcoidosis  Biventricular ICD in situ and interrogation attached to the chart with normal function  Patient had splenic infarct with presence of PFO status post initiation of apixaban  Hypertension currently on metoprolol  Hyperlipidemia on rosuvastatin  Patient's ESR and ACE levels are elevated  Nonischemic cardiomyopathy currently on losartan, Toprol and Jardiance  Patient to be scheduled for gene testing with [employer] for evaluation of Lahman and cardiomyopathy  ICD reprogrammed to include ATP in the VT zone which is usually between 193-195  Cardiac PET sarcoidosis was ordered  Patient to be started on amiodarone and prescription given  Close follow-up in 2 weeks  Patient and family strongly educated about not driving because of recurrent VT        ECG 12  Lead    Date/Time: 5/5/2025 9:24 AM  Performed by: Dez Loco MD    Authorized by: Dez Loco MD  Comparison: compared with previous ECG   Similar to previous ECG  Rhythm: sinus rhythm and paced  Rate: normal      Electronically signed by Dez Loco MD, 05/05/25, 9:24 AM EDT.

## 2025-05-19 ENCOUNTER — NURSE TRIAGE (OUTPATIENT)
Dept: CALL CENTER | Facility: HOSPITAL | Age: 60
End: 2025-05-19
Payer: COMMERCIAL

## 2025-05-19 ENCOUNTER — OFFICE VISIT (OUTPATIENT)
Dept: FAMILY MEDICINE CLINIC | Facility: CLINIC | Age: 60
End: 2025-05-19
Payer: COMMERCIAL

## 2025-05-19 VITALS
SYSTOLIC BLOOD PRESSURE: 140 MMHG | BODY MASS INDEX: 36.4 KG/M2 | HEIGHT: 68 IN | DIASTOLIC BLOOD PRESSURE: 92 MMHG | OXYGEN SATURATION: 98 % | HEART RATE: 61 BPM | WEIGHT: 240.2 LBS

## 2025-05-19 DIAGNOSIS — J01.90 ACUTE RHINOSINUSITIS: Primary | ICD-10-CM

## 2025-05-19 PROCEDURE — 99213 OFFICE O/P EST LOW 20 MIN: CPT | Performed by: STUDENT IN AN ORGANIZED HEALTH CARE EDUCATION/TRAINING PROGRAM

## 2025-05-19 RX ORDER — BENZONATATE 100 MG/1
200 CAPSULE ORAL 3 TIMES DAILY PRN
Qty: 30 CAPSULE | Refills: 0 | Status: SHIPPED | OUTPATIENT
Start: 2025-05-19

## 2025-05-19 NOTE — PROGRESS NOTES
"Chief Complaint  Chief Complaint   Patient presents with    Shortness of Breath     3 weeks     Cough       Subjective        Ismael Pulido is a 59 y.o. male who presents to [facility].  History of Present Illness    Ismael is a 59 year old male here for cough.    Patient reports cough and congestion that started 3 weeks ago.  He states symptoms started shortly after he was mowing.  Since then has also developed bilateral ear fullness and tinnitus.  Has been taking Claritin, Mucinex, DayQuil which seem to help some.  Daughter, wife, and grandson have all had similar symptoms.  Denies fevers  Patient notes that he does have a history of seasonal allergies and has had similar symptoms with this        Objective   /92   Pulse 61   Ht 172.7 cm (68\")   Wt 109 kg (240 lb 3.2 oz)   SpO2 98%   BMI 36.52 kg/m²     Estimated body mass index is 36.52 kg/m² as calculated from the following:    Height as of this encounter: 172.7 cm (68\").    Weight as of this encounter: 109 kg (240 lb 3.2 oz).     Physical Exam   GEN: In no acute distress, non toxic appearing  HEENT: EOMI. Mucous membranes moist. Oropharynx without erythema or exudate.  TM normal in appearance bilaterally.  Mild tenderness to palpation of the bilateral maxillary sinuses.  CV: Regular rate and rhythm, no murmurs. No extremity edema.   RESP: Lungs clear to auscultation bilaterally.  No signs of respiratory distress on room air.  SKIN: No rashes  MSK: No joint erythema, deformity, or effusion.   NEURO: Alert and appropriate. CN 2-12 intact grossly.        Result Review :              Assessment and Plan     Diagnoses and all orders for this visit:    1. Acute rhinosinusitis (Primary)  Unclear if symptoms are due to allergies versus bacterial sinusitis  Given multiple contacts with similar symptoms most likely started as viral URI and has developed bacterial sinusitis.  Will treat with Augmentin 875 mg twice daily x " 5 days.    Continue Claritin, Mucinex  Also recommend intranasal steroid, saline rinse  Prescription for Tessalon Perles sent to pharmacy to help with cough    Other orders  -     amoxicillin-clavulanate (AUGMENTIN) 875-125 MG per tablet; Take 1 tablet by mouth 2 (Two) Times a Day for 5 days.  Dispense: 10 tablet; Refill: 0  -     benzonatate (Tessalon Perles) 100 MG capsule; Take 2 capsules by mouth 3 (Three) Times a Day As Needed for Cough.  Dispense: 30 capsule; Refill: 0            Follow Up     No follow-ups on file.

## 2025-05-19 NOTE — TELEPHONE ENCOUNTER
"Hub- SOB was a key word.     The caller- He has shortness of breath for 3 weeks followed by a cough. He is leaving for a cruise at the end of the week. He would like an office visit time.    Declined ER and triage.      Outcome - called the office back line - he is speaking with the office.   Reason for Disposition   Caller requesting an appointment, triage offered and declined    Additional Information   Negative: Lab calling with strep throat test results and triager can call in prescription   Negative: Lab calling with urinalysis test results and triager can call in prescription   Negative: Medication questions   Negative: Medication renewal and refill questions   Negative: Pre-operative or pre-procedural questions   Negative: ED call to PCP (i.e., primary care provider; doctor, NP, or PA)   Negative: Doctor (or NP/PA) call to PCP   Negative: Call about patient who is currently hospitalized   Negative: Lab or radiology calling with CRITICAL test results   Negative: [1] Follow-up call from patient regarding patient's clinical status AND [2] information urgent   Negative: [1] Caller requests to speak ONLY to PCP AND [2] URGENT question   Negative: [1] Caller requests to speak to PCP now AND [2] won't tell us reason for call  (Exception: If 10 pm to 6 am, caller must first discuss reason for the call.)   Negative: Notification of hospital admission   Negative: Notification of death   Negative: Caller requesting lab results  (Exception: Routine or non-urgent lab result.)   Negative: Lab or radiology calling with test results   Negative: [1] Follow-up call from patient regarding patient's clinical status AND [2] information NON-URGENT   Negative: [1] Caller requests to speak ONLY to PCP AND [2] NON-URGENT question    Answer Assessment - Initial Assessment Questions  1. REASON FOR CALL or QUESTION: \"What is your reason for calling today?\" or \"How can I best  help you?\" or \"What question do you have that I can help " "answer?\"      Office visit   2. CALLER: Document the source of call. (e.g., laboratory, patient).      Patient.    Protocols used: PCP Call - No Triage-ADULT-    "

## 2025-05-27 ENCOUNTER — TELEPHONE (OUTPATIENT)
Dept: CARDIOLOGY | Facility: CLINIC | Age: 60
End: 2025-05-27
Payer: COMMERCIAL

## 2025-05-27 NOTE — TELEPHONE ENCOUNTER
Received FMLA forms from [facility] for this patient. Forms have been completed and patient notified. Gave forms to Medical Records to provide additional records per request.

## 2025-06-02 ENCOUNTER — TELEPHONE (OUTPATIENT)
Dept: CARDIOLOGY | Age: 60
End: 2025-06-02
Payer: COMMERCIAL

## 2025-06-02 NOTE — TELEPHONE ENCOUNTER
Claire Donaldson called needing to move pt MRI to a different appt time slot.I called pt and s/w his wife. I provided wife with new time

## 2025-06-03 ENCOUNTER — TELEPHONE (OUTPATIENT)
Dept: CARDIOLOGY | Age: 60
End: 2025-06-03
Payer: COMMERCIAL

## 2025-06-03 ENCOUNTER — TELEPHONE (OUTPATIENT)
Dept: CARDIOLOGY | Facility: CLINIC | Age: 60
End: 2025-06-03
Payer: COMMERCIAL

## 2025-06-03 NOTE — TELEPHONE ENCOUNTER
----- Message from Dez Loco sent at 6/3/2025 12:30 PM EDT -----  He cannot drive till VT is controlled  ----- Message -----  From: Pepper Gomes MA  Sent: 6/3/2025   9:58 AM EDT  To: Dez BURCH MD    Pt was released to return to work on 6/2/25, but was advised to not drive for 6 months post procedure. 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 Pt would like to know what he is to do about this? Should pt remain off work until he can resume driving?

## 2025-06-03 NOTE — TELEPHONE ENCOUNTER
I received an email from radiology stating they needed cardiac juanpablo from MD who pt follows for pacemaker. I called pt to see if he could call his cardiologist to get mri juanpablo. I provided pt with [facility] fax number  and my call back number .

## 2025-06-03 NOTE — TELEPHONE ENCOUNTER
Spoke with pt he is aware he is unable to drive. He requested a letter to be sent to his work stating this. Pt will get FMLA paperwork to the office if the pt is unable to return to work because of this restrictions.

## 2025-06-03 NOTE — TELEPHONE ENCOUNTER
REQUEST FOR CARDIAC CLEARANCE    Caller name: Ismael Pulido     Phone Number: [phone]     Type of planned surgery: CARDIAC MRI    Date of planned surgery: 06.04.25    Have you been experiencing chest pain or shortness of breath? NO    Where should we fax the clearance to? [phone]    DR PLUMMER ORDERED A CARDIAC MRI FOR PT AND HE IS HAVING IT DONE AT 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

## 2025-06-04 ENCOUNTER — HOSPITAL ENCOUNTER (OUTPATIENT)
Dept: MRI IMAGING | Facility: HOSPITAL | Age: 60
Discharge: HOME | End: 2025-06-04
Admitting: INTERNAL MEDICINE
Payer: COMMERCIAL

## 2025-06-04 VITALS
HEART RATE: 70 BPM | RESPIRATION RATE: 16 BRPM | DIASTOLIC BLOOD PRESSURE: 83 MMHG | SYSTOLIC BLOOD PRESSURE: 115 MMHG | OXYGEN SATURATION: 100 % | TEMPERATURE: 98.1 F

## 2025-06-04 PROCEDURE — 25510000002 GADOBENATE DIMEGLUMINE 529 MG/ML SOLUTION: Performed by: INTERNAL MEDICINE

## 2025-06-04 PROCEDURE — 75561 CARDIAC MRI FOR MORPH W/DYE: CPT

## 2025-06-04 PROCEDURE — A9577 INJ MULTIHANCE: HCPCS | Performed by: INTERNAL MEDICINE

## 2025-06-04 RX ADMIN — GADOBENATE DIMEGLUMINE 20 ML: 529 INJECTION, SOLUTION INTRAVENOUS at 11:26

## 2025-06-04 NOTE — OBED NOTES
[facility] Progress  Note        NAME: Maranda Davis. :  1934       MRN:  [id number]     

## 2025-06-16 ENCOUNTER — TELEPHONE (OUTPATIENT)
Dept: FAMILY MEDICINE CLINIC | Facility: CLINIC | Age: 60
End: 2025-06-16

## 2025-06-16 NOTE — TELEPHONE ENCOUNTER
Caller: BETHANY WITH [employer]    Relationship:     Best call back number: [phone]     What was the call regarding: BETHANY IS PERSONAL  FOR PATIENT. IF HE IS NEEDING ANY ASSISTANCE WITH ANY MEDICAL NEEDS, SHE WILL BE MORE THAN HAPPY TO HELP.          Subjective:    Ta Melton is a 10 m o  female who is brought in for this well child visit  History provided by: father    Current Issues:  Current concerns: none  Well Child Assessment:  History was provided by the father  Jonathan Jason lives with her mother and father  ( no interval problems)     Nutrition  Types of milk consumed include formula  Additional intake includes cereal and solids  Formula - Types of formula consumed include cow's milk based ( Similac Advanced)  Feedings occur every 4-5 hours  Solid Foods - Types of intake include fruits and vegetables  The patient can consume pureed foods  ( no feeding problems)   Dental  The patient has teething symptoms  Tooth eruption is not evident  Elimination  Urination occurs more than 6 times per 24 hours  Bowel movements occur 1-3 times per 24 hours  Stools have a loose consistency  ( no elimination problems)   Sleep  The patient sleeps in her crib  Sleep positions include supine  Safety  Home is child-proofed? yes  There is no smoking in the home ( the father smokes outside)  There is an appropriate car seat in use  Screening  Immunizations are not up-to-date  Social  The caregiver enjoys the child  Childcare is provided at child's home  The childcare provider is a parent  No birth history on file    The following portions of the patient's history were reviewed and updated as appropriate: allergies, current medications, past family history, past medical history, past social history, past surgical history and problem list     Developmental 4 Months Appropriate     Question Response Comments    Gurgles, coos, babbles, or similar sounds Yes Yes on 2019 (Age - 4mo)    Follows parent's movements by turning head from one side to facing directly forward Yes Yes on 2019 (Age - 4mo)    Follows parent's movements by turning head from one side almost all the way to the other side Yes Yes on 2019 (Age - 4mo)    Lifts head off ground when lying prone Yes Yes on 2019 (Age - 4mo)    Lifts head to 39' off ground when lying prone Yes Yes on 2019 (Age - 4mo)    Lifts head to 80' off ground when lying prone Yes Yes on 2019 (Age - 4mo)    Laughs out loud without being tickled or touched Yes Yes on 2019 (Age - 4mo)    Plays with hands by touching them together Yes Yes on 2019 (Age - 4mo)    Will follow parent's movements by turning head all the way from one side to the other Yes Yes on 2019 (Age - 4mo)      Developmental 6 Months Appropriate     Question Response Comments    Hold head upright and steady Yes Yes on 1/27/2020 (Age - 6mo)    When placed prone will lift chest off the ground Yes Yes on 1/27/2020 (Age - 6mo)    Occasionally makes happy high-pitched noises (not crying) Yes Yes on 1/27/2020 (Age - 6mo)    Remy Bienenstock over from stomach->back and back->stomach Yes Yes on 1/27/2020 (Age - 6mo)    Smiles at inanimate objects when playing alone Yes Yes on 1/27/2020 (Age - 6mo)    Seems to focus gaze on small (coin-sized) objects Yes Yes on 1/27/2020 (Age - 6mo)    Will  toy if placed within reach Yes Yes on 1/27/2020 (Age - 6mo)    Can keep head from lagging when pulled from supine to sitting Yes Yes on 1/27/2020 (Age - 6mo)          Screening Questions:  Risk factors for lead toxicity: no      Objective:     Growth parameters are noted and are appropriate for age  Wt Readings from Last 1 Encounters:   01/27/20 8 221 kg (18 lb 2 oz) (81 %, Z= 0 87)*     * Growth percentiles are based on WHO (Girls, 0-2 years) data  Ht Readings from Last 1 Encounters:   01/27/20 26 5" (67 3 cm) (69 %, Z= 0 50)*     * Growth percentiles are based on WHO (Girls, 0-2 years) data        Head Circumference: 43 5 cm (17 13")    Vitals:    01/27/20 1058   Pulse: (!) 136   Resp: 40   Temp: 98 4 °F (36 9 °C)   TempSrc: Axillary   Weight: 8 221 kg (18 lb 2 oz)   Height: 26 5" (67 3 cm)   HC: 43 5 cm (17 13")       Physical Exam Constitutional: Vital signs are normal  She appears well-developed and well-nourished  She is active  She has a strong cry  No distress  HENT:   Head: Anterior fontanelle is flat  No cranial deformity or facial anomaly  Right Ear: Tympanic membrane normal    Left Ear: Tympanic membrane normal    Nose: Nose normal  No nasal discharge  Mouth/Throat: Dentition is normal  Oropharynx is clear  Pharynx is normal    Eyes: Visual tracking is normal  Pupils are equal, round, and reactive to light  Conjunctivae, EOM and lids are normal  Right eye exhibits no discharge  Left eye exhibits no discharge  Neck: Normal range of motion  Neck supple  Cardiovascular: Normal rate, regular rhythm, S1 normal and S2 normal  Pulses are palpable  No murmur heard  Pulmonary/Chest: Effort normal and breath sounds normal  No nasal flaring or stridor  No respiratory distress  She has no wheezes  She has no rhonchi  She has no rales  She exhibits no retraction  Abdominal: Soft  Bowel sounds are normal  She exhibits no distension and no mass  There is no hepatosplenomegaly  There is no tenderness  There is no rebound and no guarding  No hernia  Genitourinary: No labial rash  Genitourinary Comments: Elias 1   Musculoskeletal: Normal range of motion  She exhibits no edema, tenderness, deformity or signs of injury  Ortholani - Negative  Botello - Negative     Lymphadenopathy: No occipital adenopathy is present  She has no cervical adenopathy  Neurological: She is alert  She has normal strength  Suck normal    Skin: No petechiae, no purpura and no rash noted  She is not diaphoretic  No cyanosis  No mottling, jaundice or pallor  Nursing note and vitals reviewed  Assessment:     Healthy 6 m o  female infant  1  Encounter for routine child health examination w/o abnormal findings     2   Need for vaccination  HEPATITIS B VACCINE PEDIATRIC / ADOLESCENT 3-DOSE IM    DTAP HIB IPV COMBINED VACCINE IM    PNEUMOCOCCAL CONJUGATE VACCINE 13-VALENT GREATER THAN 6 MONTHS    ROTAVIRUS VACCINE PENTAVALENT 3 DOSE ORAL    influenza vaccine, [id number], quadrivalent, 0 5 mL, preservative-free, for adult and pediatric patients 6 mos+ (AFLURIA, FLUARIX, FLULAVAL, FLUZONE)   3  Encounter for screening for maternal depression     4  Passive smoke exposure          Plan:       Introduce protein foods as discussed     Flu immunization 2  In one months    1  Anticipatory guidance discussed  Gave handout on well-child issues at this age  2  Development: appropriate for age    1  Immunizations today: per orders  Vaccine Counseling: Discussed with: Ped parent/guardian: father  The benefits, contraindication and side effects for the following vaccines were reviewed: Immunization component list: Tetanus, Diphtheria, pertussis, HIB, IPV, rotavirus, Hep B, Prevnar and influenza  Total number of components reveiwed:9    4  Follow-up visit in 3 months for next well child visit, or sooner as needed

## 2025-06-20 ENCOUNTER — OFFICE VISIT (OUTPATIENT)
Dept: CARDIOLOGY | Facility: CLINIC | Age: 60
End: 2025-06-20
Payer: COMMERCIAL

## 2025-06-20 ENCOUNTER — CLINICAL SUPPORT NO REQUIREMENTS (OUTPATIENT)
Dept: CARDIOLOGY | Facility: CLINIC | Age: 60
End: 2025-06-20
Payer: COMMERCIAL

## 2025-06-20 VITALS — SYSTOLIC BLOOD PRESSURE: 115 MMHG | DIASTOLIC BLOOD PRESSURE: 80 MMHG

## 2025-06-20 DIAGNOSIS — G47.33 OBSTRUCTIVE SLEEP APNEA SYNDROME: ICD-10-CM

## 2025-06-20 DIAGNOSIS — I44.2 INTERMITTENT COMPLETE HEART BLOCK: ICD-10-CM

## 2025-06-20 DIAGNOSIS — Z95.810 PRESENCE OF BIVENTRICULAR IMPLANTABLE CARDIOVERTER-DEFIBRILLATOR (ICD): ICD-10-CM

## 2025-06-20 DIAGNOSIS — I47.20 VENTRICULAR TACHYCARDIA: Primary | ICD-10-CM

## 2025-06-20 DIAGNOSIS — E78.2 MIXED HYPERLIPIDEMIA: ICD-10-CM

## 2025-06-20 DIAGNOSIS — R00.1 SYMPTOMATIC BRADYCARDIA: ICD-10-CM

## 2025-06-20 DIAGNOSIS — I10 PRIMARY HYPERTENSION: ICD-10-CM

## 2025-06-20 NOTE — LETTER
June 20, 2025     Sharon Silva MD  [address]  [address]  [place] [address]    Patient: Ismael Pulido   YOB: 1965   Date of Visit: 6/20/2025     Dear Sharon Silva MD:       Thank you for referring Ismael Pulido to me for evaluation. Below are the relevant portions of my assessment and plan of care.    If you have questions, please do not hesitate to call me. I look forward to following Ismael along with you.         Sincerely,        Dez Loco MD        CC: No Recipients    Dez Loco MD  06/20/25 1241  Sign when Signing Visit  C--- recurrent VT    Sub  59-year-old patient with biventricular device and VT comes in for follow-up  Cardiac MRI was done which revealed      Patient presConclusion:     Spoiled gradient echo (FLASH) sequences were used for cine rather than balanced SSFP (TruFISP) to minimize ICD associated artifact. Several maneuvers were also employed including arms above head and end-inspiration acquisition.     1. LV normal in size with mildly reduced in function.  LVEF 42%.  Abnormal septal bounce in the setting of ventricular pacing.  Mild concentric LVH.  2. RV moderately dilated in size and reduced in function.  RVEF 41% by 3 DR Labs quantitation and 30-35% on visual estimation.  Mild RVH.  3. There is no significant valvular abnormality.  4. There is no obvious perfusion defect on first-pass perfusion to suggest resting ischemia.  5. There is no significant LGE to suggest fibrosis/inflammation/infiltrative changes.  Of note, the apex and apical anterior wall are nondiagnostic due to severe artifact from ICD generator box.  6.  T2 STIR images were of limited quality but did not show obvious regional hyperintensity to suggest acute myocarditis/myocardial edema.  7.  T1 and T2 mapping sequences were done for investigative purposes and not clinically applied.  Image quality degraded due to device related artifact.  8. ICD lead  noted, with tip well-positioned in the RV apex      Previous office note below      59-year-old male patient started have recurrent ICD therapy for VT and came to the office.  Patient presented with  left bundle branch block with intermittent complete heart block with near syncope and subsequently underwent biventricular pacemaker.  After pacemaker implantation patient presented again to the hospital with flulike illness with elevated RV threshold and elevated procalcitonin and repeat evaluation of RV lead was unchanged with normal RV lead function.  Patient was subsequently seen for sustained monomorphic VT unresponsive to amiodarone and patient admitted to the hospital  Prior to VT presentation patient had a splenic infarct and was in [facility] and had a PFO with EF of 45% and subsequently placed on apixaban.  Patient has a family history of factor V Leiden mutation however patient's testing was within normal limits.  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  He was discharged home and started have recurrent VT and patient presented back to the office today.      Most recent echo attached below     Left ventricular ejection fraction appears to be 46 - 50%.  •  Left ventricular wall thickness is consistent with mild concentric hypertrophy.  •  Left ventricular diastolic function is consistent with (grade I) impaired relaxation.  •  Moderately reduced right ventricular systolic function noted.  •  The right ventricular cavity is moderately dilated.  •  The left atrial cavity is mild to moderately dilated.      Past Medical History:   Diagnosis Date   • Allergic 01/01/1988    5+ molds, pollens, grass   • Diabetes mellitus     Not sure like 2020   • Diverticulitis    • Diverticulosis     Mot sure 1995   • GERD (gastroesophageal reflux disease)    • Hypertension    • Kidney stone     Not dure 1995    • Obesity     Not sure    • Status post placement of cardiac pacemaker 2025     Past Surgical History:   Procedure Laterality Date   • CARDIAC CATHETERIZATION Right 3/31/2025    Procedure: Coronary angiography;  Surgeon: Win Reaves MD;  Location: [facility] CATH INVASIVE LOCATION;  Service: Cardiovascular;  Laterality: Right;   • CARDIAC CATHETERIZATION N/A 3/31/2025    Procedure: Left Heart Cath;  Surgeon: Win Reaves MD;  Location: [facility] CATH INVASIVE LOCATION;  Service: Cardiovascular;  Laterality: N/A;   • CARDIAC ELECTROPHYSIOLOGY PROCEDURE N/A 2025    Procedure: Pacemaker DC new [place] aware;  Surgeon: Dez Loco MD;  Location: [facility] CATH INVASIVE LOCATION;  Service: Cardiovascular;  Laterality: N/A;   • CARDIAC ELECTROPHYSIOLOGY PROCEDURE Left 2025    Procedure: Biventricular Device Upgrade-[place] aware;  Surgeon: Dez Loco MD;  Location: [facility] CATH INVASIVE LOCATION;  Service: Cardiovascular;  Laterality: Left;   • ENDOSCOPY N/A 2025    Procedure: ESOPHAGOGASTRODUODENOSCOPY;  Surgeon: Kiko Talbert MD;  Location: [facility] ENDOSCOPY;  Service: Gastroenterology;  Laterality: N/A;  POST-ESOPHAGITIS, GASTRITIS   • HERNIA REPAIR       Family History   Problem Relation Age of Onset   • Anxiety disorder Mother    • Arthritis Mother    • Depression Mother    • Diabetes Mother    • Hyperlipidemia Mother    • Kidney disease Mother         Kidney stones   • Cancer Father          of cancer in 2009   • Diabetes Father      Social History     Tobacco Use   • Smoking status: Never   • Smokeless tobacco: Never   Vaping Use   • Vaping status: Never Used   Substance Use Topics   • Alcohol use: Never   • Drug use: Never        Physical Exam    General:      well developed, well nourished, in no acute distress.    Head:      normocephalic and atraumatic.    Eyes:      PERRL/EOM intact, conjunctivae and sclerae clear without nystagmus.    Neck:      no   thyromegaly, trachea central with normal respiratory effort  Lungs:      clear bilaterally to auscultation.    Heart:       regular rate and rhythm, S1, S2 without murmurs, rubs, or gallops  Skin:      intact without lesions or rashes.    Psych:      alert and cooperative; normal mood and affect; normal attention span and concentration.      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--- significance of this mutation is unknown discussed with the patient  Biventricular ICD in situ--- interrogation of the device without recurrent VT  Cardiac PET scan for sarcoidosis pending  Patient started on amiodarone without further VT since initiation of amiodarone  Underlying complete heart block with left bundle branch block and nonischemic cardiomyopathy currently on Toprol/losartan  Dyslipidemia on rosuvastatin  Patient had splenic infarct with presence of PFO on apixaban  Patient's ESR and ACE levels are elevated  Medications reviewed and follow-up appointments made        Electronically signed by Dez Loco MD, 06/20/25, 12:41 PM EDT.

## 2025-06-20 NOTE — PROGRESS NOTES
C--- recurrent VT    Sub  59-year-old patient with biventricular device and VT comes in for follow-up  Cardiac MRI was done which revealed      Patient presConclusion:     Spoiled gradient echo (FLASH) sequences were used for cine rather than balanced SSFP (TruFISP) to minimize ICD associated artifact. Several maneuvers were also employed including arms above head and end-inspiration acquisition.     1. LV normal in size with mildly reduced in function.  LVEF 42%.  Abnormal septal bounce in the setting of ventricular pacing.  Mild concentric LVH.  2. RV moderately dilated in size and reduced in function.  RVEF 41% by 3 DR Labs quantitation and 30-35% on visual estimation.  Mild RVH.  3. There is no significant valvular abnormality.  4. There is no obvious perfusion defect on first-pass perfusion to suggest resting ischemia.  5. There is no significant LGE to suggest fibrosis/inflammation/infiltrative changes.  Of note, the apex and apical anterior wall are nondiagnostic due to severe artifact from ICD generator box.  6.  T2 STIR images were of limited quality but did not show obvious regional hyperintensity to suggest acute myocarditis/myocardial edema.  7.  T1 and T2 mapping sequences were done for investigative purposes and not clinically applied.  Image quality degraded due to device related artifact.  8. ICD lead noted, with tip well-positioned in the RV apex      Previous office note below      59-year-old male patient started have recurrent ICD therapy for VT and came to the office.  Patient presented with  left bundle branch block with intermittent complete heart block with near syncope and subsequently underwent biventricular pacemaker.  After pacemaker implantation patient presented again to the hospital with flulike illness with elevated RV threshold and elevated procalcitonin and repeat evaluation of RV lead was unchanged with normal RV lead function.  Patient was subsequently seen for sustained  monomorphic VT unresponsive to amiodarone and patient admitted to the hospital  Prior to VT presentation patient had a splenic infarct and was in [facility] and had a PFO with EF of 45% and subsequently placed on apixaban.  Patient has a family history of factor V Leiden mutation however patient's testing was within normal limits.  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  He was discharged home and started have recurrent VT and patient presented back to the office today.      Most recent echo attached below     Left ventricular ejection fraction appears to be 46 - 50%.    Left ventricular wall thickness is consistent with mild concentric hypertrophy.    Left ventricular diastolic function is consistent with (grade I) impaired relaxation.    Moderately reduced right ventricular systolic function noted.    The right ventricular cavity is moderately dilated.    The left atrial cavity is mild to moderately dilated.      Past Medical History:   Diagnosis Date    Allergic 01/01/1988    5+ molds, pollens, grass    Diabetes mellitus     Not sure like 2020    Diverticulitis     Diverticulosis     Mot sure 1995    GERD (gastroesophageal reflux disease)     Hypertension     Kidney stone     Not dure 1995    Obesity     Not sure 1999    Status post placement of cardiac pacemaker 03/06/2025     Past Surgical History:   Procedure Laterality Date    CARDIAC CATHETERIZATION Right 3/31/2025    Procedure: Coronary angiography;  Surgeon: Win Reaves MD;  Location:  [facility] CATH INVASIVE LOCATION;  Service: Cardiovascular;  Laterality: Right;    CARDIAC CATHETERIZATION N/A 3/31/2025    Procedure: Left Heart Cath;  Surgeon: Win Reaves MD;  Location:  [facility] CATH INVASIVE LOCATION;  Service: Cardiovascular;  Laterality: N/A;    CARDIAC ELECTROPHYSIOLOGY PROCEDURE N/A 2/21/2025    Procedure: Pacemaker DC new [place]  aware;  Surgeon: Dez Loco MD;  Location: [facility] CATH INVASIVE LOCATION;  Service: Cardiovascular;  Laterality: N/A;    CARDIAC ELECTROPHYSIOLOGY PROCEDURE Left 2025    Procedure: Biventricular Device Upgrade-[place] aware;  Surgeon: Dez Loco MD;  Location: [facility] CATH INVASIVE LOCATION;  Service: Cardiovascular;  Laterality: Left;    ENDOSCOPY N/A 2025    Procedure: ESOPHAGOGASTRODUODENOSCOPY;  Surgeon: Kiko Talbert MD;  Location: [facility] ENDOSCOPY;  Service: Gastroenterology;  Laterality: N/A;  POST-ESOPHAGITIS, GASTRITIS    HERNIA REPAIR       Family History   Problem Relation Age of Onset    Anxiety disorder Mother     Arthritis Mother     Depression Mother     Diabetes Mother     Hyperlipidemia Mother     Kidney disease Mother         Kidney stones    Cancer Father          of cancer in 2009    Diabetes Father      Social History     Tobacco Use    Smoking status: Never    Smokeless tobacco: Never   Vaping Use    Vaping status: Never Used   Substance Use Topics    Alcohol use: Never    Drug use: Never        Physical Exam    General:      well developed, well nourished, in no acute distress.    Head:      normocephalic and atraumatic.    Eyes:      PERRL/EOM intact, conjunctivae and sclerae clear without nystagmus.    Neck:      no  thyromegaly, trachea central with normal respiratory effort  Lungs:      clear bilaterally to auscultation.    Heart:       regular rate and rhythm, S1, S2 without murmurs, rubs, or gallops  Skin:      intact without lesions or rashes.    Psych:      alert and cooperative; normal mood and affect; normal attention span and concentration.      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--- significance of this mutation is unknown discussed with the patient  Biventricular ICD in situ--- interrogation of the device without  recurrent VT  Cardiac PET scan for sarcoidosis pending  Patient started on amiodarone without further VT since initiation of amiodarone  Underlying complete heart block with left bundle branch block and nonischemic cardiomyopathy currently on Toprol/losartan  Dyslipidemia on rosuvastatin  Patient had splenic infarct with presence of PFO on apixaban  Patient's ESR and ACE levels are elevated  Medications reviewed and follow-up appointments made        Electronically signed by Dez Loco MD, 06/20/25, 12:41 PM EDT.

## 2025-06-24 LAB
MC_CV_MDC_IDC_RATE_1: 150
MC_CV_MDC_IDC_RATE_1: 170
MC_CV_MDC_IDC_RATE_1: 205
MC_CV_MDC_IDC_THERAPIES: NORMAL
MC_CV_MDC_IDC_ZONE_ID: 1
MC_CV_MDC_IDC_ZONE_ID: 2
MC_CV_MDC_IDC_ZONE_ID: 3
MDC_IDC_MSMT_BATTERY_STATUS: NORMAL
MDC_IDC_MSMT_LEADCHNL_LV_PACING_THRESHOLD_AMPLITUDE: 0.7
MDC_IDC_MSMT_LEADCHNL_LV_PACING_THRESHOLD_PULSEWIDTH: 0.4
MDC_IDC_MSMT_LEADCHNL_RA_PACING_THRESHOLD_AMPLITUDE: 0.5
MDC_IDC_MSMT_LEADCHNL_RA_PACING_THRESHOLD_PULSEWIDTH: 0.4
MDC_IDC_MSMT_LEADCHNL_RV_PACING_THRESHOLD_AMPLITUDE: 0.5
MDC_IDC_MSMT_LEADCHNL_RV_PACING_THRESHOLD_PULSEWIDTH: 0.4
MDC_IDC_PG_IMPLANT_DTM: NORMAL
MDC_IDC_PG_MFG: NORMAL
MDC_IDC_PG_MODEL: NORMAL
MDC_IDC_PG_SERIAL: NORMAL
MDC_IDC_PG_TYPE: NORMAL
MDC_IDC_SESS_DTM: NORMAL
MDC_IDC_SET_BRADY_AT_MODE_SWITCH_RATE: 170
MDC_IDC_SET_BRADY_LOWRATE: 60
MDC_IDC_SET_BRADY_MAX_SENSOR_RATE: 130
MDC_IDC_SET_BRADY_MAX_TRACKING_RATE: 130
MDC_IDC_SET_BRADY_MODE: NORMAL
MDC_IDC_SET_BRADY_PAV_DELAY: 180
MDC_IDC_SET_BRADY_SAV_DELAY: 120
MDC_IDC_SET_CRT_PACED_CHAMBERS: NORMAL
MDC_IDC_SET_LEADCHNL_LV_PACING_AMPLITUDE: 2
MDC_IDC_SET_LEADCHNL_LV_PACING_PULSEWIDTH: 0.4
MDC_IDC_SET_LEADCHNL_LV_SENSING_SENSITIVITY: 1
MDC_IDC_SET_LEADCHNL_RA_PACING_AMPLITUDE: 2
MDC_IDC_SET_LEADCHNL_RA_PACING_PULSEWIDTH: 0.4
MDC_IDC_SET_LEADCHNL_RA_SENSING_SENSITIVITY: 0.25
MDC_IDC_SET_LEADCHNL_RV_PACING_AMPLITUDE: 2
MDC_IDC_SET_LEADCHNL_RV_PACING_PULSEWIDTH: 0.4
MDC_IDC_SET_LEADCHNL_RV_SENSING_SENSITIVITY: 0.6
MDC_IDC_SET_ZONE_STATUS: NORMAL
MDC_IDC_SET_ZONE_TYPE: NORMAL
MDC_IDC_STAT_TACHYTHERAPY_ATP_DELIVERED_RECENT: 7
MDC_IDC_STAT_TACHYTHERAPY_SHOCKS_ABORTED_RECENT: 1
MDC_IDC_STAT_TACHYTHERAPY_SHOCKS_DELIVERED_RECENT: 1

## 2025-07-01 RX ORDER — AMIODARONE HYDROCHLORIDE 200 MG/1
200 TABLET ORAL DAILY
Qty: 100 TABLET | Refills: 0 | Status: SHIPPED | OUTPATIENT
Start: 2025-07-01

## 2025-07-02 ENCOUNTER — TELEPHONE (OUTPATIENT)
Dept: CARDIOLOGY | Facility: CLINIC | Age: 60
End: 2025-07-02
Payer: COMMERCIAL

## 2025-07-02 NOTE — TELEPHONE ENCOUNTER
Caller: Ismael Pulido    Relationship to patient: Self    Best call back number: [phone]    Patient is needing: PT IS WANTING A CALLBACK FROM [facility] TO GO OVER HIS LAST APPT AND HAD SOME QUESTIONS ABOUT THE PAPERWORK WITH JANELL.

## 2025-07-03 NOTE — TELEPHONE ENCOUNTER
Spoke with pt he is aware Dr Loco would liek to se him in the office. Uriel messaged to call and schedule appt for the next week or so.

## 2025-07-18 ENCOUNTER — OFFICE VISIT (OUTPATIENT)
Dept: FAMILY MEDICINE CLINIC | Facility: CLINIC | Age: 60
End: 2025-07-18
Payer: COMMERCIAL

## 2025-07-18 ENCOUNTER — LAB (OUTPATIENT)
Dept: FAMILY MEDICINE CLINIC | Facility: CLINIC | Age: 60
End: 2025-07-18
Payer: COMMERCIAL

## 2025-07-18 VITALS
RESPIRATION RATE: 18 BRPM | BODY MASS INDEX: 38.92 KG/M2 | OXYGEN SATURATION: 95 % | HEIGHT: 68 IN | WEIGHT: 256.8 LBS | SYSTOLIC BLOOD PRESSURE: 114 MMHG | DIASTOLIC BLOOD PRESSURE: 62 MMHG | HEART RATE: 65 BPM

## 2025-07-18 DIAGNOSIS — E11.9 TYPE 2 DIABETES MELLITUS WITHOUT COMPLICATION, WITHOUT LONG-TERM CURRENT USE OF INSULIN: Primary | ICD-10-CM

## 2025-07-18 DIAGNOSIS — Z95.810 PRESENCE OF BIVENTRICULAR IMPLANTABLE CARDIOVERTER-DEFIBRILLATOR (ICD): ICD-10-CM

## 2025-07-18 DIAGNOSIS — E11.9 TYPE 2 DIABETES MELLITUS WITHOUT COMPLICATION, WITHOUT LONG-TERM CURRENT USE OF INSULIN: ICD-10-CM

## 2025-07-18 LAB — HBA1C MFR BLD: 9.6 % (ref 4.8–5.6)

## 2025-07-18 PROCEDURE — 36415 COLL VENOUS BLD VENIPUNCTURE: CPT

## 2025-07-18 PROCEDURE — 83036 HEMOGLOBIN GLYCOSYLATED A1C: CPT | Performed by: STUDENT IN AN ORGANIZED HEALTH CARE EDUCATION/TRAINING PROGRAM

## 2025-07-18 RX ORDER — METFORMIN HYDROCHLORIDE 750 MG/1
750 TABLET, EXTENDED RELEASE ORAL
Qty: 90 TABLET | Refills: 1 | Status: SHIPPED | OUTPATIENT
Start: 2025-07-18

## 2025-07-21 ENCOUNTER — OFFICE VISIT (OUTPATIENT)
Dept: CARDIOLOGY | Facility: CLINIC | Age: 60
End: 2025-07-21
Payer: COMMERCIAL

## 2025-07-21 VITALS
SYSTOLIC BLOOD PRESSURE: 128 MMHG | BODY MASS INDEX: 36.98 KG/M2 | HEART RATE: 61 BPM | RESPIRATION RATE: 16 BRPM | HEIGHT: 68 IN | DIASTOLIC BLOOD PRESSURE: 80 MMHG | WEIGHT: 244 LBS | OXYGEN SATURATION: 95 %

## 2025-07-21 DIAGNOSIS — I44.2 INTERMITTENT COMPLETE HEART BLOCK: ICD-10-CM

## 2025-07-21 DIAGNOSIS — G47.33 OBSTRUCTIVE SLEEP APNEA SYNDROME: ICD-10-CM

## 2025-07-21 DIAGNOSIS — I10 PRIMARY HYPERTENSION: ICD-10-CM

## 2025-07-21 DIAGNOSIS — Z95.810 PRESENCE OF BIVENTRICULAR IMPLANTABLE CARDIOVERTER-DEFIBRILLATOR (ICD): ICD-10-CM

## 2025-07-21 DIAGNOSIS — I47.20 VENTRICULAR TACHYCARDIA: Primary | ICD-10-CM

## 2025-07-21 DIAGNOSIS — E78.2 MIXED HYPERLIPIDEMIA: ICD-10-CM

## 2025-07-21 DIAGNOSIS — E11.9 TYPE 2 DIABETES MELLITUS WITHOUT COMPLICATION, WITHOUT LONG-TERM CURRENT USE OF INSULIN: ICD-10-CM

## 2025-07-21 DIAGNOSIS — D86.85 CARDIAC SARCOIDOSIS: ICD-10-CM

## 2025-07-21 DIAGNOSIS — R00.1 SYMPTOMATIC BRADYCARDIA: ICD-10-CM

## 2025-07-21 LAB
MC_CV_MDC_IDC_RATE_1: 150
MC_CV_MDC_IDC_RATE_1: 170
MC_CV_MDC_IDC_RATE_1: 205
MC_CV_MDC_IDC_THERAPIES: NORMAL
MC_CV_MDC_IDC_ZONE_ID: 1
MC_CV_MDC_IDC_ZONE_ID: 2
MC_CV_MDC_IDC_ZONE_ID: 3
MDC_IDC_MSMT_BATTERY_REMAINING_LONGEVITY: 132 MO
MDC_IDC_MSMT_BATTERY_STATUS: NORMAL
MDC_IDC_MSMT_LEADCHNL_LV_IMPEDANCE_VALUE: 576
MDC_IDC_MSMT_LEADCHNL_LV_PACING_THRESHOLD_AMPLITUDE: 0.6
MDC_IDC_MSMT_LEADCHNL_LV_PACING_THRESHOLD_PULSEWIDTH: 0.4
MDC_IDC_MSMT_LEADCHNL_RA_IMPEDANCE_VALUE: 760
MDC_IDC_MSMT_LEADCHNL_RA_PACING_THRESHOLD_AMPLITUDE: 0.6
MDC_IDC_MSMT_LEADCHNL_RA_PACING_THRESHOLD_PULSEWIDTH: 0.4
MDC_IDC_MSMT_LEADCHNL_RA_SENSING_INTR_AMPL: 6.4
MDC_IDC_MSMT_LEADCHNL_RV_IMPEDANCE_VALUE: 437
MDC_IDC_MSMT_LEADCHNL_RV_PACING_THRESHOLD_AMPLITUDE: 0.5
MDC_IDC_MSMT_LEADCHNL_RV_PACING_THRESHOLD_PULSEWIDTH: 0.4
MDC_IDC_PG_IMPLANT_DTM: NORMAL
MDC_IDC_PG_MFG: NORMAL
MDC_IDC_PG_MODEL: NORMAL
MDC_IDC_PG_SERIAL: NORMAL
MDC_IDC_PG_TYPE: NORMAL
MDC_IDC_SESS_DTM: NORMAL
MDC_IDC_SET_BRADY_AT_MODE_SWITCH_RATE: 170
MDC_IDC_SET_BRADY_LOWRATE: 60
MDC_IDC_SET_BRADY_MAX_SENSOR_RATE: 130
MDC_IDC_SET_BRADY_MAX_TRACKING_RATE: 130
MDC_IDC_SET_BRADY_MODE: NORMAL
MDC_IDC_SET_BRADY_PAV_DELAY: 180
MDC_IDC_SET_BRADY_SAV_DELAY: 120
MDC_IDC_SET_CRT_PACED_CHAMBERS: NORMAL
MDC_IDC_SET_LEADCHNL_LV_PACING_AMPLITUDE: 2
MDC_IDC_SET_LEADCHNL_LV_PACING_PULSEWIDTH: 0.4
MDC_IDC_SET_LEADCHNL_LV_SENSING_SENSITIVITY: 1
MDC_IDC_SET_LEADCHNL_RA_PACING_AMPLITUDE: 2
MDC_IDC_SET_LEADCHNL_RA_PACING_PULSEWIDTH: 0.4
MDC_IDC_SET_LEADCHNL_RA_SENSING_SENSITIVITY: 0.25
MDC_IDC_SET_LEADCHNL_RV_PACING_AMPLITUDE: 2
MDC_IDC_SET_LEADCHNL_RV_PACING_PULSEWIDTH: 0.4
MDC_IDC_SET_LEADCHNL_RV_SENSING_SENSITIVITY: 0.6
MDC_IDC_SET_ZONE_STATUS: NORMAL
MDC_IDC_SET_ZONE_TYPE: NORMAL
MDC_IDC_STAT_BRADY_RA_PERCENT_PACED: 39
MDC_IDC_STAT_BRADY_RV_PERCENT_PACED: 98
MDC_IDC_STAT_CRT_LV_PERCENT_PACED: 98
MDC_IDC_STAT_TACHYTHERAPY_ATP_DELIVERED_RECENT: 7
MDC_IDC_STAT_TACHYTHERAPY_SHOCKS_ABORTED_RECENT: 1
MDC_IDC_STAT_TACHYTHERAPY_SHOCKS_DELIVERED_RECENT: 1

## 2025-07-21 PROCEDURE — 93000 ELECTROCARDIOGRAM COMPLETE: CPT | Performed by: INTERNAL MEDICINE

## 2025-07-21 PROCEDURE — 93284 PRGRMG EVAL IMPLANTABLE DFB: CPT | Performed by: INTERNAL MEDICINE

## 2025-07-21 PROCEDURE — 99214 OFFICE O/P EST MOD 30 MIN: CPT | Performed by: INTERNAL MEDICINE

## 2025-07-21 RX ORDER — PREDNISONE 10 MG/1
10 TABLET ORAL DAILY
Qty: 120 TABLET | Refills: 0 | Status: SHIPPED | OUTPATIENT
Start: 2025-07-21

## 2025-07-21 RX ORDER — CETIRIZINE HYDROCHLORIDE 5 MG/1
5 TABLET ORAL DAILY
COMMUNITY

## 2025-07-21 RX ORDER — PREDNISONE 10 MG/1
10 TABLET ORAL DAILY
Qty: 120 TABLET | Refills: 0 | Status: SHIPPED | OUTPATIENT
Start: 2025-07-21 | End: 2025-07-21 | Stop reason: SDUPTHER

## 2025-07-21 NOTE — PROGRESS NOTES
CC--- recurrent VT    Sub  59-year-old male patient with recurrent VT and complete heart block with biventricular ICD in situ underwent cardiac cath for evaluation of sarcoidosis which revealed    CONCLUSIONS      1. Positive FDG-PET study with a metabolically active myocardium along the    basal to mid anteroseptal, inferoseptal walls with a matched perfusion    defect (focal mismatch pattern).      2. Additional metabolic activity noted in the basal inferolateral and less    so in the anterolateral wall inferior wall with less activity in the    inferoseptal wall and apical inferior wall with noted activity along the RV    free wall with no obvious associated perfusion mismatch. This may represent    early evidence of metabolically active lesions in the myocardium (early    disease) versus normal variant versus suboptimal patient preparation.      3. Overall burden of myocardial inflammation/mixed inflammation/scar is 17%    of the total myocardial volume (12% inflammatory, 5% mixed) with a maximum    SUV of 4.0, max myocardial to blood pool SUV ratio of 1.33.      4. Resting mild perfusion defects of the basal to distal anteroseptal and    apical septal walls with associated summed rest score of 8.      5. Low normal LV systolic function with no obvious regional wall motion    abnormalities/abnormalities in left ventricular wall thickening. LVEF 50%.       Recent history below    59-year-old patient with biventricular device and VT comes in for follow-up  Cardiac MRI was done which revealed      Patient presConclusion:     Spoiled gradient echo (FLASH) sequences were used for cine rather than balanced SSFP (TruFISP) to minimize ICD associated artifact. Several maneuvers were also employed including arms above head and end-inspiration acquisition.     1. LV normal in size with mildly reduced in function.  LVEF 42%.  Abnormal septal bounce in the setting of ventricular pacing.  Mild concentric LVH.  2. RV  moderately dilated in size and reduced in function.  RVEF 41% by 3 DR Labs quantitation and 30-35% on visual estimation.  Mild RVH.  3. There is no significant valvular abnormality.  4. There is no obvious perfusion defect on first-pass perfusion to suggest resting ischemia.  5. There is no significant LGE to suggest fibrosis/inflammation/infiltrative changes.  Of note, the apex and apical anterior wall are nondiagnostic due to severe artifact from ICD generator box.  6.  T2 STIR images were of limited quality but did not show obvious regional hyperintensity to suggest acute myocarditis/myocardial edema.  7.  T1 and T2 mapping sequences were done for investigative purposes and not clinically applied.  Image quality degraded due to device related artifact.  8. ICD lead noted, with tip well-positioned in the RV apex      Previous office note below      59-year-old male patient started have recurrent ICD therapy for VT and came to the office.  Patient presented with  left bundle branch block with intermittent complete heart block with near syncope and subsequently underwent biventricular pacemaker.  After pacemaker implantation patient presented again to the hospital with flulike illness with elevated RV threshold and elevated procalcitonin and repeat evaluation of RV lead was unchanged with normal RV lead function.  Patient was subsequently seen for sustained monomorphic VT unresponsive to amiodarone and patient admitted to the hospital  Prior to VT presentation patient had a splenic infarct and was in [facility] [place], operated by [facility] and had a PFO with EF of 45% and subsequently placed on apixaban.  Patient has a family history of factor V Leiden mutation however patient's testing was within normal limits.  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  He was discharged home and started have recurrent VT and patient  presented back to the office today.      Most recent echo attached below     Left ventricular ejection fraction appears to be 46 - 50%.    Left ventricular wall thickness is consistent with mild concentric hypertrophy.    Left ventricular diastolic function is consistent with (grade I) impaired relaxation.    Moderately reduced right ventricular systolic function noted.    The right ventricular cavity is moderately dilated.    The left atrial cavity is mild to moderately dilated.      Past Medical History:   Diagnosis Date    Allergic 01/01/1988    5+ molds, pollens, grass    Diabetes mellitus     Not sure like 2020    Diverticulitis     Diverticulosis     Mot sure 1995    GERD (gastroesophageal reflux disease)     Hypertension     Kidney stone     Not dure 1995    Obesity     Not sure 1999    Status post placement of cardiac pacemaker 03/06/2025     Past Surgical History:   Procedure Laterality Date    CARDIAC CATHETERIZATION Right 3/31/2025    Procedure: Coronary angiography;  Surgeon: Win Reaves MD;  Location: [facility] CATH INVASIVE LOCATION;  Service: Cardiovascular;  Laterality: Right;    CARDIAC CATHETERIZATION N/A 3/31/2025    Procedure: Left Heart Cath;  Surgeon: Win Reaves MD;  Location: [facility] CATH INVASIVE LOCATION;  Service: Cardiovascular;  Laterality: N/A;    CARDIAC ELECTROPHYSIOLOGY PROCEDURE N/A 2/21/2025    Procedure: Pacemaker DC new [place] aware;  Surgeon: Dez Loco MD;  Location: [facility] CATH INVASIVE LOCATION;  Service: Cardiovascular;  Laterality: N/A;    CARDIAC ELECTROPHYSIOLOGY PROCEDURE Left 4/1/2025    Procedure: Biventricular Device Upgrade-[place] aware;  Surgeon: Dez Loco MD;  Location: [facility] CATH INVASIVE LOCATION;  Service: Cardiovascular;  Laterality: Left;    ENDOSCOPY N/A 2/26/2025    Procedure: ESOPHAGOGASTRODUODENOSCOPY;  Surgeon: Kiko Talbert MD;  Location: [facility] ENDOSCOPY;  Service: Gastroenterology;  Laterality: N/A;  POST-ESOPHAGITIS,  GASTRITIS    HERNIA REPAIR       Family History   Problem Relation Age of Onset    Anxiety disorder Mother     Arthritis Mother     Depression Mother     Diabetes Mother     Hyperlipidemia Mother     Kidney disease Mother         Kidney stones    Cancer Father          of cancer in 2009    Diabetes Father      Social History     Tobacco Use    Smoking status: Never    Smokeless tobacco: Never   Vaping Use    Vaping status: Never Used   Substance Use Topics    Alcohol use: Never    Drug use: Never        Physical Exam    General:      well developed, well nourished, in no acute distress.    Head:      normocephalic and atraumatic.    Eyes:      PERRL/EOM intact, conjunctivae and sclerae clear without nystagmus.    Neck:      no  thyromegaly, trachea central with normal respiratory effort  Lungs:      clear bilaterally to auscultation.    Heart:       regular rate and rhythm, S1, S2 without murmurs, rubs, or gallops  Skin:      intact without lesions or rashes.    Psych:      alert and cooperative; normal mood and affect; normal attention span and concentration.      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  Patient and family educated  Treatment to be started with initiation of oral steroids  Patient may need additional therapy for glycemic control with initiation of steroids which was discussed with patient and family  Biventricular ICD in situ or interrogation attached to the chart  No recurrent VT on amiodarone  Underlying complete heart block with left bundle branch block and nonischemic cardiomyopathy currently on Jardiance/Toprol/losartan  Patient had splenic infarct with presence of PFO on apixaban  Gene testing inconclusive on [employer] testing with 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  Patient to avoid driving for now until VT is well-controlled  Biventricular ICD interrogated today without VT in the last 6 weeks  I will follow this patient in 6 weeks to see tolerance to steroid therapy          ECG 12 Lead    Date/Time: 7/21/2025 8:31 AM  Performed by: Dez Loco MD    Authorized by: Dez Loco MD  Comparison: compared with previous ECG   Similar to previous ECG  Rhythm: sinus rhythm and paced  Rate: normal      Electronically signed by Dez Loco MD, 07/21/25, 8:31 AM EDT.

## 2025-07-21 NOTE — LETTER
July 21, 2025     Sharon Silva MD  [address]  [address]  [place] [address]    Patient: Ismael Pulido   YOB: 1965   Date of Visit: 7/21/2025     Dear Sharon Silva MD:       Thank you for referring Ismael Pulido to me for evaluation. Below are the relevant portions of my assessment and plan of care.    If you have questions, please do not hesitate to call me. I look forward to following Ismael along with you.         Sincerely,        Dez Loco MD        CC: No Recipients    Dez Loco MD  07/21/25 0856  Sign when Signing Visit  CC--- recurrent VT    Sub  59-year-old male patient with recurrent VT and complete heart block with biventricular ICD in situ underwent cardiac cath for evaluation of sarcoidosis which revealed    CONCLUSIONS      1. Positive FDG-PET study with a metabolically active myocardium along the    basal to mid anteroseptal, inferoseptal walls with a matched perfusion    defect (focal mismatch pattern).      2. Additional metabolic activity noted in the basal inferolateral and less    so in the anterolateral wall inferior wall with less activity in the    inferoseptal wall and apical inferior wall with noted activity along the RV    free wall with no obvious associated perfusion mismatch. This may represent    early evidence of metabolically active lesions in the myocardium (early    disease) versus normal variant versus suboptimal patient preparation.      3. Overall burden of myocardial inflammation/mixed inflammation/scar is 17%    of the total myocardial volume (12% inflammatory, 5% mixed) with a maximum    SUV of 4.0, max myocardial to blood pool SUV ratio of 1.33.      4. Resting mild perfusion defects of the basal to distal anteroseptal and    apical septal walls with associated summed rest score of 8.      5. Low normal LV systolic function with no obvious regional wall motion    abnormalities/abnormalities in left  ventricular wall thickening. LVEF 50%.       Recent history below    59-year-old patient with biventricular device and VT comes in for follow-up  Cardiac MRI was done which revealed      Patient presConclusion:     Spoiled gradient echo (FLASH) sequences were used for cine rather than balanced SSFP (TruFISP) to minimize ICD associated artifact. Several maneuvers were also employed including arms above head and end-inspiration acquisition.     1. LV normal in size with mildly reduced in function.  LVEF 42%.  Abnormal septal bounce in the setting of ventricular pacing.  Mild concentric LVH.  2. RV moderately dilated in size and reduced in function.  RVEF 41% by 3 DR Labs quantitation and 30-35% on visual estimation.  Mild RVH.  3. There is no significant valvular abnormality.  4. There is no obvious perfusion defect on first-pass perfusion to suggest resting ischemia.  5. There is no significant LGE to suggest fibrosis/inflammation/infiltrative changes.  Of note, the apex and apical anterior wall are nondiagnostic due to severe artifact from ICD generator box.  6.  T2 STIR images were of limited quality but did not show obvious regional hyperintensity to suggest acute myocarditis/myocardial edema.  7.  T1 and T2 mapping sequences were done for investigative purposes and not clinically applied.  Image quality degraded due to device related artifact.  8. ICD lead noted, with tip well-positioned in the RV apex      Previous office note below      59-year-old male patient started have recurrent ICD therapy for VT and came to the office.  Patient presented with  left bundle branch block with intermittent complete heart block with near syncope and subsequently underwent biventricular pacemaker.  After pacemaker implantation patient presented again to the hospital with flulike illness with elevated RV threshold and elevated procalcitonin and repeat evaluation of RV lead was unchanged with normal RV lead function.  Patient  was subsequently seen for sustained monomorphic VT unresponsive to amiodarone and patient admitted to the hospital  Prior to VT presentation patient had a splenic infarct and was in [facility] and had a PFO with EF of 45% and subsequently placed on apixaban.  Patient has a family history of factor V Leiden mutation however patient's testing was within normal limits.  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  He was discharged home and started have recurrent VT and patient presented back to the office today.      Most recent echo attached below     Left ventricular ejection fraction appears to be 46 - 50%.  •  Left ventricular wall thickness is consistent with mild concentric hypertrophy.  •  Left ventricular diastolic function is consistent with (grade I) impaired relaxation.  •  Moderately reduced right ventricular systolic function noted.  •  The right ventricular cavity is moderately dilated.  •  The left atrial cavity is mild to moderately dilated.      Past Medical History:   Diagnosis Date   • Allergic 01/01/1988    5+ molds, pollens, grass   • Diabetes mellitus     Not sure like 2020   • Diverticulitis    • Diverticulosis     Mot sure 1995   • GERD (gastroesophageal reflux disease)    • Hypertension    • Kidney stone     Not dure 1995   • Obesity     Not sure 1999   • Status post placement of cardiac pacemaker 03/06/2025     Past Surgical History:   Procedure Laterality Date   • CARDIAC CATHETERIZATION Right 3/31/2025    Procedure: Coronary angiography;  Surgeon: Win Reaves MD;  Location:  [facility] CATH INVASIVE LOCATION;  Service: Cardiovascular;  Laterality: Right;   • CARDIAC CATHETERIZATION N/A 3/31/2025    Procedure: Left Heart Cath;  Surgeon: Win Reaves MD;  Location:  [facility];  Service: Cardiovascular;  Laterality: N/A;   • CARDIAC ELECTROPHYSIOLOGY PROCEDURE  N/A 2025    Procedure: Pacemaker DC new [place] aware;  Surgeon: Dez Loco MD;  Location: [facility] CATH INVASIVE LOCATION;  Service: Cardiovascular;  Laterality: N/A;   • CARDIAC ELECTROPHYSIOLOGY PROCEDURE Left 2025    Procedure: Biventricular Device Upgrade-[place] aware;  Surgeon: Dez Loco MD;  Location: [facility] CATH INVASIVE LOCATION;  Service: Cardiovascular;  Laterality: Left;   • ENDOSCOPY N/A 2025    Procedure: ESOPHAGOGASTRODUODENOSCOPY;  Surgeon: Kiko Talbert MD;  Location: [facility] ENDOSCOPY;  Service: Gastroenterology;  Laterality: N/A;  POST-ESOPHAGITIS, GASTRITIS   • HERNIA REPAIR       Family History   Problem Relation Age of Onset   • Anxiety disorder Mother    • Arthritis Mother    • Depression Mother    • Diabetes Mother    • Hyperlipidemia Mother    • Kidney disease Mother         Kidney stones   • Cancer Father          of cancer in 2009   • Diabetes Father      Social History     Tobacco Use   • Smoking status: Never   • Smokeless tobacco: Never   Vaping Use   • Vaping status: Never Used   Substance Use Topics   • Alcohol use: Never   • Drug use: Never        Physical Exam    General:      well developed, well nourished, in no acute distress.    Head:      normocephalic and atraumatic.    Eyes:      PERRL/EOM intact, conjunctivae and sclerae clear without nystagmus.    Neck:      no  thyromegaly, trachea central with normal respiratory effort  Lungs:      clear bilaterally to auscultation.    Heart:       regular rate and rhythm, S1, S2 without murmurs, rubs, or gallops  Skin:      intact without lesions or rashes.    Psych:      alert and cooperative; normal mood and affect; normal attention span and concentration.      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  Patient and family educated  Treatment to be started with initiation of oral steroids  Patient may need additional therapy for  glycemic control with initiation of steroids which was discussed with patient and family  Biventricular ICD in situ or interrogation attached to the chart  No recurrent VT on amiodarone  Underlying complete heart block with left bundle branch block and nonischemic cardiomyopathy currently on Jardiance/Toprol/losartan  Patient had splenic infarct with presence of PFO on apixaban  Gene testing inconclusive on [employer] testing with 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  Patient to avoid driving for now until VT is well-controlled  Biventricular ICD interrogated today without VT in the last 6 weeks  I will follow this patient in 6 weeks to see tolerance to steroid therapy          ECG 12 Lead    Date/Time: 7/21/2025 8:31 AM  Performed by: Dez Loco MD    Authorized by: Dez Loco MD  Comparison: compared with previous ECG   Similar to previous ECG  Rhythm: sinus rhythm and paced  Rate: normal      Electronically signed by Dez Loco MD, 07/21/25, 8:31 AM EDT.

## 2025-07-25 ENCOUNTER — TELEPHONE (OUTPATIENT)
Dept: CARDIOLOGY | Facility: CLINIC | Age: 60
End: 2025-07-25
Payer: COMMERCIAL

## 2025-07-25 NOTE — TELEPHONE ENCOUNTER
Pt is aware he is ok to give blood and to have teeth cleaning as scheduled. Letter faxed to dental office [phone].

## 2025-07-25 NOTE — TELEPHONE ENCOUNTER
Caller: Ismael Pulido    Relationship to patient: Self    Best call back number: [phone]    Patient is needing: PT CALLED STATING THEY HAVE AN OPPORTUNITY TO GIVE BLOOD AND PT WAS WONDERING IF THEY SHOULD. WOULD LIKE TO KNOW WHAT DR JOSHUA OR ELODIA THINKS ABOUT THIS AS PT IS ON ELIQUIS AND OTHER MEDICATIONS. PT ALSO STATES THAT THEY HAVE A DENTIST APPT ON 8/15 AND THAT THE DENTIST DIDN'T WANT TO DO TEETH CLEANING BECAUSE PT HAS A PACEMAKER AND MAY NEED TO TAKE MEDICATIONS PRIOR TO THE TEETH CLEANING. SHOULD PT CANCEL THIS APPT AND MOVE IT FURTHER OUT AGAIN? PLEASE CALL PT TO ADVISE.

## 2025-08-07 ENCOUNTER — NURSE TRIAGE (OUTPATIENT)
Dept: CALL CENTER | Facility: HOSPITAL | Age: 60
End: 2025-08-07
Payer: COMMERCIAL

## 2025-08-07 ENCOUNTER — APPOINTMENT (OUTPATIENT)
Dept: CT IMAGING | Facility: HOSPITAL | Age: 60
End: 2025-08-07
Payer: COMMERCIAL

## 2025-08-07 ENCOUNTER — HOSPITAL ENCOUNTER (OUTPATIENT)
Facility: HOSPITAL | Age: 60
Setting detail: OBSERVATION
LOS: 1 days | Discharge: HOME | End: 2025-08-08
Attending: EMERGENCY MEDICINE | Admitting: STUDENT IN AN ORGANIZED HEALTH CARE EDUCATION/TRAINING PROGRAM
Payer: COMMERCIAL

## 2025-08-07 DIAGNOSIS — R73.9 HYPERGLYCEMIA: ICD-10-CM

## 2025-08-07 DIAGNOSIS — K92.2 GASTROINTESTINAL HEMORRHAGE, UNSPECIFIED GASTROINTESTINAL HEMORRHAGE TYPE: Primary | ICD-10-CM

## 2025-08-07 LAB
ABO GROUP BLD: NORMAL
ALBUMIN SERPL-MCNC: 4.3 G/DL (ref 3.5–5.2)
ALBUMIN/GLOB SERPL: 1.3 G/DL
ALP SERPL-CCNC: 97 U/L (ref 39–117)
ALT SERPL W P-5'-P-CCNC: 54 U/L (ref 1–41)
ANION GAP SERPL CALCULATED.3IONS-SCNC: 15.7 MMOL/L (ref 5–15)
APTT PPP: 23.5 SECONDS (ref 22.7–35.4)
AST SERPL-CCNC: 33 U/L (ref 1–40)
ATMOSPHERIC PRESS: ABNORMAL MM[HG]
BASE EXCESS BLDV CALC-SCNC: 0.6 MMOL/L (ref -2–2)
BASOPHILS # BLD AUTO: 0.06 10*3/MM3 (ref 0–0.2)
BASOPHILS NFR BLD AUTO: 0.4 % (ref 0–1.5)
BDY SITE: ABNORMAL
BILIRUB SERPL-MCNC: 0.6 MG/DL (ref 0–1.2)
BILIRUB UR QL STRIP: NEGATIVE
BLD GP AB SCN SERPL QL: NEGATIVE
BUN SERPL-MCNC: 31.3 MG/DL (ref 6–20)
BUN/CREAT SERPL: 25.2 (ref 7–25)
CALCIUM SPEC-SCNC: 9.7 MG/DL (ref 8.6–10.5)
CHLORIDE SERPL-SCNC: 94 MMOL/L (ref 98–107)
CLARITY UR: CLEAR
CO2 SERPL-SCNC: 20.3 MMOL/L (ref 22–29)
COLOR UR: YELLOW
CREAT SERPL-MCNC: 1.24 MG/DL (ref 0.76–1.27)
DEPRECATED RDW RBC AUTO: 39.9 FL (ref 37–54)
EGFRCR SERPLBLD CKD-EPI 2021: 67 ML/MIN/1.73
EOSINOPHIL # BLD AUTO: 0.02 10*3/MM3 (ref 0–0.4)
EOSINOPHIL NFR BLD AUTO: 0.1 % (ref 0.3–6.2)
ERYTHROCYTE [DISTWIDTH] IN BLOOD BY AUTOMATED COUNT: 13 % (ref 12.3–15.4)
GLOBULIN UR ELPH-MCNC: 3.4 GM/DL
GLUCOSE BLDC GLUCOMTR-MCNC: 226 MG/DL (ref 70–105)
GLUCOSE SERPL-MCNC: 476 MG/DL (ref 65–99)
GLUCOSE UR STRIP-MCNC: ABNORMAL MG/DL
HCO3 BLDV-SCNC: 25 MMOL/L (ref 22–26)
HCT VFR BLD AUTO: 52.4 % (ref 37.5–51)
HGB BLD-MCNC: 17.5 G/DL (ref 13–17.7)
HGB UR QL STRIP.AUTO: NEGATIVE
IMM GRANULOCYTES # BLD AUTO: 0.12 10*3/MM3 (ref 0–0.05)
IMM GRANULOCYTES NFR BLD AUTO: 0.8 % (ref 0–0.5)
INHALED O2 CONCENTRATION: 21 %
INR PPP: 1.11 (ref 0.9–1.1)
KETONES UR QL STRIP: ABNORMAL
LEUKOCYTE ESTERASE UR QL STRIP.AUTO: NEGATIVE
LYMPHOCYTES # BLD AUTO: 1.48 10*3/MM3 (ref 0.7–3.1)
LYMPHOCYTES NFR BLD AUTO: 10.3 % (ref 19.6–45.3)
MAGNESIUM SERPL-MCNC: 2.4 MG/DL (ref 1.6–2.6)
MCH RBC QN AUTO: 28.6 PG (ref 26.6–33)
MCHC RBC AUTO-ENTMCNC: 33.4 G/DL (ref 31.5–35.7)
MCV RBC AUTO: 85.6 FL (ref 79–97)
MODALITY: ABNORMAL
MONOCYTES # BLD AUTO: 0.82 10*3/MM3 (ref 0.1–0.9)
MONOCYTES NFR BLD AUTO: 5.7 % (ref 5–12)
NEUTROPHILS NFR BLD AUTO: 11.82 10*3/MM3 (ref 1.7–7)
NEUTROPHILS NFR BLD AUTO: 82.7 % (ref 42.7–76)
NITRITE UR QL STRIP: NEGATIVE
NRBC BLD AUTO-RTO: 0 /100 WBC (ref 0–0.2)
PCO2 BLDV: 38.7 MM HG (ref 42–51)
PH BLDV: 7.42 PH UNITS (ref 7.32–7.43)
PH UR STRIP.AUTO: <=5 [PH] (ref 5–8)
PLATELET # BLD AUTO: 202 10*3/MM3 (ref 140–450)
PMV BLD AUTO: 9 FL (ref 6–12)
PO2 BLDV: 33.6 MM HG (ref 40–42)
POTASSIUM SERPL-SCNC: 5 MMOL/L (ref 3.5–5.2)
PROT SERPL-MCNC: 7.7 G/DL (ref 6–8.5)
PROT UR QL STRIP: NEGATIVE
PROTHROMBIN TIME: 14.3 SECONDS (ref 11.7–14.2)
RBC # BLD AUTO: 6.12 10*6/MM3 (ref 4.14–5.8)
RH BLD: POSITIVE
SAO2 % BLDCOV: 65.9 % (ref 45–75)
SODIUM SERPL-SCNC: 130 MMOL/L (ref 136–145)
SP GR UR STRIP: 1.03 (ref 1–1.03)
T&S EXPIRATION DATE: NORMAL
UROBILINOGEN UR QL STRIP: ABNORMAL
WBC NRBC COR # BLD AUTO: 14.32 10*3/MM3 (ref 3.4–10.8)

## 2025-08-07 PROCEDURE — 85610 PROTHROMBIN TIME: CPT | Performed by: EMERGENCY MEDICINE

## 2025-08-07 PROCEDURE — 25510000001 IOPAMIDOL PER 1 ML: Performed by: EMERGENCY MEDICINE

## 2025-08-07 PROCEDURE — G0378 HOSPITAL OBSERVATION PER HR: HCPCS

## 2025-08-07 PROCEDURE — 25810000003 SODIUM CHLORIDE 0.9 % SOLUTION: Performed by: EMERGENCY MEDICINE

## 2025-08-07 PROCEDURE — 82803 BLOOD GASES ANY COMBINATION: CPT | Performed by: EMERGENCY MEDICINE

## 2025-08-07 PROCEDURE — 86850 RBC ANTIBODY SCREEN: CPT | Performed by: EMERGENCY MEDICINE

## 2025-08-07 PROCEDURE — 83735 ASSAY OF MAGNESIUM: CPT | Performed by: EMERGENCY MEDICINE

## 2025-08-07 PROCEDURE — 81003 URINALYSIS AUTO W/O SCOPE: CPT | Performed by: EMERGENCY MEDICINE

## 2025-08-07 PROCEDURE — 85730 THROMBOPLASTIN TIME PARTIAL: CPT | Performed by: EMERGENCY MEDICINE

## 2025-08-07 PROCEDURE — 86901 BLOOD TYPING SEROLOGIC RH(D): CPT

## 2025-08-07 PROCEDURE — 80053 COMPREHEN METABOLIC PANEL: CPT | Performed by: EMERGENCY MEDICINE

## 2025-08-07 PROCEDURE — 86900 BLOOD TYPING SEROLOGIC ABO: CPT

## 2025-08-07 PROCEDURE — 36415 COLL VENOUS BLD VENIPUNCTURE: CPT

## 2025-08-07 PROCEDURE — 63710000001 INSULIN REGULAR HUMAN PER 5 UNITS: Performed by: EMERGENCY MEDICINE

## 2025-08-07 PROCEDURE — 82948 REAGENT STRIP/BLOOD GLUCOSE: CPT

## 2025-08-07 PROCEDURE — 85025 COMPLETE CBC W/AUTO DIFF WBC: CPT | Performed by: EMERGENCY MEDICINE

## 2025-08-07 PROCEDURE — 99285 EMERGENCY DEPT VISIT HI MDM: CPT

## 2025-08-07 PROCEDURE — 74177 CT ABD & PELVIS W/CONTRAST: CPT

## 2025-08-07 PROCEDURE — 86901 BLOOD TYPING SEROLOGIC RH(D): CPT | Performed by: EMERGENCY MEDICINE

## 2025-08-07 PROCEDURE — 86900 BLOOD TYPING SEROLOGIC ABO: CPT | Performed by: EMERGENCY MEDICINE

## 2025-08-07 RX ORDER — ACETAMINOPHEN 160 MG/5ML
650 SOLUTION ORAL EVERY 4 HOURS PRN
Status: DISCONTINUED | OUTPATIENT
Start: 2025-08-07 | End: 2025-08-08 | Stop reason: HOSPADM

## 2025-08-07 RX ORDER — PANTOPRAZOLE SODIUM 40 MG/1
40 TABLET, DELAYED RELEASE ORAL
Status: DISCONTINUED | OUTPATIENT
Start: 2025-08-08 | End: 2025-08-08 | Stop reason: HOSPADM

## 2025-08-07 RX ORDER — AMIODARONE HYDROCHLORIDE 200 MG/1
200 TABLET ORAL DAILY
Status: DISCONTINUED | OUTPATIENT
Start: 2025-08-08 | End: 2025-08-08 | Stop reason: HOSPADM

## 2025-08-07 RX ORDER — SODIUM CHLORIDE 9 MG/ML
40 INJECTION, SOLUTION INTRAVENOUS AS NEEDED
Status: DISCONTINUED | OUTPATIENT
Start: 2025-08-07 | End: 2025-08-08 | Stop reason: HOSPADM

## 2025-08-07 RX ORDER — ONDANSETRON 4 MG/1
4 TABLET, ORALLY DISINTEGRATING ORAL EVERY 6 HOURS PRN
Status: DISCONTINUED | OUTPATIENT
Start: 2025-08-07 | End: 2025-08-08 | Stop reason: HOSPADM

## 2025-08-07 RX ORDER — ACETAMINOPHEN 325 MG/1
650 TABLET ORAL EVERY 4 HOURS PRN
Status: DISCONTINUED | OUTPATIENT
Start: 2025-08-07 | End: 2025-08-08 | Stop reason: HOSPADM

## 2025-08-07 RX ORDER — LOSARTAN POTASSIUM 25 MG/1
25 TABLET ORAL DAILY
Status: DISCONTINUED | OUTPATIENT
Start: 2025-08-08 | End: 2025-08-08 | Stop reason: HOSPADM

## 2025-08-07 RX ORDER — SODIUM CHLORIDE 0.9 % (FLUSH) 0.9 %
10 SYRINGE (ML) INJECTION AS NEEDED
Status: DISCONTINUED | OUTPATIENT
Start: 2025-08-07 | End: 2025-08-08 | Stop reason: HOSPADM

## 2025-08-07 RX ORDER — ACETAMINOPHEN 650 MG/1
650 SUPPOSITORY RECTAL EVERY 4 HOURS PRN
Status: DISCONTINUED | OUTPATIENT
Start: 2025-08-07 | End: 2025-08-08 | Stop reason: HOSPADM

## 2025-08-07 RX ORDER — IBUPROFEN 600 MG/1
1 TABLET ORAL
Status: DISCONTINUED | OUTPATIENT
Start: 2025-08-07 | End: 2025-08-08 | Stop reason: HOSPADM

## 2025-08-07 RX ORDER — SODIUM CHLORIDE 0.9 % (FLUSH) 0.9 %
10 SYRINGE (ML) INJECTION EVERY 12 HOURS SCHEDULED
Status: DISCONTINUED | OUTPATIENT
Start: 2025-08-08 | End: 2025-08-08 | Stop reason: HOSPADM

## 2025-08-07 RX ORDER — INSULIN LISPRO 100 [IU]/ML
3-14 INJECTION, SOLUTION INTRAVENOUS; SUBCUTANEOUS
Status: DISCONTINUED | OUTPATIENT
Start: 2025-08-08 | End: 2025-08-08 | Stop reason: HOSPADM

## 2025-08-07 RX ORDER — CETIRIZINE HYDROCHLORIDE 10 MG/1
5 TABLET ORAL DAILY
Status: DISCONTINUED | OUTPATIENT
Start: 2025-08-08 | End: 2025-08-08 | Stop reason: HOSPADM

## 2025-08-07 RX ORDER — ONDANSETRON 2 MG/ML
4 INJECTION INTRAMUSCULAR; INTRAVENOUS EVERY 6 HOURS PRN
Status: DISCONTINUED | OUTPATIENT
Start: 2025-08-07 | End: 2025-08-08 | Stop reason: HOSPADM

## 2025-08-07 RX ORDER — METOPROLOL SUCCINATE 50 MG/1
100 TABLET, EXTENDED RELEASE ORAL DAILY
Status: DISCONTINUED | OUTPATIENT
Start: 2025-08-08 | End: 2025-08-08 | Stop reason: HOSPADM

## 2025-08-07 RX ORDER — ROSUVASTATIN CALCIUM 10 MG/1
10 TABLET, COATED ORAL DAILY
Status: DISCONTINUED | OUTPATIENT
Start: 2025-08-08 | End: 2025-08-08

## 2025-08-07 RX ORDER — IOPAMIDOL 755 MG/ML
100 INJECTION, SOLUTION INTRAVASCULAR
Status: COMPLETED | OUTPATIENT
Start: 2025-08-07 | End: 2025-08-07

## 2025-08-07 RX ORDER — DEXTROSE MONOHYDRATE 25 G/50ML
25 INJECTION, SOLUTION INTRAVENOUS
Status: DISCONTINUED | OUTPATIENT
Start: 2025-08-07 | End: 2025-08-08 | Stop reason: HOSPADM

## 2025-08-07 RX ORDER — NICOTINE POLACRILEX 4 MG
15 LOZENGE BUCCAL
Status: DISCONTINUED | OUTPATIENT
Start: 2025-08-07 | End: 2025-08-08 | Stop reason: HOSPADM

## 2025-08-07 RX ORDER — ALUMINA, MAGNESIA, AND SIMETHICONE 2400; 2400; 240 MG/30ML; MG/30ML; MG/30ML
15 SUSPENSION ORAL EVERY 6 HOURS PRN
Status: DISCONTINUED | OUTPATIENT
Start: 2025-08-07 | End: 2025-08-08 | Stop reason: HOSPADM

## 2025-08-07 RX ORDER — ASCORBIC ACID 500 MG
4000 TABLET ORAL DAILY
Status: DISCONTINUED | OUTPATIENT
Start: 2025-08-08 | End: 2025-08-08 | Stop reason: HOSPADM

## 2025-08-07 RX ADMIN — INSULIN HUMAN 5 UNITS: 100 INJECTION, SOLUTION PARENTERAL at 19:09

## 2025-08-07 RX ADMIN — SODIUM CHLORIDE 500 ML: 9 INJECTION, SOLUTION INTRAVENOUS at 19:08

## 2025-08-07 RX ADMIN — SODIUM CHLORIDE 500 ML: 9 INJECTION, SOLUTION INTRAVENOUS at 17:58

## 2025-08-07 RX ADMIN — IOPAMIDOL 100 ML: 755 INJECTION, SOLUTION INTRAVENOUS at 19:02

## 2025-08-08 ENCOUNTER — APPOINTMENT (OUTPATIENT)
Dept: ULTRASOUND IMAGING | Facility: HOSPITAL | Age: 60
End: 2025-08-08
Payer: COMMERCIAL

## 2025-08-08 ENCOUNTER — TELEPHONE (OUTPATIENT)
Dept: FAMILY MEDICINE CLINIC | Facility: CLINIC | Age: 60
End: 2025-08-08

## 2025-08-08 ENCOUNTER — READMISSION MANAGEMENT (OUTPATIENT)
Dept: CALL CENTER | Facility: HOSPITAL | Age: 60
End: 2025-08-08
Payer: COMMERCIAL

## 2025-08-08 VITALS
HEIGHT: 68 IN | BODY MASS INDEX: 35.38 KG/M2 | DIASTOLIC BLOOD PRESSURE: 79 MMHG | OXYGEN SATURATION: 94 % | SYSTOLIC BLOOD PRESSURE: 118 MMHG | TEMPERATURE: 97.6 F | WEIGHT: 233.47 LBS | HEART RATE: 61 BPM | RESPIRATION RATE: 13 BRPM

## 2025-08-08 PROBLEM — K62.5 RECTAL BLEEDING: Status: ACTIVE | Noted: 2025-08-08

## 2025-08-08 PROBLEM — K62.5 RECTAL BLEEDING: Status: RESOLVED | Noted: 2025-08-08 | Resolved: 2025-08-08

## 2025-08-08 LAB
ANION GAP SERPL CALCULATED.3IONS-SCNC: 14.7 MMOL/L (ref 5–15)
BASOPHILS # BLD AUTO: 0.05 10*3/MM3 (ref 0–0.2)
BASOPHILS # BLD AUTO: 0.06 10*3/MM3 (ref 0–0.2)
BASOPHILS NFR BLD AUTO: 0.4 % (ref 0–1.5)
BASOPHILS NFR BLD AUTO: 0.5 % (ref 0–1.5)
BUN SERPL-MCNC: 22.9 MG/DL (ref 6–20)
BUN/CREAT SERPL: 24.4 (ref 7–25)
CALCIUM SPEC-SCNC: 9.2 MG/DL (ref 8.6–10.5)
CHLORIDE SERPL-SCNC: 100 MMOL/L (ref 98–107)
CO2 SERPL-SCNC: 22.3 MMOL/L (ref 22–29)
CREAT SERPL-MCNC: 0.94 MG/DL (ref 0.76–1.27)
DEPRECATED RDW RBC AUTO: 41.6 FL (ref 37–54)
DEPRECATED RDW RBC AUTO: 42.5 FL (ref 37–54)
EGFRCR SERPLBLD CKD-EPI 2021: 93.4 ML/MIN/1.73
EOSINOPHIL # BLD AUTO: 0.12 10*3/MM3 (ref 0–0.4)
EOSINOPHIL # BLD AUTO: 0.12 10*3/MM3 (ref 0–0.4)
EOSINOPHIL NFR BLD AUTO: 1 % (ref 0.3–6.2)
EOSINOPHIL NFR BLD AUTO: 1 % (ref 0.3–6.2)
ERYTHROCYTE [DISTWIDTH] IN BLOOD BY AUTOMATED COUNT: 13.2 % (ref 12.3–15.4)
ERYTHROCYTE [DISTWIDTH] IN BLOOD BY AUTOMATED COUNT: 13.2 % (ref 12.3–15.4)
GLUCOSE BLDC GLUCOMTR-MCNC: 164 MG/DL (ref 70–105)
GLUCOSE BLDC GLUCOMTR-MCNC: 256 MG/DL (ref 70–105)
GLUCOSE SERPL-MCNC: 162 MG/DL (ref 65–99)
HAV IGM SERPL QL IA: NORMAL
HBV CORE IGM SERPL QL IA: NORMAL
HBV SURFACE AG SERPL QL IA: NORMAL
HCT VFR BLD AUTO: 48.6 % (ref 37.5–51)
HCT VFR BLD AUTO: 51 % (ref 37.5–51)
HCV AB SER QL: NORMAL
HGB BLD-MCNC: 16.2 G/DL (ref 13–17.7)
HGB BLD-MCNC: 17 G/DL (ref 13–17.7)
IMM GRANULOCYTES # BLD AUTO: 0.09 10*3/MM3 (ref 0–0.05)
IMM GRANULOCYTES # BLD AUTO: 0.12 10*3/MM3 (ref 0–0.05)
IMM GRANULOCYTES NFR BLD AUTO: 0.8 % (ref 0–0.5)
IMM GRANULOCYTES NFR BLD AUTO: 1 % (ref 0–0.5)
LYMPHOCYTES # BLD AUTO: 2.91 10*3/MM3 (ref 0.7–3.1)
LYMPHOCYTES # BLD AUTO: 3.46 10*3/MM3 (ref 0.7–3.1)
LYMPHOCYTES NFR BLD AUTO: 25.5 % (ref 19.6–45.3)
LYMPHOCYTES NFR BLD AUTO: 27.9 % (ref 19.6–45.3)
MCH RBC QN AUTO: 29 PG (ref 26.6–33)
MCH RBC QN AUTO: 29 PG (ref 26.6–33)
MCHC RBC AUTO-ENTMCNC: 33.3 G/DL (ref 31.5–35.7)
MCHC RBC AUTO-ENTMCNC: 33.3 G/DL (ref 31.5–35.7)
MCV RBC AUTO: 86.9 FL (ref 79–97)
MCV RBC AUTO: 86.9 FL (ref 79–97)
MONOCYTES # BLD AUTO: 0.73 10*3/MM3 (ref 0.1–0.9)
MONOCYTES # BLD AUTO: 0.77 10*3/MM3 (ref 0.1–0.9)
MONOCYTES NFR BLD AUTO: 6.2 % (ref 5–12)
MONOCYTES NFR BLD AUTO: 6.4 % (ref 5–12)
NEUTROPHILS NFR BLD AUTO: 63.5 % (ref 42.7–76)
NEUTROPHILS NFR BLD AUTO: 65.8 % (ref 42.7–76)
NEUTROPHILS NFR BLD AUTO: 7.52 10*3/MM3 (ref 1.7–7)
NEUTROPHILS NFR BLD AUTO: 7.86 10*3/MM3 (ref 1.7–7)
NRBC BLD AUTO-RTO: 0 /100 WBC (ref 0–0.2)
NRBC BLD AUTO-RTO: 0 /100 WBC (ref 0–0.2)
PLATELET # BLD AUTO: 152 10*3/MM3 (ref 140–450)
PLATELET # BLD AUTO: 169 10*3/MM3 (ref 140–450)
PMV BLD AUTO: 8.9 FL (ref 6–12)
PMV BLD AUTO: 9.2 FL (ref 6–12)
POTASSIUM SERPL-SCNC: 4 MMOL/L (ref 3.5–5.2)
RBC # BLD AUTO: 5.59 10*6/MM3 (ref 4.14–5.8)
RBC # BLD AUTO: 5.87 10*6/MM3 (ref 4.14–5.8)
SODIUM SERPL-SCNC: 137 MMOL/L (ref 136–145)
WBC NRBC COR # BLD AUTO: 11.43 10*3/MM3 (ref 3.4–10.8)
WBC NRBC COR # BLD AUTO: 12.38 10*3/MM3 (ref 3.4–10.8)

## 2025-08-08 PROCEDURE — 80074 ACUTE HEPATITIS PANEL: CPT

## 2025-08-08 PROCEDURE — 85025 COMPLETE CBC W/AUTO DIFF WBC: CPT | Performed by: NURSE PRACTITIONER

## 2025-08-08 PROCEDURE — 63710000001 INSULIN LISPRO (HUMAN) PER 5 UNITS: Performed by: NURSE PRACTITIONER

## 2025-08-08 PROCEDURE — 82948 REAGENT STRIP/BLOOD GLUCOSE: CPT | Performed by: NURSE PRACTITIONER

## 2025-08-08 PROCEDURE — 99214 OFFICE O/P EST MOD 30 MIN: CPT | Performed by: INTERNAL MEDICINE

## 2025-08-08 PROCEDURE — 82948 REAGENT STRIP/BLOOD GLUCOSE: CPT

## 2025-08-08 PROCEDURE — 63710000001 PREDNISONE PER 5 MG: Performed by: NURSE PRACTITIONER

## 2025-08-08 PROCEDURE — 85025 COMPLETE CBC W/AUTO DIFF WBC: CPT | Performed by: INTERNAL MEDICINE

## 2025-08-08 PROCEDURE — G0378 HOSPITAL OBSERVATION PER HR: HCPCS

## 2025-08-08 PROCEDURE — 76705 ECHO EXAM OF ABDOMEN: CPT

## 2025-08-08 PROCEDURE — 63710000001 PREDNISONE PER 1 MG: Performed by: NURSE PRACTITIONER

## 2025-08-08 PROCEDURE — 80048 BASIC METABOLIC PNL TOTAL CA: CPT | Performed by: NURSE PRACTITIONER

## 2025-08-08 RX ORDER — PREDNISONE 10 MG/1
10 TABLET ORAL DAILY
Status: DISCONTINUED | OUTPATIENT
Start: 2025-08-08 | End: 2025-08-08

## 2025-08-08 RX ORDER — GLIPIZIDE 5 MG/1
TABLET ORAL
Qty: 30 TABLET | Refills: 0 | Status: SHIPPED | OUTPATIENT
Start: 2025-08-08

## 2025-08-08 RX ORDER — PREDNISONE 10 MG/1
10 TABLET ORAL
Status: DISCONTINUED | OUTPATIENT
Start: 2025-08-19 | End: 2025-08-08 | Stop reason: HOSPADM

## 2025-08-08 RX ORDER — PREDNISONE 20 MG/1
20 TABLET ORAL
Status: DISCONTINUED | OUTPATIENT
Start: 2025-08-12 | End: 2025-08-08 | Stop reason: HOSPADM

## 2025-08-08 RX ADMIN — INSULIN LISPRO 8 UNITS: 100 INJECTION, SOLUTION INTRAVENOUS; SUBCUTANEOUS at 12:30

## 2025-08-08 RX ADMIN — CETIRIZINE HYDROCHLORIDE 5 MG: 10 TABLET, FILM COATED ORAL at 09:24

## 2025-08-08 RX ADMIN — INSULIN LISPRO 3 UNITS: 100 INJECTION, SOLUTION INTRAVENOUS; SUBCUTANEOUS at 09:22

## 2025-08-08 RX ADMIN — AMIODARONE HYDROCHLORIDE 200 MG: 200 TABLET ORAL at 09:24

## 2025-08-08 RX ADMIN — PREDNISONE 30 MG: 10 TABLET ORAL at 09:23

## 2025-08-08 RX ADMIN — PANTOPRAZOLE SODIUM 40 MG: 40 TABLET, DELAYED RELEASE ORAL at 00:00

## 2025-08-08 RX ADMIN — PANTOPRAZOLE SODIUM 40 MG: 40 TABLET, DELAYED RELEASE ORAL at 09:31

## 2025-08-08 RX ADMIN — LOSARTAN POTASSIUM 25 MG: 25 TABLET, FILM COATED ORAL at 09:24

## 2025-08-08 RX ADMIN — OXYCODONE HYDROCHLORIDE AND ACETAMINOPHEN 4000 MG: 500 TABLET ORAL at 09:22

## 2025-08-08 RX ADMIN — Medication 10 ML: at 09:31

## 2025-08-08 RX ADMIN — EMPAGLIFLOZIN 25 MG: 25 TABLET, FILM COATED ORAL at 09:23

## 2025-08-08 RX ADMIN — Medication 10 ML: at 00:00

## 2025-08-08 RX ADMIN — METOPROLOL SUCCINATE 100 MG: 50 TABLET, EXTENDED RELEASE ORAL at 09:23

## 2025-08-11 ENCOUNTER — TRANSITIONAL CARE MANAGEMENT TELEPHONE ENCOUNTER (OUTPATIENT)
Dept: CALL CENTER | Facility: HOSPITAL | Age: 60
End: 2025-08-11
Payer: COMMERCIAL

## 2025-08-11 ENCOUNTER — TELEPHONE (OUTPATIENT)
Dept: CARDIOLOGY | Facility: CLINIC | Age: 60
End: 2025-08-11
Payer: COMMERCIAL

## 2025-08-12 ENCOUNTER — PATIENT MESSAGE (OUTPATIENT)
Dept: FAMILY MEDICINE CLINIC | Facility: CLINIC | Age: 60
End: 2025-08-12
Payer: COMMERCIAL

## 2025-08-19 RX ORDER — GLIPIZIDE 5 MG/1
TABLET ORAL
Qty: 30 TABLET | Refills: 0 | OUTPATIENT
Start: 2025-08-19

## 2025-08-20 RX ORDER — GLIPIZIDE 5 MG/1
TABLET ORAL
Qty: 90 TABLET | Refills: 0 | Status: SHIPPED | OUTPATIENT
Start: 2025-08-20

## 2025-08-26 ENCOUNTER — TELEPHONE (OUTPATIENT)
Dept: CARDIOLOGY | Facility: CLINIC | Age: 60
End: 2025-08-26
Payer: COMMERCIAL

## 2025-08-26 ENCOUNTER — READMISSION MANAGEMENT (OUTPATIENT)
Dept: CALL CENTER | Facility: HOSPITAL | Age: 60
End: 2025-08-26
Payer: COMMERCIAL

## 2025-08-27 LAB
MC_CV_MDC_IDC_RATE_1: 150
MC_CV_MDC_IDC_RATE_1: 170
MC_CV_MDC_IDC_RATE_1: 205
MC_CV_MDC_IDC_SHOCK_MEASURED_IMPEDANCE: 72
MC_CV_MDC_IDC_THERAPIES: NORMAL
MC_CV_MDC_IDC_ZONE_ID: 1
MC_CV_MDC_IDC_ZONE_ID: 2
MC_CV_MDC_IDC_ZONE_ID: 3
MDC_IDC_MSMT_BATTERY_REMAINING_LONGEVITY: 132 MO
MDC_IDC_MSMT_BATTERY_REMAINING_PERCENTAGE: 100 %
MDC_IDC_MSMT_BATTERY_STATUS: NORMAL
MDC_IDC_MSMT_CAP_CHARGE_TIME: 9.4
MDC_IDC_MSMT_LEADCHNL_LV_DTM: NORMAL
MDC_IDC_MSMT_LEADCHNL_LV_IMPEDANCE_VALUE: 594
MDC_IDC_MSMT_LEADCHNL_LV_PACING_THRESHOLD_POLARITY: NORMAL
MDC_IDC_MSMT_LEADCHNL_LV_SENSING_INTR_AMPL: 9.8
MDC_IDC_MSMT_LEADCHNL_RA_DTM: NORMAL
MDC_IDC_MSMT_LEADCHNL_RA_IMPEDANCE_VALUE: 787
MDC_IDC_MSMT_LEADCHNL_RA_PACING_THRESHOLD_POLARITY: NORMAL
MDC_IDC_MSMT_LEADCHNL_RA_SENSING_INTR_AMPL: 8.3
MDC_IDC_MSMT_LEADCHNL_RV_DTM: NORMAL
MDC_IDC_MSMT_LEADCHNL_RV_IMPEDANCE_VALUE: 410
MDC_IDC_MSMT_LEADCHNL_RV_PACING_THRESHOLD_AMPLITUDE: 0.6
MDC_IDC_MSMT_LEADCHNL_RV_PACING_THRESHOLD_POLARITY: NORMAL
MDC_IDC_MSMT_LEADCHNL_RV_PACING_THRESHOLD_PULSEWIDTH: 0.4
MDC_IDC_PG_IMPLANT_DTM: NORMAL
MDC_IDC_PG_MFG: NORMAL
MDC_IDC_PG_MODEL: NORMAL
MDC_IDC_PG_SERIAL: NORMAL
MDC_IDC_PG_TYPE: NORMAL
MDC_IDC_SESS_DTM: NORMAL
MDC_IDC_SESS_TYPE: NORMAL
MDC_IDC_SET_BRADY_AT_MODE_SWITCH_RATE: 170
MDC_IDC_SET_BRADY_LOWRATE: 60
MDC_IDC_SET_BRADY_MAX_SENSOR_RATE: 130
MDC_IDC_SET_BRADY_MAX_TRACKING_RATE: 130
MDC_IDC_SET_BRADY_MODE: NORMAL
MDC_IDC_SET_BRADY_PAV_DELAY: 180
MDC_IDC_SET_BRADY_SAV_DELAY: 120
MDC_IDC_SET_CRT_LVRV_DELAY: 0
MDC_IDC_SET_CRT_PACED_CHAMBERS: NORMAL
MDC_IDC_SET_LEADCHNL_LV_PACING_AMPLITUDE: 2
MDC_IDC_SET_LEADCHNL_LV_PACING_PULSEWIDTH: 0.4
MDC_IDC_SET_LEADCHNL_RA_PACING_AMPLITUDE: 2
MDC_IDC_SET_LEADCHNL_RA_PACING_POLARITY: NORMAL
MDC_IDC_SET_LEADCHNL_RA_PACING_PULSEWIDTH: 0.4
MDC_IDC_SET_LEADCHNL_RA_SENSING_POLARITY: NORMAL
MDC_IDC_SET_LEADCHNL_RA_SENSING_SENSITIVITY: 0.25
MDC_IDC_SET_LEADCHNL_RV_PACING_AMPLITUDE: 2
MDC_IDC_SET_LEADCHNL_RV_PACING_POLARITY: NORMAL
MDC_IDC_SET_LEADCHNL_RV_PACING_PULSEWIDTH: 0.4
MDC_IDC_SET_LEADCHNL_RV_SENSING_POLARITY: NORMAL
MDC_IDC_SET_LEADCHNL_RV_SENSING_SENSITIVITY: 0.6
MDC_IDC_SET_ZONE_STATUS: NORMAL
MDC_IDC_SET_ZONE_TYPE: NORMAL
MDC_IDC_STAT_AT_BURDEN_PERCENT: 1
MDC_IDC_STAT_BRADY_RA_PERCENT_PACED: 34
MDC_IDC_STAT_BRADY_RV_PERCENT_PACED: 97
MDC_IDC_STAT_CRT_LV_PERCENT_PACED: 98
MDC_IDC_STAT_TACHYTHERAPY_ATP_DELIVERED_RECENT: 7
MDC_IDC_STAT_TACHYTHERAPY_SHOCKS_ABORTED_RECENT: 1
MDC_IDC_STAT_TACHYTHERAPY_SHOCKS_DELIVERED_RECENT: 1

## 2025-09-02 ENCOUNTER — ON CAMPUS - OUTPATIENT (OUTPATIENT)
Dept: URBAN - METROPOLITAN AREA HOSPITAL 85 | Facility: HOSPITAL | Age: 60
End: 2025-09-02
Payer: COMMERCIAL

## 2025-09-02 DIAGNOSIS — Z12.11 ENCOUNTER FOR SCREENING FOR MALIGNANT NEOPLASM OF COLON: ICD-10-CM

## 2025-09-02 DIAGNOSIS — K57.30 DIVERTICULOSIS OF LARGE INTESTINE WITHOUT PERFORATION OR ABS: ICD-10-CM

## 2025-09-02 DIAGNOSIS — D12.3 BENIGN NEOPLASM OF TRANSVERSE COLON: ICD-10-CM

## 2025-09-02 DIAGNOSIS — K64.8 OTHER HEMORRHOIDS: ICD-10-CM

## 2025-09-02 DIAGNOSIS — D12.0 BENIGN NEOPLASM OF CECUM: ICD-10-CM

## 2025-09-02 DIAGNOSIS — D12.2 BENIGN NEOPLASM OF ASCENDING COLON: ICD-10-CM

## 2025-09-02 PROCEDURE — 45385 COLONOSCOPY W/LESION REMOVAL: CPT | Performed by: INTERNAL MEDICINE

## (undated) DEVICE — SHT AIR TRANSFR COMFRT GLIDE LAT 40X80IN

## (undated) DEVICE — SUT MNCRYL 2/0 CT1 36IN

## (undated) DEVICE — 3M™ IOBAN™ 2 ANTIMICROBIAL INCISE DRAPE 6650EZ: Brand: IOBAN™ 2

## (undated) DEVICE — TR BAND RADIAL ARTERY COMPRESSION DEVICE: Brand: TR BAND

## (undated) DEVICE — PLASMABLADE PS200-040 4.0: Brand: PLASMABLADE™

## (undated) DEVICE — INTRO SHEATH PRELUDE SNAP .038 9F 13CM W/SDPRT BLK

## (undated) DEVICE — CABL BIPOL W/ALLGTR CLIP/SM 12FT

## (undated) DEVICE — DGW .035 FC J3MM 260CM TEF: Brand: EMERALD

## (undated) DEVICE — INTRO SHEATH PRELUDE SNAP .038 8F 13CM W/SDPRT

## (undated) DEVICE — IMMOB SHLDR CUT/AWAY UNIV

## (undated) DEVICE — DRAPE,INSTRUMENT,MAGNETIC,10X16: Brand: MEDLINE

## (undated) DEVICE — BITEBLOCK ENDO W/STRAP 60F A/ LF DISP

## (undated) DEVICE — PACEMAKER CDS: Brand: MEDLINE INDUSTRIES, INC.

## (undated) DEVICE — PK TRY HEART CATH 50

## (undated) DEVICE — FIXATION TOOL

## (undated) DEVICE — INTRO SHEATH PRELUDE SNAP .038 6F 13CM W/SDPRT

## (undated) DEVICE — GW CHOICE PT PLS .014 182CM

## (undated) DEVICE — Device

## (undated) DEVICE — ST ACC MICROPUNCTURE STFF/CANN PLAT/TP 4F 21G 40CM

## (undated) DEVICE — PAD E/S GRND SGL/FOIL 9FT/CORD DISP

## (undated) DEVICE — BIDIRECTIONAL TORQUE WRENCH NO2

## (undated) DEVICE — Device: Brand: WEBSTER

## (undated) DEVICE — ELECTRD DEFIB M/FUNC PROPADZ RADIOL 2PK

## (undated) DEVICE — SUT ETHIB 0/0 MO6 I8IN CX45D

## (undated) DEVICE — PK ENDO GI 50

## (undated) DEVICE — RADIFOCUS GLIDEWIRE: Brand: GLIDEWIRE

## (undated) DEVICE — GLIDESHEATH SLENDER STAINLESS STEEL KIT: Brand: GLIDESHEATH SLENDER

## (undated) DEVICE — PENCL SMOKE/EVAC MEGADYNE TELESCP 10FT

## (undated) DEVICE — INTRO SHEATH PRELUDE SNAP .038 10F 13CM W/SDPRT FUSCHIA

## (undated) DEVICE — SUT MNCRYL 3/0 SH 27 IN Y416H

## (undated) DEVICE — REFLEX ONE, SKIN STAPLER, 35 WIDE: Brand: REFLEX

## (undated) DEVICE — 3M™ PATIENT PLATE, CORDED, SPLIT, LARGE, 40 PER CASE, 1179: Brand: 3M™

## (undated) DEVICE — RADIFOCUS OPTITORQUE ANGIOGRAPHIC CATHETER: Brand: OPTITORQUE